# Patient Record
Sex: FEMALE | Race: WHITE | NOT HISPANIC OR LATINO | Employment: OTHER | URBAN - METROPOLITAN AREA
[De-identification: names, ages, dates, MRNs, and addresses within clinical notes are randomized per-mention and may not be internally consistent; named-entity substitution may affect disease eponyms.]

---

## 2017-01-09 DIAGNOSIS — M06.09 RHEUMATOID ARTHRITIS OF MULTIPLE SITES WITH NEGATIVE RHEUMATOID FACTOR (H): ICD-10-CM

## 2017-01-09 DIAGNOSIS — Z79.899 HIGH RISK MEDICATION USE: ICD-10-CM

## 2017-01-09 LAB
ALBUMIN SERPL-MCNC: 3.3 G/DL (ref 3.4–5)
ALP SERPL-CCNC: 62 U/L (ref 40–150)
ALT SERPL W P-5'-P-CCNC: 31 U/L (ref 0–50)
AST SERPL W P-5'-P-CCNC: 22 U/L (ref 0–45)
BASOPHILS # BLD AUTO: 0.1 10E9/L (ref 0–0.2)
BASOPHILS NFR BLD AUTO: 0.5 %
BILIRUB DIRECT SERPL-MCNC: <0.1 MG/DL (ref 0–0.2)
BILIRUB SERPL-MCNC: 0.4 MG/DL (ref 0.2–1.3)
CREAT SERPL-MCNC: 1.44 MG/DL (ref 0.52–1.04)
DIFFERENTIAL METHOD BLD: ABNORMAL
EOSINOPHIL # BLD AUTO: 0.1 10E9/L (ref 0–0.7)
EOSINOPHIL NFR BLD AUTO: 1.4 %
ERYTHROCYTE [DISTWIDTH] IN BLOOD BY AUTOMATED COUNT: 18.9 % (ref 10–15)
GFR SERPL CREATININE-BSD FRML MDRD: 34 ML/MIN/1.7M2
HCT VFR BLD AUTO: 35 % (ref 35–47)
HGB BLD-MCNC: 10.8 G/DL (ref 11.7–15.7)
LYMPHOCYTES # BLD AUTO: 2.1 10E9/L (ref 0.8–5.3)
LYMPHOCYTES NFR BLD AUTO: 20.6 %
MCH RBC QN AUTO: 28.7 PG (ref 26.5–33)
MCHC RBC AUTO-ENTMCNC: 30.9 G/DL (ref 31.5–36.5)
MCV RBC AUTO: 93 FL (ref 78–100)
MONOCYTES # BLD AUTO: 0.4 10E9/L (ref 0–1.3)
MONOCYTES NFR BLD AUTO: 4.1 %
NEUTROPHILS # BLD AUTO: 7.3 10E9/L (ref 1.6–8.3)
NEUTROPHILS NFR BLD AUTO: 73.4 %
PLATELET # BLD AUTO: 267 10E9/L (ref 150–450)
PROT SERPL-MCNC: 6.5 G/DL (ref 6.8–8.8)
RBC # BLD AUTO: 3.76 10E12/L (ref 3.8–5.2)
WBC # BLD AUTO: 10 10E9/L (ref 4–11)

## 2017-01-09 PROCEDURE — 82565 ASSAY OF CREATININE: CPT | Performed by: INTERNAL MEDICINE

## 2017-01-09 PROCEDURE — 80076 HEPATIC FUNCTION PANEL: CPT | Performed by: INTERNAL MEDICINE

## 2017-01-09 PROCEDURE — 36415 COLL VENOUS BLD VENIPUNCTURE: CPT | Performed by: INTERNAL MEDICINE

## 2017-01-09 PROCEDURE — 85025 COMPLETE CBC W/AUTO DIFF WBC: CPT | Performed by: INTERNAL MEDICINE

## 2017-01-10 NOTE — PROGRESS NOTES
Quick Note:    Rheumatology team: Please call to inform Ms. Stark that her renal function is slightly reduced, but similar to labs done in early December 2016. She also has mild anemia but will be reevaluated with her next labs.    Jamie Johnson MD  1/10/2017 4:52 PM    ______

## 2017-01-17 ENCOUNTER — TELEPHONE (OUTPATIENT)
Dept: FAMILY MEDICINE | Facility: CLINIC | Age: 82
End: 2017-01-17

## 2017-01-17 DIAGNOSIS — M06.09 RHEUMATOID ARTHRITIS OF MULTIPLE SITES WITH NEGATIVE RHEUMATOID FACTOR (H): Primary | ICD-10-CM

## 2017-01-17 RX ORDER — PREDNISONE 20 MG/1
TABLET ORAL
Qty: 5 TABLET | Refills: 0 | Status: SHIPPED | OUTPATIENT
Start: 2017-01-17 | End: 2017-01-25

## 2017-01-17 NOTE — TELEPHONE ENCOUNTER
Patients daughter notified of providers message as written.  Patients daugther verbalized understanding, no further questions or concerns.     Jerilyn Orr RN

## 2017-01-17 NOTE — TELEPHONE ENCOUNTER
Spoke with daughter, patients left foot toes and joints seem inflamed and are pink in color- especially in second toe, no swelling in big toe or pinky toe.  Sister in law is RN and thinks this gout, because they are on vacation in [place] she was hoping a prescription could be sent to pharmacy.  Patient is walking okay but it is painful.  This started last Wednesday 1/11/17 but is worse now.    Routing to provider to advise     Jerilyn Orr RN

## 2017-01-17 NOTE — TELEPHONE ENCOUNTER
Call her: I have sent prescription for prednisone 20 mg to be taken one daily (along with her daily 5 mg prednisone) for 5 days.     YISSEL LIGHT M.D.

## 2017-01-17 NOTE — TELEPHONE ENCOUNTER
Reason for call:  Patient reporting a symptom    Symptom or request: Left foot pain.    Duration (how long have symptoms been present): a week.    Have you been treated for this before? No    Additional comments: Requesting prescription for gout to be sent to [facility] in [place] [phone]    Phone Number patient can be reached at:  [phone]    Best Time:  any    Can we leave a detailed message on this number:  YES    Call taken on 1/17/2017 at 9:29 AM by Amarilis Chaudhry

## 2017-01-17 NOTE — TELEPHONE ENCOUNTER
Anna Hirsch is asking if the RX can be sent to [facility] in [place] [phone].  Thank-you,  .Lou Gudino

## 2017-01-25 ENCOUNTER — PRE VISIT (OUTPATIENT)
Dept: CARDIOLOGY | Facility: CLINIC | Age: 82
End: 2017-01-25

## 2017-01-25 ENCOUNTER — OFFICE VISIT (OUTPATIENT)
Dept: FAMILY MEDICINE | Facility: CLINIC | Age: 82
End: 2017-01-25
Payer: MEDICARE

## 2017-01-25 VITALS
TEMPERATURE: 97.1 F | WEIGHT: 120.2 LBS | HEIGHT: 61 IN | HEART RATE: 68 BPM | OXYGEN SATURATION: 99 % | SYSTOLIC BLOOD PRESSURE: 128 MMHG | BODY MASS INDEX: 22.69 KG/M2 | DIASTOLIC BLOOD PRESSURE: 58 MMHG

## 2017-01-25 DIAGNOSIS — B35.4 TINEA CORPORIS: ICD-10-CM

## 2017-01-25 DIAGNOSIS — B35.3 TINEA PEDIS OF BOTH FEET: ICD-10-CM

## 2017-01-25 DIAGNOSIS — M10.9 ACUTE GOUTY ARTHRITIS: Primary | ICD-10-CM

## 2017-01-25 LAB — URATE SERPL-MCNC: 5.7 MG/DL (ref 2.6–6)

## 2017-01-25 PROCEDURE — 84550 ASSAY OF BLOOD/URIC ACID: CPT | Performed by: FAMILY MEDICINE

## 2017-01-25 PROCEDURE — 99214 OFFICE O/P EST MOD 30 MIN: CPT | Performed by: FAMILY MEDICINE

## 2017-01-25 PROCEDURE — 36415 COLL VENOUS BLD VENIPUNCTURE: CPT | Performed by: FAMILY MEDICINE

## 2017-01-25 RX ORDER — ECONAZOLE NITRATE 10 MG/G
CREAM TOPICAL
Qty: 85 G | Refills: 3 | Status: SHIPPED | OUTPATIENT
Start: 2017-01-25 | End: 2018-08-23

## 2017-01-25 ASSESSMENT — PAIN SCALES - GENERAL: PAINLEVEL: MODERATE PAIN (4)

## 2017-01-25 NOTE — Clinical Note
[facility]  [address] [place] [address]    January 25, 2017    Roxanna Stark  [address] [facility] [address]  [place] [address]          Dear Roxanna,    Enclosed is a copy of your results. Your labs are all within normal limits  Your uric acid was normal. That really doesn't help much. I would have been helpful to know your uric acid on the day your gout started. I hope you never have gout again. Continue your healthy lifestyle.    Results for orders placed or performed in visit on 01/25/17   Uric acid   Result Value Ref Range    Uric Acid 5.7 2.6 - 6.0 mg/dL       If you have any questions or concerns, please call myself or my nurse at [phone].      Sincerely,        Letha Grissom MD, dr

## 2017-01-25 NOTE — PATIENT INSTRUCTIONS
[facility]    If you have any questions regarding to your visit please contact your care team:       Team Purple:   Clinic Hours Telephone Number   [address] Dr.   7am-7pm  Monday - Thursday   7am-5pm  Fridays  [phone]  (Appointment scheduling available 24/7)    Questions about your Visit?   Team Line:  [phone]   Urgent Care - [place] and [facility] - 11am-9pm Monday-Friday Saturday-Sunday- 9am-5pm   [place] - 5pm-9pm Monday-Friday Saturday-Sunday- 9am-5pm  [phone] - [facility]  [phone] - [place]       What options do I have for visits at the clinic other than the traditional office visit?  To expand how we care for you, many of our providers are utilizing electronic visits (e-visits) and telephone visits, when medically appropriate, for interactions with their patients rather than a visit in the clinic.   We also offer nurse visits for many medical concerns. Just like any other service, we will bill your insurance company for this type of visit based on time spent on the phone with your provider. Not all insurance companies cover these visits. Please check with your medical insurance if this type of visit is covered. You will be responsible for any charges that are not paid by your insurance.      E-visits via Miro:  generally incur a $35.00 fee.  Telephone visits:  Time spent on the phone: *charged based on time that is spent on the phone in increments of 10 minutes. Estimated cost:   5-10 mins $30.00   11-20 mins. $59.00   21-30 mins. $85.00     Use [email or web address] (secure email communication and access to your chart) to send your primary care provider a message or make an appointment. Ask someone on your Team how to sign up for Miro.  For a Price Quote for your services, please call our Consumer Price Line at [phone].  As always, Thank you for trusting us with your health care needs!

## 2017-01-25 NOTE — MR AVS SNAPSHOT
After Visit Summary   1/25/2017    Roxanna Stark    MRN: [id number]           Patient Information     Date Of Birth          5/20/1927        Visit Information        Provider Department      1/25/2017 9:00 AM Letha Grissom MD [facility]        Today's Diagnoses     Tinea corporis         Acute gouty arthritis         Tinea pedis of both feet           Care Instructions    [facility]    If you have any questions regarding to your visit please contact your care team:       Team Purple:   Clinic Hours Telephone Number   [address]NA [address] Dr[address] Dr.   7am-7pm  Monday - Thursday   7am-5pm  Fridays  [phone]  (Appointment scheduling available 24/7)    Questions about your Visit?   Team Line:  [phone]   Urgent Care - [place] - 11am-9pm Monday-Friday Saturday-Sunday- 9am-5pm   [place] - 5pm-9pm Monday-Friday Saturday-Sunday- 9am-5pm  [phone] - [facility]  [phone] - [place]       What options do I have for visits at the clinic other than the traditional office visit?  To expand how we care for you, many of our providers are utilizing electronic visits (e-visits) and telephone visits, when medically appropriate, for interactions with their patients rather than a visit in the clinic.   We also offer nurse visits for many medical concerns. Just like any other service, we will bill your insurance company for this type of visit based on time spent on the phone with your provider. Not all insurance companies cover these visits. Please check with your medical insurance if this type of visit is covered. You will be responsible for any charges that are not paid by your insurance.      E-visits via [employer]:  generally incur a $35.00 fee.  Telephone visits:  Time spent on the phone: *charged based on time that is spent on the phone in increments of 10 minutes. Estimated cost:   5-10  mins $30.00   11-20 mins. $59.00   21-30 mins. $85.00     Use IDENT Technologyhart (secure email communication and access to your chart) to send your primary care provider a message or make an appointment. Ask someone on your Team how to sign up for BIOSAFEt.  For a Price Quote for your services, please call our Consumer Price Line at [phone].  As always, Thank you for trusting us with your health care needs!            Follow-ups after your visit        Your next 10 appointments already scheduled     Feb 03, 2017  9:50 AM   Return Visit with Boston Navarro MD   [facility] ([facility])    [address]  [facility] [address]   [phone]            Feb 06, 2017  9:00 AM   LAB with [facility]   [facility] ([facility]    [address] [address] [phone]           Patient must bring picture ID.  Patient should be prepared to give a urine specimen  Please do not eat 10-12 hours before your appointment if you are coming in fasting for labs on lipids, cholesterol, or glucose (sugar).  Pregnant women should follow their Care Team instructions. Water with medications is okay. Do not drink coffee or other fluids.   If you have concerns about taking  your medications, please ask at office or if scheduling via [employer], send a message by clicking on Secure Messaging, Message Your Care Team.            Feb 09, 2017 10:00 AM   Return Visit with Jamie Johnson MD   [facility] ([facility])    [address] [address]   [phone]              Who to contact     If you have questions or need follow up information about today's clinic visit or your schedule please contact [facility] directly at [phone].  Normal or non-critical lab and imaging results will be communicated to you by MyChart, letter or phone within 4 business days after the  "clinic has received the results. If you do not hear from us within 7 days, please contact the clinic through CleveX or phone. If you have a critical or abnormal lab result, we will notify you by phone as soon as possible.  Submit refill requests through CleveX or call your pharmacy and they will forward the refill request to us. Please allow 3 business days for your refill to be completed.          Additional Information About Your Visit        Entrust[employer] Love QC Information     CleveX lets you send messages to your doctor, view your test results, renew your prescriptions, schedule appointments and more. To sign up, go to [email or web address] . Click on \"Log in\" on the left side of the screen, which will take you to the Welcome page. Then click on \"Sign up Now\" on the right side of the page.     You will be asked to enter the access code listed below, as well as some personal information. Please follow the directions to create your username and password.     Your access code is: [email or web address]  Expires: 2017  9:04 AM     Your access code will  in 90 days. If you need help or a new code, please call your [place] clinic or [phone].        Care EveryWhere ID     This is your Care EveryWhere ID. This could be used by other organizations to access your [place] medical records  [id number]        Your Vitals Were     Pulse Temperature Height BMI (Body Mass Index) Pulse Oximetry       68 97.1  F (36.2  C) (Oral) 5' 1\" (1.549 m) 22.72 kg/m2 99%        Blood Pressure from Last 3 Encounters:   17 128/58   16 126/71   11/10/16 151/68    Weight from Last 3 Encounters:   17 120 lb 3.2 oz (54.522 kg)   11/10/16 120 lb (54.432 kg)   16 118 lb (53.524 kg)              We Performed the Following     Uric acid          Today's Medication Changes          These changes are accurate as of: 17  9:34 AM.  If you have any questions, ask your nurse or doctor.               Start taking these " medicines.        Dose/Directions    econazole nitrate 1 % cream   Used for:  Tinea corporis, Tinea pedis of both feet   Started by:  Letha Grissom MD        Apply to red rash of legs and feet twice a day till rash is gone   Quantity:  85 g   Refills:  3            Where to get your medicines      These medications were sent to [id number] - [place], [place] - [address] [facility] [address], [facility] [address]     Phone:  [phone]    - econazole nitrate 1 % cream             Primary Care Provider Office Phone # Fax #    Letha Grissom [phone]       [address] [address]        Thank you!     Thank you for choosing [facility]  for your care. Our goal is always to provide you with excellent care. Hearing back from our patients is one way we can continue to improve our services. Please take a few minutes to complete the written survey that you may receive in the mail after your visit with us. Thank you!             Your Updated Medication List - Protect others around you: Learn how to safely use, store and throw away your medicines at www.disposemymeds.org.          This list is accurate as of: 1/25/17  9:34 AM.  Always use your most recent med list.                   Brand Name Dispense Instructions for use    aspirin 325 MG EC tablet     90 tablet    Take 1 tablet (325 mg) by mouth daily       calcium-vitamin D 500-125 MG-UNIT Tabs          econazole nitrate 1 % cream     85 g    Apply to red rash of legs and feet twice a day till rash is gone       folic acid 1 MG tablet    FOLVITE    100 tablet    Take 1 tablet (1 mg) by mouth daily       furosemide 20 MG tablet    LASIX    90 tablet    TAKE 1 TABLET BY MOUTH EVERY DAY IF 2 TO 3 POUNDS WEIGHT PAIN OVER 2 DAY PERIOD       ICAPS PO      2 tablets daily       lisinopril 5 MG tablet    PRINIVIL/ZESTRIL    90 tablet     Take 1 tablet (5 mg) by mouth daily       methotrexate sodium 2.5 MG Tabs     24 tablet    Take 15 mg by mouth once a week . Take all 6 tablets on the same day of each week.       metoprolol 25 MG tablet    LOPRESSOR    180 tablet    Take 1 tablet (25 mg) by mouth 2 times daily       OMEGA-3 FISH OIL PO      Take 2 g by mouth daily       predniSONE 5 MG tablet    DELTASONE    30 tablet    Take 1 tablet (5 mg) by mouth daily

## 2017-01-25 NOTE — PROGRESS NOTES
SUBJECTIVE:                                                    Roxanna Stark is a 89 year old female who presents to clinic today for the following health issues:      Musculoskeletal problem/pain      Duration: ongoing worse in the last week    Description  Location: left foot     Intensity:  mild, 4/10    Accompanying signs and symptoms: swelling, redness and burning feeling, hurts to touch it-stabbing pain    History  Previous similar problem: no   Previous evaluation:  none    Precipitating or alleviating factors:  Trauma or overuse: no   Aggravating factors include: standing, walking, climbing stairs and am pain    Therapies tried and outcome: rest/inactivity and covering with bandaid            Problem list and histories reviewed & adjusted, as indicated.  Additional history: as documented    Patient Active Problem List   Diagnosis     Polymyalgia rheumatica (H)     History of basal cell carcinoma     CARDIOVASCULAR SCREENING; LDL GOAL LESS THAN 130     Advanced directives, counseling/discussion     Hypertension goal BP (blood pressure) < 140/90     Hip pain     Left atrial enlargement     Aortic stenosis     Status post coronary angiogram     Aortic valve stenosis     Aortic valve replaced     Rheumatoid arthritis of multiple sites with negative rheumatoid factor (H)     Past Surgical History   Procedure Laterality Date     Colonoscopy  2002     C skin tissue procedure unlisted  11/3/08     mmis skin cancer excision     Cataract iol, rt/lt  5/09     bilateral     Replace valve aortic N/A 4/25/2016     Procedure: REPLACE VALVE AORTIC;  Surgeon: Sudeep Tsai MD;  Location:  OR       Social History   Substance Use Topics     Smoking status: Never Smoker      Smokeless tobacco: Never Used     Alcohol Use: Yes     Family History   Problem Relation Age of Onset     Breast Cancer Sister      Arthritis Sister      CANCER Sister      breast     Thyroid Disease Sister      CANCER Sister      colon      "Arthritis Sister      Arthritis Mother      Hypertension Father      Cancer - colorectal Father      Prostate Cancer Father      Arthritis Father      HEART DISEASE Father      Lipids Father      Arthritis Sister      Asthma Daughter      Asthma Daughter      CANCER Daughter 58     lung     CANCER  81     pancreatic          BP Readings from Last 3 Encounters:   01/25/17 128/58   11/11/16 126/71   11/10/16 151/68    Wt Readings from Last 3 Encounters:   01/25/17 120 lb 3.2 oz (54.522 kg)   11/10/16 120 lb (54.432 kg)   08/11/16 118 lb (53.524 kg)                  Labs reviewed in EPIC  Problem list, Medication list, Allergies, and Medical/Social/Surgical histories reviewed in [facility] and updated as appropriate.    ROS:  This 89 year old female is here today because she was on vacation with her grandson and daughter in [place], [place] last week and she suddenly had severe pain in her left foot. She is on prednisone 5 mg daily for chronic polymyalgia rheumatica and rheumatoid arthritis. Daughter called me on 1/17/17 and I felt patient most likely had acute gout given that her middle toe was the most painful, red, and inflamed. I treated her with an additional 20 mg prednisone daily for 5 days. She is back home now. She still has pain in that toe but it is getting better. She is able to walk and drive. However, she has developed a rash on her feet and on her left lower leg. It doesn't itch. All other review of systems are negative  Personal, family, and social history reviewed with patient and revised.         OBJECTIVE:                                                    /58 mmHg  Pulse 68  Temp(Src) 97.1  F (36.2  C) (Oral)  Ht 5' 1\" (1.549 m)  Wt 120 lb 3.2 oz (54.522 kg)  BMI 22.72 kg/m2  SpO2 99%  Body mass index is 22.72 kg/(m^2).   patient has classic tinea corporis on her left lower medial leg. Large area: larger than a silver dollar.   Patient has tinea pedis of both her feet, most likely due to her " chronic prednisone use and recent warm weather exposure  Patient has resolving gout of her left 3rd and 4th toes. 3rd is still quite painful and red. Top of left foot is still red and swollen. She probably doesn't need more prednisone at this time as she can wear shoes and walk.   Well hydrated  Well nourished  Well groomed  Alert and oriented X 3  Good spirits  Gait is quite brisk with no limp       Diagnostic Test Results:  none      ASSESSMENT/PLAN:                                                             1. Acute gouty arthritis  As above   - Uric acid    2. Tinea corporis  As above   - econazole nitrate 1 % cream; Apply to red rash of legs and feet twice a day till rash is gone  Dispense: 85 g; Refill: 3    3. Tinea pedis of both feet  As above   - econazole nitrate 1 % cream; Apply to red rash of legs and feet twice a day till rash is gone  Dispense: 85 g; Refill: 3    Return to clinic if no improvement     YISSEL LIGHT MD  [facility]

## 2017-01-25 NOTE — NURSING NOTE
"Chief Complaint   Patient presents with     Musculoskeletal Problem       Initial /58 mmHg  Pulse 68  Temp(Src) 97.1  F (36.2  C) (Oral)  Ht 5' 1\" (1.549 m)  Wt 120 lb 3.2 oz (54.522 kg)  BMI 22.72 kg/m2  SpO2 99% Estimated body mass index is 22.72 kg/(m^2) as calculated from the following:    Height as of this encounter: 5' 1\" (1.549 m).    Weight as of this encounter: 120 lb 3.2 oz (54.522 kg).  BP completed using cuff size: teresita Soriano CMA    "

## 2017-01-25 NOTE — TELEPHONE ENCOUNTER
Last Assessment & Plan:     1. Severe AS. S/p AVR with a #21 tissue prosthesis. Excellent post-surgical results.  2. Hypertension: well controlled.  3. Polymyalgia.  4. Mild volume overload.    4. Plan:    - Lasix 20 mg PO BID for 3 days; then take lasix 20 mg PO QD if 2-3 lbs weight gain over 2 day period. Quit taking lasix when weight returns to normal. Target weight 115-117 lbs.  - BMP in 2 weeks. (Basic metabolic panel LAST COMPLETED 12/14/2016)  - ASA 81 mg PO QHS.  - RTC in 6 months.      Boston Navarro MD, PhD    Chart prep completed; patient is up to date on requested cardiac/lab testing. No further information or testing needed at this time.   SORAIDA LatifM.A.

## 2017-01-31 ENCOUNTER — TELEPHONE (OUTPATIENT)
Dept: FAMILY MEDICINE | Facility: CLINIC | Age: 82
End: 2017-01-31

## 2017-01-31 NOTE — TELEPHONE ENCOUNTER
Call her. Ring worm takes a long time to go away. Is her ring worm rash spreading? She needs to use the cream regularly and faithfully.     YISSEL LIGHT M.D.

## 2017-01-31 NOTE — TELEPHONE ENCOUNTER
Patient notified of providers message as written.  Patient verbalized understanding, no further questions or concerns.  Patient will call back if worsening symptoms    Jerilyn Orr RN

## 2017-01-31 NOTE — TELEPHONE ENCOUNTER
Reason for call:  Patient reporting a symptom    Symptom or request: Left foot hurts    Duration (how long have symptoms been present): awhile    Have you been treated for this before? Yes    Additional comments: Patient states the econazole nitrate is not helping her. Please call.    Phone Number patient can be reached at:  Home number on file [phone] (home)    Best Time:  any    Can we leave a detailed message on this number:  YES    Call taken on 1/31/2017 at 2:14 PM by Pippa Mullins

## 2017-01-31 NOTE — TELEPHONE ENCOUNTER
Patient was seen on 1/25/17 and given Econazole cream.  Patient states it is not helping with her pain.  What are the next steps?  Please advise   Jerilyn Orr RN

## 2017-02-03 ENCOUNTER — OFFICE VISIT (OUTPATIENT)
Dept: CARDIOLOGY | Facility: CLINIC | Age: 82
End: 2017-02-03
Payer: MEDICARE

## 2017-02-03 VITALS
HEART RATE: 68 BPM | WEIGHT: 115 LBS | SYSTOLIC BLOOD PRESSURE: 131 MMHG | DIASTOLIC BLOOD PRESSURE: 68 MMHG | BODY MASS INDEX: 21.74 KG/M2 | OXYGEN SATURATION: 100 %

## 2017-02-03 DIAGNOSIS — I35.0 NONRHEUMATIC AORTIC VALVE STENOSIS: Primary | ICD-10-CM

## 2017-02-03 DIAGNOSIS — Z95.2 S/P AVR (AORTIC VALVE REPLACEMENT): ICD-10-CM

## 2017-02-03 PROCEDURE — 99213 OFFICE O/P EST LOW 20 MIN: CPT | Performed by: INTERNAL MEDICINE

## 2017-02-03 ASSESSMENT — PAIN SCALES - GENERAL: PAINLEVEL: NO PAIN (0)

## 2017-02-03 NOTE — PROGRESS NOTES
CARDIOLOGY CLINIC FOLLOW UP    HPI: Roxanna Stark is an 89 year-old very pleasant female patient seen today in follow up. She was initially seen for severe aortic stenosis and underwent AVR with a 21 mm tissue valve on 4/21/2016 by Dr. Tsai. Her postoperative course was uneventful. She is very active. She denies chest discomfort, dyspnea, PND, orthopnea, pedal edema, palpitations, lightheadedness, and syncope. When she was last seen in clinic, I discontinued her furosemide and K supplement. Today, she returns to clinic and is asymptomatic.    PAST MEDICAL HISTORY:  Past Medical History   Diagnosis Date     Polymyalgia rheumatica (H) 11/99     Temporal arteritis (H) 11/99     HTN (hypertension)      Melanoma in situ (H) 9/30/08     left arm     Basal cell carcinoma 9/30/08     left cheek     melanoma in situ 9/30/2008     LEFT ARM     Actinic keratosis      Basal cell cancer 7/2014     left eye medial canthus      Aortic stenosis 2014     CURRENT MEDICATIONS:  Current Outpatient Prescriptions   Medication Sig Dispense Refill     econazole nitrate 1 % cream Apply to red rash of legs and feet twice a day till rash is gone 85 g 3     methotrexate sodium 2.5 MG TABS Take 15 mg by mouth once a week . Take all 6 tablets on the same day of each week. 24 tablet 3     folic acid (FOLVITE) 1 MG tablet Take 1 tablet (1 mg) by mouth daily 100 tablet 3     predniSONE (DELTASONE) 5 MG tablet Take 1 tablet (5 mg) by mouth daily 30 tablet 3     metoprolol (LOPRESSOR) 25 MG tablet Take 1 tablet (25 mg) by mouth 2 times daily 180 tablet 3     lisinopril (PRINIVIL,ZESTRIL) 5 MG tablet Take 1 tablet (5 mg) by mouth daily 90 tablet 3     furosemide (LASIX) 20 MG tablet TAKE 1 TABLET BY MOUTH EVERY DAY IF 2 TO 3 POUNDS WEIGHT PAIN OVER 2 DAY PERIOD 90 tablet 1     calcium-vitamin D 500-125 MG-UNIT TABS        aspirin  MG EC tablet Take 1 tablet (325 mg) by mouth daily 90 tablet 3     Omega-3 Fatty Acids (OMEGA-3 FISH OIL PO) Take 2 g  by mouth daily       ICAPS PO 2 tablets daily       PAST SURGICAL HISTORY:  Past Surgical History   Procedure Laterality Date     Colonoscopy  2002     C skin tissue procedure unlisted  11/3/08     mmis skin cancer excision     Cataract iol, rt/lt  5/09     bilateral     Replace valve aortic N/A 4/25/2016     Procedure: REPLACE VALVE AORTIC;  Surgeon: Sudeep Tsai MD;  Location: [facility] OR     ALLERGIES  Review of patient's allergies indicates no known allergies.    FAMILY HX:  Family History   Problem Relation Age of Onset     Breast Cancer Sister      Arthritis Sister      CANCER Sister      breast     Thyroid Disease Sister      CANCER Sister      colon     Arthritis Sister      Arthritis Mother      Hypertension Father      Cancer - colorectal Father      Prostate Cancer Father      Arthritis Father      HEART DISEASE Father      Lipids Father      Arthritis Sister      Asthma Daughter      Asthma Daughter      CANCER Daughter 58     lung     CANCER  81     pancreatic      SOCIAL HX:  History     Social History     Marital Status:      Spouse Name: N/A     Number of Children: N/A     Years of Education: N/A     Occupational History      Retired     Social History Main Topics     Smoking status: Never Smoker      Smokeless tobacco: Never Used     Alcohol Use: Yes     Drug Use: No     Sexual Activity: No     Other Topics Concern     Not on file     Social History Narrative     ROS:  Constitutional: No fever, chills, or sweats. No weight gain/loss.   ENT: No visual disturbance, ear ache, epistaxis, sore throat.   Allergies/Immunologic: Negative.   Respiratory: No cough, hemoptysis.   Cardiovascular: As per HPI.   GI: No nausea, vomiting, hematemesis, melena, or hematochezia.   : No urinary frequency, dysuria, or hematuria.   Integument: Negative.   Psychiatric: Negative.   Neuro: Negative.   Endocrinology: Negative.   Musculoskeletal: No myalgia. The sternal wound is healing well without  erythema.    VITAL SIGNS:  /68 mmHg  Pulse 68  Wt 115 lb (52.164 kg)  SpO2 100%  Body mass index is 21.74 kg/(m^2).  Wt Readings from Last 2 Encounters:   17 115 lb (52.164 kg)   17 120 lb 3.2 oz (54.522 kg)       PHYSICAL EXAM  Roxanna Stark is an 89 year old female in no acute distress.  HEENT: Unremarkable.  Neck: JVP normal.  Carotids +4/4 bilaterally without bruits.  Lungs: CTA.  Cor: RRR. Normal S1 and S2, soft MICHAELA.  Abd: Soft, nontender, nondistended.  NABS.  No pulsatile mass.  Extremities: Trace to mild bilateral LE edema.  Pulses +4/4 symmetric in upper and lower extremities.  Neuro: Grossly intact.    LABS    WBC     12.5   2012  RBC     4.50   2012  HGB     13.9   2012  HCT     40.5   2012  No components found with this name: mct  MCV       90   2012  MCH     30.8   2012  MCHC     34.2   2012  RDW     13.3   2012  PLT      296   2012   Recent Labs   Lab Test  14   1426  13   1403   POTASSIUM  4.4  4.3   CR  1.10*  0.90     EK12.   Sinus Rhythm - occasional ectopic ventricular beat.    ECHOCARDIOGRAMS:   2016  Global and regional left ventricular function is normal with an EF of 60-65%.  There is at least moderate diastolic dysfunction.  Global right ventricular function is normal.  Severe aortic stenosis is present. The mean gradient across the aortic valve is 61 mmHg. The peak aortic velocity is 5.1 m/sec. JAVIER 0.6 cm2.    10/13/2014   1. Normal biventricular systolic function. LVEF estimate 65%.   2. Moderate aortic stenosis (peak velocity: 3.9 m/s, mean gradient: 36 mmHg, calculated valve area: 1.2 cm2).   3. Normal IVC with preserved respiratory variability.   4. Compared to study dated 02/10/14 the aortic valve parameters have slightly worsened.    14  Global and regional left ventricular function is normal with an EF of 55-60%. Global right ventricular function is normal. Trace tricuspid insufficiency is  present. Right ventricular systolic pressure is 19mmHg above the right atrial pressure. The inferior vena cava was normal in size with preserved respiratory variability. Small circumferential pericardial effusion is present without any hemodynamic significance. Chamber compression is not present; there is no evidence for tamponade.   Left Ventricle: Global and regional left ventricular function is normal with an EF of 55-60%. Left ventricular size is normal. Left ventricular Doppler filling pattern consistent with abnormal relaxation.   Right Ventricle: The right ventricle is normal size. Global right ventricular function is normal.   Atria: The right atria appears normal. Moderate left atrial enlargement is present.   Mitral Valve: Mild mitral annular calcification is present. Trace mitral insufficiency is present.   Aortic Valve: Mild aortic valve sclerosis is present. No aortic regurgitation is present.   Tricuspid Valve: The tricuspid valve is normal. Trace tricuspid insufficiency is present. Right ventricular systolic pressure is 19mmHg above the right atrial pressure.   Pulmonic Valve: The pulmonic valve is normal. Trace pulmonic insufficiency is present.   Vessels: The aorta root is normal. The inferior vena cava was normal in size with preserved respiratory variability.   Pericardium: Small circumferential pericardial effusion is present without any hemodynamic significance. Chamber compression is not present; there is no evidence for tamponade.    05/11/12  Left ventricular function, chamber size, wall motion, and wall thickness are normal.The EF is > 65%. Paradoxic low flow aortic stenosis with moderate to severe reduction in valve area. Visually the valve appears moderately calcified with mild-moderate cusp restriction.   Left Ventricle: Left ventricular function, chamber size, wall motion, and wall thickness are normal.The EF is > 65%. Relative wall thickness is increased consistent with concentric  remodeling.   Right Ventricle: Right ventricular function, chamber size, wall motion, and thickness are normal.   Atria: Both atria appear normal.   Mitral Valve: Mild to moderate mitral annular calcification is present. Systolic anterior motion without gradient.   Aortic Valve: The aortic valve is tricuspid. Mild aortic valve sclerosis is present.   Tricuspid Valve: The tricuspid valve is normal. Trace tricuspid insufficiency is present.   Pulmonic Valve: The pulmonic valve cannot be assessed.   Vessels: The aorta root is normal. The pulmonary artery is normal. The inferior vena cava is normal.   Pericardium: No pericardial effusion is present.     ASSESSMENT AND PLAN:   1. Severe AS. S/p AVR with a #21 tissue prosthesis. Excellent post-surgical results.  2. Hypertension: well controlled.  3. RA.  4. Plan:   - No changes to medications today. Euvolemic. Weight target 114-117 lbs. No need to take furosemide.  - RTC in 6 months with TTE.     Boston Navarro MD, PhD

## 2017-02-03 NOTE — Clinical Note
2/3/2017      RE: Roxanna Stark  [address]  [place] [address]       Dear Colleague,    Thank you for the opportunity to participate in the care of your patient, Roxanna Stark, at the [facility]. Please see a copy of my visit note below.    CARDIOLOGY CLINIC FOLLOW UP    HPI: Roxanna Stark is an 89 year-old very pleasant female patient seen today in follow up. She was initially seen for severe aortic stenosis and underwent AVR with a 21 mm tissue valve on 4/21/2016 by Dr. Tsai. Her postoperative course was uneventful. She is very active. She denies chest discomfort, dyspnea, PND, orthopnea, pedal edema, palpitations, lightheadedness, and syncope. When she was last seen in clinic, I discontinued her furosemide and K supplement. Today, she returns to clinic and is asymptomatic.    PAST MEDICAL HISTORY:  Past Medical History   Diagnosis Date     Polymyalgia rheumatica (H) 11/99     Temporal arteritis (H) 11/99     HTN (hypertension)      Melanoma in situ (H) 9/30/08     left arm     Basal cell carcinoma 9/30/08     left cheek     melanoma in situ 9/30/2008     LEFT ARM     Actinic keratosis      Basal cell cancer 7/2014     left eye medial canthus      Aortic stenosis 2014     CURRENT MEDICATIONS:  Current Outpatient Prescriptions   Medication Sig Dispense Refill     econazole nitrate 1 % cream Apply to red rash of legs and feet twice a day till rash is gone 85 g 3     methotrexate sodium 2.5 MG TABS Take 15 mg by mouth once a week . Take all 6 tablets on the same day of each week. 24 tablet 3     folic acid (FOLVITE) 1 MG tablet Take 1 tablet (1 mg) by mouth daily 100 tablet 3     predniSONE (DELTASONE) 5 MG tablet Take 1 tablet (5 mg) by mouth daily 30 tablet 3     metoprolol (LOPRESSOR) 25 MG tablet Take 1 tablet (25 mg) by mouth 2 times daily 180 tablet 3     lisinopril (PRINIVIL,ZESTRIL) 5 MG  tablet Take 1 tablet (5 mg) by mouth daily 90 tablet 3     furosemide (LASIX) 20 MG tablet TAKE 1 TABLET BY MOUTH EVERY DAY IF 2 TO 3 POUNDS WEIGHT PAIN OVER 2 DAY PERIOD 90 tablet 1     calcium-vitamin D 500-125 MG-UNIT TABS        aspirin  MG EC tablet Take 1 tablet (325 mg) by mouth daily 90 tablet 3     Omega-3 Fatty Acids (OMEGA-3 FISH OIL PO) Take 2 g by mouth daily       ICAPS PO 2 tablets daily       PAST SURGICAL HISTORY:  Past Surgical History   Procedure Laterality Date     Colonoscopy  2002     C skin tissue procedure unlisted  11/3/08     mmis skin cancer excision     Cataract iol, rt/lt  5/09     bilateral     Replace valve aortic N/A 4/25/2016     Procedure: REPLACE VALVE AORTIC;  Surgeon: Sudeep Tsai MD;  Location: [facility] OR     ALLERGIES  Review of patient's allergies indicates no known allergies.    FAMILY HX:  Family History   Problem Relation Age of Onset     Breast Cancer Sister      Arthritis Sister      CANCER Sister      breast     Thyroid Disease Sister      CANCER Sister      colon     Arthritis Sister      Arthritis Mother      Hypertension Father      Cancer - colorectal Father      Prostate Cancer Father      Arthritis Father      HEART DISEASE Father      Lipids Father      Arthritis Sister      Asthma Daughter      Asthma Daughter      CANCER Daughter 58     lung     CANCER  81     pancreatic      SOCIAL HX:  History     Social History     Marital Status:      Spouse Name: N/A     Number of Children: N/A     Years of Education: N/A     Occupational History      Retired     Social History Main Topics     Smoking status: Never Smoker      Smokeless tobacco: Never Used     Alcohol Use: Yes     Drug Use: No     Sexual Activity: No     Other Topics Concern     Not on file     Social History Narrative     ROS:  Constitutional: No fever, chills, or sweats. No weight gain/loss.   ENT: No visual disturbance, ear ache, epistaxis, sore throat.   Allergies/Immunologic:  Negative.   Respiratory: No cough, hemoptysis.   Cardiovascular: As per HPI.   GI: No nausea, vomiting, hematemesis, melena, or hematochezia.   : No urinary frequency, dysuria, or hematuria.   Integument: Negative.   Psychiatric: Negative.   Neuro: Negative.   Endocrinology: Negative.   Musculoskeletal: No myalgia. The sternal wound is healing well without erythema.    VITAL SIGNS:  /68 mmHg  Pulse 68  Wt 115 lb (52.164 kg)  SpO2 100%  Body mass index is 21.74 kg/(m^2).  Wt Readings from Last 2 Encounters:   17 115 lb (52.164 kg)   17 120 lb 3.2 oz (54.522 kg)       PHYSICAL EXAM  Roxanna Stark is an 89 year old female in no acute distress.  HEENT: Unremarkable.  Neck: JVP normal.  Carotids +4/4 bilaterally without bruits.  Lungs: CTA.  Cor: RRR. Normal S1 and S2, soft MICHAELA.  Abd: Soft, nontender, nondistended.  NABS.  No pulsatile mass.  Extremities: Trace to mild bilateral LE edema.  Pulses +4/4 symmetric in upper and lower extremities.  Neuro: Grossly intact.    LABS    WBC     12.5   2012  RBC     4.50   2012  HGB     13.9   2012  HCT     40.5   2012  No components found with this name: mct  MCV       90   2012  MCH     30.8   2012  MCHC     34.2   2012  RDW     13.3   2012  PLT      296   2012   Recent Labs   Lab Test  14   1426  13   1403   POTASSIUM  4.4  4.3   CR  1.10*  0.90     EK12.   Sinus Rhythm - occasional ectopic ventricular beat.    ECHOCARDIOGRAMS:   2016  Global and regional left ventricular function is normal with an EF of 60-65%.  There is at least moderate diastolic dysfunction.  Global right ventricular function is normal.  Severe aortic stenosis is present. The mean gradient across the aortic valve is 61 mmHg. The peak aortic velocity is 5.1 m/sec. JAVIER 0.6 cm2.    10/13/2014   1. Normal biventricular systolic function. LVEF estimate 65%.   2. Moderate aortic stenosis (peak velocity: 3.9 m/s, mean gradient: 36  mmHg, calculated valve area: 1.2 cm2).   3. Normal IVC with preserved respiratory variability.   4. Compared to study dated 02/10/14 the aortic valve parameters have slightly worsened.    02/11/14  Global and regional left ventricular function is normal with an EF of 55-60%. Global right ventricular function is normal. Trace tricuspid insufficiency is present. Right ventricular systolic pressure is 19mmHg above the right atrial pressure. The inferior vena cava was normal in size with preserved respiratory variability. Small circumferential pericardial effusion is present without any hemodynamic significance. Chamber compression is not present; there is no evidence for tamponade.   Left Ventricle: Global and regional left ventricular function is normal with an EF of 55-60%. Left ventricular size is normal. Left ventricular Doppler filling pattern consistent with abnormal relaxation.   Right Ventricle: The right ventricle is normal size. Global right ventricular function is normal.   Atria: The right atria appears normal. Moderate left atrial enlargement is present.   Mitral Valve: Mild mitral annular calcification is present. Trace mitral insufficiency is present.   Aortic Valve: Mild aortic valve sclerosis is present. No aortic regurgitation is present.   Tricuspid Valve: The tricuspid valve is normal. Trace tricuspid insufficiency is present. Right ventricular systolic pressure is 19mmHg above the right atrial pressure.   Pulmonic Valve: The pulmonic valve is normal. Trace pulmonic insufficiency is present.   Vessels: The aorta root is normal. The inferior vena cava was normal in size with preserved respiratory variability.   Pericardium: Small circumferential pericardial effusion is present without any hemodynamic significance. Chamber compression is not present; there is no evidence for tamponade.    05/11/12  Left ventricular function, chamber size, wall motion, and wall thickness are normal.The EF is > 65%.  Paradoxic low flow aortic stenosis with moderate to severe reduction in valve area. Visually the valve appears moderately calcified with mild-moderate cusp restriction.   Left Ventricle: Left ventricular function, chamber size, wall motion, and wall thickness are normal.The EF is > 65%. Relative wall thickness is increased consistent with concentric remodeling.   Right Ventricle: Right ventricular function, chamber size, wall motion, and thickness are normal.   Atria: Both atria appear normal.   Mitral Valve: Mild to moderate mitral annular calcification is present. Systolic anterior motion without gradient.   Aortic Valve: The aortic valve is tricuspid. Mild aortic valve sclerosis is present.   Tricuspid Valve: The tricuspid valve is normal. Trace tricuspid insufficiency is present.   Pulmonic Valve: The pulmonic valve cannot be assessed.   Vessels: The aorta root is normal. The pulmonary artery is normal. The inferior vena cava is normal.   Pericardium: No pericardial effusion is present.     ASSESSMENT AND PLAN:   1. Severe AS. S/p AVR with a #21 tissue prosthesis. Excellent post-surgical results.  2. Hypertension: well controlled.  3. RA.  4. Plan:   - No changes to medications today. Euvolemic. Weight target 114-117 lbs. No need to take furosemide.  - RTC in 6 months with TTE.     Boston Navarro MD, PhD

## 2017-02-03 NOTE — MR AVS SNAPSHOT
After Visit Summary   2/3/2017    Roxanna Stark    MRN: [id number]           Patient Information     Date Of Birth          5/20/1927        Visit Information        Provider Department      2/3/2017 9:50 AM Boston Navarro MD [facility] PHYSICIANS HEART AT [facility]        Today's Diagnoses     Aortic stenosis    -  1     Left atrial enlargement           Care Instructions    1. Dr. Boston Navarro has ordered an echocardiogram to be performed. We have scheduled your echocardiogram appointment at the  [facility] Imaging Department ([address] [place] [address]) for Friday, August 4th, 2017 at 9:00am.  Please arrive 15 minutes early to allow time for registration.  The Cardiology Nurse will contact you regarding the results (please see result notification details at bottom of summary).    Echocardiogram Instructions:  -Wear comfortable clothing  -Refrain from wearing perfumes or scented lotions      2. We have scheduled you for a cardiac follow up appointment with Dr. Boston Navarro at the [facility]  ([address] [place] [address]) on Friday, August 4th, 2017 at 10:30am with a check-in arrival time of 10:15am.  If this appointment conflicts with your schedule, please contact our specialty schedulers at [phone] to reschedule. We look forward to seeing you again.        [facility] Cardiology [facility] Location    If you have any questions regarding to your visit please contact your care team:     Cardiology  Telephone Number   Leonard Quinones  Cardiology RN's.    Goldie Rodriguez CMA [phone]    After hours: [phone].  [phone]   For scheduling appts:     [phone] or  [phone]    After hours: [phone]   For the Device Clinic (Pacemakers and ICD's)  RN's :  Rosa Knight   During business hours: [phone]  After business hours:  [phone]- select option 4.      If you need a medication refill please  contact your pharmacy.  Please allow 3 business days for your refill to be completed.    As always, Thank you for trusting us with your health care needs!  _____________________________________________________________________            Follow-ups after your visit        Your next 10 appointments already scheduled     Feb 06, 2017  9:00 AM   LAB with [facility]   [facility]    [address] [address]   [phone]           Patient must bring picture ID.  Patient should be prepared to give a urine specimen  Please do not eat 10-12 hours before your appointment if you are coming in fasting for labs on lipids, cholesterol, or glucose (sugar).  Pregnant women should follow their Care Team instructions. Water with medications is okay. Do not drink coffee or other fluids.   If you have concerns about taking  your medications, please ask at office or if scheduling via [email or web address], send a message by clicking on Secure Messaging, Message Your Care Team.            Feb 09, 2017 10:00 AM   Return Visit with Jamie Johnson MD   [facility] ([facility]    [address] [address]   [phone]            Aug 04, 2017 10:30 AM   Return Visit with Boston Navarro MD   [facility] PHYSICIANS HEART AT [facility] ([facility])    [address] [place] [address] [address] [facility] [address]   [phone]              Future tests that were ordered for you today     Open Future Orders        Priority Expected Expires Ordered    Echocardiogram Complete Routine  2/3/2018 2/3/2017            Who to contact     If you have questions or need follow up information about today's clinic visit or your schedule please contact [facility] PHYSICIANS HEART AT [facility] directly at [phone].  Normal or non-critical lab and imaging results will be communicated to you by MyChart, letter or phone within  "4 business days after the clinic has received the results. If you do not hear from us within 7 days, please contact the clinic through [employer] or phone. If you have a critical or abnormal lab result, we will notify you by phone as soon as possible.  Submit refill requests through [employer] or call your pharmacy and they will forward the refill request to us. Please allow 3 business days for your refill to be completed.          Additional Information About Your Visit        [employer] Information     [employer] lets you send messages to your doctor, view your test results, renew your prescriptions, schedule appointments and more. To sign up, go to [email or web address] . Click on \"Log in\" on the left side of the screen, which will take you to the Welcome page. Then click on \"Sign up Now\" on the right side of the page.     You will be asked to enter the access code listed below, as well as some personal information. Please follow the directions to create your username and password.     Your access code is: [email or web address]  Expires: 2017  9:04 AM     Your access code will  in 90 days. If you need help or a new code, please call your [facility] clinic or [phone].        Care EveryWhere ID     This is your Care EveryWhere ID. This could be used by other organizations to access your [facility] medical records  [id number]        Your Vitals Were     Pulse Pulse Oximetry                68 100%           Blood Pressure from Last 3 Encounters:   17 131/68   17 128/58   16 126/71    Weight from Last 3 Encounters:   17 52.164 kg (115 lb)   17 54.522 kg (120 lb 3.2 oz)   11/10/16 54.432 kg (120 lb)               Primary Care Provider Office Phone # Fax #    Letha Grissom [phone]       [address] [address]        Thank you!     Thank you for choosing [facility] PHYSICIANS HEART AT [facility]  for your " care. Our goal is always to provide you with excellent care. Hearing back from our patients is one way we can continue to improve our services. Please take a few minutes to complete the written survey that you may receive in the mail after your visit with us. Thank you!             Your Updated Medication List - Protect others around you: Learn how to safely use, store and throw away your medicines at www.disposemymeds.org.          This list is accurate as of: 2/3/17 10:31 AM.  Always use your most recent med list.                   Brand Name Dispense Instructions for use    aspirin 325 MG EC tablet     90 tablet    Take 1 tablet (325 mg) by mouth daily       calcium-vitamin D 500-125 MG-UNIT Tabs          econazole nitrate 1 % cream     85 g    Apply to red rash of legs and feet twice a day till rash is gone       folic acid 1 MG tablet    FOLVITE    100 tablet    Take 1 tablet (1 mg) by mouth daily       furosemide 20 MG tablet    LASIX    90 tablet    TAKE 1 TABLET BY MOUTH EVERY DAY IF 2 TO 3 POUNDS WEIGHT PAIN OVER 2 DAY PERIOD       ICAPS PO      2 tablets daily       lisinopril 5 MG tablet    PRINIVIL/ZESTRIL    90 tablet    Take 1 tablet (5 mg) by mouth daily       methotrexate sodium 2.5 MG Tabs     24 tablet    Take 15 mg by mouth once a week . Take all 6 tablets on the same day of each week.       metoprolol 25 MG tablet    LOPRESSOR    180 tablet    Take 1 tablet (25 mg) by mouth 2 times daily       OMEGA-3 FISH OIL PO      Take 2 g by mouth daily       predniSONE 5 MG tablet    DELTASONE    30 tablet    Take 1 tablet (5 mg) by mouth daily

## 2017-02-03 NOTE — NURSING NOTE
"Chief Complaint   Patient presents with     RECHECK     7 month cardiac follow up for Nonrheumatic aortic valve stenosis and S/P AVR (aortic valve replacement); States she is feeling well will sometimes feel her heartbeat with bending over.       Initial /68 mmHg  Pulse 68  Wt 115 lb (52.164 kg)  SpO2 100% Estimated body mass index is 21.74 kg/(m^2) as calculated from the following:    Height as of 1/25/17: 5' 1\" (1.549 m).    Weight as of this encounter: 115 lb (52.164 kg)..  BP completed using cuff size: teresita Rodriguez CMA      "

## 2017-02-03 NOTE — NURSING NOTE
Cardiac Testing: Patient given instructions regarding  echocardiogram (8-4-17). Discussed purpose, preparation, procedure and when to expect results reported back to the patient. Patient demonstrated understanding of this information and agreed to call with further questions or concerns.    Return Appointment: Patient given instructions regarding scheduling next clinic visit (on 8-14-17). Patient demonstrated understanding of this information and agreed to call with further questions or concerns.    Patient stated she understood all health information given and agreed to call with further questions or concerns.    Leonard Rashid RN

## 2017-02-03 NOTE — PATIENT INSTRUCTIONS
1. Dr. Boston Navarro has ordered an echocardiogram to be performed. We have scheduled your echocardiogram appointment at the  [facility] Imaging Department ([address] [place] [address]) for Friday, August 4th, 2017 at 9:00am.  Please arrive 15 minutes early to allow time for registration.  The Cardiology Nurse will contact you regarding the results (please see result notification details at bottom of summary).    Echocardiogram Instructions:  -Wear comfortable clothing  -Refrain from wearing perfumes or scented lotions      2. We have scheduled you for a cardiac follow up appointment with Dr. Boston Navarro at the [facility]  ([address] [place] [address]) on Friday, August 4th, 2017 at 10:30am with a check-in arrival time of 10:15am.  If this appointment conflicts with your schedule, please contact our specialty schedulers at [phone] to reschedule. We look forward to seeing you again.        [facility] Cardiology [facility] Location    If you have any questions regarding to your visit please contact your care team:     Cardiology  Telephone Number   Leonard Quinones  Cardiology RN's.    Goldie Rodriguez [facility] [phone]    After hours: [phone].  [phone]   For scheduling appts:     [phone] or  [phone]    After hours: [phone]   For the Device Clinic (Pacemakers and ICD's)  RN's :  Rosa Knight   During business hours: [phone]  After business hours:  [phone]- select option 4.      If you need a medication refill please contact your pharmacy.  Please allow 3 business days for your refill to be completed.    As always, Thank you for trusting us with your health care needs!  _____________________________________________________________________

## 2017-02-06 DIAGNOSIS — Z79.899 HIGH RISK MEDICATION USE: ICD-10-CM

## 2017-02-06 DIAGNOSIS — M06.09 RHEUMATOID ARTHRITIS OF MULTIPLE SITES WITH NEGATIVE RHEUMATOID FACTOR (H): ICD-10-CM

## 2017-02-06 LAB
ALBUMIN SERPL-MCNC: 3.5 G/DL (ref 3.4–5)
ALP SERPL-CCNC: 60 U/L (ref 40–150)
ALT SERPL W P-5'-P-CCNC: 39 U/L (ref 0–50)
AST SERPL W P-5'-P-CCNC: 29 U/L (ref 0–45)
BASOPHILS # BLD AUTO: 0.1 10E9/L (ref 0–0.2)
BASOPHILS NFR BLD AUTO: 1.1 %
BILIRUB DIRECT SERPL-MCNC: <0.1 MG/DL (ref 0–0.2)
BILIRUB SERPL-MCNC: 0.3 MG/DL (ref 0.2–1.3)
CREAT SERPL-MCNC: 1.46 MG/DL (ref 0.52–1.04)
CRP SERPL-MCNC: <2.9 MG/L (ref 0–8)
DIFFERENTIAL METHOD BLD: ABNORMAL
EOSINOPHIL # BLD AUTO: 0.2 10E9/L (ref 0–0.7)
EOSINOPHIL NFR BLD AUTO: 2.1 %
ERYTHROCYTE [DISTWIDTH] IN BLOOD BY AUTOMATED COUNT: 20.6 % (ref 10–15)
ERYTHROCYTE [SEDIMENTATION RATE] IN BLOOD BY WESTERGREN METHOD: 26 MM/H (ref 0–30)
GFR SERPL CREATININE-BSD FRML MDRD: 34 ML/MIN/1.7M2
HCT VFR BLD AUTO: 36.1 % (ref 35–47)
HGB BLD-MCNC: 11.1 G/DL (ref 11.7–15.7)
LYMPHOCYTES # BLD AUTO: 2.1 10E9/L (ref 0.8–5.3)
LYMPHOCYTES NFR BLD AUTO: 18.6 %
MCH RBC QN AUTO: 29.5 PG (ref 26.5–33)
MCHC RBC AUTO-ENTMCNC: 30.7 G/DL (ref 31.5–36.5)
MCV RBC AUTO: 96 FL (ref 78–100)
MONOCYTES # BLD AUTO: 1.1 10E9/L (ref 0–1.3)
MONOCYTES NFR BLD AUTO: 10.2 %
NEUTROPHILS # BLD AUTO: 7.5 10E9/L (ref 1.6–8.3)
NEUTROPHILS NFR BLD AUTO: 68 %
PLATELET # BLD AUTO: 266 10E9/L (ref 150–450)
PROT SERPL-MCNC: 6.9 G/DL (ref 6.8–8.8)
RBC # BLD AUTO: 3.76 10E12/L (ref 3.8–5.2)
WBC # BLD AUTO: 11 10E9/L (ref 4–11)

## 2017-02-06 PROCEDURE — 86140 C-REACTIVE PROTEIN: CPT | Performed by: INTERNAL MEDICINE

## 2017-02-06 PROCEDURE — 82565 ASSAY OF CREATININE: CPT | Performed by: INTERNAL MEDICINE

## 2017-02-06 PROCEDURE — 36415 COLL VENOUS BLD VENIPUNCTURE: CPT | Performed by: INTERNAL MEDICINE

## 2017-02-06 PROCEDURE — 85652 RBC SED RATE AUTOMATED: CPT | Performed by: INTERNAL MEDICINE

## 2017-02-06 PROCEDURE — 80076 HEPATIC FUNCTION PANEL: CPT | Performed by: INTERNAL MEDICINE

## 2017-02-06 PROCEDURE — 85025 COMPLETE CBC W/AUTO DIFF WBC: CPT | Performed by: INTERNAL MEDICINE

## 2017-02-09 ENCOUNTER — OFFICE VISIT (OUTPATIENT)
Dept: RHEUMATOLOGY | Facility: CLINIC | Age: 82
End: 2017-02-09
Payer: MEDICARE

## 2017-02-09 VITALS
TEMPERATURE: 96.2 F | HEART RATE: 61 BPM | BODY MASS INDEX: 22.51 KG/M2 | HEIGHT: 61 IN | SYSTOLIC BLOOD PRESSURE: 140 MMHG | DIASTOLIC BLOOD PRESSURE: 63 MMHG | WEIGHT: 119.2 LBS | OXYGEN SATURATION: 100 %

## 2017-02-09 DIAGNOSIS — G89.29 CHRONIC RIGHT SHOULDER PAIN: ICD-10-CM

## 2017-02-09 DIAGNOSIS — M06.09 RHEUMATOID ARTHRITIS OF MULTIPLE SITES WITH NEGATIVE RHEUMATOID FACTOR (H): Primary | ICD-10-CM

## 2017-02-09 DIAGNOSIS — Z79.899 HIGH RISK MEDICATION USE: ICD-10-CM

## 2017-02-09 DIAGNOSIS — M25.511 CHRONIC RIGHT SHOULDER PAIN: ICD-10-CM

## 2017-02-09 PROCEDURE — 99213 OFFICE O/P EST LOW 20 MIN: CPT | Mod: 25 | Performed by: INTERNAL MEDICINE

## 2017-02-09 PROCEDURE — 20610 DRAIN/INJ JOINT/BURSA W/O US: CPT | Mod: RT | Performed by: INTERNAL MEDICINE

## 2017-02-09 RX ORDER — TRIAMCINOLONE ACETONIDE 40 MG/ML
40 INJECTION, SUSPENSION INTRA-ARTICULAR; INTRAMUSCULAR ONCE
Qty: 1 ML | Refills: 0 | OUTPATIENT
Start: 2017-02-09 | End: 2017-02-09

## 2017-02-09 RX ORDER — METHOTREXATE 2.5 MG/1
15 TABLET ORAL WEEKLY
Qty: 24 TABLET | Refills: 3 | Status: SHIPPED | OUTPATIENT
Start: 2017-02-09 | End: 2017-05-05

## 2017-02-09 RX ORDER — PREDNISONE 5 MG/1
5 TABLET ORAL DAILY
Qty: 30 TABLET | Refills: 3 | Status: SHIPPED | OUTPATIENT
Start: 2017-02-09 | End: 2017-05-05

## 2017-02-09 NOTE — NURSING NOTE
"Chief Complaint   Patient presents with     RECHECK     RA no concerns       Initial /63 mmHg  Pulse 61  Temp(Src) 96.2  F (35.7  C) (Oral)  Ht 1.549 m (5' 1\")  Wt 54.069 kg (119 lb 3.2 oz)  BMI 22.53 kg/m2  SpO2 100% Estimated body mass index is 22.53 kg/(m^2) as calculated from the following:    Height as of this encounter: 1.549 m (5' 1\").    Weight as of this encounter: 54.069 kg (119 lb 3.2 oz).  BP completed using cuff size: regular         RAPID3 (0-30) Cumulative Score  8.0          RAPID3 Weighted Score (divide #4 by 3 and that is the weighted score)  2.7           "

## 2017-02-09 NOTE — PROGRESS NOTES
Rheumatology Clinic Visit      Roxanna Stark MRN# [id number]   YOB: 1927 Age: 89 year old      Date of visit: 2/09/17   PCP: Dr. Letha Grissom     Chief Complaint   Patient presents with:  RECHECK: RA no concerns      Assessment and Plan     1. 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  She was treated with prednisone monotherapy for several years, being able to taper off without recurrence of symptoms. She then developed worsening symptoms in her hands and was diagnosed with rheumatoid arthritis. Initially, she was resistant to DMARD therapy. She has now been on methotrexate 15 mg once weekly for about 3 months and is doing significantly better. No synovitis on exam today. Plan to continue methotrexate and expect that prednisone will be able to be tapered at her next clinic visit.   - Continue Methotrexate 15mg once weekly   - Continue folic acid 1mg daily  - Continue prednisone 5 mg daily  - Labs 2-3 days prior to the next rheumatology clinic visit: CBC, Creatinine, Hepatic Panel, ESR, CRP     2. Giant Cell Arteritis History?: 12/26/2005 Left TA biopsy negative per [facility] record review.  No symptoms of GCA at this time.     3. Right shoulder rotator cuff tear and pain: Previously evaluated by orthopedic surgery and her pain resolved for approximately one year after having a steroid injection in March 2015. She does not want to have surgical correction of her shoulder. Repeat steroid injection in Feb 2016 was effective. Restart injection in November 2016 was not as effective as previous injections, as it wore off in the last couple weeks. Repeat steroid injection of the right shoulder today. I strongly encouraged her to go to physical therapy; she preferred doing the activities at home that she has been taught. If she does not improve, she is going to reconsider seeing  orthopedic surgery. She believes that some of her pain has worsened since she was not doing physical therapy exercises after her heart surgery.  - Steroid injection as documented in the procedure section    4. History of basal cell carcinoma / skin lesion: Currently with chronic lesion on her face that does not appear to be basal cell carcinoma but should be evaluated by dermatology.  I encouraged her again to see her dermatologist.     5. Bone Health: Managed by PCP already.    Ms. Stark verbalized agreement with and understanding of the rational for the diagnosis and treatment plan.  All questions were answered to best of my ability and the patient's satisfaction. Ms. Stark was advised to contact the clinic with any questions that may arise after the clinic visit.      Thank you for involving me in the care of the patient    Return to clinic: 3 months      HPI   Roxanna Stark is a 89 year old female with medical history significant for basal cell carcinoma, hypertension, aortic stenosis, right rotator cuff tear (previously evaluated by Dr. Bingham, orthopedic surgery, on 3/20/2015 where at that time Ms. Stark was not interested in surgical correction; she received an intra-articular steroid injection at that time that was effective for ~1year), temporal arteritis?, and seronegative erosive rheumatoid arthritis.     Today, she tells me that she does not want to live with the pain in her hands anymore. She tells me that she has pain and swelling in her MCPs, PIPs, and wrists. Morning stiffness for at least 2-3 hours per day. Pain and stiffness improve with activity. She continues to have some pain in her shoulders and hips. She is willing to start a steroid sparing DMARD now. Currently on prednisone 5 mg daily.    Denies fevers, chills, nausea, vomiting, constipation, diarrhea. No abdominal pain. No chest pain/pressure, palpitations, or shortness of breath. No oral or nasal sores. No neck pain. No rash. No LE  swelling.  No headache. No jaw claudication. No scalp tenderness.  No change in vision; no vision loss.     Tobacco: None  EtOH: No more than 1 drink per week  Drugs: None  Occupation: Used to work for the [employer] company; now retired    ROS   GEN: No fevers, chills, night sweats, or weight change  SKIN: No itching, rashes, sores  HEENT: No oral or nasal ulcers.  CV: No chest pain, pressure, palpitations, or dyspnea on exertion.  PULM: No SOB, wheeze, cough.  GI: No nausea, vomiting, constipation, diarrhea. No blood in stool. No abdominal pain.  : No blood in urine.  MSK: See HPI.  NEURO: No numbness, tingling, or weakness.  ENDO: No heat/cold intolerance.  EXT: No LE swelling    Active Problem List     Patient Active Problem List   Diagnosis     Polymyalgia rheumatica (H)     History of basal cell carcinoma     CARDIOVASCULAR SCREENING; LDL GOAL LESS THAN 130     Advanced directives, counseling/discussion     Hypertension goal BP (blood pressure) < 140/90     Hip pain     Left atrial enlargement     Aortic stenosis     Status post coronary angiogram     Aortic valve stenosis     Aortic valve replaced     Rheumatoid arthritis of multiple sites with negative rheumatoid factor (H)     Past Medical History     Past Medical History   Diagnosis Date     Polymyalgia rheumatica (H) 11/99     Temporal arteritis (H) 11/99     HTN (hypertension)      Melanoma in situ (H) 9/30/08     left arm     Basal cell carcinoma 9/30/08     left cheek     melanoma in situ 9/30/2008     LEFT ARM     Actinic keratosis      Basal cell cancer 7/2014     left eye medial canthus      Aortic stenosis 2014     Past Surgical History     Past Surgical History   Procedure Laterality Date     Colonoscopy  2002     C skin tissue procedure unlisted  11/3/08     mmis skin cancer excision     Cataract iol, rt/lt  5/09     bilateral     Replace valve aortic N/A 4/25/2016     Procedure: REPLACE VALVE AORTIC;  Surgeon: Sudeep Tsai MD;   Location: [facility] OR     Allergy   No Known Allergies     Current Medication List     Current Outpatient Prescriptions   Medication Sig     econazole nitrate 1 % cream Apply to red rash of legs and feet twice a day till rash is gone     methotrexate sodium 2.5 MG TABS Take 15 mg by mouth once a week . Take all 6 tablets on the same day of each week.     folic acid (FOLVITE) 1 MG tablet Take 1 tablet (1 mg) by mouth daily     predniSONE (DELTASONE) 5 MG tablet Take 1 tablet (5 mg) by mouth daily     metoprolol (LOPRESSOR) 25 MG tablet Take 1 tablet (25 mg) by mouth 2 times daily     lisinopril (PRINIVIL,ZESTRIL) 5 MG tablet Take 1 tablet (5 mg) by mouth daily     furosemide (LASIX) 20 MG tablet TAKE 1 TABLET BY MOUTH EVERY DAY IF 2 TO 3 POUNDS WEIGHT PAIN OVER 2 DAY PERIOD     calcium-vitamin D 500-125 MG-UNIT TABS      aspirin  MG EC tablet Take 1 tablet (325 mg) by mouth daily     Omega-3 Fatty Acids (OMEGA-3 FISH OIL PO) Take 2 g by mouth daily     ICAPS PO 2 tablets daily     No current facility-administered medications for this visit.       Social History   See HPI    Family History     Family History   Problem Relation Age of Onset     Breast Cancer Sister      Arthritis Sister      CANCER Sister      breast     Thyroid Disease Sister      CANCER Sister      colon     Arthritis Sister      Arthritis Mother      Hypertension Father      Cancer - colorectal Father      Prostate Cancer Father      Arthritis Father      HEART DISEASE Father      Lipids Father      Arthritis Sister      Asthma Daughter      Asthma Daughter      CANCER Daughter 58     lung     CANCER  81     pancreatic      Physical Exam     Temp Readings from Last 3 Encounters:   02/09/17 96.2  F (35.7  C) Oral   01/25/17 97.1  F (36.2  C) Oral   11/11/16 97.9  F (36.6  C)      BP Readings from Last 5 Encounters:   02/09/17 140/63   02/03/17 131/68   01/25/17 128/58   11/11/16 126/71   11/10/16 151/68     Pulse Readings from Last 1 Encounters:  "  02/09/17 61     Resp Readings from Last 1 Encounters:   11/11/16 18     Estimated body mass index is 22.53 kg/(m^2) as calculated from the following:    Height as of this encounter: 1.549 m (5' 1\").    Weight as of this encounter: 54.069 kg (119 lb 3.2 oz).    GEN: NAD  HEENT: MMM.  Anicteric, noninjected sclera  CV: S1, S2. RRR. No m/r/g.  PULM: CTA bilaterally. No w/c.  MSK:  Bilateral second and third MCPs with synovial swelling but no tenderness to palpation. Heberden's Gracie's nodes present. Wrists without swelling or tenderness to palpation. Elbows without swelling or tenderness to palpation. Left shoulder with normal range of motion and no swelling or tenderness to palpation. Right shoulder with moderate diffuse muscle atrophy by visual inspection, and pain with range of motion, worse with abduction. Hips nontender to direct palpation. Knees and ankles without swelling, increased warmth, tenderness to palpation, or overlying erythema. Negative MTP squeeze.    NEURO: Able to stand up from a chair without difficulty.   SKIN: No rash.    EXT: No LE edema  PSYCH: Alert. Appropriate.    Labs / Imaging (select studies)   RF/CCP  Recent Labs   Lab Test  08/11/16   1124  08/04/16   1222  02/18/16   1543   CCPIGG  1   --    --    RHF   --   <20  <20     BAIRON/RNP/Sm/SSA/SSB/dsDNA  Recent Labs   Lab Test  08/04/16   1222  02/18/16   1543   ISABELA  <1.0  Interpretation:  Negative    <1.0  Interpretation:  Negative       CBC  Recent Labs   Lab Test  02/06/17   0859  01/09/17   0943  12/08/16   0854   WBC  11.0  10.0  9.9   RBC  3.76*  3.76*  4.18   HGB  11.1*  10.8*  11.5*   HCT  36.1  35.0  37.7   MCV  96  93  90   RDW  20.6*  18.9*  17.1*   PLT  266  267  289   MCH  29.5  28.7  27.5   MCHC  30.7*  30.9*  30.5*   NEUTROPHIL  68.0  73.4  57.4   LYMPH  18.6  20.6  32.7   MONOCYTE  10.2  4.1  7.4   EOSINOPHIL  2.1  1.4  1.9   BASOPHIL  1.1  0.5  0.6   ANEU  7.5  7.3  5.7   ALYM  2.1  2.1  3.2   IGNACIO  1.1  0.4  0.7   AEOS  " 0.2  0.1  0.2   ABAS  0.1  0.1  0.1     CMP  Recent Labs   Lab Test  02/06/17   0859  01/09/17   0943  12/14/16   0849  12/08/16   0854   07/12/16   1035  06/24/16   0852   NA   --    --   140   --    --   138  140   POTASSIUM   --    --   4.5   --    --   4.6  4.3   CHLORIDE   --    --   105   --    --   102  105   CO2   --    --   26   --    --   28  27   ANIONGAP   --    --   9   --    --   8  8   GLC   --    --   172*   --    --   117*  95   BUN   --    --   28   --    --   17  25   CR  1.46*  1.44*  1.19*  1.61*   < >  1.04  1.18*   GFRESTIMATED  34*  34*  43*  30*   < >  50*  43*   GFRESTBLACK  41*  41*  52*  36*   < >  60*  52*   ROEL   --    --   8.8   --    --   9.4  9.0   BILITOTAL  0.3  0.4   --   0.4   < >   --    --    ALBUMIN  3.5  3.3*   --   3.7   < >   --    --    PROTTOTAL  6.9  6.5*   --   7.2   < >   --    --    ALKPHOS  60  62   --   65   < >   --    --    AST  29  22   --   23   < >   --    --    ALT  39  31   --   35   < >   --    --     < > = values in this interval not displayed.     Uric Acid  Recent Labs   Lab Test  01/25/17   0940   URIC  5.7     Calcium/VitaminD  Recent Labs   Lab Test  12/14/16   0849  07/12/16   1035  06/24/16   0852   ROEL  8.8  9.4  9.0     ESR/CRP  Recent Labs   Lab Test  02/06/17   0859  11/10/16   0909  08/04/16   1222   SED  26  63*  37*   CRP  <2.9  28.9*  50.6*     TSH/T4  Recent Labs   Lab Test  06/21/12   0841   TSH  1.55   T4  0.81     Lipid Panel  Recent Labs   Lab Test  03/17/15   0923   CHOL  228*   TRIG  200*   HDL  72   LDL  116   VLDL  40*   CHOLHDLRATIO  3.2     Hepatitis B  Recent Labs   Lab Test  11/10/16   0909   HBCAB  Nonreactive   HEPBANG  Nonreactive     Hepatitis C  Recent Labs   Lab Test  11/10/16   0909   HCVAB  Nonreactive   Assay performance characteristics have not been established for newborns,   infants, and children       HIV Screening  Recent Labs   Lab Test  11/10/16   0909   HIAGAB  Nonreactive   HIV-1 p24 Ag & HIV-1/HIV-2 Ab Not  "Detected         \"XR HAND BILATERAL G/E 3 VW 8/11/2016 11:43 AM     HISTORY: Rheumatoid arthritis.     COMPARISON: None.     Findings: Near complete loss of joint space in the right hand at the  triscaphe joint, first interphalangeal joint, second and third  metacarpal phalangeal joints and second and fourth distal  interphalangeal joints as well as in the left hand at the triscaphe  joint, first carpometacarpal joint, first interphalangeal joint,  second metacarpophalangeal joints and the second and third distal  interphalangeal joints.     Subtle erosive changes are seen at the bases of the third proximal  phalanges, bilaterally. Osteopenia about the hands. No fractures are  seen.                                                                    IMPRESSION: Degenerative changes in both hands as detailed above.  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     RAMILA HERNANDEZ\"    Immunization History     Immunization History   Administered Date(s) Administered     Influenza (H1N1) 10/20/2016     Influenza (IIV3) 10/04/2005, 10/20/2010, 11/09/2011, 09/22/2012, 09/16/2013, 10/15/2014     Pneumococcal (PCV 13) 03/17/2015     Pneumococcal 23 valent 11/03/2000, 12/13/2010     TD (ADULT, 7+) 01/12/2004     TDAP (ADACEL AGES 11-64) 05/14/2013     Zoster vaccine, live 12/15/2006       Procedure      Procedure: Right shoulder steroid injection  Indication: Pain    The procedure was explained in detail. Risks including infection, pain, structural damage such as cartilage damage and tendon rupture, fat atrophy, skin hyper-/hypo-pigmentation, and medication reaction was explained. The need for rest of the affected joint for one week after the procedure was explained.  The option of not doing the procedure was also provided. All questions were answered and the patient consented to the procedure.     A time-out was performed and the correct patient, procedure, and laterality were " verified.    The right shoulder was examined and location for injection was identified - posterior approach. The area was cleaned with chlorhexidine, twice.  Ethyl chloride was then used for topical anaesthetic. Then a mixture of lidocaine 1% 2 mL and Kenalog 40mg was injected into the intra-articular space.     The patient tolerated the procedure well. No complications.             Kenalog-40  NDC 7630-6629-50  [employer]  EXP May 2018  Lot: [id number]         Chart documentation done in part with Dragon Voice recognition Software. Although reviewed after completion, some word and grammatical error may remain.    Jamie Johnson MD

## 2017-02-09 NOTE — MR AVS SNAPSHOT
After Visit Summary   2/9/2017    Roxanna Stark    MRN: [id number]           Patient Information     Date Of Birth          5/20/1927        Visit Information        Provider Department      2/9/2017 10:00 AM Jamie Johnson MD [facility]        Today's Diagnoses     Rheumatoid arthritis of multiple sites with negative rheumatoid factor (H)    -  1     High risk medication use         Chronic right shoulder pain            Follow-ups after your visit        Your next 10 appointments already scheduled     May 02, 2017  9:15 AM   LAB with  LAB   [facility]    [address] [address]   [phone]           Patient must bring picture ID.  Patient should be prepared to give a urine specimen  Please do not eat 10-12 hours before your appointment if you are coming in fasting for labs on lipids, cholesterol, or glucose (sugar).  Pregnant women should follow their Care Team instructions. Water with medications is okay. Do not drink coffee or other fluids.   If you have concerns about taking  your medications, please ask at office or if scheduling via [place], send a message by clicking on Secure Messaging, Message Your Care Team.            May 05, 2017  8:20 AM   Return Visit with Jamie Johnson MD   [facility] ([facility]    [address] [address]   [phone]            Aug 04, 2017 10:30 AM   Return Visit with Boston Navarro MD   [facility] ([facility])    [address]  [facility] [address]   [phone]              Future tests that were ordered for you today     Open Future Orders        Priority Expected Expires Ordered    CBC with platelets differential Routine 5/6/2017 5/25/2017 2/9/2017    Creatinine Routine 5/6/2017 5/25/2017 2/9/2017    Erythrocyte sedimentation rate auto Routine  "2017    CRP inflammation Routine 2017    Hepatic panel Routine 2017            Who to contact     If you have questions or need follow up information about today's clinic visit or your schedule please contact [facility] directly at [phone].  Normal or non-critical lab and imaging results will be communicated to you by [employer], letter or phone within 4 business days after the clinic has received the results. If you do not hear from us within 7 days, please contact the clinic through [employer] or phone. If you have a critical or abnormal lab result, we will notify you by phone as soon as possible.  Submit refill requests through [employer] or call your pharmacy and they will forward the refill request to us. Please allow 3 business days for your refill to be completed.          Additional Information About Your Visit        [employer] Information     [employer] lets you send messages to your doctor, view your test results, renew your prescriptions, schedule appointments and more. To sign up, go to [email or web address] . Click on \"Log in\" on the left side of the screen, which will take you to the Welcome page. Then click on \"Sign up Now\" on the right side of the page.     You will be asked to enter the access code listed below, as well as some personal information. Please follow the directions to create your username and password.     Your access code is: [email or web address]  Expires: 5/10/2017 10:33 AM     Your access code will  in 90 days. If you need help or a new code, please call your [facility] clinic or [phone].        Care EveryWhere ID     This is your Care EveryWhere ID. This could be used by other organizations to access your [facility] medical records  [id number]        Your Vitals Were     Pulse Temperature Height BMI (Body Mass Index) Pulse Oximetry       61 96.2  F (35.7  C) (Oral) 5' 1\" (1.549 m) 22.53 kg/m2 100%        " Blood Pressure from Last 3 Encounters:   02/09/17 140/63   02/03/17 131/68   01/25/17 128/58    Weight from Last 3 Encounters:   02/09/17 119 lb 3.2 oz (54.069 kg)   02/03/17 115 lb (52.164 kg)   01/25/17 120 lb 3.2 oz (54.522 kg)              We Performed the Following     DRAIN/INJECT LARGE JOINT/BURSA     TRIAMCINOLONE ACET INJ NOS          Today's Medication Changes          These changes are accurate as of: 2/9/17 10:33 AM.  If you have any questions, ask your nurse or doctor.               Start taking these medicines.        Dose/Directions    triamcinolone acetonide 40 MG/ML injection   Commonly known as:  KENALOG   Used for:  Chronic right shoulder pain   Started by:  Jamie Johnson MD        Dose:  40 mg   1 mL (40 mg) by INTRA-ARTICULAR route once for 1 dose   Quantity:  1 mL   Refills:  0            Where to get your medicines      These medications were sent to [id number] - [address] [place] [address], [facility] [address]     Phone:  [phone]    - methotrexate sodium 2.5 MG Tabs  - predniSONE 5 MG tablet      Some of these will need a paper prescription and others can be bought over the counter.  Ask your nurse if you have questions.     You don't need a prescription for these medications    - triamcinolone acetonide 40 MG/ML injection             Primary Care Provider Office Phone # Fax #    Letha Grissom [phone]       [address] [address]        Thank you!     Thank you for choosing [facility]  for your care. Our goal is always to provide you with excellent care. Hearing back from our patients is one way we can continue to improve our services. Please take a few minutes to complete the written survey that you may receive in the mail after your visit with us. Thank you!             Your Updated Medication List - Protect others around  you: Learn how to safely use, store and throw away your medicines at www.disposemymeds.org.          This list is accurate as of: 2/9/17 10:33 AM.  Always use your most recent med list.                   Brand Name Dispense Instructions for use    aspirin 325 MG EC tablet     90 tablet    Take 1 tablet (325 mg) by mouth daily       calcium-vitamin D 500-125 MG-UNIT Tabs          econazole nitrate 1 % cream     85 g    Apply to red rash of legs and feet twice a day till rash is gone       folic acid 1 MG tablet    FOLVITE    100 tablet    Take 1 tablet (1 mg) by mouth daily       furosemide 20 MG tablet    LASIX    90 tablet    TAKE 1 TABLET BY MOUTH EVERY DAY IF 2 TO 3 POUNDS WEIGHT PAIN OVER 2 DAY PERIOD       ICAPS PO      2 tablets daily       lisinopril 5 MG tablet    PRINIVIL/ZESTRIL    90 tablet    Take 1 tablet (5 mg) by mouth daily       methotrexate sodium 2.5 MG Tabs     24 tablet    Take 15 mg by mouth once a week . Take all 6 tablets on the same day of each week.       metoprolol 25 MG tablet    LOPRESSOR    180 tablet    Take 1 tablet (25 mg) by mouth 2 times daily       OMEGA-3 FISH OIL PO      Take 2 g by mouth daily       predniSONE 5 MG tablet    DELTASONE    30 tablet    Take 1 tablet (5 mg) by mouth daily       triamcinolone acetonide 40 MG/ML injection    KENALOG    1 mL    1 mL (40 mg) by INTRA-ARTICULAR route once for 1 dose

## 2017-03-09 ENCOUNTER — OFFICE VISIT (OUTPATIENT)
Dept: FAMILY MEDICINE | Facility: CLINIC | Age: 82
End: 2017-03-09
Payer: MEDICARE

## 2017-03-09 VITALS
OXYGEN SATURATION: 97 % | TEMPERATURE: 97.2 F | BODY MASS INDEX: 22.66 KG/M2 | DIASTOLIC BLOOD PRESSURE: 70 MMHG | HEART RATE: 84 BPM | HEIGHT: 61 IN | SYSTOLIC BLOOD PRESSURE: 138 MMHG | WEIGHT: 120 LBS

## 2017-03-09 DIAGNOSIS — D22.9 ATYPICAL NEVI: Primary | ICD-10-CM

## 2017-03-09 PROCEDURE — 99213 OFFICE O/P EST LOW 20 MIN: CPT | Performed by: FAMILY MEDICINE

## 2017-03-09 ASSESSMENT — ANXIETY QUESTIONNAIRES
1. FEELING NERVOUS, ANXIOUS, OR ON EDGE: SEVERAL DAYS
7. FEELING AFRAID AS IF SOMETHING AWFUL MIGHT HAPPEN: NOT AT ALL
2. NOT BEING ABLE TO STOP OR CONTROL WORRYING: SEVERAL DAYS
6. BECOMING EASILY ANNOYED OR IRRITABLE: NOT AT ALL
5. BEING SO RESTLESS THAT IT IS HARD TO SIT STILL: NOT AT ALL
GAD7 TOTAL SCORE: 2
3. WORRYING TOO MUCH ABOUT DIFFERENT THINGS: NOT AT ALL

## 2017-03-09 ASSESSMENT — PATIENT HEALTH QUESTIONNAIRE - PHQ9: 5. POOR APPETITE OR OVEREATING: NOT AT ALL

## 2017-03-09 NOTE — PATIENT INSTRUCTIONS
[facility]    If you have any questions regarding to your visit please contact your care team:       Team Purple:   Clinic Hours Telephone Number   [address] Dr.   7am-7pm  Monday - Thursday   7am-5pm  Fridays  [phone]  (Appointment scheduling available 24/7)    Questions about your Visit?   Team Line:  [phone]   Urgent Care - [place] [facility] - 11am-9pm Monday-Friday Saturday-Sunday- 9am-5pm   [place] - 5pm-9pm Monday-Friday Saturday-Sunday- 9am-5pm  [phone] - [facility]  [phone] - [place]       What options do I have for visits at the clinic other than the traditional office visit?  To expand how we care for you, many of our providers are utilizing electronic visits (e-visits) and telephone visits, when medically appropriate, for interactions with their patients rather than a visit in the clinic.   We also offer nurse visits for many medical concerns. Just like any other service, we will bill your insurance company for this type of visit based on time spent on the phone with your provider. Not all insurance companies cover these visits. Please check with your medical insurance if this type of visit is covered. You will be responsible for any charges that are not paid by your insurance.      E-visits via Phone Warrior:  generally incur a $35.00 fee.  Telephone visits:  Time spent on the phone: *charged based on time that is spent on the phone in increments of 10 minutes. Estimated cost:   5-10 mins $30.00   11-20 mins. $59.00   21-30 mins. $85.00     Use [employer]qSearcht (secure email communication and access to your chart) to send your primary care provider a message or make an appointment. Ask someone on your Team how to sign up for Phone Warrior.  For a Price Quote for your services, please call our Consumer Price Line at [phone].  As always, Thank you for trusting us with your health care needs!

## 2017-03-09 NOTE — PROGRESS NOTES
SUBJECTIVE:                                                    Roxanna Stark is a 89 year old female who presents to clinic today for the following health issues:      Hypertension Follow-up      Outpatient blood pressures are not being checked.    Low Salt Diet: no added salt       Amount of exercise or physical activity: 6-7 days/week for an average of 30-45 minutes    Problems taking medications regularly: No    Medication side effects: none  Diet: regular (no restrictions)         Problem list and histories reviewed & adjusted, as indicated.  Additional history: as documented    Patient Active Problem List   Diagnosis     Polymyalgia rheumatica (H)     History of basal cell carcinoma     CARDIOVASCULAR SCREENING; LDL GOAL LESS THAN 130     Advanced directives, counseling/discussion     Hypertension goal BP (blood pressure) < 140/90     Hip pain     Left atrial enlargement     Aortic stenosis     Status post coronary angiogram     Aortic valve stenosis     Aortic valve replaced     Rheumatoid arthritis of multiple sites with negative rheumatoid factor (H)     Past Surgical History   Procedure Laterality Date     Colonoscopy  2002     C skin tissue procedure unlisted  11/3/08     mmis skin cancer excision     Cataract iol, rt/lt  5/09     bilateral     Replace valve aortic N/A 4/25/2016     Procedure: REPLACE VALVE AORTIC;  Surgeon: Sudeep Tsai MD;  Location:  OR       Social History   Substance Use Topics     Smoking status: Never Smoker     Smokeless tobacco: Never Used     Alcohol use Yes     Family History   Problem Relation Age of Onset     Breast Cancer Sister      Arthritis Sister      CANCER Sister      breast     Thyroid Disease Sister      CANCER Sister      colon     Arthritis Sister      Arthritis Mother      Hypertension Father      Cancer - colorectal Father      Prostate Cancer Father      Arthritis Father      HEART DISEASE Father      Lipids Father      Arthritis Sister       Asthma Daughter      Asthma Daughter      CANCER Daughter 58     lung     CANCER Other 81     pancreatic          Current Outpatient Prescriptions   Medication Sig Dispense Refill     methotrexate sodium 2.5 MG TABS Take 15 mg by mouth once a week . Take all 6 tablets on the same day of each week. 24 tablet 3     predniSONE (DELTASONE) 5 MG tablet Take 1 tablet (5 mg) by mouth daily 30 tablet 3     econazole nitrate 1 % cream Apply to red rash of legs and feet twice a day till rash is gone 85 g 3     folic acid (FOLVITE) 1 MG tablet Take 1 tablet (1 mg) by mouth daily 100 tablet 3     metoprolol (LOPRESSOR) 25 MG tablet Take 1 tablet (25 mg) by mouth 2 times daily 180 tablet 3     lisinopril (PRINIVIL,ZESTRIL) 5 MG tablet Take 1 tablet (5 mg) by mouth daily 90 tablet 3     furosemide (LASIX) 20 MG tablet TAKE 1 TABLET BY MOUTH EVERY DAY IF 2 TO 3 POUNDS WEIGHT PAIN OVER 2 DAY PERIOD 90 tablet 1     calcium-vitamin D 500-125 MG-UNIT TABS        aspirin  MG EC tablet Take 1 tablet (325 mg) by mouth daily 90 tablet 3     Omega-3 Fatty Acids (OMEGA-3 FISH OIL PO) Take 2 g by mouth daily       ICAPS PO 2 tablets daily       BP Readings from Last 3 Encounters:   03/09/17 138/70   02/09/17 140/63   02/03/17 131/68    Wt Readings from Last 3 Encounters:   03/09/17 120 lb (54.4 kg)   02/09/17 119 lb 3.2 oz (54.1 kg)   02/03/17 115 lb (52.2 kg)                  Labs reviewed in EPIC    Reviewed and updated as needed this visit by clinical staff  Tobacco  Allergies  Meds  Med Hx  Surg Hx  Fam Hx  Soc Hx      Reviewed and updated as needed this visit by Provider         ROS:  This 89 year old female is here today because she is worried she could have more basal cell cancer on her face. She had a basal cell cancer removed from the medial canthus of her left eye by an ophthalmologist in 2014. Now she has new lesions on the tip of her nose and thickened white lesions on her left cheek that are growing. She can't pick  "them off. All other review of systems are negative  Personal, family, and social history reviewed with patient and revised.    C: NEGATIVE for fever, chills, change in weight  E/M: NEGATIVE for ear, mouth and throat problems  R: NEGATIVE for significant cough or SOB  CV: NEGATIVE for chest pain, palpitations or peripheral edema    OBJECTIVE:                                                    /70 (BP Location: Right arm, Patient Position: Chair, Cuff Size: Adult Regular)  Pulse 84  Temp 97.2  F (36.2  C)  Ht 5' 1\" (1.549 m)  Wt 120 lb (54.4 kg)  SpO2 97%  BMI 22.67 kg/m2  Body mass index is 22.67 kg/(m^2).   patient has thickened white growths on her left cheek and by her nose. They are not warts. I have no idea what they could be but they are growing and should be removed.   Also has salmon colored growths on the tip of her nose.   Well hydrated  Well nourished  Well groomed  Alert and oriented X 3  Good spirits  Brisk gait   Diagnostic Test Results:  none      ASSESSMENT/PLAN:                                                             1. Atypical nevi  As above   - DERMATOLOGY REFERRAL    Return to clinic if no improvement     YISSEL LIGHT MD  [facility]3] [place]    "

## 2017-03-09 NOTE — NURSING NOTE
"Chief Complaint   Patient presents with     Recheck Medication     Light/dark Spots     on face     Nose Bleeds       Initial /70 (BP Location: Right arm, Patient Position: Chair, Cuff Size: Adult Regular)  Pulse 84  Temp 97.2  F (36.2  C)  Ht 5' 1\" (1.549 m)  Wt 120 lb (54.4 kg)  SpO2 97%  BMI 22.67 kg/m2 Estimated body mass index is 22.67 kg/(m^2) as calculated from the following:    Height as of this encounter: 5' 1\" (1.549 m).    Weight as of this encounter: 120 lb (54.4 kg).  Medication Reconciliation: complete   Latisha Atkins MA      "

## 2017-03-09 NOTE — MR AVS SNAPSHOT
After Visit Summary   3/9/2017    Roxanna Stark    MRN: [id number]           Patient Information     Date Of Birth          5/20/1927        Visit Information        Provider Department      3/9/2017 1:45 PM Letha Grissom MD [facility]        Today's Diagnoses     Atypical nevi    -  1      Care Instructions    [facility]    If you have any questions regarding to your visit please contact your care team:       Team Purple:   Clinic Hours Telephone Number   [address]NA [address] Dr., Dr.   7am-7pm  Monday - Thursday   7am-5pm  Fridays  [phone]  (Appointment scheduling available 24/7)    Questions about your Visit?   Team Line:  [phone]   Urgent Care - [facility] - 11am-9pm Monday-Friday Saturday-Sunday- 9am-5pm   [place] - 5pm-9pm Monday-Friday Saturday-Sunday- 9am-5pm  [phone] - [place]   [phone] - [place]       What options do I have for visits at the clinic other than the traditional office visit?  To expand how we care for you, many of our providers are utilizing electronic visits (e-visits) and telephone visits, when medically appropriate, for interactions with their patients rather than a visit in the clinic.   We also offer nurse visits for many medical concerns. Just like any other service, we will bill your insurance company for this type of visit based on time spent on the phone with your provider. Not all insurance companies cover these visits. Please check with your medical insurance if this type of visit is covered. You will be responsible for any charges that are not paid by your insurance.      E-visits via Conversion Logic:  generally incur a $35.00 fee.  Telephone visits:  Time spent on the phone: *charged based on time that is spent on the phone in increments of 10 minutes. Estimated cost:   5-10 mins $30.00   11-20 mins. $59.00   21-30 mins. $85.00     Use  Wowsait (secure email communication and access to your chart) to send your primary care provider a message or make an appointment. Ask someone on your Team how to sign up for Crescentrating.  For a Price Quote for your services, please call our Consumer Price Line at [phone].  As always, Thank you for trusting us with your health care needs!            Follow-ups after your visit        Additional Services     DERMATOLOGY REFERRAL       Your provider has referred you to: [facility]: [facility]  [place] [phone]   [email or web address]    Please be aware that coverage of these services is subject to the terms and limitations of your health insurance plan.  Call member services at your health plan with any benefit or coverage questions.      Please bring the following with you to your appointment:    (1) Any X-Rays, CTs or MRIs which have been performed.  Contact the facility where they were done to arrange for  prior to your scheduled appointment.    (2) List of current medications  (3) This referral request   (4) Any documents/labs given to you for this referral                  Your next 10 appointments already scheduled     May 02, 2017  9:15 AM CDT   LAB with  LAB   [facility]    [address] [address] [phone]           Patient must bring picture ID.  Patient should be prepared to give a urine specimen  Please do not eat 10-12 hours before your appointment if you are coming in fasting for labs on lipids, cholesterol, or glucose (sugar).  Pregnant women should follow their Care Team instructions. Water with medications is okay. Do not drink coffee or other fluids.   If you have concerns about taking  your medications, please ask at office or if scheduling via Crescentrating, send a message by clicking on Secure Messaging, Message Your Care Team.            May 05, 2017  8:20 AM CDT   [facility] Visit with Jamie Johnson MD  "  [facility]    [address]  [place] [address]   [phone]            Aug 04, 2017  9:00 AM CDT   Ech Complete with FKECHR1   [facility] [place] ([facility])    [address] [place] [address] 2nd Floor  [place] [address]   [phone]           1.  Please bring or wear a comfortable two-piece outfit. 2.  You may eat, drink and take your normal medicines. 3.  For any questions that cannot be answered, please contact the ordering physician            Aug 04, 2017 10:30 AM CDT   Return Visit with Boston Navarro MD   [facility] ([facility])    [address] [place] [address]   [phone]              Who to contact     If you have questions or need follow up information about today's clinic visit or your schedule please contact [facility] directly at [phone].  Normal or non-critical lab and imaging results will be communicated to you by Serverside Grouphart, letter or phone within 4 business days after the clinic has received the results. If you do not hear from us within 7 days, please contact the clinic through FonJaxt or phone. If you have a critical or abnormal lab result, we will notify you by phone as soon as possible.  Submit refill requests through AchieveMint or call your pharmacy and they will forward the refill request to us. Please allow 3 business days for your refill to be completed.          Additional Information About Your Visit        Serverside Grouphart Information     AchieveMint lets you send messages to your doctor, view your test results, renew your prescriptions, schedule appointments and more. To sign up, go to [email or web address] . Click on \"Log in\" on the left side of the screen, which will take you to the Welcome page. Then click on \"Sign up Now\" on the right side of the page.     You will be asked to enter the access " "code listed below, as well as some personal information. Please follow the directions to create your username and password.     Your access code is: [email or web address]  Expires: 5/10/2017 10:33 AM     Your access code will  in 90 days. If you need help or a new code, please call your [facility] or [phone].        Care EveryWhere ID     This is your Care EveryWhere ID. This could be used by other organizations to access your [place] medical records  [id number]        Your Vitals Were     Pulse Temperature Height Pulse Oximetry BMI (Body Mass Index)       84 97.2  F (36.2  C) 5' 1\" (1.549 m) 97% 22.67 kg/m2        Blood Pressure from Last 3 Encounters:   17 138/70   17 140/63   17 131/68    Weight from Last 3 Encounters:   17 120 lb (54.4 kg)   17 119 lb 3.2 oz (54.1 kg)   17 115 lb (52.2 kg)              We Performed the Following     DERMATOLOGY REFERRAL        Primary Care Provider Office Phone # Fax #    Letha Grissom [phone]       [address] [address]        Thank you!     Thank you for choosing [facility]  for your care. Our goal is always to provide you with excellent care. Hearing back from our patients is one way we can continue to improve our services. Please take a few minutes to complete the written survey that you may receive in the mail after your visit with us. Thank you!             Your Updated Medication List - Protect others around you: Learn how to safely use, store and throw away your medicines at www.disposemymeds.org.          This list is accurate as of: 3/9/17  2:18 PM.  Always use your most recent med list.                   Brand Name Dispense Instructions for use    aspirin 325 MG EC tablet     90 tablet    Take 1 tablet (325 mg) by mouth daily       calcium-vitamin D 500-125 MG-UNIT Tabs          econazole nitrate 1 % cream     85 g    Apply to red " rash of legs and feet twice a day till rash is gone       folic acid 1 MG tablet    FOLVITE    100 tablet    Take 1 tablet (1 mg) by mouth daily       furosemide 20 MG tablet    LASIX    90 tablet    TAKE 1 TABLET BY MOUTH EVERY DAY IF 2 TO 3 POUNDS WEIGHT PAIN OVER 2 DAY PERIOD       ICAPS PO      2 tablets daily       lisinopril 5 MG tablet    PRINIVIL/ZESTRIL    90 tablet    Take 1 tablet (5 mg) by mouth daily       methotrexate sodium 2.5 MG Tabs     24 tablet    Take 15 mg by mouth once a week . Take all 6 tablets on the same day of each week.       metoprolol 25 MG tablet    LOPRESSOR    180 tablet    Take 1 tablet (25 mg) by mouth 2 times daily       OMEGA-3 FISH OIL PO      Take 2 g by mouth daily       predniSONE 5 MG tablet    DELTASONE    30 tablet    Take 1 tablet (5 mg) by mouth daily

## 2017-03-10 ASSESSMENT — PATIENT HEALTH QUESTIONNAIRE - PHQ9: SUM OF ALL RESPONSES TO PHQ QUESTIONS 1-9: 3

## 2017-03-10 ASSESSMENT — ANXIETY QUESTIONNAIRES: GAD7 TOTAL SCORE: 2

## 2017-03-19 DIAGNOSIS — I10 HYPERTENSION GOAL BP (BLOOD PRESSURE) < 140/90: ICD-10-CM

## 2017-03-20 ENCOUNTER — TRANSFERRED RECORDS (OUTPATIENT)
Dept: HEALTH INFORMATION MANAGEMENT | Facility: CLINIC | Age: 82
End: 2017-03-20

## 2017-03-21 RX ORDER — FUROSEMIDE 20 MG
TABLET ORAL
Qty: 90 TABLET | Refills: 0 | Status: SHIPPED | OUTPATIENT
Start: 2017-03-21 | End: 2017-06-11

## 2017-04-03 ENCOUNTER — TRANSFERRED RECORDS (OUTPATIENT)
Dept: HEALTH INFORMATION MANAGEMENT | Facility: CLINIC | Age: 82
End: 2017-04-03

## 2017-04-17 ENCOUNTER — TRANSFERRED RECORDS (OUTPATIENT)
Dept: HEALTH INFORMATION MANAGEMENT | Facility: CLINIC | Age: 82
End: 2017-04-17

## 2017-05-02 DIAGNOSIS — M06.09 RHEUMATOID ARTHRITIS OF MULTIPLE SITES WITH NEGATIVE RHEUMATOID FACTOR (H): ICD-10-CM

## 2017-05-02 DIAGNOSIS — Z79.899 HIGH RISK MEDICATION USE: ICD-10-CM

## 2017-05-02 LAB
ALBUMIN SERPL-MCNC: 3.4 G/DL (ref 3.4–5)
ALP SERPL-CCNC: 60 U/L (ref 40–150)
ALT SERPL W P-5'-P-CCNC: 33 U/L (ref 0–50)
AST SERPL W P-5'-P-CCNC: 20 U/L (ref 0–45)
BASOPHILS # BLD AUTO: 0.1 10E9/L (ref 0–0.2)
BASOPHILS NFR BLD AUTO: 0.8 %
BILIRUB DIRECT SERPL-MCNC: <0.1 MG/DL (ref 0–0.2)
BILIRUB SERPL-MCNC: 0.3 MG/DL (ref 0.2–1.3)
CREAT SERPL-MCNC: 1.46 MG/DL (ref 0.52–1.04)
CRP SERPL-MCNC: <2.9 MG/L (ref 0–8)
DIFFERENTIAL METHOD BLD: ABNORMAL
EOSINOPHIL # BLD AUTO: 0.3 10E9/L (ref 0–0.7)
EOSINOPHIL NFR BLD AUTO: 2.3 %
ERYTHROCYTE [DISTWIDTH] IN BLOOD BY AUTOMATED COUNT: 16.2 % (ref 10–15)
ERYTHROCYTE [SEDIMENTATION RATE] IN BLOOD BY WESTERGREN METHOD: 20 MM/H (ref 0–30)
GFR SERPL CREATININE-BSD FRML MDRD: 34 ML/MIN/1.7M2
HCT VFR BLD AUTO: 37.7 % (ref 35–47)
HGB BLD-MCNC: 12 G/DL (ref 11.7–15.7)
LYMPHOCYTES # BLD AUTO: 2.2 10E9/L (ref 0.8–5.3)
LYMPHOCYTES NFR BLD AUTO: 18.7 %
MCH RBC QN AUTO: 31 PG (ref 26.5–33)
MCHC RBC AUTO-ENTMCNC: 31.8 G/DL (ref 31.5–36.5)
MCV RBC AUTO: 97 FL (ref 78–100)
MONOCYTES # BLD AUTO: 0.7 10E9/L (ref 0–1.3)
MONOCYTES NFR BLD AUTO: 5.7 %
NEUTROPHILS # BLD AUTO: 8.3 10E9/L (ref 1.6–8.3)
NEUTROPHILS NFR BLD AUTO: 72.5 %
PLATELET # BLD AUTO: 269 10E9/L (ref 150–450)
PROT SERPL-MCNC: 6.8 G/DL (ref 6.8–8.8)
RBC # BLD AUTO: 3.87 10E12/L (ref 3.8–5.2)
WBC # BLD AUTO: 11.5 10E9/L (ref 4–11)

## 2017-05-02 PROCEDURE — 85652 RBC SED RATE AUTOMATED: CPT | Performed by: INTERNAL MEDICINE

## 2017-05-02 PROCEDURE — 80076 HEPATIC FUNCTION PANEL: CPT | Performed by: INTERNAL MEDICINE

## 2017-05-02 PROCEDURE — 86140 C-REACTIVE PROTEIN: CPT | Performed by: INTERNAL MEDICINE

## 2017-05-02 PROCEDURE — 36415 COLL VENOUS BLD VENIPUNCTURE: CPT | Performed by: INTERNAL MEDICINE

## 2017-05-02 PROCEDURE — 82565 ASSAY OF CREATININE: CPT | Performed by: INTERNAL MEDICINE

## 2017-05-02 PROCEDURE — 85025 COMPLETE CBC W/AUTO DIFF WBC: CPT | Performed by: INTERNAL MEDICINE

## 2017-05-05 ENCOUNTER — OFFICE VISIT (OUTPATIENT)
Dept: RHEUMATOLOGY | Facility: CLINIC | Age: 82
End: 2017-05-05
Payer: MEDICARE

## 2017-05-05 VITALS
HEIGHT: 61 IN | SYSTOLIC BLOOD PRESSURE: 122 MMHG | WEIGHT: 119.8 LBS | OXYGEN SATURATION: 97 % | BODY MASS INDEX: 22.62 KG/M2 | DIASTOLIC BLOOD PRESSURE: 62 MMHG | HEART RATE: 70 BPM | TEMPERATURE: 96.6 F

## 2017-05-05 DIAGNOSIS — Z79.899 HIGH RISK MEDICATION USE: ICD-10-CM

## 2017-05-05 DIAGNOSIS — M06.09 RHEUMATOID ARTHRITIS OF MULTIPLE SITES WITH NEGATIVE RHEUMATOID FACTOR (H): Primary | ICD-10-CM

## 2017-05-05 PROCEDURE — 99213 OFFICE O/P EST LOW 20 MIN: CPT | Performed by: INTERNAL MEDICINE

## 2017-05-05 RX ORDER — PREDNISONE 1 MG/1
TABLET ORAL
Qty: 140 TABLET | Refills: 0 | Status: SHIPPED | OUTPATIENT
Start: 2017-05-05 | End: 2017-08-02

## 2017-05-05 RX ORDER — METHOTREXATE 2.5 MG/1
10 TABLET ORAL WEEKLY
Qty: 16 TABLET | Refills: 3 | Status: SHIPPED | OUTPATIENT
Start: 2017-05-05 | End: 2017-08-02

## 2017-05-05 NOTE — NURSING NOTE
"Chief Complaint   Patient presents with     Arthritis     RA, patient states the RA is about the same, not bad       Initial /61 (BP Location: Left arm, Patient Position: Chair, Cuff Size: Adult Regular)  Pulse 70  Temp 96.6  F (35.9  C) (Oral)  Ht 1.549 m (5' 1\")  Wt 54.3 kg (119 lb 12.8 oz)  SpO2 97%  BMI 22.64 kg/m2 Estimated body mass index is 22.64 kg/(m^2) as calculated from the following:    Height as of this encounter: 1.549 m (5' 1\").    Weight as of this encounter: 54.3 kg (119 lb 12.8 oz).  BP completed using cuff size: regular         RAPID3 (0-30) Cumulative Score  5.0          RAPID3 Weighted Score (divide #4 by 3 and that is the weighted score)  1.67         "

## 2017-05-05 NOTE — MR AVS SNAPSHOT
After Visit Summary   5/5/2017    Roxanna Stark    MRN: [id number]           Patient Information     Date Of Birth          5/20/1927        Visit Information        Provider Department      5/5/2017 8:20 AM Jamie Johnson MD [facility]        Today's Diagnoses     Rheumatoid arthritis of multiple sites with negative rheumatoid factor (H)    -  1    High risk medication use          Care Instructions    - Reduce methotrexate: Take 10 mg by mouth once a week . Take all 4 tablets on the same day of each week.      - Reduce prednisone: 4mg daily x2weeks, then 3mg daily x2weeks, then 2mg daily x2weeks, then 1mg daily x2weeks, then stop.          Follow-ups after your visit        Your next 10 appointments already scheduled     Jul 31, 2017  9:00 AM CDT   LAB with  LAB   [facility] ([facility])    [address] [address] [phone]           Patient must bring picture ID.  Patient should be prepared to give a urine specimen  Please do not eat 10-12 hours before your appointment if you are coming in fasting for labs on lipids, cholesterol, or glucose (sugar).  Pregnant women should follow their Care Team instructions. Water with medications is okay. Do not drink coffee or other fluids.   If you have concerns about taking  your medications, please ask at office or if scheduling via Cellmax, send a message by clicking on Secure Messaging, Message Your Care Team.            Aug 02, 2017  9:20 AM CDT   Return Visit with Jamie Johnson MD   [facility] [place] ([facility])    [address] [address]   [phone]            Aug 04, 2017  9:00 AM CDT   Ech Complete with FKECHR1   [facility] ([facility])    [address] [facility] [address]   [phone]           1.  Please bring or wear a comfortable two-piece outfit. 2.  You may eat,  "drink and take your normal medicines. 3.  For any questions that cannot be answered, please contact the ordering physician            Aug 04, 2017 10:30 AM CDT   Return Visit with Boston Navarro MD   [facility] ([facility])    [address] [place] [address] 2nd Floor  [place] [address] [phone]              Future tests that were ordered for you today     Open Future Orders        Priority Expected Expires Ordered    CBC with platelets differential Routine 7/30/2017 8/18/2017 5/5/2017    Creatinine Routine 7/30/2017 8/18/2017 5/5/2017    CRP inflammation Routine 7/30/2017 8/18/2017 5/5/2017    Erythrocyte sedimentation rate auto Routine 7/30/2017 8/18/2017 5/5/2017    Hepatic panel Routine 7/30/2017 8/18/2017 5/5/2017            Who to contact     If you have questions or need follow up information about today's clinic visit or your schedule please contact [facility] directly at [phone].  Normal or non-critical lab and imaging results will be communicated to you by 4momshart, letter or phone within 4 business days after the clinic has received the results. If you do not hear from us within 7 days, please contact the clinic through Cadeet or phone. If you have a critical or abnormal lab result, we will notify you by phone as soon as possible.  Submit refill requests through Impliant or call your pharmacy and they will forward the refill request to us. Please allow 3 business days for your refill to be completed.          Additional Information About Your Visit        4momshart Information     Impliant lets you send messages to your doctor, view your test results, renew your prescriptions, schedule appointments and more. To sign up, go to [email or web address] . Click on \"Log in\" on the left side of the screen, which will take you to the Welcome page. Then click on \"Sign up Now\" on the right side of the page.     You will be asked to " "enter the access code listed below, as well as some personal information. Please follow the directions to create your username and password.     Your access code is: [email or web address]  Expires: 5/10/2017 11:33 AM     Your access code will  in 90 days. If you need help or a new code, please call your [place] clinic or [phone].        Care EveryWhere ID     This is your Care EveryWhere ID. This could be used by other organizations to access your [place] medical records  [id number]        Your Vitals Were     Pulse Temperature Height Pulse Oximetry BMI (Body Mass Index)       70 96.6  F (35.9  C) (Oral) 1.549 m (5' 1\") 97% 22.64 kg/m2        Blood Pressure from Last 3 Encounters:   17 122/62   17 138/70   17 140/63    Weight from Last 3 Encounters:   17 54.3 kg (119 lb 12.8 oz)   17 54.4 kg (120 lb)   17 54.1 kg (119 lb 3.2 oz)                 Today's Medication Changes          These changes are accurate as of: 17  9:09 AM.  If you have any questions, ask your nurse or doctor.               These medicines have changed or have updated prescriptions.        Dose/Directions    methotrexate sodium 2.5 MG Tabs   This may have changed:    - how much to take  - additional instructions   Used for:  Rheumatoid arthritis of multiple sites with negative rheumatoid factor (H)   Changed by:  Jamie Johnson MD        Dose:  10 mg   Take 10 mg by mouth once a week . Take all 4 tablets on the same day of each week.   Quantity:  16 tablet   Refills:  3       predniSONE 1 MG tablet   Commonly known as:  DELTASONE   This may have changed:    - medication strength  - how much to take  - how to take this  - when to take this  - additional instructions   Used for:  Rheumatoid arthritis of multiple sites with negative rheumatoid factor (H)   Changed by:  Jamie Johnson MD        Prednisone 4mg daily x2weeks, then 3mg daily x2weeks, then 2mg daily x2weeks, then 1mg daily x2weeks, then stop. "   Quantity:  140 tablet   Refills:  0            Where to get your medicines      These medications were sent to [id number] - [place] [id number] AT [facility] [address] [facility] [address]     Phone:  [phone]     methotrexate sodium 2.5 MG Tabs    predniSONE 1 MG tablet                Primary Care Provider Office Phone # Fax #    Letha Grissom [phone]       [address] [address]        Thank you!     Thank you for choosing [facility]  for your care. Our goal is always to provide you with excellent care. Hearing back from our patients is one way we can continue to improve our services. Please take a few minutes to complete the written survey that you may receive in the mail after your visit with us. Thank you!             Your Updated Medication List - Protect others around you: Learn how to safely use, store and throw away your medicines at www.disposemymeds.org.          This list is accurate as of: 5/5/17  9:09 AM.  Always use your most recent med list.                   Brand Name Dispense Instructions for use    aspirin 325 MG EC tablet     90 tablet    Take 1 tablet (325 mg) by mouth daily       calcium-vitamin D 500-125 MG-UNIT Tabs          econazole nitrate 1 % cream     85 g    Apply to red rash of legs and feet twice a day till rash is gone       folic acid 1 MG tablet    FOLVITE    100 tablet    Take 1 tablet (1 mg) by mouth daily       furosemide 20 MG tablet    LASIX    90 tablet    TAKE 1 TABLET BY MOUTH EVERY DAY IF 2 TO 3 POUNDS WEIGHT PAIN OVER 2 DAY PERIOD       ICAPS PO      2 tablets daily       lisinopril 5 MG tablet    PRINIVIL/ZESTRIL    90 tablet    Take 1 tablet (5 mg) by mouth daily       methotrexate sodium 2.5 MG Tabs     16 tablet    Take 10 mg by mouth once a week . Take all 4 tablets on the same day of each week.       metoprolol 25 MG  tablet    LOPRESSOR    180 tablet    Take 1 tablet (25 mg) by mouth 2 times daily       OMEGA-3 FISH OIL PO      Take 2 g by mouth daily       predniSONE 1 MG tablet    DELTASONE    140 tablet    Prednisone 4mg daily x2weeks, then 3mg daily x2weeks, then 2mg daily x2weeks, then 1mg daily x2weeks, then stop.

## 2017-05-05 NOTE — Clinical Note
Dr. Grissom,  RA improved.  Cr elevated - possibly from lasix or methotrexate?  I am reducing MTX.  Also reducing prednisone now and hopefully this will reduce her lasix dose if the prednisone was causing any fluid retention.  Thanks, Jamie

## 2017-05-05 NOTE — PROGRESS NOTES
Rheumatology Clinic Visit      Roxanna Stark MRN# [id number]   YOB: 1927 Age: 89 year old      Date of visit: 5/05/17   PCP: Dr. Letha Grissom     Chief Complaint   Patient presents with:  Arthritis: RA, patient states the RA is about the same, not bad      Assessment and Plan     1. 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  She was treated with prednisone monotherapy for several years, being able to taper off without recurrence of symptoms. She then developed worsening symptoms in her hands and was diagnosed with rheumatoid arthritis. Initially, she was resistant to DMARD therapy. She has now been on methotrexate 15 mg once weekly for about 6 months and is doing well. Creatinine has been elevated and therefore will reduce methotrexate; given that she is now on a steroid sparing DMARD will also reduce prednisone. Hopefully with the reduce prednisone dose she can also reduce her Lasix requirement as prednisone may be causing her to retain some fluid.    - Reduce methotrexate to 10 mg once weekly   - Continue folic acid 1mg daily  - Reduce prednisone: 4 mg daily ×2 weeks, then 3 mg daily ×2 weeks, then 2 mg daily ×2 weeks, then 1 mg daily ×2 weeks, then stop   - Labs 2-3 days prior to the next rheumatology clinic visit: CBC, Creatinine, Hepatic Panel, ESR, CRP               Rapid 3, cumulative scores                      5/5/2017: 5 (MTX 15mg wkly, prednisone 5mg daily)    2. Giant Cell Arteritis History?: 12/26/2005 Left TA biopsy negative per [facility] record review.  No symptoms of GCA at this time.     3. Right shoulder rotator cuff tear and pain: Previously evaluated by orthopedic surgery and her pain resolved for approximately one year after having a steroid injection in March 2015. She does not want to have surgical correction of her shoulder. Repeat steroid injections  have been helpful; not required today. Physical therapy was effective and she is doing exercises at home.     4. History of basal cell carcinoma: Following with dermatology     5. Bone Health: Managed by PCP already.    6. Right iliotibial band syndrome: The diagnosis treatment options were discussed with her today. She did not want to go to physical therapy. I explained the exercises that she can do and she plans to try these at home. If they are not helpful, then she says that she will go to physical therapy.    Ms. Stark verbalized agreement with and understanding of the rational for the diagnosis and treatment plan.  All questions were answered to best of my ability and the patient's satisfaction. Ms. Stark was advised to contact the clinic with any questions that may arise after the clinic visit.      Thank you for involving me in the care of the patient    Return to clinic: 3 months      HPI   Roxanna Stark is a 89 year old female with medical history significant for basal cell carcinoma, hypertension, aortic stenosis, right rotator cuff tear (previously evaluated by Dr. Bingham, orthopedic surgery, on 3/20/2015 where at that time Ms. Stark was not interested in surgical correction; she received an intra-articular steroid injection at that time that was effective for ~1year), temporal arteritis?, and seronegative erosive rheumatoid arthritis.     Today, she reports that she is doing well. No joint pain. No morning stiffness. No difficulty standing up from a chair or raising her arms above her head. No difficulty with daily tasks. No difficulty getting dressed in the morning. Right shoulder pain occasionally that she says is tolerable and doing well; she says it is not bad right now and does not need a repeat steroid injection yet.     Denies fevers, chills, nausea, vomiting, constipation, diarrhea. No abdominal pain. No chest pain/pressure, palpitations, or shortness of breath. No oral or nasal sores. No  neck pain. No rash. No LE swelling.  No headache. No jaw claudication. No scalp tenderness.  No change in vision; no vision loss.     Tobacco: None  EtOH: No more than 1 drink per week  Drugs: None  Occupation: Used to work for the [employer] company; now retired    ROS   GEN: No fevers, chills, night sweats, or weight change  SKIN: No itching, rashes, sores  HEENT: No oral or nasal ulcers.  CV: No chest pain, pressure, palpitations, or dyspnea on exertion.  PULM: No SOB, wheeze, cough.  GI: No nausea, vomiting, constipation, diarrhea. No blood in stool. No abdominal pain.  : No blood in urine.  MSK: See HPI.  NEURO: No numbness, tingling, or weakness.  ENDO: No heat/cold intolerance.  EXT: No LE swelling    Active Problem List     Patient Active Problem List   Diagnosis     Polymyalgia rheumatica (H)     History of basal cell carcinoma     CARDIOVASCULAR SCREENING; LDL GOAL LESS THAN 130     Advanced directives, counseling/discussion     Hypertension goal BP (blood pressure) < 140/90     Hip pain     Left atrial enlargement     Aortic stenosis     Status post coronary angiogram     Aortic valve stenosis     Aortic valve replaced     Rheumatoid arthritis of multiple sites with negative rheumatoid factor (H)     Past Medical History     Past Medical History:   Diagnosis Date     Actinic keratosis      Aortic stenosis 2014     Basal cell cancer 7/2014    left eye medial canthus      Basal cell carcinoma 9/30/08    left cheek     HTN (hypertension)      melanoma in situ 9/30/2008    LEFT ARM     Melanoma in situ (H) 9/30/08    left arm     Polymyalgia rheumatica (H) 11/99     Temporal arteritis (H) 11/99     Past Surgical History     Past Surgical History:   Procedure Laterality Date     C SKIN TISSUE PROCEDURE UNLISTED  11/3/08    mmis skin cancer excision     CATARACT IOL, RT/LT  5/09    bilateral     COLONOSCOPY  2002     REPLACE VALVE AORTIC N/A 4/25/2016    Procedure: REPLACE VALVE AORTIC;  Surgeon: Sudeep Tsai  MD Elsa;  Location: [facility] OR     Allergy   No Known Allergies     Current Medication List     Current Outpatient Prescriptions   Medication Sig     furosemide (LASIX) 20 MG tablet TAKE 1 TABLET BY MOUTH EVERY DAY IF 2 TO 3 POUNDS WEIGHT PAIN OVER 2 DAY PERIOD     methotrexate sodium 2.5 MG TABS Take 15 mg by mouth once a week . Take all 6 tablets on the same day of each week.     predniSONE (DELTASONE) 5 MG tablet Take 1 tablet (5 mg) by mouth daily     econazole nitrate 1 % cream Apply to red rash of legs and feet twice a day till rash is gone     folic acid (FOLVITE) 1 MG tablet Take 1 tablet (1 mg) by mouth daily     metoprolol (LOPRESSOR) 25 MG tablet Take 1 tablet (25 mg) by mouth 2 times daily     lisinopril (PRINIVIL,ZESTRIL) 5 MG tablet Take 1 tablet (5 mg) by mouth daily     calcium-vitamin D 500-125 MG-UNIT TABS      aspirin  MG EC tablet Take 1 tablet (325 mg) by mouth daily     Omega-3 Fatty Acids (OMEGA-3 FISH OIL PO) Take 2 g by mouth daily     ICAPS PO 2 tablets daily     No current facility-administered medications for this visit.        Social History   See HPI    Family History     Family History   Problem Relation Age of Onset     Breast Cancer Sister      Arthritis Sister      CANCER Sister      breast     Thyroid Disease Sister      CANCER Sister      colon     Arthritis Sister      Arthritis Mother      Hypertension Father      Cancer - colorectal Father      Prostate Cancer Father      Arthritis Father      HEART DISEASE Father      Lipids Father      Arthritis Sister      Asthma Daughter      Asthma Daughter      CANCER Daughter 58     lung     CANCER Other 81     pancreatic      Physical Exam     Temp Readings from Last 3 Encounters:   05/05/17 96.6  F (35.9  C) (Oral)   03/09/17 97.2  F (36.2  C)   02/09/17 96.2  F (35.7  C) (Oral)     BP Readings from Last 5 Encounters:   05/05/17 152/61   03/09/17 138/70   02/09/17 140/63   02/03/17 131/68   01/25/17 128/58     Pulse Readings  "from Last 1 Encounters:   05/05/17 70     Resp Readings from Last 1 Encounters:   11/11/16 18     Estimated body mass index is 22.64 kg/(m^2) as calculated from the following:    Height as of this encounter: 1.549 m (5' 1\").    Weight as of this encounter: 54.3 kg (119 lb 12.8 oz).    GEN: NAD  HEENT: MMM.  Anicteric, noninjected sclera  CV: S1, S2. RRR. No m/r/g.  PULM: CTA bilaterally. No w/c.  MSK:  MCPs, PIPs, wrists, elbows, and shoulders without swelling or tenderness to palpation. Heberden's and Gracie's nodes present. Hips nontender to direct palpation. Mild tenderness to palpation along the iliotibial band on the right side. Knees without swelling or tenderness to palpation. Ankles and feet without swelling or tenderness to palpation.   NEURO: Able to stand up unassisted from a chair without difficulty. Able to raise her arms above her head without difficulty.  SKIN: No rash.    EXT: No LE edema  PSYCH: Alert. Appropriate.    Labs / Imaging (select studies)   RF/CCP  Recent Labs   Lab Test  08/11/16   1124  08/04/16   1222  02/18/16   1543   CCPIGG  1   --    --    RHF   --   <20  <20     BAIRON/RNP/Sm/SSA/SSB/dsDNA  Recent Labs   Lab Test  08/04/16   1222  02/18/16   1543   ISABELA  <1.0  Interpretation:  Negative    <1.0  Interpretation:  Negative       CBC  Recent Labs   Lab Test  05/02/17   0912  02/06/17   0859  01/09/17   0943   WBC  11.5*  11.0  10.0   RBC  3.87  3.76*  3.76*   HGB  12.0  11.1*  10.8*   HCT  37.7  36.1  35.0   MCV  97  96  93   RDW  16.2*  20.6*  18.9*   PLT  269  266  267   MCH  31.0  29.5  28.7   MCHC  31.8  30.7*  30.9*   NEUTROPHIL  72.5  68.0  73.4   LYMPH  18.7  18.6  20.6   MONOCYTE  5.7  10.2  4.1   EOSINOPHIL  2.3  2.1  1.4   BASOPHIL  0.8  1.1  0.5   ANEU  8.3  7.5  7.3   ALYM  2.2  2.1  2.1   IGNACIO  0.7  1.1  0.4   AEOS  0.3  0.2  0.1   ABAS  0.1  0.1  0.1     CMP  Recent Labs   Lab Test  05/02/17   0912  02/06/17   0859  01/09/17   0943  12/14/16   0849   07/12/16   1035  " "06/24/16   0852   NA   --    --    --   140   --   138  140   POTASSIUM   --    --    --   4.5   --   4.6  4.3   CHLORIDE   --    --    --   105   --   102  105   CO2   --    --    --   26   --   28  27   ANIONGAP   --    --    --   9   --   8  8   GLC   --    --    --   172*   --   117*  95   BUN   --    --    --   28   --   17  25   CR  1.46*  1.46*  1.44*  1.19*   < >  1.04  1.18*   GFRESTIMATED  34*  34*  34*  43*   < >  50*  43*   GFRESTBLACK  41*  41*  41*  52*   < >  60*  52*   OREL   --    --    --   8.8   --   9.4  9.0   BILITOTAL  0.3  0.3  0.4   --    < >   --    --    ALBUMIN  3.4  3.5  3.3*   --    < >   --    --    PROTTOTAL  6.8  6.9  6.5*   --    < >   --    --    ALKPHOS  60  60  62   --    < >   --    --    AST  20  29  22   --    < >   --    --    ALT  33  39  31   --    < >   --    --     < > = values in this interval not displayed.     Uric Acid  Recent Labs   Lab Test  01/25/17   0940   URIC  5.7     Calcium/VitaminD  Recent Labs   Lab Test  12/14/16   0849  07/12/16   1035  06/24/16   0852   ROEL  8.8  9.4  9.0     ESR/CRP  Recent Labs   Lab Test  05/02/17   0912  02/06/17   0859  11/10/16   0909   SED  20  26  63*   CRP  <2.9  <2.9  28.9*     TSH/T4  Recent Labs   Lab Test  06/21/12   0841   TSH  1.55   T4  0.81     Lipid Panel  Recent Labs   Lab Test  03/17/15   0923   CHOL  228*   TRIG  200*   HDL  72   LDL  116   VLDL  40*   CHOLHDLRATIO  3.2     Hepatitis B  Recent Labs   Lab Test  11/10/16   0909   HBCAB  Nonreactive   HEPBANG  Nonreactive     Hepatitis C  Recent Labs   Lab Test  11/10/16   0909   HCVAB  Nonreactive   Assay performance characteristics have not been established for newborns,   infants, and children       HIV Screening  Recent Labs   Lab Test  11/10/16   0909   HIAGAB  Nonreactive   HIV-1 p24 Ag & HIV-1/HIV-2 Ab Not Detected           \"XR HAND BILATERAL G/E 3 VW 8/11/2016 11:43 AM     HISTORY: Rheumatoid arthritis.     COMPARISON: None.     Findings: Near complete loss of " "joint space in the right hand at the  triscaphe joint, first interphalangeal joint, second and third  metacarpal phalangeal joints and second and fourth distal  interphalangeal joints as well as in the left hand at the triscaphe  joint, first carpometacarpal joint, first interphalangeal joint,  second metacarpophalangeal joints and the second and third distal  interphalangeal joints.     Subtle erosive changes are seen at the bases of the third proximal  phalanges, bilaterally. Osteopenia about the hands. No fractures are  seen.                                                                    IMPRESSION: Degenerative changes in both hands as detailed above.  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     RAMILA HERNANDEZ\"    Immunization History     Immunization History   Administered Date(s) Administered     Influenza (H1N1) 10/20/2016     Influenza (IIV3) 10/04/2005, 10/20/2010, 11/09/2011, 09/22/2012, 09/16/2013, 10/15/2014     Pneumococcal (PCV 13) 03/17/2015     Pneumococcal 23 valent 11/03/2000, 12/13/2010     TD (ADULT, 7+) 01/12/2004     TDAP Vaccine (Adacel) 05/14/2013     Zoster vaccine, live 12/15/2006          Chart documentation done in part with Dragon Voice recognition Software. Although reviewed after completion, some word and grammatical error may remain.    Jamie Johnson MD      "

## 2017-05-05 NOTE — PATIENT INSTRUCTIONS
- Reduce methotrexate: Take 10 mg by mouth once a week . Take all 4 tablets on the same day of each week.      - Reduce prednisone: 4mg daily x2weeks, then 3mg daily x2weeks, then 2mg daily x2weeks, then 1mg daily x2weeks, then stop.

## 2017-05-10 ENCOUNTER — TELEPHONE (OUTPATIENT)
Dept: FAMILY MEDICINE | Facility: CLINIC | Age: 82
End: 2017-05-10

## 2017-05-10 NOTE — TELEPHONE ENCOUNTER
Received fax from pharmacy stating patient requires Prior Authorization for Econazole Nitrate 1% Cream 15gm .     Insurance information:   Name: Medicare  Phone number: [phone]  ID number: [id number]    Initiate prior authorization or change medication?    If a prior authorization is to be initiated, please list the following:    -any medications the patient has tried and failed or any contraindications.  Over the counter lotrimin cream  -is the patient currently on this medication, or has tried before? No   -What is the diagnosis?  Tinea corporis   -Justification or other information that may be helpful. She needs a strong antifungal cream    YISSEL LIGHT M.D.          JEAN-PIERRE/MA

## 2017-05-15 NOTE — TELEPHONE ENCOUNTER
Reason for Call:  Other prescription    Detailed comments: Hector with [employer] would like to clarify the Qty for the prescription Econazole cream    Phone Number Patient can be reached at: [phone]    Best Time: today    Can we leave a detailed message on this number? NO    Call taken on 5/15/2017 at 9:29 AM by Ashly Mary

## 2017-05-15 NOTE — TELEPHONE ENCOUNTER
No need to call we received an Approval for the econazole Nitrate cream good until 05/10/2018ID # [id number]. Called the pharmacy and let them know this information and they will let patient know.  Svetlana Kirkpatrick,

## 2017-06-11 DIAGNOSIS — I10 HYPERTENSION GOAL BP (BLOOD PRESSURE) < 140/90: ICD-10-CM

## 2017-06-13 RX ORDER — FUROSEMIDE 20 MG
TABLET ORAL
Qty: 90 TABLET | Refills: 3 | Status: SHIPPED | OUTPATIENT
Start: 2017-06-13 | End: 2018-06-20

## 2017-06-22 ENCOUNTER — OFFICE VISIT (OUTPATIENT)
Dept: FAMILY MEDICINE | Facility: CLINIC | Age: 82
End: 2017-06-22
Payer: MEDICARE

## 2017-06-22 VITALS
BODY MASS INDEX: 23.22 KG/M2 | SYSTOLIC BLOOD PRESSURE: 134 MMHG | WEIGHT: 123 LBS | HEART RATE: 76 BPM | TEMPERATURE: 97.5 F | HEIGHT: 61 IN | DIASTOLIC BLOOD PRESSURE: 72 MMHG | OXYGEN SATURATION: 98 %

## 2017-06-22 DIAGNOSIS — T14.8XXA WOUND INFECTION: Primary | ICD-10-CM

## 2017-06-22 DIAGNOSIS — L08.9 WOUND INFECTION: Primary | ICD-10-CM

## 2017-06-22 PROCEDURE — 99213 OFFICE O/P EST LOW 20 MIN: CPT | Performed by: FAMILY MEDICINE

## 2017-06-22 RX ORDER — CEPHALEXIN 500 MG/1
500 CAPSULE ORAL 3 TIMES DAILY
Qty: 30 CAPSULE | Refills: 0 | Status: SHIPPED | OUTPATIENT
Start: 2017-06-22 | End: 2017-08-04

## 2017-06-22 NOTE — PATIENT INSTRUCTIONS
[facility]    If you have any questions regarding to your visit please contact your care team:       Team Purple:   Clinic Hours Telephone Number   Dr. Letha Salinas     7am-7pm  Monday - Thursday   7am-5pm  Fridays  [phone]  (Appointment scheduling available 24/7)    Questions about your Visit?   Team Line:  [phone]   Urgent Care - [place] and [facility] - 11am-9pm Monday-Friday Saturday-Sunday- 9am-5pm   [place] - 5pm-9pm Monday-Friday Saturday-Sunday- 9am-5pm  [phone] - [facility]  [phone] - [place]       What options do I have for visits at the clinic other than the traditional office visit?  To expand how we care for you, many of our providers are utilizing electronic visits (e-visits) and telephone visits, when medically appropriate, for interactions with their patients rather than a visit in the clinic.   We also offer nurse visits for many medical concerns. Just like any other service, we will bill your insurance company for this type of visit based on time spent on the phone with your provider. Not all insurance companies cover these visits. Please check with your medical insurance if this type of visit is covered. You will be responsible for any charges that are not paid by your insurance.      E-visits via [employer]:  generally incur a $35.00 fee.  Telephone visits:  Time spent on the phone: *charged based on time that is spent on the phone in increments of 10 minutes. Estimated cost:   5-10 mins $30.00   11-20 mins. $59.00   21-30 mins. $85.00     Use [employer] (secure email communication and access to your chart) to send your primary care provider a message or make an appointment. Ask someone on your Team how to sign up for [employer].  For a Price Quote for your services, please call our Consumer Price Line at [phone].  As always, Thank you for trusting us with your health care needs!

## 2017-06-22 NOTE — PROGRESS NOTES
"Chief Complaint   Patient presents with     WOUND CARE     left leg     SUBJECTIVE:  This 89 year old female is here today because she was getting out of her recliner about a week ago when her left foot slipped off the foot rest and her left shin rubbed hard on the fabric as she fell to the floor. Initially it was just a small abrasion but now the skin around the area is red and tender. She admits that she didn't clean it well and has avoided getting any soap to the area. She had been putting bacitracin on the scrape. Foot is swollen now as well. All other review of systems are negative  Personal, family, and social history reviewed with patient and revised.  Immunization History   Administered Date(s) Administered     Influenza (H1N1) 10/20/2016     Influenza (IIV3) 10/04/2005, 10/20/2010, 11/09/2011, 09/22/2012, 09/16/2013, 10/15/2014     Pneumococcal (PCV 13) 03/17/2015     Pneumococcal 23 valent 11/03/2000, 12/13/2010     TD (ADULT, 7+) 01/12/2004     TDAP Vaccine (Adacel) 05/14/2013     Zoster vaccine, live 12/15/2006      OBJECTIVE:  Vital signs:  Temp: 97.5  F (36.4  C)   BP: 134/72 Pulse: 76     SpO2: 98 %     Height: 5' 1\" (154.9 cm) Weight: 123 lb (55.8 kg)  Estimated body mass index is 23.24 kg/(m^2) as calculated from the following:    Height as of this encounter: 5' 1\" (1.549 m).    Weight as of this encounter: 123 lb (55.8 kg).       patient has an abrasion over her left anterior tibial area. It has some yellow honey coating over the surface which indicates the start of impetigo. She also has local redness, tenderness and swelling around the abrasion. She now needs antibiotics. Has some mild dependent swelling into her left ankle as well.   Her gait is brisk   Well hydrated  Well nourished  Well groomed  Alert and oriented X 3  Good spirits  ASSESSMENT / PLAN:  (T14.8,  L08.9) Wound infection  (primary encounter diagnosis)  Comment: as above   Plan: cephALEXin (KEFLEX) 500 MG capsule         TID for 10 " days    Wash area with soap and water several times a day and then apply bacitracin topically. Try to keep open to the air.     YISSEL LIGHT M.D.

## 2017-06-22 NOTE — PATIENT INSTRUCTIONS
Preventive Health Recommendations  Female Ages 65 +    Yearly exam:     See your health care provider every year in order to  o Review health changes.   o Discuss preventive care.    o Review your medicines if your doctor has prescribed any.      You no longer need a yearly Pap test unless you've had an abnormal Pap test in the past 10 years. If you have vaginal symptoms, such as bleeding or discharge, be sure to talk with your provider about a Pap test.      Every 1 to 2 years, have a mammogram.  If you are over 69, talk with your health care provider about whether or not you want to continue having screening mammograms.      Every 10 years, have a colonoscopy. Or, have a yearly FIT test (stool test). These exams will check for colon cancer.       Have a cholesterol test every 5 years, or more often if your doctor advises it.       Have a diabetes test (fasting glucose) every three years. If you are at risk for diabetes, you should have this test more often.       At age 65, have a bone density scan (DEXA) to check for osteoporosis (brittle bone disease).    Shots:    Get a flu shot each year.    Get a tetanus shot every 10 years.    Talk to your doctor about your pneumonia vaccines. There are now two you should receive - Pneumovax (PPSV 23) and Prevnar (PCV 13).    Talk to your doctor about the shingles vaccine.    Talk to your doctor about the hepatitis B vaccine.    Nutrition:     Eat at least 5 servings of fruits and vegetables each day.      Eat whole-grain bread, whole-wheat pasta and brown rice instead of white grains and rice.      Talk to your provider about Calcium and Vitamin D.     Lifestyle    Exercise at least 150 minutes a week (30 minutes a day, 5 days a week). This will help you control your weight and prevent disease.      Limit alcohol to one drink per day.      No smoking.       Wear sunscreen to prevent skin cancer.       See your dentist twice a year for an exam and cleaning.      See your  eye doctor every 1 to 2 years to screen for conditions such as glaucoma, macular degeneration, cataracts, etc     [facility]    If you have any questions regarding to your visit please contact your care team:       Team Purple:   Clinic Hours Telephone Number   Dr. Letha Salinas     7am-7pm  Monday - Thursday   7am-5pm  Fridays  [phone]  (Appointment scheduling available 24/7)    Questions about your Visit?   Team Line:  [phone]   Urgent Care - [place] - 11am-9pm Monday-Friday Saturday-Sunday- 9am-5pm   [place] - 5pm-9pm Monday-Friday Saturday-Sunday- 9am-5pm  [phone] - [facility]  [phone] - [place]       What options do I have for visits at the clinic other than the traditional office visit?  To expand how we care for you, many of our providers are utilizing electronic visits (e-visits) and telephone visits, when medically appropriate, for interactions with their patients rather than a visit in the clinic.   We also offer nurse visits for many medical concerns. Just like any other service, we will bill your insurance company for this type of visit based on time spent on the phone with your provider. Not all insurance companies cover these visits. Please check with your medical insurance if this type of visit is covered. You will be responsible for any charges that are not paid by your insurance.      E-visits via [employer]:  generally incur a $35.00 fee.  Telephone visits:  Time spent on the phone: *charged based on time that is spent on the phone in increments of 10 minutes. Estimated cost:   5-10 mins $30.00   11-20 mins. $59.00   21-30 mins. $85.00     Use [employer] (secure email communication and access to your chart) to send your primary care provider a message or make an appointment. Ask someone on your Team how to sign up for [employer].  For a Price Quote for your services, please call our Consumer Price  Line at [phone].  As always, Thank you for trusting us with your health care needs!

## 2017-06-22 NOTE — NURSING NOTE
"Chief Complaint   Patient presents with     WOUND CARE     left leg       Initial /72 (BP Location: Right arm, Patient Position: Chair, Cuff Size: Adult Regular)  Pulse 76  Temp 97.5  F (36.4  C)  Ht 5' 1\" (1.549 m)  Wt 123 lb (55.8 kg)  SpO2 98%  BMI 23.24 kg/m2 Estimated body mass index is 23.24 kg/(m^2) as calculated from the following:    Height as of this encounter: 5' 1\" (1.549 m).    Weight as of this encounter: 123 lb (55.8 kg).  Medication Reconciliation: complete   Latisha Atkins MA      "

## 2017-06-22 NOTE — MR AVS SNAPSHOT
After Visit Summary   6/22/2017    Roxanna Stark    MRN: [id number]           Patient Information     Date Of Birth          5/20/1927        Visit Information        Provider Department      6/22/2017 9:00 AM Letha Grissom MD [facility]        Today's Diagnoses     Wound infection    -  1      Care Instructions    [facility]    If you have any questions regarding to your visit please contact your care team:       Team Purple:   Clinic Hours Telephone Number   Dr. Letha Salinas     7am-7pm  Monday - Thursday   7am-5pm  Fridays  [phone]  (Appointment scheduling available 24/7)    Questions about your Visit?   Team Line:  [phone]   Urgent Care - [place] and [facility] - 11am-9pm Monday-Friday Saturday-Sunday- 9am-5pm   [place] - 5pm-9pm Monday-Friday Saturday-Sunday- 9am-5pm  [phone] - [facility]  [phone] - [place]       What options do I have for visits at the clinic other than the traditional office visit?  To expand how we care for you, many of our providers are utilizing electronic visits (e-visits) and telephone visits, when medically appropriate, for interactions with their patients rather than a visit in the clinic.   We also offer nurse visits for many medical concerns. Just like any other service, we will bill your insurance company for this type of visit based on time spent on the phone with your provider. Not all insurance companies cover these visits. Please check with your medical insurance if this type of visit is covered. You will be responsible for any charges that are not paid by your insurance.      E-visits via OptixConnect:  generally incur a $35.00 fee.  Telephone visits:  Time spent on the phone: *charged based on time that is spent on the phone in increments of 10 minutes. Estimated cost:   5-10 mins $30.00   11-20 mins. $59.00   21-30 mins. $85.00     Use OptixConnect  (secure email communication and access to your chart) to send your primary care provider a message or make an appointment. Ask someone on your Team how to sign up for [place].  For a Price Quote for your services, please call our Consumer Price Line at [phone].  As always, Thank you for trusting us with your health care needs!              Follow-ups after your visit        Your next 10 appointments already scheduled     Jun 28, 2017  9:30 AM CDT   [facility]   [facility]    [address] [facility] [address] [phone]           Bring a current list of meds and any records pertaining to this visit.  For Physicals, please bring immunization records and any forms needing to be filled out.  Please arrive 10 minutes early to complete paperwork.            Jul 31, 2017  9:00 AM CDT   LAB with  LAB   [facility]    [address] [address] [phone]           Patient must bring picture ID.  Patient should be prepared to give a urine specimen  Please do not eat 10-12 hours before your appointment if you are coming in fasting for labs on lipids, cholesterol, or glucose (sugar).  Pregnant women should follow their Care Team instructions. Water with medications is okay. Do not drink coffee or other fluids.   If you have concerns about taking  your medications, please ask at office or if scheduling via Styloolahart, send a message by clicking on Secure Messaging, Message Your Care Team.            Aug 02, 2017  9:20 AM CDT   Return Visit with Jamie Johnson MD   [facility] [place] ([facility])    [address] [address] [facility] [address] [phone]            Aug 04, 2017  9:00 AM CDT   Ech Complete with FKECHR1   [facility] ([facility])    [address] [place] [address] 2nd Floor  [place] [address]  "  [phone]           1.  Please bring or wear a comfortable two-piece outfit. 2.  You may eat, drink and take your normal medicines. 3.  For any questions that cannot be answered, please contact the ordering physician            Aug 04, 2017 10:30 AM CDT   Return Visit with Boston Navarro MD   [facility] AT [facility] ([facility])    [address] [place] [address] Floor  [facility] [address] [phone]              Who to contact     If you have questions or need follow up information about today's clinic visit or your schedule please contact [facility] directly at [phone].  Normal or non-critical lab and imaging results will be communicated to you by eCullethart, letter or phone within 4 business days after the clinic has received the results. If you do not hear from us within 7 days, please contact the clinic through eCullethart or phone. If you have a critical or abnormal lab result, we will notify you by phone as soon as possible.  Submit refill requests through Starline or call your pharmacy and they will forward the refill request to us. Please allow 3 business days for your refill to be completed.          Additional Information About Your Visit        eCullethart Information     Starline lets you send messages to your doctor, view your test results, renew your prescriptions, schedule appointments and more. To sign up, go to [email or web address] . Click on \"Log in\" on the left side of the screen, which will take you to the Welcome page. Then click on \"Sign up Now\" on the right side of the page.     You will be asked to enter the access code listed below, as well as some personal information. Please follow the directions to create your username and password.     Your access code is: [email or web address]  Expires: 2017  9:18 AM     Your access code will  in 90 days. If you need help or a new code, please call your [facility] or " "[phone].        Care EveryWhere ID     This is your Care EveryWhere ID. This could be used by other organizations to access your [place] medical records  [id number]        Your Vitals Were     Pulse Temperature Height Pulse Oximetry BMI (Body Mass Index)       76 97.5  F (36.4  C) 5' 1\" (1.549 m) 98% 23.24 kg/m2        Blood Pressure from Last 3 Encounters:   06/22/17 134/72   05/05/17 122/62   03/09/17 138/70    Weight from Last 3 Encounters:   06/22/17 123 lb (55.8 kg)   05/05/17 119 lb 12.8 oz (54.3 kg)   03/09/17 120 lb (54.4 kg)              Today, you had the following     No orders found for display         Today's Medication Changes          These changes are accurate as of: 6/22/17  9:18 AM.  If you have any questions, ask your nurse or doctor.               Start taking these medicines.        Dose/Directions    cephALEXin 500 MG capsule   Commonly known as:  KEFLEX   Used for:  Wound infection   Started by:  Letha Grissom MD        Dose:  500 mg   Take 1 capsule (500 mg) by mouth 3 times daily   Quantity:  30 capsule   Refills:  0            Where to get your medicines      These medications were sent to [facility] [address] [place], [place] [address]E [place] AT [address] [address]     Phone:  [phone]     cephALEXin 500 MG capsule                Primary Care Provider Office Phone # Fax #    Letha Grissom [phone]       [address] [address]        Equal Access to Services     JARRELL HEIN AH: Elías Pearson, sherrida lumartha, qaybta kaalmasarai fermin, griffin bonner. So [facility] [phone].    ATENCIÓN: Si habla español, tiene a goldman disposición servicios gratuitos de asistencia lingüística. Llame al [phone].    We comply with applicable federal civil rights laws and [place] laws. We do not discriminate on the basis " of race, color, national origin, age, disability sex, sexual orientation or gender identity.            Thank you!     Thank you for choosing [facility]  for your care. Our goal is always to provide you with excellent care. Hearing back from our patients is one way we can continue to improve our services. Please take a few minutes to complete the written survey that you may receive in the mail after your visit with us. Thank you!             Your Updated Medication List - Protect others around you: Learn how to safely use, store and throw away your medicines at www.disposemymeds.org.          This list is accurate as of: 6/22/17  9:18 AM.  Always use your most recent med list.                   Brand Name Dispense Instructions for use Diagnosis    aspirin 325 MG EC tablet     90 tablet    Take 1 tablet (325 mg) by mouth daily    S/P AVR (aortic valve replacement)       calcium-vitamin D 500-125 MG-UNIT Tabs           cephALEXin 500 MG capsule    KEFLEX    30 capsule    Take 1 capsule (500 mg) by mouth 3 times daily    Wound infection       econazole nitrate 1 % cream     85 g    Apply to red rash of legs and feet twice a day till rash is gone    Tinea corporis, Tinea pedis of both feet       folic acid 1 MG tablet    FOLVITE    100 tablet    Take 1 tablet (1 mg) by mouth daily    Rheumatoid arthritis of multiple sites with negative rheumatoid factor (H), High risk medication use       furosemide 20 MG tablet    LASIX    90 tablet    TAKE 1 TABLET BY MOUTH EVERY DAY IF 2 TO 3 POUNDS WEIGHT GAIN OVER 2 DAY PERIOD    Hypertension goal BP (blood pressure) < 140/90       ICAPS PO      2 tablets daily        lisinopril 5 MG tablet    PRINIVIL/ZESTRIL    90 tablet    Take 1 tablet (5 mg) by mouth daily    S/P AVR (aortic valve replacement), Hypertension goal BP (blood pressure) < 140/90       methotrexate sodium 2.5 MG Tabs     16 tablet    Take 10 mg by mouth once a week . Take all 4 tablets on the same day  of each week.    Rheumatoid arthritis of multiple sites with negative rheumatoid factor (H)       metoprolol 25 MG tablet    LOPRESSOR    180 tablet    Take 1 tablet (25 mg) by mouth 2 times daily    Hypertension goal BP (blood pressure) < 140/90       OMEGA-3 FISH OIL PO      Take 2 g by mouth daily        predniSONE 1 MG tablet    DELTASONE    140 tablet    Prednisone 4mg daily x2weeks, then 3mg daily x2weeks, then 2mg daily x2weeks, then 1mg daily x2weeks, then stop.    Rheumatoid arthritis of multiple sites with negative rheumatoid factor (H)

## 2017-06-27 DIAGNOSIS — M06.09 RHEUMATOID ARTHRITIS OF MULTIPLE SITES WITH NEGATIVE RHEUMATOID FACTOR (H): ICD-10-CM

## 2017-06-27 RX ORDER — METHOTREXATE 2.5 MG/1
10 TABLET ORAL WEEKLY
Qty: 16 TABLET | Refills: 3 | Status: CANCELLED | OUTPATIENT
Start: 2017-06-27

## 2017-06-27 NOTE — TELEPHONE ENCOUNTER
Routing refill request to provider for review/approval because:  Drug not on the [facility] refill protocol       Ann Sparrow RN - BC

## 2017-06-27 NOTE — TELEPHONE ENCOUNTER
methotrexate sodium 2.5 MG TABS      Last Written Prescription Date:  5/5/17  Last Fill Quantity: 16,   # refills: 3  Last Office Visit with [facility], [facility] or [facility] prescribing provider: 5/5/17  Future Office visit:    Next 5 appointments (look out 90 days)     Jun 28, 2017  9:30 AM CDT   PHYSICAL with Letha Grissom MD   [facility] ([facility]    [address] [address]   [phone]            Aug 02, 2017  9:20 AM CDT   Return Visit with Jamie Johnson MD   [facility] ([facility])    [address] [address]   [phone]            Aug 04, 2017 10:30 AM CDT   Return Visit with Boston Navarro MD   [facility] AT [facility] ([facility])    [address] [place] [address] 2nd Floor  [facility] [address]   [phone]                   Routing refill request to provider for review/approval because:  Drug not on the [facility], [facility] or [facility] refill protocol or controlled substance

## 2017-06-28 ENCOUNTER — OFFICE VISIT (OUTPATIENT)
Dept: FAMILY MEDICINE | Facility: CLINIC | Age: 82
End: 2017-06-28
Payer: MEDICARE

## 2017-06-28 VITALS
WEIGHT: 122 LBS | HEIGHT: 61 IN | DIASTOLIC BLOOD PRESSURE: 72 MMHG | OXYGEN SATURATION: 98 % | SYSTOLIC BLOOD PRESSURE: 122 MMHG | TEMPERATURE: 98.2 F | BODY MASS INDEX: 23.03 KG/M2 | HEART RATE: 71 BPM

## 2017-06-28 DIAGNOSIS — Z00.00 ENCOUNTER FOR ROUTINE ADULT HEALTH EXAMINATION WITHOUT ABNORMAL FINDINGS: Primary | ICD-10-CM

## 2017-06-28 DIAGNOSIS — H61.22 IMPACTED CERUMEN OF LEFT EAR: ICD-10-CM

## 2017-06-28 DIAGNOSIS — R19.8 INCREASED ABDOMINAL GIRTH: ICD-10-CM

## 2017-06-28 PROCEDURE — G0439 PPPS, SUBSEQ VISIT: HCPCS | Performed by: FAMILY MEDICINE

## 2017-06-28 NOTE — MR AVS SNAPSHOT
After Visit Summary   6/28/2017    Roxanna Stark    MRN: [id number]           Patient Information     Date Of Birth          5/20/1927        Visit Information        Provider Department      6/28/2017 9:30 AM Letha Grissom MD [facility]        Today's Diagnoses     Encounter for routine adult health examination without abnormal findings    -  1    Impacted cerumen of left ear        Increased abdominal girth          Care Instructions      Preventive Health Recommendations  Female Ages 65 +    Yearly exam:     See your health care provider every year in order to  o Review health changes.   o Discuss preventive care.    o Review your medicines if your doctor has prescribed any.      You no longer need a yearly Pap test unless you've had an abnormal Pap test in the past 10 years. If you have vaginal symptoms, such as bleeding or discharge, be sure to talk with your provider about a Pap test.      Every 1 to 2 years, have a mammogram.  If you are over 69, talk with your health care provider about whether or not you want to continue having screening mammograms.      Every 10 years, have a colonoscopy. Or, have a yearly FIT test (stool test). These exams will check for colon cancer.       Have a cholesterol test every 5 years, or more often if your doctor advises it.       Have a diabetes test (fasting glucose) every three years. If you are at risk for diabetes, you should have this test more often.       At age 65, have a bone density scan (DEXA) to check for osteoporosis (brittle bone disease).    Shots:    Get a flu shot each year.    Get a tetanus shot every 10 years.    Talk to your doctor about your pneumonia vaccines. There are now two you should receive - Pneumovax (PPSV 23) and Prevnar (PCV 13).    Talk to your doctor about the shingles vaccine.    Talk to your doctor about the hepatitis B vaccine.    Nutrition:     Eat at least 5 servings of fruits and vegetables each  day.      Eat whole-grain bread, whole-wheat pasta and brown rice instead of white grains and rice.      Talk to your provider about Calcium and Vitamin D.     Lifestyle    Exercise at least 150 minutes a week (30 minutes a day, 5 days a week). This will help you control your weight and prevent disease.      Limit alcohol to one drink per day.      No smoking.       Wear sunscreen to prevent skin cancer.       See your dentist twice a year for an exam and cleaning.      See your eye doctor every 1 to 2 years to screen for conditions such as glaucoma, macular degeneration, cataracts, etc     [facility]    If you have any questions regarding to your visit please contact your care team:       Team Purple:   Clinic Hours Telephone Number   Dr. Letha Salinas     7am-7pm  Monday - Thursday   7am-5pm  Fridays  [phone]  (Appointment scheduling available 24/7)    Questions about your Visit?   Team Line:  [phone]   Urgent Care - [place] and [place] - 11am-9pm Monday-Friday Saturday-Sunday- 9am-5pm   [place] - 5pm-9pm Monday-Friday Saturday-Sunday- 9am-5pm  [phone] - [place]   [phone] - [place]       What options do I have for visits at the clinic other than the traditional office visit?  To expand how we care for you, many of our providers are utilizing electronic visits (e-visits) and telephone visits, when medically appropriate, for interactions with their patients rather than a visit in the clinic.   We also offer nurse visits for many medical concerns. Just like any other service, we will bill your insurance company for this type of visit based on time spent on the phone with your provider. Not all insurance companies cover these visits. Please check with your medical insurance if this type of visit is covered. You will be responsible for any charges that are not paid by your insurance.      E-visits via NatureBox:   generally incur a $35.00 fee.  Telephone visits:  Time spent on the phone: *charged based on time that is spent on the phone in increments of 10 minutes. Estimated cost:   5-10 mins $30.00   11-20 mins. $59.00   21-30 mins. $85.00     Use Mowdohart (secure email communication and access to your chart) to send your primary care provider a message or make an appointment. Ask someone on your Team how to sign up for Oncimmunet.  For a Price Quote for your services, please call our [place] Line at [phone].  As always, Thank you for trusting us with your health care needs!              Follow-ups after your visit        Your next 10 appointments already scheduled     Jul 31, 2017  9:00 AM CDT   LAB with  LAB   [facility]    [address] [facility] [address] [phone]           Patient must bring picture ID.  Patient should be prepared to give a urine specimen  Please do not eat 10-12 hours before your appointment if you are coming in fasting for labs on lipids, cholesterol, or glucose (sugar).  Pregnant women should follow their Care Team instructions. Water with medications is okay. Do not drink coffee or other fluids.   If you have concerns about taking  your medications, please ask at office or if scheduling via Oncimmune, send a message by clicking on Secure Messaging, Message Your Care Team.            Aug 02, 2017  9:20 AM CDT   Return Visit with Jamie Johnson MD   [facility] ([facility])    [address] [place] [address]   [phone]            Aug 04, 2017  9:00 AM CDT   Ech Complete with FKECHR1   [facility] ([facility])    [address] [facility] [facility] [address] [facility] [address] [phone]           1.  Please bring or wear a comfortable two-piece outfit. 2.  You may eat, drink and take your normal medicines. 3.  For any questions that cannot be answered,  "please contact the ordering physician            Aug 04, 2017 10:30 AM CDT   Return Visit with Boston Navarro MD   [facility] AT [facility] ([facility])    [address] [place] [address] Floor  [facility] [address] [phone]              Future tests that were ordered for you today     Open Future Orders        Priority Expected Expires Ordered    US Abdomen Complete Routine  2018            Who to contact     If you have questions or need follow up information about today's clinic visit or your schedule please contact [facility] directly at [phone].  Normal or non-critical lab and imaging results will be communicated to you by Romotivehart, letter or phone within 4 business days after the clinic has received the results. If you do not hear from us within 7 days, please contact the clinic through Romotivehart or phone. If you have a critical or abnormal lab result, we will notify you by phone as soon as possible.  Submit refill requests through Urban Traffic or call your pharmacy and they will forward the refill request to us. Please allow 3 business days for your refill to be completed.          Additional Information About Your Visit        RomotiveharBuyWithMe Information     Urban Traffic lets you send messages to your doctor, view your test results, renew your prescriptions, schedule appointments and more. To sign up, go to [email or web address] . Click on \"Log in\" on the left side of the screen, which will take you to the Welcome page. Then click on \"Sign up Now\" on the right side of the page.     You will be asked to enter the access code listed below, as well as some personal information. Please follow the directions to create your username and password.     Your access code is: [email or web address]  Expires: 2017  9:18 AM     Your access code will  in 90 days. If you need help or a new code, please call your [facility] or [phone].        Care " "EveryWhere ID     This is your Care EveryWhere ID. This could be used by other organizations to access your [place] medical records  [id number]        Your Vitals Were     Pulse Temperature Height Pulse Oximetry BMI (Body Mass Index)       71 98.2  F (36.8  C) 5' 1\" (1.549 m) 98% 23.05 kg/m2        Blood Pressure from Last 3 Encounters:   06/28/17 122/72   06/22/17 134/72   05/05/17 122/62    Weight from Last 3 Encounters:   06/28/17 122 lb (55.3 kg)   06/22/17 123 lb (55.8 kg)   05/05/17 119 lb 12.8 oz (54.3 kg)              We Performed the Following     REMOVE IMPACTED [place]        Primary Care Provider Office Phone # Fax #    Letha Grissom [phone]       [address] [address]        Equal Access to Services     ALTA HEIN : Hadii aad ku hadasho Soomaali, waaxda luqadaha, qaybta kaalmada adeegyada, waxay idiin hayshabnamn agapito medina . So [facility] [phone].    ATENCIÓN: Si habla español, tiene a goldman disposición servicios gratuitos de asistencia lingüística. Llame al [phone].    We comply with applicable federal civil rights laws and [place] laws. We do not discriminate on the basis of race, color, national origin, age, disability sex, sexual orientation or gender identity.            Thank you!     Thank you for choosing [facility]  for your care. Our goal is always to provide you with excellent care. Hearing back from our patients is one way we can continue to improve our services. Please take a few minutes to complete the written survey that you may receive in the mail after your visit with us. Thank you!             Your Updated Medication List - Protect others around you: Learn how to safely use, store and throw away your medicines at www.disposemymeds.org.          This list is accurate as of: 6/28/17  9:56 AM.  Always use your most recent med list.                   Brand Name Dispense Instructions " for use Diagnosis    aspirin 325 MG EC tablet     90 tablet    Take 1 tablet (325 mg) by mouth daily    S/P AVR (aortic valve replacement)       calcium-vitamin D 500-125 MG-UNIT Tabs           cephALEXin 500 MG capsule    KEFLEX    30 capsule    Take 1 capsule (500 mg) by mouth 3 times daily    Wound infection       econazole nitrate 1 % cream     85 g    Apply to red rash of legs and feet twice a day till rash is gone    Tinea corporis, Tinea pedis of both feet       folic acid 1 MG tablet    FOLVITE    100 tablet    Take 1 tablet (1 mg) by mouth daily    Rheumatoid arthritis of multiple sites with negative rheumatoid factor (H), High risk medication use       furosemide 20 MG tablet    LASIX    90 tablet    TAKE 1 TABLET BY MOUTH EVERY DAY IF 2 TO 3 POUNDS WEIGHT GAIN OVER 2 DAY PERIOD    Hypertension goal BP (blood pressure) < 140/90       ICAPS PO      2 tablets daily        lisinopril 5 MG tablet    PRINIVIL/ZESTRIL    90 tablet    Take 1 tablet (5 mg) by mouth daily    S/P AVR (aortic valve replacement), Hypertension goal BP (blood pressure) < 140/90       methotrexate sodium 2.5 MG Tabs     16 tablet    Take 10 mg by mouth once a week . Take all 4 tablets on the same day of each week.    Rheumatoid arthritis of multiple sites with negative rheumatoid factor (H)       metoprolol 25 MG tablet    LOPRESSOR    180 tablet    Take 1 tablet (25 mg) by mouth 2 times daily    Hypertension goal BP (blood pressure) < 140/90       OMEGA-3 FISH OIL PO      Take 2 g by mouth daily        predniSONE 1 MG tablet    DELTASONE    140 tablet    Prednisone 4mg daily x2weeks, then 3mg daily x2weeks, then 2mg daily x2weeks, then 1mg daily x2weeks, then stop.    Rheumatoid arthritis of multiple sites with negative rheumatoid factor (H)

## 2017-06-28 NOTE — TELEPHONE ENCOUNTER
Spoke with pharmacy patient had called in a old prescription number off a bottle of methotrexate. Prescription was picked up today.  Twila Atkins CMA  6/28/2017 3:45 PM

## 2017-06-28 NOTE — NURSING NOTE
"Chief Complaint   Patient presents with     Physical       Initial /72 (BP Location: Left arm, Patient Position: Chair, Cuff Size: Adult Regular)  Pulse 71  Temp 98.2  F (36.8  C)  Ht 5' 1\" (1.549 m)  Wt 122 lb (55.3 kg)  SpO2 98%  BMI 23.05 kg/m2 Estimated body mass index is 23.05 kg/(m^2) as calculated from the following:    Height as of this encounter: 5' 1\" (1.549 m).    Weight as of this encounter: 122 lb (55.3 kg).  Medication Reconciliation: complete   Latisha Atkins MA      "

## 2017-07-05 ENCOUNTER — RADIANT APPOINTMENT (OUTPATIENT)
Dept: ULTRASOUND IMAGING | Facility: CLINIC | Age: 82
End: 2017-07-05
Attending: FAMILY MEDICINE
Payer: MEDICARE

## 2017-07-05 DIAGNOSIS — R19.8 INCREASED ABDOMINAL GIRTH: ICD-10-CM

## 2017-07-05 PROCEDURE — 76700 US EXAM ABDOM COMPLETE: CPT

## 2017-07-07 ENCOUNTER — TELEPHONE (OUTPATIENT)
Dept: FAMILY MEDICINE | Facility: CLINIC | Age: 82
End: 2017-07-07

## 2017-07-07 DIAGNOSIS — Z29.89 SBE (SUBACUTE BACTERIAL ENDOCARDITIS) PROPHYLAXIS CANDIDATE: Primary | ICD-10-CM

## 2017-07-07 RX ORDER — AMOXICILLIN 500 MG/1
2000 CAPSULE ORAL ONCE
Qty: 4 CAPSULE | Refills: 3 | Status: SHIPPED | OUTPATIENT
Start: 2017-07-07 | End: 2017-11-14

## 2017-07-07 NOTE — TELEPHONE ENCOUNTER
Patient notified of providers message as written.  Prescription sent to pharmacy.  Patient verbalized understanding, no further questions or concerns.    Jerilyn Orr RN

## 2017-07-07 NOTE — TELEPHONE ENCOUNTER
Reason for Call:  Other prescription    Detailed comments:  Patient calling. She is going to the dentist. She needs a rx called to the pharmacy.     Phone Number Patient can be reached at: Home number on file [phone] (home)    Best Time:  Any     Can we leave a detailed message on this number? YES    Call taken on 7/7/2017 at 10:35 AM by Shoshana Myers

## 2017-07-07 NOTE — TELEPHONE ENCOUNTER
Patient is [facility] before her dentist appointment on 7/10/17.     Esther Henry MA

## 2017-07-07 NOTE — TELEPHONE ENCOUNTER
Routing refill request to provider for review/approval because:  Drug not on the [facility] refill protocol     Jerilyn Orr RN

## 2017-07-07 NOTE — TELEPHONE ENCOUNTER
Amoxicillin can interact with methotrexate  Needs to hold her weekly dose of methotrexate when she takes the amoxicillin  Ok to send prescription to pharmacy of choice  Beatris Salinas MD

## 2017-07-24 ENCOUNTER — TRANSFERRED RECORDS (OUTPATIENT)
Dept: HEALTH INFORMATION MANAGEMENT | Facility: CLINIC | Age: 82
End: 2017-07-24

## 2017-07-26 ENCOUNTER — PRE VISIT (OUTPATIENT)
Dept: CARDIOLOGY | Facility: CLINIC | Age: 82
End: 2017-07-26

## 2017-07-26 NOTE — TELEPHONE ENCOUNTER
6 month cardiac follow up for Nonrheumatic aortic valve stenosis and S/P AVR (aortic valve replacement); patient has echocardiogram SCHEDULED @ 9:00 AM today.    Last Assessment & Plan:     1. Severe AS. S/p AVR with a #21 tissue prosthesis. Excellent post-surgical results.  2. Hypertension: well controlled.  3. RA.  4. Plan:   - No changes to medications today. Euvolemic. Weight target 114-117 lbs. No need to take furosemide.  - RTC in 6 months with TTE. (ECHOCARDIOGRAM SCHEDULED @ 9:00 AM TO PROCEED CARDIOLOGY FOLLOW UP APPOINTMENT)      Boston Navarro MD, PhD     Chart prep completed; patient is up to date on requested cardiac/lab testing. No further information or testing needed at this time.   SORAIDA LatifM.A.

## 2017-07-31 DIAGNOSIS — Z79.899 HIGH RISK MEDICATION USE: ICD-10-CM

## 2017-07-31 DIAGNOSIS — M06.09 RHEUMATOID ARTHRITIS OF MULTIPLE SITES WITH NEGATIVE RHEUMATOID FACTOR (H): ICD-10-CM

## 2017-07-31 LAB
ALBUMIN SERPL-MCNC: 3.2 G/DL (ref 3.4–5)
ALP SERPL-CCNC: 81 U/L (ref 40–150)
ALT SERPL W P-5'-P-CCNC: 22 U/L (ref 0–50)
AST SERPL W P-5'-P-CCNC: 20 U/L (ref 0–45)
BASOPHILS # BLD AUTO: 0.1 10E9/L (ref 0–0.2)
BASOPHILS NFR BLD AUTO: 0.9 %
BILIRUB DIRECT SERPL-MCNC: 0.1 MG/DL (ref 0–0.2)
BILIRUB SERPL-MCNC: 0.2 MG/DL (ref 0.2–1.3)
CREAT SERPL-MCNC: 1.18 MG/DL (ref 0.52–1.04)
CRP SERPL-MCNC: 14.3 MG/L (ref 0–8)
DIFFERENTIAL METHOD BLD: ABNORMAL
EOSINOPHIL # BLD AUTO: 0.5 10E9/L (ref 0–0.7)
EOSINOPHIL NFR BLD AUTO: 4.9 %
ERYTHROCYTE [DISTWIDTH] IN BLOOD BY AUTOMATED COUNT: 16.6 % (ref 10–15)
ERYTHROCYTE [SEDIMENTATION RATE] IN BLOOD BY WESTERGREN METHOD: 49 MM/H (ref 0–30)
GFR SERPL CREATININE-BSD FRML MDRD: 43 ML/MIN/1.7M2
HCT VFR BLD AUTO: 36.4 % (ref 35–47)
HGB BLD-MCNC: 11.5 G/DL (ref 11.7–15.7)
LYMPHOCYTES # BLD AUTO: 2.1 10E9/L (ref 0.8–5.3)
LYMPHOCYTES NFR BLD AUTO: 21.5 %
MCH RBC QN AUTO: 29.5 PG (ref 26.5–33)
MCHC RBC AUTO-ENTMCNC: 31.6 G/DL (ref 31.5–36.5)
MCV RBC AUTO: 93 FL (ref 78–100)
MONOCYTES # BLD AUTO: 1 10E9/L (ref 0–1.3)
MONOCYTES NFR BLD AUTO: 10.4 %
NEUTROPHILS # BLD AUTO: 5.9 10E9/L (ref 1.6–8.3)
NEUTROPHILS NFR BLD AUTO: 62.3 %
PLATELET # BLD AUTO: 265 10E9/L (ref 150–450)
PROT SERPL-MCNC: 7 G/DL (ref 6.8–8.8)
RBC # BLD AUTO: 3.9 10E12/L (ref 3.8–5.2)
WBC # BLD AUTO: 9.5 10E9/L (ref 4–11)

## 2017-07-31 PROCEDURE — 82565 ASSAY OF CREATININE: CPT | Performed by: INTERNAL MEDICINE

## 2017-07-31 PROCEDURE — 80076 HEPATIC FUNCTION PANEL: CPT | Performed by: INTERNAL MEDICINE

## 2017-07-31 PROCEDURE — 86140 C-REACTIVE PROTEIN: CPT | Performed by: INTERNAL MEDICINE

## 2017-07-31 PROCEDURE — 36415 COLL VENOUS BLD VENIPUNCTURE: CPT | Performed by: INTERNAL MEDICINE

## 2017-07-31 PROCEDURE — 85025 COMPLETE CBC W/AUTO DIFF WBC: CPT | Performed by: INTERNAL MEDICINE

## 2017-07-31 PROCEDURE — 85652 RBC SED RATE AUTOMATED: CPT | Performed by: INTERNAL MEDICINE

## 2017-08-02 ENCOUNTER — OFFICE VISIT (OUTPATIENT)
Dept: RHEUMATOLOGY | Facility: CLINIC | Age: 82
End: 2017-08-02
Payer: MEDICARE

## 2017-08-02 VITALS
HEIGHT: 61 IN | BODY MASS INDEX: 22.69 KG/M2 | DIASTOLIC BLOOD PRESSURE: 80 MMHG | SYSTOLIC BLOOD PRESSURE: 146 MMHG | HEART RATE: 79 BPM | TEMPERATURE: 96.9 F | OXYGEN SATURATION: 95 % | WEIGHT: 120.2 LBS

## 2017-08-02 DIAGNOSIS — G89.29 CHRONIC RIGHT SHOULDER PAIN: ICD-10-CM

## 2017-08-02 DIAGNOSIS — M06.09 RHEUMATOID ARTHRITIS OF MULTIPLE SITES WITH NEGATIVE RHEUMATOID FACTOR (H): Primary | ICD-10-CM

## 2017-08-02 DIAGNOSIS — Z79.899 HIGH RISK MEDICATION USE: ICD-10-CM

## 2017-08-02 DIAGNOSIS — M25.511 CHRONIC RIGHT SHOULDER PAIN: ICD-10-CM

## 2017-08-02 PROCEDURE — 20610 DRAIN/INJ JOINT/BURSA W/O US: CPT | Mod: RT | Performed by: INTERNAL MEDICINE

## 2017-08-02 PROCEDURE — 99213 OFFICE O/P EST LOW 20 MIN: CPT | Mod: 25 | Performed by: INTERNAL MEDICINE

## 2017-08-02 RX ORDER — TRIAMCINOLONE ACETONIDE 40 MG/ML
40 INJECTION, SUSPENSION INTRA-ARTICULAR; INTRAMUSCULAR ONCE
Qty: 1 ML | Refills: 0 | OUTPATIENT
Start: 2017-08-02 | End: 2017-08-02

## 2017-08-02 RX ORDER — FOLIC ACID 1 MG/1
1 TABLET ORAL DAILY
Qty: 100 TABLET | Refills: 3 | Status: SHIPPED | OUTPATIENT
Start: 2017-08-02 | End: 2017-11-20

## 2017-08-02 RX ORDER — METHOTREXATE 2.5 MG/1
10 TABLET ORAL WEEKLY
Qty: 16 TABLET | Refills: 3 | Status: SHIPPED | OUTPATIENT
Start: 2017-08-02 | End: 2017-11-01

## 2017-08-02 NOTE — NURSING NOTE
"Chief Complaint   Patient presents with     Arthritis     RA, patient states she does not feel any different       Initial /79 (BP Location: Left arm, Patient Position: Chair, Cuff Size: Adult Regular)  Pulse 79  Temp 96.9  F (36.1  C) (Oral)  Ht 1.549 m (5' 1\")  Wt 54.5 kg (120 lb 3.2 oz)  SpO2 95%  BMI 22.71 kg/m2 Estimated body mass index is 22.71 kg/(m^2) as calculated from the following:    Height as of this encounter: 1.549 m (5' 1\").    Weight as of this encounter: 54.5 kg (120 lb 3.2 oz).  BP completed using cuff size: regular         RAPID3 (0-30) Cumulative Score            RAPID3 Weighted Score (divide #4 by 3 and that is the weighted score)           "

## 2017-08-02 NOTE — MR AVS SNAPSHOT
After Visit Summary   8/2/2017    Roxanna Stark    MRN: [id number]           Patient Information     Date Of Birth          5/20/1927        Visit Information        Provider Department      8/2/2017 9:20 AM Jamie Johnson MD [facility]        Today's Diagnoses     Rheumatoid arthritis of multiple sites with negative rheumatoid factor (H)    -  1    High risk medication use        Chronic right shoulder pain           Follow-ups after your visit        Your next 10 appointments already scheduled     Aug 04, 2017  9:00 AM CDT   Ech Complete with FKECHR1   [facility] [place] ([facility])    [address] [place] [address]   [phone]           1. Please bring or wear a comfortable two-piece outfit. 2. You may eat, drink and take your normal medicines. 3. For any questions that cannot be answered, please contact the ordering physician            Aug 04, 2017 10:30 AM CDT   Return Visit with Boston Navarro MD   [facility] ([facility])    [address] [place] [address]   [phone]            Oct 30, 2017  9:00 AM CDT   LAB with [facility]   [facility]    [address] [facility] [address] [facility] [address]   [phone]           Patient must bring picture ID. Patient should be prepared to give a urine specimen  Please do not eat 10-12 hours before your appointment if you are coming in fasting for labs on lipids, cholesterol, or glucose (sugar). Pregnant women should follow their Care Team instructions. Water with medications is okay. Do not drink coffee or other fluids. If you have concerns about taking  your medications, please ask at office or if scheduling via [employer], send a message by clicking on Secure Messaging, Message Your Care Team.            Nov 01, 2017  9:00 AM CDT   Return Visit with Jamie  "MD Alex   [facility] [place] ([facility])    [address] [facility]  [place] [address] [phone]              Future tests that were ordered for you today     Open Future Orders        Priority Expected Expires Ordered    Creatinine Routine 10/27/2017 11/15/2017 8/2/2017    CRP inflammation Routine 10/27/2017 11/15/2017 8/2/2017    Erythrocyte sedimentation rate auto Routine 10/27/2017 11/15/2017 8/2/2017    Hepatic panel Routine 10/27/2017 11/15/2017 8/2/2017    CBC with platelets differential Routine 10/27/2017 11/15/2017 8/2/2017            Who to contact     If you have questions or need follow up information about today's clinic visit or your schedule please contact [facility] directly at [phone].  Normal or non-critical lab and imaging results will be communicated to you by MyChart, letter or phone within 4 business days after the clinic has received the results. If you do not hear from us within 7 days, please contact the clinic through [employer]hart or phone. If you have a critical or abnormal lab result, we will notify you by phone as soon as possible.  Submit refill requests through [employer] or call your pharmacy and they will forward the refill request to us. Please allow 3 business days for your refill to be completed.          Additional Information About Your Visit        MyChart Information     [employer] lets you send messages to your doctor, view your test results, renew your prescriptions, schedule appointments and more. To sign up, go to [email or web address] . Click on \"Log in\" on the left side of the screen, which will take you to the Welcome page. Then click on \"Sign up Now\" on the right side of the page.     You will be asked to enter the access code listed below, as well as some personal information. Please follow the directions to create your username and password.     Your access code is: [email or web address]  Expires: 9/20/2017  9:18 AM     Your access " "code will  in 90 days. If you need help or a new code, please call your [facility] clinic or [phone].        Care EveryWhere ID     This is your Care EveryWhere ID. This could be used by other organizations to access your [facility] medical records  [id number]        Your Vitals Were     Pulse Temperature Height Pulse Oximetry BMI (Body Mass Index)       79 96.9  F (36.1  C) (Oral) 1.549 m (5' 1\") 95% 22.71 kg/m2        Blood Pressure from Last 3 Encounters:   17 154/79   17 122/72   17 134/72    Weight from Last 3 Encounters:   17 54.5 kg (120 lb 3.2 oz)   17 55.3 kg (122 lb)   17 55.8 kg (123 lb)                 Today's Medication Changes          These changes are accurate as of: 17  9:48 AM.  If you have any questions, ask your nurse or doctor.               Stop taking these medicines if you haven't already. Please contact your care team if you have questions.     predniSONE 1 MG tablet   Commonly known as:  DELTASONE   Stopped by:  Jamie Johnson MD                Where to get your medicines      [facility] [address] [place] [address] AVE [address] AT [place] [id number] [facility] [address]     Phone:  [phone]     folic acid 1 MG tablet    methotrexate sodium 2.5 MG Tabs                Primary Care Provider Office Phone # Fax #    Letha Grissom [phone]       [facility] [address] [facility] [address]        Equal Access to Services     [facility] ANGEL LUIS AH: Elías Pearson, zoran flores, roberta kaalmasarai fermin, griffin bonner. So [facility] [phone].    ATENCIÓN: Si habla español, tiene a goldman disposición servicios gratuitos de asistencia lingüística. Llame al [phone].    We comply with applicable federal civil rights laws and [place] laws. We do not discriminate on the basis of race, color, " national origin, age, disability sex, sexual orientation or gender identity.            Thank you!     Thank you for choosing [facility]  for your care. Our goal is always to provide you with excellent care. Hearing back from our patients is one way we can continue to improve our services. Please take a few minutes to complete the written survey that you may receive in the mail after your visit with us. Thank you!             Your Updated Medication List - Protect others around you: Learn how to safely use, store and throw away your medicines at www.disposemymeds.org.          This list is accurate as of: 8/2/17  9:48 AM.  Always use your most recent med list.                   Brand Name Dispense Instructions for use Diagnosis    aspirin 325 MG EC tablet     90 tablet    Take 1 tablet (325 mg) by mouth daily    S/P AVR (aortic valve replacement)       calcium-vitamin D 500-125 MG-UNIT Tabs           cephALEXin 500 MG capsule    KEFLEX    30 capsule    Take 1 capsule (500 mg) by mouth 3 times daily    Wound infection       econazole nitrate 1 % cream     85 g    Apply to red rash of legs and feet twice a day till rash is gone    Tinea corporis, Tinea pedis of both feet       folic acid 1 MG tablet    FOLVITE    100 tablet    Take 1 tablet (1 mg) by mouth daily    Rheumatoid arthritis of multiple sites with negative rheumatoid factor (H), High risk medication use       furosemide 20 MG tablet    LASIX    90 tablet    TAKE 1 TABLET BY MOUTH EVERY DAY IF 2 TO 3 POUNDS WEIGHT GAIN OVER 2 DAY PERIOD    Hypertension goal BP (blood pressure) < 140/90       ICAPS PO      2 tablets daily        lisinopril 5 MG tablet    PRINIVIL/ZESTRIL    90 tablet    Take 1 tablet (5 mg) by mouth daily    S/P AVR (aortic valve replacement), Hypertension goal BP (blood pressure) < 140/90       methotrexate sodium 2.5 MG Tabs     16 tablet    Take 10 mg by mouth once a week . Take all 4 tablets on the same day of each week.     Rheumatoid arthritis of multiple sites with negative rheumatoid factor (H), High risk medication use       metoprolol 25 MG tablet    LOPRESSOR    180 tablet    Take 1 tablet (25 mg) by mouth 2 times daily    Hypertension goal BP (blood pressure) < 140/90       OMEGA-3 FISH OIL PO      Take 2 g by mouth daily

## 2017-08-02 NOTE — PROGRESS NOTES
Rheumatology Clinic Visit      Roxanna Stark MRN# [id number]   YOB: 1927 Age: 89 year old      Date of visit: 8/02/17   PCP: Dr. Letha Grissom     Chief Complaint   Patient presents with:  Arthritis: RA, patient states she does not feel any different      Assessment and Plan     1. 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  She was treated with prednisone monotherapy for several years, being able to taper off without recurrence of symptoms. She then developed worsening symptoms in her hands and was diagnosed with rheumatoid arthritis. Initially, she was resistant to DMARD therapy. She was on methotrexate 15 mg once weekly and was doing well; methotrexate and prednisone were reduced. Currently off of prednisone and on methotrexate 10 mg once weekly. She is doing well except for the right shoulder pain that is mechanical. ESR and CRP are elevated but this does not correlate with her symptoms.   - Continue methotrexate 10 mg once weekly   - Continue folic acid 1mg daily  - Labs 2-3 days prior to the next rheumatology clinic visit: CBC, Creatinine, Hepatic Panel, ESR, CRP     2. Giant Cell Arteritis History?: 12/26/2005 Left TA biopsy negative per [facility] record review.  No symptoms of GCA at this time.     3. Right shoulder rotator cuff tear and pain: Previously evaluated by orthopedic surgery and her pain resolved for approximately one year after having a steroid injection in March 2015. She does not want to have surgical correction of her shoulder. Repeat steroid injections have been helpful; repeat today as documented in the procedure section.  Physical therapy was effective and she is doing exercises at home.     4. History of basal cell carcinoma: Following with dermatology     5. Bone Health: Managed by PCP already.    Ms. Stark verbalized agreement with and  understanding of the rational for the diagnosis and treatment plan.  All questions were answered to best of my ability and the patient's satisfaction. Ms. Stark was advised to contact the clinic with any questions that may arise after the clinic visit.      Thank you for involving me in the care of the patient    Return to clinic: 3 months      HPI   Roxanna Stark is a 89 year old female with medical history significant for basal cell carcinoma, hypertension, aortic stenosis, right rotator cuff tear (previously evaluated by Dr. Bingham, orthopedic surgery, on 3/20/2015 where at that time Ms. Stark was not interested in surgical correction; she received an intra-articular steroid injection at that time that was effective for ~1year), temporal arteritis?, and seronegative erosive rheumatoid arthritis.     Today, she reports that she is doing well. Right shoulder has some pain, especially when she raises her arm above her head. She would like a repeat steroid injection of her right shoulder today. No other joint pain. No morning stiffness. No gelling phenomenon. She is able to stand up from a chair unassisted and without difficulty. She is able to raise her arms above her head without difficulty, but does have pain in the right shoulder. No headache. No vision change. No vision loss. No scalp tenderness. No jaw claudication.     Denies fevers, chills, nausea, vomiting, constipation, diarrhea. No abdominal pain. No chest pain/pressure, palpitations, or shortness of breath. No oral or nasal sores. No neck pain. No rash. No LE swelling.       Tobacco: None  EtOH: No more than 1 drink per week  Drugs: None  Occupation: Used to work for the [employer]; now retired    ROS   GEN: No fevers, chills, night sweats, or weight change  SKIN: No itching, rashes, sores  HEENT: No oral or nasal ulcers.  CV: No chest pain, pressure, palpitations, or dyspnea on exertion.  PULM: No SOB, wheeze, cough.  GI: No nausea, vomiting,  constipation, diarrhea. No blood in stool. No abdominal pain.  : No blood in urine.  MSK: See HPI.  NEURO: No numbness, tingling, or weakness.  ENDO: No heat/cold intolerance.  EXT: No LE swelling    Active Problem List     Patient Active Problem List   Diagnosis     Polymyalgia rheumatica (H)     History of basal cell carcinoma     CARDIOVASCULAR SCREENING; LDL GOAL LESS THAN 130     Advanced directives, counseling/discussion     Hypertension goal BP (blood pressure) < 140/90     Hip pain     Left atrial enlargement     Aortic stenosis     Status post coronary angiogram     Aortic valve stenosis     Aortic valve replaced     Rheumatoid arthritis of multiple sites with negative rheumatoid factor (H)     Past Medical History     Past Medical History:   Diagnosis Date     Actinic keratosis      Aortic stenosis 2014     Basal cell cancer 7/2014    left eye medial canthus      Basal cell carcinoma 9/30/08    left cheek     HTN (hypertension)      melanoma in situ 9/30/2008    LEFT ARM     Melanoma in situ (H) 9/30/08    left arm     Polymyalgia rheumatica (H) 11/99     Temporal arteritis (H) 11/99     Past Surgical History     Past Surgical History:   Procedure Laterality Date     C SKIN TISSUE PROCEDURE UNLISTED  11/3/08    mmis skin cancer excision     CATARACT IOL, RT/LT  5/09    bilateral     COLONOSCOPY  2002     REPLACE VALVE AORTIC N/A 4/25/2016    Procedure: REPLACE VALVE AORTIC;  Surgeon: Sudeep Tsai MD;  Location: [facility] OR     Allergy   No Known Allergies     Current Medication List     Current Outpatient Prescriptions   Medication Sig     furosemide (LASIX) 20 MG tablet TAKE 1 TABLET BY MOUTH EVERY DAY IF 2 TO 3 POUNDS WEIGHT GAIN OVER 2 DAY PERIOD     methotrexate sodium 2.5 MG TABS Take 10 mg by mouth once a week . Take all 4 tablets on the same day of each week.     folic acid (FOLVITE) 1 MG tablet Take 1 tablet (1 mg) by mouth daily     metoprolol (LOPRESSOR) 25 MG tablet Take 1 tablet (25 mg)  "by mouth 2 times daily     lisinopril (PRINIVIL,ZESTRIL) 5 MG tablet Take 1 tablet (5 mg) by mouth daily     calcium-vitamin D 500-125 MG-UNIT TABS      aspirin  MG EC tablet Take 1 tablet (325 mg) by mouth daily     Omega-3 Fatty Acids (OMEGA-3 FISH OIL PO) Take 2 g by mouth daily     ICAPS PO 2 tablets daily     cephALEXin (KEFLEX) 500 MG capsule Take 1 capsule (500 mg) by mouth 3 times daily (Patient not taking: Reported on 8/2/2017)     predniSONE (DELTASONE) 1 MG tablet Prednisone 4mg daily x2weeks, then 3mg daily x2weeks, then 2mg daily x2weeks, then 1mg daily x2weeks, then stop. (Patient not taking: Reported on 8/2/2017)     econazole nitrate 1 % cream Apply to red rash of legs and feet twice a day till rash is gone (Patient not taking: Reported on 8/2/2017)     No current facility-administered medications for this visit.        Social History   See HPI    Family History     Family History   Problem Relation Age of Onset     Breast Cancer Sister      Arthritis Sister      CANCER Sister      breast     Thyroid Disease Sister      CANCER Sister      colon     Arthritis Sister      Arthritis Mother      Hypertension Father      Cancer - colorectal Father      Prostate Cancer Father      Arthritis Father      HEART DISEASE Father      Lipids Father      Arthritis Sister      Asthma Daughter      Asthma Daughter      CANCER Daughter 58     lung     CANCER Other 81     pancreatic      Physical Exam     Temp Readings from Last 3 Encounters:   08/02/17 96.9  F (36.1  C) (Oral)   06/28/17 98.2  F (36.8  C)   06/22/17 97.5  F (36.4  C)     BP Readings from Last 5 Encounters:   08/02/17 146/80   06/28/17 122/72   06/22/17 134/72   05/05/17 122/62   03/09/17 138/70     Pulse Readings from Last 1 Encounters:   08/02/17 79     Resp Readings from Last 1 Encounters:   11/11/16 18     Estimated body mass index is 22.71 kg/(m^2) as calculated from the following:    Height as of this encounter: 1.549 m (5' 1\").    Weight " as of this encounter: 54.5 kg (120 lb 3.2 oz).    GEN: NAD  HEENT: MMM.  Anicteric, noninjected sclera  CV: S1, S2. RRR. No m/r/g.  PULM: CTA bilaterally. No w/c.  MSK:  MCPs, PIPs, wrists, elbows, knees, ankles, and feet without swelling or tenderness to palpation. Heberden's and Gracie's nodes present. His nontender to direct palpation. Right shoulder painful with abduction above 90 , and with palpation on the most lateral aspect. Left shoulder without swelling or tenderness to palpation. .   NEURO: Able to stand up unassisted from a chair without difficulty. Able to raise her arms above her head without difficulty, but she has pain in the right shoulder when she abducts above 90 .   SKIN: No rash.    EXT: No LE edema  PSYCH: Alert. Appropriate.    Labs / Imaging (select studies)   RF/CCP  Recent Labs   Lab Test  08/11/16   1124  08/04/16   1222  02/18/16   1543   CCPIGG  1   --    --    RHF   --   <20  <20     BAIRON/RNP/Sm/SSA/SSB  Recent Labs   Lab Test  08/04/16   1222  02/18/16   1543   ISABELA  <1.0  Interpretation:  Negative    <1.0  Interpretation:  Negative       CBC  Recent Labs   Lab Test  07/31/17   0905  05/02/17   0912  02/06/17   0859   WBC  9.5  11.5*  11.0   RBC  3.90  3.87  3.76*   HGB  11.5*  12.0  11.1*   HCT  36.4  37.7  36.1   MCV  93  97  96   RDW  16.6*  16.2*  20.6*   PLT  265  269  266   MCH  29.5  31.0  29.5   MCHC  31.6  31.8  30.7*   NEUTROPHIL  62.3  72.5  68.0   LYMPH  21.5  18.7  18.6   MONOCYTE  10.4  5.7  10.2   EOSINOPHIL  4.9  2.3  2.1   BASOPHIL  0.9  0.8  1.1   ANEU  5.9  8.3  7.5   ALYM  2.1  2.2  2.1   IGNACIO  1.0  0.7  1.1   AEOS  0.5  0.3  0.2   ABAS  0.1  0.1  0.1     CMP  Recent Labs   Lab Test  07/31/17   0905  05/02/17   0912  02/06/17   0859  01/09/17   0943  12/14/16   0849   07/12/16   1035  06/24/16   0852  06/06/16   1116  05/02/16   0508  05/01/16   0643   NA   --    --    --    --   140   --   138  140  141  141  141   POTASSIUM   --    --    --    --   4.5   --   4.6   4.3  4.7  4.2  4.1   CHLORIDE   --    --    --    --   105   --   102  105  104  110*  109   CO2   --    --    --    --   26   --   28  27  31  24  24   ANIONGAP   --    --    --    --   9   --   8  8  6  6  8   GLC   --    --    --    --   172*   --   117*  95  111*  115*  98   BUN   --    --    --    --   28   --   17  25  30  30  31*   CR  1.18*  1.46*  1.46*  1.44*  1.19*   < >  1.04  1.18*  1.23*  0.93  1.03   GFRESTIMATED  43*  34*  34*  34*  43*   < >  50*  43*  41*  57*  50*   GFRESTBLACK  52*  41*  41*  41*  52*   < >  60*  52*  50*  69  61   ROEL   --    --    --    --   8.8   --   9.4  9.0  9.3  8.3*  8.2*   BILITOTAL  0.2  0.3  0.3  0.4   --    < >   --    --    --    --    --    ALBUMIN  3.2*  3.4  3.5  3.3*   --    < >   --    --    --    --    --    PROTTOTAL  7.0  6.8  6.9  6.5*   --    < >   --    --    --    --    --    ALKPHOS  81  60  60  62   --    < >   --    --    --    --    --    AST  20  20  29  22   --    < >   --    --    --    --    --    ALT  22  33  39  31   --    < >   --    --    --    --    --     < > = values in this interval not displayed.     Uric Acid  Recent Labs   Lab Test  01/25/17   0940   URIC  5.7     Calcium/VitaminD  Recent Labs   Lab Test  12/14/16   0849  07/12/16   1035  06/24/16   0852   ROEL  8.8  9.4  9.0     ESR/CRP  Recent Labs   Lab Test  07/31/17   0905  05/02/17   0912  02/06/17   0859   SED  49*  20  26   CRP  14.3*  <2.9  <2.9     TSH/T4  Recent Labs   Lab Test  06/21/12   0841   TSH  1.55   T4  0.81     Lipid Panel  Recent Labs   Lab Test  03/17/15   0923   CHOL  228*   TRIG  200*   HDL  72   LDL  116   VLDL  40*   CHOLHDLRATIO  3.2     Hepatitis B  Recent Labs   Lab Test  11/10/16   0909   HBCAB  Nonreactive   HEPBANG  Nonreactive     Hepatitis C  Recent Labs   Lab Test  11/10/16   0909   HCVAB  Nonreactive   Assay performance characteristics have not been established for newborns,   infants, and children       HIV Screening  Recent Labs   Lab Test   11/10/16   0909   HIAGAB  Nonreactive   HIV-1 p24 Ag & HIV-1/HIV-2 Ab Not Detected       Immunization History     Immunization History   Administered Date(s) Administered     Influenza (H1N1) 10/20/2016     Influenza (IIV3) 10/04/2005, 10/20/2010, 11/09/2011, 09/22/2012, 09/16/2013, 10/15/2014     Pneumococcal (PCV 13) 03/17/2015     Pneumococcal 23 valent 11/03/2000, 12/13/2010     TD (ADULT, 7+) 01/12/2004     TDAP Vaccine (Adacel) 05/14/2013     Zoster vaccine, live 12/15/2006       Procedure     Procedure: Steroid injection of the right shoulder  Indication: Pain, impingement syndrome     The procedure was explained in detail. Risks including infection, pain, structural damage such as cartilage damage and tendon rupture, fat atrophy, skin hyper-/hypo-pigmentation, and medication reaction was explained. The need for rest of the affected joint for one week after the procedure was explained.  The option of not doing the procedure was also provided. All questions were answered and the patient consented to the procedure.     A time-out was performed and the correct patient, procedure, and laterality were verified.    The right shoulder was examined and location for injection was identified. The area was cleaned with chlorhexidine, twice.  Ethyl chloride was then used for topical anaesthetic.  Then a mixture of lidocaine 1% 2 mL and Kenalog 40mg was injected into the intra-articular space.     The patient tolerated the procedure well. No complications.    MEDICATION: Kenalog 40 mg  LOT #: [id number]  : [employer]  EXPIRATION DATE: 12/01/2018  NDC#: 1591-0747-97          Chart documentation done in part with Dragon Voice recognition Software. Although reviewed after completion, some word and grammatical error may remain.    Jamie Johnson MD

## 2017-08-02 NOTE — NURSING NOTE
The following medication was given:     MEDICATION: Kenalog 40 mg  SITE: Right Shoulder  DOSE: 1 ml  LOT #: [id number]  :  [employer]  EXPIRATION DATE:  12/01/2018  NDC#: 7346-4497-48

## 2017-08-02 NOTE — NURSING NOTE
Blood pressure rechecked after visit   146/80  Twila Atkins CMA  8/2/2017 9:52 AM

## 2017-08-04 ENCOUNTER — OFFICE VISIT (OUTPATIENT)
Dept: CARDIOLOGY | Facility: CLINIC | Age: 82
End: 2017-08-04
Payer: MEDICARE

## 2017-08-04 ENCOUNTER — RADIANT APPOINTMENT (OUTPATIENT)
Dept: CARDIOLOGY | Facility: CLINIC | Age: 82
End: 2017-08-04
Attending: INTERNAL MEDICINE
Payer: MEDICARE

## 2017-08-04 VITALS — DIASTOLIC BLOOD PRESSURE: 80 MMHG | SYSTOLIC BLOOD PRESSURE: 176 MMHG | OXYGEN SATURATION: 100 % | HEART RATE: 63 BPM

## 2017-08-04 DIAGNOSIS — I35.0 NONRHEUMATIC AORTIC VALVE STENOSIS: ICD-10-CM

## 2017-08-04 DIAGNOSIS — I10 HYPERTENSION GOAL BP (BLOOD PRESSURE) < 140/90: Primary | ICD-10-CM

## 2017-08-04 PROCEDURE — 99213 OFFICE O/P EST LOW 20 MIN: CPT | Mod: 25 | Performed by: INTERNAL MEDICINE

## 2017-08-04 PROCEDURE — 93306 TTE W/DOPPLER COMPLETE: CPT | Performed by: INTERNAL MEDICINE

## 2017-08-04 RX ORDER — LISINOPRIL 10 MG/1
10 TABLET ORAL DAILY
Qty: 90 TABLET | Refills: 3 | Status: SHIPPED | OUTPATIENT
Start: 2017-08-04 | End: 2018-06-20

## 2017-08-04 ASSESSMENT — PAIN SCALES - GENERAL: PAINLEVEL: MODERATE PAIN (4)

## 2017-08-04 NOTE — PATIENT INSTRUCTIONS
1.  Dr. Boston Navarro would like for you to check your blood pressure at home once daily.     2. Dr. Boston Navarro has increased your Lisinopril to 10 mg daily. You may take two (2) of the 5 mg tabs to equal 10 mg, until those are finished. Then just take one (1) tab of 10 mg daily going forward. Rx has been sent to your [facility] in [place].    3. Rx for home blood pressure cuff given.    3. Follow up in 1 year (August 2018) we will call you to schedule this appointment as time draws closer to the date.          [facility] - [place] Location    If you have any questions regarding to your visit please contact your care team:     Cardiology  Telephone Number   Leonard Quinones  Cardiology RN's.    Goldie Rodriguez CMA [phone]    After hours: [phone].  [phone]   For scheduling appts:     [phone] or  [phone]    After hours: [phone]   For the Device Clinic (Pacemakers and ICD's)  RN's :  Rosa Knight   During business hours: [phone]  After business hours:  [phone]- select option 4.      If you need a medication refill please contact your pharmacy.  Please allow 3 business days for your refill to be completed.    As always, Thank you for trusting us with your health care needs!  _____________________________________________________________________

## 2017-08-04 NOTE — NURSING NOTE
"Chief Complaint   Patient presents with     RECHECK     6 month cardiac follow up for Nonrheumatic aortic valve stenosis and S/P AVR (aortic valve replacement);  ECHO completed 8/4/17. Reports feeling well, no new sx or concerns       Initial /80 (BP Location: Left arm, Patient Position: Chair, Cuff Size: Adult Regular)  Pulse 63  SpO2 100% Estimated body mass index is 22.71 kg/(m^2) as calculated from the following:    Height as of 8/2/17: 5' 1\" (1.549 m).    Weight as of 8/2/17: 120 lb 3.2 oz (54.5 kg)..  BP completed using cuff size: regular    Oriana Rodriguez CMA    "

## 2017-08-04 NOTE — NURSING NOTE
Med Reconcile: Reviewed and verified all current medications with the patient. The updated medication list was printed and given to the patient.    Return Appointment: Patient given instructions regarding scheduling next clinic visit, in 1 year (August 2018). Patient demonstrated understanding of this information and agreed to call with further questions or concerns.    Pt to take BP daily and record. Call clinic with any issues.    Medication Change: Patient was educated regarding prescribed medication change, increase lisinopril to 10mg/day, including discussion of the indication, administration, side effects, and when to report to MD or RN. Patient demonstrated understanding of this information and agreed to call with further questions or concerns.    Patient stated she understood all health information given and agreed to call with further questions or concerns.    Leonard Rashid RN

## 2017-08-04 NOTE — NURSING NOTE
Patient presents today for resting echo ordered by MD.   Echo Tech provided patient education.    Echo technician completed resting echo. Data transferred to [facility] for final interpretation through [facility].   Patient education provided about cardiology interpretation and primary provider will be notified of results.    Mitar Avila RDCS

## 2017-08-04 NOTE — LETTER
8/4/2017      RE: Roxanna Stark  [address]  [place] [address]       Dear Colleague,    Thank you for the opportunity to participate in the care of your patient, Roxanna Stark, at the [facility] HEART AT [facility]. Please see a copy of my visit note below.    CARDIOLOGY CLINIC FOLLOW UP    HPI: Roxanna Stark is an 89 year-old very pleasant female patient seen today in follow up. She was initially seen for severe aortic stenosis and underwent AVR with a 21 mm tissue valve on 4/21/2016 by Dr. Tsai. Her postoperative course was uneventful. She is very active. She denies chest discomfort, dyspnea, PND, orthopnea, pedal edema, palpitations, lightheadedness, and syncope. When she was last seen in clinic, I discontinued her furosemide and K supplement. Today, she returns to clinic and is asymptomatic.    PAST MEDICAL HISTORY:  Past Medical History:   Diagnosis Date     Actinic keratosis      Aortic stenosis 2014     Basal cell cancer 7/2014    left eye medial canthus      Basal cell carcinoma 9/30/08    left cheek     HTN (hypertension)      melanoma in situ 9/30/2008    LEFT ARM     Melanoma in situ (H) 9/30/08    left arm     Polymyalgia rheumatica (H) 11/99     Temporal arteritis (H) 11/99     CURRENT MEDICATIONS:  Current Outpatient Prescriptions   Medication Sig Dispense Refill     methotrexate sodium 2.5 MG TABS Take 10 mg by mouth once a week . Take all 4 tablets on the same day of each week. 16 tablet 3     folic acid (FOLVITE) 1 MG tablet Take 1 tablet (1 mg) by mouth daily 100 tablet 3     furosemide (LASIX) 20 MG tablet TAKE 1 TABLET BY MOUTH EVERY DAY IF 2 TO 3 POUNDS WEIGHT GAIN OVER 2 DAY PERIOD 90 tablet 3     metoprolol (LOPRESSOR) 25 MG tablet Take 1 tablet (25 mg) by mouth 2 times daily 180 tablet 3     lisinopril (PRINIVIL,ZESTRIL) 5 MG tablet Take 1 tablet (5 mg) by mouth daily 90 tablet 3      calcium-vitamin D 500-125 MG-UNIT TABS        aspirin  MG EC tablet Take 1 tablet (325 mg) by mouth daily 90 tablet 3     Omega-3 Fatty Acids (OMEGA-3 FISH OIL PO) Take 2 g by mouth daily       ICAPS PO 2 tablets daily       econazole nitrate 1 % cream Apply to red rash of legs and feet twice a day till rash is gone (Patient not taking: Reported on 8/4/2017) 85 g 3     PAST SURGICAL HISTORY:  Past Surgical History:   Procedure Laterality Date     C SKIN TISSUE PROCEDURE UNLISTED  11/3/08    mmis skin cancer excision     CATARACT IOL, RT/LT  5/09    bilateral     COLONOSCOPY  2002     REPLACE VALVE AORTIC N/A 4/25/2016    Procedure: REPLACE VALVE AORTIC;  Surgeon: Sudeep Tsai MD;  Location: [facility] OR     ALLERGIES  Review of patient's allergies indicates no known allergies.    FAMILY HX:  Family History   Problem Relation Age of Onset     Breast Cancer Sister      Arthritis Sister      CANCER Sister      breast     Thyroid Disease Sister      CANCER Sister      colon     Arthritis Sister      Arthritis Mother      Hypertension Father      Cancer - colorectal Father      Prostate Cancer Father      Arthritis Father      HEART DISEASE Father      Lipids Father      Arthritis Sister      Asthma Daughter      Asthma Daughter      CANCER Daughter 58     lung     CANCER Other 81     pancreatic      SOCIAL HX:  History     Social History     Marital Status:      Spouse Name: N/A     Number of Children: N/A     Years of Education: N/A     Occupational History      Retired     Social History Main Topics     Smoking status: Never Smoker      Smokeless tobacco: Never Used     Alcohol Use: Yes     Drug Use: No     Sexual Activity: No     Other Topics Concern     Not on file     Social History Narrative     ROS:  Constitutional: No fever, chills, or sweats. No weight gain/loss.   ENT: No visual disturbance, ear ache, epistaxis, sore throat.   Allergies/Immunologic: Negative.   Respiratory: No cough,  hemoptysis.   Cardiovascular: As per HPI.   GI: No nausea, vomiting, hematemesis, melena, or hematochezia.   : No urinary frequency, dysuria, or hematuria.   Integument: Negative.   Psychiatric: Negative.   Neuro: Negative.   Endocrinology: Negative.   Musculoskeletal: No myalgia. The sternal wound is healing well without erythema.    VITAL SIGNS:  /80 (BP Location: Left arm, Patient Position: Chair, Cuff Size: Adult Regular)  Pulse 63  SpO2 100%  There is no height or weight on file to calculate BMI.  Wt Readings from Last 2 Encounters:   17 120 lb 3.2 oz (54.5 kg)   17 122 lb (55.3 kg)     PHYSICAL EXAM  Roxanna Stark is an 89 year old female in no acute distress.  HEENT: Unremarkable.  Neck: JVP normal.  Carotids +4/4 bilaterally without bruits.  Lungs: CTA.  Cor: RRR. Normal S1 and S2, soft MICHAELA.  Abd: Soft, nontender, nondistended.  NABS.  No pulsatile mass.  Extremities: Trace to mild bilateral LE edema.  Pulses +4/4 symmetric in upper and lower extremities.  Neuro: Grossly intact.    LABS    WBC     12.5   2012  RBC     4.50   2012  HGB     13.9   2012  HCT     40.5   2012  No components found with this name: mct  MCV       90   2012  MCH     30.8   2012  MCHC     34.2   2012  RDW     13.3   2012  PLT      296   2012   Recent Labs   Lab Test  14   1426  13   1403   POTASSIUM  4.4  4.3   CR  1.10*  0.90     EK12.   Sinus Rhythm - occasional ectopic ventricular beat.    ECHOCARDIOGRAMS:   2016  Global and regional left ventricular function is normal with an EF of 60-65%.  There is at least moderate diastolic dysfunction.  Global right ventricular function is normal.  Severe aortic stenosis is present. The mean gradient across the aortic valve is 61 mmHg. The peak aortic velocity is 5.1 m/sec. JAVIER 0.6 cm2.    10/13/2014   1. Normal biventricular systolic function. LVEF estimate 65%.   2. Moderate aortic stenosis (peak velocity:  3.9 m/s, mean gradient: 36 mmHg, calculated valve area: 1.2 cm2).   3. Normal IVC with preserved respiratory variability.   4. Compared to study dated 02/10/14 the aortic valve parameters have slightly worsened.    02/11/14  Global and regional left ventricular function is normal with an EF of 55-60%. Global right ventricular function is normal. Trace tricuspid insufficiency is present. Right ventricular systolic pressure is 19mmHg above the right atrial pressure. The inferior vena cava was normal in size with preserved respiratory variability. Small circumferential pericardial effusion is present without any hemodynamic significance. Chamber compression is not present; there is no evidence for tamponade.   Left Ventricle: Global and regional left ventricular function is normal with an EF of 55-60%. Left ventricular size is normal. Left ventricular Doppler filling pattern consistent with abnormal relaxation.   Right Ventricle: The right ventricle is normal size. Global right ventricular function is normal.   Atria: The right atria appears normal. Moderate left atrial enlargement is present.   Mitral Valve: Mild mitral annular calcification is present. Trace mitral insufficiency is present.   Aortic Valve: Mild aortic valve sclerosis is present. No aortic regurgitation is present.   Tricuspid Valve: The tricuspid valve is normal. Trace tricuspid insufficiency is present. Right ventricular systolic pressure is 19mmHg above the right atrial pressure.   Pulmonic Valve: The pulmonic valve is normal. Trace pulmonic insufficiency is present.   Vessels: The aorta root is normal. The inferior vena cava was normal in size with preserved respiratory variability.   Pericardium: Small circumferential pericardial effusion is present without any hemodynamic significance. Chamber compression is not present; there is no evidence for tamponade.    05/11/12  Left ventricular function, chamber size, wall motion, and wall thickness are  normal.The EF is > 65%. Paradoxic low flow aortic stenosis with moderate to severe reduction in valve area. Visually the valve appears moderately calcified with mild-moderate cusp restriction.   Left Ventricle: Left ventricular function, chamber size, wall motion, and wall thickness are normal.The EF is > 65%. Relative wall thickness is increased consistent with concentric remodeling.   Right Ventricle: Right ventricular function, chamber size, wall motion, and thickness are normal.   Atria: Both atria appear normal.   Mitral Valve: Mild to moderate mitral annular calcification is present. Systolic anterior motion without gradient.   Aortic Valve: The aortic valve is tricuspid. Mild aortic valve sclerosis is present.   Tricuspid Valve: The tricuspid valve is normal. Trace tricuspid insufficiency is present.   Pulmonic Valve: The pulmonic valve cannot be assessed.   Vessels: The aorta root is normal. The pulmonary artery is normal. The inferior vena cava is normal.   Pericardium: No pericardial effusion is present.     08/04/2017  Global and regional left ventricular function is normal with an EF of 60-65%.  Global right ventricular function is normal.  S/p AVR 21mm tissue valve. The mean gradient is 11 mmHg. The effective orifice  area is 1.4 cm^2. EOAI is 0.9 cm/m2, DVI is 0.55 (all normal.)  Mild mitral stenosis is present. The etiology of the mitral stenosis is mitral  annular calcification. Mean gradient is 4mmHg  IVC is normal in size with preserved respiratory variability. PAP is normal.  No pericardial effusion is present.  This study was compared with the study from 2/25/2016. The patient is now s/p AVR for severe aortic stenosis.      Left Ventricle  Global and regional left ventricular function is normal with an EF of 60-65%.  Left ventricular size is normal. Mild concentric wall thickening consistent with left ventricular hypertrophy is present. Diastolic function not assessed  due to severe mitral  annular calcification.     Right Ventricle  The right ventricle is normal size. Global right ventricular function is normal.     Atria  Moderate left atrial enlargement is present. Mild right atrial enlargement is  present. The atrial septum is intact as assessed by color Doppler .     Mitral Valve  Moderate mitral annular calcification is present. Mild mitral insufficiency is present. Mild mitral stenosis is present. The mean mitral valve gradient is 4.0 mmHg. The etiology of the mitral stenosis is mitral annular  calcification .        Aortic Valve  The mean gradient is 11 mmHg. The effective orifice area is 1.4 cm^2.     Tricuspid Valve  Right ventricular systolic pressure is 32mmHg above the right atrial pressure.  Mild tricuspid insufficiency is present.     Pulmonic Valve  Trace pulmonic insufficiency is present.     Vessels  The aorta root is normal. The inferior vena cava was normal in size with preserved respiratory variability. Estimated mean right atrial pressure is 3  mmHg.     Pericardium  No pericardial effusion is present.        Compared to Previous Study  This study was compared with the study from 2016 . The patient is now s/p AVR for severe aortic stenosis.     IVSd: 1.4 cm  LVIDd: 3.5 cm  LVIDs: 2.7 cm  LVPWd: 1.1 cm  FS: 21.3 %  EDV(Teich): 50.2 ml  ESV(Teich): 28.0 ml  LV mass(C)d: 144.4 grams  LV mass(C)dI: 96.7 grams/m2  MV Diam: 3.0 cm  Ao root diam: 2.6 cm  LA dimension: 3.8 cm  LA/Ao: 1.5  LVOT diam: 1.8 cm  LVOT area: 2.5 cm2  LA Volume (BP): 72.0 ml  LA Volume Index (BP): 48.3 ml/m2  MV E max damien: 161.0 cm/sec  MV A max damien: 122.0 cm/sec  MV E/A: 1.3  MV max P.2 mmHg  MV mean P.0 mmHg  MV V2 VTI: 57.9 cm  MVA(VTI): 1.3 cm2  MV Flow area(1diam): 6.8 cm2  MV dec time: 0.26 sec  Ao V2 max: 227.0 cm/sec  Ao max P.0 mmHg  Ao V2 mean: 158.5 cm/sec  Ao mean P.0 mmHg  Ao V2 VTI: 55.0 cm  JAVIER(I,D): 1.4 cm2  JAVIER(V,D): 1.4 cm2  LV V1 max P.3 mmHg  LV V1 max: 125.0  cm/sec  LV V1 VTI: 30.5 cm  SV(MV 1 diam): 395.7 ml  SI(MV 1 diam): 265.0 ml/m2  SV(LVOT): 77.6 ml  SI(LVOT): 52.0 ml/m2  TR max damien: 284.7 cm/sec  TR max P.4 mmHg  RF(MV,Ao)(1 diam): 0.26  JAVIER Index (cm2/m2): 0.95  Lateral E/e': 25.9  Medial E/e': 30.5    ASSESSMENT AND PLAN:   1. Severe AS. S/p AVR with a #21 tissue prosthesis. Excellent post-surgical results.  2. Hypertension: not at target.  3. RA.  4. Plan:   - Increase lisinopril to 10 mg PO QD.  - Check BP at home weekly. Call the office with an update in 2-3 weeks.  - RTC in 12 months.     Boston Navarro MD, PhD

## 2017-08-04 NOTE — MR AVS SNAPSHOT
After Visit Summary   8/4/2017    Roxanna Stark    MRN: [id number]           Patient Information     Date Of Birth          5/20/1927        Visit Information        Provider Department      8/4/2017 10:30 AM Boston Navarro MD [facility] PHYSICIANS HEART AT [facility]        Today's Diagnoses     Hypertension goal BP (blood pressure) < 140/90    -  1      Care Instructions    1.  Dr. Boston Navarro would like for you to check your blood pressure at home once daily.     2. Dr. Boston Navarro has increased your Lisinopril to 10 mg daily. You may take two (2) of the 5 mg tabs to equal 10 mg, until those are finished. Then just take one (1) tab of 10 mg daily going forward. Rx has been sent to your [facility] in [place].    3. Rx for home blood pressure cuff given.    3. Follow up in 1 year (August 2018) we will call you to schedule this appointment as time draws closer to the date.          [facility] Cardiology - [place] Location    If you have any questions regarding to your visit please contact your care team:     Cardiology  Telephone Number   Leonard Quinones  Cardiology RN's.    Goldie Rodriguez CMA [phone]    After hours: [phone].  [phone]   For scheduling appts:     [phone] or  [phone]    After hours: [phone]   For the Device Clinic (Pacemakers and ICD's)  RN's :  Rosa Knight   During business hours: [phone]  After business hours:  [phone]- select option 4.      If you need a medication refill please contact your pharmacy.  Please allow 3 business days for your refill to be completed.    As always, Thank you for trusting us with your health care needs!  _____________________________________________________________________              Follow-ups after your visit        Your next 10 appointments already scheduled     Oct 30, 2017  9:00 AM CDT   LAB with  LAB   [facility] ([facility] [place])     "[address]  [place] [address]   [phone]           Patient must bring picture ID. Patient should be prepared to give a urine specimen  Please do not eat 10-12 hours before your appointment if you are coming in fasting for labs on lipids, cholesterol, or glucose (sugar). Pregnant women should follow their Care Team instructions. Water with medications is okay. Do not drink coffee or other fluids. If you have concerns about taking  your medications, please ask at office or if scheduling via Eyesquad, send a message by clicking on Secure Messaging, Message Your Care Team.            Nov 01, 2017  9:00 AM CDT   Return Visit with Jamie Johnson MD   [facility] ([facility])    [address]  [place] [id number] [phone]              Who to contact     If you have questions or need follow up information about today's clinic visit or your schedule please contact [facility] PHYSICIANS HEART AT [facility] directly at [phone].  Normal or non-critical lab and imaging results will be communicated to you by Dabblehart, letter or phone within 4 business days after the clinic has received the results. If you do not hear from us within 7 days, please contact the clinic through @Payt or phone. If you have a critical or abnormal lab result, we will notify you by phone as soon as possible.  Submit refill requests through Eyesquad or call your pharmacy and they will forward the refill request to us. Please allow 3 business days for your refill to be completed.          Additional Information About Your Visit        Eyesquad Information     Eyesquad lets you send messages to your doctor, view your test results, renew your prescriptions, schedule appointments and more. To sign up, go to [email or web address] . Click on \"Log in\" on the left side of the screen, which will take you to the Welcome page. Then click on \"Sign up Now\" on the right side of the page. "     You will be asked to enter the access code listed below, as well as some personal information. Please follow the directions to create your username and password.     Your access code is: [email or web address]  Expires: 2017  9:18 AM     Your access code will  in 90 days. If you need help or a new code, please call your [place] clinic or [phone].        Care EveryWhere ID     This is your Care EveryWhere ID. This could be used by other organizations to access your [place] medical records  [id number]        Your Vitals Were     Pulse Pulse Oximetry                63 100%           Blood Pressure from Last 3 Encounters:   17 176/80   17 146/80   17 122/72    Weight from Last 3 Encounters:   17 54.5 kg (120 lb 3.2 oz)   17 55.3 kg (122 lb)   17 55.8 kg (123 lb)              Today, you had the following     No orders found for display         Today's Medication Changes          These changes are accurate as of: 17 11:48 AM.  If you have any questions, ask your nurse or doctor.               Start taking these medicines.        Dose/Directions    Blood Pressure Monitor Kit   Used for:  Hypertension goal BP (blood pressure) < 140/90   Started by:  Boston Navarro MD        Dose:  1 Units   1 Units daily   Quantity:  1 kit   Refills:  0         These medicines have changed or have updated prescriptions.        Dose/Directions    * lisinopril 5 MG tablet   Commonly known as:  PRINIVIL/ZESTRIL   This may have changed:  Another medication with the same name was added. Make sure you understand how and when to take each.   Used for:  S/P AVR (aortic valve replacement), Hypertension goal BP (blood pressure) < 140/90   Changed by:  Boston Navarro MD        Dose:  5 mg   Take 1 tablet (5 mg) by mouth daily   Quantity:  90 tablet   Refills:  3       * lisinopril 10 MG tablet   Commonly known as:  PRINIVIL/ZESTRIL   This may have changed:  You were already taking a  medication with the same name, and this prescription was added. Make sure you understand how and when to take each.   Used for:  Hypertension goal BP (blood pressure) < 140/90   Changed by:  Boston Navarro MD        Dose:  10 mg   Take 1 tablet (10 mg) by mouth daily   Quantity:  90 tablet   Refills:  3       * Notice:  This list has 2 medication(s) that are the same as other medications prescribed for you. Read the directions carefully, and ask your doctor or other care provider to review them with you.         Where to get your medicines      These medications were sent to [facility] [address] AT [place] [id number], [facility] [address]     Phone:  [phone]     lisinopril 10 MG tablet         Some of these will need a paper prescription and others can be bought over the counter.  Ask your nurse if you have questions.     Bring a paper prescription for each of these medications     Blood Pressure Monitor Kit                Primary Care Provider Office Phone # Fax #    Letha Grissom [phone]       [facility] [address] [address]        Equal Access to Services     ALTA HEIN AH: Hadii carol thakuro Soharriett, waaxda luqadaha, qaybta kaalmada adeegyada, griffin bonner. So [facility] [phone].    ATENCIÓN: Si habla español, tiene a goldman disposición servicios gratuitos de asistencia lingüística. Neal al [phone].    We comply with applicable federal civil rights laws and [place] laws. We do not discriminate on the basis of race, color, national origin, age, disability sex, sexual orientation or gender identity.            Thank you!     Thank you for choosing [facility] PHYSICIANS HEART AT [facility]  for your care. Our goal is always to provide you with excellent care. Hearing back from our patients is one way we can continue to  improve our services. Please take a few minutes to complete the written survey that you may receive in the mail after your visit with us. Thank you!             Your Updated Medication List - Protect others around you: Learn how to safely use, store and throw away your medicines at www.disposemymeds.org.          This list is accurate as of: 8/4/17 11:48 AM.  Always use your most recent med list.                   Brand Name Dispense Instructions for use Diagnosis    aspirin 325 MG EC tablet     90 tablet    Take 1 tablet (325 mg) by mouth daily    S/P AVR (aortic valve replacement)       Blood Pressure Monitor Kit     1 kit    1 Units daily    Hypertension goal BP (blood pressure) < 140/90       calcium-vitamin D 500-125 MG-UNIT Tabs           econazole nitrate 1 % cream     85 g    Apply to red rash of legs and feet twice a day till rash is gone    Tinea corporis, Tinea pedis of both feet       folic acid 1 MG tablet    FOLVITE    100 tablet    Take 1 tablet (1 mg) by mouth daily    Rheumatoid arthritis of multiple sites with negative rheumatoid factor (H), High risk medication use       furosemide 20 MG tablet    LASIX    90 tablet    TAKE 1 TABLET BY MOUTH EVERY DAY IF 2 TO 3 POUNDS WEIGHT GAIN OVER 2 DAY PERIOD    Hypertension goal BP (blood pressure) < 140/90       ICAPS PO      2 tablets daily        * lisinopril 5 MG tablet    PRINIVIL/ZESTRIL    90 tablet    Take 1 tablet (5 mg) by mouth daily    S/P AVR (aortic valve replacement), Hypertension goal BP (blood pressure) < 140/90       * lisinopril 10 MG tablet    PRINIVIL/ZESTRIL    90 tablet    Take 1 tablet (10 mg) by mouth daily    Hypertension goal BP (blood pressure) < 140/90       methotrexate sodium 2.5 MG Tabs     16 tablet    Take 10 mg by mouth once a week . Take all 4 tablets on the same day of each week.    Rheumatoid arthritis of multiple sites with negative rheumatoid factor (H), High risk medication use       metoprolol 25 MG tablet     LOPRESSOR    180 tablet    Take 1 tablet (25 mg) by mouth 2 times daily    Hypertension goal BP (blood pressure) < 140/90       OMEGA-3 FISH OIL PO      Take 2 g by mouth daily        * Notice:  This list has 2 medication(s) that are the same as other medications prescribed for you. Read the directions carefully, and ask your doctor or other care provider to review them with you.

## 2017-08-25 ENCOUNTER — TELEPHONE (OUTPATIENT)
Dept: FAMILY MEDICINE | Facility: CLINIC | Age: 82
End: 2017-08-25

## 2017-08-25 NOTE — TELEPHONE ENCOUNTER
Reason for Call:  Other call back    Detailed comments: patient states she was taking extra calcium when she was also on the prednisone. Patient is no longer taking the prednisone so is wondering if she still needs to take that much calcium. Please contact patient to discuss further.    Phone Number Patient can be reached at: Home number on file [phone] (home)    Best Time: any time    Can we leave a detailed message on this number? YES    Call taken on 8/25/2017 at 1:40 PM by Julia Wise

## 2017-08-25 NOTE — TELEPHONE ENCOUNTER
"Per patient, she started taking \"Dietary Supplement, Bone Building w/D and Mg\" when she started Prednisone therapy.  She stated that she brought the bottle in for Dr. Grissom to review at one point in the past and was told that she should take 1 cap TID  Had patient read the label.  She stated that it lists 4 cap per serving and each serving has Calcium Carbonate 1000mg    Patient is no longer on Prednisone therapy and is wondering if she should d/c this dietary supplement or continue  Routing to DR Grissom to Please advise    Deonte Washington RN    "

## 2017-08-28 NOTE — TELEPHONE ENCOUNTER
Detailed message left for patient with providers message as written.  Advised patient to call back if there are any questions.   Jerilyn Orr RN

## 2017-10-21 ENCOUNTER — NURSE TRIAGE (OUTPATIENT)
Dept: NURSING | Facility: CLINIC | Age: 82
End: 2017-10-21

## 2017-10-21 NOTE — TELEPHONE ENCOUNTER
"Patient states she has been out of her medications for a week.   Has been out of Metoprolol, Furosemide, methotrexate (was due to take yesterday)  and folic acid.  Has 2 pills left of lisinopril.    Patient states that she doesn't know why the pharmacy hasn't called her. States that she has Prednisone at home that she could take in place of the methotrexate, but she doesn't think her provider wants her to take it.   Patient states that she has right leg pain and some swelling in her left hand and that both ankles are slightly swollen. \"That's from my rheumatoid arthritis.\"   Denies any chest pain or difficulty breathing. Declines triage    Spoke with [facility] pharmacy and they have all the medication that the Patient is out of and they will fill it for her today.   Called Roxanna and informed her that her medications will be ready for her to .  Roxanna has no further questions at this time.     Additional Information    Caller has medication question, adult has minor symptoms, caller declines triage, and triager answers question    Protocols used: MEDICATION QUESTION CALL-ADULT-    "

## 2017-11-01 ENCOUNTER — OFFICE VISIT (OUTPATIENT)
Dept: RHEUMATOLOGY | Facility: CLINIC | Age: 82
End: 2017-11-01
Payer: MEDICARE

## 2017-11-01 VITALS — HEIGHT: 61 IN | OXYGEN SATURATION: 96 % | WEIGHT: 120.6 LBS | HEART RATE: 72 BPM | BODY MASS INDEX: 22.77 KG/M2

## 2017-11-01 DIAGNOSIS — M75.41 IMPINGEMENT SYNDROME, SHOULDER, RIGHT: ICD-10-CM

## 2017-11-01 DIAGNOSIS — Z79.899 HIGH RISK MEDICATION USE: ICD-10-CM

## 2017-11-01 DIAGNOSIS — M06.09 RHEUMATOID ARTHRITIS OF MULTIPLE SITES WITH NEGATIVE RHEUMATOID FACTOR (H): Primary | ICD-10-CM

## 2017-11-01 LAB
ALBUMIN SERPL-MCNC: 3 G/DL (ref 3.4–5)
ALP SERPL-CCNC: 103 U/L (ref 40–150)
ALT SERPL W P-5'-P-CCNC: 27 U/L (ref 0–50)
AST SERPL W P-5'-P-CCNC: 19 U/L (ref 0–45)
BASOPHILS # BLD AUTO: 0.1 10E9/L (ref 0–0.2)
BASOPHILS NFR BLD AUTO: 1.1 %
BILIRUB DIRECT SERPL-MCNC: <0.1 MG/DL (ref 0–0.2)
BILIRUB SERPL-MCNC: 0.2 MG/DL (ref 0.2–1.3)
CREAT SERPL-MCNC: 0.98 MG/DL (ref 0.52–1.04)
CRP SERPL-MCNC: 23.8 MG/L (ref 0–8)
DIFFERENTIAL METHOD BLD: ABNORMAL
EOSINOPHIL # BLD AUTO: 0.3 10E9/L (ref 0–0.7)
EOSINOPHIL NFR BLD AUTO: 2.6 %
ERYTHROCYTE [DISTWIDTH] IN BLOOD BY AUTOMATED COUNT: 17.7 % (ref 10–15)
ERYTHROCYTE [SEDIMENTATION RATE] IN BLOOD BY WESTERGREN METHOD: 44 MM/H (ref 0–30)
GFR SERPL CREATININE-BSD FRML MDRD: 53 ML/MIN/1.7M2
HCT VFR BLD AUTO: 36.1 % (ref 35–47)
HGB BLD-MCNC: 11.5 G/DL (ref 11.7–15.7)
LYMPHOCYTES # BLD AUTO: 1.8 10E9/L (ref 0.8–5.3)
LYMPHOCYTES NFR BLD AUTO: 15.1 %
MCH RBC QN AUTO: 28.9 PG (ref 26.5–33)
MCHC RBC AUTO-ENTMCNC: 31.9 G/DL (ref 31.5–36.5)
MCV RBC AUTO: 91 FL (ref 78–100)
MONOCYTES # BLD AUTO: 1.2 10E9/L (ref 0–1.3)
MONOCYTES NFR BLD AUTO: 10.2 %
NEUTROPHILS # BLD AUTO: 8.3 10E9/L (ref 1.6–8.3)
NEUTROPHILS NFR BLD AUTO: 71 %
PLATELET # BLD AUTO: 337 10E9/L (ref 150–450)
PROT SERPL-MCNC: 7.3 G/DL (ref 6.8–8.8)
RBC # BLD AUTO: 3.98 10E12/L (ref 3.8–5.2)
WBC # BLD AUTO: 11.7 10E9/L (ref 4–11)

## 2017-11-01 PROCEDURE — 86140 C-REACTIVE PROTEIN: CPT | Performed by: INTERNAL MEDICINE

## 2017-11-01 PROCEDURE — 85652 RBC SED RATE AUTOMATED: CPT | Performed by: INTERNAL MEDICINE

## 2017-11-01 PROCEDURE — 36415 COLL VENOUS BLD VENIPUNCTURE: CPT | Performed by: INTERNAL MEDICINE

## 2017-11-01 PROCEDURE — 80076 HEPATIC FUNCTION PANEL: CPT | Performed by: INTERNAL MEDICINE

## 2017-11-01 PROCEDURE — 99213 OFFICE O/P EST LOW 20 MIN: CPT | Mod: 25 | Performed by: INTERNAL MEDICINE

## 2017-11-01 PROCEDURE — 20610 DRAIN/INJ JOINT/BURSA W/O US: CPT | Mod: RT | Performed by: INTERNAL MEDICINE

## 2017-11-01 PROCEDURE — 82565 ASSAY OF CREATININE: CPT | Performed by: INTERNAL MEDICINE

## 2017-11-01 PROCEDURE — 85025 COMPLETE CBC W/AUTO DIFF WBC: CPT | Performed by: INTERNAL MEDICINE

## 2017-11-01 RX ORDER — TRIAMCINOLONE ACETONIDE 40 MG/ML
40 INJECTION, SUSPENSION INTRA-ARTICULAR; INTRAMUSCULAR ONCE
Qty: 1 ML | Refills: 0 | OUTPATIENT
Start: 2017-11-01 | End: 2017-11-01

## 2017-11-01 RX ORDER — PREDNISONE 2.5 MG/1
TABLET ORAL
Qty: 70 TABLET | Refills: 0 | Status: SHIPPED | OUTPATIENT
Start: 2017-11-01 | End: 2018-01-31

## 2017-11-01 RX ORDER — METHOTREXATE 2.5 MG/1
15 TABLET ORAL WEEKLY
Qty: 24 TABLET | Refills: 3 | Status: SHIPPED | OUTPATIENT
Start: 2017-11-01 | End: 2018-01-31

## 2017-11-01 NOTE — Clinical Note
FYI: arthritis doing okay.  Bilateral nonpitting edema of the lower extremities; I advised f/u with Dr. Grissom and/or Dr. Navarro for this issue, and to use compression stockings.

## 2017-11-01 NOTE — NURSING NOTE
"Chief Complaint   Patient presents with     Arthritis     Patient states she is feeling ok, little pain and stiffness.       Initial Pulse 72  Ht 1.549 m (5' 1\")  Wt 54.7 kg (120 lb 9.6 oz)  SpO2 96%  BMI 22.79 kg/m2 Estimated body mass index is 22.79 kg/(m^2) as calculated from the following:    Height as of this encounter: 1.549 m (5' 1\").    Weight as of this encounter: 54.7 kg (120 lb 9.6 oz).  BP completed using cuff size: small regular         RAPID3 (0-30) Cumulative Score  11.5          RAPID3 Weighted Score (divide #4 by 3 and that is the weighted score)  3.83         "

## 2017-11-01 NOTE — MR AVS SNAPSHOT
After Visit Summary   2017    Roxanna Stark    MRN: [id number]           Patient Information     Date Of Birth          1927        Visit Information        Provider Department      2017 9:00 AM Jamie Johnson MD [facility] [place]        Today's Diagnoses     Rheumatoid arthritis of multiple sites with negative rheumatoid factor (H)    -  1    High risk medication use          Care Instructions    Dr. Johnson s Rheumatology Clinics  Locations Clinic Hours Telephone Number     [place]  [address]  [place] [address]     Wednesday: 7:20AM - 4:00PM  Thursday:     7:20AM - 4:00PM     Friday:          7:20AM - 11:00AM       To schedule an appointment with  Dr. Johnson,  please contact  Specialty [phone]       [place]  [address]  [place] [address]       Monday:       7:20AM - 4:00PM        [place]  [address]  [place] [address]       Tuesday:      7:20AM - 4:00PM          Thank you!    Twila Atkins CMA              Follow-ups after your visit        Your next 10 appointments already scheduled     2017  9:00 AM CST   LAB with [facility]   [facility]    [address] [address]   [phone]           Please do not eat 10-12 hours before your appointment if you are coming in fasting for labs on lipids, cholesterol, or glucose (sugar). This does not apply to pregnant women. Water, hot tea and black coffee (with nothing added) are okay. Do not drink other fluids, diet soda or chew gum.            Dec 22, 2017  9:00 AM CST   LAB with [facility]   [facility])    [address] [address]   [phone]           Please do not eat 10-12 hours before your appointment if you are coming in fasting for labs on lipids, cholesterol, or glucose (sugar). This does not apply to pregnant  women. Water, hot tea and black coffee (with nothing added) are okay. Do not drink other fluids, diet soda or chew gum.            Jan 29, 2018  9:00 AM CST   [facility]   [facility]    [address]  [place] [address]   [phone]           Please do not eat 10-12 hours before your appointment if you are coming in fasting for labs on lipids, cholesterol, or glucose (sugar). This does not apply to pregnant women. Water, hot tea and black coffee (with nothing added) are okay. Do not drink other fluids, diet soda or chew gum.            Jan 31, 2018  9:00 AM CST   Return Visit with Jamie Johnson MD   [facility]    [address]  [place] [address]   [phone]              Future tests that were ordered for you today     Open Standing Orders        Priority Remaining Interval Expires Ordered    CBC with platelets differential Routine 2/2 Every 4 Weeks 4/30/2018 11/1/2017    Creatinine Routine 2/2 Every 4 Weeks 4/30/2018 11/1/2017    Hepatic panel Routine 2/2 Every 4 Weeks 4/30/2018 11/1/2017          Open Future Orders        Priority Expected Expires Ordered    CBC with platelets differential Routine 1/26/2018 2/14/2018 11/1/2017    Creatinine Routine 1/26/2018 2/14/2018 11/1/2017    Erythrocyte sedimentation rate auto Routine 1/26/2018 2/14/2018 11/1/2017    CRP inflammation Routine 1/26/2018 2/14/2018 11/1/2017    Hepatic panel Routine 1/26/2018 2/14/2018 11/1/2017            Who to contact     If you have questions or need follow up information about today's clinic visit or your schedule please contact [facility] directly at [phone].  Normal or non-critical lab and imaging results will be communicated to you by MyChart, letter or phone within 4 business days after the clinic has received the results. If you do not hear from us within 7 days, please contact the clinic through [employer]  "or phone. If you have a critical or abnormal lab result, we will notify you by phone as soon as possible.  Submit refill requests through [employer] or call your pharmacy and they will forward the refill request to us. Please allow 3 business days for your refill to be completed.          Additional Information About Your Visit        [employer] Information     [employer] lets you send messages to your doctor, view your test results, renew your prescriptions, schedule appointments and more. To sign up, go to [email or web address] . Click on \"Log in\" on the left side of the screen, which will take you to the Welcome page. Then click on \"Sign up Now\" on the right side of the page.     You will be asked to enter the access code listed below, as well as some personal information. Please follow the directions to create your username and password.     Your access code is: [email or web address]  Expires: 2018  9:35 AM     Your access code will  in 90 days. If you need help or a new code, please call your [place] clinic or [phone].        Care EveryWhere ID     This is your Care EveryWhere ID. This could be used by other organizations to access your [place] medical records  [id number]        Your Vitals Were     Pulse Height Pulse Oximetry BMI (Body Mass Index)          72 1.549 m (5' 1\") 96% 22.79 kg/m2         Blood Pressure from Last 3 Encounters:   17 176/80   17 146/80   17 122/72    Weight from Last 3 Encounters:   17 54.7 kg (120 lb 9.6 oz)   17 54.5 kg (120 lb 3.2 oz)   17 55.3 kg (122 lb)              We Performed the Following     CBC with platelets differential     Creatinine     CRP inflammation     Erythrocyte sedimentation rate auto     Hepatic panel          Today's Medication Changes          These changes are accurate as of: 17  9:35 AM.  If you have any questions, ask your nurse or doctor.               Start taking these medicines.        Dose/Directions    " predniSONE 2.5 MG tablet   Commonly known as:  DELTASONE   Used for:  Rheumatoid arthritis of multiple sites with negative rheumatoid factor (H), High risk medication use   Started by:  Jamie Johnson MD        Prednisone 10mg daily x7days, then 7.5mg daily x7days, then 5mg daily x7days, then 2.5mg daily x7days, then stop.   Quantity:  70 tablet   Refills:  0         These medicines have changed or have updated prescriptions.        Dose/Directions    methotrexate sodium 2.5 MG Tabs   This may have changed:    - how much to take  - additional instructions   Used for:  Rheumatoid arthritis of multiple sites with negative rheumatoid factor (H), High risk medication use   Changed by:  Jamie Johnson MD        Dose:  15 mg   Take 15 mg by mouth once a week . Take all 6 tablets on the same day of each week.   Quantity:  24 tablet   Refills:  3            Where to get your medicines      These medications were sent to [facility] [address] [place] [address] AVE [address] AT [address] [facility] [address]     Phone:  [phone]     methotrexate sodium 2.5 MG Tabs    predniSONE 2.5 MG tablet                Primary Care Provider Office Phone # Fax #    Letha Grissom [phone]       [facility] [address] [address]        Equal Access to Services     ALTA HEIN AH: Hadii carol baig hadasho Soomaali, waaxda luqadaha, qaybta kaalmada adeegyada, griffin bonner. So [facility] [phone].    ATENCIÓN: Si habla español, tiene a goldman disposición servicios gratuitos de asistencia lingüística. Neal al [phone].    We comply with applicable federal civil rights laws and [place] laws. We do not discriminate on the basis of race, color, national origin, age, disability, sex, sexual orientation, or gender identity.            Thank you!     Thank you for choosing [facility]  for your care.  Our goal is always to provide you with excellent care. Hearing back from our patients is one way we can continue to improve our services. Please take a few minutes to complete the written survey that you may receive in the mail after your visit with us. Thank you!             Your Updated Medication List - Protect others around you: Learn how to safely use, store and throw away your medicines at www.disposemymeds.org.          This list is accurate as of: 11/1/17  9:35 AM.  Always use your most recent med list.                   Brand Name Dispense Instructions for use Diagnosis    aspirin 325 MG EC tablet     90 tablet    Take 1 tablet (325 mg) by mouth daily    S/P AVR (aortic valve replacement)       Blood Pressure Monitor Kit     1 kit    1 Units daily    Hypertension goal BP (blood pressure) < 140/90       calcium-vitamin D 500-125 MG-UNIT Tabs           econazole nitrate 1 % cream     85 g    Apply to red rash of legs and feet twice a day till rash is gone    Tinea corporis, Tinea pedis of both feet       folic acid 1 MG tablet    FOLVITE    100 tablet    Take 1 tablet (1 mg) by mouth daily    Rheumatoid arthritis of multiple sites with negative rheumatoid factor (H), High risk medication use       furosemide 20 MG tablet    LASIX    90 tablet    TAKE 1 TABLET BY MOUTH EVERY DAY IF 2 TO 3 POUNDS WEIGHT GAIN OVER 2 DAY PERIOD    Hypertension goal BP (blood pressure) < 140/90       ICAPS PO      2 tablets daily        * lisinopril 5 MG tablet    PRINIVIL/ZESTRIL    90 tablet    Take 1 tablet (5 mg) by mouth daily    S/P AVR (aortic valve replacement), Hypertension goal BP (blood pressure) < 140/90       * lisinopril 10 MG tablet    PRINIVIL/ZESTRIL    90 tablet    Take 1 tablet (10 mg) by mouth daily    Hypertension goal BP (blood pressure) < 140/90       methotrexate sodium 2.5 MG Tabs     24 tablet    Take 15 mg by mouth once a week . Take all 6 tablets on the same day of each week.    Rheumatoid arthritis of  multiple sites with negative rheumatoid factor (H), High risk medication use       metoprolol 25 MG tablet    LOPRESSOR    180 tablet    Take 1 tablet (25 mg) by mouth 2 times daily    Hypertension goal BP (blood pressure) < 140/90       OMEGA-3 FISH OIL PO      Take 2 g by mouth daily        predniSONE 2.5 MG tablet    DELTASONE    70 tablet    Prednisone 10mg daily x7days, then 7.5mg daily x7days, then 5mg daily x7days, then 2.5mg daily x7days, then stop.    Rheumatoid arthritis of multiple sites with negative rheumatoid factor (H), High risk medication use       * Notice:  This list has 2 medication(s) that are the same as other medications prescribed for you. Read the directions carefully, and ask your doctor or other care provider to review them with you.

## 2017-11-01 NOTE — PROGRESS NOTES
Rheumatology team: Please call to notify Ms. Stark that her labs do not show evidence for methotrexate toxicity, but ESR and CRP are both elevated that suggests an increased disease activity that correlates with her symptoms. These findings do not change the plan as outlined during the clinic visit.     Jamie Johnson MD  11/1/2017 5:36 PM

## 2017-11-01 NOTE — PROGRESS NOTES
Rheumatology Clinic Visit      Roxanna Stark MRN# [id number]   YOB: 1927 Age: 89 year old      Date of visit: 11/01/17   PCP: Dr. Letha Grissom  Cardiology: Dr. Boston Navarro     Chief Complaint   Patient presents with:  Arthritis: Patient states she is feeling ok, little pain and stiffness.      Assessment and Plan     1. 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  She was treated with prednisone monotherapy for several years, being able to taper off without recurrence of symptoms. She then developed worsening symptoms in her hands and was diagnosed with rheumatoid arthritis. Initially, she was resistant to DMARD therapy. She was on MTX 15 mg once weekly and was doing well; methotrexate and prednisone were reduced. Currently off of prednisone and on methotrexate 10 mg once weekly. Now with some active arthritis in her hands so will increase MTX to 15mg once weekly again.  Will use prednisone to calm down the current disease activity.   - Start prednisone 10mg daily x7days, then 7.5mg daily x7days, then 5mg daily x7days, then 2.5mg daily x7days, then stop.    - Increase methotrexate to 15 mg once weekly   - Continue folic acid 1mg daily  - Labs monthly w8ssteni: CBC, Cr, Hepatic Panel  - Labs 2-3 days prior to the next rheumatology clinic visit: CBC, Creatinine, Hepatic Panel, ESR, CRP     # Prednisone Risks and Benefits: The risks and benefits of prednisone were discussed in detail and the patient verbalized understanding.  The risks discussed include, but are not limited to, weight gain, fluid retention, impaired wound healing, hyperglycemia, adrenal suppression, GI upset, peptic ulcer, hepatotoxicity, aseptic necrosis of the femoral and humeral heads, osteoporosis, myopathy, tendon rupture (particularly Achilles tendon), ocular changes including an increased intraocular  pressure.  I encouraged reviewing the package insert and asking any questions about the medication.      2. Giant Cell Arteritis History?: 12/26/2005 Left TA biopsy negative per [facility] record review.  No symptoms of GCA at this time.     3. Right shoulder rotator cuff tear and pain: Previously evaluated by orthopedic surgery and her pain resolved for approximately one year after having a steroid injection in March 2015. She does not want to have surgical correction of her shoulder. Repeat steroid injections have been helpful; repeat today as documented in the procedure section.  Physical therapy was effective and she is doing exercises at home.     4. History of basal cell carcinoma: Following with dermatology     5. Bone Health: Managed by PCP already.    6. Nonpitting edema of the bilateral lower extremities: I advised her to f/u with her PCP and cardiologist for this issue.  I also recommended that she wear compression stockings as she has used compression stockings in the past for this issue with good effect.     Ms. Stark verbalized agreement with and understanding of the rational for the diagnosis and treatment plan.  All questions were answered to best of my ability and the patient's satisfaction. Ms. Stark was advised to contact the clinic with any questions that may arise after the clinic visit.      Thank you for involving me in the care of the patient    Return to clinic: 3 months      HPI   Roxanna Stark is a 89 year old female with medical history significant for basal cell carcinoma, hypertension, aortic stenosis, right rotator cuff tear (previously evaluated by Dr. Bingham, orthopedic surgery, on 3/20/2015 where at that time Ms. Stark was not interested in surgical correction; she received an intra-articular steroid injection at that time that was effective for ~1year), temporal arteritis?, and seronegative erosive rheumatoid arthritis.     Today, she reports that she is not doing as well.  She  says that she has some bad days and some good days. On her bad days she has some swelling in her fingers, especially in her left third finger. On good days she feels okay. She says that one issue she had was that her medications were on automatic refill and she did not realize that she was not taking several of her medications for about 2 weeks; reportedly the automatic refill at her pharmacy has been reinstated. Her daughter, who is with her today, says that her mother, the patient, felt much better when she was on prednisone and encouraged the patient to ask for more prednisone; Ms. Harkins then said that she felt somewhat better on prednisone but it was hard for her to tell them that she is doing okay right now, but then said that she would be happy trying prednisone again. She would like to have repeat steroid injection of her right shoulder as it is effective for her right shoulder pain. She said the morning stiffness does not really bother her; she would not quantitate how many minutes the morning stiffness lasted for. She is able to stand up from a chair unassisted and without difficulty. She is able to raise her arms above her head without difficulty, but does have pain in the right shoulder. No headache. No vision change. No vision loss. No scalp tenderness. No jaw claudication.     Denies fevers, chills, nausea, vomiting, constipation, diarrhea. No abdominal pain. No chest pain/pressure, palpitations, or shortness of breath. No oral or nasal sores. No neck pain. No rash. Swelling in her bilateral feet.       Tobacco: None  EtOH: No more than 1 drink per week  Drugs: None  Occupation: Used to work for the telephone company; now retired    ROS   GEN: No fevers, chills, night sweats, or weight change  SKIN: No itching, rashes, sores  HEENT: No oral or nasal ulcers.  CV: No chest pain, pressure, palpitations, or dyspnea on exertion.  PULM: No SOB, wheeze, cough.  GI: No nausea, vomiting, constipation,  diarrhea. No blood in stool. No abdominal pain.  : No blood in urine.  MSK: See HPI.  NEURO: No numbness, tingling, or weakness.  ENDO: No heat/cold intolerance.  EXT: See HPI    Active Problem List     Patient Active Problem List   Diagnosis     Polymyalgia rheumatica (H)     History of basal cell carcinoma     CARDIOVASCULAR SCREENING; LDL GOAL LESS THAN 130     Advanced directives, counseling/discussion     Hypertension goal BP (blood pressure) < 140/90     Hip pain     Left atrial enlargement     Aortic stenosis     Status post coronary angiogram     Aortic valve stenosis     Aortic valve replaced     Rheumatoid arthritis of multiple sites with negative rheumatoid factor (H)     Past Medical History     Past Medical History:   Diagnosis Date     Actinic keratosis      Aortic stenosis 2014     Basal cell cancer 7/2014    left eye medial canthus      Basal cell carcinoma 9/30/08    left cheek     HTN (hypertension)      melanoma in situ 9/30/2008    LEFT ARM     Melanoma in situ (H) 9/30/08    left arm     Polymyalgia rheumatica (H) 11/99     Temporal arteritis (H) 11/99     Past Surgical History     Past Surgical History:   Procedure Laterality Date     C SKIN TISSUE PROCEDURE UNLISTED  11/3/08    mmis skin cancer excision     CATARACT IOL, RT/LT  5/09    bilateral     COLONOSCOPY  2002     REPLACE VALVE AORTIC N/A 4/25/2016    Procedure: REPLACE VALVE AORTIC;  Surgeon: Sudeep Tsai MD;  Location: [facility] OR     Allergy   No Known Allergies     Current Medication List     Current Outpatient Prescriptions   Medication Sig     lisinopril (PRINIVIL/ZESTRIL) 10 MG tablet Take 1 tablet (10 mg) by mouth daily     methotrexate sodium 2.5 MG TABS Take 10 mg by mouth once a week . Take all 4 tablets on the same day of each week.     folic acid (FOLVITE) 1 MG tablet Take 1 tablet (1 mg) by mouth daily     furosemide (LASIX) 20 MG tablet TAKE 1 TABLET BY MOUTH EVERY DAY IF 2 TO 3 POUNDS WEIGHT GAIN OVER 2 DAY  "PERIOD     metoprolol (LOPRESSOR) 25 MG tablet Take 1 tablet (25 mg) by mouth 2 times daily     lisinopril (PRINIVIL,ZESTRIL) 5 MG tablet Take 1 tablet (5 mg) by mouth daily     calcium-vitamin D 500-125 MG-UNIT TABS      aspirin  MG EC tablet Take 1 tablet (325 mg) by mouth daily     Omega-3 Fatty Acids (OMEGA-3 FISH OIL PO) Take 2 g by mouth daily     Blood Pressure Monitor KIT 1 Units daily     econazole nitrate 1 % cream Apply to red rash of legs and feet twice a day till rash is gone (Patient not taking: Reported on 8/4/2017)     ICAPS PO 2 tablets daily     No current facility-administered medications for this visit.        Social History   See HPI    Family History     Family History   Problem Relation Age of Onset     Breast Cancer Sister      Arthritis Sister      CANCER Sister      breast     Thyroid Disease Sister      CANCER Sister      colon     Arthritis Sister      Arthritis Mother      Hypertension Father      Cancer - colorectal Father      Prostate Cancer Father      Arthritis Father      HEART DISEASE Father      Lipids Father      Arthritis Sister      Asthma Daughter      Asthma Daughter      CANCER Daughter 58     lung     CANCER Other 81     pancreatic      Physical Exam     Temp Readings from Last 3 Encounters:   08/02/17 96.9  F (36.1  C) (Oral)   06/28/17 98.2  F (36.8  C)   06/22/17 97.5  F (36.4  C)     BP Readings from Last 5 Encounters:   08/04/17 176/80   08/02/17 146/80   06/28/17 122/72   06/22/17 134/72   05/05/17 122/62     Pulse Readings from Last 1 Encounters:   11/01/17 72     Resp Readings from Last 1 Encounters:   11/11/16 18     Estimated body mass index is 22.79 kg/(m^2) as calculated from the following:    Height as of this encounter: 1.549 m (5' 1\").    Weight as of this encounter: 54.7 kg (120 lb 9.6 oz).    GEN: NAD  HEENT: MMM.  Anicteric, noninjected sclera  CV: S1, S2. RRR. No m/r/g.  PULM: CTA bilaterally. No w/c.  MSK:  Swelling and tenderness to palpation of " the left third MCP and PIP. Other MCPs and PIPs without swelling or tenderness to palpation. Wrists, elbows, knees, ankles, and MTPs without swelling or tenderness to palpation. Heberden's and Gracie's nodes present. Hips nontender to direct palpation. Right shoulder painful with abduction above 90  and with palpation on the most lateral aspect; no swelling or increased warmth. Left shoulder without swelling or tenderness to palpation. .   NEURO: Able to stand up unassisted from a chair without difficulty. Able to raise her arms above her head without difficulty, but she has pain in the right shoulder when she abducts above 90 .   SKIN: No rash.    EXT: Nonpitting edema of the bilateral lower extremities distal to the mid calf  PSYCH: Alert. Appropriate.    Labs / Imaging (select studies)   RF/CCP  Recent Labs   Lab Test  08/11/16   1124  08/04/16   1222  02/18/16   1543   CCPIGG  1   --    --    RHF   --   <20  <20     CBC  Recent Labs   Lab Test  07/31/17   0905 05/02/17   0912  02/06/17   0859   WBC  9.5  11.5*  11.0   RBC  3.90  3.87  3.76*   HGB  11.5*  12.0  11.1*   HCT  36.4  37.7  36.1   MCV  93  97  96   RDW  16.6*  16.2*  20.6*   PLT  265  269  266   MCH  29.5  31.0  29.5   MCHC  31.6  31.8  30.7*   NEUTROPHIL  62.3  72.5  68.0   LYMPH  21.5  18.7  18.6   MONOCYTE  10.4  5.7  10.2   EOSINOPHIL  4.9  2.3  2.1   BASOPHIL  0.9  0.8  1.1   ANEU  5.9  8.3  7.5   ALYM  2.1  2.2  2.1   IGNACIO  1.0  0.7  1.1   AEOS  0.5  0.3  0.2   ABAS  0.1  0.1  0.1     CMP  Recent Labs   Lab Test  07/31/17   0905  05/02/17   0912  02/06/17   0859   12/14/16   0849   07/12/16   1035  06/24/16   0852   NA   --    --    --    --   140   --   138  140   POTASSIUM   --    --    --    --   4.5   --   4.6  4.3   CHLORIDE   --    --    --    --   105   --   102  105   CO2   --    --    --    --   26   --   28  27   ANIONGAP   --    --    --    --   9   --   8  8   GLC   --    --    --    --   172*   --   117*  95   BUN   --    --     --    --   28   --   17  25   CR  1.18*  1.46*  1.46*   < >  1.19*   < >  1.04  1.18*   GFRESTIMATED  43*  34*  34*   < >  43*   < >  50*  43*   GFRESTBLACK  52*  41*  41*   < >  52*   < >  60*  52*   ROEL   --    --    --    --   8.8   --   9.4  9.0   BILITOTAL  0.2  0.3  0.3   < >   --    < >   --    --    ALBUMIN  3.2*  3.4  3.5   < >   --    < >   --    --    PROTTOTAL  7.0  6.8  6.9   < >   --    < >   --    --    ALKPHOS  81  60  60   < >   --    < >   --    --    AST  20  20  29   < >   --    < >   --    --    ALT  22  33  39   < >   --    < >   --    --     < > = values in this interval not displayed.     Calcium/VitaminD  Recent Labs   Lab Test  12/14/16   0849  07/12/16   1035  06/24/16   0852   ROEL  8.8  9.4  9.0     ESR/CRP  Recent Labs   Lab Test  07/31/17   0905  05/02/17   0912  02/06/17   0859   SED  49*  20  26   CRP  14.3*  <2.9  <2.9     Hepatitis B  Recent Labs   Lab Test  11/10/16   0909   HBCAB  Nonreactive   HEPBANG  Nonreactive     Hepatitis C  Recent Labs   Lab Test  11/10/16   0909   HCVAB  Nonreactive   Assay performance characteristics have not been established for newborns,   infants, and children       HIV Screening  Recent Labs   Lab Test  11/10/16   0909   HIAGAB  Nonreactive   HIV-1 p24 Ag & HIV-1/HIV-2 Ab Not Detected         Immunization History     Immunization History   Administered Date(s) Administered     Influenza (H1N1) 10/20/2016     Influenza (High Dose) 3 valent vaccine 10/03/2017     Influenza (IIV3) 10/04/2005, 10/20/2010, 11/09/2011, 09/22/2012, 09/16/2013, 10/15/2014     Pneumococcal (PCV 13) 03/17/2015     Pneumococcal 23 valent 11/03/2000, 12/13/2010     TD (ADULT, 7+) 01/12/2004     TDAP Vaccine (Adacel) 05/14/2013     Zoster vaccine, live 12/15/2006       Procedure     Procedure: Steroid injection of the right shoulder  Indication: Pain, impingement syndrome, rheumatoid arthritis    The procedure was explained in detail. Risks including infection, pain, structural  damage such as cartilage damage and tendon rupture, fat atrophy, skin hyper-/hypo-pigmentation, and medication reaction was explained. The need for rest of the affected joint for one week after the procedure was explained.  The option of not doing the procedure was also provided. All questions were answered and the patient consented to the procedure.     A time-out was performed and the correct patient, procedure, and laterality were verified.    The right shoulder was examined and location for injection was identified - posterior approach. The area was cleaned with chlorhexidine, twice.  Ethyl chloride was then used for topical anaesthetic.  Then a mixture of lidocaine 1% 2 mL and Kenalog 40mg was injected into the intra-articular space.     The patient tolerated the procedure well. No complications.    MEDICATION: Kenalog 40 mg  LOT #: [id number]  : luma-id  EXPIRATION DATE: 02/01/2019  NDC#: 9310-3933-15          Chart documentation done in part with Dragon Voice recognition Software. Although reviewed after completion, some word and grammatical error may remain.    Jamie Johnson MD

## 2017-11-01 NOTE — NURSING NOTE
The following medication was given:     MEDICATION: Kenalog 40 mg  SITE: Right shoulder  DOSE: 1 ml  LOT #: [id number]  :  Recroup  EXPIRATION DATE:  02/01/2019  NDC#: 3648-4166-68

## 2017-11-01 NOTE — PATIENT INSTRUCTIONS
Dr. Johnson s Rheumatology Clinics  Locations Clinic Hours Telephone Number     Tim [place]  [address]  [place] [address]     Wednesday: 7:20AM - 4:00PM  Thursday:     7:20AM - 4:00PM     Friday:          7:20AM - 11:00AM       To schedule an appointment with  Dr. Johnson,  please contact  Specialty [phone]       Tim [place]  [address]  [place] [address]       Monday:       7:20AM - 4:00PM        Tim Esteban  [address]  [place] [address]       Tuesday:      7:20AM - 4:00PM          Thank you!    Twila Atkins CMA

## 2017-11-07 ENCOUNTER — TELEPHONE (OUTPATIENT)
Dept: FAMILY MEDICINE | Facility: CLINIC | Age: 82
End: 2017-11-07

## 2017-11-07 NOTE — TELEPHONE ENCOUNTER
Reason for Call:  Other call back    Detailed comments: patient states she received a letter from an Care Coordinator with an $25.00 dollar check inside, the patient would like a return call to discuss the reasons for receiving this letter,     Phone Number Patient can be reached at: Home number on file [phone] (home)    Best Time: today    Can we leave a detailed message on this number? YES    Call taken on 11/7/2017 at 8:08 AM by Ashly Mary

## 2017-11-07 NOTE — TELEPHONE ENCOUNTER
Patient is due for routine mammogram, please contact patient to schedule.   Svetlana Kirkpatrick,

## 2017-11-08 NOTE — TELEPHONE ENCOUNTER
Called patient left message that we do not show we sent this information to her. Said maybe if was in the Hospital lately maybe that did told to call back if more questions.  Svetlana Kirkpatrick,

## 2017-11-14 DIAGNOSIS — Z29.89 SBE (SUBACUTE BACTERIAL ENDOCARDITIS) PROPHYLAXIS CANDIDATE: ICD-10-CM

## 2017-11-14 RX ORDER — AMOXICILLIN 500 MG/1
CAPSULE ORAL
Qty: 4 CAPSULE | Refills: 3 | Status: SHIPPED | OUTPATIENT
Start: 2017-11-14 | End: 2018-12-02

## 2017-11-14 NOTE — TELEPHONE ENCOUNTER
amoxicillin (AMOXIL) 500 MG capsule    Last Written Prescription Date:  07/07/2017  Last Fill Quantity: 4,   # refills: 3  Future Office visit:    Next 5 appointments (look out 90 days)     Jan 31, 2018  9:00 AM CST   [facility] with [facility] MD   [facility]    [address]  [place] [address]   [phone]                   Routing refill request to provider for review/approval because:  Drug not active on patient's medication list    Mita Araujo MA

## 2017-11-19 DIAGNOSIS — I10 HYPERTENSION GOAL BP (BLOOD PRESSURE) < 140/90: ICD-10-CM

## 2017-11-21 RX ORDER — METOPROLOL TARTRATE 25 MG/1
TABLET, FILM COATED ORAL
Qty: 180 TABLET | Refills: 3 | Status: SHIPPED | OUTPATIENT
Start: 2017-11-21 | End: 2018-11-22

## 2017-11-27 DIAGNOSIS — Z79.899 HIGH RISK MEDICATION USE: ICD-10-CM

## 2017-11-27 DIAGNOSIS — M06.09 RHEUMATOID ARTHRITIS OF MULTIPLE SITES WITH NEGATIVE RHEUMATOID FACTOR (H): ICD-10-CM

## 2017-11-27 LAB
ALBUMIN SERPL-MCNC: 3.4 G/DL (ref 3.4–5)
ALP SERPL-CCNC: 60 U/L (ref 40–150)
ALT SERPL W P-5'-P-CCNC: 32 U/L (ref 0–50)
AST SERPL W P-5'-P-CCNC: 28 U/L (ref 0–45)
BASOPHILS # BLD AUTO: 0.1 10E9/L (ref 0–0.2)
BASOPHILS NFR BLD AUTO: 0.5 %
BILIRUB DIRECT SERPL-MCNC: <0.1 MG/DL (ref 0–0.2)
BILIRUB SERPL-MCNC: 0.3 MG/DL (ref 0.2–1.3)
CREAT SERPL-MCNC: 1.38 MG/DL (ref 0.52–1.04)
DIFFERENTIAL METHOD BLD: ABNORMAL
EOSINOPHIL # BLD AUTO: 0.3 10E9/L (ref 0–0.7)
EOSINOPHIL NFR BLD AUTO: 2.7 %
ERYTHROCYTE [DISTWIDTH] IN BLOOD BY AUTOMATED COUNT: 18.7 % (ref 10–15)
GFR SERPL CREATININE-BSD FRML MDRD: 36 ML/MIN/1.7M2
HCT VFR BLD AUTO: 35.4 % (ref 35–47)
HGB BLD-MCNC: 11.1 G/DL (ref 11.7–15.7)
LYMPHOCYTES # BLD AUTO: 2.1 10E9/L (ref 0.8–5.3)
LYMPHOCYTES NFR BLD AUTO: 21.5 %
MCH RBC QN AUTO: 28.6 PG (ref 26.5–33)
MCHC RBC AUTO-ENTMCNC: 31.4 G/DL (ref 31.5–36.5)
MCV RBC AUTO: 91 FL (ref 78–100)
MONOCYTES # BLD AUTO: 0.7 10E9/L (ref 0–1.3)
MONOCYTES NFR BLD AUTO: 7.3 %
NEUTROPHILS # BLD AUTO: 6.6 10E9/L (ref 1.6–8.3)
NEUTROPHILS NFR BLD AUTO: 68 %
PLATELET # BLD AUTO: 209 10E9/L (ref 150–450)
PROT SERPL-MCNC: 6.9 G/DL (ref 6.8–8.8)
RBC # BLD AUTO: 3.88 10E12/L (ref 3.8–5.2)
WBC # BLD AUTO: 9.8 10E9/L (ref 4–11)

## 2017-11-27 PROCEDURE — 80076 HEPATIC FUNCTION PANEL: CPT | Performed by: INTERNAL MEDICINE

## 2017-11-27 PROCEDURE — 85025 COMPLETE CBC W/AUTO DIFF WBC: CPT | Performed by: INTERNAL MEDICINE

## 2017-11-27 PROCEDURE — 36415 COLL VENOUS BLD VENIPUNCTURE: CPT | Performed by: INTERNAL MEDICINE

## 2017-11-27 PROCEDURE — 82565 ASSAY OF CREATININE: CPT | Performed by: INTERNAL MEDICINE

## 2017-11-27 NOTE — TELEPHONE ENCOUNTER
predniSONE (DELTASONE) 2.5 MG tablet      Last Written Prescription Date:  11/1/17  Last Fill Quantity: 70,   # refills: 0  Last Office Visit: 11/1/17  Future Office visit:    Next 5 appointments (look out 90 days)     Jan 31, 2018  9:00 AM CST   Return Visit with Jamie Johnson MD   [facility] ([facility]    [address]  [place] [address]   [phone]                   Routing refill request to provider for review/approval because:  Drug not on the [facility], [facility] or [facility] refill protocol or controlled substance

## 2017-11-28 RX ORDER — PREDNISONE 2.5 MG/1
TABLET ORAL
Qty: 70 TABLET | Refills: 0 | OUTPATIENT
Start: 2017-11-28

## 2017-11-28 NOTE — TELEPHONE ENCOUNTER
Rheumatology team: Prednisone refill request was received and refused.  It was a taper only.  Was the refill requested by the patient? If so please get more details.  Or was it automated from the pharmacy?    Jamie Johnson MD  11/28/2017 5:17 AM

## 2017-11-28 NOTE — PROGRESS NOTES
Rheumatology team: Please call to notify Ms. Stark that her labs do not show evidence of methotrexate toxicity.    Jamie Johnson MD  11/28/2017 4:39 PM

## 2017-11-30 NOTE — TELEPHONE ENCOUNTER
Patient called back, she is out of prednisone and is not sure if on the taper she did them for more than 7 days at a time. Spoke with Dr Johnson and he said that if she is feeling ok then she does not need to take anymore right now. Patient was happy to know she does not need anymore. Will call pharmacy to let them know.  Twila Atkins CMA  11/30/2017 12:18 PM

## 2017-12-22 DIAGNOSIS — M06.09 RHEUMATOID ARTHRITIS OF MULTIPLE SITES WITH NEGATIVE RHEUMATOID FACTOR (H): ICD-10-CM

## 2017-12-22 DIAGNOSIS — Z79.899 HIGH RISK MEDICATION USE: ICD-10-CM

## 2017-12-22 LAB
ALBUMIN SERPL-MCNC: 3.5 G/DL (ref 3.4–5)
ALP SERPL-CCNC: 84 U/L (ref 40–150)
ALT SERPL W P-5'-P-CCNC: 30 U/L (ref 0–50)
AST SERPL W P-5'-P-CCNC: 24 U/L (ref 0–45)
BASOPHILS # BLD AUTO: 0.1 10E9/L (ref 0–0.2)
BASOPHILS NFR BLD AUTO: 0.6 %
BILIRUB DIRECT SERPL-MCNC: <0.1 MG/DL (ref 0–0.2)
BILIRUB SERPL-MCNC: 0.3 MG/DL (ref 0.2–1.3)
CREAT SERPL-MCNC: 1.09 MG/DL (ref 0.52–1.04)
DIFFERENTIAL METHOD BLD: ABNORMAL
EOSINOPHIL # BLD AUTO: 0.2 10E9/L (ref 0–0.7)
EOSINOPHIL NFR BLD AUTO: 1.8 %
ERYTHROCYTE [DISTWIDTH] IN BLOOD BY AUTOMATED COUNT: 19.3 % (ref 10–15)
GFR SERPL CREATININE-BSD FRML MDRD: 47 ML/MIN/1.7M2
HCT VFR BLD AUTO: 35.9 % (ref 35–47)
HGB BLD-MCNC: 11.3 G/DL (ref 11.7–15.7)
LYMPHOCYTES # BLD AUTO: 2.4 10E9/L (ref 0.8–5.3)
LYMPHOCYTES NFR BLD AUTO: 19.2 %
MCH RBC QN AUTO: 29.2 PG (ref 26.5–33)
MCHC RBC AUTO-ENTMCNC: 31.5 G/DL (ref 31.5–36.5)
MCV RBC AUTO: 93 FL (ref 78–100)
MONOCYTES # BLD AUTO: 1.4 10E9/L (ref 0–1.3)
MONOCYTES NFR BLD AUTO: 11.1 %
NEUTROPHILS # BLD AUTO: 8.5 10E9/L (ref 1.6–8.3)
NEUTROPHILS NFR BLD AUTO: 67.3 %
PLATELET # BLD AUTO: 331 10E9/L (ref 150–450)
PROT SERPL-MCNC: 7.3 G/DL (ref 6.8–8.8)
RBC # BLD AUTO: 3.87 10E12/L (ref 3.8–5.2)
WBC # BLD AUTO: 12.6 10E9/L (ref 4–11)

## 2017-12-22 PROCEDURE — 80076 HEPATIC FUNCTION PANEL: CPT | Performed by: INTERNAL MEDICINE

## 2017-12-22 PROCEDURE — 82565 ASSAY OF CREATININE: CPT | Performed by: INTERNAL MEDICINE

## 2017-12-22 PROCEDURE — 36415 COLL VENOUS BLD VENIPUNCTURE: CPT | Performed by: INTERNAL MEDICINE

## 2017-12-22 PROCEDURE — 85025 COMPLETE CBC W/AUTO DIFF WBC: CPT | Performed by: INTERNAL MEDICINE

## 2018-01-22 DIAGNOSIS — M06.09 RHEUMATOID ARTHRITIS OF MULTIPLE SITES WITH NEGATIVE RHEUMATOID FACTOR (H): ICD-10-CM

## 2018-01-22 DIAGNOSIS — Z79.899 HIGH RISK MEDICATION USE: ICD-10-CM

## 2018-01-22 NOTE — TELEPHONE ENCOUNTER
Routing refill request to provider for review/approval because:  Drug not on the [facility] refill protocol     Requested Prescriptions   Pending Prescriptions Disp Refills     methotrexate sodium 2.5 MG TABS 24 tablet 3     Sig: Take 15 mg by mouth once a week . Take all 6 tablets on the same day of each week.    There is no refill protocol information for this order        Ann Sparrow RN - BC

## 2018-01-23 RX ORDER — METHOTREXATE 2.5 MG/1
15 TABLET ORAL WEEKLY
Qty: 24 TABLET | Refills: 3 | OUTPATIENT
Start: 2018-01-23

## 2018-01-23 NOTE — TELEPHONE ENCOUNTER
Rheumatology team: please contact patient and her pharmacy.  Methotrexate refill request was refused, as she should have a sufficient supply.  Please let me know if you find out otherwise.    Jamie Johnson MD  1/23/2018 5:00 PM

## 2018-01-26 NOTE — TELEPHONE ENCOUNTER
Called the pharmacy and informed them of MD message below. Attempted to call the patient, no answer, did not leave a message. Please inform the patient of refill denial and see how much medication she has left. Patient should have at least 4 weeks supply left based on dosing and last rx 11/1/17.     Leilani Ruiz, CMA

## 2018-01-29 DIAGNOSIS — M06.09 RHEUMATOID ARTHRITIS OF MULTIPLE SITES WITH NEGATIVE RHEUMATOID FACTOR (H): ICD-10-CM

## 2018-01-29 DIAGNOSIS — Z79.899 HIGH RISK MEDICATION USE: ICD-10-CM

## 2018-01-29 LAB
ALBUMIN SERPL-MCNC: 3.1 G/DL (ref 3.4–5)
ALP SERPL-CCNC: 77 U/L (ref 40–150)
ALT SERPL W P-5'-P-CCNC: 21 U/L (ref 0–50)
AST SERPL W P-5'-P-CCNC: 17 U/L (ref 0–45)
BASOPHILS # BLD AUTO: 0.1 10E9/L (ref 0–0.2)
BASOPHILS NFR BLD AUTO: 0.5 %
BILIRUB DIRECT SERPL-MCNC: <0.1 MG/DL (ref 0–0.2)
BILIRUB SERPL-MCNC: 0.3 MG/DL (ref 0.2–1.3)
CREAT SERPL-MCNC: 1.05 MG/DL (ref 0.52–1.04)
CRP SERPL-MCNC: 31.8 MG/L (ref 0–8)
DIFFERENTIAL METHOD BLD: ABNORMAL
EOSINOPHIL # BLD AUTO: 0.3 10E9/L (ref 0–0.7)
EOSINOPHIL NFR BLD AUTO: 2.5 %
ERYTHROCYTE [DISTWIDTH] IN BLOOD BY AUTOMATED COUNT: 19.2 % (ref 10–15)
ERYTHROCYTE [SEDIMENTATION RATE] IN BLOOD BY WESTERGREN METHOD: 68 MM/H (ref 0–30)
GFR SERPL CREATININE-BSD FRML MDRD: 49 ML/MIN/1.7M2
HCT VFR BLD AUTO: 33.1 % (ref 35–47)
HGB BLD-MCNC: 10.4 G/DL (ref 11.7–15.7)
LYMPHOCYTES # BLD AUTO: 1.7 10E9/L (ref 0.8–5.3)
LYMPHOCYTES NFR BLD AUTO: 17.3 %
MCH RBC QN AUTO: 28.7 PG (ref 26.5–33)
MCHC RBC AUTO-ENTMCNC: 31.4 G/DL (ref 31.5–36.5)
MCV RBC AUTO: 91 FL (ref 78–100)
MONOCYTES # BLD AUTO: 0.9 10E9/L (ref 0–1.3)
MONOCYTES NFR BLD AUTO: 8.8 %
NEUTROPHILS # BLD AUTO: 7.1 10E9/L (ref 1.6–8.3)
NEUTROPHILS NFR BLD AUTO: 70.9 %
PLATELET # BLD AUTO: 340 10E9/L (ref 150–450)
PROT SERPL-MCNC: 7 G/DL (ref 6.8–8.8)
RBC # BLD AUTO: 3.62 10E12/L (ref 3.8–5.2)
WBC # BLD AUTO: 10 10E9/L (ref 4–11)

## 2018-01-29 PROCEDURE — 82565 ASSAY OF CREATININE: CPT | Performed by: INTERNAL MEDICINE

## 2018-01-29 PROCEDURE — 85652 RBC SED RATE AUTOMATED: CPT | Performed by: INTERNAL MEDICINE

## 2018-01-29 PROCEDURE — 85025 COMPLETE CBC W/AUTO DIFF WBC: CPT | Performed by: INTERNAL MEDICINE

## 2018-01-29 PROCEDURE — 86140 C-REACTIVE PROTEIN: CPT | Performed by: INTERNAL MEDICINE

## 2018-01-29 PROCEDURE — 36415 COLL VENOUS BLD VENIPUNCTURE: CPT | Performed by: INTERNAL MEDICINE

## 2018-01-29 PROCEDURE — 80076 HEPATIC FUNCTION PANEL: CPT | Performed by: INTERNAL MEDICINE

## 2018-01-31 ENCOUNTER — OFFICE VISIT (OUTPATIENT)
Dept: RHEUMATOLOGY | Facility: CLINIC | Age: 83
End: 2018-01-31
Payer: MEDICARE

## 2018-01-31 VITALS
HEART RATE: 73 BPM | OXYGEN SATURATION: 100 % | SYSTOLIC BLOOD PRESSURE: 160 MMHG | RESPIRATION RATE: 16 BRPM | HEIGHT: 61 IN | DIASTOLIC BLOOD PRESSURE: 68 MMHG | BODY MASS INDEX: 21.94 KG/M2 | WEIGHT: 116.2 LBS | TEMPERATURE: 97.5 F

## 2018-01-31 DIAGNOSIS — M06.09 RHEUMATOID ARTHRITIS OF MULTIPLE SITES WITH NEGATIVE RHEUMATOID FACTOR (H): Primary | ICD-10-CM

## 2018-01-31 DIAGNOSIS — Z79.899 HIGH RISK MEDICATION USE: ICD-10-CM

## 2018-01-31 PROCEDURE — 99214 OFFICE O/P EST MOD 30 MIN: CPT | Performed by: INTERNAL MEDICINE

## 2018-01-31 RX ORDER — METHOTREXATE 2.5 MG/1
20 TABLET ORAL WEEKLY
Qty: 32 TABLET | Refills: 3 | Status: SHIPPED | OUTPATIENT
Start: 2018-01-31 | End: 2018-04-01

## 2018-01-31 RX ORDER — SULFASALAZINE 500 MG/1
TABLET, DELAYED RELEASE ORAL
Qty: 120 TABLET | Refills: 3 | Status: SHIPPED | OUTPATIENT
Start: 2018-01-31 | End: 2018-05-10

## 2018-01-31 RX ORDER — PREDNISONE 2.5 MG/1
TABLET ORAL
Qty: 134 TABLET | Refills: 0 | Status: SHIPPED | OUTPATIENT
Start: 2018-01-31 | End: 2018-04-20

## 2018-01-31 NOTE — NURSING NOTE
"Chief Complaint   Patient presents with     RECHECK     RA       Initial /68  Pulse 73  Temp 97.5  F (36.4  C) (Oral)  Resp 16  Ht 1.549 m (5' 1\")  Wt 52.7 kg (116 lb 3.2 oz)  SpO2 100%  BMI 21.96 kg/m2 Estimated body mass index is 21.96 kg/(m^2) as calculated from the following:    Height as of this encounter: 1.549 m (5' 1\").    Weight as of this encounter: 52.7 kg (116 lb 3.2 oz).  BP completed using cuff size: regular         RAPID3 (0-30) Cumulative Score  7.8          RAPID3 Weighted Score (divide #4 by 3 and that is the weighted score)  2.6         "

## 2018-01-31 NOTE — MR AVS SNAPSHOT
After Visit Summary   1/31/2018    Roxanna Stark    MRN: [id number]           Patient Information     Date Of Birth          5/20/1927        Visit Information        Provider Department      1/31/2018 9:00 AM Jamie Johnson MD [facility]        Today's Diagnoses     Rheumatoid arthritis of multiple sites with negative rheumatoid factor (H)    -  1    High risk medication use          Care Instructions    Methotrexate: increase to 6 tablets once weekly for one week, then 8 tablets (20mg) once weekly thereafter    Start sulfasalazine 500mg twice daily for 1 week, then 1000mg twice daily thereafter    Prednisone 5mg daily [id number], then 2.5mg daily [id number], then stop    Labs monthly          Follow-ups after your visit        Your next 10 appointments already scheduled     Feb 26, 2018  8:30 AM CST   LAB with [facility]   [facility]    [address]  [place] [address]   [phone]           Please do not eat 10-12 hours before your appointment if you are coming in fasting for labs on lipids, cholesterol, or glucose (sugar). This does not apply to pregnant women. Water, hot tea and black coffee (with nothing added) are okay. Do not drink other fluids, diet soda or chew gum.            Mar 26, 2018  8:30 AM CDT   LAB with [facility]   [facility] ([facility]    [address]  [place] [address]   [phone]           Please do not eat 10-12 hours before your appointment if you are coming in fasting for labs on lipids, cholesterol, or glucose (sugar). This does not apply to pregnant women. Water, hot tea and black coffee (with nothing added) are okay. Do not drink other fluids, diet soda or chew gum.            Apr 23, 2018  8:30 AM CDT   LAB with [facility]   [facility] ([facility])    [address] [address]   [phone]           Please do not eat 10-12  hours before your appointment if you are coming in fasting for labs on lipids, cholesterol, or glucose (sugar). This does not apply to pregnant women. Water, hot tea and black coffee (with nothing added) are okay. Do not drink other fluids, diet soda or chew gum.            May 07, 2018  8:00 AM CDT   [facility]   [facility]    [address] [place] [place] [address] [address]   [phone]           Please do not eat 10-12 hours before your appointment if you are coming in fasting for labs on lipids, cholesterol, or glucose (sugar). This does not apply to pregnant women. Water, hot tea and black coffee (with nothing added) are okay. Do not drink other fluids, diet soda or chew gum.            May 07, 2018  9:00 AM CDT   [facility] with [facility]   [facility]    [address] [place] [place] [address] [address]   [phone]           Please do not eat 10-12 hours before your appointment if you are coming in fasting for labs on lipids, cholesterol, or glucose (sugar). This does not apply to pregnant women. Water, hot tea and black coffee (with nothing added) are okay. Do not drink other fluids, diet soda or chew gum.            May 10, 2018 11:00 AM CDT   [facility] with [facility] Johnson MD   [facility]    [address] [place] [address]   [phone]              Future tests that were ordered for you today     Open Standing Orders        Priority Remaining Interval Expires Ordered    CBC with platelets differential Routine 2/2 Every 4 Weeks 7/30/2018 1/31/2018    Creatinine Routine 2/2 Every 4 Weeks 7/30/2018 1/31/2018    Hepatic panel Routine 2/2 Every 4 Weeks 7/30/2018 1/31/2018          Open Future Orders        Priority Expected Expires Ordered    CBC with platelets differential Routine 4/27/2018 5/16/2018 1/31/2018    Creatinine Routine 4/27/2018 5/16/2018 1/31/2018    CRP inflammation  "Routine 2018    Erythrocyte sedimentation rate auto Routine 2018    Hepatic panel Routine 2018            Who to contact     If you have questions or need follow up information about today's clinic visit or your schedule please contact [facility] directly at [phone].  Normal or non-critical lab and imaging results will be communicated to you by [employer], letter or phone within 4 business days after the clinic has received the results. If you do not hear from us within 7 days, please contact the clinic through [facility] or phone. If you have a critical or abnormal lab result, we will notify you by phone as soon as possible.  Submit refill requests through [employer] or call your pharmacy and they will forward the refill request to us. Please allow 3 business days for your refill to be completed.          Additional Information About Your Visit        [employer] Information     [employer] lets you send messages to your doctor, view your test results, renew your prescriptions, schedule appointments and more. To sign up, go to [email or web address] . Click on \"Log in\" on the left side of the screen, which will take you to the Welcome page. Then click on \"Sign up Now\" on the right side of the page.     You will be asked to enter the access code listed below, as well as some personal information. Please follow the directions to create your username and password.     Your access code is: [email or web address]  Expires: 2018  9:44 AM     Your access code will  in 90 days. If you need help or a new code, please call your [facility] clinic or [phone].        Care EveryWhere ID     This is your Care EveryWhere ID. This could be used by other organizations to access your [facility] medical records  [id number]        Your Vitals Were     Pulse Temperature Respirations Height Pulse Oximetry BMI (Body Mass Index)    73 97.5  F (36.4  C) (Oral) " "16 1.549 m (5' 1\") 100% 21.96 kg/m2       Blood Pressure from Last 3 Encounters:   01/31/18 160/68   08/04/17 176/80   08/02/17 146/80    Weight from Last 3 Encounters:   01/31/18 52.7 kg (116 lb 3.2 oz)   11/01/17 54.7 kg (120 lb 9.6 oz)   08/02/17 54.5 kg (120 lb 3.2 oz)                 Today's Medication Changes          These changes are accurate as of 1/31/18  9:44 AM.  If you have any questions, ask your nurse or doctor.               Start taking these medicines.        Dose/Directions    sulfaSALAzine  MG EC tablet   Commonly known as:  AZULFIDINE EN   Used for:  Rheumatoid arthritis of multiple sites with negative rheumatoid factor (H)   Started by:  Jamie Johnson MD        500mg BID for 7 days, then increase to 1000mg BID and continue 1000mg BID thereafter.   Quantity:  120 tablet   Refills:  3         These medicines have changed or have updated prescriptions.        Dose/Directions    methotrexate sodium 2.5 MG Tabs   This may have changed:    - how much to take  - additional instructions   Used for:  Rheumatoid arthritis of multiple sites with negative rheumatoid factor (H)   Changed by:  Jamie Johnson MD        Dose:  20 mg   Take 20 mg by mouth once a week . Take all 8 tablets on the same day of each week.   Quantity:  32 tablet   Refills:  3       predniSONE 2.5 MG tablet   Commonly known as:  DELTASONE   This may have changed:  additional instructions   Used for:  Rheumatoid arthritis of multiple sites with negative rheumatoid factor (H)   Changed by:  Jamie Johnson MD        Prednisone 5mg daily [id number], then 2.5mg daily [id number], then stop.   Quantity:  134 tablet   Refills:  0            Where to get your medicines      These medications were sent to [id number] - [facility] [address] AT [place] [address], [facility] [address]     Phone:  [phone]     methotrexate sodium 2.5 MG Tabs    predniSONE 2.5 MG tablet    sulfaSALAzine "  MG EC tablet                Primary Care Provider Office Phone # Fax #    Letha Grissom [phone]       [address] [address]        Equal Access to Services     LUCINDAJARRELL ANGEL LUIS AH: Hadii aad ku hadnegritoo Soomaali, waaxda luqadaha, qaybta kaalmada adeegyada, waxamry idiin hayshabnamn adealexandra hi laKenanbrandon bonner. So [facility] [phone].    ATENCIÓN: Si habla español, tiene a goldman disposición servicios gratuitos de asistencia lingüística. Llame al [phone].    We comply with applicable federal civil rights laws and [place] laws. We do not discriminate on the basis of race, color, national origin, age, disability, sex, sexual orientation, or gender identity.            Thank you!     Thank you for choosing [facility]  for your care. Our goal is always to provide you with excellent care. Hearing back from our patients is one way we can continue to improve our services. Please take a few minutes to complete the written survey that you may receive in the mail after your visit with us. Thank you!             Your Updated Medication List - Protect others around you: Learn how to safely use, store and throw away your medicines at www.disposemymeds.org.          This list is accurate as of 1/31/18  9:44 AM.  Always use your most recent med list.                   Brand Name Dispense Instructions for use Diagnosis    amoxicillin 500 MG capsule    AMOXIL    4 capsule    TAKE FOUR CAPSULES BY MOUTH 1 HOUR BEFORE DENTAL APPOINTMENT    SBE (subacute bacterial endocarditis) prophylaxis candidate       aspirin 325 MG EC tablet     90 tablet    Take 1 tablet (325 mg) by mouth daily    S/P AVR (aortic valve replacement)       Blood Pressure Monitor Kit     1 kit    1 Units daily    Hypertension goal BP (blood pressure) < 140/90       calcium-vitamin D 500-125 MG-UNIT Tabs           econazole nitrate 1 % cream     85 g    Apply to red rash of legs and feet  twice a day till rash is gone    Tinea corporis, Tinea pedis of both feet       folic acid 1 MG tablet    FOLVITE    100 tablet    Take 1 tablet (1 mg) by mouth daily    Rheumatoid arthritis of multiple sites with negative rheumatoid factor (H), High risk medication use       furosemide 20 MG tablet    LASIX    90 tablet    TAKE 1 TABLET BY MOUTH EVERY DAY IF 2 TO 3 POUNDS WEIGHT GAIN OVER 2 DAY PERIOD    Hypertension goal BP (blood pressure) < 140/90       ICAPS PO      2 tablets daily        * lisinopril 5 MG tablet    PRINIVIL/ZESTRIL    90 tablet    Take 1 tablet (5 mg) by mouth daily    S/P AVR (aortic valve replacement), Hypertension goal BP (blood pressure) < 140/90       * lisinopril 10 MG tablet    PRINIVIL/ZESTRIL    90 tablet    Take 1 tablet (10 mg) by mouth daily    Hypertension goal BP (blood pressure) < 140/90       methotrexate sodium 2.5 MG Tabs     32 tablet    Take 20 mg by mouth once a week . Take all 8 tablets on the same day of each week.    Rheumatoid arthritis of multiple sites with negative rheumatoid factor (H)       metoprolol tartrate 25 MG tablet    LOPRESSOR    180 tablet    TAKE 1 TABLET(25 MG) BY MOUTH TWICE DAILY    Hypertension goal BP (blood pressure) < 140/90       OMEGA-3 FISH OIL PO      Take 2 g by mouth daily        predniSONE 2.5 MG tablet    DELTASONE    134 tablet    Prednisone 5mg daily [id number], then 2.5mg daily [id number], then stop.    Rheumatoid arthritis of multiple sites with negative rheumatoid factor (H)       sulfaSALAzine  MG EC tablet    AZULFIDINE EN    120 tablet    500mg BID for 7 days, then increase to 1000mg BID and continue 1000mg BID thereafter.    Rheumatoid arthritis of multiple sites with negative rheumatoid factor (H)       * Notice:  This list has 2 medication(s) that are the same as other medications prescribed for you. Read the directions carefully, and ask your doctor or other care provider to review them with you.

## 2018-01-31 NOTE — PATIENT INSTRUCTIONS
Methotrexate: increase to 6 tablets once weekly for one week, then 8 tablets (20mg) once weekly thereafter    Start sulfasalazine 500mg twice daily for 1 week, then 1000mg twice daily thereafter    Prednisone 5mg daily [id number], then 2.5mg daily i28jbmf, then stop    Labs monthly

## 2018-01-31 NOTE — PROGRESS NOTES
Rheumatology Clinic Visit      Roxanna Stark MRN# [id number]   YOB: 1927 Age: 89 year old      Date of visit: 1/31/18   PCP: Dr. Letha Grissom  Cardiology: Dr. Boston Navarro     Chief Complaint   Patient presents with:  RECHECK: RA      Assessment and Plan     1. 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  She was treated with prednisone monotherapy for several years, being able to taper off without recurrence of symptoms. She then developed worsening symptoms in her hands and was diagnosed with rheumatoid arthritis. Initially, she was resistant to DMARD therapy.  She was then started on methotrexate 15 mg once weekly and was doing well so the dose was reduced to 10 mg once weekly with return of symptoms.  She was supposed to increase methotrexate 15 mg once weekly again but did not.  We discussed the rationale for increasing methotrexate and will do so today.  Also add sulfasalazine given the persistent synovitis on exam.    - Increase methotrexate to 15 mg once weekly ×1 week, then 20 mg once weekly thereafter  - Continue folic acid 1mg daily  - Start sulfasalazine 500 mg twice daily ×7 days, then 1000 mg twice daily thereafter  - Start prednisone 5 mg daily ×60 days, then 2.5 mg daily ×14 days, then stop  - Labs monthly [id number]: CBC, Cr, Hepatic Panel  - Labs 2-3 days prior to the next rheumatology clinic visit: CBC, Creatinine, Hepatic Panel, ESR, CRP     # Sulfasalazine Risks and Benefits: The risks and benefits of sulfasalazine were discussed in detail and the patient verbalized understanding.  The risks discussed include, but are not limited to, the risk for hypersensitivity, anaphylaxis, anaphylactoid reactions, infections, bone marrow suppression,  hepatotoxicity, nausea, vomiting, and GI upset.  Oligospermia may occur in males.  I encouraged reviewing the package  insert and asking any questions about the medication.      # Prednisone Risks and Benefits: The risks and benefits of prednisone were discussed in detail and the patient verbalized understanding.  The risks discussed include, but are not limited to, weight gain, fluid retention, impaired wound healing, hyperglycemia, adrenal suppression, GI upset, peptic ulcer, hepatotoxicity, aseptic necrosis of the femoral and humeral heads, osteoporosis, myopathy, tendon rupture (particularly Achilles tendon), ocular changes including an increased intraocular pressure.  I encouraged reviewing the package insert and asking any questions about the medication.      2. Giant Cell Arteritis History?: 12/26/2005 Left TA biopsy negative per [facility] record review.  No symptoms of GCA at this time.     3. Right shoulder rotator cuff tear and pain: Previously evaluated by orthopedic surgery and her pain resolved for approximately one year after having a steroid injection in March 2015. She does not want to have surgical correction of her shoulder. Repeat steroid injections have been helpful; not needed today.  Physical therapy was effective and she is doing exercises at home.     4. History of basal cell carcinoma: Following with dermatology     5. Bone Health: Managed by PCP already.    Ms. Stark verbalized agreement with and understanding of the rational for the diagnosis and treatment plan.  All questions were answered to best of my ability and the patient's satisfaction. Ms. Stark was advised to contact the clinic with any questions that may arise after the clinic visit.      Thank you for involving me in the care of the patient    Return to clinic: 3 months      HPI   Roxanna Stark is a 89 year old female with medical history significant for basal cell carcinoma, hypertension, aortic stenosis, right rotator cuff tear (previously evaluated by Dr. Bingham, orthopedic surgery, on 3/20/2015 where at that time Ms. Stark was not  interested in surgical correction; she received an intra-articular steroid injection at that time that was effective for ~1year), temporal arteritis?, and seronegative erosive rheumatoid arthritis.     Today, she reports that she has not taken methotrexate 15 mg once weekly but rather only taking 10 mg once weekly.  She is having some pain in her MCPs and PIPs.  She says that her shoulders are doing well and she does not need a repeat steroid injection today.  She also has some pain in her knees and feet.  Morning stiffness for most of the day.  Stiffness improves with activity.  She says that she has just never been the same ever since her open heart surgery.  She is able to stand up from a chair unassisted and without difficulty. She is able to raise her arms above her head without difficulty, but does have pain in the right shoulder. No headache. No vision change. No vision loss. No scalp tenderness. No jaw claudication.     Denies fevers, chills, nausea, vomiting, constipation, diarrhea. No abdominal pain. No chest pain/pressure, palpitations, or shortness of breath. No oral or nasal sores. No neck pain. No rash. Swelling in her bilateral feet.       Her granddaughter was present with her today during the clinic visit    Tobacco: None  EtOH: No more than 1 drink per week  Drugs: None  Occupation: Used to work for the [employer]; now retired    ROS   GEN: No fevers, chills, night sweats, or weight change  SKIN: No itching, rashes, sores  HEENT: No oral or nasal ulcers.  CV: No chest pain, pressure, palpitations, or dyspnea on exertion.  PULM: No SOB, wheeze, cough.  GI: No nausea, vomiting, constipation, diarrhea. No blood in stool. No abdominal pain.  : No blood in urine.  MSK: See HPI.  NEURO: No numbness, tingling, or weakness.  ENDO: No heat/cold intolerance.  EXT: See HPI    Active Problem List     Patient Active Problem List   Diagnosis     Polymyalgia rheumatica (H)     History of basal cell  carcinoma     CARDIOVASCULAR SCREENING; LDL GOAL LESS THAN 130     Advanced directives, counseling/discussion     Hypertension goal BP (blood pressure) < 140/90     Hip pain     Left atrial enlargement     Aortic stenosis     Status post coronary angiogram     Aortic valve stenosis     Aortic valve replaced     Rheumatoid arthritis of multiple sites with negative rheumatoid factor (H)     Past Medical History     Past Medical History:   Diagnosis Date     Actinic keratosis      Aortic stenosis 2014     Basal cell cancer 7/2014    left eye medial canthus      Basal cell carcinoma 9/30/08    left cheek     HTN (hypertension)      melanoma in situ 9/30/2008    LEFT ARM     Melanoma in situ (H) 9/30/08    left arm     Polymyalgia rheumatica (H) 11/99     Temporal arteritis (H) 11/99     Past Surgical History     Past Surgical History:   Procedure Laterality Date     C SKIN TISSUE PROCEDURE UNLISTED  11/3/08    mmis skin cancer excision     CATARACT IOL, RT/LT  5/09    bilateral     COLONOSCOPY  2002     REPLACE VALVE AORTIC N/A 4/25/2016    Procedure: REPLACE VALVE AORTIC;  Surgeon: Sudeep Tsai MD;  Location: [facility] OR     Allergy   No Known Allergies     Current Medication List     Current Outpatient Prescriptions   Medication Sig     metoprolol (LOPRESSOR) 25 MG tablet TAKE 1 TABLET(25 MG) BY MOUTH TWICE DAILY     folic acid (FOLVITE) 1 MG tablet Take 1 tablet (1 mg) by mouth daily     amoxicillin (AMOXIL) 500 MG capsule TAKE FOUR CAPSULES BY MOUTH 1 HOUR BEFORE DENTAL APPOINTMENT     methotrexate sodium 2.5 MG TABS Take 15 mg by mouth once a week . Take all 6 tablets on the same day of each week.     predniSONE (DELTASONE) 2.5 MG tablet Prednisone 10mg daily x7days, then 7.5mg daily x7days, then 5mg daily x7days, then 2.5mg daily x7days, then stop.     Blood Pressure Monitor KIT 1 Units daily     lisinopril (PRINIVIL/ZESTRIL) 10 MG tablet Take 1 tablet (10 mg) by mouth daily     furosemide (LASIX) 20 MG  "tablet TAKE 1 TABLET BY MOUTH EVERY DAY IF 2 TO 3 POUNDS WEIGHT GAIN OVER 2 DAY PERIOD     econazole nitrate 1 % cream Apply to red rash of legs and feet twice a day till rash is gone     lisinopril (PRINIVIL,ZESTRIL) 5 MG tablet Take 1 tablet (5 mg) by mouth daily     calcium-vitamin D 500-125 MG-UNIT TABS      aspirin  MG EC tablet Take 1 tablet (325 mg) by mouth daily     Omega-3 Fatty Acids (OMEGA-3 FISH OIL PO) Take 2 g by mouth daily     ICAPS PO 2 tablets daily     No current facility-administered medications for this visit.        Social History   See HPI    Family History     Family History   Problem Relation Age of Onset     Breast Cancer Sister      Arthritis Sister      CANCER Sister      breast     Thyroid Disease Sister      CANCER Sister      colon     Arthritis Sister      Arthritis Mother      Hypertension Father      Cancer - colorectal Father      Prostate Cancer Father      Arthritis Father      HEART DISEASE Father      Lipids Father      Arthritis Sister      Asthma Daughter      Asthma Daughter      CANCER Daughter 58     lung     CANCER Other 81     pancreatic      Physical Exam     Temp Readings from Last 3 Encounters:   01/31/18 97.5  F (36.4  C) (Oral)   08/02/17 96.9  F (36.1  C) (Oral)   06/28/17 98.2  F (36.8  C)     BP Readings from Last 5 Encounters:   01/31/18 160/68   08/04/17 176/80   08/02/17 146/80   06/28/17 122/72   06/22/17 134/72     Pulse Readings from Last 1 Encounters:   01/31/18 73     Resp Readings from Last 1 Encounters:   01/31/18 16     Estimated body mass index is 21.96 kg/(m^2) as calculated from the following:    Height as of this encounter: 1.549 m (5' 1\").    Weight as of this encounter: 52.7 kg (116 lb 3.2 oz).    GEN: NAD  HEENT: MMM.  Anicteric, noninjected sclera  CV: S1, S2. RRR. No m/r/g.  PULM: CTA bilaterally. No w/c.  MSK:  Swelling and tenderness to palpation of the bilateral second-third MCPs and second-fourth PIPs.  Wrists with swelling and " tenderness to palpation.  Elbows, shoulders, knees, ankles, and MTPs without swelling or tenderness to palpation.     NEURO: Able to stand up unassisted from a chair without difficulty. Able to raise her arms above her head without difficulty  SKIN: No rash.    EXT: Nonpitting edema of the bilateral lower extremities  PSYCH: Alert. Appropriate.    Labs / Imaging (select studies)   RF/CCP  Recent Labs   Lab Test  08/11/16   1124  08/04/16   1222  02/18/16   1543   CCPIGG  1   --    --    RHF   --   <20  <20     CBC  Recent Labs   Lab Test  01/29/18   0903  12/22/17   0921 11/27/17   0903   WBC  10.0  12.6*  9.8   RBC  3.62*  3.87  3.88   HGB  10.4*  11.3*  11.1*   HCT  33.1*  35.9  35.4   MCV  91  93  91   RDW  19.2*  19.3*  18.7*   PLT  340  331  209   MCH  28.7  29.2  28.6   MCHC  31.4*  31.5  31.4*   NEUTROPHIL  70.9  67.3  68.0   LYMPH  17.3  19.2  21.5   MONOCYTE  8.8  11.1  7.3   EOSINOPHIL  2.5  1.8  2.7   BASOPHIL  0.5  0.6  0.5   ANEU  7.1  8.5*  6.6   ALYM  1.7  2.4  2.1   IGNACIO  0.9  1.4*  0.7   AEOS  0.3  0.2  0.3   ABAS  0.1  0.1  0.1     CMP  Recent Labs   Lab Test  01/29/18   0903  12/22/17   0921  11/27/17   0903   12/14/16   0849   07/12/16   1035  06/24/16   0852   NA   --    --    --    --   140   --   138  140   POTASSIUM   --    --    --    --   4.5   --   4.6  4.3   CHLORIDE   --    --    --    --   105   --   102  105   CO2   --    --    --    --   26   --   28  27   ANIONGAP   --    --    --    --   9   --   8  8   GLC   --    --    --    --   172*   --   117*  95   BUN   --    --    --    --   28   --   17  25   CR  1.05*  1.09*  1.38*   < >  1.19*   < >  1.04  1.18*   GFRESTIMATED  49*  47*  36*   < >  43*   < >  50*  43*   GFRESTBLACK  59*  57*  43*   < >  52*   < >  60*  52*   ROEL   --    --    --    --   8.8   --   9.4  9.0   BILITOTAL  0.3  0.3  0.3   < >   --    < >   --    --    ALBUMIN  3.1*  3.5  3.4   < >   --    < >   --    --    PROTTOTAL  7.0  7.3  6.9   < >   --    < >   --     --    ALKPHOS  77  84  60   < >   --    < >   --    --    AST  17  24  28   < >   --    < >   --    --    ALT  21  30  32   < >   --    < >   --    --     < > = values in this interval not displayed.     Calcium/VitaminD  Recent Labs   Lab Test  12/14/16   0849  07/12/16   1035  06/24/16   0852   ROEL  8.8  9.4  9.0     ESR/CRP  Recent Labs   Lab Test  01/29/18   0903  11/01/17   0945  07/31/17   0905   SED  68*  44*  49*   CRP  31.8*  23.8*  14.3*     Lipid Panel  Recent Labs   Lab Test  03/17/15   0923   CHOL  228*   TRIG  200*   HDL  72   LDL  116   VLDL  40*   CHOLHDLRATIO  3.2     Hepatitis B  Recent Labs   Lab Test  11/10/16   0909   HBCAB  Nonreactive   HEPBANG  Nonreactive     Hepatitis C  Recent Labs   Lab Test  11/10/16   0909   HCVAB  Nonreactive   Assay performance characteristics have not been established for newborns,   infants, and children       HIV Screening  Recent Labs   Lab Test  11/10/16   0909   HIAGAB  Nonreactive   HIV-1 p24 Ag & HIV-1/HIV-2 Ab Not Detected         Immunization History     Immunization History   Administered Date(s) Administered     Influenza (H1N1) 10/20/2016     Influenza (High Dose) 3 valent vaccine 10/03/2017     Influenza (IIV3) PF 10/04/2005, 10/20/2010, 11/09/2011, 09/22/2012, 09/16/2013, 10/15/2014     Pneumo Conj 13-V (2010&after) 03/17/2015     Pneumococcal 23 valent 11/03/2000, 12/13/2010     TD (ADULT, 7+) 01/12/2004     TDAP Vaccine (Adacel) 05/14/2013     Zoster vaccine, live 12/15/2006          Chart documentation done in part with Dragon Voice recognition Software. Although reviewed after completion, some word and grammatical error may remain.    Jamie Johnson MD

## 2018-02-01 DIAGNOSIS — Z95.2 AORTIC VALVE REPLACED: Primary | ICD-10-CM

## 2018-02-01 DIAGNOSIS — I51.7 LEFT ATRIAL ENLARGEMENT: ICD-10-CM

## 2018-02-01 DIAGNOSIS — I10 HYPERTENSION GOAL BP (BLOOD PRESSURE) < 140/90: ICD-10-CM

## 2018-02-01 DIAGNOSIS — I35.0 AORTIC STENOSIS: ICD-10-CM

## 2018-02-01 DIAGNOSIS — I35.0 AORTIC VALVE STENOSIS: ICD-10-CM

## 2018-02-02 ENCOUNTER — TELEPHONE (OUTPATIENT)
Dept: RHEUMATOLOGY | Facility: CLINIC | Age: 83
End: 2018-02-02

## 2018-02-05 NOTE — TELEPHONE ENCOUNTER
Got a PA response case ID# [id number], saying the medication SulfaSalazine 500mg tablet is covered.  This medication is on your plan's list of covered drugs. Prior authorization is not required at this time. If your pharmacy has questions regarding the processing of your prescription, please have them call the [facility] pharmacy help desk at [phone]. For additional information, the member can contact Member Services by calling the number on the back of their ID Card.

## 2018-02-07 NOTE — TELEPHONE ENCOUNTER
Prescription for methotrexate was written on 1/31/18. Closing encounter  Twila Atkins CMA  2/7/2018 12:02 PM

## 2018-02-14 NOTE — PROGRESS NOTES
SUBJECTIVE:   Roxanna Stark is a 89 year old female who presents to clinic today for the following health issues:    Sore on the head    Duration: x unsure when it started but in the last couple of weeks has worsened in symptoms    Description (location/character/radiation): bump on the top of the head     Intensity:  moderate    Accompanying signs and symptoms: none    History (similar episodes/previous evaluation): None    Precipitating or alleviating factors: None    Therapies tried and outcome: None          Problem list and histories reviewed & adjusted, as indicated.  Additional history: as documented    Patient Active Problem List   Diagnosis     Polymyalgia rheumatica (H)     History of basal cell carcinoma     CARDIOVASCULAR SCREENING; LDL GOAL LESS THAN 130     Advanced directives, counseling/discussion     Hypertension goal BP (blood pressure) < 140/90     Hip pain     Left atrial enlargement     Aortic stenosis     Status post coronary angiogram     Aortic valve stenosis     Aortic valve replaced     Rheumatoid arthritis of multiple sites with negative rheumatoid factor (H)     Past Surgical History:   Procedure Laterality Date     C SKIN TISSUE PROCEDURE UNLISTED  11/3/08    mmis skin cancer excision     CATARACT IOL, RT/LT  5/09    bilateral     COLONOSCOPY  2002     REPLACE VALVE AORTIC N/A 4/25/2016    Procedure: REPLACE VALVE AORTIC;  Surgeon: Sudeep Tsai MD;  Location:  OR       Social History   Substance Use Topics     Smoking status: Never Smoker     Smokeless tobacco: Never Used     Alcohol use Yes     Family History   Problem Relation Age of Onset     Breast Cancer Sister      Arthritis Sister      CANCER Sister      breast     Thyroid Disease Sister      CANCER Sister      colon     Arthritis Sister      Arthritis Mother      Hypertension Father      Cancer - colorectal Father      Prostate Cancer Father      Arthritis Father      HEART DISEASE Father      Lipids Father       Arthritis Sister      Asthma Daughter      Asthma Daughter      CANCER Daughter 58     lung     CANCER Other 81     pancreatic          Current Outpatient Prescriptions   Medication Sig Dispense Refill     methotrexate sodium 2.5 MG TABS Take 20 mg by mouth once a week . Take all 8 tablets on the same day of each week. 32 tablet 3     predniSONE (DELTASONE) 2.5 MG tablet Prednisone 5mg daily [id number], then 2.5mg daily [id number], then stop. 134 tablet 0     sulfaSALAzine ER (AZULFIDINE EN) 500 MG EC tablet 500mg BID for 7 days, then increase to 1000mg BID and continue 1000mg BID thereafter. 120 tablet 3     metoprolol (LOPRESSOR) 25 MG tablet TAKE 1 TABLET(25 MG) BY MOUTH TWICE DAILY 180 tablet 3     folic acid (FOLVITE) 1 MG tablet Take 1 tablet (1 mg) by mouth daily 100 tablet 3     amoxicillin (AMOXIL) 500 MG capsule TAKE FOUR CAPSULES BY MOUTH 1 HOUR BEFORE DENTAL APPOINTMENT 4 capsule 3     Blood Pressure Monitor KIT 1 Units daily 1 kit 0     lisinopril (PRINIVIL/ZESTRIL) 10 MG tablet Take 1 tablet (10 mg) by mouth daily 90 tablet 3     furosemide (LASIX) 20 MG tablet TAKE 1 TABLET BY MOUTH EVERY DAY IF 2 TO 3 POUNDS WEIGHT GAIN OVER 2 DAY PERIOD 90 tablet 3     econazole nitrate 1 % cream Apply to red rash of legs and feet twice a day till rash is gone 85 g 3     lisinopril (PRINIVIL,ZESTRIL) 5 MG tablet Take 1 tablet (5 mg) by mouth daily 90 tablet 3     calcium-vitamin D 500-125 MG-UNIT TABS        aspirin  MG EC tablet Take 1 tablet (325 mg) by mouth daily 90 tablet 3     Omega-3 Fatty Acids (OMEGA-3 FISH OIL PO) Take 2 g by mouth daily       ICAPS PO 2 tablets daily       No Known Allergies  BP Readings from Last 3 Encounters:   02/15/18 128/42   01/31/18 160/68   08/04/17 176/80    Wt Readings from Last 3 Encounters:   02/15/18 113 lb (51.3 kg)   01/31/18 116 lb 3.2 oz (52.7 kg)   11/01/17 120 lb 9.6 oz (54.7 kg)                  Labs reviewed in EPIC    Reviewed and updated as needed this visit by  "clinical staff       Reviewed and updated as needed this visit by Provider       ROS:  This 89 year old female is here today because she has been feeling a crusted lesion on the top of her head for the past month. Initially she thought it might be a scab that would wash away when she washes her hair, but it just keeps growing. It is not painful. She has history of facial basal cell cancer and also had melanoma removed from her left arm in the past. She has been seen by [facility] dermatology in the past for basal cell on her face. All other review of systems are negative  Personal, family, and social history reviewed with patient and revised.         OBJECTIVE:     /42 (BP Location: Left arm, Patient Position: Chair, Cuff Size: Adult Regular)  Pulse 65  Temp 97.5  F (36.4  C) (Oral)  Resp 13  Ht 5' 1\" (1.549 m)  Wt 113 lb (51.3 kg)  SpO2 99%  Breastfeeding? No  BMI 21.35 kg/m2  Body mass index is 21.35 kg/(m^2).  Patient has a dark, raised irregular crusted lesion on the top of her head. Appears to have some dried blood at the base. This is not normal and needs to be removed and sent to pathology.     Diagnostic Test Results:  none     ASSESSMENT/PLAN:              1. Atypical nevus  As above   - DERMATOLOGY REFERRAL    Return to clinic if no improvement     YISSEL LIGHT MD  [facility]  "

## 2018-02-15 ENCOUNTER — OFFICE VISIT (OUTPATIENT)
Dept: FAMILY MEDICINE | Facility: CLINIC | Age: 83
End: 2018-02-15
Payer: MEDICARE

## 2018-02-15 VITALS
DIASTOLIC BLOOD PRESSURE: 42 MMHG | TEMPERATURE: 97.5 F | BODY MASS INDEX: 21.34 KG/M2 | HEART RATE: 65 BPM | SYSTOLIC BLOOD PRESSURE: 128 MMHG | WEIGHT: 113 LBS | OXYGEN SATURATION: 99 % | RESPIRATION RATE: 13 BRPM | HEIGHT: 61 IN

## 2018-02-15 DIAGNOSIS — D22.9 ATYPICAL NEVUS: Primary | ICD-10-CM

## 2018-02-15 PROCEDURE — 99213 OFFICE O/P EST LOW 20 MIN: CPT | Performed by: FAMILY MEDICINE

## 2018-02-15 NOTE — MR AVS SNAPSHOT
After Visit Summary   2/15/2018    Roxanna Stark    MRN: [id number]           Patient Information     Date Of Birth          5/20/1927        Visit Information        Provider Department      2/15/2018 2:30 PM Letha Grissom MD [facility]        Today's Diagnoses     Atypical nevus    -  1      Care Instructions    [facility]    If you have any questions regarding to your visit please contact your care team:       Team Purple:   Clinic Hours Telephone Number   Dr. Letha Arrieta   7am-7pm  Monday - Thursday   7am-5pm  Fridays  [phone]  (Appointment scheduling available 24/7)    Questions about your Visit?   Team Line:  [phone]   Urgent Care - [place] and [facility] - 11am-9pm Monday-Friday Saturday-Sunday- 9am-5pm   [place] - 5pm-9pm Monday-Friday Saturday-Sunday- 9am-5pm  [phone] - [facility]  [phone] - [place]       What options do I have for visits at the clinic other than the traditional office visit?  To expand how we care for you, many of our providers are utilizing electronic visits (e-visits) and telephone visits, when medically appropriate, for interactions with their patients rather than a visit in the clinic.   We also offer nurse visits for many medical concerns. Just like any other service, we will bill your insurance company for this type of visit based on time spent on the phone with your provider. Not all insurance companies cover these visits. Please check with your medical insurance if this type of visit is covered. You will be responsible for any charges that are not paid by your insurance.      E-visits via [employer]:  generally incur a $35.00 fee.  Telephone visits:  Time spent on the phone: *charged based on time that is spent on the phone in increments of 10 minutes. Estimated cost:   5-10 mins $30.00   11-20 mins. $59.00   21-30 mins. $85.00      Use [employer] (secure email communication and access to your chart) to send your primary care provider a message or make an appointment. Ask someone on your Team how to sign up for [employer].  For a Price Quote for your services, please call our Consumer Price Line at [phone].  As always, Thank you for trusting us with your health care needs!              Follow-ups after your visit        Additional Services     DERMATOLOGY REFERRAL       Your provider has referred you to: BENIGNO: [facility]  [place] [phone]   [email or web address]    Please be aware that coverage of these services is subject to the terms and limitations of your health insurance plan.  Call member services at your health plan with any benefit or coverage questions.      Please bring the following with you to your appointment:    (1) Any X-Rays, CTs or MRIs which have been performed.  Contact the facility where they were done to arrange for  prior to your scheduled appointment.    (2) List of current medications  (3) This referral request   (4) Any documents/labs given to you for this referral                  Your next 10 appointments already scheduled     Feb 26, 2018  8:30 AM CST   LAB with [facility] LAB   [facility]    [address] [address]   [phone]           Please do not eat 10-12 hours before your appointment if you are coming in fasting for labs on lipids, cholesterol, or glucose (sugar). This does not apply to pregnant women. Water, hot tea and black coffee (with nothing added) are okay. Do not drink other fluids, diet soda or chew gum.            Mar 26, 2018  8:30 AM CDT   LAB with [facility]Z LAB   [facility])    [address] [address]   [phone]           Please do not eat 10-12 hours before your appointment if you are coming in fasting for labs on lipids, cholesterol, or  glucose (sugar). This does not apply to pregnant women. Water, hot tea and black coffee (with nothing added) are okay. Do not drink other fluids, diet soda or chew gum.            Apr 23, 2018  8:30 AM CDT   LAB with FZ LAB   [facility] [place] ([facility])    [address] [place] [address]  [place] [address]   [phone]           Please do not eat 10-12 hours before your appointment if you are coming in fasting for labs on lipids, cholesterol, or glucose (sugar). This does not apply to pregnant women. Water, hot tea and black coffee (with nothing added) are okay. Do not drink other fluids, diet soda or chew gum.            May 07, 2018  8:00 AM CDT   LAB with FZ LAB   [facility] [place] ([facility] [place])    [address] [place] [address]  [place] [address]   [phone]           Please do not eat 10-12 hours before your appointment if you are coming in fasting for labs on lipids, cholesterol, or glucose (sugar). This does not apply to pregnant women. Water, hot tea and black coffee (with nothing added) are okay. Do not drink other fluids, diet soda or chew gum.            May 07, 2018  9:00 AM CDT   LAB with FZ LAB   [facility] [place] ([facility])    [address] [place] [address]  [place] [address]   [phone]           Please do not eat 10-12 hours before your appointment if you are coming in fasting for labs on lipids, cholesterol, or glucose (sugar). This does not apply to pregnant women. Water, hot tea and black coffee (with nothing added) are okay. Do not drink other fluids, diet soda or chew gum.            May 10, 2018 11:00 AM CDT   Return Visit with Jamie Johnson MD   [facility] ([facility])    [address] [place] [address]  [place] [address]   [phone]              Who to contact     If you have questions or need follow up information about today's clinic visit or your schedule please contact [facility] [place] directly  "at [phone].  Normal or non-critical lab and imaging results will be communicated to you by MyChart, letter or phone within 4 business days after the clinic has received the results. If you do not hear from us within 7 days, please contact the clinic through [employer] or phone. If you have a critical or abnormal lab result, we will notify you by phone as soon as possible.  Submit refill requests through [email or web address] or call your pharmacy and they will forward the refill request to us. Please allow 3 business days for your refill to be completed.          Additional Information About Your Visit        [employer] Information     [email or web address] lets you send messages to your doctor, view your test results, renew your prescriptions, schedule appointments and more. To sign up, go to [email or web address] . Click on \"Log in\" on the left side of the screen, which will take you to the Welcome page. Then click on \"Sign up Now\" on the right side of the page.     You will be asked to enter the access code listed below, as well as some personal information. Please follow the directions to create your username and password.     Your access code is: [email or web address]  Expires: 2018  9:44 AM     Your access code will  in 90 days. If you need help or a new code, please call your [place] clinic or [phone].        Care EveryWhere ID     This is your Care EveryWhere ID. This could be used by other organizations to access your [place] medical records  [id number]        Your Vitals Were     Pulse Temperature Respirations Height Pulse Oximetry Breastfeeding?    65 97.5  F (36.4  C) (Oral) 13 5' 1\" (1.549 m) 99% No    BMI (Body Mass Index)                   21.35 kg/m2            Blood Pressure from Last 3 Encounters:   02/15/18 128/42   18 160/68   17 176/80    Weight from Last 3 Encounters:   02/15/18 113 lb (51.3 kg)   18 116 lb 3.2 oz (52.7 kg)   17 120 lb 9.6 oz (54.7 kg)              We Performed the " Following     DERMATOLOGY REFERRAL        Primary Care Provider Office Phone # Fax #    Letha Grissom [phone]       [address] [address]        Equal Access to Services     ALTA HEIN : Hadii carol ku hadnegritoo Soomaali, waaxda luqadaha, qaybta kaalmada adeegyada, griffin patinon sulyalexandra hi adam [place]ner. So [facility] [phone].    ATENCIÓN: Si habla español, tiene a goldman disposición servicios gratuitos de asistencia lingüística. Llame al [phone].    We comply with applicable federal civil rights laws and [place] laws. We do not discriminate on the basis of race, color, national origin, age, disability, sex, sexual orientation, or gender identity.            Thank you!     Thank you for choosing [facility]  for your care. Our goal is always to provide you with excellent care. Hearing back from our patients is one way we can continue to improve our services. Please take a few minutes to complete the written survey that you may receive in the mail after your visit with us. Thank you!             Your Updated Medication List - Protect others around you: Learn how to safely use, store and throw away your medicines at www.disposemymeds.org.          This list is accurate as of 2/15/18  3:12 PM.  Always use your most recent med list.                   Brand Name Dispense Instructions for use Diagnosis    amoxicillin 500 MG capsule    AMOXIL    4 capsule    TAKE FOUR CAPSULES BY MOUTH 1 HOUR BEFORE DENTAL APPOINTMENT    SBE (subacute bacterial endocarditis) prophylaxis candidate       aspirin 325 MG EC tablet     90 tablet    Take 1 tablet (325 mg) by mouth daily    S/P AVR (aortic valve replacement)       Blood Pressure Monitor Kit     1 kit    1 Units daily    Hypertension goal BP (blood pressure) < 140/90       calcium-vitamin D 500-125 MG-UNIT Tabs           econazole nitrate 1 % cream     85 g    Apply to red rash of legs  and feet twice a day till rash is gone    Tinea corporis, Tinea pedis of both feet       folic acid 1 MG tablet    FOLVITE    100 tablet    Take 1 tablet (1 mg) by mouth daily    Rheumatoid arthritis of multiple sites with negative rheumatoid factor (H), High risk medication use       furosemide 20 MG tablet    LASIX    90 tablet    TAKE 1 TABLET BY MOUTH EVERY DAY IF 2 TO 3 POUNDS WEIGHT GAIN OVER 2 DAY PERIOD    Hypertension goal BP (blood pressure) < 140/90       ICAPS PO      2 tablets daily        * lisinopril 5 MG tablet    PRINIVIL/ZESTRIL    90 tablet    Take 1 tablet (5 mg) by mouth daily    S/P AVR (aortic valve replacement), Hypertension goal BP (blood pressure) < 140/90       * lisinopril 10 MG tablet    PRINIVIL/ZESTRIL    90 tablet    Take 1 tablet (10 mg) by mouth daily    Hypertension goal BP (blood pressure) < 140/90       methotrexate sodium 2.5 MG Tabs     32 tablet    Take 20 mg by mouth once a week . Take all 8 tablets on the same day of each week.    Rheumatoid arthritis of multiple sites with negative rheumatoid factor (H)       metoprolol tartrate 25 MG tablet    LOPRESSOR    180 tablet    TAKE 1 TABLET(25 MG) BY MOUTH TWICE DAILY    Hypertension goal BP (blood pressure) < 140/90       OMEGA-3 FISH OIL PO      Take 2 g by mouth daily        predniSONE 2.5 MG tablet    DELTASONE    134 tablet    Prednisone 5mg daily [id number], then 2.5mg daily [id number], then stop.    Rheumatoid arthritis of multiple sites with negative rheumatoid factor (H)       sulfaSALAzine  MG EC tablet    AZULFIDINE EN    120 tablet    500mg BID for 7 days, then increase to 1000mg BID and continue 1000mg BID thereafter.    Rheumatoid arthritis of multiple sites with negative rheumatoid factor (H)       * Notice:  This list has 2 medication(s) that are the same as other medications prescribed for you. Read the directions carefully, and ask your doctor or other care provider to review them with you.

## 2018-02-15 NOTE — NURSING NOTE
"Chief Complaint   Patient presents with     Derm Problem     sore on head     Health Maintenance     PHQ-9, HI, ADP       Initial /42 (BP Location: Left arm, Patient Position: Chair, Cuff Size: Adult Regular)  Pulse 65  Temp 97.5  F (36.4  C) (Oral)  Resp 13  Ht 5' 1\" (1.549 m)  Wt 113 lb (51.3 kg)  SpO2 99%  Breastfeeding? No  BMI 21.35 kg/m2 Estimated body mass index is 21.35 kg/(m^2) as calculated from the following:    Height as of this encounter: 5' 1\" (1.549 m).    Weight as of this encounter: 113 lb (51.3 kg).  Medication Reconciliation: complete    Wade Stock      "

## 2018-02-15 NOTE — PATIENT INSTRUCTIONS
[facility]    If you have any questions regarding to your visit please contact your care team:       Team Purple:   Clinic Hours Telephone Number   Dr. Letha Arrieta   7am-7pm  Monday - Thursday   7am-5pm  Fridays  [phone]  (Appointment scheduling available 24/7)    Questions about your Visit?   Team Line:  [phone]   Urgent Care - [place] and [facility] - 11am-9pm Monday-Friday Saturday-Sunday- 9am-5pm   [place] - 5pm-9pm Monday-Friday Saturday-Sunday- 9am-5pm  [phone] - [facility]  [phone] - [place]       What options do I have for visits at the clinic other than the traditional office visit?  To expand how we care for you, many of our providers are utilizing electronic visits (e-visits) and telephone visits, when medically appropriate, for interactions with their patients rather than a visit in the clinic.   We also offer nurse visits for many medical concerns. Just like any other service, we will bill your insurance company for this type of visit based on time spent on the phone with your provider. Not all insurance companies cover these visits. Please check with your medical insurance if this type of visit is covered. You will be responsible for any charges that are not paid by your insurance.      E-visits via [employer]:  generally incur a $35.00 fee.  Telephone visits:  Time spent on the phone: *charged based on time that is spent on the phone in increments of 10 minutes. Estimated cost:   5-10 mins $30.00   11-20 mins. $59.00   21-30 mins. $85.00     Use [employer] (secure email communication and access to your chart) to send your primary care provider a message or make an appointment. Ask someone on your Team how to sign up for [employer].  For a Price Quote for your services, please call our Consumer Price Line at [phone].  As always, Thank you for trusting us with your health care needs!

## 2018-02-21 ENCOUNTER — TRANSFERRED RECORDS (OUTPATIENT)
Dept: HEALTH INFORMATION MANAGEMENT | Facility: CLINIC | Age: 83
End: 2018-02-21

## 2018-02-26 DIAGNOSIS — Z79.899 HIGH RISK MEDICATION USE: ICD-10-CM

## 2018-02-26 DIAGNOSIS — M06.09 RHEUMATOID ARTHRITIS OF MULTIPLE SITES WITH NEGATIVE RHEUMATOID FACTOR (H): ICD-10-CM

## 2018-02-26 LAB
ALBUMIN SERPL-MCNC: 3.5 G/DL (ref 3.4–5)
ALP SERPL-CCNC: 62 U/L (ref 40–150)
ALT SERPL W P-5'-P-CCNC: 38 U/L (ref 0–50)
AST SERPL W P-5'-P-CCNC: 19 U/L (ref 0–45)
BASOPHILS # BLD AUTO: 0.1 10E9/L (ref 0–0.2)
BASOPHILS NFR BLD AUTO: 1 %
BILIRUB DIRECT SERPL-MCNC: <0.1 MG/DL (ref 0–0.2)
BILIRUB SERPL-MCNC: 0.4 MG/DL (ref 0.2–1.3)
CREAT SERPL-MCNC: 1.19 MG/DL (ref 0.52–1.04)
DIFFERENTIAL METHOD BLD: ABNORMAL
EOSINOPHIL # BLD AUTO: 0.2 10E9/L (ref 0–0.7)
EOSINOPHIL NFR BLD AUTO: 2.5 %
ERYTHROCYTE [DISTWIDTH] IN BLOOD BY AUTOMATED COUNT: 19.7 % (ref 10–15)
GFR SERPL CREATININE-BSD FRML MDRD: 43 ML/MIN/1.7M2
HCT VFR BLD AUTO: 33.6 % (ref 35–47)
HGB BLD-MCNC: 10.6 G/DL (ref 11.7–15.7)
LYMPHOCYTES # BLD AUTO: 2.6 10E9/L (ref 0.8–5.3)
LYMPHOCYTES NFR BLD AUTO: 29 %
MCH RBC QN AUTO: 29.2 PG (ref 26.5–33)
MCHC RBC AUTO-ENTMCNC: 31.5 G/DL (ref 31.5–36.5)
MCV RBC AUTO: 93 FL (ref 78–100)
MONOCYTES # BLD AUTO: 0.6 10E9/L (ref 0–1.3)
MONOCYTES NFR BLD AUTO: 6.2 %
NEUTROPHILS # BLD AUTO: 5.5 10E9/L (ref 1.6–8.3)
NEUTROPHILS NFR BLD AUTO: 61.3 %
PLATELET # BLD AUTO: 240 10E9/L (ref 150–450)
PROT SERPL-MCNC: 6.9 G/DL (ref 6.8–8.8)
RBC # BLD AUTO: 3.63 10E12/L (ref 3.8–5.2)
WBC # BLD AUTO: 9 10E9/L (ref 4–11)

## 2018-02-26 PROCEDURE — 36415 COLL VENOUS BLD VENIPUNCTURE: CPT | Performed by: INTERNAL MEDICINE

## 2018-02-26 PROCEDURE — 85025 COMPLETE CBC W/AUTO DIFF WBC: CPT | Performed by: INTERNAL MEDICINE

## 2018-02-26 PROCEDURE — 80076 HEPATIC FUNCTION PANEL: CPT | Performed by: INTERNAL MEDICINE

## 2018-02-26 PROCEDURE — 82565 ASSAY OF CREATININE: CPT | Performed by: INTERNAL MEDICINE

## 2018-02-26 NOTE — PROGRESS NOTES
Rheumatology team: Please call to inform Ms. Stark that her labs do not show evidence for medication toxicity.    Jamie Johnson MD  2/26/2018 5:30 PM

## 2018-02-27 ENCOUNTER — TELEPHONE (OUTPATIENT)
Dept: RHEUMATOLOGY | Facility: CLINIC | Age: 83
End: 2018-02-27

## 2018-02-27 NOTE — TELEPHONE ENCOUNTER
Called and spoke with Pt passing on message re: results per Dr Johnson.  Pt agreed and understood.    Rosy Alvarez, CMA

## 2018-03-26 DIAGNOSIS — M06.09 RHEUMATOID ARTHRITIS OF MULTIPLE SITES WITH NEGATIVE RHEUMATOID FACTOR (H): ICD-10-CM

## 2018-03-26 DIAGNOSIS — Z79.899 HIGH RISK MEDICATION USE: ICD-10-CM

## 2018-03-26 LAB
ALBUMIN SERPL-MCNC: 3.6 G/DL (ref 3.4–5)
ALP SERPL-CCNC: 59 U/L (ref 40–150)
ALT SERPL W P-5'-P-CCNC: 44 U/L (ref 0–50)
AST SERPL W P-5'-P-CCNC: 31 U/L (ref 0–45)
BASOPHILS # BLD AUTO: 0.1 10E9/L (ref 0–0.2)
BASOPHILS NFR BLD AUTO: 0.8 %
BILIRUB DIRECT SERPL-MCNC: <0.1 MG/DL (ref 0–0.2)
BILIRUB SERPL-MCNC: 0.4 MG/DL (ref 0.2–1.3)
CREAT SERPL-MCNC: 1.46 MG/DL (ref 0.52–1.04)
DIFFERENTIAL METHOD BLD: ABNORMAL
EOSINOPHIL # BLD AUTO: 0.2 10E9/L (ref 0–0.7)
EOSINOPHIL NFR BLD AUTO: 3.1 %
ERYTHROCYTE [DISTWIDTH] IN BLOOD BY AUTOMATED COUNT: 20.3 % (ref 10–15)
GFR SERPL CREATININE-BSD FRML MDRD: 34 ML/MIN/1.7M2
HCT VFR BLD AUTO: 33.9 % (ref 35–47)
HGB BLD-MCNC: 10.7 G/DL (ref 11.7–15.7)
LYMPHOCYTES # BLD AUTO: 2 10E9/L (ref 0.8–5.3)
LYMPHOCYTES NFR BLD AUTO: 30.6 %
MCH RBC QN AUTO: 29.8 PG (ref 26.5–33)
MCHC RBC AUTO-ENTMCNC: 31.6 G/DL (ref 31.5–36.5)
MCV RBC AUTO: 94 FL (ref 78–100)
MONOCYTES # BLD AUTO: 0.3 10E9/L (ref 0–1.3)
MONOCYTES NFR BLD AUTO: 4.8 %
NEUTROPHILS # BLD AUTO: 4 10E9/L (ref 1.6–8.3)
NEUTROPHILS NFR BLD AUTO: 60.7 %
PLATELET # BLD AUTO: 301 10E9/L (ref 150–450)
PROT SERPL-MCNC: 7.3 G/DL (ref 6.8–8.8)
RBC # BLD AUTO: 3.59 10E12/L (ref 3.8–5.2)
WBC # BLD AUTO: 6.5 10E9/L (ref 4–11)

## 2018-03-26 PROCEDURE — 82565 ASSAY OF CREATININE: CPT | Performed by: INTERNAL MEDICINE

## 2018-03-26 PROCEDURE — 80076 HEPATIC FUNCTION PANEL: CPT | Performed by: INTERNAL MEDICINE

## 2018-03-26 PROCEDURE — 85025 COMPLETE CBC W/AUTO DIFF WBC: CPT | Performed by: INTERNAL MEDICINE

## 2018-03-26 PROCEDURE — 36415 COLL VENOUS BLD VENIPUNCTURE: CPT | Performed by: INTERNAL MEDICINE

## 2018-04-01 DIAGNOSIS — M06.09 RHEUMATOID ARTHRITIS OF MULTIPLE SITES WITH NEGATIVE RHEUMATOID FACTOR (H): ICD-10-CM

## 2018-04-01 RX ORDER — METHOTREXATE 2.5 MG/1
15 TABLET ORAL WEEKLY
Qty: 24 TABLET | Refills: 1 | Status: SHIPPED | OUTPATIENT
Start: 2018-04-01 | End: 2018-05-10

## 2018-04-20 DIAGNOSIS — M06.09 RHEUMATOID ARTHRITIS OF MULTIPLE SITES WITH NEGATIVE RHEUMATOID FACTOR (H): ICD-10-CM

## 2018-04-20 NOTE — TELEPHONE ENCOUNTER
Routing refill request to provider for review/approval because:  Drug not active on patient's medication list      Requested Prescriptions   Pending Prescriptions Disp Refills     predniSONE (DELTASONE) 2.5 MG tablet  Last Written Prescription Date:  1/31/18  Last Fill Quantity: 134,  # refills: 0   Last office visit: 1/31/2018 with prescribing provider:     Future Office Visit:   Next 5 appointments (look out 90 days)     May 10, 2018 11:00 AM CDT   Return Visit with Jamie Johnson MD   [facility] ([address] [address]   [phone]                  134 tablet 0     Sig: Prednisone 5mg daily [id number], then 2.5mg daily [id number], then stop.    There is no refill protocol information for this order        Ann Sparrow RN - BC

## 2018-04-23 DIAGNOSIS — M06.09 RHEUMATOID ARTHRITIS OF MULTIPLE SITES WITH NEGATIVE RHEUMATOID FACTOR (H): ICD-10-CM

## 2018-04-23 DIAGNOSIS — Z79.899 HIGH RISK MEDICATION USE: ICD-10-CM

## 2018-04-23 LAB
ALBUMIN SERPL-MCNC: 3.5 G/DL (ref 3.4–5)
ALP SERPL-CCNC: 66 U/L (ref 40–150)
ALT SERPL W P-5'-P-CCNC: 30 U/L (ref 0–50)
AST SERPL W P-5'-P-CCNC: 30 U/L (ref 0–45)
BASOPHILS # BLD AUTO: 0.1 10E9/L (ref 0–0.2)
BASOPHILS NFR BLD AUTO: 0.7 %
BILIRUB DIRECT SERPL-MCNC: <0.1 MG/DL (ref 0–0.2)
BILIRUB SERPL-MCNC: 0.3 MG/DL (ref 0.2–1.3)
CREAT SERPL-MCNC: 1.08 MG/DL (ref 0.52–1.04)
CRP SERPL-MCNC: 4.8 MG/L (ref 0–8)
DIFFERENTIAL METHOD BLD: ABNORMAL
EOSINOPHIL # BLD AUTO: 0.3 10E9/L (ref 0–0.7)
EOSINOPHIL NFR BLD AUTO: 4.1 %
ERYTHROCYTE [DISTWIDTH] IN BLOOD BY AUTOMATED COUNT: 20.7 % (ref 10–15)
ERYTHROCYTE [SEDIMENTATION RATE] IN BLOOD BY WESTERGREN METHOD: 53 MM/H (ref 0–30)
GFR SERPL CREATININE-BSD FRML MDRD: 48 ML/MIN/1.7M2
HCT VFR BLD AUTO: 31.2 % (ref 35–47)
HGB BLD-MCNC: 9.9 G/DL (ref 11.7–15.7)
LYMPHOCYTES # BLD AUTO: 1.8 10E9/L (ref 0.8–5.3)
LYMPHOCYTES NFR BLD AUTO: 24 %
MCH RBC QN AUTO: 29.7 PG (ref 26.5–33)
MCHC RBC AUTO-ENTMCNC: 31.7 G/DL (ref 31.5–36.5)
MCV RBC AUTO: 94 FL (ref 78–100)
MONOCYTES # BLD AUTO: 0.7 10E9/L (ref 0–1.3)
MONOCYTES NFR BLD AUTO: 10 %
NEUTROPHILS # BLD AUTO: 4.5 10E9/L (ref 1.6–8.3)
NEUTROPHILS NFR BLD AUTO: 61.2 %
PLATELET # BLD AUTO: 294 10E9/L (ref 150–450)
PROT SERPL-MCNC: 7 G/DL (ref 6.8–8.8)
RBC # BLD AUTO: 3.33 10E12/L (ref 3.8–5.2)
WBC # BLD AUTO: 7.4 10E9/L (ref 4–11)

## 2018-04-23 PROCEDURE — 85652 RBC SED RATE AUTOMATED: CPT | Performed by: INTERNAL MEDICINE

## 2018-04-23 PROCEDURE — 82565 ASSAY OF CREATININE: CPT | Performed by: INTERNAL MEDICINE

## 2018-04-23 PROCEDURE — 36415 COLL VENOUS BLD VENIPUNCTURE: CPT | Performed by: INTERNAL MEDICINE

## 2018-04-23 PROCEDURE — 85025 COMPLETE CBC W/AUTO DIFF WBC: CPT | Performed by: INTERNAL MEDICINE

## 2018-04-23 PROCEDURE — 86140 C-REACTIVE PROTEIN: CPT | Performed by: INTERNAL MEDICINE

## 2018-04-23 PROCEDURE — 80076 HEPATIC FUNCTION PANEL: CPT | Performed by: INTERNAL MEDICINE

## 2018-04-24 RX ORDER — PREDNISONE 2.5 MG/1
TABLET ORAL
Qty: 134 TABLET | Refills: 0 | Status: SHIPPED | OUTPATIENT
Start: 2018-04-24 | End: 2018-08-23

## 2018-04-24 NOTE — TELEPHONE ENCOUNTER
Called and notified patient of provider's message.  She reports she did not request a refill and is doing well off it.  She will not start it. No further questions.    Ken Soriano RN....4/24/2018 1:50 PM

## 2018-04-24 NOTE — TELEPHONE ENCOUNTER
Rheumatology team: Please call to notify Ms. Stark that the prednisone was refilled, but it was not clear if she requested it or if it was requested just by her pharmacy.  If she is doing well off prednisone then she does not need to restart it.  Jamie Johnson MD  4/24/2018 6:14 AM

## 2018-05-07 ENCOUNTER — DOCUMENTATION ONLY (OUTPATIENT)
Dept: LAB | Facility: CLINIC | Age: 83
End: 2018-05-07

## 2018-05-07 DIAGNOSIS — Z79.899 HIGH RISK MEDICATION USE: Primary | ICD-10-CM

## 2018-05-07 DIAGNOSIS — Z79.899 HIGH RISK MEDICATION USE: ICD-10-CM

## 2018-05-07 PROCEDURE — 85025 COMPLETE CBC W/AUTO DIFF WBC: CPT | Performed by: INTERNAL MEDICINE

## 2018-05-07 PROCEDURE — 36415 COLL VENOUS BLD VENIPUNCTURE: CPT | Performed by: INTERNAL MEDICINE

## 2018-05-07 PROCEDURE — 82565 ASSAY OF CREATININE: CPT | Performed by: INTERNAL MEDICINE

## 2018-05-07 PROCEDURE — 80076 HEPATIC FUNCTION PANEL: CPT | Performed by: INTERNAL MEDICINE

## 2018-05-07 NOTE — PROGRESS NOTES
Please place lab orders if needed for today's lab appointment  5/07/18. Lab collected blood samples just in case. Thank You.     RANDAL

## 2018-05-08 LAB
ALBUMIN SERPL-MCNC: 3.4 G/DL (ref 3.4–5)
ALP SERPL-CCNC: 67 U/L (ref 40–150)
ALT SERPL W P-5'-P-CCNC: 29 U/L (ref 0–50)
AST SERPL W P-5'-P-CCNC: 33 U/L (ref 0–45)
BASOPHILS # BLD AUTO: 0.1 10E9/L (ref 0–0.2)
BASOPHILS NFR BLD AUTO: 1.3 %
BILIRUB DIRECT SERPL-MCNC: <0.1 MG/DL (ref 0–0.2)
BILIRUB SERPL-MCNC: 0.3 MG/DL (ref 0.2–1.3)
CREAT SERPL-MCNC: 1.2 MG/DL (ref 0.52–1.04)
DIFFERENTIAL METHOD BLD: ABNORMAL
EOSINOPHIL # BLD AUTO: 0.4 10E9/L (ref 0–0.7)
EOSINOPHIL NFR BLD AUTO: 3.8 %
ERYTHROCYTE [DISTWIDTH] IN BLOOD BY AUTOMATED COUNT: 21.4 % (ref 10–15)
GFR SERPL CREATININE-BSD FRML MDRD: 42 ML/MIN/1.7M2
HCT VFR BLD AUTO: 30.9 % (ref 35–47)
HGB BLD-MCNC: 9.5 G/DL (ref 11.7–15.7)
LYMPHOCYTES # BLD AUTO: 2.1 10E9/L (ref 0.8–5.3)
LYMPHOCYTES NFR BLD AUTO: 22.5 %
MCH RBC QN AUTO: 29.4 PG (ref 26.5–33)
MCHC RBC AUTO-ENTMCNC: 30.7 G/DL (ref 31.5–36.5)
MCV RBC AUTO: 96 FL (ref 78–100)
MONOCYTES # BLD AUTO: 0.6 10E9/L (ref 0–1.3)
MONOCYTES NFR BLD AUTO: 6.1 %
NEUTROPHILS # BLD AUTO: 6.1 10E9/L (ref 1.6–8.3)
NEUTROPHILS NFR BLD AUTO: 66.3 %
PLATELET # BLD AUTO: 323 10E9/L (ref 150–450)
PROT SERPL-MCNC: 6.8 G/DL (ref 6.8–8.8)
RBC # BLD AUTO: 3.23 10E12/L (ref 3.8–5.2)
WBC # BLD AUTO: 9.3 10E9/L (ref 4–11)

## 2018-05-10 ENCOUNTER — OFFICE VISIT (OUTPATIENT)
Dept: RHEUMATOLOGY | Facility: CLINIC | Age: 83
End: 2018-05-10
Payer: MEDICARE

## 2018-05-10 VITALS
DIASTOLIC BLOOD PRESSURE: 69 MMHG | SYSTOLIC BLOOD PRESSURE: 139 MMHG | WEIGHT: 116 LBS | HEIGHT: 61 IN | HEART RATE: 70 BPM | RESPIRATION RATE: 14 BRPM | BODY MASS INDEX: 21.9 KG/M2 | OXYGEN SATURATION: 99 %

## 2018-05-10 DIAGNOSIS — Z79.899 HIGH RISK MEDICATION USE: ICD-10-CM

## 2018-05-10 DIAGNOSIS — M06.09 RHEUMATOID ARTHRITIS OF MULTIPLE SITES WITH NEGATIVE RHEUMATOID FACTOR (H): Primary | ICD-10-CM

## 2018-05-10 PROCEDURE — 99213 OFFICE O/P EST LOW 20 MIN: CPT | Performed by: INTERNAL MEDICINE

## 2018-05-10 RX ORDER — METHOTREXATE 2.5 MG/1
15 TABLET ORAL WEEKLY
Qty: 24 TABLET | Refills: 3 | Status: SHIPPED | OUTPATIENT
Start: 2018-05-10 | End: 2018-08-13

## 2018-05-10 RX ORDER — SULFASALAZINE 500 MG/1
500 TABLET, DELAYED RELEASE ORAL 2 TIMES DAILY
Qty: 120 TABLET | Refills: 3 | Status: SHIPPED | OUTPATIENT
Start: 2018-05-10 | End: 2018-09-06

## 2018-05-10 RX ORDER — FOLIC ACID 1 MG/1
1 TABLET ORAL DAILY
Qty: 30 TABLET | Refills: 6 | Status: SHIPPED | OUTPATIENT
Start: 2018-05-10 | End: 2018-08-27

## 2018-05-10 NOTE — MR AVS SNAPSHOT
After Visit Summary   5/10/2018    Roxanna Stark    MRN: [id number]           Patient Information     Date Of Birth          5/20/1927        Visit Information        Provider Department      5/10/2018 11:00 AM Jamie Johnson MD [facility]        Today's Diagnoses     Rheumatoid arthritis of multiple sites with negative rheumatoid factor (H)    -  1    High risk medication use          Care Instructions    Rheumatology    Dr. Jamie Johnson         [facility]   (Monday)  [address]  [place] [address]       [facility]   (Tuesday)  [address] [place] [address]    [facility]   (Wed., Thurs., and Friday)  [address]  [place] [address]    Phone number: [phone]  Thank you for choosing [facility].  Twila Atkins CMA            Follow-ups after your visit        Your next 10 appointments already scheduled     Jun 21, 2018  9:00 AM CDT   LAB with [facility]   [facility] ([facility])    [address] [address]   [phone]           Please do not eat 10-12 hours before your appointment if you are coming in fasting for labs on lipids, cholesterol, or glucose (sugar). This does not apply to pregnant women. Water, hot tea and black coffee (with nothing added) are okay. Do not drink other fluids, diet soda or chew gum.            Sep 04, 2018  9:00 AM CDT   LAB with [facility] LAB   [facility] ([facility])    [address] [address]   [phone]           Please do not eat 10-12 hours before your appointment if you are coming in fasting for labs on lipids, cholesterol, or glucose (sugar). This does not apply to pregnant women. Water, hot tea and black coffee (with nothing added) are okay. Do not drink other fluids, diet soda or chew gum.            Sep 06, 2018  9:00 AM CDT   Return Visit with Jamie Johnson MD   [facility] ([facility]  "[facility] [place])    [address]  [place] [address]   [phone]              Future tests that were ordered for you today     Open Future Orders        Priority Expected Expires Ordered    CBC with platelets differential Routine 8/4/2018 9/7/2018 5/10/2018    Creatinine Routine 8/4/2018 9/7/2018 5/10/2018    Erythrocyte sedimentation rate auto Routine 8/4/2018 9/7/2018 5/10/2018    CRP inflammation Routine 8/4/2018 9/7/2018 5/10/2018    Hepatic panel Routine 8/4/2018 9/7/2018 5/10/2018    CBC with platelets differential Routine 6/19/2018 7/5/2019 5/10/2018            Who to contact     If you have questions or need follow up information about today's clinic visit or your schedule please contact [facility] directly at [phone].  Normal or non-critical lab and imaging results will be communicated to you by MyChart, letter or phone within 4 business days after the clinic has received the results. If you do not hear from us within 7 days, please contact the clinic through [facility] or phone. If you have a critical or abnormal lab result, we will notify you by phone as soon as possible.  Submit refill requests through [employer] or call your pharmacy and they will forward the refill request to us. Please allow 3 business days for your refill to be completed.          Additional Information About Your Visit        [employer] Information     [employer] lets you send messages to your doctor, view your test results, renew your prescriptions, schedule appointments and more. To sign up, go to [email or web address] . Click on \"Log in\" on the left side of the screen, which will take you to the Welcome page. Then click on \"Sign up Now\" on the right side of the page.     You will be asked to enter the access code listed below, as well as some personal information. Please follow the directions to create your username and password.     Your access code is: [email or web address]  Expires: 8/8/2018 11:24 AM     Your " "access code will  in 90 days. If you need help or a new code, please call your [facility] clinic or [phone].        Care EveryWhere ID     This is your Care EveryWhere ID. This could be used by other organizations to access your [facility] medical records  [id number]        Your Vitals Were     Pulse Respirations Height Pulse Oximetry BMI (Body Mass Index)       70 14 1.549 m (5' 1\") 99% 21.92 kg/m2        Blood Pressure from Last 3 Encounters:   05/10/18 139/69   02/15/18 128/42   18 160/68    Weight from Last 3 Encounters:   05/10/18 52.6 kg (116 lb)   02/15/18 51.3 kg (113 lb)   18 52.7 kg (116 lb 3.2 oz)                 Today's Medication Changes          These changes are accurate as of 5/10/18 11:24 AM.  If you have any questions, ask your nurse or doctor.               These medicines have changed or have updated prescriptions.        Dose/Directions    sulfaSALAzine  MG EC tablet   Commonly known as:  AZULFIDINE EN   This may have changed:    - how much to take  - how to take this  - when to take this  - additional instructions   Used for:  Rheumatoid arthritis of multiple sites with negative rheumatoid factor (H)   Changed by:  Jamie Johnson MD        Dose:  500 mg   Take 1 tablet (500 mg) by mouth 2 times daily   Quantity:  120 tablet   Refills:  3            Where to get your medicines      These medications were sent to [facility] [address] [place] [address] AVE NE AT [address] [facility] [address]     Phone:  [phone]     folic acid 1 MG tablet    methotrexate sodium 2.5 MG Tabs    sulfaSALAzine  MG EC tablet                Primary Care Provider Office Phone # Fax #    Letha Grissom [phone] [address] [address]        Equal Access to Services     ALTA HEIN AH: Elías reynoso Soharriett, waaxda luqadaha, qaybalcides fermin, griffin howard " agapito yingjessysandro hernandez'aan ah. So [facility] [phone].    ATENCIÓN: Si sergey patel, tiene a goldman disposición servicios gratuitos de asistencia lingüística. Neal massey [phone].    We comply with applicable federal civil rights laws and [place] laws. We do not discriminate on the basis of race, color, national origin, age, disability, sex, sexual orientation, or gender identity.            Thank you!     Thank you for choosing [facility]  for your care. Our goal is always to provide you with excellent care. Hearing back from our patients is one way we can continue to improve our services. Please take a few minutes to complete the written survey that you may receive in the mail after your visit with us. Thank you!             Your Updated Medication List - Protect others around you: Learn how to safely use, store and throw away your medicines at www.disposemymeds.org.          This list is accurate as of 5/10/18 11:24 AM.  Always use your most recent med list.                   Brand Name Dispense Instructions for use Diagnosis    amoxicillin 500 MG capsule    AMOXIL    4 capsule    TAKE FOUR CAPSULES BY MOUTH 1 HOUR BEFORE DENTAL APPOINTMENT    SBE (subacute bacterial endocarditis) prophylaxis candidate       aspirin 325 MG EC tablet     90 tablet    Take 1 tablet (325 mg) by mouth daily    S/P AVR (aortic valve replacement)       Blood Pressure Monitor Kit     1 kit    1 Units daily    Hypertension goal BP (blood pressure) < 140/90       calcium-vitamin D 500-125 MG-UNIT Tabs           econazole nitrate 1 % cream     85 g    Apply to red rash of legs and feet twice a day till rash is gone    Tinea corporis, Tinea pedis of both feet       folic acid 1 MG tablet    FOLVITE    30 tablet    Take 1 tablet (1 mg) by mouth daily    Rheumatoid arthritis of multiple sites with negative rheumatoid factor (H)       furosemide 20 MG tablet    LASIX    90 tablet    TAKE 1 TABLET BY MOUTH EVERY DAY IF 2 TO 3 POUNDS  WEIGHT GAIN OVER 2 DAY PERIOD    Hypertension goal BP (blood pressure) < 140/90       ICAPS PO      2 tablets daily        * lisinopril 5 MG tablet    PRINIVIL/ZESTRIL    90 tablet    Take 1 tablet (5 mg) by mouth daily    S/P AVR (aortic valve replacement), Hypertension goal BP (blood pressure) < 140/90       * lisinopril 10 MG tablet    PRINIVIL/ZESTRIL    90 tablet    Take 1 tablet (10 mg) by mouth daily    Hypertension goal BP (blood pressure) < 140/90       methotrexate sodium 2.5 MG Tabs     24 tablet    Take 15 mg by mouth once a week . Take all 6 tablets on the same day of each week.  Do not take with amoxicillin.    Rheumatoid arthritis of multiple sites with negative rheumatoid factor (H)       metoprolol tartrate 25 MG tablet    LOPRESSOR    180 tablet    TAKE 1 TABLET(25 MG) BY MOUTH TWICE DAILY    Hypertension goal BP (blood pressure) < 140/90       OMEGA-3 FISH OIL PO      Take 2 g by mouth daily        predniSONE 2.5 MG tablet    DELTASONE    134 tablet    Prednisone 5mg daily [id number], then 2.5mg daily [id number], then stop.    Rheumatoid arthritis of multiple sites with negative rheumatoid factor (H)       sulfaSALAzine  MG EC tablet    AZULFIDINE EN    120 tablet    Take 1 tablet (500 mg) by mouth 2 times daily    Rheumatoid arthritis of multiple sites with negative rheumatoid factor (H)       * Notice:  This list has 2 medication(s) that are the same as other medications prescribed for you. Read the directions carefully, and ask your doctor or other care provider to review them with you.

## 2018-05-10 NOTE — PROGRESS NOTES
Rheumatology Clinic Visit      Roxanna Stark MRN# [id number]   YOB: 1927 Age: 89 year old      Date of visit: 5/10/18   PCP: Dr. Letha Grissom  Cardiology: Dr. Boston Navarro     Chief Complaint   Patient presents with:  Arthritis: RA, patient states she is ok.      Assessment and Plan     1. 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  She was treated with prednisone monotherapy for several years, being able to taper off without recurrence of symptoms. She then developed worsening symptoms in her hands and was diagnosed with rheumatoid arthritis. Initially, she was resistant to DMARD therapy.  She then was started on MTX that was effective; she reduced the dose with worsening symptoms. Currently on MTX 15mg wkly and SSZ 1000mg BID.  SSZ initiation correlates with reduced Hgb and will therefore reduce the dose of SSZ.  Synovitis resolved with SSZ 1000mg BID when also on MTX 15mg wkly.  Also advised increasing folic acid to 2mg daily but she wants to see how the SSZ dose reduction does before increasing folic acid.    - Continue methotrexate 15 mg once weekly   - Continue folic acid 1mg daily  - Reduce sulfasalazine to 500 mg twice daily   - Labs in 6 weeks: CBC  - Labs 2-3 days prior to the next rheumatology clinic visit: CBC, Creatinine, Hepatic Panel, ESR, CRP     2. Giant Cell Arteritis History?: 12/26/2005 Left TA biopsy negative per [facility] record review.  No symptoms of GCA at this time.     3. Right shoulder rotator cuff tear and pain: Previously evaluated by orthopedic surgery and her pain resolved for approximately one year after having a steroid injection in March 2015. She does not want to have surgical correction of her shoulder. Repeat steroid injections have been helpful; not needed today.  Physical therapy was effective and she is doing exercises at home.     4.  History of basal cell carcinoma: Following with dermatology     5. Bone Health: Managed by PCP already.    Ms. Stark verbalized agreement with and understanding of the rational for the diagnosis and treatment plan.  All questions were answered to best of my ability and the patient's satisfaction. Ms. Stark was advised to contact the clinic with any questions that may arise after the clinic visit.      Thank you for involving me in the care of the patient    Return to clinic: 3 months      HPI   Roxanna Stark is a 89 year old female with medical history significant for basal cell carcinoma, hypertension, aortic stenosis, right rotator cuff tear (previously evaluated by Dr. Bingham, orthopedic surgery, on 3/20/2015 where at that time Ms. Stark was not interested in surgical correction; she received an intra-articular steroid injection at that time that was effective for ~1year), temporal arteritis?, and seronegative erosive rheumatoid arthritis.     Today, she reports that she is doing well.  Taking methotrexate 15 mg once weekly and sulfasalazine 1000 mg twice daily.  No joint pain.  Morning stiffness for no more than 10 minutes.  Arthritis does not prevent her daily activities.  No difficulty standing up from a low seated position or raising her arms above her head.  No headache.  No vision change.  No vision loss.  No scalp tenderness or jaw claudication.     Denies fevers, chills, nausea, vomiting, constipation, diarrhea. No abdominal pain. No chest pain/pressure, palpitations, or shortness of breath. No oral or nasal sores. No neck pain. No rash. Swelling in her bilateral feet.       Tobacco: None  EtOH: No more than 1 drink per week  Drugs: None  Occupation: Used to work for the [employer]; now retired    ROS   GEN: No fevers, chills, night sweats, or weight change  SKIN: No itching, rashes, sores  HEENT: No oral or nasal ulcers.  CV: No chest pain, pressure, palpitations, or dyspnea on  exertion.  PULM: No SOB, wheeze, cough.  GI: No nausea, vomiting, constipation, diarrhea. No blood in stool. No abdominal pain.  : No blood in urine.  MSK: See HPI.  NEURO: No numbness, tingling, or weakness.  ENDO: No heat/cold intolerance.  EXT: See HPI    Active Problem List     Patient Active Problem List   Diagnosis     Polymyalgia rheumatica (H)     History of basal cell carcinoma     CARDIOVASCULAR SCREENING; LDL GOAL LESS THAN 130     Advanced directives, counseling/discussion     Hypertension goal BP (blood pressure) < 140/90     Hip pain     Left atrial enlargement     Aortic stenosis     Status post coronary angiogram     Aortic valve stenosis     Aortic valve replaced     Rheumatoid arthritis of multiple sites with negative rheumatoid factor (H)     Past Medical History     Past Medical History:   Diagnosis Date     Actinic keratosis      Aortic stenosis 2014     Basal cell cancer 7/2014    left eye medial canthus      Basal cell carcinoma 9/30/08    left cheek     HTN (hypertension)      melanoma in situ 9/30/2008    LEFT ARM     Melanoma in situ (H) 9/30/08    left arm     Polymyalgia rheumatica (H) 11/99     Temporal arteritis (H) 11/99     Past Surgical History     Past Surgical History:   Procedure Laterality Date     C SKIN TISSUE PROCEDURE UNLISTED  11/3/08    mmis skin cancer excision     CATARACT IOL, RT/LT  5/09    bilateral     COLONOSCOPY  2002     REPLACE VALVE AORTIC N/A 4/25/2016    Procedure: REPLACE VALVE AORTIC;  Surgeon: Sudeep Tsai MD;  Location: [facility] OR     Allergy   No Known Allergies     Current Medication List     Current Outpatient Prescriptions   Medication Sig     aspirin  MG EC tablet Take 1 tablet (325 mg) by mouth daily     econazole nitrate 1 % cream Apply to red rash of legs and feet twice a day till rash is gone     folic acid (FOLVITE) 1 MG tablet Take 1 tablet (1 mg) by mouth daily     furosemide (LASIX) 20 MG tablet TAKE 1 TABLET BY MOUTH EVERY DAY  IF 2 TO 3 POUNDS WEIGHT GAIN OVER 2 DAY PERIOD     ICAPS PO 2 tablets daily     lisinopril (PRINIVIL/ZESTRIL) 10 MG tablet Take 1 tablet (10 mg) by mouth daily     methotrexate sodium 2.5 MG TABS Take 15 mg by mouth once a week . Take all 6 tablets on the same day of each week.  Do not take with amoxicillin.     metoprolol (LOPRESSOR) 25 MG tablet TAKE 1 TABLET(25 MG) BY MOUTH TWICE DAILY     Omega-3 Fatty Acids (OMEGA-3 FISH OIL PO) Take 2 g by mouth daily     sulfaSALAzine ER (AZULFIDINE EN) 500 MG EC tablet 500mg BID for 7 days, then increase to 1000mg BID and continue 1000mg BID thereafter.     amoxicillin (AMOXIL) 500 MG capsule TAKE FOUR CAPSULES BY MOUTH 1 HOUR BEFORE DENTAL APPOINTMENT     Blood Pressure Monitor KIT 1 Units daily     calcium-vitamin D 500-125 MG-UNIT TABS      lisinopril (PRINIVIL,ZESTRIL) 5 MG tablet Take 1 tablet (5 mg) by mouth daily (Patient not taking: Reported on 5/10/2018)     predniSONE (DELTASONE) 2.5 MG tablet Prednisone 5mg daily [id number], then 2.5mg daily [id number], then stop. (Patient not taking: Reported on 5/10/2018)     No current facility-administered medications for this visit.        Social History   See HPI    Family History     Family History   Problem Relation Age of Onset     Breast Cancer Sister      Arthritis Sister      CANCER Sister      breast     Thyroid Disease Sister      CANCER Sister      colon     Arthritis Sister      Arthritis Mother      Hypertension Father      Cancer - colorectal Father      Prostate Cancer Father      Arthritis Father      HEART DISEASE Father      Lipids Father      Arthritis Sister      Asthma Daughter      Asthma Daughter      CANCER Daughter 58     lung     CANCER Other 81     pancreatic      Physical Exam     Temp Readings from Last 3 Encounters:   02/15/18 97.5  F (36.4  C) (Oral)   01/31/18 97.5  F (36.4  C) (Oral)   08/02/17 96.9  F (36.1  C) (Oral)     BP Readings from Last 5 Encounters:   05/10/18 139/69   02/15/18 128/42  "  01/31/18 160/68   08/04/17 176/80   08/02/17 146/80     Pulse Readings from Last 1 Encounters:   05/10/18 70     Resp Readings from Last 1 Encounters:   05/10/18 14     Estimated body mass index is 21.92 kg/(m^2) as calculated from the following:    Height as of this encounter: 1.549 m (5' 1\").    Weight as of this encounter: 52.6 kg (116 lb).    GEN: NAD  HEENT: MMM.  Anicteric, noninjected sclera  CV: S1, S2. RRR. No m/r/g.  PULM: CTA bilaterally. No w/c.  MSK: Bilateral second-third MCPs appear swollen by visual inspection but the joint spaces are well palpated and they are nontender to palpation.  PIPs, wrists, elbows, shoulders, knees, ankles, and MTPs without swelling or tenderness to palpation.  Hips nontender to palpation.  NEURO: Able to stand up unassisted from a chair without difficulty. Able to raise her arms above her head without difficulty  SKIN: No rash.    EXT: Nonpitting edema of the bilateral lower extremities  PSYCH: Alert. Appropriate.    Labs / Imaging (select studies)   RF/CCP  Recent Labs   Lab Test  08/11/16   1124  08/04/16   1222  02/18/16   1543   CCPIGG  1   --    --    RHF   --   <20  <20     CBC  Recent Labs   Lab Test  05/07/18   0800  04/23/18   0824  03/26/18   0834   WBC  9.3  7.4  6.5   RBC  3.23*  3.33*  3.59*   HGB  9.5*  9.9*  10.7*   HCT  30.9*  31.2*  33.9*   MCV  96  94  94   RDW  21.4*  20.7*  20.3*   PLT  323  294  301   MCH  29.4  29.7  29.8   MCHC  30.7*  31.7  31.6   NEUTROPHIL  66.3  61.2  60.7   LYMPH  22.5  24.0  30.6   MONOCYTE  6.1  10.0  4.8   EOSINOPHIL  3.8  4.1  3.1   BASOPHIL  1.3  0.7  0.8   ANEU  6.1  4.5  4.0   ALYM  2.1  1.8  2.0   IGNACIO  0.6  0.7  0.3   AEOS  0.4  0.3  0.2   ABAS  0.1  0.1  0.1     CMP  Recent Labs   Lab Test  05/07/18   0800  04/23/18   0824  03/26/18   0834   12/14/16   0849   07/12/16   1035  06/24/16   0852   NA   --    --    --    --   140   --   138  140   POTASSIUM   --    --    --    --   4.5   --   4.6  4.3   CHLORIDE   --    -- "    --    --   105   --   102  105   CO2   --    --    --    --   26   --   28  27   ANIONGAP   --    --    --    --   9   --   8  8   GLC   --    --    --    --   172*   --   117*  95   BUN   --    --    --    --   28   --   17  25   CR  1.20*  1.08*  1.46*   < >  1.19*   < >  1.04  1.18*   GFRESTIMATED  42*  48*  34*   < >  43*   < >  50*  43*   GFRESTBLACK  51*  58*  41*   < >  52*   < >  60*  52*   ROEL   --    --    --    --   8.8   --   9.4  9.0   BILITOTAL  0.3  0.3  0.4   < >   --    < >   --    --    ALBUMIN  3.4  3.5  3.6   < >   --    < >   --    --    PROTTOTAL  6.8  7.0  7.3   < >   --    < >   --    --    ALKPHOS  67  66  59   < >   --    < >   --    --    AST  33  30  31   < >   --    < >   --    --    ALT  29  30  44   < >   --    < >   --    --     < > = values in this interval not displayed.     Calcium/VitaminD  Recent Labs   Lab Test  12/14/16   0849  07/12/16   1035  06/24/16   0852   ROEL  8.8  9.4  9.0     ESR/CRP  Recent Labs   Lab Test  04/23/18   0824  01/29/18   0903  11/01/17   0945   SED  53*  68*  44*   CRP  4.8  31.8*  23.8*     Hepatitis B  Recent Labs   Lab Test  11/10/16   0909   HBCAB  Nonreactive   HEPBANG  Nonreactive     Hepatitis C  Recent Labs   Lab Test  11/10/16   0909   HCVAB  Nonreactive   Assay performance characteristics have not been established for newborns,   infants, and children       HIV Screening  Recent Labs   Lab Test  11/10/16   0909   HIAGAB  Nonreactive   HIV-1 p24 Ag & HIV-1/HIV-2 Ab Not Detected       Immunization History     Immunization History   Administered Date(s) Administered     Influenza (H1N1) 10/20/2016     Influenza (High Dose) 3 valent vaccine 10/03/2017     Influenza (IIV3) PF 10/04/2005, 10/20/2010, 11/09/2011, 09/22/2012, 09/16/2013, 10/15/2014     Pneumo Conj 13-V (2010&after) 03/17/2015     Pneumococcal 23 valent 11/03/2000, 12/13/2010     TD (ADULT, 7+) 01/12/2004     TDAP Vaccine (Adacel) 05/14/2013     Zoster vaccine, live 12/15/2006           Chart documentation done in part with Dragon Voice recognition Software. Although reviewed after completion, some word and grammatical error may remain.    Jamie Johnson MD

## 2018-05-10 NOTE — PATIENT INSTRUCTIONS
Rheumatology    Dr. Jamie Johnson         [facility]   (Monday)  [address]  [place] [address]       [facility]   (Tuesday)  [address]  [place] [address]    [facility]   (Wed., Thurs., and Friday)  [address] [place] [address]    Phone number: [phone]  Thank you for choosing [facility].  Twila Atkins CMA

## 2018-06-19 NOTE — PROGRESS NOTES
SUBJECTIVE:   Roxanna Stark is a 89 year old female who presents to clinic today for the following health issues:      ENT Symptoms             Symptoms: cc Present Absent Comment   Fever/Chills   x Fever present at onset symptoms for 1 week but not currenlty   Fatigue  x     Muscle Aches   x    Eye Irritation   x    Sneezing  x     Nasal Praveen/Drg  x     Sinus Pressure/Pain   x    Loss of smell   x    Dental pain   x    Sore Throat   x    Swollen Glands   x    Ear Pain/Fullness   x    Cough  x     Wheeze  x     Chest Pain   x    Shortness of breath  x  Slightly when walking   Rash   x    Other   x      Symptom duration:  3 months    Symptom severity:  mild    Treatments tried:  none   Contacts:  none              Problem list and histories reviewed & adjusted, as indicated.  Additional history: as documented    Patient Active Problem List   Diagnosis     Polymyalgia rheumatica (H)     History of basal cell carcinoma     CARDIOVASCULAR SCREENING; LDL GOAL LESS THAN 130     Advanced directives, counseling/discussion     Hypertension goal BP (blood pressure) < 140/90     Hip pain     Left atrial enlargement     Aortic stenosis     Status post coronary angiogram     Aortic valve stenosis     Aortic valve replaced     Rheumatoid arthritis of multiple sites with negative rheumatoid factor (H)     Past Surgical History:   Procedure Laterality Date     C SKIN TISSUE PROCEDURE UNLISTED  11/3/08    mmis skin cancer excision     CATARACT IOL, RT/LT  5/09    bilateral     COLONOSCOPY  2002     REPLACE VALVE AORTIC N/A 4/25/2016    Procedure: REPLACE VALVE AORTIC;  Surgeon: Sudeep Tsai MD;  Location:  OR       Social History   Substance Use Topics     Smoking status: Never Smoker     Smokeless tobacco: Never Used     Alcohol use Yes     Family History   Problem Relation Age of Onset     Breast Cancer Sister      Arthritis Sister      Cancer Sister      breast     Thyroid Disease Sister      Cancer Sister       colon     Arthritis Sister      Arthritis Mother      Hypertension Father      Cancer - colorectal Father      Prostate Cancer Father      Arthritis Father      HEART DISEASE Father      Lipids Father      Arthritis Sister      Asthma Daughter      Asthma Daughter      Cancer Daughter 58     lung     Cancer Other 81     pancreatic          Current Outpatient Prescriptions   Medication Sig Dispense Refill     amoxicillin (AMOXIL) 500 MG capsule TAKE FOUR CAPSULES BY MOUTH 1 HOUR BEFORE DENTAL APPOINTMENT 4 capsule 3     aspirin  MG EC tablet Take 1 tablet (325 mg) by mouth daily 90 tablet 3     Blood Pressure Monitor KIT 1 Units daily 1 kit 0     calcium-vitamin D 500-125 MG-UNIT TABS        folic acid (FOLVITE) 1 MG tablet Take 1 tablet (1 mg) by mouth daily 30 tablet 6     furosemide (LASIX) 20 MG tablet TAKE 1 TABLET BY MOUTH EVERY DAY IF 2 TO 3 POUNDS WEIGHT GAIN OVER 2 DAY PERIOD 90 tablet 3     ICAPS PO 2 tablets daily       lisinopril (PRINIVIL/ZESTRIL) 10 MG tablet Take 1 tablet (10 mg) by mouth daily 90 tablet 3     methotrexate sodium 2.5 MG TABS Take 15 mg by mouth once a week . Take all 6 tablets on the same day of each week.  Do not take with amoxicillin. 24 tablet 3     metoprolol (LOPRESSOR) 25 MG tablet TAKE 1 TABLET(25 MG) BY MOUTH TWICE DAILY 180 tablet 3     Omega-3 Fatty Acids (OMEGA-3 FISH OIL PO) Take 1 tablet twice daily.       sulfaSALAzine ER (AZULFIDINE EN) 500 MG EC tablet Take 1 tablet (500 mg) by mouth 2 times daily 120 tablet 3     econazole nitrate 1 % cream Apply to red rash of legs and feet twice a day till rash is gone (Patient not taking: Reported on 6/20/2018) 85 g 3     lisinopril (PRINIVIL,ZESTRIL) 5 MG tablet Take 1 tablet (5 mg) by mouth daily (Patient not taking: Reported on 5/10/2018) 90 tablet 3     predniSONE (DELTASONE) 2.5 MG tablet Prednisone 5mg daily [id number], then 2.5mg daily [id number], then stop. (Patient not taking: Reported on 5/10/2018) 134 tablet 0      "[DISCONTINUED] furosemide (LASIX) 20 MG tablet TAKE 1 TABLET BY MOUTH EVERY DAY IF 2 TO 3 POUNDS WEIGHT GAIN OVER 2 DAY PERIOD 90 tablet 3     [DISCONTINUED] lisinopril (PRINIVIL/ZESTRIL) 10 MG tablet Take 1 tablet (10 mg) by mouth daily 90 tablet 3     No Known Allergies  BP Readings from Last 3 Encounters:   06/20/18 120/58   05/10/18 139/69   02/15/18 128/42    Wt Readings from Last 3 Encounters:   06/20/18 116 lb (52.6 kg)   05/10/18 116 lb (52.6 kg)   02/15/18 113 lb (51.3 kg)                  Labs reviewed in EPIC    Reviewed and updated as needed this visit by clinical staff       Reviewed and updated as needed this visit by Provider         ROS:  This 89 year old female is here today because she has had a cough for over 2 months. Started with a head cold and slight fever end of March. Was close to her chair or bed for a week. Since then, it has just been a lingering cough that is irritating to her and family members. No fevers. Weight is stable. Will see cardiologist next week in follow-up of her aortic valve replacement. Has no swelling of her legs. No shortness of breath. All other review of systems are negative  Personal, family, and social history reviewed with patient and revised.         OBJECTIVE:     /58  Pulse 67  Temp 97.2  F (36.2  C) (Oral)  Resp 18  Ht 5' 1\" (1.549 m)  Wt 116 lb (52.6 kg)  SpO2 100%  BMI 21.92 kg/m2  Body mass index is 21.92 kg/(m^2).  GENERAL: healthy, alert and no distress  EYES: Eyes grossly normal to inspection, PERRL and conjunctivae and sclerae normal  HENT: ear canals and TM's normal, nose and mouth without ulcers or lesions  NECK: no adenopathy, no asymmetry, masses, or scars and thyroid normal to palpation  RESP: lungs clear to auscultation - no rales, rhonchi or wheezes. She does have a frequent dry cough   CV: regular rate and rhythm, normal S1 S2, no S3 or S4, no murmur, click or rub, no peripheral edema and peripheral pulses strong  ABDOMEN: soft, " slim  MS: no gross musculoskeletal defects noted, no edema    Diagnostic Test Results:  none     ASSESSMENT/PLAN:              1. Cough  X-ray appears normal. I believe her cough is an ACE inhibitor induced cough   - XR Chest 2 Views    2. Hypertension goal BP (blood pressure) < 140/90  Good control, but will stop lisinopril and change to losartan.   - furosemide (LASIX) 20 MG tablet; TAKE 1 TABLET BY MOUTH EVERY DAY IF 2 TO 3 POUNDS WEIGHT GAIN OVER 2 DAY PERIOD  Dispense: 90 tablet; Refill: 3  - losartan (COZAAR) 25 MG tablet; Take 1 tablet (25 mg) by mouth daily  Dispense: 90 tablet; Refill: 3    Return to clinic if no improvement     YISSEL LIGHT MD  [facility]

## 2018-06-20 ENCOUNTER — OFFICE VISIT (OUTPATIENT)
Dept: FAMILY MEDICINE | Facility: CLINIC | Age: 83
End: 2018-06-20
Payer: MEDICARE

## 2018-06-20 ENCOUNTER — RADIANT APPOINTMENT (OUTPATIENT)
Dept: GENERAL RADIOLOGY | Facility: CLINIC | Age: 83
End: 2018-06-20
Attending: FAMILY MEDICINE
Payer: MEDICARE

## 2018-06-20 VITALS
TEMPERATURE: 97.2 F | DIASTOLIC BLOOD PRESSURE: 58 MMHG | HEART RATE: 67 BPM | HEIGHT: 61 IN | WEIGHT: 116 LBS | OXYGEN SATURATION: 100 % | RESPIRATION RATE: 18 BRPM | BODY MASS INDEX: 21.9 KG/M2 | SYSTOLIC BLOOD PRESSURE: 120 MMHG

## 2018-06-20 DIAGNOSIS — I10 HYPERTENSION GOAL BP (BLOOD PRESSURE) < 140/90: ICD-10-CM

## 2018-06-20 DIAGNOSIS — R05.9 COUGH: Primary | ICD-10-CM

## 2018-06-20 PROCEDURE — 99213 OFFICE O/P EST LOW 20 MIN: CPT | Performed by: FAMILY MEDICINE

## 2018-06-20 PROCEDURE — 71046 X-RAY EXAM CHEST 2 VIEWS: CPT

## 2018-06-20 RX ORDER — LOSARTAN POTASSIUM 25 MG/1
25 TABLET ORAL DAILY
Qty: 90 TABLET | Refills: 3 | Status: SHIPPED | OUTPATIENT
Start: 2018-06-20 | End: 2019-06-13

## 2018-06-20 RX ORDER — FUROSEMIDE 20 MG
TABLET ORAL
Qty: 90 TABLET | Refills: 3 | Status: SHIPPED | OUTPATIENT
Start: 2018-06-20 | End: 2019-06-21

## 2018-06-20 RX ORDER — LISINOPRIL 10 MG/1
10 TABLET ORAL DAILY
Qty: 90 TABLET | Refills: 3 | Status: SHIPPED | OUTPATIENT
Start: 2018-06-20 | End: 2018-06-20 | Stop reason: SINTOL

## 2018-06-20 ASSESSMENT — ANXIETY QUESTIONNAIRES
5. BEING SO RESTLESS THAT IT IS HARD TO SIT STILL: NOT AT ALL
7. FEELING AFRAID AS IF SOMETHING AWFUL MIGHT HAPPEN: NOT AT ALL
2. NOT BEING ABLE TO STOP OR CONTROL WORRYING: NOT AT ALL
3. WORRYING TOO MUCH ABOUT DIFFERENT THINGS: NOT AT ALL
GAD7 TOTAL SCORE: 0
6. BECOMING EASILY ANNOYED OR IRRITABLE: NOT AT ALL
1. FEELING NERVOUS, ANXIOUS, OR ON EDGE: NOT AT ALL
IF YOU CHECKED OFF ANY PROBLEMS ON THIS QUESTIONNAIRE, HOW DIFFICULT HAVE THESE PROBLEMS MADE IT FOR YOU TO DO YOUR WORK, TAKE CARE OF THINGS AT HOME, OR GET ALONG WITH OTHER PEOPLE: NOT DIFFICULT AT ALL

## 2018-06-20 ASSESSMENT — PATIENT HEALTH QUESTIONNAIRE - PHQ9: 5. POOR APPETITE OR OVEREATING: NOT AT ALL

## 2018-06-20 NOTE — LETTER
[address] [place] [address]    June 20, 2018    Roxanna Stark  [facility] [facility]   [place] [address]          Dear Roxanna,    The radiologist agreed with me that your chest xray was normal. Please let me know if your cough doesn't resolve after stopping lisinopril    Enclosed is a copy of your results.     Results for orders placed or performed in visit on 06/20/18   XR Chest 2 Views    Narrative    XR CHEST 2 VW 6/20/2018 12:32 PM    COMPARISON: 5/1/2016    HISTORY: Cough.      Impression    IMPRESSION: Median sternotomy wires appear intact. No focal airspace  disease, pleural effusion or pneumothorax.    RAMILA HERNANDEZ MD       If you have any questions or concerns, please call myself or my nurse at [phone].      Sincerely,      Letha Grissom MD/johnny

## 2018-06-20 NOTE — PATIENT INSTRUCTIONS
[facility]    If you have any questions regarding to your visit please contact your care team:       Team Purple:   Clinic Hours Telephone Number   Dr. Letha Arrieta   7am-7pm  Monday - Thursday   7am-5pm  Fridays  [phone]  (Appointment scheduling available 24/7)    Questions about your recent visit?   Team Line:  [phone]   Urgent Care - [place] and [facility] - 11am-9pm Monday-Friday Saturday-Sunday- 9am-5pm   [place] - 5pm-9pm Monday-Friday Saturday-Sunday- 9am-5pm  [phone] - [place]  [phone] [facility]       What options do I have for a visit other than an office visit? We offer electronic visits (e-visits) and telephone visits, when medically appropriate.  Please check with your medical insurance to see if these types of visits are covered, as you will be responsible for any charges that are not paid by your insurance.      You can use [employer] (secure electronic communication) to access to your chart, send your primary care provider a message, or make an appointment. Ask a team member how to get started.     For a price quote for your services, please call our Consumer Price Line at [phone] or our Imaging Cost estimation line at [phone] (for imaging tests).

## 2018-06-20 NOTE — MR AVS SNAPSHOT
After Visit Summary   6/20/2018    Roxanna Stark    MRN: [id number]           Patient Information     Date Of Birth          5/20/1927        Visit Information        Provider Department      6/20/2018 12:00 PM Letha Grissom MD [facility]        Today's Diagnoses     Cough    -  1    Hypertension goal BP (blood pressure) < 140/90          Care Instructions    [facility]    If you have any questions regarding to your visit please contact your care team:       Team Purple:   Clinic Hours Telephone Number   Dr. Letha Arrieta   7am-7pm  Monday - Thursday   7am-5pm  Fridays  [phone]  (Appointment scheduling available 24/7)    Questions about your recent visit?   Team Line:  [phone]   Urgent Care - [place] and [facility] - 11am-9pm Monday-Friday Saturday-Sunday- 9am-5pm   [place] - 5pm-9pm Monday-Friday Saturday-Sunday- 9am-5pm  [phone] - [place]  [phone] [facility]       What options do I have for a visit other than an office visit? We offer electronic visits (e-visits) and telephone visits, when medically appropriate.  Please check with your medical insurance to see if these types of visits are covered, as you will be responsible for any charges that are not paid by your insurance.      You can use [employer] (secure electronic communication) to access to your chart, send your primary care provider a message, or make an appointment. Ask a team member how to get started.     For a price quote for your services, please call our Consumer Price Line at [phone] or our Imaging Cost estimation line at [phone] (for imaging tests).              Follow-ups after your visit        Your next 10 appointments already scheduled     Jun 21, 2018  9:00 AM CDT   LAB with [facility]   [facility] ([facility])    [address] [address] [phone]            Please do not eat 10-12 hours before your appointment if you are coming in fasting for labs on lipids, cholesterol, or glucose (sugar). This does not apply to pregnant women. Water, hot tea and black coffee (with nothing added) are okay. Do not drink other fluids, diet soda or chew gum.            Jun 29, 2018 12:40 PM CDT   Return Visit with Maynor Mary MD   [facility] ([facility])    [address]  [facility] [address]   [phone]            Sep 04, 2018  9:00 AM CDT   LAB with FZ LAB   [facility] ([facility])    [address] [address]   [phone]           Please do not eat 10-12 hours before your appointment if you are coming in fasting for labs on lipids, cholesterol, or glucose (sugar). This does not apply to pregnant women. Water, hot tea and black coffee (with nothing added) are okay. Do not drink other fluids, diet soda or chew gum.            Sep 06, 2018  9:00 AM CDT   Return Visit with Jamie Johnson MD   [facility] ([facility])    [address] [address]   [phone]              Who to contact     If you have questions or need follow up information about today's clinic visit or your schedule please contact [facility] directly at [phone].  Normal or non-critical lab and imaging results will be communicated to you by [employer], letter or phone within 4 business days after the clinic has received the results. If you do not hear from us within 7 days, please contact the clinic through [employer] or phone. If you have a critical or abnormal lab result, we will notify you by phone as soon as possible.  Submit refill requests through [employer] or call your pharmacy and they will forward the refill request to us. Please allow 3 business days for your refill to be completed.          Additional Information  "About Your Visit        Care EveryWhere ID     This is your Care EveryWhere ID. This could be used by other organizations to access your [facility] medical records  [id number]        Your Vitals Were     Pulse Temperature Respirations Height Pulse Oximetry BMI (Body Mass Index)    67 97.2  F (36.2  C) (Oral) 18 5' 1\" (1.549 m) 100% 21.92 kg/m2       Blood Pressure from Last 3 Encounters:   06/20/18 120/58   05/10/18 139/69   02/15/18 128/42    Weight from Last 3 Encounters:   06/20/18 116 lb (52.6 kg)   05/10/18 116 lb (52.6 kg)   02/15/18 113 lb (51.3 kg)              We Performed the Following     XR Chest 2 Views          Today's Medication Changes          These changes are accurate as of 6/20/18 12:36 PM.  If you have any questions, ask your nurse or doctor.               Start taking these medicines.        Dose/Directions    losartan 25 MG tablet   Commonly known as:  COZAAR   Used for:  Hypertension goal BP (blood pressure) < 140/90   Started by:  Letha Grissom MD        Dose:  25 mg   Take 1 tablet (25 mg) by mouth daily   Quantity:  90 tablet   Refills:  3         These medicines have changed or have updated prescriptions.        Dose/Directions    lisinopril 5 MG tablet   Commonly known as:  PRINIVIL/ZESTRIL   This may have changed:  Another medication with the same name was removed. Continue taking this medication, and follow the directions you see here.   Used for:  S/P AVR (aortic valve replacement), Hypertension goal BP (blood pressure) < 140/90   Changed by:  Letha Grissom MD        Dose:  5 mg   Take 1 tablet (5 mg) by mouth daily   Quantity:  90 tablet   Refills:  3            Where to get your medicines      These medications were sent to [id number] - [place], [place] - [address] AT [facility] [address], [facility] [address]     Phone:  [phone]     furosemide 20 MG tablet    losartan 25 MG tablet                Primary Care Provider " Office Phone # Fax #    Letha Grissom [phone] [address] [facility] [address]        Equal Access to Services     ALTA HEIN : Hadii carol ku blazeo Soomaali, waaxda luqadaha, qaybta kaalmada adeross, griffin yangbrandon bonner. So [facility] [phone].    ATENCIÓN: Si habla español, tiene a goldman disposición servicios gratuitos de asistencia lingüística. Llame al [phone].    We comply with applicable federal civil rights laws and [place] laws. We do not discriminate on the basis of race, color, national origin, age, disability, sex, sexual orientation, or gender identity.            Thank you!     Thank you for choosing [facility]  for your care. Our goal is always to provide you with excellent care. Hearing back from our patients is one way we can continue to improve our services. Please take a few minutes to complete the written survey that you may receive in the mail after your visit with us. Thank you!             Your Updated Medication List - Protect others around you: Learn how to safely use, store and throw away your medicines at www.disposemymeds.org.          This list is accurate as of 6/20/18 12:36 PM.  Always use your most recent med list.                   Brand Name Dispense Instructions for use Diagnosis    amoxicillin 500 MG capsule    AMOXIL    4 capsule    TAKE FOUR CAPSULES BY MOUTH 1 HOUR BEFORE DENTAL APPOINTMENT    SBE (subacute bacterial endocarditis) prophylaxis candidate       aspirin 325 MG EC tablet     90 tablet    Take 1 tablet (325 mg) by mouth daily    S/P AVR (aortic valve replacement)       Blood Pressure Monitor Kit     1 kit    1 Units daily    Hypertension goal BP (blood pressure) < 140/90       calcium-vitamin D 500-125 MG-UNIT Tabs           econazole nitrate 1 % cream     85 g    Apply to red rash of legs and feet twice a day till rash is gone    Tinea corporis, Tinea pedis of both feet        folic acid 1 MG tablet    FOLVITE    30 tablet    Take 1 tablet (1 mg) by mouth daily    Rheumatoid arthritis of multiple sites with negative rheumatoid factor (H)       furosemide 20 MG tablet    LASIX    90 tablet    TAKE 1 TABLET BY MOUTH EVERY DAY IF 2 TO 3 POUNDS WEIGHT GAIN OVER 2 DAY PERIOD    Hypertension goal BP (blood pressure) < 140/90       ICAPS PO      2 tablets daily        lisinopril 5 MG tablet    PRINIVIL/ZESTRIL    90 tablet    Take 1 tablet (5 mg) by mouth daily    S/P AVR (aortic valve replacement), Hypertension goal BP (blood pressure) < 140/90       losartan 25 MG tablet    COZAAR    90 tablet    Take 1 tablet (25 mg) by mouth daily    Hypertension goal BP (blood pressure) < 140/90       methotrexate sodium 2.5 MG Tabs     24 tablet    Take 15 mg by mouth once a week . Take all 6 tablets on the same day of each week.  Do not take with amoxicillin.    Rheumatoid arthritis of multiple sites with negative rheumatoid factor (H)       metoprolol tartrate 25 MG tablet    LOPRESSOR    180 tablet    TAKE 1 TABLET(25 MG) BY MOUTH TWICE DAILY    Hypertension goal BP (blood pressure) < 140/90       OMEGA-3 FISH OIL PO      Take 1 tablet twice daily.        predniSONE 2.5 MG tablet    DELTASONE    134 tablet    Prednisone 5mg daily [id number], then 2.5mg daily [id number], then stop.    Rheumatoid arthritis of multiple sites with negative rheumatoid factor (H)       sulfaSALAzine  MG EC tablet    AZULFIDINE EN    120 tablet    Take 1 tablet (500 mg) by mouth 2 times daily    Rheumatoid arthritis of multiple sites with negative rheumatoid factor (H)

## 2018-06-21 ASSESSMENT — PATIENT HEALTH QUESTIONNAIRE - PHQ9: SUM OF ALL RESPONSES TO PHQ QUESTIONS 1-9: 5

## 2018-06-21 ASSESSMENT — ANXIETY QUESTIONNAIRES: GAD7 TOTAL SCORE: 0

## 2018-06-29 ENCOUNTER — OFFICE VISIT (OUTPATIENT)
Dept: CARDIOLOGY | Facility: CLINIC | Age: 83
End: 2018-06-29
Payer: MEDICARE

## 2018-06-29 VITALS
DIASTOLIC BLOOD PRESSURE: 71 MMHG | WEIGHT: 114 LBS | HEART RATE: 67 BPM | SYSTOLIC BLOOD PRESSURE: 147 MMHG | OXYGEN SATURATION: 98 % | BODY MASS INDEX: 21.54 KG/M2

## 2018-06-29 DIAGNOSIS — I35.0 NONRHEUMATIC AORTIC VALVE STENOSIS: ICD-10-CM

## 2018-06-29 DIAGNOSIS — Z95.2 AORTIC VALVE REPLACED: Primary | ICD-10-CM

## 2018-06-29 DIAGNOSIS — E78.2 MIXED HYPERLIPIDEMIA: ICD-10-CM

## 2018-06-29 DIAGNOSIS — I10 BENIGN ESSENTIAL HYPERTENSION: ICD-10-CM

## 2018-06-29 PROCEDURE — 99214 OFFICE O/P EST MOD 30 MIN: CPT | Performed by: INTERNAL MEDICINE

## 2018-06-29 NOTE — PATIENT INSTRUCTIONS
Thank you for coming to the [facility] [place]; please note the following instructions:    1. Dr. Maynor Mary would like you to return for a cardiac follow up in 1 year  (June 2019).  We will contact you regarding your appointment when the time draws closer or you may call [phone] to arrange an appointment.  Mean while, if you should have any questions or concerns regarding your heart health, please contact us.  Thank you for choosing [facility] for your care.        If you have any questions regarding your visit please contact your care team:     Cardiology  Telephone Number   Andressa MCGRATH, RN  Leonard LEWIS, RN   Linda HENDRIX, JEF HINKLE, MA  Rich QUIGLEY, LEFTYN   [phone]    *After hours: [phone]   For scheduling appts:     [phone] or    [phone] (select option 1)    *After hours: [phone]     For the Device Clinic (Pacemakers and ICD's)  RN's :  Rosa Knight   During business hours: [phone]    *After business hours:  [phone] (select option 4)      Normal test result notifications will be released via [employer] or mailed within 7 business days.  All other test results, will be communicated via telephone once reviewed by your cardiologist.    If you need a medication refill please contact your pharmacy.  Please allow 3 business days for your refill to be completed.    As always, thank you for trusting us with your health care needs!

## 2018-06-29 NOTE — PROGRESS NOTES
June 29, 2018     Roxanna Stark is an 89 year-old very female patient seen today in follow up, previously seen and followed by Dr. Navarro. She was initially seen for severe aortic stenosis and underwent AVR with a 21 mm tissue valve on 4/21/2016 by Dr. Tsai. Her postoperative course was uneventful.  She remains very active able to walk around as she wishes and denies any associated chest pain worsening shortness of breath no dizziness lightheadedness and no syncope.  Also denies any PND lower extremity edema.  Takes all her medications as prescribed.  No other complaints    PAST MEDICAL HISTORY:  Past Medical History:   Diagnosis Date     Actinic keratosis      Aortic stenosis 2014     Basal cell cancer 7/2014    left eye medial canthus      Basal cell carcinoma 9/30/08    left cheek     HTN (hypertension)      melanoma in situ 9/30/2008    LEFT ARM     Melanoma in situ (H) 9/30/08    left arm     Polymyalgia rheumatica (H) 11/99     Temporal arteritis (H) 11/99     FAMILY HISTORY:  Family History   Problem Relation Age of Onset     Breast Cancer Sister      Arthritis Sister      Cancer Sister      breast     Thyroid Disease Sister      Cancer Sister      colon     Arthritis Sister      Arthritis Mother      Hypertension Father      Cancer - colorectal Father      Prostate Cancer Father      Arthritis Father      HEART DISEASE Father      Lipids Father      Arthritis Sister      Asthma Daughter      Asthma Daughter      Cancer Daughter 58     lung     Cancer Other 81     pancreatic      SOCIAL HISTORY:  Social History     Social History     Marital status:      Spouse name: N/A     Number of children: N/A     Years of education: N/A     Occupational History      Retired     Social History Main Topics     Smoking status: Never Smoker     Smokeless tobacco: Never Used     Alcohol use Yes      Comment: 4 times a year     Drug use: No     Sexual activity: No     Other Topics Concern     Parent/Sibling W/ Cabg, Mi  Or Angioplasty Before 65f 55m? No     Social History Narrative     CURRENT MEDICATIONS:  Current Outpatient Prescriptions   Medication     amoxicillin (AMOXIL) 500 MG capsule     aspirin  MG EC tablet     Blood Pressure Monitor KIT     calcium-vitamin D 500-125 MG-UNIT TABS     folic acid (FOLVITE) 1 MG tablet     furosemide (LASIX) 20 MG tablet     ICAPS PO     losartan (COZAAR) 25 MG tablet     methotrexate sodium 2.5 MG TABS     metoprolol (LOPRESSOR) 25 MG tablet     Omega-3 Fatty Acids (OMEGA-3 FISH OIL PO)     sulfaSALAzine ER (AZULFIDINE EN) 500 MG EC tablet     econazole nitrate 1 % cream     lisinopril (PRINIVIL,ZESTRIL) 5 MG tablet     predniSONE (DELTASONE) 2.5 MG tablet     No current facility-administered medications for this visit.      ROS:   Constitutional: No fever, chills, or sweats. Weight is 114 lbs 0 oz  ENT: No visual disturbance, ear ache, epistaxis, sore throat.   Allergies/Immunologic: Negative.   Respiratory: No cough, hemoptysis.   Cardiovascular: As per HPI.   GI: No nausea, vomiting, hematemesis, melena, or hematochezia.   : No urinary frequency, dysuria, or hematuria.   Integrument: Negative.   Psychiatric: No evidence of major depression  Neuro: No new neurological complaints at this time. Non focal  Endocrinology: Negative.   Musculoskeletal: As per HPI.      EXAM:  /71 (BP Location: Right arm, Patient Position: Chair, Cuff Size: Adult Regular)  Pulse 67  Wt 51.7 kg (114 lb)  SpO2 98%  BMI 21.54 kg/m2  General: appears comfortable, alert and oriented  Head: normocephalic, atraumatic  Eyes: anicteric sclera, EOMI , PERRL  Neck: no adenopathy  Orophyarynx: moist mucosa, no lesions noted  Heart: regular, S1/S2, soft MICHAELA unchanged per prior notes, no gallop. Estimated JVP at 5 cmH2O  Lungs: CTAB, No wheezing.   Abdomen: soft, non-tender, bowel sounds present, no hepatosplenomegaly  Extremities: No LE edema today  Skin: no open lesions noted  Neuro: grossly  non-focal  Psych: no evidence of depression noted     Labs:  Lab Results   Component Value Date    WBC 9.3 2018    HGB 9.5 (L) 2018    HCT 30.9 (L) 2018     2018     2016    POTASSIUM 4.5 2016    CHLORIDE 105 2016    CO2 26 2016    BUN 28 2016    CR 1.20 (H) 2018     (H) 2016    SED 53 (H) 2018    AST 33 2018    ALT 29 2018    ALKPHOS 67 2018    BILITOTAL 0.3 2018    INR 1.41 (H) 2016   EK12.   Sinus Rhythm - occasional ectopic ventricular beat.  ECHOCARDIOGRAMS:   2016  Global and regional left ventricular function is normal with an EF of 60-65%.  There is at least moderate diastolic dysfunction.  Global right ventricular function is normal.  Severe aortic stenosis is present. The mean gradient across the aortic valve is 61 mmHg. The peak aortic velocity is 5.1 m/sec. JAVIER 0.6 cm2.     10/13/2014   1. Normal biventricular systolic function. LVEF estimate 65%.   2. Moderate aortic stenosis (peak velocity: 3.9 m/s, mean gradient: 36 mmHg, calculated valve area: 1.2 cm2).   3. Normal IVC with preserved respiratory variability.   4. Compared to study dated 02/10/14 the aortic valve parameters have slightly worsened.     14  Global and regional left ventricular function is normal with an EF of 55-60%. Global right ventricular function is normal. Trace tricuspid insufficiency is present. Right ventricular systolic pressure is 19mmHg above the right atrial pressure. The inferior vena cava was normal in size with preserved respiratory variability. Small circumferential pericardial effusion is present without any hemodynamic significance. Chamber compression is not present; there is no evidence for tamponade.   Left Ventricle: Global and regional left ventricular function is normal with an EF of 55-60%. Left ventricular size is normal. Left ventricular Doppler filling pattern consistent  with abnormal relaxation.   Right Ventricle: The right ventricle is normal size. Global right ventricular function is normal.   Atria: The right atria appears normal. Moderate left atrial enlargement is present.   Mitral Valve: Mild mitral annular calcification is present. Trace mitral insufficiency is present.   Aortic Valve: Mild aortic valve sclerosis is present. No aortic regurgitation is present.   Tricuspid Valve: The tricuspid valve is normal. Trace tricuspid insufficiency is present. Right ventricular systolic pressure is 19mmHg above the right atrial pressure.   Pulmonic Valve: The pulmonic valve is normal. Trace pulmonic insufficiency is present.   Vessels: The aorta root is normal. The inferior vena cava was normal in size with preserved respiratory variability.   Pericardium: Small circumferential pericardial effusion is present without any hemodynamic significance. Chamber compression is not present; there is no evidence for tamponade.   05/11/12  Left ventricular function, chamber size, wall motion, and wall thickness are normal.The EF is > 65%. Paradoxic low flow aortic stenosis with moderate to severe reduction in valve area. Visually the valve appears moderately calcified with mild-moderate cusp restriction.   Left Ventricle: Left ventricular function, chamber size, wall motion, and wall thickness are normal.The EF is > 65%. Relative wall thickness is increased consistent with concentric remodeling.   Right Ventricle: Right ventricular function, chamber size, wall motion, and thickness are normal.   Atria: Both atria appear normal.   Mitral Valve: Mild to moderate mitral annular calcification is present. Systolic anterior motion without gradient.   Aortic Valve: The aortic valve is tricuspid. Mild aortic valve sclerosis is present.   Tricuspid Valve: The tricuspid valve is normal. Trace tricuspid insufficiency is present.   Pulmonic Valve: The pulmonic valve cannot be assessed.   Vessels: The aorta  root is normal. The pulmonary artery is normal. The inferior vena cava is normal.   Pericardium: No pericardial effusion is present.      08/04/2017  Global and regional left ventricular function is normal with an EF of 60-65%.  Global right ventricular function is normal.  S/p AVR 21mm tissue valve. The mean gradient is 11 mmHg. The effective orifice  area is 1.4 cm^2. EOAI is 0.9 cm/m2, DVI is 0.55 (all normal.)  Mild mitral stenosis is present. The etiology of the mitral stenosis is mitral  annular calcification. Mean gradient is 4mmHg  IVC is normal in size with preserved respiratory variability. PAP is normal.  No pericardial effusion is present.  This study was compared with the study from 2/25/2016. The patient is now s/p AVR for severe aortic stenosis.    ASSESSMENT AND PLAN:   1. Severe AS. S/p AVR with a #21 tissue prosthesis. Excellent post-surgical results.  2. Hypertension: not at target although does have lower numbers at times and also notes better values ar home. Transitioned to losartan due to cough with lisinopril and tolerates this well.  3. RA.  4. Plan:   - Check BP at home weekly. Call the office with an update in 2-3 weeks.  - TTE in 1 year prior to visit  - RTC in 12 months.    I appreciate the opportunity to participate in the care of Roxanna MIGEL Lincoln . Please do not hesitate to contact me with any further questions.    Sincerely,    Maynor Mary MD     [facility] Division of Cardiology

## 2018-06-29 NOTE — NURSING NOTE
"Chief Complaint   Patient presents with     RECHECK     1 year follow up. Hypertension,patient reports no new concerns       Initial /71 (BP Location: Right arm, Patient Position: Chair, Cuff Size: Adult Regular)  Pulse 67  Wt 51.7 kg (114 lb)  SpO2 98%  BMI 21.54 kg/m2 Estimated body mass index is 21.54 kg/(m^2) as calculated from the following:    Height as of 6/20/18: 1.549 m (5' 1\").    Weight as of this encounter: 51.7 kg (114 lb)..  BP completed using cuff size: regular    JEF Latif      "

## 2018-06-29 NOTE — LETTER
6/29/2018      RE: Roxanna Stark  [address]  [place] [address]       Dear Colleague,    Thank you for the opportunity to participate in the care of your patient, Roxanna Stark, at the [facility] HEART AT [facility] Please see a copy of my visit note below.    June 29, 2018     Roxanna Stark is an 89 year-old very female patient seen today in follow up, previously seen and followed by Dr. Navarro. She was initially seen for severe aortic stenosis and underwent AVR with a 21 mm tissue valve on 4/21/2016 by Dr. Tsai. Her postoperative course was uneventful.  She remains very active able to walk around as she wishes and denies any associated chest pain worsening shortness of breath no dizziness lightheadedness and no syncope.  Also denies any PND lower extremity edema.  Takes all her medications as prescribed.  No other complaints    PAST MEDICAL HISTORY:  Past Medical History:   Diagnosis Date     Actinic keratosis      Aortic stenosis 2014     Basal cell cancer 7/2014    left eye medial canthus      Basal cell carcinoma 9/30/08    left cheek     HTN (hypertension)      melanoma in situ 9/30/2008    LEFT ARM     Melanoma in situ (H) 9/30/08    left arm     Polymyalgia rheumatica (H) 11/99     Temporal arteritis (H) 11/99     FAMILY HISTORY:  Family History   Problem Relation Age of Onset     Breast Cancer Sister      Arthritis Sister      Cancer Sister      breast     Thyroid Disease Sister      Cancer Sister      colon     Arthritis Sister      Arthritis Mother      Hypertension Father      Cancer - colorectal Father      Prostate Cancer Father      Arthritis Father      HEART DISEASE Father      Lipids Father      Arthritis Sister      Asthma Daughter      Asthma Daughter      Cancer Daughter 58     lung     Cancer Other 81     pancreatic      SOCIAL HISTORY:  Social History     Social History     Marital status:       Spouse name: N/A     Number of children: N/A     Years of education: N/A     Occupational History      Retired     Social History Main Topics     Smoking status: Never Smoker     Smokeless tobacco: Never Used     Alcohol use Yes      Comment: 4 times a year     Drug use: No     Sexual activity: No     Other Topics Concern     Parent/Sibling W/ Cabg, Mi Or Angioplasty Before 65f 55m? No     Social History Narrative     CURRENT MEDICATIONS:  Current Outpatient Prescriptions   Medication     amoxicillin (AMOXIL) 500 MG capsule     aspirin  MG EC tablet     Blood Pressure Monitor KIT     calcium-vitamin D 500-125 MG-UNIT TABS     folic acid (FOLVITE) 1 MG tablet     furosemide (LASIX) 20 MG tablet     ICAPS PO     losartan (COZAAR) 25 MG tablet     methotrexate sodium 2.5 MG TABS     metoprolol (LOPRESSOR) 25 MG tablet     Omega-3 Fatty Acids (OMEGA-3 FISH OIL PO)     sulfaSALAzine ER (AZULFIDINE EN) 500 MG EC tablet     econazole nitrate 1 % cream     lisinopril (PRINIVIL,ZESTRIL) 5 MG tablet     predniSONE (DELTASONE) 2.5 MG tablet     No current facility-administered medications for this visit.      ROS:   Constitutional: No fever, chills, or sweats. Weight is 114 lbs 0 oz  ENT: No visual disturbance, ear ache, epistaxis, sore throat.   Allergies/Immunologic: Negative.   Respiratory: No cough, hemoptysis.   Cardiovascular: As per HPI.   GI: No nausea, vomiting, hematemesis, melena, or hematochezia.   : No urinary frequency, dysuria, or hematuria.   Integrument: Negative.   Psychiatric: No evidence of major depression  Neuro: No new neurological complaints at this time. Non focal  Endocrinology: Negative.   Musculoskeletal: As per HPI.      EXAM:  /71 (BP Location: Right arm, Patient Position: Chair, Cuff Size: Adult Regular)  Pulse 67  Wt 51.7 kg (114 lb)  SpO2 98%  BMI 21.54 kg/m2  General: appears comfortable, alert and oriented  Head: normocephalic, atraumatic  Eyes: anicteric sclera,  EOMI , PERRL  Neck: no adenopathy  Orophyarynx: moist mucosa, no lesions noted  Heart: regular, S1/S2,  soft MICHEALA unchanged per prior notes, no gallop. Estimated JVP at 5 cmH2O  Lungs: CTAB, No wheezing.   Abdomen: soft, non-tender, bowel sounds present, no hepatosplenomegaly  Extremities: No LE edema today  Skin: no open lesions noted  Neuro: grossly non-focal  Psych: no evidence of depression noted     Labs:  Lab Results   Component Value Date    WBC 9.3 2018    HGB 9.5 (L) 2018    HCT 30.9 (L) 2018     2018     2016    POTASSIUM 4.5 2016    CHLORIDE 105 2016    CO2 26 2016    BUN 28 2016    CR 1.20 (H) 2018     (H) 2016    SED 53 (H) 2018    AST 33 2018    ALT 29 2018    ALKPHOS 67 2018    BILITOTAL 0.3 2018    INR 1.41 (H) 2016   EK12.   Sinus Rhythm - occasional ectopic ventricular beat.  ECHOCARDIOGRAMS:   2016  Global and regional left ventricular function is normal with an EF of 60-65%.  There is at least moderate diastolic dysfunction.  Global right ventricular function is normal.  Severe aortic stenosis is present. The mean gradient across the aortic valve is 61 mmHg. The peak aortic velocity is 5.1 m/sec. JAVIER 0.6 cm2.     10/13/2014   1. Normal biventricular systolic function. LVEF estimate 65%.   2. Moderate aortic stenosis (peak velocity: 3.9 m/s, mean gradient: 36 mmHg, calculated valve area: 1.2 cm2).   3. Normal IVC with preserved respiratory variability.   4. Compared to study dated 02/10/14 the aortic valve parameters have slightly worsened.     14  Global and regional left ventricular function is normal with an EF of 55-60%. Global right ventricular function is normal. Trace tricuspid insufficiency is present. Right ventricular systolic pressure is 19mmHg above the right atrial pressure. The inferior vena cava was normal in size with preserved respiratory  variability. Small circumferential pericardial effusion is present without any hemodynamic significance. Chamber compression is not present; there is no evidence for tamponade.   Left Ventricle: Global and regional left ventricular function is normal with an EF of 55-60%. Left ventricular size is normal. Left ventricular Doppler filling pattern consistent with abnormal relaxation.   Right Ventricle: The right ventricle is normal size. Global right ventricular function is normal.   Atria: The right atria appears normal. Moderate left atrial enlargement is present.   Mitral Valve: Mild mitral annular calcification is present. Trace mitral insufficiency is present.   Aortic Valve: Mild aortic valve sclerosis is present. No aortic regurgitation is present.   Tricuspid Valve: The tricuspid valve is normal. Trace tricuspid insufficiency is present. Right ventricular systolic pressure is 19mmHg above the right atrial pressure.   Pulmonic Valve: The pulmonic valve is normal. Trace pulmonic insufficiency is present.   Vessels: The aorta root is normal. The inferior vena cava was normal in size with preserved respiratory variability.   Pericardium: Small circumferential pericardial effusion is present without any hemodynamic significance. Chamber compression is not present; there is no evidence for tamponade.   05/11/12  Left ventricular function, chamber size, wall motion, and wall thickness are normal.The EF is > 65%. Paradoxic low flow aortic stenosis with moderate to severe reduction in valve area. Visually the valve appears moderately calcified with mild-moderate cusp restriction.   Left Ventricle: Left ventricular function, chamber size, wall motion, and wall thickness are normal.The EF is > 65%. Relative wall thickness is increased consistent with concentric remodeling.   Right Ventricle: Right ventricular function, chamber size, wall motion, and thickness are normal.   Atria: Both atria appear normal.   Mitral Valve:  Mild to moderate mitral annular calcification is present. Systolic anterior motion without gradient.   Aortic Valve: The aortic valve is tricuspid. Mild aortic valve sclerosis is present.   Tricuspid Valve: The tricuspid valve is normal. Trace tricuspid insufficiency is present.   Pulmonic Valve: The pulmonic valve cannot be assessed.   Vessels: The aorta root is normal. The pulmonary artery is normal. The inferior vena cava is normal.   Pericardium: No pericardial effusion is present.      08/04/2017  Global and regional left ventricular function is normal with an EF of 60-65%.  Global right ventricular function is normal.  S/p AVR 21mm tissue valve. The mean gradient is 11 mmHg. The effective orifice  area is 1.4 cm^2. EOAI is 0.9 cm/m2, DVI is 0.55 (all normal.)  Mild mitral stenosis is present. The etiology of the mitral stenosis is mitral  annular calcification. Mean gradient is 4mmHg  IVC is normal in size with preserved respiratory variability. PAP is normal.  No pericardial effusion is present.  This study was compared with the study from 2/25/2016. The patient is now s/p AVR for severe aortic stenosis.    ASSESSMENT AND PLAN:   1. Severe AS. S/p AVR with a #21 tissue prosthesis. Excellent post-surgical results.  2. Hypertension: not at target although does have lower numbers at times and also notes better values ar home. Transitioned to losartan due to cough with lisinopril and tolerates this well.  3. RA.  4. Plan:   - Check BP at home weekly. Call the office with an update in 2-3 weeks.  - TTE in 1 year prior to visit  - RTC in 12 months.    I appreciate the opportunity to participate in the care of Roxanna Stark . Please do not hesitate to contact me with any further questions.    Sincerely,   Maynor Mary MD     [facility] Division of Cardiology

## 2018-06-29 NOTE — MR AVS SNAPSHOT
After Visit Summary   6/29/2018    Roxanna Stark    MRN: [id number]           Patient Information     Date Of Birth          5/20/1927        Visit Information        Provider Department      6/29/2018 12:40 PM Maynor Mary MD [facility] PHYSICIANS HEART AT [facility]        Today's Diagnoses     Aortic valve replaced    -  1    Nonrheumatic aortic valve stenosis        Benign essential hypertension        Mixed hyperlipidemia          Care Instructions    Thank you for coming to the [facility] @ [facility]; please note the following instructions:    1. Dr. Maynor Mary would like you to return for a cardiac follow up in 1 year  (June 2019).  We will contact you regarding your appointment when the time draws closer or you may call [phone] to arrange an appointment.  Mean while, if you should have any questions or concerns regarding your heart health, please contact us.  Thank you for choosing [facility] for your care.        If you have any questions regarding your visit please contact your care team:     Cardiology  Telephone Number   Andressa MCGRATH, JOHN LEWIS, RN   Linda HENDRIX, JEF HINKLE, VESTA QUIGLEY, N   [phone]    *After hours: [phone]   For scheduling appts:     [phone] or    [phone] (select option 1)    *After hours: [phone]     For the Device Clinic (Pacemakers and ICD's)  RN's :  Rosa Knight   During business hours: [phone]    *After business hours:  [phone] (select option 4)      Normal test result notifications will be released via SetJam or mailed within 7 business days.  All other test results, will be communicated via telephone once reviewed by your cardiologist.    If you need a medication refill please contact your pharmacy.  Please allow 3 business days for your refill to be completed.    As always, thank you for trusting us with your health care needs!                Follow-ups  after your visit        Your next 10 appointments already scheduled     Sep 04, 2018  9:00 AM CDT   LAB with FZ LAB   [facility] ([facility])    [address] [place] [address] [phone]           Please do not eat 10-12 hours before your appointment if you are coming in fasting for labs on lipids, cholesterol, or glucose (sugar). This does not apply to pregnant women. Water, hot tea and black coffee (with nothing added) are okay. Do not drink other fluids, diet soda or chew gum.            Sep 06, 2018  9:00 AM CDT   Return Visit with Jamie Johnson MD   [facility] ([facility])    [address]  [place] [address] [phone]              Future tests that were ordered for you today     Open Future Orders        Priority Expected Expires Ordered    Echocardiogram Complete Routine  6/29/2019 6/29/2018            Who to contact     If you have questions or need follow up information about today's clinic visit or your schedule please contact [facility] PHYSICIANS HEART AT [facility] directly at [phone].  Normal or non-critical lab and imaging results will be communicated to you by MyChart, letter or phone within 4 business days after the clinic has received the results. If you do not hear from us within 7 days, please contact the clinic through MyChart or phone. If you have a critical or abnormal lab result, we will notify you by phone as soon as possible.  Submit refill requests through [employer] or call your pharmacy and they will forward the refill request to us. Please allow 3 business days for your refill to be completed.          Additional Information About Your Visit        Care EveryWhere ID     This is your Care EveryWhere ID. This could be used by other organizations to access your [place] medical records  [id number]        Your Vitals Were     Pulse Pulse Oximetry BMI (Body Mass Index)              67 98% 21.54 kg/m2          Blood Pressure from Last 3 Encounters:   06/29/18 147/71   06/20/18 120/58   05/10/18 139/69    Weight from Last 3 Encounters:   06/29/18 51.7 kg (114 lb)   06/20/18 52.6 kg (116 lb)   05/10/18 52.6 kg (116 lb)               Primary Care Provider Office Phone # Fax #    Letha Grissom [phone] [address] [address]        Equal Access to Services     [facility]: Hadii aad ku hadasho Soomaali, waaxda luqadaha, qaybta kaalmada adealexandrayasarai, griffin medina . So [facility] [phone].    ATENCIÓN: Si habla español, tiene a goldman disposición servicios gratuitos de asistencia lingüística. [facility] [phone].    We comply with applicable federal civil rights laws and [place] laws. We do not discriminate on the basis of race, color, national origin, age, disability, sex, sexual orientation, or gender identity.            Thank you!     Thank you for choosing [facility] PHYSICIANS HEART AT [facility]  for your care. Our goal is always to provide you with excellent care. Hearing back from our patients is one way we can continue to improve our services. Please take a few minutes to complete the written survey that you may receive in the mail after your visit with us. Thank you!             Your Updated Medication List - Protect others around you: Learn how to safely use, store and throw away your medicines at www.disposemymeds.org.          This list is accurate as of 6/29/18  1:02 PM.  Always use your most recent med list.                   Brand Name Dispense Instructions for use Diagnosis    amoxicillin 500 MG capsule    AMOXIL    4 capsule    TAKE FOUR CAPSULES BY MOUTH 1 HOUR BEFORE DENTAL APPOINTMENT    SBE (subacute bacterial endocarditis) prophylaxis candidate       aspirin 325 MG EC tablet     90 tablet    Take 1 tablet (325 mg) by mouth daily    S/P AVR (aortic valve replacement)       Blood  Pressure Monitor Kit     1 kit    1 Units daily    Hypertension goal BP (blood pressure) < 140/90       calcium-vitamin D 500-125 MG-UNIT Tabs           econazole nitrate 1 % cream     85 g    Apply to red rash of legs and feet twice a day till rash is gone    Tinea corporis, Tinea pedis of both feet       folic acid 1 MG tablet    FOLVITE    30 tablet    Take 1 tablet (1 mg) by mouth daily    Rheumatoid arthritis of multiple sites with negative rheumatoid factor (H)       furosemide 20 MG tablet    LASIX    90 tablet    TAKE 1 TABLET BY MOUTH EVERY DAY IF 2 TO 3 POUNDS WEIGHT GAIN OVER 2 DAY PERIOD    Hypertension goal BP (blood pressure) < 140/90       ICAPS PO      2 tablets daily        lisinopril 5 MG tablet    PRINIVIL/ZESTRIL    90 tablet    Take 1 tablet (5 mg) by mouth daily    S/P AVR (aortic valve replacement), Hypertension goal BP (blood pressure) < 140/90       losartan 25 MG tablet    COZAAR    90 tablet    Take 1 tablet (25 mg) by mouth daily    Hypertension goal BP (blood pressure) < 140/90       methotrexate sodium 2.5 MG Tabs     24 tablet    Take 15 mg by mouth once a week . Take all 6 tablets on the same day of each week.  Do not take with amoxicillin.    Rheumatoid arthritis of multiple sites with negative rheumatoid factor (H)       metoprolol tartrate 25 MG tablet    LOPRESSOR    180 tablet    TAKE 1 TABLET(25 MG) BY MOUTH TWICE DAILY    Hypertension goal BP (blood pressure) < 140/90       OMEGA-3 FISH OIL PO      Take 1 tablet twice daily.        predniSONE 2.5 MG tablet    DELTASONE    134 tablet    Prednisone 5mg daily [id number], then 2.5mg daily [id number], then stop.    Rheumatoid arthritis of multiple sites with negative rheumatoid factor (H)       sulfaSALAzine  MG EC tablet    AZULFIDINE EN    120 tablet    Take 1 tablet (500 mg) by mouth 2 times daily    Rheumatoid arthritis of multiple sites with negative rheumatoid factor (H)

## 2018-08-13 DIAGNOSIS — M06.09 RHEUMATOID ARTHRITIS OF MULTIPLE SITES WITH NEGATIVE RHEUMATOID FACTOR (H): ICD-10-CM

## 2018-08-13 NOTE — TELEPHONE ENCOUNTER
Requested Prescriptions   Pending Prescriptions Disp Refills     methotrexate sodium 2.5 MG TABS 24 tablet 3     Sig: Take 6 tablets (15 mg) by mouth once a week . Take all 6 tablets on the same day of each week.  Do not take with amoxicillin.    There is no refill protocol information for this order        Last Written Prescription Date:  5/10/2018  Last Fill Quantity: 24,  # refills: 3   Last office visit: 6/20/2018 with prescribing provider:  Praveena   Future Office Visit:   Next 5 appointments (look out 90 days)     Sep 06, 2018  9:00 AM CDT   [facility] Jamie [facility]   [facility]    [address]  [place] [address] [phone]

## 2018-08-14 RX ORDER — METHOTREXATE 2.5 MG/1
15 TABLET ORAL WEEKLY
Qty: 24 TABLET | Refills: 3 | Status: SHIPPED | OUTPATIENT
Start: 2018-08-14 | End: 2018-09-06

## 2018-08-21 NOTE — PROGRESS NOTES
SUBJECTIVE:   Roxanna Stark is a 89 year old female who presents to clinic today for the following health issues:      Patient presents with:  Swelling: and pain in feet x 2 months or more             Problem list and histories reviewed & adjusted, as indicated.  Additional history: as documented    Patient Active Problem List   Diagnosis     Polymyalgia rheumatica (H)     History of basal cell carcinoma     CARDIOVASCULAR SCREENING; LDL GOAL LESS THAN 130     Advanced directives, counseling/discussion     Hypertension goal BP (blood pressure) < 140/90     Hip pain     Left atrial enlargement     Aortic stenosis     Status post coronary angiogram     Aortic valve stenosis     Aortic valve replaced     Rheumatoid arthritis of multiple sites with negative rheumatoid factor (H)     Past Surgical History:   Procedure Laterality Date     C SKIN TISSUE PROCEDURE UNLISTED  11/3/08    mmis skin cancer excision     CATARACT IOL, RT/LT  5/09    bilateral     COLONOSCOPY  2002     REPLACE VALVE AORTIC N/A 4/25/2016    Procedure: REPLACE VALVE AORTIC;  Surgeon: Sudeep Tsai MD;  Location:  OR       Social History   Substance Use Topics     Smoking status: Never Smoker     Smokeless tobacco: Never Used     Alcohol use Yes      Comment: 4 times a year     Family History   Problem Relation Age of Onset     Breast Cancer Sister      Arthritis Sister      Cancer Sister      breast     Thyroid Disease Sister      Cancer Sister      colon     Arthritis Sister      Arthritis Mother      Hypertension Father      Cancer - colorectal Father      Prostate Cancer Father      Arthritis Father      HEART DISEASE Father      Lipids Father      Arthritis Sister      Asthma Daughter      Asthma Daughter      Cancer Daughter 58     lung     Cancer Other 81     pancreatic          Current Outpatient Prescriptions   Medication Sig Dispense Refill     amoxicillin (AMOXIL) 500 MG capsule TAKE FOUR CAPSULES BY MOUTH 1 HOUR BEFORE  DENTAL APPOINTMENT 4 capsule 3     aspirin  MG EC tablet Take 1 tablet (325 mg) by mouth daily 90 tablet 3     Blood Pressure Monitor KIT 1 Units daily 1 kit 0     calcium-vitamin D 500-125 MG-UNIT TABS        econazole nitrate 1 % cream Apply topically 2 times daily for 14 days 30 g 3     folic acid (FOLVITE) 1 MG tablet Take 1 tablet (1 mg) by mouth daily 30 tablet 6     furosemide (LASIX) 20 MG tablet TAKE 1 TABLET BY MOUTH EVERY DAY IF 2 TO 3 POUNDS WEIGHT GAIN OVER 2 DAY PERIOD 90 tablet 3     gabapentin (NEURONTIN) 100 MG capsule Take one 3 X a day for neuropathy 90 capsule 3     ICAPS PO 2 tablets daily       losartan (COZAAR) 25 MG tablet Take 1 tablet (25 mg) by mouth daily 90 tablet 3     methotrexate sodium 2.5 MG TABS Take 6 tablets (15 mg) by mouth once a week . Take all 6 tablets on the same day of each week.  Do not take with amoxicillin. 24 tablet 3     metoprolol (LOPRESSOR) 25 MG tablet TAKE 1 TABLET(25 MG) BY MOUTH TWICE DAILY 180 tablet 3     Omega-3 Fatty Acids (OMEGA-3 FISH OIL PO) Take 1 tablet twice daily.       sulfaSALAzine ER (AZULFIDINE EN) 500 MG EC tablet Take 1 tablet (500 mg) by mouth 2 times daily 120 tablet 3     Allergies   Allergen Reactions     Lisinopril Cough     BP Readings from Last 3 Encounters:   08/23/18 160/60   06/29/18 147/71   06/20/18 120/58    Wt Readings from Last 3 Encounters:   08/23/18 116 lb (52.6 kg)   06/29/18 114 lb (51.7 kg)   06/20/18 116 lb (52.6 kg)                  Labs reviewed in EPIC    Reviewed and updated as needed this visit by clinical staff       Reviewed and updated as needed this visit by Provider         ROS:  This 89 year old female is here today to discuss several issues:  1. She has developed a burning feeling in her feet. She is not diabetic.   2. The joints in her fingers keep swelling. She has rheumatoid arthritis but worries about swelling that might be from congestive heart failure.   3. She has swelling of her feet most of the  "time though she admits she doesn't wear compression socks very often. She had TVAR 2 1/2 years ago and was warned that she could develop congestive heart failure which would cause swelling of her feet.   4. She also has enlarging lower abdomen and was told that abdomen swelling could be a sign of congestive heart failure. This worries her.   5. Patient is flying to [place] with a friend next month. Wonders about her vaccines. She just got her flu vaccine and shingrix today.   6. She wonders if she needs a mammogram. Hasn't had one in a long time.   7. She has recurrence of a rash on her left inner lower leg that responded to econzaole cream last year. Wonders if she should use it again.   All other review of systems are negative  Personal, family, and social history reviewed with patient and revised.         OBJECTIVE:     /60  Pulse 141  Temp 97.8  F (36.6  C) (Oral)  Resp 16  Ht 5' 1\" (1.549 m)  Wt 116 lb (52.6 kg)  SpO2 96%  BMI 21.92 kg/m2  Body mass index is 21.92 kg/(m^2).  GENERAL: healthy, alert and no distress  NECK: no adenopathy, no asymmetry, masses, or scars and thyroid normal to palpation  RESP: lungs clear to auscultation - no rales, rhonchi or wheezes  CV: regular rate and rhythm, normal S1 S2, no S3 or S4, no murmur, click or rub, no peripheral edema and peripheral pulses strong  ABDOMEN: soft, nontender, no hepatosplenomegaly, no masses and bowel sounds normal, but she does have hard enlargement of her lower abdomen. I would worry that she could have an enlarging uterus   MS: no gross musculoskeletal defects noted, no edema  She has compression socks on today   Rheumatoid arthritis changes in all of her hand joints   Suspicious red dermatitis on her inner left lower leg that looks like tinea, but can't be sure. No excoriations. OK to resume econazole cream as it can't do any harm.  Venous stasis of her lower legs: she needs to wear compression socks at all times.   Well hydrated  Well " nourished  Well groomed  Alert and oriented X 3  Good spirits  Brisk gait with no shortness of breath   Very active for her age     Diagnostic Test Results:  none     ASSESSMENT/PLAN:              1. Neuropathy of both feet  As above, will start low dose   - gabapentin (NEURONTIN) 100 MG capsule; Take one 3 X a day for neuropathy  Dispense: 90 capsule; Refill: 3    2. Dermatitis  As above   - econazole nitrate 1 % cream; Apply topically 2 times daily for 14 days  Dispense: 30 g; Refill: 3    3. Abdominal enlargement  As above   - US Pelvic Complete w Transvaginal; Future    4. Rheumatoid arthritis of multiple sites with negative rheumatoid factor (H)  Managed by Dr. Johnson    5. Venous stasis of both lower extremities  As above, encouraged compression socks while on the plane and in [place] also     6. Encounter for screening mammogram for breast cancer  due  - *MA Screening Digital Bilateral; Future    Return to clinic as needed if no improvement     YISSEL LIGHT MD  [facility]

## 2018-08-23 ENCOUNTER — OFFICE VISIT (OUTPATIENT)
Dept: FAMILY MEDICINE | Facility: CLINIC | Age: 83
End: 2018-08-23
Payer: MEDICARE

## 2018-08-23 VITALS
HEIGHT: 61 IN | SYSTOLIC BLOOD PRESSURE: 160 MMHG | BODY MASS INDEX: 21.9 KG/M2 | TEMPERATURE: 97.8 F | WEIGHT: 116 LBS | DIASTOLIC BLOOD PRESSURE: 60 MMHG | RESPIRATION RATE: 16 BRPM | HEART RATE: 141 BPM | OXYGEN SATURATION: 96 %

## 2018-08-23 DIAGNOSIS — L30.9 DERMATITIS: ICD-10-CM

## 2018-08-23 DIAGNOSIS — Z12.31 ENCOUNTER FOR SCREENING MAMMOGRAM FOR BREAST CANCER: ICD-10-CM

## 2018-08-23 DIAGNOSIS — M06.09 RHEUMATOID ARTHRITIS OF MULTIPLE SITES WITH NEGATIVE RHEUMATOID FACTOR (H): ICD-10-CM

## 2018-08-23 DIAGNOSIS — I87.8 VENOUS STASIS OF BOTH LOWER EXTREMITIES: ICD-10-CM

## 2018-08-23 DIAGNOSIS — G57.93 NEUROPATHY OF BOTH FEET: Primary | ICD-10-CM

## 2018-08-23 DIAGNOSIS — R19.8 ABDOMINAL ENLARGEMENT: ICD-10-CM

## 2018-08-23 PROCEDURE — 99214 OFFICE O/P EST MOD 30 MIN: CPT | Performed by: FAMILY MEDICINE

## 2018-08-23 RX ORDER — GABAPENTIN 100 MG/1
CAPSULE ORAL
Qty: 90 CAPSULE | Refills: 3 | Status: SHIPPED | OUTPATIENT
Start: 2018-08-23 | End: 2018-12-01

## 2018-08-23 RX ORDER — ECONAZOLE NITRATE 10 MG/G
CREAM TOPICAL 2 TIMES DAILY
Qty: 30 G | Refills: 3 | Status: SHIPPED | OUTPATIENT
Start: 2018-08-23 | End: 2018-09-06

## 2018-08-23 ASSESSMENT — PAIN SCALES - GENERAL: PAINLEVEL: MODERATE PAIN (5)

## 2018-08-23 NOTE — MR AVS SNAPSHOT
After Visit Summary   8/23/2018    Roxanna Stark    MRN: [id number]           Patient Information     Date Of Birth          5/20/1927        Visit Information        Provider Department      8/23/2018 12:00 PM Letha Grissom MD [facility]        Today's Diagnoses     Neuropathy of both feet    -  1    Dermatitis        Encounter for screening mammogram for breast cancer        Abdominal enlargement          Care Instructions    [facility]    If you have any questions regarding to your visit please contact your care team:       Team Purple:   Clinic Hours Telephone Number   Dr. Letha Arrieta   7am-7pm  Monday - Thursday   7am-5pm  Fridays  [phone]  (Appointment scheduling available 24/7)    Questions about your recent visit?   Team Line:  [phone]   Urgent Care - [place] and [facility] - 11am-9pm Monday-Friday Saturday-Sunday- 9am-5pm   [place] - 5pm-9pm Monday-Friday Saturday-Sunday- 9am-5pm  [phone] - [place]  [phone] - [place]       What options do I have for a visit other than an office visit? We offer electronic visits (e-visits) and telephone visits, when medically appropriate.  Please check with your medical insurance to see if these types of visits are covered, as you will be responsible for any charges that are not paid by your insurance.      You can use uiu (secure electronic communication) to access to your chart, send your primary care provider a message, or make an appointment. Ask a team member how to get started.     For a price quote for your services, please call our Consumer Price Line at [phone] or our Imaging Cost estimation line at [phone] (for imaging tests).              Follow-ups after your visit        Your next 10 appointments already scheduled     Sep 04, 2018  9:00 AM CDT   LAB with FZ LAB   [facility] ([facility]  "[place])    [address]  [place] [address]   [phone]           Please do not eat 10-12 hours before your appointment if you are coming in fasting for labs on lipids, cholesterol, or glucose (sugar). This does not apply to pregnant women. Water, hot tea and black coffee (with nothing added) are okay. Do not drink other fluids, diet soda or chew gum.            Sep 06, 2018  9:00 AM CDT   Return Visit with Jamie Johnson MD   [facility] [place] ([facility])    [address]  [place] [address]   [phone]              Future tests that were ordered for you today     Open Future Orders        Priority Expected Expires Ordered    US Pelvic Complete w Transvaginal Routine  8/23/2019 8/23/2018    *MA Screening Digital Bilateral Routine  8/23/2019 8/23/2018            Who to contact     If you have questions or need follow up information about today's clinic visit or your schedule please contact [facility] directly at [phone].  Normal or non-critical lab and imaging results will be communicated to you by MyChart, letter or phone within 4 business days after the clinic has received the results. If you do not hear from us within 7 days, please contact the clinic through MyChart or phone. If you have a critical or abnormal lab result, we will notify you by phone as soon as possible.  Submit refill requests through Robin or call your pharmacy and they will forward the refill request to us. Please allow 3 business days for your refill to be completed.          Additional Information About Your Visit        Care EveryWhere ID     This is your Care EveryWhere ID. This could be used by other organizations to access your [place] medical records  [id number]        Your Vitals Were     Pulse Temperature Respirations Height Pulse Oximetry BMI (Body Mass Index)    141 97.8  F (36.6  C) (Oral) 16 5' 1\" (1.549 m) 96% 21.92 kg/m2       Blood Pressure from Last 3 " Encounters:   08/23/18 160/60   06/29/18 147/71   06/20/18 120/58    Weight from Last 3 Encounters:   08/23/18 116 lb (52.6 kg)   06/29/18 114 lb (51.7 kg)   06/20/18 116 lb (52.6 kg)                 Today's Medication Changes          These changes are accurate as of 8/23/18 12:29 PM.  If you have any questions, ask your nurse or doctor.               Start taking these medicines.        Dose/Directions    econazole nitrate 1 % cream   Used for:  Dermatitis   Started by:  Letha Grissom MD        Apply topically 2 times daily for 14 days   Quantity:  30 g   Refills:  3       gabapentin 100 MG capsule   Commonly known as:  NEURONTIN   Used for:  Neuropathy of both feet   Started by:  Letha Grissom MD        Take one 3 X a day for neuropathy   Quantity:  90 capsule   Refills:  3            Where to get your medicines      These medications were sent to [facility] [address] [place] [address] AVE [place] AT [place] [address] [facility] [address]     Phone:  [phone]     econazole nitrate 1 % cream    gabapentin 100 MG capsule                Primary Care Provider Office Phone # Fax #    Letha Grissom [phone] [address] [address]        Equal Access to Services     [facility]: Hadii carol baig hadasho Soomaali, waaxda luqadaha, qaybta kaalmada adeross, griffin bonner. So [facility] [phone].    ATENCIÓN: Si habla español, tiene a goldman disposición servicios gratuitos de asistencia lingüística. Neal al [phone].    We comply with applicable federal civil rights laws and [place] laws. We do not discriminate on the basis of race, color, national origin, age, disability, sex, sexual orientation, or gender identity.            Thank you!     Thank you for choosing [facility]  for your care. Our goal is always to provide you with excellent care. Hearing back from our  patients is one way we can continue to improve our services. Please take a few minutes to complete the written survey that you may receive in the mail after your visit with us. Thank you!             Your Updated Medication List - Protect others around you: Learn how to safely use, store and throw away your medicines at www.disposemymeds.org.          This list is accurate as of 8/23/18 12:29 PM.  Always use your most recent med list.                   Brand Name Dispense Instructions for use Diagnosis    amoxicillin 500 MG capsule    AMOXIL    4 capsule    TAKE FOUR CAPSULES BY MOUTH 1 HOUR BEFORE DENTAL APPOINTMENT    SBE (subacute bacterial endocarditis) prophylaxis candidate       aspirin 325 MG EC tablet     90 tablet    Take 1 tablet (325 mg) by mouth daily    S/P AVR (aortic valve replacement)       Blood Pressure Monitor Kit     1 kit    1 Units daily    Hypertension goal BP (blood pressure) < 140/90       calcium-vitamin D 500-125 MG-UNIT Tabs           econazole nitrate 1 % cream     30 g    Apply topically 2 times daily for 14 days    Dermatitis       folic acid 1 MG tablet    FOLVITE    30 tablet    Take 1 tablet (1 mg) by mouth daily    Rheumatoid arthritis of multiple sites with negative rheumatoid factor (H)       furosemide 20 MG tablet    LASIX    90 tablet    TAKE 1 TABLET BY MOUTH EVERY DAY IF 2 TO 3 POUNDS WEIGHT GAIN OVER 2 DAY PERIOD    Hypertension goal BP (blood pressure) < 140/90       gabapentin 100 MG capsule    NEURONTIN    90 capsule    Take one 3 X a day for neuropathy    Neuropathy of both feet       ICAPS PO      2 tablets daily        losartan 25 MG tablet    COZAAR    90 tablet    Take 1 tablet (25 mg) by mouth daily    Hypertension goal BP (blood pressure) < 140/90       methotrexate sodium 2.5 MG Tabs     24 tablet    Take 6 tablets (15 mg) by mouth once a week . Take all 6 tablets on the same day of each week.  Do not take with amoxicillin.    Rheumatoid arthritis of multiple  sites with negative rheumatoid factor (H)       metoprolol tartrate 25 MG tablet    LOPRESSOR    180 tablet    TAKE 1 TABLET(25 MG) BY MOUTH TWICE DAILY    Hypertension goal BP (blood pressure) < 140/90       OMEGA-3 FISH OIL PO      Take 1 tablet twice daily.        sulfaSALAzine  MG EC tablet    AZULFIDINE EN    120 tablet    Take 1 tablet (500 mg) by mouth 2 times daily    Rheumatoid arthritis of multiple sites with negative rheumatoid factor (H)

## 2018-08-23 NOTE — PATIENT INSTRUCTIONS
[facility]    If you have any questions regarding to your visit please contact your care team:       Team Purple:   Clinic Hours Telephone Number   Dr. Letha Arrieta   7am-7pm  Monday - Thursday   7am-5pm  Fridays  [phone]  (Appointment scheduling available 24/7)    Questions about your recent visit?   Team Line:  [phone]   Urgent Care - [place] and [facility] - 11am-9pm Monday-Friday Saturday-Sunday- 9am-5pm   [place] - 5pm-9pm Monday-Friday Saturday-Sunday- 9am-5pm  [phone] - [place]  [phone] [facility]       What options do I have for a visit other than an office visit? We offer electronic visits (e-visits) and telephone visits, when medically appropriate.  Please check with your medical insurance to see if these types of visits are covered, as you will be responsible for any charges that are not paid by your insurance.      You can use [facility] (secure electronic communication) to access to your chart, send your primary care provider a message, or make an appointment. Ask a team member how to get started.     For a price quote for your services, please call our Consumer Price Line at [phone] or our Imaging Cost estimation line at [phone] (for imaging tests).

## 2018-08-24 ENCOUNTER — TELEPHONE (OUTPATIENT)
Dept: FAMILY MEDICINE | Facility: CLINIC | Age: 83
End: 2018-08-24

## 2018-08-24 NOTE — TELEPHONE ENCOUNTER
Prior Authorization Retail Medication Request    Medication/Dose: econazole nitrate 1 % cream  ICD code (if different than what is on RX):  Dermatitis [L30.9]   Previously Tried and Failed:    Rationale:      Insurance Name:    Insurance ID:        Pharmacy Information (if different than what is on RX)  Name:  [facility]  Phone:  [phone]  Fax: [phone]

## 2018-08-27 DIAGNOSIS — M06.09 RHEUMATOID ARTHRITIS OF MULTIPLE SITES WITH NEGATIVE RHEUMATOID FACTOR (H): ICD-10-CM

## 2018-08-27 NOTE — TELEPHONE ENCOUNTER
Requested Prescriptions   Pending Prescriptions Disp Refills     folic acid (FOLVITE) 1 MG tablet 30 tablet 6     Sig: Take 1 tablet (1 mg) by mouth daily    There is no refill protocol information for this order        Last Written Prescription Date:  5/10/2018  Last Fill Quantity: 30,  # refills: 6   Last office visit: 8/23/2018 with prescribing provider:  Praveena   Future Office Visit:   Next 5 appointments (look out 90 days)     Sep 06, 2018  9:00 AM CDT   Return Visit with Jamie Johnson MD   [facility] [place] ([facility])    [address]  [place] [address]   [phone]

## 2018-08-27 NOTE — TELEPHONE ENCOUNTER
PA Initiation    Medication: ECONAZOLE NITRATE 1% CREAM  Insurance Company: [employer] ([facility]) - Phone [phone] Fax [phone]  Pharmacy Filling the Rx: [email or web address] [address] [place] [address] [address]VE [address] AT [facility] [address]  Filling Pharmacy Phone: [phone]  Filling Pharmacy Fax:    Start Date: 8/27/2018

## 2018-08-28 ENCOUNTER — RADIANT APPOINTMENT (OUTPATIENT)
Dept: MAMMOGRAPHY | Facility: CLINIC | Age: 83
End: 2018-08-28
Attending: FAMILY MEDICINE
Payer: MEDICARE

## 2018-08-28 DIAGNOSIS — Z12.31 ENCOUNTER FOR SCREENING MAMMOGRAM FOR BREAST CANCER: ICD-10-CM

## 2018-08-28 PROCEDURE — 77067 SCR MAMMO BI INCL CAD: CPT | Mod: TC

## 2018-08-28 RX ORDER — FOLIC ACID 1 MG/1
1 TABLET ORAL DAILY
Qty: 30 TABLET | Refills: 6 | Status: SHIPPED | OUTPATIENT
Start: 2018-08-28 | End: 2019-01-23

## 2018-09-04 DIAGNOSIS — Z79.899 HIGH RISK MEDICATION USE: ICD-10-CM

## 2018-09-04 DIAGNOSIS — M06.09 RHEUMATOID ARTHRITIS OF MULTIPLE SITES WITH NEGATIVE RHEUMATOID FACTOR (H): ICD-10-CM

## 2018-09-04 LAB
BASOPHILS # BLD AUTO: 0.1 10E9/L (ref 0–0.2)
BASOPHILS NFR BLD AUTO: 0.9 %
DIFFERENTIAL METHOD BLD: ABNORMAL
EOSINOPHIL # BLD AUTO: 0.5 10E9/L (ref 0–0.7)
EOSINOPHIL NFR BLD AUTO: 5 %
ERYTHROCYTE [DISTWIDTH] IN BLOOD BY AUTOMATED COUNT: 20.6 % (ref 10–15)
HCT VFR BLD AUTO: 31.7 % (ref 35–47)
HGB BLD-MCNC: 10.1 G/DL (ref 11.7–15.7)
LYMPHOCYTES # BLD AUTO: 2.3 10E9/L (ref 0.8–5.3)
LYMPHOCYTES NFR BLD AUTO: 22.5 %
MCH RBC QN AUTO: 28.2 PG (ref 26.5–33)
MCHC RBC AUTO-ENTMCNC: 31.9 G/DL (ref 31.5–36.5)
MCV RBC AUTO: 89 FL (ref 78–100)
MONOCYTES # BLD AUTO: 0.8 10E9/L (ref 0–1.3)
MONOCYTES NFR BLD AUTO: 7.4 %
NEUTROPHILS # BLD AUTO: 6.6 10E9/L (ref 1.6–8.3)
NEUTROPHILS NFR BLD AUTO: 64.2 %
PLATELET # BLD AUTO: 296 10E9/L (ref 150–450)
RBC # BLD AUTO: 3.58 10E12/L (ref 3.8–5.2)
WBC # BLD AUTO: 10.3 10E9/L (ref 4–11)

## 2018-09-04 PROCEDURE — 36415 COLL VENOUS BLD VENIPUNCTURE: CPT | Performed by: INTERNAL MEDICINE

## 2018-09-04 PROCEDURE — 85025 COMPLETE CBC W/AUTO DIFF WBC: CPT | Performed by: INTERNAL MEDICINE

## 2018-09-05 ENCOUNTER — RADIANT APPOINTMENT (OUTPATIENT)
Dept: ULTRASOUND IMAGING | Facility: CLINIC | Age: 83
End: 2018-09-05
Attending: FAMILY MEDICINE
Payer: MEDICARE

## 2018-09-05 DIAGNOSIS — R19.8 ABDOMINAL ENLARGEMENT: ICD-10-CM

## 2018-09-05 PROCEDURE — 76856 US EXAM PELVIC COMPLETE: CPT

## 2018-09-06 ENCOUNTER — OFFICE VISIT (OUTPATIENT)
Dept: RHEUMATOLOGY | Facility: CLINIC | Age: 83
End: 2018-09-06
Payer: MEDICARE

## 2018-09-06 VITALS
WEIGHT: 118.8 LBS | SYSTOLIC BLOOD PRESSURE: 132 MMHG | HEART RATE: 71 BPM | OXYGEN SATURATION: 97 % | RESPIRATION RATE: 16 BRPM | DIASTOLIC BLOOD PRESSURE: 58 MMHG | BODY MASS INDEX: 22.43 KG/M2 | TEMPERATURE: 97.4 F | HEIGHT: 61 IN

## 2018-09-06 DIAGNOSIS — Z79.899 HIGH RISK MEDICATION USE: ICD-10-CM

## 2018-09-06 DIAGNOSIS — M06.09 RHEUMATOID ARTHRITIS OF MULTIPLE SITES WITH NEGATIVE RHEUMATOID FACTOR (H): Primary | ICD-10-CM

## 2018-09-06 LAB
ALBUMIN SERPL-MCNC: 3.3 G/DL (ref 3.4–5)
ALP SERPL-CCNC: 91 U/L (ref 40–150)
ALT SERPL W P-5'-P-CCNC: 25 U/L (ref 0–50)
AST SERPL W P-5'-P-CCNC: 23 U/L (ref 0–45)
BASOPHILS # BLD AUTO: 0.1 10E9/L (ref 0–0.2)
BASOPHILS NFR BLD AUTO: 0.7 %
BILIRUB DIRECT SERPL-MCNC: <0.1 MG/DL (ref 0–0.2)
BILIRUB SERPL-MCNC: 0.2 MG/DL (ref 0.2–1.3)
CREAT SERPL-MCNC: 1.2 MG/DL (ref 0.52–1.04)
CRP SERPL-MCNC: 13.6 MG/L (ref 0–8)
DIFFERENTIAL METHOD BLD: ABNORMAL
EOSINOPHIL # BLD AUTO: 0.5 10E9/L (ref 0–0.7)
EOSINOPHIL NFR BLD AUTO: 4.5 %
ERYTHROCYTE [DISTWIDTH] IN BLOOD BY AUTOMATED COUNT: 20.9 % (ref 10–15)
ERYTHROCYTE [SEDIMENTATION RATE] IN BLOOD BY WESTERGREN METHOD: 64 MM/H (ref 0–30)
GFR SERPL CREATININE-BSD FRML MDRD: 42 ML/MIN/1.7M2
HCT VFR BLD AUTO: 31.3 % (ref 35–47)
HGB BLD-MCNC: 9.9 G/DL (ref 11.7–15.7)
LYMPHOCYTES # BLD AUTO: 1.9 10E9/L (ref 0.8–5.3)
LYMPHOCYTES NFR BLD AUTO: 18.4 %
MCH RBC QN AUTO: 28 PG (ref 26.5–33)
MCHC RBC AUTO-ENTMCNC: 31.6 G/DL (ref 31.5–36.5)
MCV RBC AUTO: 89 FL (ref 78–100)
MONOCYTES # BLD AUTO: 1.2 10E9/L (ref 0–1.3)
MONOCYTES NFR BLD AUTO: 11.7 %
NEUTROPHILS # BLD AUTO: 6.7 10E9/L (ref 1.6–8.3)
NEUTROPHILS NFR BLD AUTO: 64.7 %
PLATELET # BLD AUTO: 282 10E9/L (ref 150–450)
PROT SERPL-MCNC: 7.3 G/DL (ref 6.8–8.8)
RBC # BLD AUTO: 3.53 10E12/L (ref 3.8–5.2)
WBC # BLD AUTO: 10.3 10E9/L (ref 4–11)

## 2018-09-06 PROCEDURE — 99213 OFFICE O/P EST LOW 20 MIN: CPT | Performed by: INTERNAL MEDICINE

## 2018-09-06 PROCEDURE — 82565 ASSAY OF CREATININE: CPT | Performed by: INTERNAL MEDICINE

## 2018-09-06 PROCEDURE — 36415 COLL VENOUS BLD VENIPUNCTURE: CPT | Performed by: INTERNAL MEDICINE

## 2018-09-06 PROCEDURE — 85652 RBC SED RATE AUTOMATED: CPT | Performed by: INTERNAL MEDICINE

## 2018-09-06 PROCEDURE — 85025 COMPLETE CBC W/AUTO DIFF WBC: CPT | Performed by: INTERNAL MEDICINE

## 2018-09-06 PROCEDURE — 86140 C-REACTIVE PROTEIN: CPT | Performed by: INTERNAL MEDICINE

## 2018-09-06 PROCEDURE — 80076 HEPATIC FUNCTION PANEL: CPT | Performed by: INTERNAL MEDICINE

## 2018-09-06 RX ORDER — METHOTREXATE 2.5 MG/1
15 TABLET ORAL WEEKLY
Qty: 24 TABLET | Refills: 4 | Status: SHIPPED | OUTPATIENT
Start: 2018-09-06 | End: 2019-01-23

## 2018-09-06 RX ORDER — SULFASALAZINE 500 MG/1
500 TABLET, DELAYED RELEASE ORAL 2 TIMES DAILY
Qty: 60 TABLET | Refills: 4 | Status: SHIPPED | OUTPATIENT
Start: 2018-09-06 | End: 2019-01-23

## 2018-09-06 NOTE — PROGRESS NOTES
Rheumatology Clinic Visit      Roxanna Stark MRN# [id number]   YOB: 1927 Age: 89 year old      Date of visit: 9/06/18   PCP: Dr. Letha Grissom  Cardiology: Dr. Boston Navarro     Chief Complaint   Patient presents with:  RECHECK: arthritis follow up      Assessment and Plan     1. 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  She was treated with prednisone monotherapy for several years, being able to taper off without recurrence of symptoms. She then developed worsening symptoms in her hands and was diagnosed with rheumatoid arthritis. Initially, she was resistant to taking DMARD therapy.  She then was started on MTX that was effective; she reduced the dose with worsening symptoms. Currently on MTX 15mg wkly and SSZ 500mg BID (mild anemia possibly associated with SSZ so the dose was previously reduced).  - Continue methotrexate 15 mg once weekly   - Continue folic acid 1mg daily  - Continue sulfasalazine 500 mg twice daily   - Labs in 3 months: CBC, Creatinine, Hepatic Panel, ESR, CRP              Rapid 3, cumulative scores                      9/6/2018:  3.3 (MTX 15mg wkly, SSZ 500mg BID)    2. Giant Cell Arteritis History?: 12/26/2005 Left TA biopsy negative per [facility] record review.  No symptoms of GCA at this time.     3. Right shoulder rotator cuff tear and pain: Previously evaluated by orthopedic surgery and her pain resolved for approximately one year after having a steroid injection in March 2015. She does not want to have surgical correction of her shoulder. Repeat steroid injections have been helpful; not needed today.  Physical therapy was effective and she is doing exercises at home.     4. History of basal cell carcinoma: Following with dermatology     5. Bone Health: Managed by PCP already.    Ms. Stark verbalized agreement with and understanding of the rational  for the diagnosis and treatment plan.  All questions were answered to best of my ability and the patient's satisfaction. Ms. Stark was advised to contact the clinic with any questions that may arise after the clinic visit.      Thank you for involving me in the care of the patient    Return to clinic: 3-4 months      HPI   Roxanna Stark is a 89 year old female with medical history significant for basal cell carcinoma, hypertension, aortic stenosis, right rotator cuff tear (previously evaluated by Dr. Bingham, orthopedic surgery, on 3/20/2015 where at that time Ms. Stark was not interested in surgical correction; she received an intra-articular steroid injection at that time that was effective for ~1year), temporal arteritis?, and seronegative erosive rheumatoid arthritis.     Today, she reports that she is doing well. Tolerating medications.   Morning stiffness for no more than 10 minutes.  Arthritis does not prevent her daily activities.  No difficulty standing up from a low seated position or raising her arms above her head.  No headache.  No vision change.  No vision loss.  No scalp tenderness or jaw claudication.  No joint pain.    Denies fevers, chills, nausea, vomiting, constipation, diarrhea. No abdominal pain. No chest pain/pressure, palpitations, or shortness of breath. No oral or nasal sores. No neck pain. No rash. Swelling in her bilateral feet.       Tobacco: None  EtOH: No more than 1 drink per week  Drugs: None  Occupation: Used to work for the telephone company; now retired    ROS   GEN: No fevers, chills, night sweats, or weight change  SKIN: No itching, rashes, sores  HEENT: No oral or nasal ulcers.  CV: No chest pain, pressure, palpitations, or dyspnea on exertion.  PULM: No SOB, wheeze, cough.  GI: No nausea, vomiting, constipation, diarrhea. No blood in stool. No abdominal pain.  : No blood in urine.  MSK: See HPI.  NEURO: Says that gabapentin is helping her peripheral neuropathy - affecting  the feet  ENDO: No heat/cold intolerance.  EXT: See HPI    Active Problem List     Patient Active Problem List   Diagnosis     Polymyalgia rheumatica (H)     History of basal cell carcinoma     CARDIOVASCULAR SCREENING; LDL GOAL LESS THAN 130     Advanced directives, counseling/discussion     Hypertension goal BP (blood pressure) < 140/90     Hip pain     Left atrial enlargement     Aortic stenosis     Status post coronary angiogram     Aortic valve stenosis     Aortic valve replaced     Rheumatoid arthritis of multiple sites with negative rheumatoid factor (H)     Past Medical History     Past Medical History:   Diagnosis Date     Actinic keratosis      Aortic stenosis 2014     Basal cell cancer 7/2014    left eye medial canthus      Basal cell carcinoma 9/30/08    left cheek     HTN (hypertension)      melanoma in situ 9/30/2008    LEFT ARM     Melanoma in situ (H) 9/30/08    left arm     Polymyalgia rheumatica (H) 11/99     Temporal arteritis (H) 11/99     Past Surgical History     Past Surgical History:   Procedure Laterality Date     C SKIN TISSUE PROCEDURE UNLISTED  11/3/08    mmis skin cancer excision     CATARACT IOL, RT/LT  5/09    bilateral     COLONOSCOPY  2002     REPLACE VALVE AORTIC N/A 4/25/2016    Procedure: REPLACE VALVE AORTIC;  Surgeon: Sudeep Tsai MD;  Location: [facility] OR     Allergy     Allergies   Allergen Reactions     Lisinopril Cough        Current Medication List     Current Outpatient Prescriptions   Medication Sig     amoxicillin (AMOXIL) 500 MG capsule TAKE FOUR CAPSULES BY MOUTH 1 HOUR BEFORE DENTAL APPOINTMENT     aspirin  MG EC tablet Take 1 tablet (325 mg) by mouth daily     Blood Pressure Monitor KIT 1 Units daily     calcium-vitamin D 500-125 MG-UNIT TABS      econazole nitrate 1 % cream Apply topically 2 times daily for 14 days     folic acid (FOLVITE) 1 MG tablet Take 1 tablet (1 mg) by mouth daily     furosemide (LASIX) 20 MG tablet TAKE 1 TABLET BY MOUTH EVERY  DAY IF 2 TO 3 POUNDS WEIGHT GAIN OVER 2 DAY PERIOD     gabapentin (NEURONTIN) 100 MG capsule Take one 3 X a day for neuropathy     ICAPS PO 2 tablets daily     losartan (COZAAR) 25 MG tablet Take 1 tablet (25 mg) by mouth daily     methotrexate sodium 2.5 MG TABS Take 6 tablets (15 mg) by mouth once a week . Take all 6 tablets on the same day of each week.  Do not take with amoxicillin.     metoprolol (LOPRESSOR) 25 MG tablet TAKE 1 TABLET(25 MG) BY MOUTH TWICE DAILY     Omega-3 Fatty Acids (OMEGA-3 FISH OIL PO) Take 1 tablet twice daily.     sulfaSALAzine ER (AZULFIDINE EN) 500 MG EC tablet Take 1 tablet (500 mg) by mouth 2 times daily     [DISCONTINUED] methotrexate sodium 2.5 MG TABS Take 6 tablets (15 mg) by mouth once a week . Take all 6 tablets on the same day of each week.  Do not take with amoxicillin.     No current facility-administered medications for this visit.        Social History   See HPI    Family History     Family History   Problem Relation Age of Onset     Breast Cancer Sister      Arthritis Sister      Cancer Sister      breast     Thyroid Disease Sister      Cancer Sister      colon     Arthritis Sister      Arthritis Mother      Hypertension Father      Cancer - colorectal Father      Prostate Cancer Father      Arthritis Father      HEART DISEASE Father      Lipids Father      Arthritis Sister      Asthma Daughter      Asthma Daughter      Cancer Daughter 58     lung     Cancer Other 81     pancreatic      Physical Exam     Temp Readings from Last 3 Encounters:   09/06/18 97.4  F (36.3  C) (Oral)   08/23/18 97.8  F (36.6  C) (Oral)   06/20/18 97.2  F (36.2  C) (Oral)     BP Readings from Last 5 Encounters:   09/06/18 132/58   08/23/18 160/60   06/29/18 147/71   06/20/18 120/58   05/10/18 139/69     Pulse Readings from Last 1 Encounters:   09/06/18 71     Resp Readings from Last 1 Encounters:   09/06/18 16     Estimated body mass index is 22.45 kg/(m^2) as calculated from the following:     "Height as of this encounter: 1.549 m (5' 1\").    Weight as of this encounter: 53.9 kg (118 lb 12.8 oz).    GEN: NAD  HEENT: MMM.  Anicteric, noninjected sclera  CV: S1, S2. RRR. No m/r/g.  PULM: CTA bilaterally. No w/c.  MSK: Mild synovial swelling without tenderness to palpation of the bilateral second-third MCPs.  PIPs, wrists, elbows, shoulders, knees, ankles, and MTPs without swelling or tenderness to palpation.  Hips nontender to palpation. Boggy nontender bursa over the left elbow.  NEURO: Able to stand up unassisted from a chair without difficulty. Able to raise her arms above her head without difficulty  SKIN: No rash.    EXT: Nonpitting edema of the bilateral lower extremities  PSYCH: Alert. Appropriate.    Labs / Imaging (select studies)   RF/CCP  Recent Labs   Lab Test  08/11/16   1124  08/04/16   1222  02/18/16   1543   CCPIGG  1   --    --    RHF   --   <20  <20     CBC  Recent Labs   Lab Test  09/04/18   0853  05/07/18   0800  04/23/18   0824   WBC  10.3  9.3  7.4   RBC  3.58*  3.23*  3.33*   HGB  10.1*  9.5*  9.9*   HCT  31.7*  30.9*  31.2*   MCV  89  96  94   RDW  20.6*  21.4*  20.7*   PLT  296  323  294   MCH  28.2  29.4  29.7   MCHC  31.9  30.7*  31.7   NEUTROPHIL  64.2  66.3  61.2   LYMPH  22.5  22.5  24.0   MONOCYTE  7.4  6.1  10.0   EOSINOPHIL  5.0  3.8  4.1   BASOPHIL  0.9  1.3  0.7   ANEU  6.6  6.1  4.5   ALYM  2.3  2.1  1.8   IGNACIO  0.8  0.6  0.7   AEOS  0.5  0.4  0.3   ABAS  0.1  0.1  0.1     CMP  Recent Labs   Lab Test  05/07/18   0800  04/23/18   0824  03/26/18   0834   12/14/16   0849   07/12/16   1035  06/24/16   0852   NA   --    --    --    --   140   --   138  140   POTASSIUM   --    --    --    --   4.5   --   4.6  4.3   CHLORIDE   --    --    --    --   105   --   102  105   CO2   --    --    --    --   26   --   28  27   ANIONGAP   --    --    --    --   9   --   8  8   GLC   --    --    --    --   172*   --   117*  95   BUN   --    --    --    --   28   --   17  25   CR  1.20*  " 1.08*  1.46*   < >  1.19*   < >  1.04  1.18*   GFRESTIMATED  42*  48*  34*   < >  43*   < >  50*  43*   GFRESTBLACK  51*  58*  41*   < >  52*   < >  60*  52*   ROEL   --    --    --    --   8.8   --   9.4  9.0   BILITOTAL  0.3  0.3  0.4   < >   --    < >   --    --    ALBUMIN  3.4  3.5  3.6   < >   --    < >   --    --    PROTTOTAL  6.8  7.0  7.3   < >   --    < >   --    --    ALKPHOS  67  66  59   < >   --    < >   --    --    AST  33  30  31   < >   --    < >   --    --    ALT  29  30  44   < >   --    < >   --    --     < > = values in this interval not displayed.     Calcium/VitaminD  Recent Labs   Lab Test  12/14/16   0849  07/12/16   1035  06/24/16   0852   ROEL  8.8  9.4  9.0     ESR/CRP  Recent Labs   Lab Test  04/23/18   0824  01/29/18   0903  11/01/17   0945   SED  53*  68*  44*   CRP  4.8  31.8*  23.8*     Hepatitis B  Recent Labs   Lab Test  11/10/16   0909   HBCAB  Nonreactive   HEPBANG  Nonreactive     Hepatitis C  Recent Labs   Lab Test  11/10/16   0909   HCVAB  Nonreactive   Assay performance characteristics have not been established for newborns,   infants, and children       HIV Screening  Recent Labs   Lab Test  11/10/16   0909   HIAGAB  Nonreactive   HIV-1 p24 Ag & HIV-1/HIV-2 Ab Not Detected         Immunization History     Immunization History   Administered Date(s) Administered     Influenza (H1N1) 10/20/2016     Influenza (High Dose) 3 valent vaccine 10/03/2017, 08/23/2018     Influenza (IIV3) PF 10/04/2005, 10/20/2010, 11/09/2011, 09/22/2012, 09/16/2013, 10/15/2014     Pneumo Conj 13-V (2010&after) 03/17/2015     Pneumococcal 23 valent 11/03/2000, 12/13/2010     TD (ADULT, 7+) 01/12/2004     TDAP Vaccine (Adacel) 05/14/2013     Zoster vaccine recombinant adjuvanted (SHINGRIX) 06/23/2018, 08/23/2018     Zoster vaccine, live 12/15/2006          Chart documentation done in part with Dragon Voice recognition Software. Although reviewed after completion, some word and grammatical error may  remain.    Jamie Johnson MD

## 2018-09-06 NOTE — MR AVS SNAPSHOT
After Visit Summary   9/6/2018    Roxanna Stark    MRN: [id number]           Patient Information     Date Of Birth          5/20/1927        Visit Information        Provider Department      9/6/2018 9:00 AM Jamie Johnson MD [facility]        Today's Diagnoses     Rheumatoid arthritis of multiple sites with negative rheumatoid factor (H)    -  1    High risk medication use           Follow-ups after your visit        Your next 10 appointments already scheduled     Dec 20, 2018  9:00 AM CST   LAB with [facility]   [facility])    [address] [place] [address]   [phone]           Please do not eat 10-12 hours before your appointment if you are coming in fasting for labs on lipids, cholesterol, or glucose (sugar). This does not apply to pregnant women. Water, hot tea and black coffee (with nothing added) are okay. Do not drink other fluids, diet soda or chew gum.            Jan 23, 2019  8:40 AM CST   Return Visit with Jamie Johnson MD   [facility] [place] ([facility])    [address] [place] [address]   [phone]              Future tests that were ordered for you today     Open Future Orders        Priority Expected Expires Ordered    CBC with platelets differential Routine 12/1/2018 1/4/2019 9/6/2018    Creatinine Routine 12/1/2018 1/4/2019 9/6/2018    Erythrocyte sedimentation rate auto Routine 12/1/2018 1/4/2019 9/6/2018    CRP inflammation Routine 12/1/2018 1/4/2019 9/6/2018    Hepatic panel Routine 12/1/2018 1/4/2019 9/6/2018            Who to contact     If you have questions or need follow up information about today's clinic visit or your schedule please contact [facility] [place] directly at [phone].  Normal or non-critical lab and imaging results will be communicated to you by MyChart, letter or phone within 4 business days after the clinic has received the results. If you do  "not hear from us within 7 days, please contact the clinic through [employer] or phone. If you have a critical or abnormal lab result, we will notify you by phone as soon as possible.  Submit refill requests through [employer] or call your pharmacy and they will forward the refill request to us. Please allow 3 business days for your refill to be completed.          Additional Information About Your Visit        Care EveryWhere ID     This is your Care EveryWhere ID. This could be used by other organizations to access your [place] medical records  [id number]        Your Vitals Were     Pulse Temperature Respirations Height Pulse Oximetry BMI (Body Mass Index)    71 97.4  F (36.3  C) (Oral) 16 1.549 m (5' 1\") 97% 22.45 kg/m2       Blood Pressure from Last 3 Encounters:   09/06/18 151/70   08/23/18 160/60   06/29/18 147/71    Weight from Last 3 Encounters:   09/06/18 53.9 kg (118 lb 12.8 oz)   08/23/18 52.6 kg (116 lb)   06/29/18 51.7 kg (114 lb)              We Performed the Following     CBC with platelets differential     Creatinine     CRP inflammation     Erythrocyte sedimentation rate auto     Hepatic panel          Where to get your medicines      These medications were sent to [facility] [id number] [place] [address] AVE [address] AT [address]  [address], [facility] [address]     Phone:  [phone]     methotrexate sodium 2.5 MG Tabs    sulfaSALAzine  MG EC tablet          Primary Care Provider Office Phone # Fax #    Letha [place] [phone] [address] [address]        Equal Access to Services     [facility]: Hadii carol Pearson, zoran flores, qaybta griffin pruett. So [facility] [phone].    ATENCIÓN: Si habla español, tiene a goldman disposición servicios gratuitos de asistencia lingüística. Llame al [phone].    We comply with applicable federal civil " rights laws and [place] laws. We do not discriminate on the basis of race, color, national origin, age, disability, sex, sexual orientation, or gender identity.            Thank you!     Thank you for choosing [facility] [place]  for your care. Our goal is always to provide you with excellent care. Hearing back from our patients is one way we can continue to improve our services. Please take a few minutes to complete the written survey that you may receive in the mail after your visit with us. Thank you!             Your Updated Medication List - Protect others around you: Learn how to safely use, store and throw away your medicines at www.disposemymeds.org.          This list is accurate as of 9/6/18  9:29 AM.  Always use your most recent med list.                   Brand Name Dispense Instructions for use Diagnosis    amoxicillin 500 MG capsule    AMOXIL    4 capsule    TAKE FOUR CAPSULES BY MOUTH 1 HOUR BEFORE DENTAL APPOINTMENT    SBE (subacute bacterial endocarditis) prophylaxis candidate       aspirin 325 MG EC tablet     90 tablet    Take 1 tablet (325 mg) by mouth daily    S/P AVR (aortic valve replacement)       Blood Pressure Monitor Kit     1 kit    1 Units daily    Hypertension goal BP (blood pressure) < 140/90       calcium-vitamin D 500-125 MG-UNIT Tabs           econazole nitrate 1 % cream     30 g    Apply topically 2 times daily for 14 days    Dermatitis       folic acid 1 MG tablet    FOLVITE    30 tablet    Take 1 tablet (1 mg) by mouth daily    Rheumatoid arthritis of multiple sites with negative rheumatoid factor (H)       furosemide 20 MG tablet    LASIX    90 tablet    TAKE 1 TABLET BY MOUTH EVERY DAY IF 2 TO 3 POUNDS WEIGHT GAIN OVER 2 DAY PERIOD    Hypertension goal BP (blood pressure) < 140/90       gabapentin 100 MG capsule    NEURONTIN    90 capsule    Take one 3 X a day for neuropathy    Neuropathy of both feet       ICAPS PO      2 tablets daily        losartan 25 MG tablet     COZAAR    90 tablet    Take 1 tablet (25 mg) by mouth daily    Hypertension goal BP (blood pressure) < 140/90       methotrexate sodium 2.5 MG Tabs     24 tablet    Take 6 tablets (15 mg) by mouth once a week . Take all 6 tablets on the same day of each week.  Do not take with amoxicillin.    Rheumatoid arthritis of multiple sites with negative rheumatoid factor (H), High risk medication use       metoprolol tartrate 25 MG tablet    LOPRESSOR    180 tablet    TAKE 1 TABLET(25 MG) BY MOUTH TWICE DAILY    Hypertension goal BP (blood pressure) < 140/90       OMEGA-3 FISH OIL PO      Take 1 tablet twice daily.        sulfaSALAzine  MG EC tablet    AZULFIDINE EN    60 tablet    Take 1 tablet (500 mg) by mouth 2 times daily    Rheumatoid arthritis of multiple sites with negative rheumatoid factor (H), High risk medication use

## 2018-09-12 ENCOUNTER — TELEPHONE (OUTPATIENT)
Dept: RHEUMATOLOGY | Facility: CLINIC | Age: 83
End: 2018-09-12

## 2018-09-12 NOTE — PROGRESS NOTES
Rheumatology team: Please call to notify Ms. Stark that inflammatory markers ESR and CRP remain elevated similar to previous labs.  Renal function is reduced, stable compared to previous labs.     Jamie Johnson MD  9/12/2018 5:04 AM

## 2018-09-12 NOTE — TELEPHONE ENCOUNTER
Attempted to contact Pt.  Left detailed message, as well as a call back number of [phone] if Pt has any questions or concerns.    Rosy Alvarez CMA  9/12/2018  11:50 AM

## 2018-11-22 DIAGNOSIS — I10 HYPERTENSION GOAL BP (BLOOD PRESSURE) < 140/90: ICD-10-CM

## 2018-11-23 RX ORDER — METOPROLOL TARTRATE 25 MG/1
TABLET, FILM COATED ORAL
Qty: 180 TABLET | Refills: 3 | Status: SHIPPED | OUTPATIENT
Start: 2018-11-23 | End: 2019-07-01

## 2018-12-01 DIAGNOSIS — G57.93 NEUROPATHY OF BOTH FEET: ICD-10-CM

## 2018-12-02 DIAGNOSIS — Z29.89 SBE (SUBACUTE BACTERIAL ENDOCARDITIS) PROPHYLAXIS CANDIDATE: ICD-10-CM

## 2018-12-03 RX ORDER — GABAPENTIN 100 MG/1
100 CAPSULE ORAL 3 TIMES DAILY
Qty: 90 CAPSULE | Refills: 0 | Status: SHIPPED | OUTPATIENT
Start: 2018-12-03 | End: 2018-12-30

## 2018-12-03 NOTE — TELEPHONE ENCOUNTER
Spoke to pharmacy and no refills on file.    gabapentin (NEURONTIN) 100 MG capsule      Last Written Prescription Date:  8/23/2018  Last Fill Quantity: 90,   # refills: 3  Last Office Visit: 8/23/2018  Future Office visit:    Next 5 appointments (look out 90 days)     Jan 23, 2019  8:40 AM CST   Return Visit with Jamie Johnson MD   [facility] ([facility]    [address]  [place] [address]   [phone]                   Routing refill request to provider for review/approval because:  Drug not on the [facility], [facility] or [facility] refill protocol or controlled substance

## 2018-12-04 RX ORDER — AMOXICILLIN 500 MG/1
CAPSULE ORAL
Qty: 4 CAPSULE | Refills: 0 | Status: SHIPPED | OUTPATIENT
Start: 2018-12-04 | End: 2019-05-03

## 2018-12-04 NOTE — TELEPHONE ENCOUNTER
Reason for call:  Med refill   Patient called regarding (reason for call): prescription-amoxicillin  Additional comments: Patient has dental appointment tomorrow and would like it filled asap    Phone number to reach patient: [phone]    Best Time: anytime    Can we leave a detailed message on this number?  YES

## 2018-12-04 NOTE — TELEPHONE ENCOUNTER
"Routing refill request to provider for review/approval because:  Pt has dental appt tomorrow     Requested Prescriptions   Pending Prescriptions Disp Refills     amoxicillin (AMOXIL) 500 MG capsule [Pharmacy Med Name: AMOXICILLIN 500MG CAPSULES] 4 capsule 0     Sig: TAKE 4 CAPSULES BY MOUTH 1 HOUR BEFORE DENTAL APPOINTMENT    Oral Acne/Rosacea Medications Protocol Failed    12/4/2018  2:35 PM       Failed - Confirmation of diagnosis is required    Please confirm diagnosis is acne or rosacea.     If NOT acne or rosacea; refer request to provider for further evaluation.    If diagnosis IS acne or rosacea, OK to refill BASED ON PREVIOUS REFILL CLINICAL NOTE RECOMMENDATION.         Passed - Patient is 12 years of age or older       Passed - Recent (12 mo) or future (30 days) visit within the authorizing provider's specialty    Patient had office visit in the last 12 months or has a visit in the next 30 days with authorizing provider or within the authorizing provider's specialty.  See \"Patient Info\" tab in inbasket, or \"Choose Columns\" in Meds & Orders section of the refill encounter.             Passed - No active pregnancy on record       Passed - No positive prenancy test is past 12 months        Roberta Carrasco RN      "

## 2018-12-21 DIAGNOSIS — Z79.899 HIGH RISK MEDICATION USE: ICD-10-CM

## 2018-12-21 DIAGNOSIS — M06.09 RHEUMATOID ARTHRITIS OF MULTIPLE SITES WITH NEGATIVE RHEUMATOID FACTOR (H): ICD-10-CM

## 2018-12-21 LAB
ALBUMIN SERPL-MCNC: 3.4 G/DL (ref 3.4–5)
ALP SERPL-CCNC: 92 U/L (ref 40–150)
ALT SERPL W P-5'-P-CCNC: 23 U/L (ref 0–50)
AST SERPL W P-5'-P-CCNC: 24 U/L (ref 0–45)
BASOPHILS # BLD AUTO: 0.1 10E9/L (ref 0–0.2)
BASOPHILS NFR BLD AUTO: 0.8 %
BILIRUB DIRECT SERPL-MCNC: <0.1 MG/DL (ref 0–0.2)
BILIRUB SERPL-MCNC: 0.3 MG/DL (ref 0.2–1.3)
CREAT SERPL-MCNC: 1.19 MG/DL (ref 0.52–1.04)
CRP SERPL-MCNC: 6.3 MG/L (ref 0–8)
DIFFERENTIAL METHOD BLD: ABNORMAL
EOSINOPHIL # BLD AUTO: 0.5 10E9/L (ref 0–0.7)
EOSINOPHIL NFR BLD AUTO: 4.6 %
ERYTHROCYTE [DISTWIDTH] IN BLOOD BY AUTOMATED COUNT: 21.4 % (ref 10–15)
ERYTHROCYTE [SEDIMENTATION RATE] IN BLOOD BY WESTERGREN METHOD: 37 MM/H (ref 0–30)
GFR SERPL CREATININE-BSD FRML MDRD: 40 ML/MIN/{1.73_M2}
HCT VFR BLD AUTO: 35.3 % (ref 35–47)
HGB BLD-MCNC: 11.1 G/DL (ref 11.7–15.7)
LYMPHOCYTES # BLD AUTO: 2.1 10E9/L (ref 0.8–5.3)
LYMPHOCYTES NFR BLD AUTO: 20.4 %
MCH RBC QN AUTO: 29.1 PG (ref 26.5–33)
MCHC RBC AUTO-ENTMCNC: 31.4 G/DL (ref 31.5–36.5)
MCV RBC AUTO: 93 FL (ref 78–100)
MONOCYTES # BLD AUTO: 1.6 10E9/L (ref 0–1.3)
MONOCYTES NFR BLD AUTO: 15.4 %
NEUTROPHILS # BLD AUTO: 6 10E9/L (ref 1.6–8.3)
NEUTROPHILS NFR BLD AUTO: 58.8 %
PLATELET # BLD AUTO: 259 10E9/L (ref 150–450)
PROT SERPL-MCNC: 7.3 G/DL (ref 6.8–8.8)
RBC # BLD AUTO: 3.81 10E12/L (ref 3.8–5.2)
WBC # BLD AUTO: 10.3 10E9/L (ref 4–11)

## 2018-12-21 PROCEDURE — 85025 COMPLETE CBC W/AUTO DIFF WBC: CPT | Performed by: INTERNAL MEDICINE

## 2018-12-21 PROCEDURE — 36415 COLL VENOUS BLD VENIPUNCTURE: CPT | Performed by: INTERNAL MEDICINE

## 2018-12-21 PROCEDURE — 82565 ASSAY OF CREATININE: CPT | Performed by: INTERNAL MEDICINE

## 2018-12-21 PROCEDURE — 80076 HEPATIC FUNCTION PANEL: CPT | Performed by: INTERNAL MEDICINE

## 2018-12-21 PROCEDURE — 86140 C-REACTIVE PROTEIN: CPT | Performed by: INTERNAL MEDICINE

## 2018-12-21 PROCEDURE — 85652 RBC SED RATE AUTOMATED: CPT | Performed by: INTERNAL MEDICINE

## 2018-12-30 DIAGNOSIS — G57.93 NEUROPATHY OF BOTH FEET: ICD-10-CM

## 2018-12-31 RX ORDER — GABAPENTIN 100 MG/1
CAPSULE ORAL
Qty: 90 CAPSULE | Refills: 0 | Status: SHIPPED | OUTPATIENT
Start: 2018-12-31 | End: 2019-02-04

## 2018-12-31 NOTE — RESULT ENCOUNTER NOTE
Rheumatology team: Please call to inform Ms. Stark that her labs do not show evidence for medication toxicity.    Jamie Johnson MD  12/31/2018 6:10 AM

## 2018-12-31 NOTE — TELEPHONE ENCOUNTER
gabapentin (NEURONTIN) 100 MG capsule      Last Written Prescription Date:  12/3/2018  Last Fill Quantity: 90,   # refills: 0  Last Office Visit: 8/23/2018  Future Office visit:    Next 5 appointments (look out 90 days)    Jan 23, 2019  8:40 AM CST  Return Visit with Jamie Johnson MD  [facility] ([facility] [address] [address]  [phone]           Routing refill request to provider for review/approval because:  Drug not on the [facility], [facility] or  Health refill protocol or controlled substance

## 2019-01-23 ENCOUNTER — OFFICE VISIT (OUTPATIENT)
Dept: RHEUMATOLOGY | Facility: CLINIC | Age: 84
End: 2019-01-23
Payer: MEDICARE

## 2019-01-23 VITALS
OXYGEN SATURATION: 95 % | DIASTOLIC BLOOD PRESSURE: 66 MMHG | HEART RATE: 64 BPM | SYSTOLIC BLOOD PRESSURE: 138 MMHG | BODY MASS INDEX: 23.35 KG/M2 | TEMPERATURE: 97.8 F | WEIGHT: 123.6 LBS

## 2019-01-23 DIAGNOSIS — Z79.899 HIGH RISK MEDICATION USE: ICD-10-CM

## 2019-01-23 DIAGNOSIS — M06.09 RHEUMATOID ARTHRITIS OF MULTIPLE SITES WITH NEGATIVE RHEUMATOID FACTOR (H): Primary | ICD-10-CM

## 2019-01-23 PROCEDURE — 99214 OFFICE O/P EST MOD 30 MIN: CPT | Performed by: INTERNAL MEDICINE

## 2019-01-23 RX ORDER — METHOTREXATE 2.5 MG/1
20 TABLET ORAL WEEKLY
Qty: 32 TABLET | Refills: 4 | Status: SHIPPED | OUTPATIENT
Start: 2019-01-23 | End: 2019-05-08

## 2019-01-23 RX ORDER — SULFASALAZINE 500 MG/1
500 TABLET, DELAYED RELEASE ORAL 2 TIMES DAILY
Qty: 60 TABLET | Refills: 4 | Status: SHIPPED | OUTPATIENT
Start: 2019-01-23 | End: 2019-05-08

## 2019-01-23 RX ORDER — FOLIC ACID 1 MG/1
1 TABLET ORAL DAILY
Qty: 30 TABLET | Refills: 6 | Status: SHIPPED | OUTPATIENT
Start: 2019-01-23 | End: 2019-05-08

## 2019-01-23 NOTE — PROGRESS NOTES
Rheumatology Clinic Visit      Roxanna Stark MRN# [id number]   YOB: 1927 Age: 89 year old      Date of visit: 1/23/19   PCP: Dr. Letha Grissom  Cardiology: Dr. Boston Navarro     Chief Complaint   Patient presents with:  Arthritis: RA; overall doing well; does mention she sometimes feels like there is something under her skin on the left side of her face for the past month    Assessment and Plan     1. 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  She was treated with prednisone monotherapy for several years, being able to taper off without recurrence of symptoms. She then developed worsening symptoms in her hands and was diagnosed with rheumatoid arthritis. Initially, she was resistant to taking DMARD therapy.  She then was started on MTX that was effective; she reduced the dose with worsening symptoms. Currently on MTX 15mg wkly and SSZ 500mg BID (mild anemia possibly associated with SSZ so the dose was previously reduced).  Currently with active synovitis so will increase methotrexate to 20 mg once weekly.  She has some hesitation about using sulfasalazine because she has some neuropathy in her feet and a sensation of bugs under her skin on the left side of her face for a few weeks now that she thinks may be related to sulfasalazine so she is going to try holding SSZ to see if her symptoms resolve.  I advised that for this neuropathy she follow-up with her PCP; she will need to establish with a new PCP since her last one has retired  - Increase methotrexate to 20 mg once weekly   - Continue folic acid 1mg daily  - Continue sulfasalazine 500 mg twice daily   - Labs monthly [id number]: CBC, Cr, Hepatic Panel  - Labs in 3 months: CBC, Creatinine, Hepatic Panel, ESR, CRP              Rapid 3, cumulative scores                      01/23/2019:   1.3  (MTX 15mg wkly, SSZ 500mg BID)                        09/06/2018:   3.3  (MTX 15mg wkly, SSZ 500mg BID)    2. Giant Cell Arteritis History?: 12/26/2005 Left TA biopsy negative per [facility] record review.  No symptoms of GCA at this time.     3. Right shoulder rotator cuff tear and pain: Previously evaluated by orthopedic surgery and her pain resolved for approximately one year after having a steroid injection in March 2015. She does not want to have surgical correction of her shoulder. Repeat steroid injections have been helpful; not needed today.  Physical therapy was effective and she is doing exercises at home.     4. History of basal cell carcinoma: Following with dermatology     5. Bone Health: Managed by PCP already.    6.  Peripheral neuropathy it is improved with gabapentin from her PCP, and a sensation of bugs crawling under her skin on the left side of her face: Symptoms have been present for more than 1 month.  Advised that she see a PCP for evaluation soon.    Ms. Stark verbalized agreement with and understanding of the rational for the diagnosis and treatment plan.  All questions were answered to best of my ability and the patient's satisfaction. Ms. Stark was advised to contact the clinic with any questions that may arise after the clinic visit.      Thank you for involving me in the care of the patient    Return to clinic: 3-4 months      HPI   Roxanna Stark is a 89 year old female with medical history significant for basal cell carcinoma, hypertension, aortic stenosis, right rotator cuff tear (previously evaluated by Dr. Bingham, orthopedic surgery, on 3/20/2015 where at that time Ms. Stark was not interested in surgical correction; she received an intra-articular steroid injection at that time that was effective for ~1year), temporal arteritis?, and seronegative erosive rheumatoid arthritis.     Today, she reports that she is doing well.  However, having some pain in her MCPs and PIPs.  Morning stiffness for approximately 45-60  minutes.  Pain and stiffness improved with activity.  Positive gelling phenomenon that resolves quickly.   No difficulty standing up from a low seated position or raising her arms above her head.  No headache.  No vision change.  No vision loss.  No scalp tenderness or jaw claudication.  No joint pain.    She notes having a sensation that bugs are crawling under her skin on the left side of her face for the past 1 month.  Also with peripheral neuropathy affecting her feet with pins and needles sensation on the plantar aspect of her feet that has improved with gabapentin given to her by her PCP.    Denies fevers, chills, nausea, vomiting, constipation, diarrhea. No abdominal pain. No chest pain/pressure, palpitations, or shortness of breath. No oral or nasal sores. No neck pain. No rash. Swelling in her bilateral feet.       Tobacco: None  EtOH: No more than 1 drink per week  Drugs: None  Occupation: Used to work for the telephone company; now retired    ROS   GEN: No fevers, chills, night sweats, or weight change  SKIN: No itching, rashes, sores  HEENT: No oral or nasal ulcers.  CV: No chest pain, pressure, palpitations, or dyspnea on exertion.  PULM: No SOB, wheeze, cough.  GI: No nausea, vomiting, constipation, diarrhea. No blood in stool. No abdominal pain.  : No blood in urine.  MSK: See HPI.  NEURO: Says that gabapentin is helping her peripheral neuropathy - affecting the feet  ENDO: No heat/cold intolerance.  EXT: See HPI    Active Problem List     Patient Active Problem List   Diagnosis     Polymyalgia rheumatica (H)     History of basal cell carcinoma     CARDIOVASCULAR SCREENING; LDL GOAL LESS THAN 130     Advanced directives, counseling/discussion     Hypertension goal BP (blood pressure) < 140/90     Hip pain     Left atrial enlargement     Aortic stenosis     Status post coronary angiogram     Aortic valve stenosis     Aortic valve replaced     Rheumatoid arthritis of multiple sites with negative  rheumatoid factor (H)     Past Medical History     Past Medical History:   Diagnosis Date     Actinic keratosis      Aortic stenosis 2014     Basal cell cancer 7/2014    left eye medial canthus      Basal cell carcinoma 9/30/08    left cheek     HTN (hypertension)      melanoma in situ 9/30/2008    LEFT ARM     Melanoma in situ (H) 9/30/08    left arm     Polymyalgia rheumatica (H) 11/99     Temporal arteritis (H) 11/99     Past Surgical History     Past Surgical History:   Procedure Laterality Date     C SKIN TISSUE PROCEDURE UNLISTED  11/3/08    mmis skin cancer excision     CATARACT IOL, RT/LT  5/09    bilateral     COLONOSCOPY  2002     REPLACE VALVE AORTIC N/A 4/25/2016    Procedure: REPLACE VALVE AORTIC;  Surgeon: Sudeep Tsai MD;  Location: [facility] OR     Allergy     Allergies   Allergen Reactions     Lisinopril Cough        Current Medication List     Current Outpatient Medications   Medication Sig     amoxicillin (AMOXIL) 500 MG capsule TAKE 4 CAPSULES BY MOUTH 1 HOUR BEFORE DENTAL APPOINTMENT     aspirin  MG EC tablet Take 1 tablet (325 mg) by mouth daily     Blood Pressure Monitor KIT 1 Units daily     calcium-vitamin D 500-125 MG-UNIT TABS      folic acid (FOLVITE) 1 MG tablet Take 1 tablet (1 mg) by mouth daily     furosemide (LASIX) 20 MG tablet TAKE 1 TABLET BY MOUTH EVERY DAY IF 2 TO 3 POUNDS WEIGHT GAIN OVER 2 DAY PERIOD     gabapentin (NEURONTIN) 100 MG capsule TAKE ONE CAPSULE BY MOUTH THREE TIMES DAILY. NEED TO SEE MD FOR MORE REFILLS     ICAPS PO 2 tablets daily     losartan (COZAAR) 25 MG tablet Take 1 tablet (25 mg) by mouth daily     methotrexate sodium 2.5 MG TABS Take 6 tablets (15 mg) by mouth once a week . Take all 6 tablets on the same day of each week.  Do not take with amoxicillin.     metoprolol tartrate (LOPRESSOR) 25 MG tablet TAKE 1 TABLET(25 MG) BY MOUTH TWICE DAILY     Omega-3 Fatty Acids (OMEGA-3 FISH OIL PO) Take 1 tablet twice daily.     sulfaSALAzine ER  "(AZULFIDINE EN) 500 MG EC tablet Take 1 tablet (500 mg) by mouth 2 times daily     No current facility-administered medications for this visit.        Social History   See HPI    Family History     Family History   Problem Relation Age of Onset     Breast Cancer Sister      Arthritis Sister      Cancer Sister         breast     Thyroid Disease Sister      Cancer Sister         colon     Arthritis Sister      Arthritis Mother      Hypertension Father      Cancer - colorectal Father      Prostate Cancer Father      Arthritis Father      Heart Disease Father      Lipids Father      Arthritis Sister      Asthma Daughter      Asthma Daughter      Cancer Daughter 58        lung     Cancer Other 81        pancreatic      Physical Exam     Temp Readings from Last 3 Encounters:   09/06/18 97.4  F (36.3  C) (Oral)   08/23/18 97.8  F (36.6  C) (Oral)   06/20/18 97.2  F (36.2  C) (Oral)     BP Readings from Last 5 Encounters:   09/06/18 132/58   08/23/18 160/60   06/29/18 147/71   06/20/18 120/58   05/10/18 139/69     Pulse Readings from Last 1 Encounters:   09/06/18 71     Resp Readings from Last 1 Encounters:   09/06/18 16     Estimated body mass index is 22.45 kg/m  as calculated from the following:    Height as of 9/6/18: 1.549 m (5' 1\").    Weight as of 9/6/18: 53.9 kg (118 lb 12.8 oz).    GEN: NAD  HEENT: MMM.  Anicteric, noninjected sclera  CV: S1, S2. RRR. No m/r/g.  PULM: CTA bilaterally. No w/c.  MSK: Mild synovial swelling with tenderness palpation of the bilateral second-third MCPs.  Other MCPs, PIPs, wrists, elbows, shoulders, knees, ankles, and MTPs without swelling or tenderness to palpation.  Hips nontender to palpation. Boggy nontender bursa over the left elbow.  NEURO: Able to stand up unassisted from a chair without difficulty. Able to raise her arms above her head without difficulty  SKIN: No rash.    EXT: Nonpitting edema of the bilateral lower extremities  PSYCH: Alert. Appropriate.    Labs / Imaging " (select studies)   RF/CCP  Recent Labs   Lab Test 08/11/16  1124 08/04/16  1222 02/18/16  1543   CCPIGG 1  --   --    RHF  --  <20 <20     CBC  Recent Labs   Lab Test 12/21/18  1050 09/06/18  0931 09/04/18  0853   WBC 10.3 10.3 10.3   RBC 3.81 3.53* 3.58*   HGB 11.1* 9.9* 10.1*   HCT 35.3 31.3* 31.7*   MCV 93 89 89   RDW 21.4* 20.9* 20.6*    282 296   MCH 29.1 28.0 28.2   MCHC 31.4* 31.6 31.9   NEUTROPHIL 58.8 64.7 64.2   LYMPH 20.4 18.4 22.5   MONOCYTE 15.4 11.7 7.4   EOSINOPHIL 4.6 4.5 5.0   BASOPHIL 0.8 0.7 0.9   ANEU 6.0 6.7 6.6   ALYM 2.1 1.9 2.3   IGNACIO 1.6* 1.2 0.8   AEOS 0.5 0.5 0.5   ABAS 0.1 0.1 0.1     CMP  Recent Labs   Lab Test 12/21/18  1050 09/06/18  0931 05/07/18  0800  12/14/16  0849  07/12/16  1035 06/24/16  0852   NA  --   --   --   --  140  --  138 140   POTASSIUM  --   --   --   --  4.5  --  4.6 4.3   CHLORIDE  --   --   --   --  105  --  102 105   CO2  --   --   --   --  26  --  28 27   ANIONGAP  --   --   --   --  9  --  8 8   GLC  --   --   --   --  172*  --  117* 95   BUN  --   --   --   --  28  --  17 25   CR 1.19* 1.20* 1.20*   < > 1.19*   < > 1.04 1.18*   GFRESTIMATED 40* 42* 42*   < > 43*   < > 50* 43*   GFRESTBLACK 46* 51* 51*   < > 52*   < > 60* 52*   ROEL  --   --   --   --  8.8  --  9.4 9.0   BILITOTAL 0.3 0.2 0.3   < >  --    < >  --   --    ALBUMIN 3.4 3.3* 3.4   < >  --    < >  --   --    PROTTOTAL 7.3 7.3 6.8   < >  --    < >  --   --    ALKPHOS 92 91 67   < >  --    < >  --   --    AST 24 23 33   < >  --    < >  --   --    ALT 23 25 29   < >  --    < >  --   --     < > = values in this interval not displayed.     Calcium/VitaminD  Recent Labs   Lab Test 12/14/16  0849 07/12/16  1035 06/24/16  0852   ROEL 8.8 9.4 9.0     ESR/CRP  Recent Labs   Lab Test 12/21/18  1050 09/06/18  0931 04/23/18  0824   SED 37* 64* 53*   CRP 6.3 13.6* 4.8     Hepatitis B  Recent Labs   Lab Test 11/10/16  0909   HBCAB Nonreactive   HEPBANG Nonreactive     Hepatitis C  Recent Labs   Lab Test  11/10/16  0909   HCVAB Nonreactive   Assay performance characteristics have not been established for newborns,   infants, and children       HIV Screening  Recent Labs   Lab Test 11/10/16  0909   HIAGAB Nonreactive   HIV-1 p24 Ag & HIV-1/HIV-2 Ab Not Detected       Immunization History     Immunization History   Administered Date(s) Administered     Influenza (H1N1) 10/20/2016     Influenza (High Dose) 3 valent vaccine 10/03/2017, 08/23/2018     Influenza (IIV3) PF 10/04/2005, 10/20/2010, 11/09/2011, 09/22/2012, 09/16/2013, 10/15/2014     Pneumo Conj 13-V (2010&after) 03/17/2015     Pneumococcal 23 valent 11/03/2000, 12/13/2010     TD (ADULT, 7+) 01/12/2004     TDAP Vaccine (Adacel) 05/14/2013     Zoster vaccine recombinant adjuvanted (SHINGRIX) 06/23/2018, 08/23/2018     Zoster vaccine, live 12/15/2006          Chart documentation done in part with Dragon Voice recognition Software. Although reviewed after completion, some word and grammatical error may remain.    Jamie Johnson MD

## 2019-01-23 NOTE — PROGRESS NOTES
RAPID3 (0-30) Cumulative Score  1.3          RAPID3 Weighted Score (divide #4 by 3 and that is the weighted score)  0.4

## 2019-02-04 ENCOUNTER — TELEPHONE (OUTPATIENT)
Dept: FAMILY MEDICINE | Facility: CLINIC | Age: 84
End: 2019-02-04

## 2019-02-04 DIAGNOSIS — G57.93 NEUROPATHY OF BOTH FEET: ICD-10-CM

## 2019-02-04 RX ORDER — GABAPENTIN 100 MG/1
CAPSULE ORAL
Qty: 90 CAPSULE | Refills: 5 | Status: SHIPPED | OUTPATIENT
Start: 2019-02-04 | End: 2019-08-21

## 2019-02-04 NOTE — TELEPHONE ENCOUNTER
Would you confirm how this is working for her and establish a new PCP  Signed Prescriptions:                        Disp   Refills    gabapentin (NEURONTIN) 100 MG capsule      90 cap*5        Sig: TAKE ONE CAPSULE BY MOUTH THREE TIMES DAILY  Authorizing Provider: SUNDAY BRADEN

## 2019-02-04 NOTE — TELEPHONE ENCOUNTER
Routing refill request to provider for review/approval because:  Drug not on the [facility] refill protocol       Requested Prescriptions   Pending Prescriptions Disp Refills     gabapentin (NEURONTIN) 100 MG capsule  Last Written Prescription Date:  12/31/18  Last Fill Quantity: 90,  # refills: 0   Last office visit: 8/23/2018 with prescribing provider:     Future Office Visit:     90 capsule 0    There is no refill protocol information for this order        Ann Sparrow RN - [facility]

## 2019-02-04 NOTE — TELEPHONE ENCOUNTER
Requested Prescriptions   Pending Prescriptions Disp Refills     gabapentin (NEURONTIN) 100 MG capsule 90 capsule 0    There is no refill protocol information for this order

## 2019-02-05 NOTE — TELEPHONE ENCOUNTER
Patient notified that prescription was sent to pharmacy.  She reports the medication is working well for the sharp pains she had in her feet from neuropathy.  Patient not sure who to follow up with patient may see Maria D Aguilar. Patient wrote name down- advised her she will need a visit before Sept. Patient verbalized understanding, no further questions or concerns.    Jerilyn Orr RN

## 2019-02-21 ENCOUNTER — TRANSFERRED RECORDS (OUTPATIENT)
Dept: HEALTH INFORMATION MANAGEMENT | Facility: CLINIC | Age: 84
End: 2019-02-21

## 2019-03-20 DIAGNOSIS — Z79.899 HIGH RISK MEDICATION USE: ICD-10-CM

## 2019-03-20 DIAGNOSIS — M06.09 RHEUMATOID ARTHRITIS OF MULTIPLE SITES WITH NEGATIVE RHEUMATOID FACTOR (H): ICD-10-CM

## 2019-03-20 LAB
ALBUMIN SERPL-MCNC: 3.5 G/DL (ref 3.4–5)
ALP SERPL-CCNC: 90 U/L (ref 40–150)
ALT SERPL W P-5'-P-CCNC: 38 U/L (ref 0–50)
AST SERPL W P-5'-P-CCNC: 26 U/L (ref 0–45)
BASOPHILS # BLD AUTO: 0.1 10E9/L (ref 0–0.2)
BASOPHILS NFR BLD AUTO: 0.7 %
BILIRUB DIRECT SERPL-MCNC: <0.1 MG/DL (ref 0–0.2)
BILIRUB SERPL-MCNC: 0.3 MG/DL (ref 0.2–1.3)
CREAT SERPL-MCNC: 1.23 MG/DL (ref 0.52–1.04)
CRP SERPL-MCNC: 6.3 MG/L (ref 0–8)
DIFFERENTIAL METHOD BLD: ABNORMAL
EOSINOPHIL # BLD AUTO: 0.4 10E9/L (ref 0–0.7)
EOSINOPHIL NFR BLD AUTO: 4.8 %
ERYTHROCYTE [DISTWIDTH] IN BLOOD BY AUTOMATED COUNT: 20.5 % (ref 10–15)
ERYTHROCYTE [SEDIMENTATION RATE] IN BLOOD BY WESTERGREN METHOD: 40 MM/H (ref 0–30)
GFR SERPL CREATININE-BSD FRML MDRD: 38 ML/MIN/{1.73_M2}
HCT VFR BLD AUTO: 34.6 % (ref 35–47)
HGB BLD-MCNC: 10.6 G/DL (ref 11.7–15.7)
LYMPHOCYTES # BLD AUTO: 2.3 10E9/L (ref 0.8–5.3)
LYMPHOCYTES NFR BLD AUTO: 25.2 %
MCH RBC QN AUTO: 28.8 PG (ref 26.5–33)
MCHC RBC AUTO-ENTMCNC: 30.6 G/DL (ref 31.5–36.5)
MCV RBC AUTO: 94 FL (ref 78–100)
MONOCYTES # BLD AUTO: 0.7 10E9/L (ref 0–1.3)
MONOCYTES NFR BLD AUTO: 8 %
NEUTROPHILS # BLD AUTO: 5.5 10E9/L (ref 1.6–8.3)
NEUTROPHILS NFR BLD AUTO: 61.3 %
PLATELET # BLD AUTO: 240 10E9/L (ref 150–450)
PROT SERPL-MCNC: 7.2 G/DL (ref 6.8–8.8)
RBC # BLD AUTO: 3.68 10E12/L (ref 3.8–5.2)
WBC # BLD AUTO: 9 10E9/L (ref 4–11)

## 2019-03-20 PROCEDURE — 36415 COLL VENOUS BLD VENIPUNCTURE: CPT | Performed by: INTERNAL MEDICINE

## 2019-03-20 PROCEDURE — 82565 ASSAY OF CREATININE: CPT | Performed by: INTERNAL MEDICINE

## 2019-03-20 PROCEDURE — 85652 RBC SED RATE AUTOMATED: CPT | Performed by: INTERNAL MEDICINE

## 2019-03-20 PROCEDURE — 85025 COMPLETE CBC W/AUTO DIFF WBC: CPT | Performed by: INTERNAL MEDICINE

## 2019-03-20 PROCEDURE — 80076 HEPATIC FUNCTION PANEL: CPT | Performed by: INTERNAL MEDICINE

## 2019-03-20 PROCEDURE — 86140 C-REACTIVE PROTEIN: CPT | Performed by: INTERNAL MEDICINE

## 2019-03-22 NOTE — RESULT ENCOUNTER NOTE
Rheumatology team: Please call to notify Ms. Stark that labs are stable.    Jamie Johnson MD  3/22/2019 11:51 AM

## 2019-03-26 NOTE — RESULT ENCOUNTER NOTE
Called and left a vm for patient letting her know her labs are stable. I left a call back number for questions.  Evon Chapman Certified Medical Assistant

## 2019-04-15 ENCOUNTER — OFFICE VISIT (OUTPATIENT)
Dept: FAMILY MEDICINE | Facility: CLINIC | Age: 84
End: 2019-04-15
Payer: MEDICARE

## 2019-04-15 VITALS
WEIGHT: 122 LBS | BODY MASS INDEX: 23.03 KG/M2 | RESPIRATION RATE: 16 BRPM | HEIGHT: 61 IN | HEART RATE: 62 BPM | TEMPERATURE: 96.7 F | DIASTOLIC BLOOD PRESSURE: 70 MMHG | SYSTOLIC BLOOD PRESSURE: 128 MMHG | OXYGEN SATURATION: 98 %

## 2019-04-15 DIAGNOSIS — Z79.899 HIGH RISK MEDICATIONS (NOT ANTICOAGULANTS) LONG-TERM USE: ICD-10-CM

## 2019-04-15 DIAGNOSIS — H93.91 EAR PROBLEM, RIGHT: Primary | ICD-10-CM

## 2019-04-15 PROCEDURE — 99213 OFFICE O/P EST LOW 20 MIN: CPT | Performed by: FAMILY MEDICINE

## 2019-04-15 ASSESSMENT — MIFFLIN-ST. JEOR: SCORE: 905.77

## 2019-04-15 NOTE — PROGRESS NOTES
SUBJECTIVE:   Roxanna Stark is a 89 year old female who presents to clinic today for the following   health issues:        Acute Illness   Acute illness concerns Right: Ear-feels some movement  Sleeps well at Night  No pain  Onset: Started around [place]    Fever: no     Chills/Sweats: no     Headache (location?): no    Sinus Pressure:no    Conjunctivitis:  no    Ear Pain:  As above    Rhinorrhea: YES    Congestion: no     Sore Throat: no      Cough: no    Wheeze: no     Decreased Appetite: no     Nausea: no     Vomiting: no     Diarrhea:  no     Dysuria/Freq.: no     Fatigue/Achiness: no     Sick/Strep Exposure: no     Therapies Tried and outcome: None  Feels somethin on the Right side of her Teeth  No pain    Additional history: as documented    Reviewed  and updated as needed this visit by clinical staff  Tobacco  Allergies  Meds  Med Hx  Surg Hx  Fam Hx  Soc Hx        Reviewed and updated as needed this visit by Provider         Patient Active Problem List   Diagnosis     Polymyalgia rheumatica (H)     History of basal cell carcinoma     CARDIOVASCULAR SCREENING; LDL GOAL LESS THAN 130     Advanced directives, counseling/discussion     Hypertension goal BP (blood pressure) < 140/90     Hip pain     Left atrial enlargement     Aortic stenosis     Status post coronary angiogram     Aortic valve stenosis     Aortic valve replaced     Rheumatoid arthritis of multiple sites with negative rheumatoid factor (H)     Past Surgical History:   Procedure Laterality Date     C SKIN TISSUE PROCEDURE UNLISTED  11/3/08    mmis skin cancer excision     CATARACT IOL, RT/LT  5/09    bilateral     COLONOSCOPY  2002     REPLACE VALVE AORTIC N/A 4/25/2016    Procedure: REPLACE VALVE AORTIC;  Surgeon: Sudeep Tsai MD;  Location:  OR       Social History     Tobacco Use     Smoking status: Never Smoker     Smokeless tobacco: Never Used   Substance Use Topics     Alcohol use: Yes     Comment: 4 times a year      Family History   Problem Relation Age of Onset     Breast Cancer Sister      Arthritis Sister      Cancer Sister         breast     Thyroid Disease Sister      Cancer Sister         colon     Arthritis Sister      Arthritis Mother      Hypertension Father      Cancer - colorectal Father      Prostate Cancer Father      Arthritis Father      Heart Disease Father      Lipids Father      Arthritis Sister      Asthma Daughter      Asthma Daughter      Cancer Daughter 58        lung     Cancer Other 81        pancreatic          Current Outpatient Medications   Medication Sig Dispense Refill     aspirin  MG EC tablet Take 1 tablet (325 mg) by mouth daily 90 tablet 3     Blood Pressure Monitor KIT 1 Units daily 1 kit 0     calcium-vitamin D 500-125 MG-UNIT TABS        folic acid (FOLVITE) 1 MG tablet Take 1 tablet (1 mg) by mouth daily 30 tablet 6     furosemide (LASIX) 20 MG tablet TAKE 1 TABLET BY MOUTH EVERY DAY IF 2 TO 3 POUNDS WEIGHT GAIN OVER 2 DAY PERIOD 90 tablet 3     gabapentin (NEURONTIN) 100 MG capsule TAKE ONE CAPSULE BY MOUTH THREE TIMES DAILY 90 capsule 5     ICAPS PO 2 tablets daily       losartan (COZAAR) 25 MG tablet Take 1 tablet (25 mg) by mouth daily 90 tablet 3     methotrexate sodium 2.5 MG TABS Take 8 tablets (20 mg) by mouth once a week . Take all 8 tablets on the same day of each week.  Do not take with amoxicillin. 32 tablet 4     metoprolol tartrate (LOPRESSOR) 25 MG tablet TAKE 1 TABLET(25 MG) BY MOUTH TWICE DAILY 180 tablet 3     Omega-3 Fatty Acids (OMEGA-3 FISH OIL PO) Take 1 tablet twice daily.       sulfaSALAzine ER (AZULFIDINE EN) 500 MG EC tablet Take 1 tablet (500 mg) by mouth 2 times daily 60 tablet 4     amoxicillin (AMOXIL) 500 MG capsule TAKE 4 CAPSULES BY MOUTH 1 HOUR BEFORE DENTAL APPOINTMENT (Patient not taking: Reported on 1/23/2019) 4 capsule 0     Allergies   Allergen Reactions     Lisinopril Cough     Recent Labs   Lab Test 03/20/19  0846 12/21/18  1050 09/06/18  0931   "12/14/16  0849  07/12/16  1035  03/17/15  0923  06/21/12  0841   LDL  --   --   --   --   --   --   --   --  116  --   --    HDL  --   --   --   --   --   --   --   --  72  --   --    TRIG  --   --   --   --   --   --   --   --  200*  --   --    ALT 38 23 25   < >  --    < >  --    < >  --   --   --    CR 1.23* 1.19* 1.20*   < > 1.19*   < > 1.04   < > 1.02   < >  --    GFRESTIMATED 38* 40* 42*   < > 43*   < > 50*   < > 51*   < >  --    GFRESTBLACK 44* 46* 51*   < > 52*   < > 60*   < > 62   < >  --    POTASSIUM  --   --   --   --  4.5  --  4.6   < > 3.8   < >  --    TSH  --   --   --   --   --   --   --   --   --   --  1.55    < > = values in this interval not displayed.      BP Readings from Last 3 Encounters:   04/15/19 128/70   01/23/19 138/66   09/06/18 132/58    Wt Readings from Last 3 Encounters:   04/15/19 55.3 kg (122 lb)   01/23/19 56.1 kg (123 lb 9.6 oz)   09/06/18 53.9 kg (118 lb 12.8 oz)                  Labs reviewed in EPIC    ROS:  CONSTITUTIONAL: NEGATIVE for fever, chills, change in weight  ENT/MOUTH: as above  RESP: NEGATIVE for significant cough or SOB  CV: NEGATIVE for chest pain, palpitations or peripheral edema  Rest of the ROS is Negative except see above and Problem list [stable]      OBJECTIVE:     /70   Pulse 62   Temp 96.7  F (35.9  C) (Oral)   Resp 16   Ht 1.549 m (5' 1\")   Wt 55.3 kg (122 lb)   SpO2 98%   Breastfeeding? No   BMI 23.05 kg/m    Body mass index is 23.05 kg/m .  GENERAL: healthy, alert and no distress  HENT: ear canals and TM's normal, nose and mouth without ulcers or lesions  NECK: no adenopathy, no asymmetry, masses, or scars and thyroid normal to palpation  RESP: lungs clear to auscultation - no rales, rhonchi or wheezes  CV: regular rate and rhythm, normal S1 S2, no S3 or S4, no murmur, click or rub, no peripheral edema and peripheral pulses strong    MS: no gross musculoskeletal defects noted, no edema    Diagnostic Test Results:  none     ASSESSMENT/PLAN: "       ICD-10-CM    1. Ear problem, right H93.91    2. High risk medications (not anticoagulants) long-term use Z79.899      Discussed I do not see anything  Please see Dentist for Possible Dental issues  Follow up if not better  Pt sleeps well at night  Swapna Gaines MD  [facility]

## 2019-05-03 ENCOUNTER — OFFICE VISIT (OUTPATIENT)
Dept: CARDIOLOGY | Facility: CLINIC | Age: 84
End: 2019-05-03
Payer: MEDICARE

## 2019-05-03 VITALS
OXYGEN SATURATION: 100 % | WEIGHT: 126 LBS | HEART RATE: 77 BPM | BODY MASS INDEX: 23.81 KG/M2 | SYSTOLIC BLOOD PRESSURE: 166 MMHG | DIASTOLIC BLOOD PRESSURE: 73 MMHG

## 2019-05-03 DIAGNOSIS — E78.2 MIXED HYPERLIPIDEMIA: ICD-10-CM

## 2019-05-03 DIAGNOSIS — I10 BENIGN ESSENTIAL HYPERTENSION: Primary | ICD-10-CM

## 2019-05-03 DIAGNOSIS — Z95.2 AORTIC VALVE REPLACED: ICD-10-CM

## 2019-05-03 DIAGNOSIS — I10 HYPERTENSION GOAL BP (BLOOD PRESSURE) < 140/90: ICD-10-CM

## 2019-05-03 PROCEDURE — 99214 OFFICE O/P EST MOD 30 MIN: CPT | Performed by: INTERNAL MEDICINE

## 2019-05-03 NOTE — LETTER
5/3/2019    RE: Roxanna Stark  [address]  [place] [address]       Dear Colleague,    Thank you for the opportunity to participate in the care of your patient, Roxanna Stark, at the [facility] HEART AT [facility]. Please see a copy of my visit note below.    May 3, 2019     Roxanna Stark is an 89 year-old very female patient seen today in follow up, previously seen and followed by Dr. Navarro. She was initially seen for severe aortic stenosis and underwent AVR with a 21 mm tissue valve on 4/21/2016 by Dr. Tsai. Her postoperative course was uneventful.   Today she returns for follow-up.  Offers no new complaints she remains active.  Her blood pressure is elevated today for the first time she says is been never this high before.  We will recheck.  No dizziness lightheadedness no chest pains no shortness of breath.  Takes all medications as prescribed.  No lower extremity edema no PND orthopnea.  No other complaints.     PAST MEDICAL HISTORY:  Past Medical History:   Diagnosis Date     Actinic keratosis      Aortic stenosis 2014     Basal cell cancer 7/2014    left eye medial canthus      Basal cell carcinoma 9/30/08    left cheek     HTN (hypertension)      melanoma in situ 9/30/2008    LEFT ARM     Melanoma in situ (H) 9/30/08    left arm     Polymyalgia rheumatica (H) 11/99     Temporal arteritis (H) 11/99     FAMILY HISTORY:  Family History   Problem Relation Age of Onset     Breast Cancer Sister      Arthritis Sister      Cancer Sister         breast     Thyroid Disease Sister      Cancer Sister         colon     Arthritis Sister      Arthritis Mother      Hypertension Father      Cancer - colorectal Father      Prostate Cancer Father      Arthritis Father      Heart Disease Father      Lipids Father      Arthritis Sister      Asthma Daughter      Asthma Daughter      Cancer Daughter 58        lung     Cancer Other 81         pancreatic      SOCIAL HISTORY:  Social History     Socioeconomic History     Marital status:      Spouse name: None     Number of children: None     Years of education: None     Highest education level: None   Occupational History     Employer: RETIRED   Social Needs     Financial resource strain: None     Food insecurity:     Worry: None     Inability: None     Transportation needs:     Medical: None     Non-medical: None   Tobacco Use     Smoking status: Never Smoker     Smokeless tobacco: Never Used   Substance and Sexual Activity     Alcohol use: Yes     Comment: 4 times a year     Drug use: No     Sexual activity: Never   Lifestyle     Physical activity:     Days per week: None     Minutes per session: None     Stress: None   Relationships     Social connections:     Talks on phone: None     Gets together: None     Attends Gnosticist service: None     Active member of club or organization: None     Attends meetings of clubs or organizations: None     Relationship status: None     Intimate partner violence:     Fear of current or ex partner: None     Emotionally abused: None     Physically abused: None     Forced sexual activity: None   Other Topics Concern     Parent/sibling w/ CABG, MI or angioplasty before 65F 55M? No   Social History Narrative     None     CURRENT MEDICATIONS:  Current Outpatient Medications   Medication     aspirin  MG EC tablet     calcium-vitamin D 500-125 MG-UNIT TABS     folic acid (FOLVITE) 1 MG tablet     furosemide (LASIX) 20 MG tablet     gabapentin (NEURONTIN) 100 MG capsule     ICAPS PO     losartan (COZAAR) 25 MG tablet     methotrexate sodium 2.5 MG TABS     metoprolol tartrate (LOPRESSOR) 25 MG tablet     Omega-3 Fatty Acids (OMEGA-3 FISH OIL PO)     sulfaSALAzine ER (AZULFIDINE EN) 500 MG EC tablet     Blood Pressure Monitor KIT     No current facility-administered medications for this visit.       ROS:   Constitutional: No fever, chills, or sweats. Weight  is 126 lbs 0 oz  ENT: No visual disturbance, ear ache, epistaxis, sore throat.   Allergies/Immunologic: Negative.   Respiratory: No cough, hemoptysis.   Cardiovascular: As per HPI.   GI: No nausea, vomiting, hematemesis, melena, or hematochezia.   : No urinary frequency, dysuria, or hematuria.   Integrument: Negative.   Psychiatric: No evidence of major depression  Neuro: No new neurological complaints at this time. Non focal  Endocrinology: Negative.   Musculoskeletal: As per HPI.      EXAM:  /73 (BP Location: Right arm, Patient Position: Chair, Cuff Size: Adult Regular)   Pulse 77   Wt 57.2 kg (126 lb)   SpO2 100%   BMI 23.81 kg/m     General: appears comfortable, alert and oriented  Head: normocephalic, atraumatic  Eyes: anicteric sclera, EOMI , PERRL  Neck: no adenopathy  Orophyarynx: moist mucosa, no lesions noted  Heart: regular, S1/S2, soft MICHAELA unchanged, no gallop. Estimated JVP at 5 cmH2O  Lungs: CTAB, No wheezing.   Abdomen: soft, non-tender, bowel sounds present, no hepatosplenomegaly  Extremities: No LE edema today  Skin: no open lesions noted  Neuro: grossly non-focal  Psych: no evidence of depression noted     Labs:  Lab Results   Component Value Date    WBC 9.0 2019    HGB 10.6 (L) 2019    HCT 34.6 (L) 2019     2019     2016    POTASSIUM 4.5 2016    CHLORIDE 105 2016    CO2 26 2016    BUN 28 2016    CR 1.23 (H) 2019     (H) 2016    SED 40 (H) 2019    AST 26 2019    ALT 38 2019    ALKPHOS 90 2019    BILITOTAL 0.3 2019    INR 1.41 (H) 2016     EK12.   Sinus Rhythm - occasional ectopic ventricular beat.  ECHOCARDIOGRAMS:   2016  Global and regional left ventricular function is normal with an EF of 60-65%.  There is at least moderate diastolic dysfunction.  Global right ventricular function is normal.  Severe aortic stenosis is present. The mean gradient across  the aortic valve is 61 mmHg. The peak aortic velocity is 5.1 m/sec. JAVIER 0.6 cm2.     10/13/2014   1. Normal biventricular systolic function. LVEF estimate 65%.   2. Moderate aortic stenosis (peak velocity: 3.9 m/s, mean gradient: 36 mmHg, calculated valve area: 1.2 cm2).   3. Normal IVC with preserved respiratory variability.   4. Compared to study dated 02/10/14 the aortic valve parameters have slightly worsened.     02/11/14  Global and regional left ventricular function is normal with an EF of 55-60%. Global right ventricular function is normal. Trace tricuspid insufficiency is present. Right ventricular systolic pressure is 19mmHg above the right atrial pressure. The inferior vena cava was normal in size with preserved respiratory variability. Small circumferential pericardial effusion is present without any hemodynamic significance. Chamber compression is not present; there is no evidence for tamponade.   Left Ventricle: Global and regional left ventricular function is normal with an EF of 55-60%. Left ventricular size is normal. Left ventricular Doppler filling pattern consistent with abnormal relaxation.   Right Ventricle: The right ventricle is normal size. Global right ventricular function is normal.   Atria: The right atria appears normal. Moderate left atrial enlargement is present.   Mitral Valve: Mild mitral annular calcification is present. Trace mitral insufficiency is present.   Aortic Valve: Mild aortic valve sclerosis is present. No aortic regurgitation is present.   Tricuspid Valve: The tricuspid valve is normal. Trace tricuspid insufficiency is present. Right ventricular systolic pressure is 19mmHg above the right atrial pressure.   Pulmonic Valve: The pulmonic valve is normal. Trace pulmonic insufficiency is present.   Vessels: The aorta root is normal. The inferior vena cava was normal in size with preserved respiratory variability.   Pericardium: Small circumferential pericardial effusion is  present without any hemodynamic significance. Chamber compression is not present; there is no evidence for tamponade.   05/11/12  Left ventricular function, chamber size, wall motion, and wall thickness are normal.The EF is > 65%. Paradoxic low flow aortic stenosis with moderate to severe reduction in valve area. Visually the valve appears moderately calcified with mild-moderate cusp restriction.   Left Ventricle: Left ventricular function, chamber size, wall motion, and wall thickness are normal.The EF is > 65%. Relative wall thickness is increased consistent with concentric remodeling.   Right Ventricle: Right ventricular function, chamber size, wall motion, and thickness are normal.   Atria: Both atria appear normal.   Mitral Valve: Mild to moderate mitral annular calcification is present. Systolic anterior motion without gradient.   Aortic Valve: The aortic valve is tricuspid. Mild aortic valve sclerosis is present.   Tricuspid Valve: The tricuspid valve is normal. Trace tricuspid insufficiency is present.   Pulmonic Valve: The pulmonic valve cannot be assessed.   Vessels: The aorta root is normal. The pulmonary artery is normal. The inferior vena cava is normal.   Pericardium: No pericardial effusion is present.      08/04/2017  Global and regional left ventricular function is normal with an EF of 60-65%.  Global right ventricular function is normal.  S/p AVR 21mm tissue valve. The mean gradient is 11 mmHg. The effective orifice  area is 1.4 cm^2. EOAI is 0.9 cm/m2, DVI is 0.55 (all normal.)  Mild mitral stenosis is present. The etiology of the mitral stenosis is mitral  annular calcification. Mean gradient is 4mmHg  IVC is normal in size with preserved respiratory variability. PAP is normal.  No pericardial effusion is present.  This study was compared with the study from 2/25/2016. The patient is now s/p AVR for severe aortic stenosis.     ASSESSMENT AND PLAN:   1. Severe AS. S/p AVR with a #21 tissue  prosthesis. Excellent post-surgical results.  2. Hypertension: not at target today, she does have lower numbers at home. Transitioned to losartan due to cough with lisinopril and tolerates this well. Will have her recheck her BP and if remains elevated would increase losartan or consider adding amlodipine. She was hesitant to any changes today  3. RA.  4. Plan:   - Check BP at home .Call the office with an update in 2-3 weeks. May need to adjust medications as above.  - TTE in 1 year prior to visit  - RTC in 12 months.    I appreciate the opportunity to participate in the care of Roxanna Stark . Please do not hesitate to contact me with any further questions.     Sincerely,    Maynor Mary MD     [facility] Division of Cardiology

## 2019-05-03 NOTE — PATIENT INSTRUCTIONS
Thank you for coming to the [facility] @ [facility]; please note the following instructions:    1. Follow up with your primary care physician regarding your blood pressure being high.  2. Follow up with Dr Mary and an echocardiogram in 1 year.        If you have any questions regarding your visit please contact your care team:     Cardiology  Telephone Number   Andressa MCGRATH, RN  Shoshana ABRAMS, RN   Linda HENDRIX, [facility]  [place], [place]    [phone]   For scheduling appts   [phone] (select option 1)         For the Device Clinic (Pacemakers and ICD's)  RN's :  Rosa Knight   During business hours: [phone]    *After business hours:  [phone] (select option 4)      Normal test result notifications will be released via Getfugu or mailed within 7 business days.  All other test results, will be communicated via telephone once reviewed by your cardiologist.    If you need a medication refill please contact your pharmacy.  Please allow 3 business days for your refill to be completed.    As always, thank you for trusting us with your health care needs!

## 2019-05-03 NOTE — PROGRESS NOTES
May 3, 2019     Roxanna Stark is an 89 year-old very female patient seen today in follow up, previously seen and followed by Dr. Navarro. She was initially seen for severe aortic stenosis and underwent AVR with a 21 mm tissue valve on 4/21/2016 by Dr. Tsai. Her postoperative course was uneventful.   Today she returns for follow-up.  Offers no new complaints she remains active.  Her blood pressure is elevated today for the first time she says is been never this high before.  We will recheck.  No dizziness lightheadedness no chest pains no shortness of breath.  Takes all medications as prescribed.  No lower extremity edema no PND orthopnea.  No other complaints.     PAST MEDICAL HISTORY:  Past Medical History:   Diagnosis Date     Actinic keratosis      Aortic stenosis 2014     Basal cell cancer 7/2014    left eye medial canthus      Basal cell carcinoma 9/30/08    left cheek     HTN (hypertension)      melanoma in situ 9/30/2008    LEFT ARM     Melanoma in situ (H) 9/30/08    left arm     Polymyalgia rheumatica (H) 11/99     Temporal arteritis (H) 11/99     FAMILY HISTORY:  Family History   Problem Relation Age of Onset     Breast Cancer Sister      Arthritis Sister      Cancer Sister         breast     Thyroid Disease Sister      Cancer Sister         colon     Arthritis Sister      Arthritis Mother      Hypertension Father      Cancer - colorectal Father      Prostate Cancer Father      Arthritis Father      Heart Disease Father      Lipids Father      Arthritis Sister      Asthma Daughter      Asthma Daughter      Cancer Daughter 58        lung     Cancer Other 81        pancreatic      SOCIAL HISTORY:  Social History     Socioeconomic History     Marital status:      Spouse name: None     Number of children: None     Years of education: None     Highest education level: None   Occupational History     Employer: RETIRED   Social Needs     Financial resource strain: None     Food insecurity:     Worry: None      Inability: None     Transportation needs:     Medical: None     Non-medical: None   Tobacco Use     Smoking status: Never Smoker     Smokeless tobacco: Never Used   Substance and Sexual Activity     Alcohol use: Yes     Comment: 4 times a year     Drug use: No     Sexual activity: Never   Lifestyle     Physical activity:     Days per week: None     Minutes per session: None     Stress: None   Relationships     Social connections:     Talks on phone: None     Gets together: None     Attends Holiness service: None     Active member of club or organization: None     Attends meetings of clubs or organizations: None     Relationship status: None     Intimate partner violence:     Fear of current or ex partner: None     Emotionally abused: None     Physically abused: None     Forced sexual activity: None   Other Topics Concern     Parent/sibling w/ CABG, MI or angioplasty before 65F 55M? No   Social History Narrative     None     CURRENT MEDICATIONS:  Current Outpatient Medications   Medication     aspirin  MG EC tablet     calcium-vitamin D 500-125 MG-UNIT TABS     folic acid (FOLVITE) 1 MG tablet     furosemide (LASIX) 20 MG tablet     gabapentin (NEURONTIN) 100 MG capsule     ICAPS PO     losartan (COZAAR) 25 MG tablet     methotrexate sodium 2.5 MG TABS     metoprolol tartrate (LOPRESSOR) 25 MG tablet     Omega-3 Fatty Acids (OMEGA-3 FISH OIL PO)     sulfaSALAzine ER (AZULFIDINE EN) 500 MG EC tablet     Blood Pressure Monitor KIT     No current facility-administered medications for this visit.       ROS:   Constitutional: No fever, chills, or sweats. Weight is 126 lbs 0 oz  ENT: No visual disturbance, ear ache, epistaxis, sore throat.   Allergies/Immunologic: Negative.   Respiratory: No cough, hemoptysis.   Cardiovascular: As per HPI.   GI: No nausea, vomiting, hematemesis, melena, or hematochezia.   : No urinary frequency, dysuria, or hematuria.   Integrument: Negative.   Psychiatric: No evidence of major  depression  Neuro: No new neurological complaints at this time. Non focal  Endocrinology: Negative.   Musculoskeletal: As per HPI.      EXAM:  /73 (BP Location: Right arm, Patient Position: Chair, Cuff Size: Adult Regular)   Pulse 77   Wt 57.2 kg (126 lb)   SpO2 100%   BMI 23.81 kg/m    General: appears comfortable, alert and oriented  Head: normocephalic, atraumatic  Eyes: anicteric sclera, EOMI , PERRL  Neck: no adenopathy  Orophyarynx: moist mucosa, no lesions noted  Heart: regular, S1/S2, soft MICHAELA unchanged, no gallop. Estimated JVP at 5 cmH2O  Lungs: CTAB, No wheezing.   Abdomen: soft, non-tender, bowel sounds present, no hepatosplenomegaly  Extremities: No LE edema today  Skin: no open lesions noted  Neuro: grossly non-focal  Psych: no evidence of depression noted     Labs:  Lab Results   Component Value Date    WBC 9.0 2019    HGB 10.6 (L) 2019    HCT 34.6 (L) 2019     2019     2016    POTASSIUM 4.5 2016    CHLORIDE 105 2016    CO2 26 2016    BUN 28 2016    CR 1.23 (H) 2019     (H) 2016    SED 40 (H) 2019    AST 26 2019    ALT 38 2019    ALKPHOS 90 2019    BILITOTAL 0.3 2019    INR 1.41 (H) 2016     EK12.   Sinus Rhythm - occasional ectopic ventricular beat.  ECHOCARDIOGRAMS:   2016  Global and regional left ventricular function is normal with an EF of 60-65%.  There is at least moderate diastolic dysfunction.  Global right ventricular function is normal.  Severe aortic stenosis is present. The mean gradient across the aortic valve is 61 mmHg. The peak aortic velocity is 5.1 m/sec. JAVIER 0.6 cm2.     10/13/2014   1. Normal biventricular systolic function. LVEF estimate 65%.   2. Moderate aortic stenosis (peak velocity: 3.9 m/s, mean gradient: 36 mmHg, calculated valve area: 1.2 cm2).   3. Normal IVC with preserved respiratory variability.   4. Compared to study dated  02/10/14 the aortic valve parameters have slightly worsened.     02/11/14  Global and regional left ventricular function is normal with an EF of 55-60%. Global right ventricular function is normal. Trace tricuspid insufficiency is present. Right ventricular systolic pressure is 19mmHg above the right atrial pressure. The inferior vena cava was normal in size with preserved respiratory variability. Small circumferential pericardial effusion is present without any hemodynamic significance. Chamber compression is not present; there is no evidence for tamponade.   Left Ventricle: Global and regional left ventricular function is normal with an EF of 55-60%. Left ventricular size is normal. Left ventricular Doppler filling pattern consistent with abnormal relaxation.   Right Ventricle: The right ventricle is normal size. Global right ventricular function is normal.   Atria: The right atria appears normal. Moderate left atrial enlargement is present.   Mitral Valve: Mild mitral annular calcification is present. Trace mitral insufficiency is present.   Aortic Valve: Mild aortic valve sclerosis is present. No aortic regurgitation is present.   Tricuspid Valve: The tricuspid valve is normal. Trace tricuspid insufficiency is present. Right ventricular systolic pressure is 19mmHg above the right atrial pressure.   Pulmonic Valve: The pulmonic valve is normal. Trace pulmonic insufficiency is present.   Vessels: The aorta root is normal. The inferior vena cava was normal in size with preserved respiratory variability.   Pericardium: Small circumferential pericardial effusion is present without any hemodynamic significance. Chamber compression is not present; there is no evidence for tamponade.   05/11/12  Left ventricular function, chamber size, wall motion, and wall thickness are normal.The EF is > 65%. Paradoxic low flow aortic stenosis with moderate to severe reduction in valve area. Visually the valve appears moderately  calcified with mild-moderate cusp restriction.   Left Ventricle: Left ventricular function, chamber size, wall motion, and wall thickness are normal.The EF is > 65%. Relative wall thickness is increased consistent with concentric remodeling.   Right Ventricle: Right ventricular function, chamber size, wall motion, and thickness are normal.   Atria: Both atria appear normal.   Mitral Valve: Mild to moderate mitral annular calcification is present. Systolic anterior motion without gradient.   Aortic Valve: The aortic valve is tricuspid. Mild aortic valve sclerosis is present.   Tricuspid Valve: The tricuspid valve is normal. Trace tricuspid insufficiency is present.   Pulmonic Valve: The pulmonic valve cannot be assessed.   Vessels: The aorta root is normal. The pulmonary artery is normal. The inferior vena cava is normal.   Pericardium: No pericardial effusion is present.      08/04/2017  Global and regional left ventricular function is normal with an EF of 60-65%.  Global right ventricular function is normal.  S/p AVR 21mm tissue valve. The mean gradient is 11 mmHg. The effective orifice  area is 1.4 cm^2. EOAI is 0.9 cm/m2, DVI is 0.55 (all normal.)  Mild mitral stenosis is present. The etiology of the mitral stenosis is mitral  annular calcification. Mean gradient is 4mmHg  IVC is normal in size with preserved respiratory variability. PAP is normal.  No pericardial effusion is present.  This study was compared with the study from 2/25/2016. The patient is now s/p AVR for severe aortic stenosis.     ASSESSMENT AND PLAN:   1. Severe AS. S/p AVR with a #21 tissue prosthesis. Excellent post-surgical results.  2. Hypertension: not at target today, she does have lower numbers at home. Transitioned to losartan due to cough with lisinopril and tolerates this well. Will have her recheck her BP and if remains elevated would increase losartan or consider adding amlodipine. She was hesitant to any changes today  3. RA.  4.  Plan:   - Check BP at home .Call the office with an update in 2-3 weeks. May need to adjust medications as above.  - TTE in 1 year prior to visit  - RTC in 12 months.    I appreciate the opportunity to participate in the care of Roxanna Stark . Please do not hesitate to contact me with any further questions.     Sincerely,    Maynor Mary MD     [facility] Division of Cardiology

## 2019-05-03 NOTE — NURSING NOTE
"Chief Complaint   Patient presents with     RECHECK     OV Maynor Mary for 1 year follow up for HTN. Pt reports no cardiac symptoms.       Initial /73 (BP Location: Right arm, Patient Position: Chair, Cuff Size: Adult Regular)   Pulse 77   Wt 57.2 kg (126 lb)   SpO2 100%   BMI 23.81 kg/m   Estimated body mass index is 23.81 kg/m  as calculated from the following:    Height as of 4/15/19: 1.549 m (5' 1\").    Weight as of this encounter: 57.2 kg (126 lb)..  BP completed using cuff size: regular    Toshia Guillen MA    "

## 2019-05-08 ENCOUNTER — OFFICE VISIT (OUTPATIENT)
Dept: RHEUMATOLOGY | Facility: CLINIC | Age: 84
End: 2019-05-08
Payer: MEDICARE

## 2019-05-08 VITALS
OXYGEN SATURATION: 100 % | HEIGHT: 61 IN | HEART RATE: 82 BPM | SYSTOLIC BLOOD PRESSURE: 128 MMHG | WEIGHT: 122.6 LBS | DIASTOLIC BLOOD PRESSURE: 80 MMHG | BODY MASS INDEX: 23.15 KG/M2

## 2019-05-08 DIAGNOSIS — Z79.899 HIGH RISK MEDICATION USE: ICD-10-CM

## 2019-05-08 DIAGNOSIS — M06.09 RHEUMATOID ARTHRITIS OF MULTIPLE SITES WITH NEGATIVE RHEUMATOID FACTOR (H): ICD-10-CM

## 2019-05-08 PROCEDURE — 99213 OFFICE O/P EST LOW 20 MIN: CPT | Performed by: INTERNAL MEDICINE

## 2019-05-08 RX ORDER — FOLIC ACID 1 MG/1
1 TABLET ORAL DAILY
Qty: 30 TABLET | Refills: 6 | Status: SHIPPED | OUTPATIENT
Start: 2019-05-08 | End: 2019-09-18

## 2019-05-08 RX ORDER — SULFASALAZINE 500 MG/1
500 TABLET, DELAYED RELEASE ORAL 2 TIMES DAILY
Qty: 60 TABLET | Refills: 4 | Status: SHIPPED | OUTPATIENT
Start: 2019-05-08 | End: 2019-09-18

## 2019-05-08 RX ORDER — METHOTREXATE 2.5 MG/1
20 TABLET ORAL WEEKLY
Qty: 32 TABLET | Refills: 4 | Status: SHIPPED | OUTPATIENT
Start: 2019-05-08 | End: 2019-09-18

## 2019-05-08 ASSESSMENT — MIFFLIN-ST. JEOR: SCORE: 908.49

## 2019-05-08 NOTE — PROGRESS NOTES
Rheumatology Clinic Visit      Roxanna Stark MRN# [id number]   YOB: 1927 Age: 89 year old      Date of visit: 5/08/19   PCP: Dr. Swapna Gaines  Cardiology: Dr. Boston Navarro     Chief Complaint   Patient presents with:  Arthritis: RA, patient is feeling the same as before, some pain and stiffness.    Assessment and Plan     1. 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  She was treated with prednisone monotherapy for several years, being able to taper off without recurrence of symptoms. She then developed worsening symptoms in her hands and was diagnosed with rheumatoid arthritis. Initially, she was resistant to taking DMARD therapy.  She then was started on MTX that was effective; she reduced the dose with worsening symptoms. Currently on MTX 15mg wkly and SSZ 500mg BID (mild anemia possibly associated with SSZ so the dose was previously reduced).  Previously she had active synovitis of methotrexate was increased.  Arthritis is well controlled today.    - Continue methotrexate 20 mg once weekly   - Continue folic acid 1mg daily  - Continue sulfasalazine 500 mg twice daily   - Labs every months: CBC, Creatinine, Hepatic Panel, ESR, CRP              Rapid 3, cumulative scores                      05/08/2019:   1.3  (MTX 20mg wkly, SSZ 500mg BID)                       01/23/2019:   1.3  (MTX 15mg wkly, SSZ 500mg BID)                       09/06/2018:   3.3  (MTX 15mg wkly, SSZ 500mg BID)    2. Giant Cell Arteritis History?: 12/26/2005 Left TA biopsy negative per [facility] record review.  No symptoms of GCA at this time.     3. Right shoulder rotator cuff tear and pain: Previously evaluated by orthopedic surgery and her pain resolved for approximately one year after having a steroid injection in March 2015. She does not want to have surgical correction of her shoulder. Repeat steroid  injections have been helpful; not needed today.  Physical therapy was effective and she is doing exercises at home.     4. History of basal cell carcinoma: Following with dermatology     5. Bone Health: Managed by PCP already.    6.  Peripheral neuropathy it is improved with gabapentin from her PCP, and a sensation of bugs crawling under her skin on the left side of her face: She still has a sensation of bugs crawling on her face but the neuropathy has improved.  Neuropathy does persist so she is going to discuss with her PCP about increasing gabapentin    Ms. Stark verbalized agreement with and understanding of the rational for the diagnosis and treatment plan.  All questions were answered to best of my ability and the patient's satisfaction. Ms. Stark was advised to contact the clinic with any questions that may arise after the clinic visit.      Thank you for involving me in the care of the patient    Return to clinic: 3-4 months      HPI   Roxanna Stark is a 89 year old female with medical history significant for basal cell carcinoma, hypertension, aortic stenosis, right rotator cuff tear (previously evaluated by Dr. Bingham, orthopedic surgery, on 3/20/2015 where at that time Ms. Stark was not interested in surgical correction; she received an intra-articular steroid injection at that time that was effective for ~1year), temporal arteritis?, and seronegative erosive rheumatoid arthritis.     Today, she reports that she is doing well.  The increased dose of methotrexate did not make much of a difference, but she says that her morning stiffness is now for only 20 minutes where it was previously 45-60 minutes.  She also reports having no pain in her MCPs or PIPs, where at her last clinic visit she reported having pain at these joints.  Positive gelling phenomenon that resolves quickly.  No difficulty standing up from a low seated position or raising her arms above her head.  No headache.  No vision change.  No  vision loss.  No scalp tenderness or jaw claudication.  No joint pain.    She notes having a sensation that bugs are crawling under her skin on her face still.  Peripheral neuropathy improved with gabapentin but she believes that she needs a higher dose because the neuropathy in her legs persists - diffuse leg numbeness and tingling.     Denies fevers, chills, nausea, vomiting, constipation, diarrhea. No abdominal pain. No chest pain/pressure, palpitations, or shortness of breath. No oral or nasal sores. No neck pain. No rash. Swelling in her bilateral feet.       Tobacco: None  EtOH: No more than 1 drink per week  Drugs: None  Occupation: Used to work for the telephone company; now retired    ROS   GEN: No fevers, chills, night sweats, or weight change  SKIN: No itching, rashes, sores  HEENT: No oral or nasal ulcers.  CV: No chest pain, pressure, palpitations, or dyspnea on exertion.  PULM: No SOB, wheeze, cough.  GI: No nausea, vomiting, constipation, diarrhea. No blood in stool. No abdominal pain.  : No blood in urine.  MSK: See HPI.  NEURO: Says that gabapentin is helping her peripheral neuropathy - affecting the feet  ENDO: No heat/cold intolerance.  EXT: See HPI    Active Problem List     Patient Active Problem List   Diagnosis     Polymyalgia rheumatica (H)     History of basal cell carcinoma     CARDIOVASCULAR SCREENING; LDL GOAL LESS THAN 130     Advanced directives, counseling/discussion     Hypertension goal BP (blood pressure) < 140/90     Hip pain     Left atrial enlargement     Aortic stenosis     Status post coronary angiogram     Aortic valve stenosis     Aortic valve replaced     Rheumatoid arthritis of multiple sites with negative rheumatoid factor (H)     Past Medical History     Past Medical History:   Diagnosis Date     Actinic keratosis      Aortic stenosis 2014     Basal cell cancer 7/2014    left eye medial canthus      Basal cell carcinoma 9/30/08    left cheek     HTN (hypertension)       melanoma in situ 9/30/2008    LEFT ARM     Melanoma in situ (H) 9/30/08    left arm     Polymyalgia rheumatica (H) 11/99     Temporal arteritis (H) 11/99     Past Surgical History     Past Surgical History:   Procedure Laterality Date     C SKIN TISSUE PROCEDURE UNLISTED  11/3/08    mmis skin cancer excision     CATARACT IOL, RT/LT  5/09    bilateral     COLONOSCOPY  2002     REPLACE VALVE AORTIC N/A 4/25/2016    Procedure: REPLACE VALVE AORTIC;  Surgeon: Sudeep Tsai MD;  Location:  OR     Allergy     Allergies   Allergen Reactions     Lisinopril Cough        Current Medication List     Current Outpatient Medications   Medication Sig     aspirin  MG EC tablet Take 1 tablet (325 mg) by mouth daily     calcium-vitamin D 500-125 MG-UNIT TABS      folic acid (FOLVITE) 1 MG tablet Take 1 tablet (1 mg) by mouth daily     furosemide (LASIX) 20 MG tablet TAKE 1 TABLET BY MOUTH EVERY DAY IF 2 TO 3 POUNDS WEIGHT GAIN OVER 2 DAY PERIOD     gabapentin (NEURONTIN) 100 MG capsule TAKE ONE CAPSULE BY MOUTH THREE TIMES DAILY     ICAPS PO 2 tablets daily     losartan (COZAAR) 25 MG tablet Take 1 tablet (25 mg) by mouth daily     methotrexate sodium 2.5 MG TABS Take 8 tablets (20 mg) by mouth once a week . Take all 8 tablets on the same day of each week.  Do not take with amoxicillin.     metoprolol tartrate (LOPRESSOR) 25 MG tablet TAKE 1 TABLET(25 MG) BY MOUTH TWICE DAILY     Omega-3 Fatty Acids (OMEGA-3 FISH OIL PO) Take 1 tablet twice daily.     sulfaSALAzine ER (AZULFIDINE EN) 500 MG EC tablet Take 1 tablet (500 mg) by mouth 2 times daily     Blood Pressure Monitor KIT 1 Units daily     No current facility-administered medications for this visit.        Social History   See HPI    Family History     Family History   Problem Relation Age of Onset     Breast Cancer Sister      Arthritis Sister      Cancer Sister         breast     Thyroid Disease Sister      Cancer Sister         colon     Arthritis Sister   "    Arthritis Mother      Hypertension Father      Cancer - colorectal Father      Prostate Cancer Father      Arthritis Father      Heart Disease Father      Lipids Father      Arthritis Sister      Asthma Daughter      Asthma Daughter      Cancer Daughter 58        lung     Cancer Other 81        pancreatic      Physical Exam     Temp Readings from Last 3 Encounters:   04/15/19 96.7  F (35.9  C) (Oral)   01/23/19 97.8  F (36.6  C) (Oral)   09/06/18 97.4  F (36.3  C) (Oral)     BP Readings from Last 5 Encounters:   05/08/19 128/80   05/03/19 166/73   04/15/19 128/70   01/23/19 138/66   09/06/18 132/58     Pulse Readings from Last 1 Encounters:   05/08/19 82     Resp Readings from Last 1 Encounters:   04/15/19 16     Estimated body mass index is 23.17 kg/m  as calculated from the following:    Height as of this encounter: 1.549 m (5' 1\").    Weight as of this encounter: 55.6 kg (122 lb 9.6 oz).    GEN: NAD  HEENT: MMM.  Anicteric, noninjected sclera  CV: S1, S2. RRR. No m/r/g.  PULM: CTA bilaterally. No w/c.  MSK:  MCPs, PIPs, wrists, elbows, shoulders, knees, ankles, and MTPs without swelling or tenderness to palpation.  Hips nontender to palpation.  NEURO: Able to stand up unassisted from a chair without difficulty. Able to raise her arms above her head without difficulty  SKIN: No rash.    EXT: Nonpitting edema of the bilateral lower extremities  PSYCH: Alert. Appropriate.    Labs / Imaging (select studies)   RF/CCP  Recent Labs   Lab Test 08/11/16  1124 08/04/16  1222 02/18/16  1543   CCPIGG 1  --   --    RHF  --  <20 <20     CBC  Recent Labs   Lab Test 03/20/19  0846 12/21/18  1050 09/06/18  0931   WBC 9.0 10.3 10.3   RBC 3.68* 3.81 3.53*   HGB 10.6* 11.1* 9.9*   HCT 34.6* 35.3 31.3*   MCV 94 93 89   RDW 20.5* 21.4* 20.9*    259 282   MCH 28.8 29.1 28.0   MCHC 30.6* 31.4* 31.6   NEUTROPHIL 61.3 58.8 64.7   LYMPH 25.2 20.4 18.4   MONOCYTE 8.0 15.4 11.7   EOSINOPHIL 4.8 4.6 4.5   BASOPHIL 0.7 0.8 0.7 "   ANEU 5.5 6.0 6.7   ALYM 2.3 2.1 1.9   IGNACIO 0.7 1.6* 1.2   AEOS 0.4 0.5 0.5   ABAS 0.1 0.1 0.1     CMP  Recent Labs   Lab Test 03/20/19  0846 12/21/18  1050 09/06/18  0931  12/14/16  0849  07/12/16  1035 06/24/16  0852   NA  --   --   --   --  140  --  138 140   POTASSIUM  --   --   --   --  4.5  --  4.6 4.3   CHLORIDE  --   --   --   --  105  --  102 105   CO2  --   --   --   --  26  --  28 27   ANIONGAP  --   --   --   --  9  --  8 8   GLC  --   --   --   --  172*  --  117* 95   BUN  --   --   --   --  28  --  17 25   CR 1.23* 1.19* 1.20*   < > 1.19*   < > 1.04 1.18*   GFRESTIMATED 38* 40* 42*   < > 43*   < > 50* 43*   GFRESTBLACK 44* 46* 51*   < > 52*   < > 60* 52*   ROEL  --   --   --   --  8.8  --  9.4 9.0   BILITOTAL 0.3 0.3 0.2   < >  --    < >  --   --    ALBUMIN 3.5 3.4 3.3*   < >  --    < >  --   --    PROTTOTAL 7.2 7.3 7.3   < >  --    < >  --   --    ALKPHOS 90 92 91   < >  --    < >  --   --    AST 26 24 23   < >  --    < >  --   --    ALT 38 23 25   < >  --    < >  --   --     < > = values in this interval not displayed.     Calcium/VitaminD  Recent Labs   Lab Test 12/14/16  0849 07/12/16  1035 06/24/16  0852   ROEL 8.8 9.4 9.0     ESR/CRP  Recent Labs   Lab Test 03/20/19  0846 12/21/18  1050 09/06/18  0931   SED 40* 37* 64*   CRP 6.3 6.3 13.6*     Hepatitis B  Recent Labs   Lab Test 11/10/16  0909   HBCAB Nonreactive   HEPBANG Nonreactive     Hepatitis C  Recent Labs   Lab Test 11/10/16  0909   HCVAB Nonreactive   Assay performance characteristics have not been established for newborns,   infants, and children       HIV Screening  Recent Labs   Lab Test 11/10/16  0909   HIAGAB Nonreactive   HIV-1 p24 Ag & HIV-1/HIV-2 Ab Not Detected         Immunization History     Immunization History   Administered Date(s) Administered     Influenza (H1N1) 10/20/2016     Influenza (High Dose) 3 valent vaccine 10/03/2017, 08/23/2018     Influenza (IIV3) PF 10/04/2005, 10/20/2010, 11/09/2011, 09/22/2012, 09/16/2013,  10/15/2014     Pneumo Conj 13-V (2010&after) 03/17/2015     Pneumococcal 23 valent 11/03/2000, 12/13/2010     TD (ADULT, 7+) 01/12/2004     TDAP Vaccine (Adacel) 05/14/2013     Zoster vaccine recombinant adjuvanted (SHINGRIX) 06/23/2018, 08/23/2018     Zoster vaccine, live 12/15/2006          Chart documentation done in part with Dragon Voice recognition Software. Although reviewed after completion, some word and grammatical error may remain.    Jamie Johnson MD

## 2019-05-08 NOTE — NURSING NOTE
RAPID3 (0-30) Cumulative Score  1.3          RAPID3 Weighted Score (divide #4 by 3 and that is the weighted score)  0.42     Twila Atkins [facility]  5/8/2019 7:56 AM

## 2019-05-08 NOTE — PATIENT INSTRUCTIONS
Rheumatology    Dr. Jamie Johnson         [facility]   (Monday)  [address]  [place] [address]       [facility]   (Tuesday)  [address]  [place] [address]    [facility]   (Wed., Thurs., and Friday)  [address] [place] [address]    Phone number: [phone]  Thank you for choosing [facility].  Twila Atkins CMA

## 2019-06-01 DIAGNOSIS — Z29.89 SBE (SUBACUTE BACTERIAL ENDOCARDITIS) PROPHYLAXIS CANDIDATE: ICD-10-CM

## 2019-06-03 RX ORDER — AMOXICILLIN 500 MG/1
CAPSULE ORAL
Qty: 4 CAPSULE | Refills: 0 | Status: SHIPPED | OUTPATIENT
Start: 2019-06-03 | End: 2020-01-06

## 2019-06-03 NOTE — TELEPHONE ENCOUNTER
AMOXICILLIN 500MG CAPSULES       Last Written Prescription Date:    Last Fill Quantity: ,   # refills:   Last Office Visit: 8/23/2018  Future Office visit:       Routing refill request to provider for review/approval because:  Drug not active on patient's medication list

## 2019-06-07 DIAGNOSIS — I10 HYPERTENSION GOAL BP (BLOOD PRESSURE) < 140/90: ICD-10-CM

## 2019-06-07 RX ORDER — LOSARTAN POTASSIUM 25 MG/1
25 TABLET ORAL DAILY
Qty: 90 TABLET | Refills: 3 | Status: CANCELLED | OUTPATIENT
Start: 2019-06-07

## 2019-06-07 NOTE — TELEPHONE ENCOUNTER
"Routing refill request to provider for review/approval because:  Labs out of range:    Labs not current:   Requested Prescriptions   Pending Prescriptions Disp Refills     losartan (COZAAR) 25 MG tablet  Last Written Prescription Date:  6/20/18  Last Fill Quantity: 90,  # refills: 3   Last office visit: 4/15/2019 with prescribing provider:     Future Office Visit:   90 tablet 3     Sig: Take 1 tablet (25 mg) by mouth daily       Angiotensin-II Receptors Failed - 6/7/2019  9:40 AM        Failed - Normal serum creatinine on file in past 12 months     Recent Labs   Lab Test 03/20/19  0846   CR 1.23*             Failed - Normal serum potassium on file in past 12 months     Recent Labs   Lab Test 12/14/16  0849   POTASSIUM 4.5                    Passed - Blood pressure under 140/90 in past 12 months     BP Readings from Last 3 Encounters:   05/08/19 128/80   05/03/19 166/73   04/15/19 128/70                 Passed - Recent (12 mo) or future (30 days) visit within the authorizing provider's specialty     Patient had office visit in the last 12 months or has a visit in the next 30 days with authorizing provider or within the authorizing provider's specialty.  See \"Patient Info\" tab in inbasket, or \"Choose Columns\" in Meds & Orders section of the refill encounter.              Passed - Medication is active on med list        Passed - Patient is age 18 or older        Passed - No active pregnancy on record        Passed - No positive pregnancy test in past 12 months        Ann Sparrow RN - BC      "

## 2019-06-10 NOTE — TELEPHONE ENCOUNTER
Voicemail left for patient to call back Red team.    Please assist in scheduling medicare wellness visit/Blood Pressure appointment check if call returned.      Thank you,  SIMEON Carr CMA ([facility])

## 2019-06-12 DIAGNOSIS — Z79.899 HIGH RISK MEDICATION USE: ICD-10-CM

## 2019-06-12 DIAGNOSIS — M06.09 RHEUMATOID ARTHRITIS OF MULTIPLE SITES WITH NEGATIVE RHEUMATOID FACTOR (H): ICD-10-CM

## 2019-06-12 LAB
ALBUMIN SERPL-MCNC: 3.5 G/DL (ref 3.4–5)
ALP SERPL-CCNC: 91 U/L (ref 40–150)
ALT SERPL W P-5'-P-CCNC: 44 U/L (ref 0–50)
AST SERPL W P-5'-P-CCNC: 38 U/L (ref 0–45)
BASOPHILS # BLD AUTO: 0.1 10E9/L (ref 0–0.2)
BASOPHILS NFR BLD AUTO: 0.9 %
BILIRUB DIRECT SERPL-MCNC: <0.1 MG/DL (ref 0–0.2)
BILIRUB SERPL-MCNC: 0.3 MG/DL (ref 0.2–1.3)
CREAT SERPL-MCNC: 1.18 MG/DL (ref 0.52–1.04)
CRP SERPL-MCNC: 6.5 MG/L (ref 0–8)
DIFFERENTIAL METHOD BLD: ABNORMAL
EOSINOPHIL # BLD AUTO: 0.3 10E9/L (ref 0–0.7)
EOSINOPHIL NFR BLD AUTO: 3.7 %
ERYTHROCYTE [DISTWIDTH] IN BLOOD BY AUTOMATED COUNT: 20.1 % (ref 10–15)
ERYTHROCYTE [SEDIMENTATION RATE] IN BLOOD BY WESTERGREN METHOD: 41 MM/H (ref 0–30)
GFR SERPL CREATININE-BSD FRML MDRD: 40 ML/MIN/{1.73_M2}
HCT VFR BLD AUTO: 35.5 % (ref 35–47)
HGB BLD-MCNC: 11.3 G/DL (ref 11.7–15.7)
LYMPHOCYTES # BLD AUTO: 2 10E9/L (ref 0.8–5.3)
LYMPHOCYTES NFR BLD AUTO: 24.3 %
MCH RBC QN AUTO: 29.9 PG (ref 26.5–33)
MCHC RBC AUTO-ENTMCNC: 31.8 G/DL (ref 31.5–36.5)
MCV RBC AUTO: 94 FL (ref 78–100)
MONOCYTES # BLD AUTO: 0.5 10E9/L (ref 0–1.3)
MONOCYTES NFR BLD AUTO: 6.4 %
NEUTROPHILS # BLD AUTO: 5.3 10E9/L (ref 1.6–8.3)
NEUTROPHILS NFR BLD AUTO: 64.7 %
PLATELET # BLD AUTO: 237 10E9/L (ref 150–450)
PROT SERPL-MCNC: 7.1 G/DL (ref 6.8–8.8)
RBC # BLD AUTO: 3.78 10E12/L (ref 3.8–5.2)
WBC # BLD AUTO: 8.2 10E9/L (ref 4–11)

## 2019-06-12 PROCEDURE — 36415 COLL VENOUS BLD VENIPUNCTURE: CPT | Performed by: INTERNAL MEDICINE

## 2019-06-12 PROCEDURE — 85652 RBC SED RATE AUTOMATED: CPT | Performed by: INTERNAL MEDICINE

## 2019-06-12 PROCEDURE — 86140 C-REACTIVE PROTEIN: CPT | Performed by: INTERNAL MEDICINE

## 2019-06-12 PROCEDURE — 85025 COMPLETE CBC W/AUTO DIFF WBC: CPT | Performed by: INTERNAL MEDICINE

## 2019-06-12 PROCEDURE — 82565 ASSAY OF CREATININE: CPT | Performed by: INTERNAL MEDICINE

## 2019-06-12 PROCEDURE — 80076 HEPATIC FUNCTION PANEL: CPT | Performed by: INTERNAL MEDICINE

## 2019-06-13 DIAGNOSIS — I10 HYPERTENSION GOAL BP (BLOOD PRESSURE) < 140/90: ICD-10-CM

## 2019-06-13 RX ORDER — LOSARTAN POTASSIUM 25 MG/1
25 TABLET ORAL DAILY
Qty: 30 TABLET | Refills: 0 | Status: SHIPPED | OUTPATIENT
Start: 2019-06-13 | End: 2019-07-01

## 2019-06-13 NOTE — RESULT ENCOUNTER NOTE
Rheumatology team: Please call to notify Ms. Stark that labs are stable.  It reveals mild renal insufficiency that is similar to previous labs.  Nonspecific marker for inflammation ESR is elevated, similar to the labs from March.  Mild anemia.  No change to the treatment plan based on this finding.    Jamie Johnson MD  6/13/2019 12:18 PM

## 2019-06-14 RX ORDER — LOSARTAN POTASSIUM 25 MG/1
TABLET ORAL
Qty: 90 TABLET | Refills: 0
Start: 2019-06-14

## 2019-06-14 NOTE — TELEPHONE ENCOUNTER
1st duplicate: Outpatient Medication Detail      Disp Refills Start End DINO   losartan (COZAAR) 25 MG tablet 30 tablet 0 6/13/2019  No   Sig - Route: Take 1 tablet (25 mg) by mouth daily - Oral   Sent to pharmacy as: losartan (COZAAR) 25 MG tablet   Class: E-Prescribe   Notes to Pharmacy: Patient due for office visit for further refills   Order: [id number]   E-Prescribing Status: Receipt confirmed by pharmacy (6/13/2019  4:07 PM CDT)

## 2019-06-20 DIAGNOSIS — I10 HYPERTENSION GOAL BP (BLOOD PRESSURE) < 140/90: ICD-10-CM

## 2019-06-20 NOTE — TELEPHONE ENCOUNTER
Requested Prescriptions   Pending Prescriptions Disp Refills     furosemide (LASIX) 20 MG tablet  Last Written Prescription Date:  6/20/18  Last Fill Quantity: 90,  # refills: 3   Last office visit: 4/15/2019 with prescribing provider:  Eder   Future Office Visit:   Next 5 appointments (look out 90 days)    Jul 01, 2019 12:20 PM CDT  [facility] [address] [facility] [address] [facility]  [place] [address]  [phone]   Sep 18, 2019  8:40 AM CDT  Return Visit with Jamie Johnson MD  [facility] [address] [facility] [address] [address] [facility]  [place] [address]  [phone]          90 tablet 3     Sig: TAKE 1 TABLET BY MOUTH EVERY DAY IF 2 TO 3 POUNDS WEIGHT GAIN OVER 2 DAY PERIOD       There is no refill protocol information for this order

## 2019-06-21 DIAGNOSIS — I10 HYPERTENSION GOAL BP (BLOOD PRESSURE) < 140/90: ICD-10-CM

## 2019-06-21 RX ORDER — FUROSEMIDE 20 MG
TABLET ORAL
Qty: 10 TABLET | Refills: 0 | Status: SHIPPED | OUTPATIENT
Start: 2019-06-21 | End: 2019-06-21

## 2019-06-21 NOTE — TELEPHONE ENCOUNTER
"Routing refill request to provider for review/approval because:  Labs not current:      Requested Prescriptions   Pending Prescriptions Disp Refills     furosemide (LASIX) 20 MG tablet  Last Written Prescription Date:  6/20/18  Last Fill Quantity: 90,  # refills: 3   Last office visit: 4/15/2019 with prescribing provider:     Future Office Visit:   Next 5 appointments (look out 90 days)    Jul 01, 2019 12:20 PM CDT  PHYSICAL with Swapna Gaines MD  [facility] ([facility] [address] [place] [address]  [facility] [address]  [phone]   Sep 18, 2019  8:40 AM CDT  Return Visit with Jamie Johnson MD  [facility] [address]  [place] [address]  [phone]        90 tablet 3     Sig: TAKE 1 TABLET BY MOUTH EVERY DAY IF 2 TO 3 POUNDS WEIGHT GAIN OVER 2 DAY PERIOD       Diuretics (Including Combos) Protocol Failed - 6/20/2019 10:18 AM        Failed - Recent (12 mo) or future (30 days) visit within the authorizing provider's specialty     Patient had office visit in the last 12 months or has a visit in the next 30 days with authorizing provider or within the authorizing provider's specialty.  See \"Patient Info\" tab in inbasket, or \"Choose Columns\" in Meds & Orders section of the refill encounter.              Failed - Normal serum creatinine on file in past 12 months     Recent Labs   Lab Test 06/12/19  0910   CR 1.18*              Failed - Normal serum potassium on file in past 12 months     Recent Labs   Lab Test 12/14/16  0849   POTASSIUM 4.5                    Failed - Normal serum sodium on file in past 12 months     Recent Labs   Lab Test 12/14/16  0849                 Passed - Blood pressure under 140/90 in past 12 months     BP Readings from Last 3 Encounters:   05/08/19 128/80   05/03/19 166/73   04/15/19 128/70                 Passed - Medication is active on med list        Passed - Patient is age 18 or older        Passed - No " active pregancy on record        Passed - No positive pregnancy test in past 12 months        Ann Sparrow, RN - BC

## 2019-06-25 RX ORDER — FUROSEMIDE 20 MG
TABLET ORAL
Qty: 90 TABLET | Refills: 0 | Status: SHIPPED | OUTPATIENT
Start: 2019-06-25 | End: 2019-07-01

## 2019-07-01 ENCOUNTER — OFFICE VISIT (OUTPATIENT)
Dept: FAMILY MEDICINE | Facility: CLINIC | Age: 84
End: 2019-07-01
Payer: MEDICARE

## 2019-07-01 VITALS
HEART RATE: 67 BPM | OXYGEN SATURATION: 98 % | TEMPERATURE: 98.6 F | HEIGHT: 61 IN | BODY MASS INDEX: 22.47 KG/M2 | WEIGHT: 119 LBS | SYSTOLIC BLOOD PRESSURE: 136 MMHG | DIASTOLIC BLOOD PRESSURE: 60 MMHG

## 2019-07-01 DIAGNOSIS — M06.09 RHEUMATOID ARTHRITIS OF MULTIPLE SITES WITH NEGATIVE RHEUMATOID FACTOR (H): ICD-10-CM

## 2019-07-01 DIAGNOSIS — I35.0 NONRHEUMATIC AORTIC VALVE STENOSIS: ICD-10-CM

## 2019-07-01 DIAGNOSIS — I10 HYPERTENSION GOAL BP (BLOOD PRESSURE) < 140/90: ICD-10-CM

## 2019-07-01 DIAGNOSIS — N76.5 VAGINAL ULCER: ICD-10-CM

## 2019-07-01 DIAGNOSIS — Z00.00 ENCOUNTER FOR MEDICARE ANNUAL WELLNESS EXAM: Primary | ICD-10-CM

## 2019-07-01 DIAGNOSIS — M35.3 POLYMYALGIA RHEUMATICA (H): ICD-10-CM

## 2019-07-01 DIAGNOSIS — I51.7 LEFT ATRIAL ENLARGEMENT: ICD-10-CM

## 2019-07-01 DIAGNOSIS — N18.30 CKD (CHRONIC KIDNEY DISEASE) STAGE 3, GFR 30-59 ML/MIN (H): ICD-10-CM

## 2019-07-01 LAB
ANION GAP SERPL CALCULATED.3IONS-SCNC: 6 MMOL/L (ref 3–14)
BUN SERPL-MCNC: 22 MG/DL (ref 7–30)
CALCIUM SERPL-MCNC: 9.6 MG/DL (ref 8.5–10.1)
CHLORIDE SERPL-SCNC: 103 MMOL/L (ref 94–109)
CO2 SERPL-SCNC: 30 MMOL/L (ref 20–32)
CREAT SERPL-MCNC: 1.21 MG/DL (ref 0.52–1.04)
GFR SERPL CREATININE-BSD FRML MDRD: 39 ML/MIN/{1.73_M2}
GLUCOSE SERPL-MCNC: 94 MG/DL (ref 70–99)
POTASSIUM SERPL-SCNC: 4.2 MMOL/L (ref 3.4–5.3)
SODIUM SERPL-SCNC: 139 MMOL/L (ref 133–144)

## 2019-07-01 PROCEDURE — 99212 OFFICE O/P EST SF 10 MIN: CPT | Mod: 25 | Performed by: FAMILY MEDICINE

## 2019-07-01 PROCEDURE — 36415 COLL VENOUS BLD VENIPUNCTURE: CPT | Performed by: FAMILY MEDICINE

## 2019-07-01 PROCEDURE — G0439 PPPS, SUBSEQ VISIT: HCPCS | Performed by: FAMILY MEDICINE

## 2019-07-01 PROCEDURE — 80048 BASIC METABOLIC PNL TOTAL CA: CPT | Performed by: FAMILY MEDICINE

## 2019-07-01 RX ORDER — FUROSEMIDE 20 MG
TABLET ORAL
Qty: 90 TABLET | Refills: 0 | Status: SHIPPED | OUTPATIENT
Start: 2019-07-01 | End: 2019-10-08

## 2019-07-01 RX ORDER — METOPROLOL TARTRATE 25 MG/1
TABLET, FILM COATED ORAL
Qty: 180 TABLET | Refills: 3 | Status: SHIPPED | OUTPATIENT
Start: 2019-07-01 | End: 2020-08-13

## 2019-07-01 RX ORDER — LOSARTAN POTASSIUM 25 MG/1
25 TABLET ORAL DAILY
Qty: 90 TABLET | Refills: 3 | Status: SHIPPED | OUTPATIENT
Start: 2019-07-01 | End: 2019-09-03

## 2019-07-01 ASSESSMENT — ANXIETY QUESTIONNAIRES
1. FEELING NERVOUS, ANXIOUS, OR ON EDGE: SEVERAL DAYS
6. BECOMING EASILY ANNOYED OR IRRITABLE: SEVERAL DAYS
IF YOU CHECKED OFF ANY PROBLEMS ON THIS QUESTIONNAIRE, HOW DIFFICULT HAVE THESE PROBLEMS MADE IT FOR YOU TO DO YOUR WORK, TAKE CARE OF THINGS AT HOME, OR GET ALONG WITH OTHER PEOPLE: SOMEWHAT DIFFICULT
7. FEELING AFRAID AS IF SOMETHING AWFUL MIGHT HAPPEN: SEVERAL DAYS
GAD7 TOTAL SCORE: 6
3. WORRYING TOO MUCH ABOUT DIFFERENT THINGS: SEVERAL DAYS
5. BEING SO RESTLESS THAT IT IS HARD TO SIT STILL: SEVERAL DAYS
2. NOT BEING ABLE TO STOP OR CONTROL WORRYING: SEVERAL DAYS

## 2019-07-01 ASSESSMENT — ACTIVITIES OF DAILY LIVING (ADL): CURRENT_FUNCTION: NO ASSISTANCE NEEDED

## 2019-07-01 ASSESSMENT — PATIENT HEALTH QUESTIONNAIRE - PHQ9
5. POOR APPETITE OR OVEREATING: NOT AT ALL
SUM OF ALL RESPONSES TO PHQ QUESTIONS 1-9: 1

## 2019-07-01 ASSESSMENT — PAIN SCALES - GENERAL: PAINLEVEL: NO PAIN (0)

## 2019-07-01 ASSESSMENT — MIFFLIN-ST. JEOR: SCORE: 879.22

## 2019-07-01 NOTE — PROGRESS NOTES
"SUBJECTIVE:   Roxanna Stark is a 89 year old female who presents for Preventive Visit.    Are you in the first 12 months of your Medicare coverage?  No    Healthy Habits:     In general, how would you rate your overall health?  Good    Frequency of exercise:  6-7 days/week    Duration of exercise:  15-30 minutes    Do you usually eat at least 4 servings of fruit and vegetables a day, include whole grains    & fiber and avoid regularly eating high fat or \"junk\" foods?  No    Taking medications regularly:  No    Barriers to taking medications:  None    Medication side effects:: possibly.    Ability to successfully perform activities of daily living:  No assistance needed    Home Safety:  No safety concerns identified    Hearing Impairment:  No hearing concerns    In the past 6 months, have you been bothered by leaking of urine? Yes    In general, how would you rate your overall mental or emotional health?  Very good      PHQ-2 Total Score: 0    Additional concerns today:  No    Do you feel safe in your environment? Yes    Do you have a Health Care Directive? No: Advance care planning reviewed with patient; information given to patient to review.      Fall risk  Fallen 2 or more times in the past year?: No  Any fall with injury in the past year?: No    Cognitive Screening   1) Repeat 3 items (Leader, Season, Table)    2) Clock draw: NORMAL  3) 3 item recall: Recalls 2 objects   Results: NORMAL clock, 1-2 items recalled: COGNITIVE IMPAIRMENT LESS LIKELY    Mini-CogTM Copyright PASTOR Holden. Licensed by the author for use in [facility]; reprinted with permission (thiago@.[facility]). All rights reserved.      Do you have sleep apnea, excessive snoring or daytime drowsiness?: yes    Reviewed and updated as needed this visit by clinical staff  Tobacco  Allergies  Meds         Reviewed and updated as needed this visit by Provider        Social History     Tobacco Use     Smoking status: Never Smoker     Smokeless " tobacco: Never Used   Substance Use Topics     Alcohol use: Yes     Comment: 4 times a year     If you drink alcohol do you typically have >3 drinks per day or >7 drinks per week? No    Alcohol Use 7/1/2019   Prescreen: >3 drinks/day or >7 drinks/week? No           Hypertension Follow-up      Do you check your blood pressure regularly outside of the clinic? No     Are you following a low salt diet? Yes    Are your blood pressures ever more than 140 on the top number (systolic) OR more   than 90 on the bottom number (diastolic), for example 140/90? No      Current providers sharing in care for this patient include:   Pt has Rheumatoid arthritis and doing well on meds  She also has a History of PMR  Pt says she has soreness in her vaginal area for over a year  No Urinary symptoms  No vaginal discharge    Pt has Aortic stenosis and sees Cardiology  CKD is stable   Not on any NSAID  Patient Care Team:  Swapna Gaines MD as PCP - General (Family Practice)  Maynor Mary MD as Assigned PCP    The following health maintenance items are reviewed in Epic and correct as of today:  Health Maintenance   Topic Date Due     ADVANCE CARE PLANNING  07/07/2016     BMP  12/14/2017     MEDICARE ANNUAL WELLNESS VISIT  06/28/2018     HI ASSESSMENT  06/20/2019     FALL RISK ASSESSMENT  06/20/2019     PHQ-9  06/20/2019     INFLUENZA VACCINE (1) 09/01/2019     CREATININE  06/12/2020     DTAP/TDAP/TD IMMUNIZATION (3 - Td) 05/14/2023     ZOSTER IMMUNIZATION  Completed     IPV IMMUNIZATION  Aged Out     MENINGITIS IMMUNIZATION  Aged Out     Lab work is in process  Labs reviewed in EPIC  BP Readings from Last 3 Encounters:   07/01/19 136/60   05/08/19 128/80   05/03/19 166/73    Wt Readings from Last 3 Encounters:   07/01/19 54 kg (119 lb)   05/08/19 55.6 kg (122 lb 9.6 oz)   05/03/19 57.2 kg (126 lb)                  Patient Active Problem List   Diagnosis     Polymyalgia rheumatica (H)     History of basal cell carcinoma     CARDIOVASCULAR  SCREENING; LDL GOAL LESS THAN 130     Advanced directives, counseling/discussion     Hypertension goal BP (blood pressure) < 140/90     Hip pain     Left atrial enlargement     Aortic stenosis     Status post coronary angiogram     Aortic valve stenosis     Aortic valve replaced     Rheumatoid arthritis of multiple sites with negative rheumatoid factor (H)     CKD (chronic kidney disease) stage 3, GFR 30-59 ml/min (H)     Past Surgical History:   Procedure Laterality Date     C SKIN TISSUE PROCEDURE UNLISTED  11/3/08    mmis skin cancer excision     CATARACT IOL, RT/LT  5/09    bilateral     COLONOSCOPY  2002     REPLACE VALVE AORTIC N/A 4/25/2016    Procedure: REPLACE VALVE AORTIC;  Surgeon: Sudeep Tsai MD;  Location:  OR       Social History     Tobacco Use     Smoking status: Never Smoker     Smokeless tobacco: Never Used   Substance Use Topics     Alcohol use: Yes     Comment: 4 times a year     Family History   Problem Relation Age of Onset     Breast Cancer Sister      Arthritis Sister      Cancer Sister         breast     Thyroid Disease Sister      Cancer Sister         colon     Arthritis Sister      Arthritis Mother      Hypertension Father      Cancer - colorectal Father      Prostate Cancer Father      Arthritis Father      Heart Disease Father      Lipids Father      Arthritis Sister      Asthma Daughter      Asthma Daughter      Cancer Daughter 58        lung     Cancer Other 81        pancreatic          Current Outpatient Medications   Medication Sig Dispense Refill     aspirin  MG EC tablet Take 1 tablet (325 mg) by mouth daily 90 tablet 3     calcium-vitamin D 500-125 MG-UNIT TABS        folic acid (FOLVITE) 1 MG tablet Take 1 tablet (1 mg) by mouth daily 30 tablet 6     furosemide (LASIX) 20 MG tablet TAKE 1 TABLET BY MOUTH EVERY DAILY IF 2 TO 3 POUNDS WEIGHT GAIN OVER 2 DAY PERIOD 90 tablet 0     gabapentin (NEURONTIN) 100 MG capsule TAKE ONE CAPSULE BY MOUTH THREE TIMES  DAILY 90 capsule 5     ICAPS PO 2 tablets daily       losartan (COZAAR) 25 MG tablet Take 1 tablet (25 mg) by mouth daily 90 tablet 3     methotrexate sodium 2.5 MG TABS Take 8 tablets (20 mg) by mouth once a week . Take all 8 tablets on the same day of each week.  Do not take with amoxicillin. 32 tablet 4     metoprolol tartrate (LOPRESSOR) 25 MG tablet TAKE 1 TABLET(25 MG) BY MOUTH TWICE DAILY 180 tablet 3     Omega-3 Fatty Acids (OMEGA-3 FISH OIL PO) Take 1 tablet twice daily.       sulfaSALAzine ER (AZULFIDINE EN) 500 MG EC tablet Take 1 tablet (500 mg) by mouth 2 times daily 60 tablet 4     amoxicillin (AMOXIL) 500 MG capsule TAKE 4 CAPSULES BY MOUTH 1 HOUR BEFORE DENTAL APPOINTMENT (Patient not taking: Reported on 7/1/2019) 4 capsule 0     Blood Pressure Monitor KIT 1 Units daily (Patient not taking: Reported on 7/1/2019) 1 kit 0     Allergies   Allergen Reactions     Lisinopril Cough     Recent Labs   Lab Test 06/12/19  0910 03/20/19  0846 12/21/18  1050  12/14/16  0849  07/12/16  1035  03/17/15  0923  06/21/12  0841   LDL  --   --   --   --   --   --   --   --  116  --   --    HDL  --   --   --   --   --   --   --   --  72  --   --    TRIG  --   --   --   --   --   --   --   --  200*  --   --    ALT 44 38 23   < >  --    < >  --    < >  --   --   --    CR 1.18* 1.23* 1.19*   < > 1.19*   < > 1.04   < > 1.02   < >  --    GFRESTIMATED 40* 38* 40*   < > 43*   < > 50*   < > 51*   < >  --    GFRESTBLACK 46* 44* 46*   < > 52*   < > 60*   < > 62   < >  --    POTASSIUM  --   --   --   --  4.5  --  4.6   < > 3.8   < >  --    TSH  --   --   --   --   --   --   --   --   --   --  1.55    < > = values in this interval not displayed.      .    Review of Systems  CONSTITUTIONAL: NEGATIVE for fever, chills, change in weight  INTEGUMENTARY/SKIN: NEGATIVE for worrisome rashes, moles or lesions  EYES: NEGATIVE for vision changes or irritation  ENT/MOUTH: NEGATIVE for ear, mouth and throat problems  RESP: NEGATIVE for  "significant cough or SOB  BREAST: NEGATIVE for masses, tenderness or discharge  CV: NEGATIVE for chest pain, palpitations or peripheral edema  GI: NEGATIVE for nausea, abdominal pain, heartburn, or change in bowel habits  : NEGATIVE for frequency, dysuria, or hematuria  MUSCULOSKELETAL:RA  NEURO: NEGATIVE for weakness, dizziness or paresthesias  ENDOCRINE: NEGATIVE for temperature intolerance, skin/hair changes  HEME: NEGATIVE for bleeding problems  PSYCHIATRIC: NEGATIVE for changes in mood or affect    OBJECTIVE:   /60 (BP Location: Left arm, Patient Position: Chair, Cuff Size: Adult Regular)   Pulse 67   Temp 98.6  F (37  C) (Oral)   Ht 1.537 m (5' 0.5\")   Wt 54 kg (119 lb)   SpO2 98%   BMI 22.86 kg/m   Estimated body mass index is 22.86 kg/m  as calculated from the following:    Height as of this encounter: 1.537 m (5' 0.5\").    Weight as of this encounter: 54 kg (119 lb).  Physical Exam  GENERAL APPEARANCE: alert and no distress  EYES: Eyes grossly normal to inspection, PERRL and conjunctivae and sclerae normal  HENT: ear canals and TM's normal, nose and mouth without ulcers or lesions, oropharynx clear and oral mucous membranes moist  NECK: no adenopathy, no asymmetry, masses, or scars and thyroid normal to palpation  RESP: lungs clear to auscultation - no rales, rhonchi or wheezes  BREAST: normal without masses, tenderness or nipple discharge and no palpable axillary masses or adenopathy  CV: regular rate and rhythm, normal S1 S2, no S3 or S4,  Grade 2/6 MICHAELA LSB, no peripheral edema and peripheral pulses strong  ABDOMEN: soft, nontender, no hepatosplenomegaly, no masses and bowel sounds normal  Genitalia  Theres is a ulcer with some whitish Plaques  MS: no musculoskeletal defects are noted  SKIN: no suspicious lesions or rashes  NEURO: Normal strength and tone, sensory exam grossly normal, mentation intact and speech normal  PSYCH: mentation appears normal and affect normal/bright    Diagnostic " "Test Results:  Pending     ASSESSMENT / PLAN:   1. Encounter for Medicare annual wellness exam      2. Polymyalgia rheumatica (H)  Stable     3. Rheumatoid arthritis of multiple sites with negative rheumatoid factor (H)  Sees Rheumatology    4. Vaginal ulcer/Leukoplakia  Referral GYN done-Pt will make appointment  For Biopsy  - OB/GYN REFERRAL    5. CKD (chronic kidney disease) stage 3, GFR 30-59 ml/min (H)  Pending   - Basic metabolic panel    6. Hypertension goal BP (blood pressure) < 140/90  controlled  - furosemide (LASIX) 20 MG tablet; TAKE 1 TABLET BY MOUTH EVERY DAILY IF 2 TO 3 POUNDS WEIGHT GAIN OVER 2 DAY PERIOD  Dispense: 90 tablet; Refill: 0  - losartan (COZAAR) 25 MG tablet; Take 1 tablet (25 mg) by mouth daily  Dispense: 90 tablet; Refill: 3  - metoprolol tartrate (LOPRESSOR) 25 MG tablet; TAKE 1 TABLET(25 MG) BY MOUTH TWICE DAILY  Dispense: 180 tablet; Refill: 3  - Basic metabolic panel    7. Nonrheumatic aortic valve stenosis  Sees Cardiology and has referral for echo    8. Left atrial enlargement  Stable       End of Life Planning:  Patient currently has an advanced directive: No.  I have verified the patient's ablity to prepare an advanced directive/make health care decisions.  Literature was provided to assist patient in preparing an advanced directive.    COUNSELING:  Reviewed preventive health counseling, as reflected in patient instructions       Regular exercise       Healthy diet/nutrition       Vision screening       Hearing screening       Dental care       Bladder control       Fall risk prevention       Osteoporosis Prevention/Bone Health       Advanced Planning     Estimated body mass index is 22.86 kg/m  as calculated from the following:    Height as of this encounter: 1.537 m (5' 0.5\").    Weight as of this encounter: 54 kg (119 lb).         reports that she has never smoked. She has never used smokeless tobacco.      Appropriate preventive services were discussed with this patient, " including applicable screening as appropriate for cardiovascular disease, diabetes, osteopenia/osteoporosis, and glaucoma.  As appropriate for age/gender, discussed screening for colorectal cancer, prostate cancer, breast cancer, and cervical cancer. Checklist reviewing preventive services available has been given to the patient.    Reviewed patients plan of care and provided an AVS. The Intermediate Care Plan ( asthma action plan, low back pain action plan, and migraine action plan) for Roxanna meets the Care Plan requirement. This Care Plan has been established and reviewed with the Patient.    Counseling Resources:  ATP IV Guidelines  Pooled Cohorts Equation Calculator  Breast Cancer Risk Calculator  FRAX Risk Assessment  ICSI Preventive Guidelines  Dietary Guidelines for Americans, 2010  [employer]'s MyPlate  ASA Prophylaxis  Lung CA Screening    Swapna Gaines MD  [facility]    Identified Health Risks:    The patient was counseled and encouraged to consider modifying their diet and eating habits. She was provided with information on recommended healthy diet options.  Information on urinary incontinence and treatment options given to patient.

## 2019-07-01 NOTE — PATIENT INSTRUCTIONS
Patient Education   Personalized Prevention Plan  You are due for the preventive services outlined below.  Your care team is available to assist you in scheduling these services.  If you have already completed any of these items, please share that information with your care team to update in your medical record.  Health Maintenance Due   Topic Date Due     Discuss Advance Directive Planning  07/07/2016     Annual Wellness Visit  06/28/2018     Anxiety Assessment  06/20/2019     FALL RISK ASSESSMENT  06/20/2019     Depression Assessment  06/20/2019        Patient Education   Personalized Prevention Plan  You are due for the preventive services outlined below.  Your care team is available to assist you in scheduling these services.  If you have already completed any of these items, please share that information with your care team to update in your medical record.  Health Maintenance Due   Topic Date Due     Discuss Advance Directive Planning  07/07/2016     Basic Metabolic Panel  12/14/2017     Annual Wellness Visit  06/28/2018     Anxiety Assessment  06/20/2019     FALL RISK ASSESSMENT  06/20/2019     Depression Assessment  06/20/2019       Understanding USDA MyPlate  The USDA (U.S. Department of Agriculture) has guidelines to help you make healthy food choices. These are called MyPlate. MyPlate shows the food groups that make up healthy meals using the image of a place setting. Before you eat, think about the healthiest choices for what to put onto your plate or into your cup or bowl. To learn more about building a healthy plate, visit www.choosemyplate.gov.    The food groups    Fruits. Any fruit or 100% fruit juice counts as part of the Fruit Group. Fruits may be fresh, canned, frozen, or dried, and may be whole, cut-up, or pureed. Make half your plate fruits and vegetables.    Vegetables. Any vegetable or 100% vegetable juice counts as a member of the Vegetable Group. Vegetables may be fresh, frozen, canned,  or dried. They can be served raw or cooked and may be whole, cut-up, or mashed. Make half your plate fruits and vegetables.    Grains. All foods made from grains are part of the Grains Group. These include wheat, rice, oats, cornmeal, and barley such as bread, pasta, oatmeal, cereal, tortillas, and grits. Grains should be no more than a quarter of your plate. At least half of your grains should be whole grains.    Protein. This group includes meat, poultry, seafood, beans and peas, eggs, processed soy products (like tofu), nuts (including nut butters), and seeds. Make protein choices no more than a quarter of your plate. Meat and poultry choices should be lean or low fat.    Dairy. All fluid milk products and foods made from milk that contain calcium, like yogurt and cheese, are part of the Dairy Group. (Foods that have little calcium, such as cream, butter, and cream cheese, are not part of the group.) Most dairy choices should be low-fat or fat-free.    Oils. These are fats that are liquid at room temperature. They include canola, corn, olive, soybean, and sunflower oil. Foods that are mainly oil include mayonnaise, certain salad dressings, and soft margarines. You should have only 5 to 7 teaspoons of oils a day. You probably already get this much from the food you eat.  Date Last Reviewed: 8/1/2017 2000-2018 [employer]. [address] [place] [address]. All rights reserved. This information is not intended as a substitute for professional medical care. Always follow your healthcare professional's instructions.          Urinary Incontinence, Female (Adult)  Urinary incontinence means loss of control of the bladder. This problem affects many women, especially as they get older. If you have incontinence, you may be embarrassed to ask for help. But know that this problem can be treated.  Types of Incontinence  There are different types of incontinence. Two of the main types are described  here. You can have more than one type.    Stress incontinence. With this type, urine leaks when pressure (stress) is put on the bladder. This may happen when you cough, sneeze, or laugh. Stress incontinence most often occurs because the pelvic floor muscles that support the bladder and urethra are weak. This can happen after pregnancy and vaginal childbirth or a hysterectomy. It can also be due to excess body weight or hormone changes.    Urge incontinence (also called overactive bladder). With this type, a sudden urge to urinate is felt often. This may happen even though there may not be much urine in the bladder. The need to urinate often during the night is common. Urge incontinence most often occurs because of bladder spasms. This may be due to bladder irritation or infection. Damage to bladder nerves or pelvic muscles, constipation, and certain medicines can also lead to urge incontinence.  Treatment of urinary incontinence depends on the cause. Further evaluation is needed to find the type you have. This will likely include an exam and certain tests. Based on the results, you and your healthcare provider can then plan treatment. Until a diagnosis is made, the home care tips below can help relieve symptoms.  Home care    Do pelvic floor muscle exercises, if they are prescribed. The pelvic floor muscles help support the bladder and urethra. Many women find that their symptoms improve when doing special exercises that strengthen these muscles. To do the exercises contract the muscles you would use to stop your stream of urine, but do this when you re not urinating. Hold for 10 seconds, then relax. Repeat 10 to 20 times in a row, at least 3 times a day. Your provider may give you other instructions for how to do the exercises and how often.    Keep a bladder diary. This helps track how often and how much you urinate over a set period of time. Bring this diary with you to your next visit with the provider. The  information can help your provider learn more about your bladder problem.    Lose weight, if advised to by your provider. Excess weight puts pressure on the bladder. Your provider can help you create a weight-loss plan that s right for you. This may include exercising more and making certain diet changes.    Don't consume foods and drinks that may irritate the bladder. These can include alcohol and caffeinated drinks.    Quit smoking. Smoking and other tobacco use can lead to chronic cough that strains the pelvic floor muscles. Smoking may also damage the bladder and urethra. Talk with your provider about treatments or methods you can use to quit smoking.    If drinking large amounts of fluid causes you to have symptoms, you may be advised to limit your fluid intake. You may also be advised to drink most of your fluids during the day and to limit fluids at night.    If you re worried about urine leakage or accidents, you may wear absorbent pads to catch urine. Change the pads often. This helps reduce discomfort. It may also reduce the risk of skin or bladder infections.  Follow-up care  Follow up with your healthcare provider, or as directed. It may take some to find the right treatment for your problem. Your treatment plan may include special therapies or medicines. Certain procedures or surgery may also be options. Be sure to discuss any questions you have with your provider.  When to seek medical advice  Call the healthcare provider right away if any of these occur:    Fever of 100.4 F (38 C) or higher, or as directed by your provider    Bladder pain or fullness    Abdominal swelling    Nausea or vomiting    Back pain    Weakness, dizziness or fainting  Date Last Reviewed: 10/1/2017    2658-8760 [employer]. [address] [place] [address], [place] [address]. All rights reserved. This information is not intended as a substitute for professional medical care. Always follow your healthcare professional's  instructions.          Depression and Suicide in Older Adults    Nearly 2 million older Americans have some type of depression. Sadly, some of them even take their own lives. Yet depression among older adults is often ignored. Learn the warning signs. You may help spare a loved one needless pain. You may also save a life.  What is depression?  Depression is a serious illness that affects the way you think and feel. It is not a normal part of aging, nor is it a sign of weakness, a character flaw, or something you can snap out of. Most people with depression need treatment to get better. The most common symptom is a feeling of deep sadness. People who are depressed also may seem tired and listless. And nothing seems to give them pleasure. It s normal to grieve or be sad sometimes. But sadness lessens or passes with time. Depression rarely goes away or improves on its own. Other symptoms of depression are:    Sleeping more or less than normal    Eating more or less than normal    Having headaches, stomachaches, or other pains that don t go away    Feeling nervous,  empty,  or worthless    Crying a great deal    Thinking or talking about suicide or death    Feeling confused or forgetful  What causes it?  The causes of depression aren t fully known. But, it is thought to result from a complex interaction of biochemistry, genetics, environmental factors, and personality. Certain chemicals in the brain play a role. Depression does run in families. And life stresses can also trigger depression in some people. Older adults often face many stressors, such as death of friends or a spouse, health problems, and financial concerns.  How you can help  Often, depressed people may not want to ask for help. When they do, they may be ignored. Or, they may receive the wrong treatment. You can help by showing parents and older friends love and support. If they seem depressed, help them find the right treatment. Talk to your doctor. Or  contact a local mental health center, social service agency, or hospital. With modern treatment, no one has to suffer from depression.  If your older friend or family member agrees, you can be an advocate for him or her in the healthcare setting. Many times, older adults have other chronic illnesses such as diabetes, heart disease, or cancer that can cause symptoms of depression. Medicine side effects can also contribute to certain behaviors and feelings. It is important that the older adult's healthcare provider listens and sorts out the causes of any symptoms of depression and makes referrals to mental health specialists when needed. Untreated depression can result in misdiagnosis, including brain disorders such as dementia and Alzheimer's. If the health professional does not take the issue of depression seriously, ask your family member or friend to consider finding another provider.  Resources    National Suicide Prevention Lifeline (crisis hotline)424-462-HUPV (3408)    National [place] of Mental Bnmqxi182-480-6589jkw.[facility].nih.gov    National [place] on Mental Kyxhttj764-058-9873ose.[place].org    Mental Health Tqfkjvc514-114-8456ifp.[facility].org    National Suicide Ljfmepz388-699-3458 (800-SUICIDE)   Date Last Reviewed: 2/1/2017 2000-2018 [employer]. [address] [place] [address], [place] [address]. All rights reserved. This information is not intended as a substitute for professional medical care. Always follow your healthcare professional's instructions.

## 2019-07-01 NOTE — LETTER
[facility]  [address] [place] [address]. [place] [address]    July 2, 2019    Roxanna Stark  [address]  [place] [address]          Dear Roxanna,  Normal Electrolytes   Kidney test are High but stable   Enclosed is a copy of your results.     Results for orders placed or performed in visit on 07/01/19   Basic metabolic panel   Result Value Ref Range    Sodium 139 133 - 144 mmol/L    Potassium 4.2 3.4 - 5.3 mmol/L    Chloride 103 94 - 109 mmol/L    Carbon Dioxide 30 20 - 32 mmol/L    Anion Gap 6 3 - 14 mmol/L    Glucose 94 70 - 99 mg/dL    Urea Nitrogen 22 7 - 30 mg/dL    Creatinine 1.21 (H) 0.52 - 1.04 mg/dL    GFR Estimate 39 (L) >60 mL/min/[1.73_m2]    GFR Estimate If Black 45 (L) >60 mL/min/[1.73_m2]    Calcium 9.6 8.5 - 10.1 mg/dL       If you have any questions or concerns, please call myself or my nurse at [phone].      Sincerely,        Swapna Gaines MD/pb

## 2019-07-02 ASSESSMENT — ANXIETY QUESTIONNAIRES: GAD7 TOTAL SCORE: 6

## 2019-07-03 ENCOUNTER — OFFICE VISIT (OUTPATIENT)
Dept: OBGYN | Facility: CLINIC | Age: 84
End: 2019-07-03
Payer: MEDICARE

## 2019-07-03 VITALS
BODY MASS INDEX: 23.13 KG/M2 | SYSTOLIC BLOOD PRESSURE: 171 MMHG | WEIGHT: 120.4 LBS | HEART RATE: 60 BPM | OXYGEN SATURATION: 100 % | DIASTOLIC BLOOD PRESSURE: 66 MMHG

## 2019-07-03 DIAGNOSIS — N90.4 VULVAR LEUKOPLAKIA: Primary | ICD-10-CM

## 2019-07-03 DIAGNOSIS — N76.6 VULVAR ULCERATION: ICD-10-CM

## 2019-07-03 PROCEDURE — 99203 OFFICE O/P NEW LOW 30 MIN: CPT | Mod: 25 | Performed by: OBSTETRICS & GYNECOLOGY

## 2019-07-03 PROCEDURE — 56605 BIOPSY OF VULVA/PERINEUM: CPT | Performed by: OBSTETRICS & GYNECOLOGY

## 2019-07-03 PROCEDURE — 88305 TISSUE EXAM BY PATHOLOGIST: CPT | Performed by: OBSTETRICS & GYNECOLOGY

## 2019-07-03 PROCEDURE — 88305 TISSUE EXAM BY PATHOLOGIST: CPT | Mod: 26 | Performed by: OBSTETRICS & GYNECOLOGY

## 2019-07-03 NOTE — PROGRESS NOTES
"Roxanna Stark is a 89-year-old postmenopausal female, presents today at the request of Dr. Gaines for consultation regarding vulvar ulcerations.  Patient reports that she is had vulvar soreness and irritation for \"a year or more\".  She has not had any itching.  She has tried options such as Vaseline and bacitracin however these have had little effect.  She states that the ulceration and soreness occasionally are better and at times seem worse.  She does not know of any aggravating factors or alleviating factors.  She does not have any history of incontinence.    Past Medical History:   Diagnosis Date     Actinic keratosis      Aortic stenosis 2014     Basal cell cancer 7/2014    left eye medial canthus      Basal cell carcinoma 9/30/08    left cheek     CKD (chronic kidney disease) stage 3, GFR 30-59 ml/min (H) 7/1/2019     HTN (hypertension)      melanoma in situ 9/30/2008    LEFT ARM     Melanoma in situ (H) 9/30/08    left arm     Polymyalgia rheumatica (H) 11/99     Temporal arteritis (H) 11/99       Past Surgical History:   Procedure Laterality Date     C SKIN TISSUE PROCEDURE UNLISTED  11/3/08    mmis skin cancer excision     CATARACT IOL, RT/LT  5/09    bilateral     COLONOSCOPY  2002     REPLACE VALVE AORTIC N/A 4/25/2016    Procedure: REPLACE VALVE AORTIC;  Surgeon: Sudeep Tsai MD;  Location:  OR          Allergies   Allergen Reactions     Lisinopril Cough       Current Outpatient Medications   Medication Sig Dispense Refill     aspirin  MG EC tablet Take 1 tablet (325 mg) by mouth daily 90 tablet 3     calcium-vitamin D 500-125 MG-UNIT TABS        folic acid (FOLVITE) 1 MG tablet Take 1 tablet (1 mg) by mouth daily 30 tablet 6     furosemide (LASIX) 20 MG tablet TAKE 1 TABLET BY MOUTH EVERY DAILY IF 2 TO 3 POUNDS WEIGHT GAIN OVER 2 DAY PERIOD 90 tablet 0     gabapentin (NEURONTIN) 100 MG capsule TAKE ONE CAPSULE BY MOUTH THREE TIMES DAILY 90 capsule 5     ICAPS PO 2 tablets daily       " losartan (COZAAR) 25 MG tablet Take 1 tablet (25 mg) by mouth daily 90 tablet 3     methotrexate sodium 2.5 MG TABS Take 8 tablets (20 mg) by mouth once a week . Take all 8 tablets on the same day of each week.  Do not take with amoxicillin. 32 tablet 4     metoprolol tartrate (LOPRESSOR) 25 MG tablet TAKE 1 TABLET(25 MG) BY MOUTH TWICE DAILY 180 tablet 3     Omega-3 Fatty Acids (OMEGA-3 FISH OIL PO) Take 1 tablet twice daily.       sulfaSALAzine ER (AZULFIDINE EN) 500 MG EC tablet Take 1 tablet (500 mg) by mouth 2 times daily 60 tablet 4     amoxicillin (AMOXIL) 500 MG capsule TAKE 4 CAPSULES BY MOUTH 1 HOUR BEFORE DENTAL APPOINTMENT (Patient not taking: Reported on 7/1/2019) 4 capsule 0     Blood Pressure Monitor KIT 1 Units daily (Patient not taking: Reported on 7/1/2019) 1 kit 0       Social History     Socioeconomic History     Marital status:      Spouse name: Not on file     Number of children: Not on file     Years of education: Not on file     Highest education level: Not on file   Occupational History     Employer: RETIRED   Social Needs     Financial resource strain: Not on file     Food insecurity:     Worry: Not on file     Inability: Not on file     Transportation needs:     Medical: Not on file     Non-medical: Not on file   Tobacco Use     Smoking status: Never Smoker     Smokeless tobacco: Never Used   Substance and Sexual Activity     Alcohol use: Yes     Comment: 4 times a year     Drug use: No     Sexual activity: Never   Lifestyle     Physical activity:     Days per week: Not on file     Minutes per session: Not on file     Stress: Not on file   Relationships     Social connections:     Talks on phone: Not on file     Gets together: Not on file     Attends Zoroastrian service: Not on file     Active member of club or organization: Not on file     Attends meetings of clubs or organizations: Not on file     Relationship status: Not on file     Intimate partner violence:     Fear of current or  "ex partner: Not on file     Emotionally abused: Not on file     Physically abused: Not on file     Forced sexual activity: Not on file   Other Topics Concern     Parent/sibling w/ CABG, MI or angioplasty before 65F 55M? No   Social History Narrative     Not on file       Family History   Problem Relation Age of Onset     Breast Cancer Sister      Arthritis Sister      Cancer Sister         breast     Thyroid Disease Sister      Cancer Sister         colon     Arthritis Sister      Arthritis Mother      Hypertension Father      Cancer - colorectal Father      Prostate Cancer Father      Arthritis Father      Heart Disease Father      Lipids Father      Arthritis Sister      Asthma Daughter      Asthma Daughter      Cancer Daughter 58        lung     Cancer Other 81        pancreatic        Review of Systems:  10 point ROS of systems including Constitutional, Eyes, Respiratory, Cardiovascular, Gastroenterology, Genitourinary, Integumentary, Muscularskeletal, Psychiatric were all negative except for pertinent positives noted in my HPI and in the PMH.        Exam  /66 (BP Location: Right arm, Cuff Size: Adult Regular)   Pulse 60   Wt 54.6 kg (120 lb 6.4 oz)   SpO2 100%   BMI 23.13 kg/m    General:  WNWD female, NAD  Alert  Oriented x 3  Gait:  Normal  Skin:  Normal skin turgor  HEENT:  NC/AT, EOMI  Abdomen:  Non-tender, non-distended.  Vulva: leukoplakia and ulceration seen from the 5 o'clock position to the 11 o'clock position of the perineum and the labia.  The leukoplakia is interspersed among the ulcerated areas.  Some areas have \"thickening\" of the skin.    BUS:  Normal, no masses noted  Urethra:  No hypermobility seen  Urethral meatus:  No masses noted  Vagina: atrophic changes seen no lesions  Cervix: atrophic   Perianal: some leukoplakia and skin thickening from the 9 to 12 o'clock position  Extremities:  No clubbing, no cyanosis and no edema.      Assessment  Leukoplakia  Vulvar " ulceration      Plan  I suggest to have the vulvar biopsy to diagnose condition.    We discussed using steroid creams and she is given the [place] cream to use until the results are back.    Questions seem to be answered.     Brandan Landin MD        PROCEDURE NOTE:  The procedure was reviewed with patient.  After consenting to the procedure she was placed into the dorsal lithotomy position.  The examination was performed.  The right labia minora was prepped with Betadine.  1% lidocaine was used for analgesia.  4 mm punch biopsy was performed over an area that had normal tissue, leukoplakia and ulcerated areas.  The tissue was removed and hemostasis was achieved with Silver Nitrate.  She tolerated the procedure well.   Instruments were removed and the specimen was sent to pathology.  Results to the patient in 1 week,  when available.  She will call me if she has not heard results in 2 weeks.     Brandan Landin MD

## 2019-07-11 LAB — COPATH REPORT: NORMAL

## 2019-07-12 DIAGNOSIS — I10 HYPERTENSION GOAL BP (BLOOD PRESSURE) < 140/90: ICD-10-CM

## 2019-07-15 RX ORDER — LOSARTAN POTASSIUM 25 MG/1
TABLET ORAL
Qty: 90 TABLET | Refills: 3 | Status: SHIPPED | OUTPATIENT
Start: 2019-07-15 | End: 2020-06-30

## 2019-07-30 ENCOUNTER — TELEPHONE (OUTPATIENT)
Dept: OBGYN | Facility: CLINIC | Age: 84
End: 2019-07-30

## 2019-07-30 DIAGNOSIS — N76.6 VULVAR ULCERATION: Primary | ICD-10-CM

## 2019-07-30 DIAGNOSIS — N90.1 VIN II (VULVAR INTRAEPITHELIAL NEOPLASIA II): ICD-10-CM

## 2019-07-30 NOTE — TELEPHONE ENCOUNTER
I called patient and gave her the biopsy results.   I suggest she see Gyn/Onc at [facility].  Referral placed.  She is given the phone number to call Gyn/Onc if she hasn't heard from them by the end of the week.   Brandan Landin MD

## 2019-08-16 NOTE — TELEPHONE ENCOUNTER
Your provider has referred you to: [facility]: Gynecologic Oncology at [facility] [phone]   [email or web address]        Please be aware that coverage of these services is subject to the terms and limitations of your health insurance plan.  Call member services at your health plan with any benefit or coverage questions.       Please bring the following to your appointment:     >>   Any x-rays, CTs or MRIs which have been performed.  Contact the facility where they were done to arrange for  prior to your scheduled appointment.  Any new CT, MRI or other procedures ordered by your specialist must be performed at a [place] facility or coordinated by your clinic's referral office.    >>   List of current medications   >>   This referral request   >>   Any documents/labs given to you for this referral    Pt lost number. Writer gave pt number to call them.    Guerline Polanco RN on 8/16/2019 at 11:07 AM

## 2019-08-16 NOTE — TELEPHONE ENCOUNTER
RECORDS STATUS - ALL OTHER DIAGNOSIS      RECORDS RECEIVED FROM: INTERNAL   DATE RECEIVED: 08/16/2019   NOTES STATUS DETAILS   OFFICE NOTE from referring provider YES CE   OFFICE NOTE from medical oncologist NA    DISCHARGE SUMMARY from hospital NA    DISCHARGE REPORT from the ER NA    OPERATIVE REPORT NA    MEDICATION LIST YES EPIC   CLINICAL TRIAL TREATMENTS TO DATE NA    LABS YES EPIC   PATHOLOGY REPORTS YES EPIC   ANYTHING RELATED TO DIAGNOSIS NA    GENONOMIC TESTING NA    TYPE:     IMAGING (NEED IMAGES & REPORT) YES    CT SCANS NA    MRI NA    MAMMO YES PACS   ULTRASOUND YES PACS   PET NA

## 2019-08-16 NOTE — TELEPHONE ENCOUNTER
Reason for call:  Other   Patient called regarding (reason for call): call back  Additional comments: Patient is calling because she miss placed information given to her and would like call back with her results and additional info given to her.      Phone number to reach patient:  Home number on file [phone] (home)    Best Time:  any    Can we leave a detailed message on this number?  YES

## 2019-08-22 ENCOUNTER — PRE VISIT (OUTPATIENT)
Dept: ONCOLOGY | Facility: CLINIC | Age: 84
End: 2019-08-22

## 2019-09-03 ENCOUNTER — PRE VISIT (OUTPATIENT)
Dept: ONCOLOGY | Facility: CLINIC | Age: 84
End: 2019-09-03

## 2019-09-03 ENCOUNTER — DOCUMENTATION ONLY (OUTPATIENT)
Dept: ONCOLOGY | Facility: CLINIC | Age: 84
End: 2019-09-03

## 2019-09-03 ENCOUNTER — ONCOLOGY VISIT (OUTPATIENT)
Dept: ONCOLOGY | Facility: CLINIC | Age: 84
End: 2019-09-03
Attending: OBSTETRICS & GYNECOLOGY
Payer: MEDICARE

## 2019-09-03 VITALS
WEIGHT: 123.8 LBS | RESPIRATION RATE: 16 BRPM | BODY MASS INDEX: 23.37 KG/M2 | HEIGHT: 61 IN | HEART RATE: 60 BPM | SYSTOLIC BLOOD PRESSURE: 185 MMHG | DIASTOLIC BLOOD PRESSURE: 80 MMHG | TEMPERATURE: 97.6 F | OXYGEN SATURATION: 98 %

## 2019-09-03 DIAGNOSIS — D07.1 VULVAR INTRAEPITHELIAL NEOPLASIA III (VIN III): Primary | ICD-10-CM

## 2019-09-03 PROCEDURE — 99205 OFFICE O/P NEW HI 60 MIN: CPT | Mod: ZP | Performed by: OBSTETRICS & GYNECOLOGY

## 2019-09-03 PROCEDURE — G0463 HOSPITAL OUTPT CLINIC VISIT: HCPCS | Mod: ZF

## 2019-09-03 ASSESSMENT — MIFFLIN-ST. JEOR: SCORE: 908.93

## 2019-09-03 ASSESSMENT — PAIN SCALES - GENERAL: PAINLEVEL: NO PAIN (0)

## 2019-09-03 NOTE — NURSING NOTE
Pre Op Nurse Teaching Template    Relevant Diagnosis: wolf 3    Teaching Topic: wide local excision colposcopy    Person(s) involved in teaching :  Patient  & other: grandson  Motivation Level:  Asks Questions:    Yes      Eager to Learn:     Yes     Cooperative:          Yes    Receptive (willing. Able to accept information):    Yes      Patient and those who are listed above demonstrates understanding of the following:   Reason for the appointment, diagnosis and treatment plan:   Yes   Knowledge of proper use of medications and conditions for which they are ordered (with special attention to potential side effects or drug interactions): Yes   Which situations necessitate calling provider and whom to contact: Yes         Nutritional needs and diet plan:  Yes      Pain management techniques:     Yes, Pain Scale   Diet:   Yes, [facility] Diet Instructions    Teaching Concerns addressed: Yes    Infection Prevention:  Patient and those who are listed above demonstrate understanding of the following:  Pre-Op CHG Bathing Instructions: Yes  Surgical procedure site care taught:   Yes   Signs and symptoms of infection taught: Yes       Instructional Materials Used/Given:  The [place] Before You Surgery Booklet  Showering or Bathing before Surgery Instructions & CHG Product  Hysterectomy Guidelines  Pain Assessment Tool   Home Care after Major Abdominal or Vaginal Surgery  Map  Accommodations Brochure  Phone numbers for [facility] and [facility]  Copy of Surgical Consent    Done Today:    Preop Visit needed with cardiology  Tests Ordered:  Post Op Visit Scheduled:10/17/19Comments:    Surgery date/time:9/27/19    Consent to file in medical records  .

## 2019-09-03 NOTE — PROGRESS NOTES
"Gynecologic Oncology Clinic - New Patient    Referring provider:    Brandan Landin MD  [address] [facility] [place] [address]    Patient: Roxanna Stark  : 1927    Date of Visit: September 3, 2019    Chief Complaint: VINIII    History of Present Illness:  Roxanna Stark is a 89 year old post-menopausal female who presents for recommendations regarding treatment/management of THERESA III.     First noticed a \"cut\" or sore on the vulva awhile ago. She's not exactly sure how long ago, but she feels it was at least a year ago. Initially was just there, but then started to be painful/itching. She has tried bacitracin and another cream which didn't seem to change things. She was seen for this issue and had a vulvar biopsy of the right labia which showed THERESA 2-3 and chronic inflammation. She presents for recommendations. She continues to have constant pain in the area. No discharge or bleeding. The area is sore. She has no other associated symptoms. She has never had anything like this before.       Review of Systems:  Constitutional: no fevers, chills  Eyes: no changes in vision  Ears/Nose/Throat: recently got hearing aids which has improved hearing  CV: no chest pain  Resp: no shortness of breath   GI: no abdominal pain, diarrhea, constipation, bloating  : no vaginal bleeding, vaginal discharge, +vulvar irritation as per HPI  MSK: negative  Skin: positive as per HPI of vulva as above  Neuro: no headaches  Psych: negative  Endocrine: no heat/cold intolerance  Heme/Lymph: no swollen lymph nodes, heat/cold intolerance  Allergy/Immunology: negative    Past Medical History  Past Medical History:   Diagnosis Date     Actinic keratosis      Aortic stenosis      Basal cell cancer 2014    left eye medial canthus      Basal cell carcinoma 08    left cheek     CKD (chronic kidney disease) stage 3, GFR 30-59 ml/min (H) 2019     HTN (hypertension)      Melanoma in situ (H) 08    left arm     " Polymyalgia rheumatica (H)      Temporal arteritis (H)        Past Surgical History  Past Surgical History:   Procedure Laterality Date     C SKIN TISSUE PROCEDURE UNLISTED  11/3/08    mmis skin cancer excision     CATARACT IOL, RT/LT      bilateral     COLONOSCOPY       REPLACE VALVE AORTIC N/A 2016    Procedure: REPLACE VALVE AORTIC;  Surgeon: Sudeep Tsai MD;  Location:  OR        OB/Gynecologic History:     Last Pap Smear: remote, no history of abnormal  She is not sexually active. She does not intend to be sexually active.      Social History  Social History     Tobacco Use     Smoking status: Never Smoker     Smokeless tobacco: Never Used   Substance Use Topics     Alcohol use: Yes     Comment: 4 times a year     Drug use: No   She has 5 living daughters.     Family History  Family History   Problem Relation Age of Onset     Breast Cancer Sister 45     Arthritis Sister      Thyroid Disease Sister      Cancer Sister         colon     Arthritis Sister      Arthritis Mother      Hypertension Father      Cancer - colorectal Father      Prostate Cancer Father      Arthritis Father      Heart Disease Father      Lipids Father      Arthritis Sister      Asthma Daughter      Asthma Daughter      Cancer Daughter 58        lung     Cancer Other 81        pancreatic        Current Outpatient Medications   Medication Sig Dispense Refill     amoxicillin (AMOXIL) 500 MG capsule TAKE 4 CAPSULES BY MOUTH 1 HOUR BEFORE DENTAL APPOINTMENT 4 capsule 0     aspirin  MG EC tablet Take 1 tablet (325 mg) by mouth daily 90 tablet 3     calcium-vitamin D 500-125 MG-UNIT TABS        folic acid (FOLVITE) 1 MG tablet Take 1 tablet (1 mg) by mouth daily 30 tablet 6     furosemide (LASIX) 20 MG tablet TAKE 1 TABLET BY MOUTH EVERY DAILY IF 2 TO 3 POUNDS WEIGHT GAIN OVER 2 DAY PERIOD 90 tablet 0     gabapentin (NEURONTIN) 100 MG capsule TAKE 1 CAPSULE BY MOUTH THREE TIMES DAILY 90 capsule 0      "ICAPS PO 2 tablets daily       losartan (COZAAR) 25 MG tablet TAKE 1 TABLET BY MOUTH EVERY DAY 90 tablet 3     methotrexate sodium 2.5 MG TABS Take 8 tablets (20 mg) by mouth once a week . Take all 8 tablets on the same day of each week.  Do not take with amoxicillin. 32 tablet 4     metoprolol tartrate (LOPRESSOR) 25 MG tablet TAKE 1 TABLET(25 MG) BY MOUTH TWICE DAILY 180 tablet 3     Omega-3 Fatty Acids (OMEGA-3 FISH OIL PO) Take 1 tablet twice daily.       sulfaSALAzine ER (AZULFIDINE EN) 500 MG EC tablet Take 1 tablet (500 mg) by mouth 2 times daily 60 tablet 4        Allergies  Allergies   Allergen Reactions     Lisinopril Cough       Physical Exam:   BP (!) 185/80   Pulse 60   Temp 97.6  F (36.4  C) (Oral)   Resp 16   Ht 1.549 m (5' 1\")   Wt 56.2 kg (123 lb 12.8 oz)   SpO2 98%   Breastfeeding? No   BMI 23.39 kg/m    General appearance: Alert and oriented, no acute distress, well groomed  ENT: no palpable neck masses or thyromegaly appreciated  CV: regular rate, regular rhythm, no murmurs appreciated   Resp: Lungs clear to auscultation bilaterally. Normal respiratory effort.  GI: Abdomen slightly distended, soft, non-tender. No palpable masses  Skin: scars consistent with prior skin excisions  Psych: alert and oriented, appropriate affect   Lymph: No palpable lymphadenopathy in the neck, supraclavicular region, groin  Genitourinary:  External/Vulva: from 5-10 o'clock on the vulva, surrounding the vaginal introitus is an erythematous plaque with areas of thickened white tissue, some areas of ulceration within. There appears to be a healing biopsy site around 9-10 o'clock, although can't tell for certain, there are some areas of hypertrophic tissue. The area of discoloration extends onto the perineum and in towards the vagina but is distal to the hymen. There are no other concerning lesions  Vagina: introitus narrowed, atrophic mucosa   Anus: no olimpia-anal lesions    Labs: n/a    Imaging: " n/a    Assessment:  Roxanna Stark is a 89 year old with PMH notable for HTN, RA on methotrexate, and aortic valve replacement here as a new patient for THERESA 2-3.    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 Left untreated it will become cancer over time. We discussed that biopsy is not always perfect and sometimes cancer can coexist within a lesion of THERESA 2-3. We reviewed options for management including surgical resection which is recommended/preferred. We discussed alternatives including LASER/ablative therapy, topical therapy, or observation (which is not recommended). We discussed wide local excision and the risks of infection and poor wound healing, scarring with narrowing of the vaginal introitus, and risk of recurrence. In her case her tissue, the size of the lesion, and her methotrexate all put her at additional risk in addition to the vulva having poor wound healing in general. I would also recommend colposcopy to look at the area given this can be a regional effect and to ensure no other concerning lesions. Given her cardiac history and HTN in clinic today, as well as being overdue for cardiology follow-up I would recommend she see cardiology and have recommended echo prior to surgery. Provided there are no concerns following that visit I recommend WLE of the vulva with colposcopy of the vagina and cervix at the same time. This would be an outpatient surgery. We did discuss taht if cancer is found she may need additional procedure. We also discussed that she will need close f/u regardless.    Pre-op workup: pre-op teaching done. To see cardiology. Up to date on PCP visits. No labs needed. She does have known CKD but Cr has been stable.    Mono Ribeiro MD     Gynecologic Oncology

## 2019-09-03 NOTE — NURSING NOTE
"Oncology Rooming Note    September 3, 2019 8:48 AM   Roxanna Stark is a 89 year old female who presents for:    Chief Complaint   Patient presents with     Oncology Clinic Visit     New: THERESA II     Initial Vitals: BP (!) 185/80   Pulse 60   Temp 97.6  F (36.4  C) (Oral)   Resp 16   Ht 1.549 m (5' 1\")   Wt 56.2 kg (123 lb 12.8 oz)   SpO2 98%   Breastfeeding? No   BMI 23.39 kg/m   Estimated body mass index is 23.39 kg/m  as calculated from the following:    Height as of this encounter: 1.549 m (5' 1\").    Weight as of this encounter: 56.2 kg (123 lb 12.8 oz). Body surface area is 1.56 meters squared.  No Pain (0) Comment: Data Unavailable   No LMP recorded. Patient is postmenopausal.  Allergies reviewed: Yes  Medications reviewed: Yes    Medications: Medication refills not needed today.  Pharmacy name entered into [facility]:    [facility]  [id number] - [place], [place] - [address] AT [facility] [address]  [id number] - [place] - [address] [facility] [address]  [facility] #[id number] - [place], [place] - [address] AT [facility] OF [address]    Clinical concerns: THERESA II       Dewey Ceja              "

## 2019-09-03 NOTE — LETTER
"9/3/2019       RE: Roxanna Stark  [address]  [place] [address]     Dear Colleague,    Thank you for referring your patient, Roxanna Stark, to the [facility]. Please see a copy of my visit note below.    Gynecologic Oncology Clinic - New Patient    Referring provider:    Brandan Landin MD  [address] [facility] [place] [address]    Patient: Roxanna Stark  : 1927    Date of Visit: September 3, 2019    Chief Complaint: VINIII    History of Present Illness:  Roxanna Stark is a 89 year old post-menopausal female who presents for recommendations regarding treatment/management of THERESA III.     First noticed a \"cut\" or sore on the vulva awhile ago. She's not exactly sure how long ago, but she feels it was at least a year ago. Initially was just there, but then started to be painful/itching. She has tried bacitracin and another cream which didn't seem to change things. She was seen for this issue and had a vulvar biopsy of the right labia which showed THERESA 2-3 and chronic inflammation. She presents for recommendations. She continues to have constant pain in the area. No discharge or bleeding. The area is sore. She has no other associated symptoms. She has never had anything like this before.       Review of Systems:  Constitutional: no fevers, chills  Eyes: no changes in vision  Ears/Nose/Throat: recently got hearing aids which has improved hearing  CV: no chest pain  Resp: no shortness of breath   GI: no abdominal pain, diarrhea, constipation, bloating  : no vaginal bleeding, vaginal discharge, +vulvar irritation as per HPI  MSK: negative  Skin: positive as per HPI of vulva as above  Neuro: no headaches  Psych: negative  Endocrine: no heat/cold intolerance  Heme/Lymph: no swollen lymph nodes, heat/cold intolerance  Allergy/Immunology: negative    Past Medical History  Past Medical History:   Diagnosis Date     Actinic keratosis      Aortic stenosis      Basal " cell cancer 2014    left eye medial canthus      Basal cell carcinoma 08    left cheek     CKD (chronic kidney disease) stage 3, GFR 30-59 ml/min (H) 2019     HTN (hypertension)      Melanoma in situ (H) 08    left arm     Polymyalgia rheumatica (H)      Temporal arteritis (H)        Past Surgical History  Past Surgical History:   Procedure Laterality Date     C SKIN TISSUE PROCEDURE UNLISTED  11/3/08    mmis skin cancer excision     CATARACT IOL, RT/LT      bilateral     COLONOSCOPY       REPLACE VALVE AORTIC N/A 2016    Procedure: REPLACE VALVE AORTIC;  Surgeon: Sudeep Tsai MD;  Location: [facility] OR        OB/Gynecologic History:     Last Pap Smear: remote, no history of abnormal  She is not sexually active. She does not intend to be sexually active.      Social History  Social History     Tobacco Use     Smoking status: Never Smoker     Smokeless tobacco: Never Used   Substance Use Topics     Alcohol use: Yes     Comment: 4 times a year     Drug use: No   She has 5 living daughters.     Family History  Family History   Problem Relation Age of Onset     Breast Cancer Sister 45     Arthritis Sister      Thyroid Disease Sister      Cancer Sister         colon     Arthritis Sister      Arthritis Mother      Hypertension Father      Cancer - colorectal Father      Prostate Cancer Father      Arthritis Father      Heart Disease Father      Lipids Father      Arthritis Sister      Asthma Daughter      Asthma Daughter      Cancer Daughter 58        lung     Cancer Other 81        pancreatic        Current Outpatient Medications   Medication Sig Dispense Refill     amoxicillin (AMOXIL) 500 MG capsule TAKE 4 CAPSULES BY MOUTH 1 HOUR BEFORE DENTAL APPOINTMENT 4 capsule 0     aspirin  MG EC tablet Take 1 tablet (325 mg) by mouth daily 90 tablet 3     calcium-vitamin D 500-125 MG-UNIT TABS        folic acid (FOLVITE) 1 MG tablet Take 1 tablet (1 mg) by mouth daily 30  "tablet 6     furosemide (LASIX) 20 MG tablet TAKE 1 TABLET BY MOUTH EVERY DAILY IF 2 TO 3 POUNDS WEIGHT GAIN OVER 2 DAY PERIOD 90 tablet 0     gabapentin (NEURONTIN) 100 MG capsule TAKE 1 CAPSULE BY MOUTH THREE TIMES DAILY 90 capsule 0     ICAPS PO 2 tablets daily       losartan (COZAAR) 25 MG tablet TAKE 1 TABLET BY MOUTH EVERY DAY 90 tablet 3     methotrexate sodium 2.5 MG TABS Take 8 tablets (20 mg) by mouth once a week . Take all 8 tablets on the same day of each week.  Do not take with amoxicillin. 32 tablet 4     metoprolol tartrate (LOPRESSOR) 25 MG tablet TAKE 1 TABLET(25 MG) BY MOUTH TWICE DAILY 180 tablet 3     Omega-3 Fatty Acids (OMEGA-3 FISH OIL PO) Take 1 tablet twice daily.       sulfaSALAzine ER (AZULFIDINE EN) 500 MG EC tablet Take 1 tablet (500 mg) by mouth 2 times daily 60 tablet 4        Allergies  Allergies   Allergen Reactions     Lisinopril Cough       Physical Exam:   BP (!) 185/80   Pulse 60   Temp 97.6  F (36.4  C) (Oral)   Resp 16   Ht 1.549 m (5' 1\")   Wt 56.2 kg (123 lb 12.8 oz)   SpO2 98%   Breastfeeding? No   BMI 23.39 kg/m     General appearance: Alert and oriented, no acute distress, well groomed  ENT: no palpable neck masses or thyromegaly appreciated  CV: regular rate, regular rhythm, no murmurs appreciated   Resp: Lungs clear to auscultation bilaterally. Normal respiratory effort.  GI: Abdomen slightly distended, soft, non-tender. No palpable masses  Skin: scars consistent with prior skin excisions  Psych: alert and oriented, appropriate affect   Lymph: No palpable lymphadenopathy in the neck, supraclavicular region, groin  Genitourinary:  External/Vulva: from 5-10 o'clock on the vulva, surrounding the vaginal introitus is an erythematous plaque with areas of thickened white tissue, some areas of ulceration within. There appears to be a healing biopsy site around 9-10 o'clock, although can't tell for certain, there are some areas of hypertrophic tissue. The area of " discoloration extends onto the perineum and in towards the vagina but is distal to the hymen. There are no other concerning lesions  Vagina: introitus narrowed, atrophic mucosa   Anus: no olimpia-anal lesions    Labs: n/a    Imaging: n/a    Assessment:  Roxanna Stark is a 89 year old with PMH notable for HTN, RA on methotrexate, and aortic valve replacement here as a new patient for THERESA 2-3.    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 Left untreated it will become cancer over time. We discussed that biopsy is not always perfect and sometimes cancer can coexist within a lesion of THERESA 2-3. We reviewed options for management including surgical resection which is recommended/preferred. We discussed alternatives including LASER/ablative therapy, topical therapy, or observation (which is not recommended). We discussed wide local excision and the risks of infection and poor wound healing, scarring with narrowing of the vaginal introitus, and risk of recurrence. In her case her tissue, the size of the lesion, and her methotrexate all put her at additional risk in addition to the vulva having poor wound healing in general. I would also recommend colposcopy to look at the area given this can be a regional effect and to ensure no other concerning lesions. Given her cardiac history and HTN in clinic today, as well as being overdue for cardiology follow-up I would recommend she see cardiology and have recommended echo prior to surgery. Provided there are no concerns following that visit I recommend WLE of the vulva with colposcopy of the vagina and cervix at the same time. This would be an outpatient surgery. We did discuss taht if cancer is found she may need additional procedure. We also discussed that she will need close f/u regardless.    Pre-op workup: pre-op teaching done. To see cardiology. Up to date on PCP visits. No labs needed. She does have  known CKD but Cr has been stable.    Mono Ribeiro MD     Gynecologic Oncology     Again, thank you for allowing me to participate in the care of your patient.      Sincerely,    Mono Ribeiro MD

## 2019-09-03 NOTE — PROGRESS NOTES
I scheduled surgery for this patient with Dr. Evans on 9/27 at [facility]. Scheduled per RN.    The RN has provided them with education and a packet regarding their procedure.     They are aware that they will receive a call  ~2 days prior to the scheduled procedure and will be given an exact arrival/start time.

## 2019-09-03 NOTE — H&P (VIEW-ONLY)
"Gynecologic Oncology Clinic - New Patient    Referring provider:    Brandan Landin MD  [address] [place] [address]    Patient: Roxanna Stark  : 1927    Date of Visit: September 3, 2019    Chief Complaint: VINIII    History of Present Illness:  Roxanna Stark is a 89 year old post-menopausal female who presents for recommendations regarding treatment/management of THERESA III.     First noticed a \"cut\" or sore on the vulva awhile ago. She's not exactly sure how long ago, but she feels it was at least a year ago. Initially was just there, but then started to be painful/itching. She has tried bacitracin and another cream which didn't seem to change things. She was seen for this issue and had a vulvar biopsy of the right labia which showed THERESA 2-3 and chronic inflammation. She presents for recommendations. She continues to have constant pain in the area. No discharge or bleeding. The area is sore. She has no other associated symptoms. She has never had anything like this before.       Review of Systems:  Constitutional: no fevers, chills  Eyes: no changes in vision  Ears/Nose/Throat: recently got hearing aids which has improved hearing  CV: no chest pain  Resp: no shortness of breath   GI: no abdominal pain, diarrhea, constipation, bloating  : no vaginal bleeding, vaginal discharge, +vulvar irritation as per HPI  MSK: negative  Skin: positive as per HPI of vulva as above  Neuro: no headaches  Psych: negative  Endocrine: no heat/cold intolerance  Heme/Lymph: no swollen lymph nodes, heat/cold intolerance  Allergy/Immunology: negative    Past Medical History  Past Medical History:   Diagnosis Date     Actinic keratosis      Aortic stenosis      Basal cell cancer 2014    left eye medial canthus      Basal cell carcinoma 08    left cheek     CKD (chronic kidney disease) stage 3, GFR 30-59 ml/min (H) 2019     HTN (hypertension)      Melanoma in situ (H) 08    left arm     " Polymyalgia rheumatica (H)      Temporal arteritis (H)        Past Surgical History  Past Surgical History:   Procedure Laterality Date     C SKIN TISSUE PROCEDURE UNLISTED  11/3/08    mmis skin cancer excision     CATARACT IOL, RT/LT      bilateral     COLONOSCOPY       REPLACE VALVE AORTIC N/A 2016    Procedure: REPLACE VALVE AORTIC;  Surgeon: Sudeep Tsai MD;  Location:  OR        OB/Gynecologic History:     Last Pap Smear: remote, no history of abnormal  She is not sexually active. She does not intend to be sexually active.      Social History  Social History     Tobacco Use     Smoking status: Never Smoker     Smokeless tobacco: Never Used   Substance Use Topics     Alcohol use: Yes     Comment: 4 times a year     Drug use: No   She has 5 living daughters.     Family History  Family History   Problem Relation Age of Onset     Breast Cancer Sister 45     Arthritis Sister      Thyroid Disease Sister      Cancer Sister         colon     Arthritis Sister      Arthritis Mother      Hypertension Father      Cancer - colorectal Father      Prostate Cancer Father      Arthritis Father      Heart Disease Father      Lipids Father      Arthritis Sister      Asthma Daughter      Asthma Daughter      Cancer Daughter 58        lung     Cancer Other 81        pancreatic        Current Outpatient Medications   Medication Sig Dispense Refill     amoxicillin (AMOXIL) 500 MG capsule TAKE 4 CAPSULES BY MOUTH 1 HOUR BEFORE DENTAL APPOINTMENT 4 capsule 0     aspirin  MG EC tablet Take 1 tablet (325 mg) by mouth daily 90 tablet 3     calcium-vitamin D 500-125 MG-UNIT TABS        folic acid (FOLVITE) 1 MG tablet Take 1 tablet (1 mg) by mouth daily 30 tablet 6     furosemide (LASIX) 20 MG tablet TAKE 1 TABLET BY MOUTH EVERY DAILY IF 2 TO 3 POUNDS WEIGHT GAIN OVER 2 DAY PERIOD 90 tablet 0     gabapentin (NEURONTIN) 100 MG capsule TAKE 1 CAPSULE BY MOUTH THREE TIMES DAILY 90 capsule 0      "ICAPS PO 2 tablets daily       losartan (COZAAR) 25 MG tablet TAKE 1 TABLET BY MOUTH EVERY DAY 90 tablet 3     methotrexate sodium 2.5 MG TABS Take 8 tablets (20 mg) by mouth once a week . Take all 8 tablets on the same day of each week.  Do not take with amoxicillin. 32 tablet 4     metoprolol tartrate (LOPRESSOR) 25 MG tablet TAKE 1 TABLET(25 MG) BY MOUTH TWICE DAILY 180 tablet 3     Omega-3 Fatty Acids (OMEGA-3 FISH OIL PO) Take 1 tablet twice daily.       sulfaSALAzine ER (AZULFIDINE EN) 500 MG EC tablet Take 1 tablet (500 mg) by mouth 2 times daily 60 tablet 4        Allergies  Allergies   Allergen Reactions     Lisinopril Cough       Physical Exam:   BP (!) 185/80   Pulse 60   Temp 97.6  F (36.4  C) (Oral)   Resp 16   Ht 1.549 m (5' 1\")   Wt 56.2 kg (123 lb 12.8 oz)   SpO2 98%   Breastfeeding? No   BMI 23.39 kg/m    General appearance: Alert and oriented, no acute distress, well groomed  ENT: no palpable neck masses or thyromegaly appreciated  CV: regular rate, regular rhythm, no murmurs appreciated   Resp: Lungs clear to auscultation bilaterally. Normal respiratory effort.  GI: Abdomen slightly distended, soft, non-tender. No palpable masses  Skin: scars consistent with prior skin excisions  Psych: alert and oriented, appropriate affect   Lymph: No palpable lymphadenopathy in the neck, supraclavicular region, groin  Genitourinary:  External/Vulva: from 5-10 o'clock on the vulva, surrounding the vaginal introitus is an erythematous plaque with areas of thickened white tissue, some areas of ulceration within. There appears to be a healing biopsy site around 9-10 o'clock, although can't tell for certain, there are some areas of hypertrophic tissue. The area of discoloration extends onto the perineum and in towards the vagina but is distal to the hymen. There are no other concerning lesions  Vagina: introitus narrowed, atrophic mucosa   Anus: no olimpia-anal lesions    Labs: n/a    Imaging: " n/a    Assessment:  Roxanna Stark is a 89 year old with PMH notable for HTN, RA on methotrexate, and aortic valve replacement here as a new patient for THERESA 2-3.    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 Left untreated it will become cancer over time. We discussed that biopsy is not always perfect and sometimes cancer can coexist within a lesion of THERESA 2-3. We reviewed options for management including surgical resection which is recommended/preferred. We discussed alternatives including LASER/ablative therapy, topical therapy, or observation (which is not recommended). We discussed wide local excision and the risks of infection and poor wound healing, scarring with narrowing of the vaginal introitus, and risk of recurrence. In her case her tissue, the size of the lesion, and her methotrexate all put her at additional risk in addition to the vulva having poor wound healing in general. I would also recommend colposcopy to look at the area given this can be a regional effect and to ensure no other concerning lesions. Given her cardiac history and HTN in clinic today, as well as being overdue for cardiology follow-up I would recommend she see cardiology and have recommended echo prior to surgery. Provided there are no concerns following that visit I recommend WLE of the vulva with colposcopy of the vagina and cervix at the same time. This would be an outpatient surgery. We did discuss taht if cancer is found she may need additional procedure. We also discussed that she will need close f/u regardless.    Pre-op workup: pre-op teaching done. To see cardiology. Up to date on PCP visits. No labs needed. She does have known CKD but Cr has been stable.    Mono Ribeiro MD     Gynecologic Oncology

## 2019-09-04 ENCOUNTER — TELEPHONE (OUTPATIENT)
Dept: CARDIOLOGY | Facility: CLINIC | Age: 84
End: 2019-09-04

## 2019-09-04 NOTE — TELEPHONE ENCOUNTER
[facility] Call Center    Phone Message    May a detailed message be left on voicemail: yes    Reason for Call: Other: pt has appt with Dr Mary on 9/6 at [place], pt needs echocardiogram done as well, please place new orders and call pt so she can schedule     Action Taken: Message routed to:  [facility]

## 2019-09-04 NOTE — TELEPHONE ENCOUNTER
Left message for patient to call clinic.  Andressa Bishop RN    9/3/2019  [facility]   Mono Ribeiro MD        1. I would also recommend colposcopy to look at the area given this can be a regional effect and to ensure no other concerning lesions. Given her cardiac history and HTN in clinic today, as well as being overdue for cardiology follow-up I would recommend she see cardiology and have recommended echo prior to surgery. Provided there are no concerns following that visit I recommend WLE of the vulva with colposcopy of the vagina and cervix at the same time. This would be an outpatient surgery. We did discuss taht if cancer is found she may need additional procedure. We also discussed that she will need close f/u regardless.

## 2019-09-05 NOTE — TELEPHONE ENCOUNTER
Spoke to patient.  She is scheduled for valva excision 9/27/19 and oncologist is recommending an echo and visit with cardiologist prior to excision.  Dr. Mary was unavailable.  Writer offered appt with Dr. Sheriff next week with echo prior and patient accepted.  Andressa Bishop RN

## 2019-09-05 NOTE — TELEPHONE ENCOUNTER
[facility] Call Center    Phone Message    May a detailed message be left on voicemail: yes    Reason for Call: Other: Patient is returning Andressa's call.  Please reach out to patient.      Action Taken: Message routed to:  [facility] ([facility] Cardiology

## 2019-09-09 ENCOUNTER — ANCILLARY PROCEDURE (OUTPATIENT)
Dept: CARDIOLOGY | Facility: CLINIC | Age: 84
End: 2019-09-09
Attending: INTERNAL MEDICINE
Payer: MEDICARE

## 2019-09-09 DIAGNOSIS — I35.0 NONRHEUMATIC AORTIC VALVE STENOSIS: ICD-10-CM

## 2019-09-09 DIAGNOSIS — Z95.2 AORTIC VALVE REPLACED: ICD-10-CM

## 2019-09-09 PROCEDURE — 93306 TTE W/DOPPLER COMPLETE: CPT | Performed by: INTERNAL MEDICINE

## 2019-09-11 ENCOUNTER — OFFICE VISIT (OUTPATIENT)
Dept: CARDIOLOGY | Facility: CLINIC | Age: 84
End: 2019-09-11
Payer: MEDICARE

## 2019-09-11 VITALS
SYSTOLIC BLOOD PRESSURE: 154 MMHG | HEART RATE: 59 BPM | WEIGHT: 122 LBS | BODY MASS INDEX: 23.05 KG/M2 | OXYGEN SATURATION: 98 % | DIASTOLIC BLOOD PRESSURE: 71 MMHG

## 2019-09-11 DIAGNOSIS — Z01.810 PRE-OPERATIVE CARDIOVASCULAR EXAMINATION: Primary | ICD-10-CM

## 2019-09-11 DIAGNOSIS — I10 ESSENTIAL HYPERTENSION: ICD-10-CM

## 2019-09-11 DIAGNOSIS — Z87.39 HISTORY OF RHEUMATOID ARTHRITIS: ICD-10-CM

## 2019-09-11 DIAGNOSIS — Z95.2 S/P AVR (AORTIC VALVE REPLACEMENT): ICD-10-CM

## 2019-09-11 PROCEDURE — 99214 OFFICE O/P EST MOD 30 MIN: CPT | Performed by: INTERNAL MEDICINE

## 2019-09-11 NOTE — PATIENT INSTRUCTIONS
Thank you for coming to the [facility] @ [place]; please note the following instructions:    1. No changes in treatment    2. Cardiac cleared for surgery by Dr. Sheriff        If you have any questions regarding your visit please contact your care team:     Cardiology  Telephone Number   Andressa MCGRATH, RN  Shoshana ABRAMS, RN   Linda HENDRIX, RMA  Toshia HINKLE, RMA  Rich QUIGLEY, [facility]   [phone] (option 1)   For scheduling appts:     [phone] (select option 1)       For the Device Clinic (Pacemakers and ICD's)  RN's :  Rosa Knight   During business hours: [phone]    *After business hours:  [phone] (select option 4)      Normal test result notifications will be released via [employer] or mailed within 7 business days.  All other test results, will be communicated via telephone once reviewed by your cardiologist.    If you need a medication refill please contact your pharmacy.  Please allow 3 business days for your refill to be completed.    As always, thank you for trusting us with your health care needs!

## 2019-09-11 NOTE — NURSING NOTE
"Chief Complaint   Patient presents with     RECHECK      OV with Dr. Maurizio Sheriff for pre-operative cardiac clearance for surgery on 9/27/19; echocardiogram scheduled for 9/09/19. Pt reports no cardiac symptoms.        Initial BP (!) 158/68 (BP Location: Right arm, Patient Position: Chair, Cuff Size: Adult Regular)   Pulse 61   Wt 55.3 kg (122 lb)   SpO2 98%   BMI 23.05 kg/m   Estimated body mass index is 23.05 kg/m  as calculated from the following:    Height as of 9/3/19: 1.549 m (5' 1\").    Weight as of this encounter: 55.3 kg (122 lb)..  BP completed using cuff size: regular    Toshia Guillen MA    "

## 2019-09-11 NOTE — PROGRESS NOTES
Referring provider:Mono Ribeiro MD    Chief complaint: None    HPI: Ms. Roxanna Stark is a 89 year old  female with PMH significant for severe aortic stenosis s/p surgical AVR with a 21 mm tissue valve on 4/21/2016 and rheumatoid arthritis.  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    The patient is asymptomatic from cardiac standpoint.  She is physically active.  She can climb 2 flights of stairs.  She reports exercising and practicing yoga. The patient denies a history of chest discomfort, dyspnea, PND, orthopnea, pedal edema, palpitations, lightheadedness, and syncope.    She had a coronary angiogram in 2016 prior to valve surgery which showed 50% proximal LAD and 90% stenosis of the first diagonal.  No intervention was performed.    No history of smoking or diabetes.    Last echo on 9/9/2019 showed normal LV/RV function with EF of 60-65%, normal functioning aortic valve with mean gradient at 11 mmHg.  No other significant valve disease was noted.  She is currently on aspirin 8325, furosemide 20 mg, losartan 25, metoprolol 25 twice daily, omega-3 acids, methotrexate and gabapentin.  Medications, personal, family, and social history reviewed with patient and revised.    PAST MEDICAL HISTORY:  Past Medical History:   Diagnosis Date     Actinic keratosis      Aortic stenosis 2014     Basal cell cancer 7/2014    left eye medial canthus      Basal cell carcinoma 9/30/08    left cheek     CKD (chronic kidney disease) stage 3, GFR 30-59 ml/min (H) 7/1/2019     HTN (hypertension)      Melanoma in situ (H) 9/30/08    left arm     Polymyalgia rheumatica (H) 11/99     Temporal arteritis (H) 11/99       CURRENT MEDICATIONS:  Current Outpatient Medications   Medication Sig Dispense Refill     aspirin  MG EC tablet Take 1 tablet (325 mg) by mouth daily 90 tablet 3     calcium-vitamin D 500-125 MG-UNIT TABS         folic acid (FOLVITE) 1 MG tablet Take 1 tablet (1 mg) by mouth daily 30 tablet 6     furosemide (LASIX) 20 MG tablet TAKE 1 TABLET BY MOUTH EVERY DAILY IF 2 TO 3 POUNDS WEIGHT GAIN OVER 2 DAY PERIOD 90 tablet 0     gabapentin (NEURONTIN) 100 MG capsule TAKE 1 CAPSULE BY MOUTH THREE TIMES DAILY 90 capsule 0     ICAPS PO 2 tablets daily       losartan (COZAAR) 25 MG tablet TAKE 1 TABLET BY MOUTH EVERY DAY 90 tablet 3     methotrexate sodium 2.5 MG TABS Take 8 tablets (20 mg) by mouth once a week . Take all 8 tablets on the same day of each week.  Do not take with amoxicillin. 32 tablet 4     metoprolol tartrate (LOPRESSOR) 25 MG tablet TAKE 1 TABLET(25 MG) BY MOUTH TWICE DAILY 180 tablet 3     Omega-3 Fatty Acids (OMEGA-3 FISH OIL PO) Take 1 tablet twice daily.       sulfaSALAzine ER (AZULFIDINE EN) 500 MG EC tablet Take 1 tablet (500 mg) by mouth 2 times daily 60 tablet 4     amoxicillin (AMOXIL) 500 MG capsule TAKE 4 CAPSULES BY MOUTH 1 HOUR BEFORE DENTAL APPOINTMENT 4 capsule 0       PAST SURGICAL HISTORY:  Past Surgical History:   Procedure Laterality Date     C SKIN TISSUE PROCEDURE UNLISTED  11/3/08    mmis skin cancer excision     CATARACT IOL, RT/LT  5/09    bilateral     COLONOSCOPY  2002     REPLACE VALVE AORTIC N/A 4/25/2016    Procedure: REPLACE VALVE AORTIC;  Surgeon: Sudeep Tsai MD;  Location:  OR       ALLERGIES:     Allergies   Allergen Reactions     Lisinopril Cough       FAMILY HISTORY:  Family History   Problem Relation Age of Onset     Breast Cancer Sister 45     Arthritis Sister      Thyroid Disease Sister      Cancer Sister         colon     Arthritis Sister      Arthritis Mother      Hypertension Father      Cancer - colorectal Father      Prostate Cancer Father      Arthritis Father      Heart Disease Father      Lipids Father      Arthritis Sister      Asthma Daughter      Asthma Daughter      Cancer Daughter 58        lung     Cancer Other 81        pancreatic           SOCIAL HISTORY:  Social History     Tobacco Use     Smoking status: Never Smoker     Smokeless tobacco: Never Used   Substance Use Topics     Alcohol use: Yes     Comment: 4 times a year     Drug use: No       ROS:   Constitutional: No fever, chills, or sweats. Weight stable.   ENT: No visual disturbance, ear ache, epistaxis, sore throat.   Cardiovascular: As per HPI.   Respiratory: No cough, hemoptysis.    GI: No nausea, vomiting, hematemesis, melena, or hematochezia.   : No hematuria.   Integument: Negative.   Psychiatric: Negative.   Hematologic:  No easy bruising, no easy bleeding.  Neuro: Negative.   Endocrinology: No significant heat or cold intolerance   Musculoskeletal: No myalgia.    Exam:  BP (!) 154/71 (BP Location: Right arm, Patient Position: Chair, Cuff Size: Adult Regular)   Pulse 59   Wt 55.3 kg (122 lb)   SpO2 98%   BMI 23.05 kg/m    GENERAL APPEARANCE: alert and no distress  HEENT: no icterus, no central cyanosis  LYMPH/NECK: no adenopathy, no asymmetry, JVP not elevated, no carotid bruits.  RESPIRATORY: lungs clear to auscultation - no rales, rhonchi or wheezes, no use of accessory muscles, no retractions, respirations are unlabored, normal respiratory rate  CARDIOVASCULAR: regular rhythm, normal S1, S2, no S3 or S4,  left parasternal 2/6 mid systolic ejection murmur.    GI: soft, non tender  EXTREMITIES:no edema  NEURO: alert, normal speech,and affect  SKIN: no ecchymoses, no rashes     I have reviewed the labs and personally reviewed the imaging below and made my comment in the assessment and plan.    Labs:  CBC RESULTS:   Lab Results   Component Value Date    WBC 8.2 06/12/2019    RBC 3.78 (L) 06/12/2019    HGB 11.3 (L) 06/12/2019    HCT 35.5 06/12/2019    MCV 94 06/12/2019    MCH 29.9 06/12/2019    MCHC 31.8 06/12/2019    RDW 20.1 (H) 06/12/2019     06/12/2019       BMP RESULTS:  Lab Results   Component Value Date     07/01/2019    POTASSIUM 4.2 07/01/2019     CHLORIDE 103 07/01/2019    CO2 30 07/01/2019    ANIONGAP 6 07/01/2019    GLC 94 07/01/2019    BUN 22 07/01/2019    CR 1.21 (H) 07/01/2019    GFRESTIMATED 39 (L) 07/01/2019    GFRESTBLACK 45 (L) 07/01/2019    ROEL 9.6 07/01/2019        INR RESULTS:  Lab Results   Component Value Date    INR 1.41 (H) 04/25/2016    INR 1.63 (H) 04/25/2016    INR 0.90 04/04/2016     Echocardiogram 9/9/2019  Global and regional left ventricular function is normal with an EF of 60-65%.  Right ventricular function, chamber size, wall motion, and thickness are normal.  S/P Minimally invasive aortic valve replacement with 21 mm Epic porcine valve  on 4/25/2016.Mean aortic gradient unchanged at 11mmHg.  There has been no change.    Coronary angiogram 2016 [facility]: LAD proximal 50%, first diagonal 90%.  No intervention performed.    Assessment and Plan:   Ms. Roxanna Stark is a 89 year old  female with PMH significant for severe aortic stenosis s/p surgical AVR with a 21 mm tissue valve on 4/21/2016 and rheumatoid arthritis.  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    1.  Preoperative cardiovascular examination: The patient is currently asymptomatic from cardiac standpoint.  Physical exam is normal (sinus rhythm). She is on furosemide 20 mg.  She is euvolemic.  She is cleared for planned gynecologic surgery.    2.  Status post AVR: Normal functioning aortic valve on last echo from 9/9/2019 which I have personally reviewed.  The patient is on aspirin 325 mg daily.  She can hold aspirin prior to planned surgery.    3.  Hypertension: Mildly elevated in clinic today.  Recommended home monitoring and report to us if it is similarly elevated at home as well.    4.  Rheumatoid arthritis: Follows with Dr. Johnson rheumatology.    No medication changes today.  Return to clinic with Dr. Mary as scheduled earlier.    A total of 30 minutes spent  face-toface with greater than 50% of the time spent in counseling and coordinating cares of the issues above.     Please donot hesitate to contact me if you have any questions or concerns. Again, thank you for allowing me to participate in the care of your patient.    Maurizio FU MD  [facility] Division of Cardiology  Pager [phone]

## 2019-09-11 NOTE — LETTER
9/11/2019      RE: Roxanna Stark  [address]  [place] [address]       Dear Colleague,    Thank you for the opportunity to participate in the care of your patient, Roxanna Stark, at the [facility] HEART AT [facility] at [facility]. Please see a copy of my visit note below.    Referring provider:Mono Ribeiro MD    Chief complaint: None    HPI: Ms. Roxanna Stark is a 89 year old  female with PMH significant for severe aortic stenosis s/p surgical AVR with a 21 mm tissue valve on 4/21/2016 and rheumatoid arthritis.  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    The patient is asymptomatic from cardiac standpoint.  She is physically active.  She can climb 2 flights of stairs.  She reports exercising and practicing yoga. The patient denies a history of chest discomfort, dyspnea, PND, orthopnea, pedal edema, palpitations, lightheadedness, and syncope.    She had a coronary angiogram in 2016 prior to valve surgery which showed 50% proximal LAD and 90% stenosis of the first diagonal.  No intervention was performed.    No history of smoking or diabetes.    Last echo on 9/9/2019 showed normal LV/RV function with EF of 60-65%, normal functioning aortic valve with mean gradient at 11 mmHg.  No other significant valve disease was noted.  She is currently on aspirin 8325, furosemide 20 mg, losartan 25, metoprolol 25 twice daily, omega-3 acids, methotrexate and gabapentin.  Medications, personal, family, and social history reviewed with patient and revised.    PAST MEDICAL HISTORY:  Past Medical History:   Diagnosis Date     Actinic keratosis      Aortic stenosis 2014     Basal cell cancer 7/2014    left eye medial canthus      Basal cell carcinoma 9/30/08    left cheek     CKD (chronic kidney disease) stage 3, GFR 30-59 ml/min (H)  7/1/2019     HTN (hypertension)      Melanoma in situ (H) 9/30/08    left arm     Polymyalgia rheumatica (H) 11/99     Temporal arteritis (H) 11/99       CURRENT MEDICATIONS:  Current Outpatient Medications   Medication Sig Dispense Refill     aspirin  MG EC tablet Take 1 tablet (325 mg) by mouth daily 90 tablet 3     calcium-vitamin D 500-125 MG-UNIT TABS        folic acid (FOLVITE) 1 MG tablet Take 1 tablet (1 mg) by mouth daily 30 tablet 6     furosemide (LASIX) 20 MG tablet TAKE 1 TABLET BY MOUTH EVERY DAILY IF 2 TO 3 POUNDS WEIGHT GAIN OVER 2 DAY PERIOD 90 tablet 0     gabapentin (NEURONTIN) 100 MG capsule TAKE 1 CAPSULE BY MOUTH THREE TIMES DAILY 90 capsule 0     ICAPS PO 2 tablets daily       losartan (COZAAR) 25 MG tablet TAKE 1 TABLET BY MOUTH EVERY DAY 90 tablet 3     methotrexate sodium 2.5 MG TABS Take 8 tablets (20 mg) by mouth once a week . Take all 8 tablets on the same day of each week.  Do not take with amoxicillin. 32 tablet 4     metoprolol tartrate (LOPRESSOR) 25 MG tablet TAKE 1 TABLET(25 MG) BY MOUTH TWICE DAILY 180 tablet 3     Omega-3 Fatty Acids (OMEGA-3 FISH OIL PO) Take 1 tablet twice daily.       sulfaSALAzine ER (AZULFIDINE EN) 500 MG EC tablet Take 1 tablet (500 mg) by mouth 2 times daily 60 tablet 4     amoxicillin (AMOXIL) 500 MG capsule TAKE 4 CAPSULES BY MOUTH 1 HOUR BEFORE DENTAL APPOINTMENT 4 capsule 0       PAST SURGICAL HISTORY:  Past Surgical History:   Procedure Laterality Date     C SKIN TISSUE PROCEDURE UNLISTED  11/3/08    mmis skin cancer excision     CATARACT IOL, RT/LT  5/09    bilateral     COLONOSCOPY  2002     REPLACE VALVE AORTIC N/A 4/25/2016    Procedure: REPLACE VALVE AORTIC;  Surgeon: Sudeep Tsai MD;  Location:  OR       ALLERGIES:     Allergies   Allergen Reactions     Lisinopril Cough       FAMILY HISTORY:  Family History   Problem Relation Age of Onset     Breast Cancer Sister 45     Arthritis Sister      Thyroid Disease Sister      Cancer  Sister         colon     Arthritis Sister      Arthritis Mother      Hypertension Father      Cancer - colorectal Father      Prostate Cancer Father      Arthritis Father      Heart Disease Father      Lipids Father      Arthritis Sister      Asthma Daughter      Asthma Daughter      Cancer Daughter 58        lung     Cancer Other 81        pancreatic          SOCIAL HISTORY:  Social History     Tobacco Use     Smoking status: Never Smoker     Smokeless tobacco: Never Used   Substance Use Topics     Alcohol use: Yes     Comment: 4 times a year     Drug use: No       ROS:   Constitutional: No fever, chills, or sweats. Weight stable.   ENT: No visual disturbance, ear ache, epistaxis, sore throat.   Cardiovascular: As per HPI.   Respiratory: No cough, hemoptysis.    GI: No nausea, vomiting, hematemesis, melena, or hematochezia.   : No hematuria.   Integument: Negative.   Psychiatric: Negative.   Hematologic:  No easy bruising, no easy bleeding.  Neuro: Negative.   Endocrinology: No significant heat or cold intolerance   Musculoskeletal: No myalgia.    Exam:  BP (!) 154/71 (BP Location: Right arm, Patient Position: Chair, Cuff Size: Adult Regular)   Pulse 59   Wt 55.3 kg (122 lb)   SpO2 98%   BMI 23.05 kg/m     GENERAL APPEARANCE: alert and no distress  HEENT: no icterus, no central cyanosis  LYMPH/NECK: no adenopathy, no asymmetry, JVP not elevated, no carotid bruits.  RESPIRATORY: lungs clear to auscultation - no rales, rhonchi or wheezes, no use of accessory muscles, no retractions, respirations are unlabored, normal respiratory rate  CARDIOVASCULAR: regular rhythm, normal S1, S2, no S3 or S4,  left parasternal 2/6 mid systolic ejection murmur.    GI: soft, non tender  EXTREMITIES:no edema  NEURO: alert, normal speech,and affect  SKIN: no ecchymoses, no rashes     I have reviewed the labs and personally reviewed the imaging below and made my comment in the assessment and plan.    Labs:  CBC RESULTS:   Lab  Results   Component Value Date    WBC 8.2 06/12/2019    RBC 3.78 (L) 06/12/2019    HGB 11.3 (L) 06/12/2019    HCT 35.5 06/12/2019    MCV 94 06/12/2019    MCH 29.9 06/12/2019    MCHC 31.8 06/12/2019    RDW 20.1 (H) 06/12/2019     06/12/2019       BMP RESULTS:  Lab Results   Component Value Date     07/01/2019    POTASSIUM 4.2 07/01/2019    CHLORIDE 103 07/01/2019    CO2 30 07/01/2019    ANIONGAP 6 07/01/2019    GLC 94 07/01/2019    BUN 22 07/01/2019    CR 1.21 (H) 07/01/2019    GFRESTIMATED 39 (L) 07/01/2019    GFRESTBLACK 45 (L) 07/01/2019    ROEL 9.6 07/01/2019        INR RESULTS:  Lab Results   Component Value Date    INR 1.41 (H) 04/25/2016    INR 1.63 (H) 04/25/2016    INR 0.90 04/04/2016     Echocardiogram 9/9/2019  Global and regional left ventricular function is normal with an EF of 60-65%.  Right ventricular function, chamber size, wall motion, and thickness are normal.  S/P Minimally invasive aortic valve replacement with 21 mm Epic porcine valve  on 4/25/2016.Mean aortic gradient unchanged at 11mmHg.  There has been no change.    Coronary angiogram 2016 [facility]: LAD proximal 50%, first diagonal 90%.  No intervention performed.    Assessment and Plan:   Ms. Roxanna Stark is a 89 year old  female with PMH significant for severe aortic stenosis s/p surgical AVR with a 21 mm tissue valve on 4/21/2016 and rheumatoid arthritis.  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    1.  Preoperative cardiovascular examination: The patient is currently asymptomatic from cardiac standpoint.  Physical exam is normal (sinus rhythm). She is on furosemide 20 mg.  She is euvolemic.  She is cleared for planned gynecologic surgery.    2.  Status post AVR: Normal functioning aortic valve on last echo from 9/9/2019 which I have personally reviewed.  The patient is on aspirin 325 mg daily.  She can hold aspirin prior  to planned surgery.    3.  Hypertension: Mildly elevated in clinic today.  Recommended home monitoring and report to us if it is similarly elevated at home as well.    4.  Rheumatoid arthritis: Follows with Dr. Johnson rheumatology.    No medication changes today.  Return to clinic with Dr. Mary as scheduled earlier.    A total of 30 minutes spent face-toface with greater than 50% of the time spent in counseling and coordinating cares of the issues above.     Please donot hesitate to contact me if you have any questions or concerns. Again, thank you for allowing me to participate in the care of your patient.    Maurizio FU MD  [facility] Division of Cardiology  Pager [phone]    Please do not hesitate to contact me if you have any questions/concerns.     Sincerely,     Maurizio Fu MD

## 2019-09-18 ENCOUNTER — OFFICE VISIT (OUTPATIENT)
Dept: RHEUMATOLOGY | Facility: CLINIC | Age: 84
End: 2019-09-18
Payer: MEDICARE

## 2019-09-18 VITALS
DIASTOLIC BLOOD PRESSURE: 64 MMHG | HEART RATE: 68 BPM | OXYGEN SATURATION: 98 % | TEMPERATURE: 98.1 F | SYSTOLIC BLOOD PRESSURE: 154 MMHG | WEIGHT: 123.6 LBS | HEIGHT: 61 IN | BODY MASS INDEX: 23.33 KG/M2

## 2019-09-18 DIAGNOSIS — Z79.899 HIGH RISK MEDICATION USE: ICD-10-CM

## 2019-09-18 DIAGNOSIS — M06.09 RHEUMATOID ARTHRITIS OF MULTIPLE SITES WITH NEGATIVE RHEUMATOID FACTOR (H): Primary | ICD-10-CM

## 2019-09-18 DIAGNOSIS — Z23 NEED FOR PROPHYLACTIC VACCINATION AND INOCULATION AGAINST INFLUENZA: ICD-10-CM

## 2019-09-18 LAB
ALBUMIN SERPL-MCNC: 3.2 G/DL (ref 3.4–5)
ALP SERPL-CCNC: 110 U/L (ref 40–150)
ALT SERPL W P-5'-P-CCNC: 38 U/L (ref 0–50)
AST SERPL W P-5'-P-CCNC: 40 U/L (ref 0–45)
BASOPHILS # BLD AUTO: 0.1 10E9/L (ref 0–0.2)
BASOPHILS NFR BLD AUTO: 0.7 %
BILIRUB DIRECT SERPL-MCNC: <0.1 MG/DL (ref 0–0.2)
BILIRUB SERPL-MCNC: 0.3 MG/DL (ref 0.2–1.3)
CREAT SERPL-MCNC: 1.07 MG/DL (ref 0.52–1.04)
CRP SERPL-MCNC: 23.2 MG/L (ref 0–8)
DIFFERENTIAL METHOD BLD: ABNORMAL
EOSINOPHIL # BLD AUTO: 0.2 10E9/L (ref 0–0.7)
EOSINOPHIL NFR BLD AUTO: 2.6 %
ERYTHROCYTE [DISTWIDTH] IN BLOOD BY AUTOMATED COUNT: 18.4 % (ref 10–15)
ERYTHROCYTE [SEDIMENTATION RATE] IN BLOOD BY WESTERGREN METHOD: 76 MM/H (ref 0–30)
GFR SERPL CREATININE-BSD FRML MDRD: 45 ML/MIN/{1.73_M2}
HCT VFR BLD AUTO: 32.9 % (ref 35–47)
HGB BLD-MCNC: 10.2 G/DL (ref 11.7–15.7)
LYMPHOCYTES # BLD AUTO: 1.5 10E9/L (ref 0.8–5.3)
LYMPHOCYTES NFR BLD AUTO: 16.6 %
MCH RBC QN AUTO: 29.8 PG (ref 26.5–33)
MCHC RBC AUTO-ENTMCNC: 31 G/DL (ref 31.5–36.5)
MCV RBC AUTO: 96 FL (ref 78–100)
MONOCYTES # BLD AUTO: 0.6 10E9/L (ref 0–1.3)
MONOCYTES NFR BLD AUTO: 6.5 %
NEUTROPHILS # BLD AUTO: 6.4 10E9/L (ref 1.6–8.3)
NEUTROPHILS NFR BLD AUTO: 73.6 %
PLATELET # BLD AUTO: 289 10E9/L (ref 150–450)
PROT SERPL-MCNC: 7.1 G/DL (ref 6.8–8.8)
RBC # BLD AUTO: 3.42 10E12/L (ref 3.8–5.2)
WBC # BLD AUTO: 8.7 10E9/L (ref 4–11)

## 2019-09-18 PROCEDURE — 86140 C-REACTIVE PROTEIN: CPT | Performed by: INTERNAL MEDICINE

## 2019-09-18 PROCEDURE — 99213 OFFICE O/P EST LOW 20 MIN: CPT | Performed by: INTERNAL MEDICINE

## 2019-09-18 PROCEDURE — 82565 ASSAY OF CREATININE: CPT | Performed by: INTERNAL MEDICINE

## 2019-09-18 PROCEDURE — 36415 COLL VENOUS BLD VENIPUNCTURE: CPT | Performed by: INTERNAL MEDICINE

## 2019-09-18 PROCEDURE — 90662 IIV NO PRSV INCREASED AG IM: CPT | Performed by: INTERNAL MEDICINE

## 2019-09-18 PROCEDURE — 85025 COMPLETE CBC W/AUTO DIFF WBC: CPT | Performed by: INTERNAL MEDICINE

## 2019-09-18 PROCEDURE — 80076 HEPATIC FUNCTION PANEL: CPT | Performed by: INTERNAL MEDICINE

## 2019-09-18 PROCEDURE — 85652 RBC SED RATE AUTOMATED: CPT | Performed by: INTERNAL MEDICINE

## 2019-09-18 PROCEDURE — G0008 ADMIN INFLUENZA VIRUS VAC: HCPCS | Performed by: INTERNAL MEDICINE

## 2019-09-18 RX ORDER — SULFASALAZINE 500 MG/1
500 TABLET, DELAYED RELEASE ORAL 2 TIMES DAILY
Qty: 60 TABLET | Refills: 6 | Status: SHIPPED | OUTPATIENT
Start: 2019-09-18 | End: 2020-03-25

## 2019-09-18 RX ORDER — FOLIC ACID 1 MG/1
1 TABLET ORAL DAILY
Qty: 30 TABLET | Refills: 6 | Status: SHIPPED | OUTPATIENT
Start: 2019-09-18 | End: 2020-03-25

## 2019-09-18 RX ORDER — METHOTREXATE 2.5 MG/1
20 TABLET ORAL WEEKLY
Qty: 32 TABLET | Refills: 4 | Status: SHIPPED | OUTPATIENT
Start: 2019-09-18 | End: 2020-03-25

## 2019-09-18 ASSESSMENT — MIFFLIN-ST. JEOR: SCORE: 908.03

## 2019-09-18 NOTE — PATIENT INSTRUCTIONS
Rheumatology    Dr. Jamie Johnson         [facility]   (Monday)  [address]  [place] [address]       [facility]   (Tuesday)  [address] [place] [address]    [facility]   (Wed., Thurs., and Friday)  [address] [place] [address]    Phone number: [phone]  Thank you for choosing [facility].  ORTIZ Stokes RMA

## 2019-09-18 NOTE — LETTER
[facility]  [address] [place] [address] [address]    September 25, 2019    Roxanna Stark  [address]  [place] [address]          Dear Roxanna,    Labs show stable renal insufficiency. Hemoglobin is reduced but similar to the range in the past.  Inflammatory markers are elevated.     No change to the treatment plan based on these labs alone.     Please let me know if you have any questions    Enclosed is a copy of your results.     Results for orders placed or performed in visit on 09/18/19   CBC with platelets differential   Result Value Ref Range    WBC 8.7 4.0 - 11.0 10e9/L    RBC Count 3.42 (L) 3.8 - 5.2 10e12/L    Hemoglobin 10.2 (L) 11.7 - 15.7 g/dL    Hematocrit 32.9 (L) 35.0 - 47.0 %    MCV 96 78 - 100 fl    MCH 29.8 26.5 - 33.0 pg    MCHC 31.0 (L) 31.5 - 36.5 g/dL    RDW 18.4 (H) 10.0 - 15.0 %    Platelet Count 289 150 - 450 10e9/L    % Neutrophils 73.6 %    % Lymphocytes 16.6 %    % Monocytes 6.5 %    % Eosinophils 2.6 %    % Basophils 0.7 %    Absolute Neutrophil 6.4 1.6 - 8.3 10e9/L    Absolute Lymphocytes 1.5 0.8 - 5.3 10e9/L    Absolute Monocytes 0.6 0.0 - 1.3 10e9/L    Absolute Eosinophils 0.2 0.0 - 0.7 10e9/L    Absolute Basophils 0.1 0.0 - 0.2 10e9/L    Diff Method Automated Method    Creatinine   Result Value Ref Range    Creatinine 1.07 (H) 0.52 - 1.04 mg/dL    GFR Estimate 45 (L) >60 mL/min/[1.73_m2]    GFR Estimate If Black 52 (L) >60 mL/min/[1.73_m2]   Erythrocyte sedimentation rate auto   Result Value Ref Range    Sed Rate 76 (H) 0 - 30 mm/h   CRP inflammation   Result Value Ref Range    CRP Inflammation 23.2 (H) 0.0 - 8.0 mg/L   Hepatic panel   Result Value Ref Range    Bilirubin Direct <0.1 0.0 - 0.2 mg/dL    Bilirubin Total 0.3 0.2 - 1.3 mg/dL    Albumin 3.2 (L) 3.4 - 5.0 g/dL    Protein Total 7.1 6.8 - 8.8 g/dL    Alkaline Phosphatase 110 40 - 150 U/L    ALT 38 0 - 50 U/L    AST 40 0 - 45 U/L       If  you have any questions or concerns, please call myself or my nurse at [phone].      Sincerely,        Jamie Johnson MD/johnny

## 2019-09-18 NOTE — NURSING NOTE
RAPID3 (0-30) Cumulative Score  3.5          RAPID3 Weighted Score (divide #4 by 3 and that is the weighted score)  1.3       Twila Atkins [facility] Rheumatology  9/18/2019 8:50 AM       (0) independent

## 2019-09-18 NOTE — PROGRESS NOTES
Rheumatology Clinic Visit      Roxanna Stark MRN# [id number]   YOB: 1927 Age: 89 year old      Date of visit: 9/18/19   PCP: Dr. Swapan Gaines  Cardiology: Dr. Boston Navarro     Chief Complaint   Patient presents with:  Arthritis: RA. Patient is doing good. No recent flares    Assessment and Plan     1. 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  She was treated with prednisone monotherapy for several years, being able to taper off without recurrence of symptoms. She then developed worsening symptoms in her hands and was diagnosed with rheumatoid arthritis. Initially, she was resistant to taking DMARD therapy.  She then was started on MTX that was effective; she reduced the dose with worsening symptoms. Currently on MTX 15mg wkly and SSZ 500mg BID (mild anemia possibly associated with SSZ so the dose was previously reduced).  RA is well controlled today.    - Continue methotrexate 20 mg once weekly   - Continue folic acid 1mg daily  - Continue sulfasalazine 500 mg twice daily   - Labs today: CBC, Creatinine, Hepatic Panel, ESR, CRP  - Labs in 3 months: CBC, Creatinine, Hepatic Panel  - Labs in 6 months: CBC, Creatinine, Hepatic Panel, ESR, CRP               Rapid 3, cumulative scores                      09/18/2019:   3.5  (MTX 20mg wkly, SSZ 500mg BID) worsening symptoms with rain today; no synovitis                      05/08/2019:   1.3  (MTX 20mg wkly, SSZ 500mg BID)                       01/23/2019:   1.3  (MTX 15mg wkly, SSZ 500mg BID)                       09/06/2018:   3.3  (MTX 15mg wkly, SSZ 500mg BID)    2. Giant Cell Arteritis History?: 12/26/2005 Left TA biopsy negative per [facility] record review.  No symptoms of GCA at this time.     3. Right shoulder rotator cuff tear and pain: Previously evaluated by orthopedic surgery and her pain resolved for approximately one  year after having a steroid injection in March 2015. She does not want to have surgical correction of her shoulder. Repeat steroid injections have been helpful; not needed today.  Physical therapy was effective and she is doing exercises at home.     4. History of basal cell carcinoma: Following with dermatology; encouraged yearly f/u for eval    5. Bone Health: Managed by PCP already.    6.  Vaccinations: Vaccinations reviewed with Ms. Stark.  Risks and benefits of vaccinations were discussed.    - Influenza: will receive today     7. Elevated blood pressure:  Roxanna to follow up with Primary Care provider regarding elevated blood pressure.     Ms. Stark verbalized agreement with and understanding of the rational for the diagnosis and treatment plan.  All questions were answered to best of my ability and the patient's satisfaction. Ms. Stark was advised to contact the clinic with any questions that may arise after the clinic visit.      Thank you for involving me in the care of the patient    Return to clinic: 6 months      HPI   Roxanna Stark is a 89 year old female with medical history significant for basal cell carcinoma, hypertension, aortic stenosis, right rotator cuff tear (previously evaluated by Dr. Bingham, orthopedic surgery, on 3/20/2015 where at that time Ms. Stark was not interested in surgical correction; she received an intra-articular steroid injection at that time that was effective for ~1year), temporal arteritis?, and seronegative erosive rheumatoid arthritis.     Today, she reports that she is doing well.  Tolerating medications well.  Morning stiffness for no more than 30 minutes.  No joint swelling.  Has bony nodules on her fingers that she says has not changed.  More aching when the weather changes, as it is raining today she feels a bit worse, but in general she feels pretty good.  She does not feel like her shoulder needs a steroid injection at this time; she had some pain when she was  doing exercises in her yoga class but those have resolved.  She avoids certain activities when doing yoga now - primarily quick over the head activity.     Denies fevers, chills, nausea, vomiting, constipation, diarrhea. No abdominal pain. No chest pain/pressure, palpitations, or shortness of breath. No oral or nasal sores. No neck pain. No rash. Swelling in her bilateral feet.       Tobacco: None  EtOH: No more than 1 drink per week  Drugs: None  Occupation: Used to work for the [employer] company; now retired    ROS   GEN: No fevers, chills, night sweats, or weight change  SKIN: No itching, rashes, sores  HEENT: No oral or nasal ulcers.  CV: No chest pain, pressure, palpitations, or dyspnea on exertion.  PULM: No SOB, wheeze, cough.  GI: No nausea, vomiting, constipation, diarrhea. No blood in stool. No abdominal pain.  : No blood in urine.  MSK: See HPI.  NEURO: Says that gabapentin is helping her peripheral neuropathy - affecting the feet  ENDO: No heat/cold intolerance.  EXT: See HPI    Active Problem List     Patient Active Problem List   Diagnosis     Polymyalgia rheumatica (H)     History of basal cell carcinoma     CARDIOVASCULAR SCREENING; LDL GOAL LESS THAN 130     Advanced directives, counseling/discussion     Hypertension goal BP (blood pressure) < 140/90     Hip pain     Left atrial enlargement     Aortic stenosis     Status post coronary angiogram     Aortic valve stenosis     Aortic valve replaced     Rheumatoid arthritis of multiple sites with negative rheumatoid factor (H)     CKD (chronic kidney disease) stage 3, GFR 30-59 ml/min (H)     Past Medical History     Past Medical History:   Diagnosis Date     Actinic keratosis      Aortic stenosis 2014     Basal cell cancer 7/2014    left eye medial canthus      Basal cell carcinoma 9/30/08    left cheek     CKD (chronic kidney disease) stage 3, GFR 30-59 ml/min (H) 7/1/2019     HTN (hypertension)      Melanoma in situ (H) 9/30/08    left arm      Polymyalgia rheumatica (H) 11/99     Temporal arteritis (H) 11/99     Past Surgical History     Past Surgical History:   Procedure Laterality Date     C SKIN TISSUE PROCEDURE UNLISTED  11/3/08    mmis skin cancer excision     CATARACT IOL, RT/LT  5/09    bilateral     COLONOSCOPY  2002     REPLACE VALVE AORTIC N/A 4/25/2016    Procedure: REPLACE VALVE AORTIC;  Surgeon: Sudeep Tsai MD;  Location:  OR     Allergy     Allergies   Allergen Reactions     Lisinopril Cough        Current Medication List     Current Outpatient Medications   Medication Sig     aspirin  MG EC tablet Take 1 tablet (325 mg) by mouth daily     calcium-vitamin D 500-125 MG-UNIT TABS      folic acid (FOLVITE) 1 MG tablet Take 1 tablet (1 mg) by mouth daily     furosemide (LASIX) 20 MG tablet TAKE 1 TABLET BY MOUTH EVERY DAILY IF 2 TO 3 POUNDS WEIGHT GAIN OVER 2 DAY PERIOD     gabapentin (NEURONTIN) 100 MG capsule TAKE 1 CAPSULE BY MOUTH THREE TIMES DAILY     ICAPS PO 2 tablets daily     losartan (COZAAR) 25 MG tablet TAKE 1 TABLET BY MOUTH EVERY DAY     methotrexate sodium 2.5 MG TABS Take 8 tablets (20 mg) by mouth once a week . Take all 8 tablets on the same day of each week.  Do not take with amoxicillin.     metoprolol tartrate (LOPRESSOR) 25 MG tablet TAKE 1 TABLET(25 MG) BY MOUTH TWICE DAILY     Omega-3 Fatty Acids (OMEGA-3 FISH OIL PO) Take 1 tablet twice daily.     sulfaSALAzine ER (AZULFIDINE EN) 500 MG EC tablet Take 1 tablet (500 mg) by mouth 2 times daily     amoxicillin (AMOXIL) 500 MG capsule TAKE 4 CAPSULES BY MOUTH 1 HOUR BEFORE DENTAL APPOINTMENT     No current facility-administered medications for this visit.        Social History   See HPI    Family History     Family History   Problem Relation Age of Onset     Breast Cancer Sister 45     Arthritis Sister      Thyroid Disease Sister      Cancer Sister         colon     Arthritis Sister      Arthritis Mother      Hypertension Father      Cancer - colorectal  "Father      Prostate Cancer Father      Arthritis Father      Heart Disease Father      Lipids Father      Arthritis Sister      Asthma Daughter      Asthma Daughter      Cancer Daughter 58        lung     Cancer Other 81        pancreatic      Physical Exam     Temp Readings from Last 3 Encounters:   09/18/19 98.1  F (36.7  C) (Oral)   09/03/19 97.6  F (36.4  C) (Oral)   07/01/19 98.6  F (37  C) (Oral)     BP Readings from Last 5 Encounters:   09/18/19 (!) 190/76   09/11/19 (!) 154/71   09/03/19 (!) 185/80   07/03/19 171/66   07/01/19 136/60     Pulse Readings from Last 1 Encounters:   09/18/19 68     Resp Readings from Last 1 Encounters:   09/03/19 16     Estimated body mass index is 23.35 kg/m  as calculated from the following:    Height as of this encounter: 1.549 m (5' 1\").    Weight as of this encounter: 56.1 kg (123 lb 9.6 oz).    GEN: NAD  HEENT: MMM.  Anicteric, noninjected sclera  CV: S1, S2. RRR. No m/r/g.  PULM: CTA bilaterally. No w/c.  MSK:  MCPs, PIPs, wrists, elbows, shoulders, knees, ankles, and MTPs without swelling or tenderness to palpation.  Hips nontender to palpation. Heberden's and Gracie's nodes.   NEURO: Able to stand up unassisted from a chair without difficulty. Able to raise her arms above her head without difficulty  SKIN: No rash.    EXT: Nonpitting edema of the bilateral lower extremities  PSYCH: Alert. Appropriate.    Labs / Imaging (select studies)   RF/CCP  Recent Labs   Lab Test 08/11/16  1124 08/04/16  1222 02/18/16  1543   CCPIGG 1  --   --    RHF  --  <20 <20     CBC  Recent Labs   Lab Test 06/12/19  0910 03/20/19  0846 12/21/18  1050   WBC 8.2 9.0 10.3   RBC 3.78* 3.68* 3.81   HGB 11.3* 10.6* 11.1*   HCT 35.5 34.6* 35.3   MCV 94 94 93   RDW 20.1* 20.5* 21.4*    240 259   MCH 29.9 28.8 29.1   MCHC 31.8 30.6* 31.4*   NEUTROPHIL 64.7 61.3 58.8   LYMPH 24.3 25.2 20.4   MONOCYTE 6.4 8.0 15.4   EOSINOPHIL 3.7 4.8 4.6   BASOPHIL 0.9 0.7 0.8   ANEU 5.3 5.5 6.0   ALYM 2.0 2.3 " 2.1   IGNACIO 0.5 0.7 1.6*   AEOS 0.3 0.4 0.5   ABAS 0.1 0.1 0.1     CMP  Recent Labs   Lab Test 07/01/19  1256 06/12/19  0910 03/20/19  0846 12/21/18  1050  12/14/16  0849  07/12/16  1035     --   --   --   --  140  --  138   POTASSIUM 4.2  --   --   --   --  4.5  --  4.6   CHLORIDE 103  --   --   --   --  105  --  102   CO2 30  --   --   --   --  26  --  28   ANIONGAP 6  --   --   --   --  9  --  8   GLC 94  --   --   --   --  172*  --  117*   BUN 22  --   --   --   --  28  --  17   CR 1.21* 1.18* 1.23* 1.19*   < > 1.19*   < > 1.04   GFRESTIMATED 39* 40* 38* 40*   < > 43*   < > 50*   GFRESTBLACK 45* 46* 44* 46*   < > 52*   < > 60*   ROEL 9.6  --   --   --   --  8.8  --  9.4   BILITOTAL  --  0.3 0.3 0.3   < >  --    < >  --    ALBUMIN  --  3.5 3.5 3.4   < >  --    < >  --    PROTTOTAL  --  7.1 7.2 7.3   < >  --    < >  --    ALKPHOS  --  91 90 92   < >  --    < >  --    AST  --  38 26 24   < >  --    < >  --    ALT  --  44 38 23   < >  --    < >  --     < > = values in this interval not displayed.     Calcium/VitaminD  Recent Labs   Lab Test 07/01/19  1256 12/14/16  0849 07/12/16  1035   ROEL 9.6 8.8 9.4     ESR/CRP  Recent Labs   Lab Test 06/12/19  0910 03/20/19  0846 12/21/18  1050   SED 41* 40* 37*   CRP 6.5 6.3 6.3     Hepatitis B  Recent Labs   Lab Test 11/10/16  0909   HBCAB Nonreactive   HEPBANG Nonreactive     Hepatitis C  Recent Labs   Lab Test 11/10/16  0909   HCVAB Nonreactive   Assay performance characteristics have not been established for newborns,   infants, and children       HIV Screening  Recent Labs   Lab Test 11/10/16  0909   HIAGAB Nonreactive   HIV-1 p24 Ag & HIV-1/HIV-2 Ab Not Detected         Immunization History     Immunization History   Administered Date(s) Administered     FLU 6-35 months 09/08/2010     Influenza (H1N1) 10/20/2016     Influenza (High Dose) 3 valent vaccine 10/16/2014, 10/06/2015, 10/23/2016, 10/03/2017, 08/23/2018     Influenza (IIV3) PF 11/05/1999, 12/14/2000,  10/26/2004, 10/04/2005, 09/16/2009, 10/20/2010, 11/09/2011, 09/22/2012, 09/15/2013, 10/15/2014     Pneumo Conj 13-V (2010&after) 03/17/2015     Pneumococcal 23 valent 11/03/2000, 12/13/2010     TD (ADULT, 7+) 01/12/2004     TDAP Vaccine (Adacel) 05/14/2013     Td (Adult), Adsorbed 01/12/2004     Zoster vaccine recombinant adjuvanted (SHINGRIX) 06/21/2018, 08/23/2018     Zoster vaccine, live 12/15/2006          Chart documentation done in part with Dragon Voice recognition Software. Although reviewed after completion, some word and grammatical error may remain.    Jamie Johnson MD

## 2019-09-19 DIAGNOSIS — G57.93 NEUROPATHY OF BOTH FEET: ICD-10-CM

## 2019-09-19 NOTE — TELEPHONE ENCOUNTER
gabapentin (NEURONTIN) 100 MG capsule      Last Written Prescription Date:  8/27/2019  Last Fill Quantity: 90,   # refills: 0  Last Office Visit: 7/1/2019  Future Office visit:       Routing refill request to provider for review/approval because:  Drug not on the [facility], [facility] or [facility] refill protocol or controlled substance

## 2019-09-20 NOTE — TELEPHONE ENCOUNTER
Routing refill request to provider for review/approval because:  Drug not on the [facility] refill protocol     Requested Prescriptions   Pending Prescriptions Disp Refills     gabapentin (NEURONTIN) 100 MG capsule [Pharmacy Med Name: GABAPENTIN 100MG CAPSULES] 90 capsule 0     Sig: TAKE 1 CAPSULE BY MOUTH THREE TIMES DAILY       There is no refill protocol information for this order        Roberta Preciado RN

## 2019-09-23 RX ORDER — GABAPENTIN 100 MG/1
CAPSULE ORAL
Qty: 90 CAPSULE | Refills: 0 | Status: SHIPPED | OUTPATIENT
Start: 2019-09-23 | End: 2019-10-21

## 2019-09-25 NOTE — RESULT ENCOUNTER NOTE
"Please mail Ms. Stark her results with the following message.    \"Ms. Stark,    Labs show stable renal insufficiency. Hemoglobin is reduced but similar to the range in the past.  Inflammatory markers are elevated.     No change to the treatment plan based on these labs alone.     Please let me know if you have any questions.    Sincerely,  Jamie Johnson MD  9/25/2019 1:25 AM\"  "

## 2019-09-26 ENCOUNTER — ANESTHESIA EVENT (OUTPATIENT)
Dept: SURGERY | Facility: CLINIC | Age: 84
End: 2019-09-26
Payer: MEDICARE

## 2019-09-26 DIAGNOSIS — I10 HYPERTENSION GOAL BP (BLOOD PRESSURE) < 140/90: ICD-10-CM

## 2019-09-26 RX ORDER — FENTANYL CITRATE 50 UG/ML
25-50 INJECTION, SOLUTION INTRAMUSCULAR; INTRAVENOUS
Status: CANCELLED | OUTPATIENT
Start: 2019-09-26

## 2019-09-26 RX ORDER — ONDANSETRON 2 MG/ML
4 INJECTION INTRAMUSCULAR; INTRAVENOUS EVERY 30 MIN PRN
Status: CANCELLED | OUTPATIENT
Start: 2019-09-26

## 2019-09-26 RX ORDER — SODIUM CHLORIDE, SODIUM LACTATE, POTASSIUM CHLORIDE, CALCIUM CHLORIDE 600; 310; 30; 20 MG/100ML; MG/100ML; MG/100ML; MG/100ML
INJECTION, SOLUTION INTRAVENOUS CONTINUOUS
Status: CANCELLED | OUTPATIENT
Start: 2019-09-26

## 2019-09-26 RX ORDER — ONDANSETRON 4 MG/1
4 TABLET, ORALLY DISINTEGRATING ORAL EVERY 30 MIN PRN
Status: CANCELLED | OUTPATIENT
Start: 2019-09-26

## 2019-09-26 NOTE — ANESTHESIA PREPROCEDURE EVALUATION
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)   Procedure(s):  Wide Local Excision Of Vulva, Colposcopy     Past Medical History:   Diagnosis Date     Actinic keratosis      Aortic stenosis      Basal cell cancer 2014    left eye medial canthus      Basal cell carcinoma 08    left cheek     CKD (chronic kidney disease) stage 3, GFR 30-59 ml/min (H) 2019     HTN (hypertension)      Melanoma in situ (H) 08    left arm     Polymyalgia rheumatica (H)      Temporal arteritis (H)       Past Surgical History:   Procedure Laterality Date     C SKIN TISSUE PROCEDURE UNLISTED  11/3/08    mmis skin cancer excision     CATARACT IOL, RT/LT      bilateral     COLONOSCOPY       REPLACE VALVE AORTIC N/A 2016    Procedure: REPLACE VALVE AORTIC;  Surgeon: Sudeep Tsai MD;  Location: [facility] OR          Anesthesia Evaluation     .             ROS/MED HX    ENT/Pulmonary:  - neg pulmonary ROS     Neurologic:  - neg neurologic ROS     Cardiovascular:     (+) hypertension----. : . . . :. valvular problems/murmurs type: AS s/p minimally invasive AVR in 2016:. Previous cardiac testing Echodate:2019results:Global and regional left ventricular function is normal with an EF of 60-65%.  Right ventricular function, chamber size, wall motion, and thickness are  normal.  S/P Minimally invasive aortic valve replacement with 21 mm Epic porcine valve  on 2016.Mean aortic gradient unchanged at 11mmHg.  There has been no change.date: results:ECG reviewed date:2019 results:A-fib with RVR date: results:          METS/Exercise Tolerance:     Hematologic:  - neg hematologic  ROS       Musculoskeletal:   (+) arthritis,  other musculoskeletal- Polymyalgia Rheumatica on methotrexate      GI/Hepatic:  - neg GI/hepatic ROS        Renal/Genitourinary:     (+) chronic renal disease, type: CRI, Pt does not require dialysis,       Endo:     (+) Chronic steroid usage for Arthritis Date most recently used: last use March 2019,.      Psychiatric:  - neg psychiatric ROS       Infectious Disease:  - neg infectious disease ROS       Malignancy:         Other:    (+) No chance of pregnancy H/O Chronic Pain,                       PHYSICAL EXAM:   Mental Status/Neuro: A/A/O   Airway: Facies: Feasible  Mallampati: II  Mouth/Opening: Full  TM distance: > 6 cm  Neck ROM: Full   Respiratory: Auscultation: CTAB      CV: Rhythm: Regular   Comments:      Dental: Normal Dentition                LABS:  CBC:   Lab Results   Component Value Date    WBC 8.7 09/18/2019    WBC 8.2 06/12/2019    HGB 10.2 (L) 09/18/2019    HGB 11.3 (L) 06/12/2019    HCT 32.9 (L) 09/18/2019    HCT 35.5 06/12/2019     09/18/2019     06/12/2019     BMP:   Lab Results   Component Value Date     07/01/2019     12/14/2016    POTASSIUM 4.2 07/01/2019    POTASSIUM 4.5 12/14/2016    CHLORIDE 103 07/01/2019    CHLORIDE 105 12/14/2016    CO2 30 07/01/2019    CO2 26 12/14/2016    BUN 22 07/01/2019    BUN 28 12/14/2016    CR 1.07 (H) 09/18/2019    CR 1.21 (H) 07/01/2019    GLC 94 07/01/2019     (H) 12/14/2016     COAGS:   Lab Results   Component Value Date    PTT 35 04/25/2016    INR 1.41 (H) 04/25/2016    FIBR 242 04/25/2016     POC:   Lab Results   Component Value Date     (H) 05/01/2016     OTHER:   Lab Results   Component Value Date    PH 7.42 04/25/2016    LACT 1.3 04/25/2016    ROEL 9.6 07/01/2019    PHOS 3.3 04/26/2016    MAG 2.8 (H) 04/28/2016    ALBUMIN 3.2 (L) 09/18/2019    PROTTOTAL 7.1 09/18/2019    ALT 38 09/18/2019    AST 40 09/18/2019    ALKPHOS 110 09/18/2019    BILITOTAL 0.3 09/18/2019    TSH 1.55 06/21/2012    T4 0.81 06/21/2012    CRP 23.2 (H) 09/18/2019    SED 76 (H) 09/18/2019        Preop Vitals    BP Readings from Last 3 Encounters:  "  09/18/19 (!) 154/64   09/11/19 (!) 154/71   09/03/19 (!) 185/80    Pulse Readings from Last 3 Encounters:   09/18/19 68   09/11/19 59   09/03/19 60      Resp Readings from Last 3 Encounters:   09/03/19 16   04/15/19 16   09/06/18 16    SpO2 Readings from Last 3 Encounters:   09/18/19 98%   09/11/19 98%   09/03/19 98%      Temp Readings from Last 1 Encounters:   09/18/19 36.7  C (98.1  F) (Oral)    Ht Readings from Last 1 Encounters:   09/18/19 1.549 m (5' 1\")      Wt Readings from Last 1 Encounters:   09/18/19 56.1 kg (123 lb 9.6 oz)    Estimated body mass index is 23.35 kg/m  as calculated from the following:    Height as of 9/18/19: 1.549 m (5' 1\").    Weight as of 9/18/19: 56.1 kg (123 lb 9.6 oz).     LDA:  Peripheral IV 04/25/16 Right Hand (Active)   Number of days: 1249        Assessment:   ASA SCORE: 3    H&P: History and physical reviewed and following examination; no interval change.   Smoking Status:  Non-Smoker/Unknown   NPO Status: NPO Appropriate     Plan:   Anes. Type:  MAC   Pre-Medication: None   Induction:  N/a   Airway: Native Airway   Access/Monitoring: PIV   Maintenance: N/a     Postop Plan:   Postop Pain: None  Postop Sedation/Airway: Not planned  Disposition: Inpatient/Admit     PONV Management:   Adult Risk Factors: Female, Non-Smoker   Prevention: Ondansetron     CONSENT: Direct conversation   Plan and risks discussed with: Patient   Blood Products: Consent Deferred (Minimal Blood Loss)                   Jaime Perez MD  "

## 2019-09-27 ENCOUNTER — ANESTHESIA (OUTPATIENT)
Dept: SURGERY | Facility: CLINIC | Age: 84
End: 2019-09-27
Payer: MEDICARE

## 2019-09-27 ENCOUNTER — SURGERY (OUTPATIENT)
Age: 84
End: 2019-09-27
Payer: MEDICARE

## 2019-09-27 ENCOUNTER — HOSPITAL ENCOUNTER (OUTPATIENT)
Facility: CLINIC | Age: 84
Discharge: HOME | End: 2019-09-27
Attending: OBSTETRICS & GYNECOLOGY | Admitting: OBSTETRICS & GYNECOLOGY
Payer: MEDICARE

## 2019-09-27 VITALS
WEIGHT: 121.25 LBS | TEMPERATURE: 96.8 F | OXYGEN SATURATION: 93 % | SYSTOLIC BLOOD PRESSURE: 115 MMHG | HEART RATE: 74 BPM | HEIGHT: 61 IN | DIASTOLIC BLOOD PRESSURE: 62 MMHG | RESPIRATION RATE: 16 BRPM | BODY MASS INDEX: 22.89 KG/M2

## 2019-09-27 DIAGNOSIS — D07.1 VULVAR INTRAEPITHELIAL NEOPLASIA III (VIN III): ICD-10-CM

## 2019-09-27 DIAGNOSIS — Z98.890 S/P GENITAL SURGERY: Primary | ICD-10-CM

## 2019-09-27 LAB
GLUCOSE BLDC GLUCOMTR-MCNC: 107 MG/DL (ref 70–99)
POTASSIUM SERPL-SCNC: 3.9 MMOL/L (ref 3.4–5.3)

## 2019-09-27 PROCEDURE — 88309 TISSUE EXAM BY PATHOLOGIST: CPT | Performed by: OBSTETRICS & GYNECOLOGY

## 2019-09-27 PROCEDURE — 40000170 ZZH STATISTIC PRE-PROCEDURE ASSESSMENT II: Performed by: OBSTETRICS & GYNECOLOGY

## 2019-09-27 PROCEDURE — 25000125 ZZHC RX 250: Performed by: OBSTETRICS & GYNECOLOGY

## 2019-09-27 PROCEDURE — 25800030 ZZH RX IP 258 OP 636: Performed by: STUDENT IN AN ORGANIZED HEALTH CARE EDUCATION/TRAINING PROGRAM

## 2019-09-27 PROCEDURE — 27210794 ZZH OR GENERAL SUPPLY STERILE: Performed by: OBSTETRICS & GYNECOLOGY

## 2019-09-27 PROCEDURE — 36000053 ZZH SURGERY LEVEL 2 EA 15 ADDTL MIN - UMMC: Performed by: OBSTETRICS & GYNECOLOGY

## 2019-09-27 PROCEDURE — 25000132 ZZH RX MED GY IP 250 OP 250 PS 637: Mod: GY | Performed by: STUDENT IN AN ORGANIZED HEALTH CARE EDUCATION/TRAINING PROGRAM

## 2019-09-27 PROCEDURE — 37000008 ZZH ANESTHESIA TECHNICAL FEE, 1ST 30 MIN: Performed by: OBSTETRICS & GYNECOLOGY

## 2019-09-27 PROCEDURE — 84132 ASSAY OF SERUM POTASSIUM: CPT | Performed by: ANESTHESIOLOGY

## 2019-09-27 PROCEDURE — 37000009 ZZH ANESTHESIA TECHNICAL FEE, EACH ADDTL 15 MIN: Performed by: OBSTETRICS & GYNECOLOGY

## 2019-09-27 PROCEDURE — 25000125 ZZHC RX 250: Performed by: STUDENT IN AN ORGANIZED HEALTH CARE EDUCATION/TRAINING PROGRAM

## 2019-09-27 PROCEDURE — 25000128 H RX IP 250 OP 636: Performed by: STUDENT IN AN ORGANIZED HEALTH CARE EDUCATION/TRAINING PROGRAM

## 2019-09-27 PROCEDURE — 25000128 H RX IP 250 OP 636: Performed by: OBSTETRICS & GYNECOLOGY

## 2019-09-27 PROCEDURE — 56620 VULVECTOMY SIMPLE PARTIAL: CPT | Mod: GC | Performed by: OBSTETRICS & GYNECOLOGY

## 2019-09-27 PROCEDURE — 36000051 ZZH SURGERY LEVEL 2 1ST 30 MIN - UMMC: Performed by: OBSTETRICS & GYNECOLOGY

## 2019-09-27 PROCEDURE — 00000146 ZZHCL STATISTIC GLUCOSE BY METER IP

## 2019-09-27 PROCEDURE — 71000027 ZZH RECOVERY PHASE 2 EACH 15 MINS: Performed by: OBSTETRICS & GYNECOLOGY

## 2019-09-27 PROCEDURE — 40000065 ZZH STATISTIC EKG NON-CHARGEABLE

## 2019-09-27 PROCEDURE — 93010 ELECTROCARDIOGRAM REPORT: CPT | Mod: 59 | Performed by: INTERNAL MEDICINE

## 2019-09-27 PROCEDURE — 93005 ELECTROCARDIOGRAM TRACING: CPT

## 2019-09-27 RX ORDER — BUPIVACAINE HCL/PF 5 MG/ML
AMPUL (ML) INJECTION PRN
Status: DISCONTINUED | OUTPATIENT
Start: 2019-09-27 | End: 2019-09-27 | Stop reason: HOSPADM

## 2019-09-27 RX ORDER — SODIUM CHLORIDE, SODIUM LACTATE, POTASSIUM CHLORIDE, CALCIUM CHLORIDE 600; 310; 30; 20 MG/100ML; MG/100ML; MG/100ML; MG/100ML
INJECTION, SOLUTION INTRAVENOUS CONTINUOUS PRN
Status: DISCONTINUED | OUTPATIENT
Start: 2019-09-27 | End: 2019-09-27

## 2019-09-27 RX ORDER — ONDANSETRON 4 MG/1
4 TABLET, ORALLY DISINTEGRATING ORAL EVERY 30 MIN PRN
Status: DISCONTINUED | OUTPATIENT
Start: 2019-09-27 | End: 2019-09-27 | Stop reason: HOSPADM

## 2019-09-27 RX ORDER — PROPOFOL 10 MG/ML
INJECTION, EMULSION INTRAVENOUS CONTINUOUS PRN
Status: DISCONTINUED | OUTPATIENT
Start: 2019-09-27 | End: 2019-09-27

## 2019-09-27 RX ORDER — HYDROCODONE BITARTRATE AND ACETAMINOPHEN 5; 325 MG/1; MG/1
0.5 TABLET ORAL EVERY 6 HOURS PRN
Qty: 5 TABLET | Refills: 0 | Status: SHIPPED | OUTPATIENT
Start: 2019-09-27 | End: 2019-10-08

## 2019-09-27 RX ORDER — OXYCODONE HYDROCHLORIDE 5 MG/1
5 TABLET ORAL
Status: COMPLETED | OUTPATIENT
Start: 2019-09-27 | End: 2019-09-27

## 2019-09-27 RX ORDER — FUROSEMIDE 20 MG
TABLET ORAL
Qty: 90 TABLET | Refills: 0 | Status: SHIPPED | OUTPATIENT
Start: 2019-09-27 | End: 2020-02-19

## 2019-09-27 RX ORDER — SODIUM CHLORIDE, SODIUM LACTATE, POTASSIUM CHLORIDE, CALCIUM CHLORIDE 600; 310; 30; 20 MG/100ML; MG/100ML; MG/100ML; MG/100ML
INJECTION, SOLUTION INTRAVENOUS CONTINUOUS
Status: DISCONTINUED | OUTPATIENT
Start: 2019-09-27 | End: 2019-09-27 | Stop reason: HOSPADM

## 2019-09-27 RX ORDER — NALOXONE HYDROCHLORIDE 0.4 MG/ML
.1-.4 INJECTION, SOLUTION INTRAMUSCULAR; INTRAVENOUS; SUBCUTANEOUS
Status: DISCONTINUED | OUTPATIENT
Start: 2019-09-27 | End: 2019-09-27 | Stop reason: HOSPADM

## 2019-09-27 RX ORDER — ACETIC ACID 5 %
LIQUID (ML) MISCELLANEOUS PRN
Status: DISCONTINUED | OUTPATIENT
Start: 2019-09-27 | End: 2019-09-27 | Stop reason: HOSPADM

## 2019-09-27 RX ORDER — LIDOCAINE 40 MG/G
CREAM TOPICAL
Status: DISCONTINUED | OUTPATIENT
Start: 2019-09-27 | End: 2019-09-27 | Stop reason: HOSPADM

## 2019-09-27 RX ORDER — MEPERIDINE HYDROCHLORIDE 25 MG/ML
12.5 INJECTION INTRAMUSCULAR; INTRAVENOUS; SUBCUTANEOUS
Status: DISCONTINUED | OUTPATIENT
Start: 2019-09-27 | End: 2019-09-27 | Stop reason: HOSPADM

## 2019-09-27 RX ORDER — ACETAMINOPHEN 325 MG/1
650 TABLET ORAL
Status: DISCONTINUED | OUTPATIENT
Start: 2019-09-27 | End: 2019-09-27 | Stop reason: HOSPADM

## 2019-09-27 RX ORDER — GABAPENTIN 300 MG/1
300 CAPSULE ORAL ONCE
Status: COMPLETED | OUTPATIENT
Start: 2019-09-27 | End: 2019-09-27

## 2019-09-27 RX ORDER — PROPOFOL 10 MG/ML
INJECTION, EMULSION INTRAVENOUS PRN
Status: DISCONTINUED | OUTPATIENT
Start: 2019-09-27 | End: 2019-09-27

## 2019-09-27 RX ORDER — FENTANYL CITRATE 50 UG/ML
25-50 INJECTION, SOLUTION INTRAMUSCULAR; INTRAVENOUS
Status: DISCONTINUED | OUTPATIENT
Start: 2019-09-27 | End: 2019-09-27 | Stop reason: HOSPADM

## 2019-09-27 RX ORDER — ACETAMINOPHEN 325 MG/1
650 TABLET ORAL EVERY 6 HOURS PRN
Qty: 50 TABLET | Refills: 0 | Status: SHIPPED | OUTPATIENT
Start: 2019-09-27 | End: 2023-06-01

## 2019-09-27 RX ORDER — ACETAMINOPHEN 325 MG/1
975 TABLET ORAL ONCE
Status: COMPLETED | OUTPATIENT
Start: 2019-09-27 | End: 2019-09-27

## 2019-09-27 RX ORDER — FENTANYL CITRATE 50 UG/ML
INJECTION, SOLUTION INTRAMUSCULAR; INTRAVENOUS PRN
Status: DISCONTINUED | OUTPATIENT
Start: 2019-09-27 | End: 2019-09-27

## 2019-09-27 RX ORDER — ONDANSETRON 2 MG/ML
INJECTION INTRAMUSCULAR; INTRAVENOUS PRN
Status: DISCONTINUED | OUTPATIENT
Start: 2019-09-27 | End: 2019-09-27

## 2019-09-27 RX ORDER — BUPIVACAINE HYDROCHLORIDE 2.5 MG/ML
INJECTION, SOLUTION INFILTRATION; PERINEURAL PRN
Status: DISCONTINUED | OUTPATIENT
Start: 2019-09-27 | End: 2019-09-27 | Stop reason: HOSPADM

## 2019-09-27 RX ORDER — LIDOCAINE HYDROCHLORIDE 20 MG/ML
INJECTION, SOLUTION INFILTRATION; PERINEURAL PRN
Status: DISCONTINUED | OUTPATIENT
Start: 2019-09-27 | End: 2019-09-27

## 2019-09-27 RX ORDER — ONDANSETRON 2 MG/ML
4 INJECTION INTRAMUSCULAR; INTRAVENOUS EVERY 30 MIN PRN
Status: DISCONTINUED | OUTPATIENT
Start: 2019-09-27 | End: 2019-09-27 | Stop reason: HOSPADM

## 2019-09-27 RX ORDER — HYDROCODONE BITARTRATE AND ACETAMINOPHEN 5; 325 MG/1; MG/1
0.5 TABLET ORAL EVERY 6 HOURS PRN
Qty: 5 TABLET | Refills: 0 | Status: SHIPPED | OUTPATIENT
Start: 2019-09-27 | End: 2019-09-27

## 2019-09-27 RX ADMIN — OXYCODONE HYDROCHLORIDE 5 MG: 5 TABLET ORAL at 15:40

## 2019-09-27 RX ADMIN — BUPIVACAINE HYDROCHLORIDE 20 ML: 2.5 INJECTION, SOLUTION INFILTRATION; PERINEURAL at 14:31

## 2019-09-27 RX ADMIN — ACETAMINOPHEN 975 MG: 325 TABLET, FILM COATED ORAL at 09:51

## 2019-09-27 RX ADMIN — GABAPENTIN 300 MG: 300 CAPSULE ORAL at 09:51

## 2019-09-27 RX ADMIN — PROPOFOL 50 MG: 10 INJECTION, EMULSION INTRAVENOUS at 12:24

## 2019-09-27 RX ADMIN — SODIUM CHLORIDE, POTASSIUM CHLORIDE, SODIUM LACTATE AND CALCIUM CHLORIDE: 600; 310; 30; 20 INJECTION, SOLUTION INTRAVENOUS at 12:12

## 2019-09-27 RX ADMIN — ONDANSETRON 4 MG: 2 INJECTION INTRAMUSCULAR; INTRAVENOUS at 12:43

## 2019-09-27 RX ADMIN — PROPOFOL 75 MCG/KG/MIN: 10 INJECTION, EMULSION INTRAVENOUS at 12:24

## 2019-09-27 RX ADMIN — FENTANYL CITRATE 25 MCG: 50 INJECTION, SOLUTION INTRAMUSCULAR; INTRAVENOUS at 13:00

## 2019-09-27 RX ADMIN — FENTANYL CITRATE 25 MCG: 50 INJECTION, SOLUTION INTRAMUSCULAR; INTRAVENOUS at 12:24

## 2019-09-27 RX ADMIN — PROPOFOL 10 MG: 10 INJECTION, EMULSION INTRAVENOUS at 12:29

## 2019-09-27 RX ADMIN — LIDOCAINE HYDROCHLORIDE 50 MG: 20 INJECTION, SOLUTION INFILTRATION; PERINEURAL at 12:24

## 2019-09-27 RX ADMIN — Medication 120 ML: at 12:41

## 2019-09-27 ASSESSMENT — MIFFLIN-ST. JEOR: SCORE: 897.12

## 2019-09-27 NOTE — INTERVAL H&P NOTE
I have seen the patient day of surgery. I have reviewed the below H&P from clinic and there have been no significant changes. We will proceed with surgery as detailed in the note below. Her daughter, Anna, is with her today.     Mono Ribeiro MD    Gynecologic Oncology

## 2019-09-27 NOTE — OR NURSING
"Pt met discharge criteria. Vitally stable,taking po well and voiding with no issues.. Pain under control.   GYN/ONC saw pt and okay to send pt home.Daughter \"Anna\" was at the bedside,all questions answered.  "

## 2019-09-27 NOTE — OP NOTE
Gynecologic Oncology Operative Note  Operative Note    Patient: Roxanna Stark  : 1927  MRN: [id number]    Date of Service: 2019    Pre-operative diagnosis:  Vulvar intraepithelial neoplasia III    Post-operative diagnosis:  Vulvar intraepithelial neoplasia III    Procedure:   EUA, colposcopy, wide local excision of vulva    Surgeon: Mono Ribeiro MD  Assistants: Lesa Ybarra MD     Anesthesia: Local with MAC    EBL: 10 mL  Urine: 350 mL clear  Fluids: 600 cystalloid    Specimens: Posterior vulva, stitch at 12 o'clock  Complications: none    Findings: On colposcopy: Small, atrophic cervix, transformation zone not seen. No gross lesions, acetowhite changes, or abnormal vessels appreciated. No vaginal lesions. Vulva: From about 4-6 o'clock on the left vulva just distal to the vaginal introitus there was an area that is slightly raised and pink. This is slightly different in appearance from the other tissue in the area. Extending around the posterior introitus is thickened white changes with the most notable plaque on the right vulva from about 7-10 o'clock. Tissue was friable and thin near the introitus and lesion extended up to the hymenal remnant. There are no AWE changes around the rectum. Due to the extent of involved tissue, there was no way to excise the two sides separately without transecting areas of concern.     Complications: none    Indications: Roxanna Stark is a 89 year old year old female who presented with vulvar complaints and was found to have a vulvar lesion that on biopsy was THERESA II-III. Discussed risks, benefits, and alternatives (LASER/ablation, topical, or observation) to the procedure including risk of infection, bleeding, difficulties with healing, and risks of recurrence. The patient's questions were answered, understanding confirmed, and the patient signed written informed consent.    Technique: The patient was taken to the OR where she was placed in the dorsal  lithotomy position with feet in Marshall stirrups. MAC was administered. The patient was examined under anesthesia and colposcopy was performed due to the possible regional effects of this disease. After acetic acid was applied to cervix, no significant findings concerning for malignancy. See findings above. The vulva was then prepped and draped in the usual sterile fashion. 0.25% marcaine was then injected into the tissue overlying and immediately surrounding the abnormal tissue. An area around the abnormalities was marked. Unfortunately due to the extent of concerning changes, this encompassed a large area. Because of the extent of the changes, the two sides could not be excised separately without transecting the concerning area. Thus the entire posterior vulvectomy was performed as a single specimen. The incision was made through the skin sharply and then carried out with electrocautery. The full thickness of skin was excised with care not to carry the excision too deep.  The specimen was removed and sent for pathology with a stitch placed to carla the medial 12 o'clock margin.  The subcutaneous tissue was then closely re-approximated using multiple layers of 2-0 vicryl using interrupted and horizontal mattress sutures. The skin was closed with a series of vertical mattress and simple interrupted sutures using 3-0 Vicryl.  Hemostasis was noted. Bacitracin was applied to the site.    The patient tolerated the procedure well and transferred to the PACU in stable condition.    Lesa Ybarra MD, MPH  OB/GYN Resident, PGY-2  9/27/2019 6:25 PM     I was present for and participated in the entire procedure. I have edited the resident's note above as necessary to reflect the findings and procedure.     Mono Ribeiro MD    Gynecologic Oncology

## 2019-09-27 NOTE — PROGRESS NOTES
SPIRITUAL HEALTH SERVICES  [facility] 3C   PRE-SURGERY VISIT    Had pre-surgery visit with pt./family. Provided spiritual support, prayer.     Glen Ortega M.Div.     Pager [phone]

## 2019-09-27 NOTE — BRIEF OP NOTE
[facility], [place]    Brief Operative Note    Pre-operative diagnosis: Vulvar Intraepithelial Neoplasia III (THERESA III)  Post-operative diagnosis Vulvar Intraepithelial Neoplasia III  Procedure: Procedure(s):  Wide Local Excision Of Vulva, Colposcopy  Surgeon: Surgeon(s) and Role:     * Mono Ribeiro MD - Primary     * Lesa Ybarra MD - Resident - Assisting  Anesthesia: Monitor Anesthesia Care  with local   Estimated blood loss: 10 ml  UOP: 350 ml  IVF: 600 ml  Drains: None  Specimens:   ID Type Source Tests Collected by Time Destination   A : Posterior vulva. Stitch at 12:00 Tissue Vulva SURGICAL PATHOLOGY EXAM Mono Ribeiro MD 9/27/2019  1:14 PM      Findings:   Colposcopy: Small, atrophic cervix, transformation zone not seen. No gross lesions, acetowhite changes, or abnormal vessels. No vaginal lesions. Vulva: From 5 to 10 o'clock external to the introitus, a thickened white plaque with thickening of the skin. Tissue was friable and thin near the introitus.  Complications: None.  Implants:  * No implants in log *     Lesa Ybarra MD, MPH  Gynecologic Oncology, PGY-2  September 27, 2019 , 2:25 PM    Pager [phone]

## 2019-09-27 NOTE — DISCHARGE INSTRUCTIONS
[facility]   Adult Discharge Orders & Instructions     For 24 hours after surgery    1. Get plenty of rest.  A responsible adult must stay with you for at least 24 hours after you leave the hospital.   2. Do not drive or use heavy equipment.  If you have weakness or tingling, don't drive or use heavy equipment until this feeling goes away.  3. Do not drink alcohol.  4. Avoid strenuous or risky activities.  Ask for help when climbing stairs.   5. You may feel lightheaded.  IF so, sit for a few minutes before standing.  Have someone help you get up.   6. If you have nausea (feel sick to your stomach): Drink only clear liquids such as apple juice, ginger ale, broth or 7-Up.  Rest may also help.  Be sure to drink enough fluids.  Move to a regular diet as you feel able.  7. You may have a slight fever. Call the doctor if your fever is over 100 F (37.7 C) (taken under the tongue) or lasts longer than 24 hours.  8. You may have a dry mouth, a sore throat, muscle aches or trouble sleeping.  These should go away after 24 hours.  9. Do not make important or legal decisions.   Call your doctor for any of the followin.  Signs of infection (fever, growing tenderness at the surgery site, a large amount of drainage or bleeding, severe pain, foul-smelling drainage, redness, swelling).    2. It has been over 8 to 10 hours since surgery and you are still not able to urinate (pass water).    3.  Headache for over 24 hours.    4.  Numbness, tingling or weakness the day after surgery (if you had spinal anesthesia).  To contact a doctor, call _____Dr. RibeiroSbid___[phone] [CLINIC]  ___________________

## 2019-09-27 NOTE — TELEPHONE ENCOUNTER
Prescription approved per [facility] Refill Protocol.  Millie Lam RN

## 2019-09-27 NOTE — ANESTHESIA POSTPROCEDURE EVALUATION
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       Anesthesia Type:  Not documented  MAC    Reportable Event: NO     PAIN: Uncomplicated   Sign Out status: Comfortable, Well controlled pain     PONV: No PONV   Sign Out status:  No Nausea or Vomiting     Neuro/Psych: Uneventful perioperative course   Sign Out Status: Preoperative baseline; Age appropriate mentation     Airway/Resp.: Uneventful perioperative course   Sign Out Status: Non labored breathing, age appropriate RR; Resp. Status within EXPECTED Parameters     CV: Uneventful perioperative course   Sign Out status: Appropriate BP and perfusion indices; Appropriate HR/Rhythm     Disposition:   Sign Out in:  Phase II  Recovery Course: Uneventful  Follow-Up: Not required           Last Anesthesia Record Vitals:  CRNA VITALS  2019 1350 - 2019 1450      2019             Pulse:  72    SpO2:  96 %          Last PACU Vitals:  Vitals Value Taken Time   /63 2019  2:22 PM   Temp 36  C (96.8  F) 2019  2:22 PM   Pulse 74 2019  2:22 PM   Resp 12 2019  2:22 PM   SpO2 96 % 2019  2:22 PM   Temp src Skin 2019  2:15 PM   NIBP 133/64 2019  2:16 PM   Pulse 72 2019  2:20 PM   SpO2 96 % 2019  2:20 PM   Resp     Temp 36  C (96.8  F) 2019  2:13 PM   Ht Rate 69 2019  2:15 PM   Temp 2           Electronically Signed By: Dewey Pratt MD, 2019, 4:16 PM

## 2019-09-27 NOTE — ANESTHESIA CARE TRANSFER NOTE
Patient: Roxanna Stark    Procedure(s):  Wide Local Excision Of Vulva, Colposcopy    Diagnosis: Vulvar Intraepithelial Neoplasia III (THERESA III)  Diagnosis Additional Information: No value filed.    Anesthesia Type:   MAC     Note:  Airway :Room Air  Patient transferred to:PACU  Handoff Report: Identifed the Patient, Identified the Reponsible Provider, Reviewed the pertinent medical history, Discussed the surgical course, Reviewed Intra-OP anesthesia mangement and issues during anesthesia, Set expectations for post-procedure period and Allowed opportunity for questions and acknowledgement of understanding      Vitals: (Last set prior to Anesthesia Care Transfer)    CRNA VITALS  9/27/2019 1350 - 9/27/2019 1423      9/27/2019             Pulse:  72    SpO2:  96 %                Electronically Signed By: CARMELITA Rodriguez CRNA  September 27, 2019  2:23 PM

## 2019-09-27 NOTE — PROGRESS NOTES
"Gynecologic Oncology Postoperative Check Note  9/27/2019    S: Patient reports she is doing well postoperatively. Pain IS well controlled with Oral pain medications. Ambulating without pain. Voiding spontaneously. Tolerating crackers and water without nausea or vomiting. Denies chest pain, shortness of breath, dizziness, or other concerns at this time.     O:  Vitals:    09/27/19 0900 09/27/19 1422 09/27/19 1430   BP: (!) 148/79 119/63 117/64   BP Location: Left arm Right arm Right arm   Cuff Size: Adult Regular Adult Regular Adult Regular   Pulse: 72 74 68   Resp:  12 16   Temp: 97.8  F (36.6  C) 96.8  F (36  C)    TempSrc: Oral Oral    SpO2:  96%    Weight: 55 kg (121 lb 4.1 oz)     Height: 1.549 m (5' 0.98\")         Gen: In no distress  Cardio: RRR, S1/S2, no murmurs  Resp: CTAB, no wheezing or crackles  Abdomen: soft, appropriately tender, non-distended  : Vulvar sutures intact  Extremities: Non-tender, no edema    A: 89 year old POD#0 s/p wide local incision of the vulva, colposcopy. Doing well post-op, ready to discharge.    Dz: THERESA III  FEN: Advance as tolerated  Pain: Tylenol, hydrocodone PRN  GI: Tolerating crackers and water  : voiding spontaneously    Dispo: To home, with follow up with Dr. Ribeiro 10/17    Lesa Ybarra MD, MPH  Gynecologic Oncology, PGY-2  September 27, 2019 , 6:27 PM    Pager [phone]      "

## 2019-09-30 LAB — INTERPRETATION ECG - MUSE: NORMAL

## 2019-10-02 ENCOUNTER — TELEPHONE (OUTPATIENT)
Dept: ONCOLOGY | Facility: CLINIC | Age: 84
End: 2019-10-02

## 2019-10-02 LAB — COPATH REPORT: NORMAL

## 2019-10-02 NOTE — TELEPHONE ENCOUNTER
"Pt had EUA, colposcopy, wide local excision of vulva on 9/27. Today is calling to C/O increased pain and swelling in the genital region. No itching or discoloration. Pt does not believe that she has a temp, no chills. \"Painful to sit or anything\" reports that standing under the shower feels good. Pt takes 0.5 norco/day in addition to gabapentin. Not taking tylenol, discussed staggering this with her norco. No n/v/c/d and passing gas.   "

## 2019-10-04 ENCOUNTER — TELEPHONE (OUTPATIENT)
Dept: ONCOLOGY | Facility: CLINIC | Age: 84
End: 2019-10-04

## 2019-10-04 ENCOUNTER — ONCOLOGY VISIT (OUTPATIENT)
Dept: ONCOLOGY | Facility: CLINIC | Age: 84
End: 2019-10-04
Attending: NURSE PRACTITIONER
Payer: MEDICARE

## 2019-10-04 VITALS
RESPIRATION RATE: 14 BRPM | BODY MASS INDEX: 22.71 KG/M2 | WEIGHT: 120.3 LBS | DIASTOLIC BLOOD PRESSURE: 76 MMHG | OXYGEN SATURATION: 98 % | HEART RATE: 104 BPM | SYSTOLIC BLOOD PRESSURE: 164 MMHG | TEMPERATURE: 97.4 F | HEIGHT: 61 IN

## 2019-10-04 DIAGNOSIS — D07.1 VIN III (VULVAR INTRAEPITHELIAL NEOPLASIA III): Primary | ICD-10-CM

## 2019-10-04 PROCEDURE — 99024 POSTOP FOLLOW-UP VISIT: CPT | Mod: ZP | Performed by: NURSE PRACTITIONER

## 2019-10-04 PROCEDURE — G0463 HOSPITAL OUTPT CLINIC VISIT: HCPCS | Mod: ZF

## 2019-10-04 ASSESSMENT — PAIN SCALES - GENERAL: PAINLEVEL: WORST PAIN (10)

## 2019-10-04 ASSESSMENT — MIFFLIN-ST. JEOR: SCORE: 893.06

## 2019-10-04 NOTE — NURSING NOTE
"Oncology Rooming Note    October 4, 2019 2:11 PM   Roxanna Stark is a 89 year old female who presents for:    Chief Complaint   Patient presents with     Oncology Clinic Visit     Return - Vulvar Ulceration     Initial Vitals: BP (!) 175/78   Pulse 104   Resp 14   Ht 1.549 m (5' 1\")   Wt 54.6 kg (120 lb 4.8 oz)   SpO2 98%   BMI 22.73 kg/m   Estimated body mass index is 22.73 kg/m  as calculated from the following:    Height as of this encounter: 1.549 m (5' 1\").    Weight as of this encounter: 54.6 kg (120 lb 4.8 oz). Body surface area is 1.53 meters squared.  Worst Pain (10) Comment: vaginal pain   No LMP recorded. Patient is postmenopausal.  Allergies reviewed: Yes  Medications reviewed: Yes    Medications: Medication refills not needed today.  Pharmacy name entered into powervault:    [facility]  [id number] - [place], [place] - [address] AT [facility] [address]  [id number] - [place] - [address] AT [facility] [address]  [id number] [place] [place] [place] [address] AT [facility] OF [id number]    Clinical concerns: Concerns about vaginal pain       Dewey Ceja              "

## 2019-10-04 NOTE — TELEPHONE ENCOUNTER
"Pt calling again, stated she may have had a voicemail from [facility] but unsure. Again stating \"there is something down there that shouldn't be there\", unsure if excess skin, lump or swelling. Denied drainage, bleeding, pain. Uncomfortable to sit, has been alternating Norco and Tylenol. No fevers or chills. Unable to reach [facility]. Added pt to Elisa Whatley NP schedule today at 2 pm.  "

## 2019-10-04 NOTE — PROGRESS NOTES
"            Follow Up Notes on Referred Patient    Date: 10/4/2019       Dr. Brandan Landin MD  [address] [facility] [place] [address]       RE: Roxanna Stark  : 1927  RUSSELL: 10/4/2019    Dear Dr. Brandan Landin:    Roxanna Stark is a 89 year old woman with a diagnosis of THERESA III s/p WLE 19.   She is here today for an acute visit.     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 She is unsure if excess skin, lump or swelling. Denied drainage, bleeding, pain. Uncomfortable to sit, has been alternating Norco and Tylenol.\". she was having constipation but had a BM this morning and now feels better. She states she has been taking 1/2 Norco in the morning and Tylenol 325 mg at bedtime; she has not taken any pain medications today. She denies any fever/chills, no issues urinating, and has used her squirt bottle when able although she does endorse not being able to use if very well. She has not had any bleeding or discharge from her wound/vulva.         Review of Systems:    Systemic           no weight changes; no fever; no chills; no night sweats; no appetite changes  Skin           no rashes, or lesions  Eye           no irritation; no changes in vision  Clara-Laryngeal           no dysphagia; no hoarseness   Pulmonary    no cough; no shortness of breath  Cardiovascular    no chest pain; no palpitations; + HTN  Gastrointestinal    no diarrhea; no constipation; no abdominal pain; no changes in bowel habits; no blood in " stool  Genitourinary   no urinary frequency; no urinary urgency; no dysuria; no pain; no abnormal vaginal discharge; no abnormal vaginal bleeding  Breast    no breast discharge; no breast changes; no breast pain  Musculoskeletal    no myalgias; no arthralgias; no back pain  Psychiatric           no depressed mood; no anxiety    Hematologic              no tender lymph nodes; no noticeable swellings or lumps   Endocrine    no hot flashes; no heat/cold intolerance         Neurological   no tremor; no numbness and tingling; no headaches; no difficulty sleeping      Past Medical History:    Past Medical History:   Diagnosis Date     Actinic keratosis      Aortic stenosis 2014     Basal cell cancer 7/2014    left eye medial canthus      Basal cell carcinoma 9/30/08    left cheek     CKD (chronic kidney disease) stage 3, GFR 30-59 ml/min (H) 7/1/2019     HTN (hypertension)      Melanoma in situ (H) 9/30/08    left arm     Polymyalgia rheumatica (H) 11/99     Temporal arteritis (H) 11/99         Past Surgical History:    Past Surgical History:   Procedure Laterality Date     C SKIN TISSUE PROCEDURE UNLISTED  11/3/08    mmis skin cancer excision     CATARACT IOL, RT/LT  5/09    bilateral     COLONOSCOPY  2002     EXCISE LESION VULVA N/A 9/27/2019    Procedure: Wide Local Excision Of Vulva, Colposcopy;  Surgeon: Mono Ribeiro MD;  Location:  OR     REPLACE VALVE AORTIC N/A 4/25/2016    Procedure: REPLACE VALVE AORTIC;  Surgeon: Sudeep Tsai MD;  Location:  OR         Health Maintenance Due   Topic Date Due     ADVANCE CARE PLANNING  07/07/2016       Current Medications:     Current Outpatient Medications   Medication Sig Dispense Refill     acetaminophen (TYLENOL) 325 MG tablet Take 2 tablets (650 mg) by mouth every 6 hours as needed for mild pain 50 tablet 0     amoxicillin (AMOXIL) 500 MG capsule TAKE 4 CAPSULES BY MOUTH 1 HOUR BEFORE DENTAL APPOINTMENT 4 capsule 0     aspirin  MG EC tablet Take 1  tablet (325 mg) by mouth daily (Patient taking differently: Take 325 mg by mouth every morning ) 90 tablet 3     calcium-vitamin D 500-125 MG-UNIT TABS        folic acid (FOLVITE) 1 MG tablet Take 1 tablet (1 mg) by mouth daily 30 tablet 6     furosemide (LASIX) 20 MG tablet TAKE 1 1 TABLET BY MOUTH EVERY DAY IF 2 TO 3 POUND WEIGHT GAIN OVER A 2 DAY PERIOD 90 tablet 0     furosemide (LASIX) 20 MG tablet TAKE 1 TABLET BY MOUTH EVERY DAILY IF 2 TO 3 POUNDS WEIGHT GAIN OVER 2 DAY PERIOD 90 tablet 0     gabapentin (NEURONTIN) 100 MG capsule TAKE 1 CAPSULE BY MOUTH THREE TIMES DAILY 90 capsule 0     HYDROcodone-acetaminophen (NORCO) 5-325 MG tablet Take 0.5 tablets by mouth every 6 hours as needed for pain or severe pain 5 tablet 0     ICAPS PO 2 tablets daily       losartan (COZAAR) 25 MG tablet TAKE 1 TABLET BY MOUTH EVERY DAY (Patient taking differently: 25 mg every morning ) 90 tablet 3     methotrexate sodium 2.5 MG TABS Take 8 tablets (20 mg) by mouth once a week . Take all 8 tablets on the same day of each week.  Do not take with amoxicillin. 32 tablet 4     metoprolol tartrate (LOPRESSOR) 25 MG tablet TAKE 1 TABLET(25 MG) BY MOUTH TWICE DAILY 180 tablet 3     Omega-3 Fatty Acids (OMEGA-3 FISH OIL PO) Take 1 tablet twice daily.       sulfaSALAzine ER (AZULFIDINE EN) 500 MG EC tablet Take 1 tablet (500 mg) by mouth 2 times daily 60 tablet 6         Allergies:        Allergies   Allergen Reactions     Lisinopril Cough        Social History:     Social History     Tobacco Use     Smoking status: Never Smoker     Smokeless tobacco: Never Used   Substance Use Topics     Alcohol use: Yes     Comment: 4 times a year       History   Drug Use No         Family History:       Family History   Problem Relation Age of Onset     Breast Cancer Sister 45     Arthritis Sister      Thyroid Disease Sister      Cancer Sister         colon     Arthritis Sister      Arthritis Mother      Hypertension Father      Cancer - colorectal  "Father      Prostate Cancer Father      Arthritis Father      Heart Disease Father      Lipids Father      Arthritis Sister      Asthma Daughter      Asthma Daughter      Cancer Daughter 58        lung     Cancer Other 81        pancreatic          Physical Exam:     BP (!) 164/76   Pulse 104   Temp 97.4  F (36.3  C) (Oral)   Resp 14   Ht 1.549 m (5' 1\")   Wt 54.6 kg (120 lb 4.8 oz)   SpO2 98%   BMI 22.73 kg/m    Body mass index is 22.73 kg/m .    General Appearance: healthy and alert, no distress     HEENT: no thyromegaly, no palpable nodules or masses        Cardiovascular: regular rate and rhythm, no gallops, rubs or murmurs     Respiratory: lungs clear, no rales, rhonchi or wheezes, normal diaphragmatic excursion    Musculoskeletal: extremities non tender and without edema    Skin: no lesions or rashes     Neurological: normal gait, no gross defects     Psychiatric: appropriate mood and affect                               Hematological: normal cervical, supraclavicular and inguinal lymph nodes     Gastrointestinal:       abdomen soft, non-tender, non-distended, no organomegaly or masses    Genitourinary: Right vulva with superficial separation and fibrinous material; stool in the vulva and near introitus; large hemorrhoid at anterior rectum; pinkness surrounding wound with some induration; malodorous. Dr. Onofre double examined and does not recommend any intervention at this time.       Assessment:    Roxanna Stark is a 89 year old woman with a diagnosis of THERESA III s/p WLE 9/27/19.   She is here today for an acute visit.     20 minutes were spent with this patient, over 50% of that time was spent in symptom management, treatment planning and in counseling and coordination of care.      Plan:     1.)       She will return next week for another check of her vulva; she was instructed to increase her use of the olimpia-bottle after voiding/BM, can apply ice/cool compress to vulva (not directly to skin) for " 10 minutes, and do a sitz bath. Reviewed use of her pain medications and she was given witting instruction on alternating Norco with Tylenol for the remainder of today and tomorrow. She was instructed to contact the clinic/go to the ED with any increase in discomfort or a fever >100.4. Return for scheduled post op visit. Reviewed discharge instructions. Discussed she can increase her pain medication if needed as well as use ice/cold compress (but not directly on the vulvar skin).      2.) Genetic risk factors were assessed and the patient does not meet the qualifications for a referral.      3.) Labs and/or tests ordered include:  None.      4.) Health maintenance issues addressed today include annual health maintenance and non-gynecologic issues with PCP. Encouraged to contact her PCP regarding her elevated BP readings over the past several weeks.     CARMELITA Adler, WHNP-BC, ANP-BC  Women's Health Nurse Practitioner  Adult Nurse Pracitioner  Division of Gynecologic Oncology          CC  Patient Care Team:  Swapna Gaines MD as PCP - General (Family Practice)  Swapna Gaines MD as Assigned PCP  Brandan Landin MD as MD (OB/Gyn)  Mono Ribeiro MD as MD (Gyn-Onc)  BRANDAN LANDIN

## 2019-10-07 ENCOUNTER — DOCUMENTATION ONLY (OUTPATIENT)
Dept: OTHER | Facility: CLINIC | Age: 84
End: 2019-10-07

## 2019-10-07 NOTE — PROGRESS NOTES
"            Follow Up Notes on Referred Patient    Date: 10/8/2019       Dr. Brandan Landin MD  [address] [facility] [place] [address]       RE: Roxanna Stark  : 1927  RUSSELL: 10/8/2019    Dear Dr. Brandan Landin:    Roxanna Stark is a 89 year old woman with a diagnosis of THERESA III s/p WLE 19.   She is here today for a follow up visit.     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 She does still notice the \"lump\" but is sitting better. She states her left side is much better and she still notices some discomfort on her right side especially with sitting for periods of time. She did take the Norco and Tylenol; now she is taking a Tylenol/day. She has been using the olimpia-bottle as well as sat in the tub a couple of times. She is drinking prune juice to help with her constipation which has improved.            Review of Systems:    Systemic           no weight changes; no fever; no chills; no night sweats; no appetite changes  Skin           no rashes, or lesions  Eye           no irritation; no changes in vision  Clara-Laryngeal           no dysphagia; no hoarseness   Pulmonary    no cough; no shortness of breath  Cardiovascular    no chest pain; no palpitations  Gastrointestinal    no diarrhea; no constipation; no abdominal pain; no changes in bowel  habits; no blood in stool  Genitourinary   no urinary frequency; no urinary urgency; no dysuria; no pain; no abnormal vaginal discharge; no abnormal vaginal bleeding  Breast   no breast discharge; no breast changes; no breast pain  Musculoskeletal    no myalgias; no " arthralgias; no back pain  Psychiatric           no depressed mood; no anxiety    Hematologic           no tender lymph nodes; no noticeable swellings or lumps   Endocrine    no hot flashes; no heat/cold intolerance         Neurological   no tremor; no numbness and tingling; no headaches; no difficulty  sleeping      Past Medical History:    Past Medical History:   Diagnosis Date     Actinic keratosis      Aortic stenosis 2014     Basal cell cancer 7/2014    left eye medial canthus      Basal cell carcinoma 9/30/08    left cheek     CKD (chronic kidney disease) stage 3, GFR 30-59 ml/min (H) 7/1/2019     HTN (hypertension)      Melanoma in situ (H) 9/30/08    left arm     Polymyalgia rheumatica (H) 11/99     Temporal arteritis (H) 11/99         Past Surgical History:    Past Surgical History:   Procedure Laterality Date     C SKIN TISSUE PROCEDURE UNLISTED  11/3/08    mmis skin cancer excision     CATARACT IOL, RT/LT  5/09    bilateral     COLONOSCOPY  2002     EXCISE LESION VULVA N/A 9/27/2019    Procedure: Wide Local Excision Of Vulva, Colposcopy;  Surgeon: Mono Ribeiro MD;  Location: [facility] OR     REPLACE VALVE AORTIC N/A 4/25/2016    Procedure: REPLACE VALVE AORTIC;  Surgeon: Sudeep Tsai MD;  Location:  OR         There are no preventive care reminders to display for this patient.    Current Medications:     Current Outpatient Medications   Medication Sig Dispense Refill     acetaminophen (TYLENOL) 325 MG tablet Take 2 tablets (650 mg) by mouth every 6 hours as needed for mild pain 50 tablet 0     amoxicillin (AMOXIL) 500 MG capsule TAKE 4 CAPSULES BY MOUTH 1 HOUR BEFORE DENTAL APPOINTMENT 4 capsule 0     aspirin  MG EC tablet Take 1 tablet (325 mg) by mouth daily (Patient taking differently: Take 325 mg by mouth every morning ) 90 tablet 3     calcium-vitamin D 500-125 MG-UNIT TABS        folic acid (FOLVITE) 1 MG tablet Take 1 tablet (1 mg) by mouth daily 30 tablet 6     furosemide (LASIX)  20 MG tablet TAKE 1 1 TABLET BY MOUTH EVERY DAY IF 2 TO 3 POUND WEIGHT GAIN OVER A 2 DAY PERIOD 90 tablet 0     gabapentin (NEURONTIN) 100 MG capsule TAKE 1 CAPSULE BY MOUTH THREE TIMES DAILY 90 capsule 0     ICAPS PO 2 tablets daily       losartan (COZAAR) 25 MG tablet TAKE 1 TABLET BY MOUTH EVERY DAY (Patient taking differently: 25 mg every morning ) 90 tablet 3     methotrexate sodium 2.5 MG TABS Take 8 tablets (20 mg) by mouth once a week . Take all 8 tablets on the same day of each week.  Do not take with amoxicillin. 32 tablet 4     metoprolol tartrate (LOPRESSOR) 25 MG tablet TAKE 1 TABLET(25 MG) BY MOUTH TWICE DAILY 180 tablet 3     Omega-3 Fatty Acids (OMEGA-3 FISH OIL PO) Take 1 tablet twice daily.       sulfaSALAzine ER (AZULFIDINE EN) 500 MG EC tablet Take 1 tablet (500 mg) by mouth 2 times daily 60 tablet 6         Allergies:        Allergies   Allergen Reactions     Lisinopril Cough        Social History:     Social History     Tobacco Use     Smoking status: Never Smoker     Smokeless tobacco: Never Used   Substance Use Topics     Alcohol use: Yes     Comment: 4 times a year       History   Drug Use No         Family History:       Family History   Problem Relation Age of Onset     Breast Cancer Sister 45     Arthritis Sister      Thyroid Disease Sister      Cancer Sister         colon     Arthritis Sister      Arthritis Mother      Hypertension Father      Cancer - colorectal Father      Prostate Cancer Father      Arthritis Father      Heart Disease Father      Lipids Father      Arthritis Sister      Asthma Daughter      Asthma Daughter      Cancer Daughter 58        lung     Cancer Other 81        pancreatic          Physical Exam:     /73   Pulse 75   Temp 97.6  F (36.4  C) (Oral)   Resp 14   Wt 54.4 kg (120 lb)   SpO2 100%   BMI 22.67 kg/m    Body mass index is 22.67 kg/m .    General Appearance: healthy and alert, no distress     HEENT: no thyromegaly, no palpable nodules or masses         Cardiovascular: regular rate and rhythm, no gallops, rubs or murmurs     Respiratory: lungs clear, no rales, rhonchi or wheezes, normal diaphragmatic excursion    Musculoskeletal: extremities non tender and without edema    Skin: no lesions or rashes     Neurological: normal gait, no gross defects     Psychiatric: appropriate mood and affect                               Hematological: normal cervical, supraclavicular and inguinal lymph nodes     Gastrointestinal:       abdomen soft, non-tender, non-distended, no organomegaly or masses    Genitourinary: Right vulva with ruptured sutures, fibrinous granulation tissue; periwound tissue pink with less induration on lower aspect. hemorrhoid slightly less firm. Dr. Ribeiro also examined and trimmed a couple sutures      Assessment:    Roxanna Stark is a 89 year old woman with a diagnosis of THERESA III s/p WLE 9/27/19.   She is here today for a follow up visit.     10 minutes were spent with this patient, over 50% of that time was spent in symptom management, treatment planning and in counseling and coordination of care.      Plan:     1.)        Return for scheduled post op visit next week. Discussed taking Tylenol 325 mg TID (can take 325-650 at bedtime if needed); this was written down for her. Continue to do sitz baths and use a olimpia bottle with each voiding/BM and monitor for any fevers or increase in discomfort. Reviewed signs and symptoms for when she should contact the clinic or seek additional care.     2.) Genetic risk factors were assessed and the patient does not meet the qualifications for a referral.      3.) Labs and/or tests ordered include:  None.      4.) Health maintenance issues addressed today include annual health maintenance and non-gynecologic issues with PCP.    CARMELITA Adler, WHNP-BC, ANP-BC  Women's Health Nurse Practitioner  Adult Nurse Pracitioner  Division of Gynecologic Oncology          CC  Patient Care Team:  Swapna Gaines MD as  PCP - General (Family Practice)  Swapna Gaines MD as Assigned PCP  Tom Landin MD as MD (OB/Gyn)  Mono Ribeiro MD as MD (Gyn-Onc)  TOM LANDIN

## 2019-10-08 ENCOUNTER — ONCOLOGY VISIT (OUTPATIENT)
Dept: ONCOLOGY | Facility: CLINIC | Age: 84
End: 2019-10-08
Attending: OBSTETRICS & GYNECOLOGY
Payer: MEDICARE

## 2019-10-08 VITALS
BODY MASS INDEX: 22.67 KG/M2 | OXYGEN SATURATION: 100 % | HEART RATE: 75 BPM | RESPIRATION RATE: 14 BRPM | DIASTOLIC BLOOD PRESSURE: 73 MMHG | TEMPERATURE: 97.6 F | WEIGHT: 120 LBS | SYSTOLIC BLOOD PRESSURE: 120 MMHG

## 2019-10-08 DIAGNOSIS — D07.1 VIN III (VULVAR INTRAEPITHELIAL NEOPLASIA III): Primary | ICD-10-CM

## 2019-10-08 PROCEDURE — G0463 HOSPITAL OUTPT CLINIC VISIT: HCPCS | Mod: ZF

## 2019-10-08 PROCEDURE — 99024 POSTOP FOLLOW-UP VISIT: CPT | Mod: ZP | Performed by: NURSE PRACTITIONER

## 2019-10-08 ASSESSMENT — PAIN SCALES - GENERAL: PAINLEVEL: NO PAIN (0)

## 2019-10-08 NOTE — NURSING NOTE
"Oncology Rooming Note    October 8, 2019 1:16 PM   Roxanna Stark is a 89 year old female who presents for:    Chief Complaint   Patient presents with     Oncology Clinic Visit     Return Vulvar Ulceration     Initial Vitals: /73   Pulse 75   Temp 97.6  F (36.4  C) (Oral)   Resp 14   Wt 54.4 kg (120 lb)   SpO2 100%   BMI 22.67 kg/m   Estimated body mass index is 22.67 kg/m  as calculated from the following:    Height as of 10/4/19: 1.549 m (5' 1\").    Weight as of this encounter: 54.4 kg (120 lb). Body surface area is 1.53 meters squared.  No Pain (0) Comment: Data Unavailable   No LMP recorded. Patient is postmenopausal.  Allergies reviewed: Yes  Medications reviewed: Yes    Medications: Medication refills not needed today.  Pharmacy name entered into [employer]:    [id number] - [place], [place] - [address] AT [facility] [address] [facility]    Clinical concerns: check on the vulvar ulceration       Brooklynn Mendieta CMA              "

## 2019-10-08 NOTE — LETTER
"10/8/2019       RE: Roxanna Stark  [address]  [place] [address]     Dear Colleague,    Thank you for referring your patient, Roxanna Stark, to the [facility]. Please see a copy of my visit note below.                Follow Up Notes on Referred Patient    Date: 10/8/2019       Dr. Brandan Landin MD  [address] [place] [address]       RE: Roxanna Stark  : 1927  RUSSELL: 10/8/2019    Dear Dr. Brandan Landin:    Roxanna Stark is a 89 year old woman with a diagnosis of THERESA III s/p WLE 19.   She is here today for a follow up visit.     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 She does still notice the \"lump\" but is sitting better. She states her left side is much better and she still notices some discomfort on her right side especially with sitting for periods of time. She did take the Norco and Tylenol; now she is taking a Tylenol/day. She has been using the olimpia-bottle as well as sat in the tub a couple of times. She is drinking prune juice to help with her constipation which has improved.            Review of Systems:    Systemic           no weight changes; no fever; no chills; no night sweats; no appetite changes  Skin           no rashes, or lesions  Eye           no irritation; no changes in vision  Clara-Laryngeal           no dysphagia; no hoarseness   Pulmonary    no cough; no shortness of breath  Cardiovascular    no chest pain; no palpitations  Gastrointestinal    no diarrhea; no constipation; no abdominal pain; no changes in bowel  habits; no blood in " stool  Genitourinary   no urinary frequency; no urinary urgency; no dysuria; no pain; no abnormal vaginal discharge; no abnormal vaginal bleeding  Breast   no breast discharge; no breast changes; no breast pain  Musculoskeletal    no myalgias; no arthralgias; no back pain  Psychiatric           no depressed mood; no anxiety    Hematologic           no tender lymph nodes; no noticeable swellings or lumps   Endocrine    no hot flashes; no heat/cold intolerance         Neurological   no tremor; no numbness and tingling; no headaches; no difficulty  sleeping      Past Medical History:    Past Medical History:   Diagnosis Date     Actinic keratosis      Aortic stenosis 2014     Basal cell cancer 7/2014    left eye medial canthus      Basal cell carcinoma 9/30/08    left cheek     CKD (chronic kidney disease) stage 3, GFR 30-59 ml/min (H) 7/1/2019     HTN (hypertension)      Melanoma in situ (H) 9/30/08    left arm     Polymyalgia rheumatica (H) 11/99     Temporal arteritis (H) 11/99         Past Surgical History:    Past Surgical History:   Procedure Laterality Date     C SKIN TISSUE PROCEDURE UNLISTED  11/3/08    mmis skin cancer excision     CATARACT IOL, RT/LT  5/09    bilateral     COLONOSCOPY  2002     EXCISE LESION VULVA N/A 9/27/2019    Procedure: Wide Local Excision Of Vulva, Colposcopy;  Surgeon: Mono Ribeiro MD;  Location:  OR     REPLACE VALVE AORTIC N/A 4/25/2016    Procedure: REPLACE VALVE AORTIC;  Surgeon: Sudeep Tsai MD;  Location:  OR         There are no preventive care reminders to display for this patient.    Current Medications:     Current Outpatient Medications   Medication Sig Dispense Refill     acetaminophen (TYLENOL) 325 MG tablet Take 2 tablets (650 mg) by mouth every 6 hours as needed for mild pain 50 tablet 0     amoxicillin (AMOXIL) 500 MG capsule TAKE 4 CAPSULES BY MOUTH 1 HOUR BEFORE DENTAL APPOINTMENT 4 capsule 0     aspirin  MG EC tablet Take 1 tablet (325 mg)  by mouth daily (Patient taking differently: Take 325 mg by mouth every morning ) 90 tablet 3     calcium-vitamin D 500-125 MG-UNIT TABS        folic acid (FOLVITE) 1 MG tablet Take 1 tablet (1 mg) by mouth daily 30 tablet 6     furosemide (LASIX) 20 MG tablet TAKE 1 1 TABLET BY MOUTH EVERY DAY IF 2 TO 3 POUND WEIGHT GAIN OVER A 2 DAY PERIOD 90 tablet 0     gabapentin (NEURONTIN) 100 MG capsule TAKE 1 CAPSULE BY MOUTH THREE TIMES DAILY 90 capsule 0     ICAPS PO 2 tablets daily       losartan (COZAAR) 25 MG tablet TAKE 1 TABLET BY MOUTH EVERY DAY (Patient taking differently: 25 mg every morning ) 90 tablet 3     methotrexate sodium 2.5 MG TABS Take 8 tablets (20 mg) by mouth once a week . Take all 8 tablets on the same day of each week.  Do not take with amoxicillin. 32 tablet 4     metoprolol tartrate (LOPRESSOR) 25 MG tablet TAKE 1 TABLET(25 MG) BY MOUTH TWICE DAILY 180 tablet 3     Omega-3 Fatty Acids (OMEGA-3 FISH OIL PO) Take 1 tablet twice daily.       sulfaSALAzine ER (AZULFIDINE EN) 500 MG EC tablet Take 1 tablet (500 mg) by mouth 2 times daily 60 tablet 6         Allergies:        Allergies   Allergen Reactions     Lisinopril Cough        Social History:     Social History     Tobacco Use     Smoking status: Never Smoker     Smokeless tobacco: Never Used   Substance Use Topics     Alcohol use: Yes     Comment: 4 times a year       History   Drug Use No         Family History:       Family History   Problem Relation Age of Onset     Breast Cancer Sister 45     Arthritis Sister      Thyroid Disease Sister      Cancer Sister         colon     Arthritis Sister      Arthritis Mother      Hypertension Father      Cancer - colorectal Father      Prostate Cancer Father      Arthritis Father      Heart Disease Father      Lipids Father      Arthritis Sister      Asthma Daughter      Asthma Daughter      Cancer Daughter 58        lung     Cancer Other 81        pancreatic          Physical Exam:     /73   Pulse  75   Temp 97.6  F (36.4  C) (Oral)   Resp 14   Wt 54.4 kg (120 lb)   SpO2 100%   BMI 22.67 kg/m     Body mass index is 22.67 kg/m .    General Appearance: healthy and alert, no distress     HEENT: no thyromegaly, no palpable nodules or masses        Cardiovascular: regular rate and rhythm, no gallops, rubs or murmurs     Respiratory: lungs clear, no rales, rhonchi or wheezes, normal diaphragmatic excursion    Musculoskeletal: extremities non tender and without edema    Skin: no lesions or rashes     Neurological: normal gait, no gross defects     Psychiatric: appropriate mood and affect                               Hematological: normal cervical, supraclavicular and inguinal lymph nodes     Gastrointestinal:       abdomen soft, non-tender, non-distended, no organomegaly or masses    Genitourinary: Right vulva with ruptured sutures, fibrinous granulation tissue; periwound tissue pink with less induration on lower aspect. hemorrhoid slightly less firm. Dr. Ribeiro also examined and trimmed a couple sutures      Assessment:    Roxanna Stark is a 89 year old woman with a diagnosis of THERESA III s/p WLE 9/27/19.   She is here today for a follow up visit.     10 minutes were spent with this patient, over 50% of that time was spent in symptom management, treatment planning and in counseling and coordination of care.      Plan:     1.)        Return for scheduled post op visit next week. Discussed taking Tylenol 325 mg TID (can take 325-650 at bedtime if needed); this was written down for her. Continue to do sitz baths and use a olimpia bottle with each voiding/BM and monitor for any fevers or increase in discomfort. Reviewed signs and symptoms for when she should contact the clinic or seek additional care.     2.) Genetic risk factors were assessed and the patient does not meet the qualifications for a referral.      3.) Labs and/or tests ordered include:  None.      4.) Health maintenance issues addressed today include  annual health maintenance and non-gynecologic issues with PCP.    CARMELITA Adler, WHNP-BC, ANP-BC  Women's Health Nurse Practitioner  Adult Nurse Pracitioner  Division of Gynecologic Oncology      CC  Patient Care Team:  Swapna Gaines MD as PCP - General (Family Practice)  Brandan Landin MD as MD (OB/Gyn)  Mono Ribeiro MD as MD (Gyn-Onc)

## 2019-10-09 ENCOUNTER — TELEPHONE (OUTPATIENT)
Dept: ONCOLOGY | Facility: CLINIC | Age: 84
End: 2019-10-09

## 2019-10-09 NOTE — TELEPHONE ENCOUNTER
I called Roxanna and asked if the time of her appt could be changed from 1pm to 1230pm to allow a new pt add. Roxanna confirmed the time change from 1pm to 1230pm on 10/17/19 with Dr Ribeiro.

## 2019-10-11 NOTE — TELEPHONE ENCOUNTER
10/3  1215  Pt called left message on voice mail  Feeling uncomfortable, feels like sand paper has been used on bottom    10/3  637 pm  Returned pt call left message on voice mail  Pt left message on voice mail  Has surgery-things are not going well  Has left messages and not gotten calls back  Just doesn't feel good    10/1  1221  Spoke with pt explained I had called her a few times over the last few days and left messages.  Pt states she has not checked her voice mail and does not answer the phone when she does not know the number    Finally had a BM today, had been very constipated and was drinking prune juice    Looked at vaginal area with a mirror, very hard to see, noted swelling, states it does not look good  Thinks surgery area is healed but area in vagina is painful and raw  Having a small amount of bleeding now  Having no issues with urination  Eating and drinking but has decreased appetite, encouraged increasing fluids.  States pain is not horrible, did take pain meds yesterday( 1/2 norco), had not been taking any pain medications up until now  Pt is using squirt bottle regularily after voiding, patting dry, avoiding tight clothing    Pending appt at 2pm for evaluatio

## 2019-10-17 ENCOUNTER — ONCOLOGY VISIT (OUTPATIENT)
Dept: ONCOLOGY | Facility: CLINIC | Age: 84
End: 2019-10-17
Attending: OBSTETRICS & GYNECOLOGY
Payer: MEDICARE

## 2019-10-17 VITALS
SYSTOLIC BLOOD PRESSURE: 150 MMHG | WEIGHT: 120.3 LBS | OXYGEN SATURATION: 100 % | HEART RATE: 60 BPM | TEMPERATURE: 97.6 F | BODY MASS INDEX: 22.73 KG/M2 | DIASTOLIC BLOOD PRESSURE: 78 MMHG

## 2019-10-17 DIAGNOSIS — D07.1 VIN III (VULVAR INTRAEPITHELIAL NEOPLASIA III): Primary | ICD-10-CM

## 2019-10-17 PROCEDURE — G0463 HOSPITAL OUTPT CLINIC VISIT: HCPCS | Mod: ZF

## 2019-10-17 PROCEDURE — 99213 OFFICE O/P EST LOW 20 MIN: CPT | Mod: 24 | Performed by: OBSTETRICS & GYNECOLOGY

## 2019-10-17 ASSESSMENT — PAIN SCALES - GENERAL: PAINLEVEL: MILD PAIN (3)

## 2019-10-17 NOTE — LETTER
10/17/2019       RE: Roxanna Stark  [address]  [place] [address]     Dear Colleague,    Thank you for referring your patient, Roxanna Stark, to the [facility]. Please see a copy of my visit note below.    Gynecologic Oncology Clinic - Established Patient Visit    Visit date: Oct 17, 2019     CC: THERESA III    Interval history: Roxanna Stark is a 89 year old female who presents to discuss management of her THERESA III s/p WLE excision complicated by wound breakdown. She reports continued pain on her perineum. It is slightly better but still painful most of the time. She is not putting anything on it.       Review of Systems:  Constitutional: no fevers, chills  Cv: no chest pain, palpitations,   Resp: no cough, shortness of breath  GI: +hemorrhoids  : + vulvar pain, +burning with urination, no bleednig    Past Medical History:  Past Medical History:   Diagnosis Date     Actinic keratosis      Aortic stenosis 2014     Basal cell cancer 7/2014    left eye medial canthus      Basal cell carcinoma 9/30/08    left cheek     CKD (chronic kidney disease) stage 3, GFR 30-59 ml/min (H) 7/1/2019     HTN (hypertension)      Melanoma in situ (H) 9/30/08    left arm     Polymyalgia rheumatica (H) 11/99     Temporal arteritis (H) 11/99       Past Surgical History:  Past Surgical History:   Procedure Laterality Date     C SKIN TISSUE PROCEDURE UNLISTED  11/3/08    mmis skin cancer excision     CATARACT IOL, RT/LT  5/09    bilateral     COLONOSCOPY  2002     EXCISE LESION VULVA N/A 9/27/2019    Procedure: Wide Local Excision Of Vulva, Colposcopy;  Surgeon: Mono Ribeiro MD;  Location: [facility] OR     REPLACE VALVE AORTIC N/A 4/25/2016    Procedure: REPLACE VALVE AORTIC;  Surgeon: Sudeep Tsai MD;  Location: [facility] OR        Physical Exam:  BP (!) 150/78   Pulse 60   Temp 97.6  F (36.4  C) (Oral)   Wt 54.6 kg (120 lb 4.8 oz)   SpO2 100%   BMI 22.73 kg/m      General appearance: no acute  distress, well groomed, sitting comfortably   :  External: there is separation of nearly the entire wound along the right side extending posteriorly and medially. There is pink granulation tissue along the base throughout. No bleeding. The wound is clean. There is a large external hemorrhoid which is slightly inflamed. There is minimal fibrinous exudate within the wound, no surrounding erythema.     Labs/Pathology:  FINAL DIAGNOSIS:   POSTERIOR VULVA, WIDE LOCAL EXCISION:   - High grade squamous intraepithelial lesion (vulvar intraepithelial   neoplasia 3)   - The 3:00 - 6:00 surgical margin is positive for [V]IN 3   - The remaining margins are negative for dysplasia     Assessment:  Roxanna is a 89 year old with THERESA III s/p excision with wound breakdown who has positive margins for THERESA III from 3-6 o'clock along the olimpia-anal margin. The wound appears to be granulating well.    Plan:  - Continue wound care as previously discussed. Keep area clean. Healing well. Offered wound check in another few weeks to month. Patient prefers to call if any issues.  - THERESA III: no evidence of cancer on specimen. Will continue to monitor closely for signs of recurrence or new lesions. Recommend vulvar exams every 6 months at a minimum. More often if concerns. Will plan for her to return to clinic in 4 months to assess wound healing and perform exam.    Fifteen minutes of today's 30 minute visit was spent in counseling about her diagnosis of THERESA III and risk for recurrence and recommendations for management.     Mono Ribeiro MD     Gynecologic Oncology

## 2019-10-17 NOTE — PROGRESS NOTES
Gynecologic Oncology Clinic - Established Patient Visit    Visit date: Oct 17, 2019     CC: THERESA III    Interval history: Roxanna Stark is a 89 year old female who presents to discuss management of her THERESA III s/p WLE excision complicated by wound breakdown. She reports continued pain on her perineum. It is slightly better but still painful most of the time. She is not putting anything on it.       Review of Systems:  Constitutional: no fevers, chills  Cv: no chest pain, palpitations,   Resp: no cough, shortness of breath  GI: +hemorrhoids  : + vulvar pain, +burning with urination, no bleednig    Past Medical History:  Past Medical History:   Diagnosis Date     Actinic keratosis      Aortic stenosis 2014     Basal cell cancer 7/2014    left eye medial canthus      Basal cell carcinoma 9/30/08    left cheek     CKD (chronic kidney disease) stage 3, GFR 30-59 ml/min (H) 7/1/2019     HTN (hypertension)      Melanoma in situ (H) 9/30/08    left arm     Polymyalgia rheumatica (H) 11/99     Temporal arteritis (H) 11/99       Past Surgical History:  Past Surgical History:   Procedure Laterality Date     C SKIN TISSUE PROCEDURE UNLISTED  11/3/08    mmis skin cancer excision     CATARACT IOL, RT/LT  5/09    bilateral     COLONOSCOPY  2002     EXCISE LESION VULVA N/A 9/27/2019    Procedure: Wide Local Excision Of Vulva, Colposcopy;  Surgeon: Mono Ribeiro MD;  Location: [facility] OR     REPLACE VALVE AORTIC N/A 4/25/2016    Procedure: REPLACE VALVE AORTIC;  Surgeon: Sudeep Tsai MD;  Location: [facility] OR        Physical Exam:  BP (!) 150/78   Pulse 60   Temp 97.6  F (36.4  C) (Oral)   Wt 54.6 kg (120 lb 4.8 oz)   SpO2 100%   BMI 22.73 kg/m     General appearance: no acute distress, well groomed, sitting comfortably   :  External: there is separation of nearly the entire wound along the right side extending posteriorly and medially. There is pink granulation tissue along the base throughout. No bleeding. The wound  is clean. There is a large external hemorrhoid which is slightly inflamed. There is minimal fibrinous exudate within the wound, no surrounding erythema.     Labs/Pathology:  FINAL DIAGNOSIS:   POSTERIOR VULVA, WIDE LOCAL EXCISION:   - High grade squamous intraepithelial lesion (vulvar intraepithelial   neoplasia 3)   - The 3:00 - 6:00 surgical margin is positive for [V]IN 3   - The remaining margins are negative for dysplasia     Assessment:  Roxanna is a 89 year old with THERESA III s/p excision with wound breakdown who has positive margins for THERESA III from 3-6 o'clock along the olimpia-anal margin. The wound appears to be granulating well.    Plan:  - Continue wound care as previously discussed. Keep area clean. Healing well. Offered wound check in another few weeks to month. Patient prefers to call if any issues.  - THERESA III: no evidence of cancer on specimen. Will continue to monitor closely for signs of recurrence or new lesions. Recommend vulvar exams every 6 months at a minimum. More often if concerns. Will plan for her to return to clinic in 4 months to assess wound healing and perform exam.    Fifteen minutes of today's 30 minute visit was spent in counseling about her diagnosis of THERESA III and risk for recurrence and recommendations for management.     Mono Ribeiro MD     Gynecologic Oncology

## 2019-10-17 NOTE — NURSING NOTE
Patient's rooming completed by Gail Robin, Student MA.      All steps completed per rooming protocol.    Shoshana Ríos CMA ([facility])

## 2019-10-17 NOTE — NURSING NOTE
"Oncology Rooming Note    October 17, 2019 12:00 PM   Roxanna Stark is a 89 year old female who presents for:    Chief Complaint   Patient presents with     Oncology Clinic Visit     Return; THERESA III     Initial Vitals: BP (!) 163/77 (BP Location: Right arm, Patient Position: Chair, Cuff Size: Adult Regular)   Pulse 60   Temp 97.6  F (36.4  C) (Oral)   Wt 54.6 kg (120 lb 4.8 oz)   SpO2 100%   BMI 22.73 kg/m   Estimated body mass index is 22.73 kg/m  as calculated from the following:    Height as of 10/4/19: 1.549 m (5' 1\").    Weight as of this encounter: 54.6 kg (120 lb 4.8 oz). Body surface area is 1.53 meters squared.  Mild Pain (3) Comment: Data Unavailable   No LMP recorded. Patient is postmenopausal.  Allergies reviewed: Yes  Medications reviewed: Yes    Medications: Medication refills not needed today.  Pharmacy name entered into [facility]:    [id number] [address] AT [facility]    Clinical concerns: Patient has no new concerns. Dr. Ribeiro was NOT notified.      SMA Raad              "

## 2019-10-18 NOTE — PATIENT INSTRUCTIONS
Plan:  - Continue wound care as previously discussed. Keep area clean. Healing well.. Patient will call if any issues.  - THERESA III: no evidence of cancer on specimen. Will continue to monitor closely for signs of recurrence or new lesions. Recommend vulvar exams every 6 months at a minimum. More often if concerns. Will plan for her to return to clinic in 4 months to assess wound healing and perform exam.

## 2019-10-21 DIAGNOSIS — G57.93 NEUROPATHY OF BOTH FEET: ICD-10-CM

## 2019-10-21 RX ORDER — GABAPENTIN 100 MG/1
CAPSULE ORAL
Qty: 90 CAPSULE | Refills: 0 | Status: SHIPPED | OUTPATIENT
Start: 2019-10-21 | End: 2019-11-21

## 2019-10-21 NOTE — TELEPHONE ENCOUNTER
gabapentin (NEURONTIN) 100 MG capsule      Last Written Prescription Date:  9-  Last Fill Quantity: 90,   # refills: 0  Last Office Visit: 7/1/2019  Future Office visit:       Routing refill request to provider for review/approval because:  Drug not on the [facility], [facility] or [facility] refill protocol or controlled substance

## 2019-11-21 DIAGNOSIS — G57.93 NEUROPATHY OF BOTH FEET: ICD-10-CM

## 2019-11-21 NOTE — TELEPHONE ENCOUNTER
gabapentin (NEURONTIN) 100 MG capsule      Last Written Prescription Date:  10/21/2019  Last Fill Quantity: 90,   # refills: 0  Last Office Visit: 7/1/2019  Future Office visit:       Routing refill request to provider for review/approval because:  Drug not on the [facility], [facility] or [facility] refill protocol or controlled substance

## 2019-11-22 RX ORDER — GABAPENTIN 100 MG/1
CAPSULE ORAL
Qty: 90 CAPSULE | Refills: 0 | Status: SHIPPED | OUTPATIENT
Start: 2019-11-22 | End: 2019-12-20

## 2019-12-18 DIAGNOSIS — M06.09 RHEUMATOID ARTHRITIS OF MULTIPLE SITES WITH NEGATIVE RHEUMATOID FACTOR (H): ICD-10-CM

## 2019-12-18 DIAGNOSIS — Z79.899 HIGH RISK MEDICATION USE: ICD-10-CM

## 2019-12-18 LAB
ALBUMIN SERPL-MCNC: 3.4 G/DL (ref 3.4–5)
ALP SERPL-CCNC: 85 U/L (ref 40–150)
ALT SERPL W P-5'-P-CCNC: 24 U/L (ref 0–50)
AST SERPL W P-5'-P-CCNC: 24 U/L (ref 0–45)
BASOPHILS # BLD AUTO: 0.1 10E9/L (ref 0–0.2)
BASOPHILS NFR BLD AUTO: 0.5 %
BILIRUB DIRECT SERPL-MCNC: <0.1 MG/DL (ref 0–0.2)
BILIRUB SERPL-MCNC: 0.3 MG/DL (ref 0.2–1.3)
CREAT SERPL-MCNC: 1.16 MG/DL (ref 0.52–1.04)
DIFFERENTIAL METHOD BLD: ABNORMAL
EOSINOPHIL # BLD AUTO: 0.4 10E9/L (ref 0–0.7)
EOSINOPHIL NFR BLD AUTO: 3.9 %
ERYTHROCYTE [DISTWIDTH] IN BLOOD BY AUTOMATED COUNT: 18.5 % (ref 10–15)
GFR SERPL CREATININE-BSD FRML MDRD: 41 ML/MIN/{1.73_M2}
HCT VFR BLD AUTO: 35.2 % (ref 35–47)
HGB BLD-MCNC: 10.8 G/DL (ref 11.7–15.7)
LYMPHOCYTES # BLD AUTO: 2.3 10E9/L (ref 0.8–5.3)
LYMPHOCYTES NFR BLD AUTO: 23.7 %
MCH RBC QN AUTO: 28.5 PG (ref 26.5–33)
MCHC RBC AUTO-ENTMCNC: 30.7 G/DL (ref 31.5–36.5)
MCV RBC AUTO: 93 FL (ref 78–100)
MONOCYTES # BLD AUTO: 0.7 10E9/L (ref 0–1.3)
MONOCYTES NFR BLD AUTO: 7.2 %
NEUTROPHILS # BLD AUTO: 6.3 10E9/L (ref 1.6–8.3)
NEUTROPHILS NFR BLD AUTO: 64.7 %
PLATELET # BLD AUTO: 200 10E9/L (ref 150–450)
PROT SERPL-MCNC: 7 G/DL (ref 6.8–8.8)
RBC # BLD AUTO: 3.79 10E12/L (ref 3.8–5.2)
WBC # BLD AUTO: 9.7 10E9/L (ref 4–11)

## 2019-12-18 PROCEDURE — 82565 ASSAY OF CREATININE: CPT | Performed by: INTERNAL MEDICINE

## 2019-12-18 PROCEDURE — 80076 HEPATIC FUNCTION PANEL: CPT | Performed by: INTERNAL MEDICINE

## 2019-12-18 PROCEDURE — 85025 COMPLETE CBC W/AUTO DIFF WBC: CPT | Performed by: INTERNAL MEDICINE

## 2019-12-18 PROCEDURE — 36415 COLL VENOUS BLD VENIPUNCTURE: CPT | Performed by: INTERNAL MEDICINE

## 2019-12-20 NOTE — RESULT ENCOUNTER NOTE
Ken Soriano, RN: Please call to notify Ms. Stark that labs are stable.  No change to treatment plan based on these labs.      Jamie Johnson MD  12/20/2019 2:26 PM

## 2020-01-09 DIAGNOSIS — I35.0 AORTIC VALVE STENOSIS: ICD-10-CM

## 2020-01-09 DIAGNOSIS — Z95.2 AORTIC VALVE REPLACED: Primary | ICD-10-CM

## 2020-01-18 NOTE — PROGRESS NOTES
Needs follow up visit with me in 4-6 months.    Rheumatology team: Please call to notify Ms. Stark that the labs showed an elevated creatinine, indicating reduced renal function compared to her previous labs.  This could be due to methotrexate or dehydration.  Therefore, please reduce methotrexate to 15mg (6 tablets) once weekly and ensure that she is well hydrated for her next labs.    Jamie Johnson MD  4/1/2018 9:33 PM

## 2020-02-13 ENCOUNTER — ONCOLOGY VISIT (OUTPATIENT)
Dept: ONCOLOGY | Facility: CLINIC | Age: 85
End: 2020-02-13
Attending: OBSTETRICS & GYNECOLOGY
Payer: MEDICARE

## 2020-02-13 VITALS
DIASTOLIC BLOOD PRESSURE: 77 MMHG | TEMPERATURE: 97.4 F | OXYGEN SATURATION: 96 % | HEART RATE: 60 BPM | RESPIRATION RATE: 14 BRPM | SYSTOLIC BLOOD PRESSURE: 173 MMHG | WEIGHT: 121.9 LBS | BODY MASS INDEX: 23.03 KG/M2

## 2020-02-13 DIAGNOSIS — D07.1 VIN III (VULVAR INTRAEPITHELIAL NEOPLASIA III): Primary | ICD-10-CM

## 2020-02-13 PROCEDURE — 56821 COLPOSCOPY VULVA W/BIOPSY: CPT

## 2020-02-13 PROCEDURE — G0463 HOSPITAL OUTPT CLINIC VISIT: HCPCS | Mod: 25

## 2020-02-13 PROCEDURE — G0463 HOSPITAL OUTPT CLINIC VISIT: HCPCS | Mod: ZF

## 2020-02-13 PROCEDURE — 56821 COLPOSCOPY VULVA W/BIOPSY: CPT | Performed by: OBSTETRICS & GYNECOLOGY

## 2020-02-13 PROCEDURE — 99213 OFFICE O/P EST LOW 20 MIN: CPT | Mod: 25 | Performed by: OBSTETRICS & GYNECOLOGY

## 2020-02-13 ASSESSMENT — PAIN SCALES - GENERAL: PAINLEVEL: NO PAIN (0)

## 2020-02-13 NOTE — PROGRESS NOTES
Gynecologic Oncology Clinic - Established Patient Visit    Visit date: Feb 13, 2020     CC: vulvar surveillance/vulvoscopy    Interval history: Roxanna Stark is a 89 year old with history of THERESA III s/p WLE excision 9/27/2019 complicated by wound breakdown. Her pathology at that time was notable for positive margin from 3-6 o'clock (near the anus). She returns today for repeat exam.     She is overall feeling well. No burning or itching. No vulvar pain. No new lesions. No vaginal bleeding or bleeding from the area. No changes to other health history.       Relevant history:  Vulvar lesion x 1 year, used creams for awhile, then ultimately presented to physician.   7/3/2019: vulvar biopsy of the right labia minora showing THERESA 2-3  9/27/2019: posterior simple vulvectomy, THERESA III with positive margin from 3-6 o'clock. Complicated by wound separation.      Review of Systems:  Constitutional: no fevers, chills  CV: no chest pain  GI: no change in bowel habits  : as per HPI    Past Medical History:  Past Medical History:   Diagnosis Date     Actinic keratosis      Aortic stenosis 2014     Basal cell cancer 7/2014    left eye medial canthus      Basal cell carcinoma 9/30/08    left cheek     CKD (chronic kidney disease) stage 3, GFR 30-59 ml/min (H) 7/1/2019     HTN (hypertension)      Melanoma in situ (H) 9/30/08    left arm     Polymyalgia rheumatica (H) 11/99     Temporal arteritis (H) 11/99       Past Surgical History:  Past Surgical History:   Procedure Laterality Date     C SKIN TISSUE PROCEDURE UNLISTED  11/3/08    mmis skin cancer excision     CATARACT IOL, RT/LT  5/09    bilateral     COLONOSCOPY  2002     EXCISE LESION VULVA N/A 9/27/2019    Procedure: Wide Local Excision Of Vulva, Colposcopy;  Surgeon: Mono Ribeiro MD;  Location: [facility] OR     REPLACE VALVE AORTIC N/A 4/25/2016    Procedure: REPLACE VALVE AORTIC;  Surgeon: Sudeep Tsai MD;  Location: [facility] OR        Physical Exam:  BP (!) 173/77 (BP  Location: Right arm, Patient Position: Chair, Cuff Size: Adult Regular)   Pulse 60   Temp 97.4  F (36.3  C) (Oral)   Resp 14   Wt 55.3 kg (121 lb 14.4 oz)   SpO2 96%   BMI 23.03 kg/m     General appearance: no acute distress, well groomed, sitting comfortably   Lymph: no inguinal lymphadenopathy  :  External: evidence of prior posterior vulvar resection. Pallor of the skin posteriorly from about 3-9 o'clock. There is some slight thickening around 5 o'clock on the vulva, but here is no prominence of this area with application of acetic acid. There are no areas of worrisome AWE with application of acetic acid.  Vagina: pale, palpably normal without lesions or masses. The introitus shows no AWE  Cervix: palpably normal without masses    Vulvoscopy:  5% acetic acid was applied to the external vulva after visual exam. This was then removed and the area examined with findings as noted above. No areas of AWE concerning for pre-malignant or malignant changes. No biopsies were indicated. Patient tolerated colposcopy well.    Labs/Pathology:  n/a    Imaging review:  n/a    Assessment:  Roxanna is a 89 year old with history of THERESA III s/p posterior simple vulvectomy 9/2019 with positive posterior margin presenting for follow-up exam without evidence of dysplasia.    Plan:  - Vulvoscopy as above. No areas concerning for high grade dysplasia or malignancy.   - Return to clinic in 6 months for repeat exam.    Mono Ribeiro MD     Gynecologic Oncology

## 2020-02-13 NOTE — NURSING NOTE
"Oncology Rooming Note    February 13, 2020 9:09 AM   Roxanna Stark is a 89 year old female who presents for:    Chief Complaint   Patient presents with     Oncology Clinic Visit     Return; THERESA III     Initial Vitals: BP (!) 173/77 (BP Location: Right arm, Patient Position: Chair, Cuff Size: Adult Regular)   Pulse 60   Temp 97.4  F (36.3  C) (Oral)   Resp 14   Wt 55.3 kg (121 lb 14.4 oz)   SpO2 96%   BMI 23.03 kg/m   Estimated body mass index is 23.03 kg/m  as calculated from the following:    Height as of 10/4/19: 1.549 m (5' 1\").    Weight as of this encounter: 55.3 kg (121 lb 14.4 oz). Body surface area is 1.54 meters squared.  No Pain (0) Comment: Data Unavailable   No LMP recorded. Patient is postmenopausal.  Allergies reviewed: Yes  Medications reviewed: Yes    Medications: Medication refills not needed today.  Pharmacy name entered into [employer]:    [id number] - [facility] [address] AT [facility] [address] [facility]    Clinical concerns: No new concerns.        Toshia France CMA              "

## 2020-02-13 NOTE — LETTER
2/13/2020       RE: Roxanna Stark  [address]  [place] [address]     Dear Colleague,    Thank you for referring your patient, Roxanna Stark, to the [facility]. Please see a copy of my visit note below.    Gynecologic Oncology Clinic - Established Patient Visit    Visit date: Feb 13, 2020     CC: vulvar surveillance/vulvoscopy    Interval history: Roxanna Stark is a 89 year old with history of THERESA III s/p WLE excision 9/27/2019 complicated by wound breakdown. Her pathology at that time was notable for positive margin from 3-6 o'clock (near the anus). She returns today for repeat exam.     She is overall feeling well. No burning or itching. No vulvar pain. No new lesions. No vaginal bleeding or bleeding from the area. No changes to other health history.       Relevant history:  Vulvar lesion x 1 year, used creams for awhile, then ultimately presented to physician.   7/3/2019: vulvar biopsy of the right labia minora showing THERESA 2-3  9/27/2019: posterior simple vulvectomy, THERESA III with positive margin from 3-6 o'clock. Complicated by wound separation.      Review of Systems:  Constitutional: no fevers, chills  CV: no chest pain  GI: no change in bowel habits  : as per HPI    Past Medical History:  Past Medical History:   Diagnosis Date     Actinic keratosis      Aortic stenosis 2014     Basal cell cancer 7/2014    left eye medial canthus      Basal cell carcinoma 9/30/08    left cheek     CKD (chronic kidney disease) stage 3, GFR 30-59 ml/min (H) 7/1/2019     HTN (hypertension)      Melanoma in situ (H) 9/30/08    left arm     Polymyalgia rheumatica (H) 11/99     Temporal arteritis (H) 11/99       Past Surgical History:  Past Surgical History:   Procedure Laterality Date     C SKIN TISSUE PROCEDURE UNLISTED  11/3/08    mmis skin cancer excision     CATARACT IOL, RT/LT  5/09    bilateral     COLONOSCOPY  2002     EXCISE LESION VULVA N/A 9/27/2019    Procedure: Wide Local  Excision Of Vulva, Colposcopy;  Surgeon: Mono Ribeiro MD;  Location: [facility] OR     REPLACE VALVE AORTIC N/A 4/25/2016    Procedure: REPLACE VALVE AORTIC;  Surgeon: Sudeep Tsai MD;  Location: [facility] OR        Physical Exam:  BP (!) 173/77 (BP Location: Right arm, Patient Position: Chair, Cuff Size: Adult Regular)   Pulse 60   Temp 97.4  F (36.3  C) (Oral)   Resp 14   Wt 55.3 kg (121 lb 14.4 oz)   SpO2 96%   BMI 23.03 kg/m      General appearance: no acute distress, well groomed, sitting comfortably   Lymph: no inguinal lymphadenopathy  :  External: evidence of prior posterior vulvar resection. Pallor of the skin posteriorly from about 3-9 o'clock. There is some slight thickening around 5 o'clock on the vulva, but here is no prominence of this area with application of acetic acid. There are no areas of worrisome AWE with application of acetic acid.  Vagina: pale, palpably normal without lesions or masses. The introitus shows no AWE  Cervix: palpably normal without masses    Vulvoscopy:  5% acetic acid was applied to the external vulva after visual exam. This was then removed and the area examined with findings as noted above. No areas of AWE concerning for pre-malignant or malignant changes. No biopsies were indicated. Patient tolerated colposcopy well.    Labs/Pathology:  n/a    Imaging review:  n/a    Assessment:  Roxanna is a 89 year old with history of THERESA III s/p posterior simple vulvectomy 9/2019 with positive posterior margin presenting for follow-up exam without evidence of dysplasia.    Plan:  - Vulvoscopy as above. No areas concerning for high grade dysplasia or malignancy.   - Return to clinic in 6 months for repeat exam.    Mono Ribeiro MD     Gynecologic Oncology

## 2020-02-19 DIAGNOSIS — I10 HYPERTENSION GOAL BP (BLOOD PRESSURE) < 140/90: ICD-10-CM

## 2020-02-19 RX ORDER — FUROSEMIDE 20 MG
TABLET ORAL
Qty: 90 TABLET | Refills: 0 | Status: SHIPPED | OUTPATIENT
Start: 2020-02-19 | End: 2020-08-13

## 2020-02-19 NOTE — TELEPHONE ENCOUNTER
"Routing refill request to provider for review/approval because:  Labs out of range:  BP, Cr    Requested Prescriptions   Pending Prescriptions Disp Refills     FUROSEMIDE 20 MG PO tablet [Pharmacy Med Name: FUROSEMIDE 20MG TABLETS] 90 tablet 0     Sig: TAKE 1 1 TABLET BY MOUTH EVERY DAY IF 2 TO 3 POUND WEIGHT GAIN OVER A 2 DAY PERIOD       Diuretics (Including Combos) Protocol Failed - 2/19/2020  7:10 AM        Failed - Blood pressure under 140/90 in past 12 months     BP Readings from Last 3 Encounters:   02/13/20 (!) 173/77   10/17/19 (!) 150/78   10/08/19 120/73                 Failed - Normal serum creatinine on file in past 12 months     Recent Labs   Lab Test 12/18/19  0828   CR 1.16*              Passed - Recent (12 mo) or future (30 days) visit within the authorizing provider's specialty     Patient has had an office visit with the authorizing provider or a provider within the authorizing providers department within the previous 12 mos or has a future within next 30 days. See \"Patient Info\" tab in inbasket, or \"Choose Columns\" in Meds & Orders section of the refill encounter.              Passed - Medication is active on med list        Passed - Patient is age 18 or older        Passed - No active pregancy on record        Passed - Normal serum potassium on file in past 12 months     Recent Labs   Lab Test 09/27/19  0950   POTASSIUM 3.9                    Passed - Normal serum sodium on file in past 12 months     Recent Labs   Lab Test 07/01/19  1256                 Passed - No positive pregnancy test in past 12 months        Roberta Preciado RN  "

## 2020-03-23 NOTE — PROGRESS NOTES
"Roxanna Stark is a 89 year old female who is being evaluated via a billable telephone visit.      The patient has been notified of following:     \"This telephone visit will be conducted via a call between you and your physician/provider. We have found that certain health care needs can be provided without the need for a physical exam.  This service lets us provide the care you need with a short phone conversation.  If a prescription is necessary we can send it directly to your pharmacy.  If lab work is needed we can place an order for that and you can then stop by our lab to have the test done at a later time.    If during the course of the call the physician/provider feels a telephone visit is not appropriate, you will not be charged for this service.\"     Roxanna Stark complains of    Chief Complaint   Patient presents with     Arthritis     RA, no flares but left hip has started hurting     I have reviewed and updated the patient's Past Medical History, Social History, Family History and Medication List.    ALLERGIES  Lisinopril    Additional provider notes:    Roxnana Stark is a 89 year old female with medical history significant for basal cell carcinoma, hypertension, aortic stenosis, right rotator cuff tear (previously evaluated by Dr. Bingham, orthopedic surgery, on 3/20/2015 where at that time Ms. Stark was not interested in surgical correction; she received an intra-articular steroid injection at that time that was effective for ~1year), temporal arteritis?, and seronegative erosive rheumatoid arthritis.      Today, she reports that she is doing well.  Tolerating medications well.  Morning stiffness for no more than 30 minutes.  No joint swelling.  happy with how well she is doing.      Denies fevers, chills, nausea, vomiting, constipation, diarrhea. No abdominal pain. No chest pain/pressure, palpitations, or shortness of breath. No oral or nasal sores. No neck pain. No rash.      Note that this is a " telephone call as there is a COVID-19 pandemic currently and therefore social distancing is recommended.  Telephone visits are for the safety of the patients and the clinic staff.       RF/CCP  Recent Labs   Lab Test 08/11/16  1124 08/04/16  1222 02/18/16  1543   CCPIGG 1  --   --    RHF  --  <20 <20     CBC  Recent Labs   Lab Test 12/18/19  0828 09/18/19  0913 06/12/19  0910   WBC 9.7 8.7 8.2   RBC 3.79* 3.42* 3.78*   HGB 10.8* 10.2* 11.3*   HCT 35.2 32.9* 35.5   MCV 93 96 94   RDW 18.5* 18.4* 20.1*    289 237   MCH 28.5 29.8 29.9   MCHC 30.7* 31.0* 31.8   NEUTROPHIL 64.7 73.6 64.7   LYMPH 23.7 16.6 24.3   MONOCYTE 7.2 6.5 6.4   EOSINOPHIL 3.9 2.6 3.7   BASOPHIL 0.5 0.7 0.9   ANEU 6.3 6.4 5.3   ALYM 2.3 1.5 2.0   IGNACIO 0.7 0.6 0.5   AEOS 0.4 0.2 0.3   ABAS 0.1 0.1 0.1     CMP  Recent Labs   Lab Test 12/18/19  0828 09/27/19  0950 09/18/19  0913 07/01/19  1256 06/12/19  0910  12/14/16  0849  07/12/16  1035   NA  --   --   --  139  --   --  140  --  138   POTASSIUM  --  3.9  --  4.2  --   --  4.5  --  4.6   CHLORIDE  --   --   --  103  --   --  105  --  102   CO2  --   --   --  30  --   --  26  --  28   ANIONGAP  --   --   --  6  --   --  9  --  8   GLC  --   --   --  94  --   --  172*  --  117*   BUN  --   --   --  22  --   --  28  --  17   CR 1.16*  --  1.07* 1.21* 1.18*   < > 1.19*   < > 1.04   GFRESTIMATED 41*  --  45* 39* 40*   < > 43*   < > 50*   GFRESTBLACK 47*  --  52* 45* 46*   < > 52*   < > 60*   ROEL  --   --   --  9.6  --   --  8.8  --  9.4   BILITOTAL 0.3  --  0.3  --  0.3   < >  --    < >  --    ALBUMIN 3.4  --  3.2*  --  3.5   < >  --    < >  --    PROTTOTAL 7.0  --  7.1  --  7.1   < >  --    < >  --    ALKPHOS 85  --  110  --  91   < >  --    < >  --    AST 24  --  40  --  38   < >  --    < >  --    ALT 24  --  38  --  44   < >  --    < >  --     < > = values in this interval not displayed.     Calcium/VitaminD  Recent Labs   Lab Test 07/01/19  1256 12/14/16  0849 07/12/16  1035   ROEL 9.6 8.8 9.4      ESR/CRP  Recent Labs   Lab Test 09/18/19  0913 06/12/19  0910 03/20/19  0846   SED 76* 41* 40*   CRP 23.2* 6.5 6.3     Hepatitis B  Recent Labs   Lab Test 11/10/16  0909   HBCAB Nonreactive   HEPBANG Nonreactive     Hepatitis C  Recent Labs   Lab Test 11/10/16  0909   HCVAB Nonreactive   Assay performance characteristics have not been established for newborns,   infants, and children       HIV Screening  Recent Labs   Lab Test 11/10/16  0909   HIAGAB Nonreactive   HIV-1 p24 Ag & HIV-1/HIV-2 Ab Not Detected         Assessment/Plan:  1. 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  She was treated with prednisone monotherapy for several years, being able to taper off without recurrence of symptoms. She then developed worsening symptoms in her hands and was diagnosed with rheumatoid arthritis. Initially, she was resistant to taking DMARD therapy.  She then was started on MTX that was effective; she reduced the dose with worsening symptoms. Currently on MTX 15mg wkly and SSZ 500mg BID (mild anemia possibly associated with SSZ so the dose was previously reduced).  RA is well controlled today.    - Continue methotrexate 20 mg once weekly   - Continue folic acid 1mg daily  - Continue sulfasalazine 500 mg twice daily   - Labs every 3 months (due now, but okay to delay a month, discussed weighing benefit of tox monitoring versus exposure): CBC, Creatinine, Hepatic Panel     2. Giant Cell Arteritis History?: 12/26/2005 Left TA biopsy negative per [facility] record review.  No symptoms of GCA at this time.      3. Right shoulder rotator cuff tear and pain: Previously evaluated by orthopedic surgery and her pain resolved for approximately one year after having a steroid injection in March 2015. She does not want to have surgical correction of her shoulder. Repeat steroid injections have been helpful; not  needed today.  Physical therapy was effective and she is doing exercises at home.      4. History of basal cell carcinoma: Following with dermatology; encouraged yearly f/u for eval     5. Bone Health: Managed by PCP already.     Phone call duration: 11 minutes and 11 seconds, starting at 8:35     Follow up: 4 mo follow-up appointment scheduled    Jamie Johnson MD  3/25/2020

## 2020-03-25 ENCOUNTER — VIRTUAL VISIT (OUTPATIENT)
Dept: RHEUMATOLOGY | Facility: CLINIC | Age: 85
End: 2020-03-25
Payer: MEDICARE

## 2020-03-25 DIAGNOSIS — Z79.899 HIGH RISK MEDICATION USE: ICD-10-CM

## 2020-03-25 DIAGNOSIS — M06.09 RHEUMATOID ARTHRITIS OF MULTIPLE SITES WITH NEGATIVE RHEUMATOID FACTOR (H): ICD-10-CM

## 2020-03-25 PROCEDURE — G2012 BRIEF CHECK IN BY MD/QHP: HCPCS | Performed by: INTERNAL MEDICINE

## 2020-03-25 RX ORDER — SULFASALAZINE 500 MG/1
500 TABLET, DELAYED RELEASE ORAL 2 TIMES DAILY
Qty: 60 TABLET | Refills: 4 | Status: SHIPPED | OUTPATIENT
Start: 2020-03-25 | End: 2020-07-31

## 2020-03-25 RX ORDER — METHOTREXATE 2.5 MG/1
20 TABLET ORAL WEEKLY
Qty: 32 TABLET | Refills: 4 | Status: SHIPPED | OUTPATIENT
Start: 2020-03-25 | End: 2020-07-31

## 2020-03-25 RX ORDER — FOLIC ACID 1 MG/1
1 TABLET ORAL DAILY
Qty: 30 TABLET | Refills: 4 | Status: SHIPPED | OUTPATIENT
Start: 2020-03-25 | End: 2020-07-31

## 2020-05-08 ENCOUNTER — VIRTUAL VISIT (OUTPATIENT)
Dept: CARDIOLOGY | Facility: CLINIC | Age: 85
End: 2020-05-08
Payer: MEDICARE

## 2020-05-08 ENCOUNTER — TELEPHONE (OUTPATIENT)
Dept: CARDIOLOGY | Facility: CLINIC | Age: 85
End: 2020-05-08

## 2020-05-08 VITALS — BODY MASS INDEX: 22.86 KG/M2 | WEIGHT: 121 LBS

## 2020-05-08 DIAGNOSIS — I50.30 HEART FAILURE WITH PRESERVED EJECTION FRACTION, BORDERLINE, CLASS III (H): ICD-10-CM

## 2020-05-08 DIAGNOSIS — Z95.2 S/P AVR: Primary | ICD-10-CM

## 2020-05-08 DIAGNOSIS — E78.2 MIXED HYPERLIPIDEMIA: ICD-10-CM

## 2020-05-08 DIAGNOSIS — I10 BENIGN ESSENTIAL HYPERTENSION: ICD-10-CM

## 2020-05-08 PROCEDURE — 99443 ZZC PHYSICIAN TELEPHONE EVALUATION 21-30 MIN: CPT | Performed by: INTERNAL MEDICINE

## 2020-05-08 NOTE — PATIENT INSTRUCTIONS
Thank you for coming to the [facility] [place]; please note the following instructions:    1. Echocardiogram ordered    2. Dr. Maynor Mary would like you to return for a cardiac follow up in 1 year  (May).  We will contact you regarding your appointment when the time draws closer or you may call [phone] option #1 to arrange an appointment.  Mean while, if you should have any questions or concerns regarding your heart health, please contact us.  Thank you for choosing [facility] for your care.        If you have any questions regarding your visit please contact your care team:     Cardiology  Telephone Number   Andressa MCGRATH, RN  Shoshana ABRAMS, RN   Linda HENDRIX, RMA  Toshia HINKLE, RMLEON QUIGLEY, LPN   [phone] (option 1)   For scheduling appts:     [phone] (select option 1)       For the Device Clinic (Pacemakers and ICD's)  RN's :  Rosa Knight   During business hours: [phone]    *After business hours:  [phone] (select option 4)      Normal test result notifications will be released via [employer] or mailed within 7 business days.  All other test results, will be communicated via telephone once reviewed by your cardiologist.    If you need a medication refill please contact your pharmacy.  Please allow 3 business days for your refill to be completed.    As always, thank you for trusting us with your health care needs!

## 2020-05-08 NOTE — TELEPHONE ENCOUNTER
Pt needs to have an essential echocardiogram (NOT LUCIANO) scheduled in the next 2-3 weeks at  or [facility] [place] [place]    This is follow up to clinic visit today with Dr Mary

## 2020-05-08 NOTE — PROGRESS NOTES
"Roxanna Stark is a 89 year old female who is being evaluated via a billable telephone visit.      The patient has been notified of following:     \"This telephone visit will be conducted via a call between you and your physician/provider. We have found that certain health care needs can be provided without the need for a physical exam.  This service lets us provide the care you need with a short phone conversation.  If a prescription is necessary we can send it directly to your pharmacy.  If lab work is needed we can place an order for that and you can then stop by our lab to have the test done at a later time.    Telephone visits are billed at different rates depending on your insurance coverage. During this emergency period, for some insurers they may be billed the same as an in-person visit.  Please reach out to your insurance provider with any questions.    If during the course of the call the physician/provider feels a telephone visit is not appropriate, you will not be charged for this service.\"    Patient has given verbal consent for Telephone visit?  Yes    What phone number would you like to be contacted at? cell    How would you like to obtain your AVS? [employer]    Phone call duration: 21 minutes    May 8, 2020  Roxanna Stark is an 89 year-old very female patient seen today in follow up, previously seen and followed by Dr. Navarro. She was initially seen for severe aortic stenosis and underwent AVR with a 21 mm tissue valve on 4/21/2016 by Dr. Tsai. Her postoperative course was uneventful.   She continues to do well, no new complaints. She is pretty sedentary at this time due to winter and also notes due to her age. However, no LE edema, no palpitations or other issues at this time    PAST MEDICAL HISTORY:  Past Medical History:   Diagnosis Date     Actinic keratosis      Aortic stenosis 2014     Basal cell cancer 7/2014    left eye medial canthus      Basal cell carcinoma 9/30/08    left cheek     CKD " (chronic kidney disease) stage 3, GFR 30-59 ml/min (H) 7/1/2019     HTN (hypertension)      Melanoma in situ (H) 9/30/08    left arm     Polymyalgia rheumatica (H) 11/99     Temporal arteritis (H) 11/99     FAMILY HISTORY:  Family History   Problem Relation Age of Onset     Breast Cancer Sister 45     Arthritis Sister      Thyroid Disease Sister      Cancer Sister         colon     Arthritis Sister      Arthritis Mother      Hypertension Father      Cancer - colorectal Father      Prostate Cancer Father      Arthritis Father      Heart Disease Father      Lipids Father      Arthritis Sister      Asthma Daughter      Asthma Daughter      Cancer Daughter 58        lung     Cancer Other 81        pancreatic      SOCIAL HISTORY:  Social History     Socioeconomic History     Marital status:      Spouse name: None     Number of children: None     Years of education: None     Highest education level: None   Occupational History     Employer: RETIRED   Social Needs     Financial resource strain: None     Food insecurity     Worry: None     Inability: None     Transportation needs     Medical: None     Non-medical: None   Tobacco Use     Smoking status: Never Smoker     Smokeless tobacco: Never Used   Substance and Sexual Activity     Alcohol use: Yes     Comment: 4 times a year     Drug use: No     Sexual activity: Never   Lifestyle     Physical activity     Days per week: None     Minutes per session: None     Stress: None   Relationships     Social connections     Talks on phone: None     Gets together: None     Attends Taoism service: None     Active member of club or organization: None     Attends meetings of clubs or organizations: None     Relationship status: None     Intimate partner violence     Fear of current or ex partner: None     Emotionally abused: None     Physically abused: None     Forced sexual activity: None   Other Topics Concern     Parent/sibling w/ CABG, MI or angioplasty before 65F 55M? No    Social History Narrative     None     CURRENT MEDICATIONS:  Current Outpatient Medications   Medication     acetaminophen (TYLENOL) 325 MG tablet     aspirin  MG EC tablet     calcium-vitamin D 500-125 MG-UNIT TABS     folic acid (FOLVITE) 1 MG tablet     FUROSEMIDE 20 MG PO tablet     gabapentin (NEURONTIN) 100 MG capsule     ICAPS PO     losartan (COZAAR) 25 MG tablet     methotrexate sodium 2.5 MG TABS     metoprolol tartrate (LOPRESSOR) 25 MG tablet     Omega-3 Fatty Acids (OMEGA-3 FISH OIL PO)     sulfaSALAzine ER (AZULFIDINE EN) 500 MG EC tablet     No current facility-administered medications for this visit.      ROS:   Constitutional: No fever, chills, or sweats. Weight is 121 lbs 0 oz  ENT: No visual disturbance, ear ache, epistaxis, sore throat.   Allergies/Immunologic: Negative.   Respiratory: No cough, hemoptysis.   Cardiovascular: As per HPI.   GI: No nausea, vomiting, hematemesis, melena, or hematochezia.   : No urinary frequency, dysuria, or hematuria.   Integrument: Negative.   Psychiatric: No evidence of major depression  Neuro: No new neurological complaints at this time. Non focal  Endocrinology: Negative.   Musculoskeletal: As per HPI.      EXAM:  Limited evaluation because of COVID. No LE edema and no abdominal bloating     Labs:  Lab Results   Component Value Date    WBC 9.7 2019    HGB 10.8 (L) 2019    HCT 35.2 2019     2019     2019    POTASSIUM 3.9 2019    CHLORIDE 103 2019    CO2 30 2019    BUN 22 2019    CR 1.16 (H) 2019    GLC 94 2019    SED 76 (H) 2019    AST 24 2019    ALT 24 2019    ALKPHOS 85 2019    BILITOTAL 0.3 2019    INR 1.41 (H) 2016     EK12.   Sinus Rhythm - occasional ectopic ventricular beat.    ECHOCARDIOGRAMS:   2016  Global and regional left ventricular function is normal with an EF of 60-65%.  There is at least moderate diastolic  dysfunction.  Global right ventricular function is normal.  Severe aortic stenosis is present. The mean gradient across the aortic valve is 61 mmHg. The peak aortic velocity is 5.1 m/sec. JAVIER 0.6 cm2.     10/13/2014   1. Normal biventricular systolic function. LVEF estimate 65%.   2. Moderate aortic stenosis (peak velocity: 3.9 m/s, mean gradient: 36 mmHg, calculated valve area: 1.2 cm2).   3. Normal IVC with preserved respiratory variability.   4. Compared to study dated 02/10/14 the aortic valve parameters have slightly worsened.     02/11/14  Global and regional left ventricular function is normal with an EF of 55-60%. Global right ventricular function is normal. Trace tricuspid insufficiency is present. Right ventricular systolic pressure is 19mmHg above the right atrial pressure. The inferior vena cava was normal in size with preserved respiratory variability. Small circumferential pericardial effusion is present without any hemodynamic significance. Chamber compression is not present; there is no evidence for tamponade.   Left Ventricle: Global and regional left ventricular function is normal with an EF of 55-60%. Left ventricular size is normal. Left ventricular Doppler filling pattern consistent with abnormal relaxation.   Right Ventricle: The right ventricle is normal size. Global right ventricular function is normal.   Atria: The right atria appears normal. Moderate left atrial enlargement is present.   Mitral Valve: Mild mitral annular calcification is present. Trace mitral insufficiency is present.   Aortic Valve: Mild aortic valve sclerosis is present. No aortic regurgitation is present.   Tricuspid Valve: The tricuspid valve is normal. Trace tricuspid insufficiency is present. Right ventricular systolic pressure is 19mmHg above the right atrial pressure.   Pulmonic Valve: The pulmonic valve is normal. Trace pulmonic insufficiency is present.   Vessels: The aorta root is normal. The inferior vena cava  was normal in size with preserved respiratory variability.   Pericardium: Small circumferential pericardial effusion is present without any hemodynamic significance. Chamber compression is not present; there is no evidence for tamponade.     05/11/12  Left ventricular function, chamber size, wall motion, and wall thickness are normal.The EF is > 65%. Paradoxic low flow aortic stenosis with moderate to severe reduction in valve area. Visually the valve appears moderately calcified with mild-moderate cusp restriction.   Left Ventricle: Left ventricular function, chamber size, wall motion, and wall thickness are normal.The EF is > 65%. Relative wall thickness is increased consistent with concentric remodeling.   Right Ventricle: Right ventricular function, chamber size, wall motion, and thickness are normal.   Atria: Both atria appear normal.   Mitral Valve: Mild to moderate mitral annular calcification is present. Systolic anterior motion without gradient.   Aortic Valve: The aortic valve is tricuspid. Mild aortic valve sclerosis is present.   Tricuspid Valve: The tricuspid valve is normal. Trace tricuspid insufficiency is present.   Pulmonic Valve: The pulmonic valve cannot be assessed.   Vessels: The aorta root is normal. The pulmonary artery is normal. The inferior vena cava is normal.   Pericardium: No pericardial effusion is present.     TTE  08/04/2017  Global and regional left ventricular function is normal with an EF of 60-65%.  Global right ventricular function is normal.  S/p AVR 21mm tissue valve. The mean gradient is 11 mmHg. The effective orifice  area is 1.4 cm^2. EOAI is 0.9 cm/m2, DVI is 0.55 (all normal.)  Mild mitral stenosis is present. The etiology of the mitral stenosis is mitral  annular calcification. Mean gradient is 4mmHg  IVC is normal in size with preserved respiratory variability. PAP is normal.  No pericardial effusion is present.  This study was compared with the study from 2/25/2016. The  patient is now s/p AVR for severe aortic stenosis.     ASSESSMENT AND PLAN:   1. Severe AS. S/p AVR with a #21 tissue prosthesis. Excellent post-surgical results.  2. Hypertension: not at target today, she does have lower numbers at home. Transitioned to losartan due to cough with lisinopril and tolerates this well. Will have her recheck her BP and if remains elevated would increase losartan or consider adding amlodipine. She was hesitant to any changes today  3. RA.  4. Plan:   - continue to monitor BP as much as possible  - TTE as soon as possible to reevaluate valve  - RTC in 12 months.     I appreciate the opportunity to participate in the care of Roxanna Stark . Please do not hesitate to contact me with any further questions.     Sincerely,    Maynor Mary MD     [facility] Division of Cardiology

## 2020-05-18 ENCOUNTER — ANCILLARY PROCEDURE (OUTPATIENT)
Dept: CARDIOLOGY | Facility: CLINIC | Age: 85
End: 2020-05-18
Attending: INTERNAL MEDICINE
Payer: MEDICARE

## 2020-05-18 DIAGNOSIS — E78.2 MIXED HYPERLIPIDEMIA: ICD-10-CM

## 2020-05-18 DIAGNOSIS — Z95.2 S/P AVR: ICD-10-CM

## 2020-05-18 DIAGNOSIS — I10 BENIGN ESSENTIAL HYPERTENSION: ICD-10-CM

## 2020-05-18 DIAGNOSIS — I50.30 HEART FAILURE WITH PRESERVED EJECTION FRACTION, BORDERLINE, CLASS III (H): ICD-10-CM

## 2020-05-18 PROCEDURE — 93306 TTE W/DOPPLER COMPLETE: CPT | Performed by: INTERNAL MEDICINE

## 2020-06-08 ENCOUNTER — TELEPHONE (OUTPATIENT)
Dept: FAMILY MEDICINE | Facility: CLINIC | Age: 85
End: 2020-06-08

## 2020-06-08 DIAGNOSIS — M79.676 PAIN OF TOE, UNSPECIFIED LATERALITY: Primary | ICD-10-CM

## 2020-06-08 NOTE — TELEPHONE ENCOUNTER
Reason for Call:  Other Referral    Detailed comments: Patient's daughter calling, she is looking to get referral for the patient to see a podiatrist. States the right is all swollen and it's bleeding. Please call back.    Phone Number Patient can be reached at: Other phone number:  [phone]    Best Time: any    Can we leave a detailed message on this number? YES    Call taken on 6/8/2020 at 5:55 PM by Mert Yates

## 2020-06-09 NOTE — TELEPHONE ENCOUNTER
Called and spoke with patient's daughter. Daughter reports big toe on left foot is swollen, red, and tight. Symptoms started 7 months ago and have been getting progressively worse. Daughter is wondering if Dr. Gaines can place a podiatry referral for patient to be evaluated. Forwarded to Dr. Gaines's team.    Ken Soriano RN....6/9/2020 10:53 AM

## 2020-06-10 NOTE — TELEPHONE ENCOUNTER
Reason for Call:  Other Referral    Detailed comments: Patient's daughter calling, she would like to clarify if the referral was purposely made to see an orthopedic specialist instead of a podiatrist. Please call back.    Phone Number Patient can be reached at: Other phone number:  [phone]    Best Time: any    Can we leave a detailed message on this number? YES    Call taken on 6/10/2020 at 5:10 PM by Mert Yates

## 2020-06-11 NOTE — TELEPHONE ENCOUNTER
Spoke to patients daughter and informed her of providers message.   Beatris LOWRY CMA ([facility])

## 2020-06-17 ENCOUNTER — TELEPHONE (OUTPATIENT)
Dept: FAMILY MEDICINE | Facility: CLINIC | Age: 85
End: 2020-06-17

## 2020-06-17 NOTE — TELEPHONE ENCOUNTER
Pt is scheduled with Dr. Musa on 06/24. Should call ortho scheduling line: [phone]. Please notify.

## 2020-06-17 NOTE — TELEPHONE ENCOUNTER
Reason for call:  Other   Patient called regarding (reason for call): call back  Additional comments: Patients daughter is calling to see if she can reschedule her appointment on June 24th. Please call daughter back to discuss.    Phone number to reach patient:  Other phone number:  [phone]    Best Time:  any    Can we leave a detailed message on this number?  YES    Travel screening: Negative

## 2020-06-18 NOTE — TELEPHONE ENCOUNTER
Called patients daughter and left detailed message giving her ortho scheduling line number. Okay to speak to anyone on red team if she has further questions.  Beatris LOWRY CMA ([facility])

## 2020-06-26 ENCOUNTER — OFFICE VISIT (OUTPATIENT)
Dept: PODIATRY | Facility: CLINIC | Age: 85
End: 2020-06-26
Payer: MEDICARE

## 2020-06-26 ENCOUNTER — ANCILLARY PROCEDURE (OUTPATIENT)
Dept: ULTRASOUND IMAGING | Facility: CLINIC | Age: 85
End: 2020-06-26
Attending: PODIATRIST
Payer: MEDICARE

## 2020-06-26 VITALS
SYSTOLIC BLOOD PRESSURE: 132 MMHG | WEIGHT: 120 LBS | HEART RATE: 90 BPM | BODY MASS INDEX: 22.67 KG/M2 | DIASTOLIC BLOOD PRESSURE: 66 MMHG

## 2020-06-26 DIAGNOSIS — I73.9 PERIPHERAL ARTERIAL DISEASE (H): Primary | ICD-10-CM

## 2020-06-26 DIAGNOSIS — L97.519 ULCER OF RIGHT FOOT, UNSPECIFIED ULCER STAGE (H): ICD-10-CM

## 2020-06-26 DIAGNOSIS — L97.529 ULCER OF LEFT FOOT, UNSPECIFIED ULCER STAGE (H): ICD-10-CM

## 2020-06-26 DIAGNOSIS — I73.9 PERIPHERAL ARTERIAL DISEASE (H): ICD-10-CM

## 2020-06-26 DIAGNOSIS — N18.30 CKD (CHRONIC KIDNEY DISEASE) STAGE 3, GFR 30-59 ML/MIN (H): ICD-10-CM

## 2020-06-26 DIAGNOSIS — M06.09 RHEUMATOID ARTHRITIS OF MULTIPLE SITES WITH NEGATIVE RHEUMATOID FACTOR (H): ICD-10-CM

## 2020-06-26 PROCEDURE — 11730 AVULSION NAIL PLATE SIMPLE 1: CPT | Mod: T5 | Performed by: PODIATRIST

## 2020-06-26 PROCEDURE — 93925 LOWER EXTREMITY STUDY: CPT | Performed by: RADIOLOGY

## 2020-06-26 PROCEDURE — 99204 OFFICE O/P NEW MOD 45 MIN: CPT | Mod: 25 | Performed by: PODIATRIST

## 2020-06-26 PROCEDURE — 93922 UPR/L XTREMITY ART 2 LEVELS: CPT | Performed by: RADIOLOGY

## 2020-06-26 NOTE — PROGRESS NOTES
Subjective:    Pt is seen today in consult from Dr. Gaines  for a foot exam.  Has CKD stage 3 and rheumatoid arthritis. Has a wound on her left hallux nail bed.  She has had this for over 6 months.  Is somewhat painful.  Patient on her left foot just a few days ago she developed a new wound on her left third interspace.  She has some discomfort with this.  She also has a loose nail on her right hallux.  It is somewhat moist here.  She denies any erythema or edema around any of these areas.  She denies any purulence or odor.  She denies any fever or chills.  Patient's father had heart disease.  She denies ever smoking.  She is retired.    ROS:  A 10-point review of systems was performed and is positive for that noted in the HPI and as seen above.  All other areas are negative.          Allergies   Allergen Reactions     Lisinopril Cough       Current Outpatient Medications   Medication Sig Dispense Refill     acetaminophen (TYLENOL) 325 MG tablet Take 2 tablets (650 mg) by mouth every 6 hours as needed for mild pain 50 tablet 0     aspirin  MG EC tablet Take 1 tablet (325 mg) by mouth daily 90 tablet 3     calcium-vitamin D 500-125 MG-UNIT TABS        folic acid (FOLVITE) 1 MG tablet Take 1 tablet (1 mg) by mouth daily 30 tablet 4     FUROSEMIDE 20 MG PO tablet TAKE 1 1 TABLET BY MOUTH EVERY DAY IF 2 TO 3 POUND WEIGHT GAIN OVER A 2 DAY PERIOD 90 tablet 0     gabapentin (NEURONTIN) 100 MG capsule TAKE 1 CAPSULE BY MOUTH THREE TIMES DAILY 90 capsule 11     ICAPS PO 2 tablets daily       losartan (COZAAR) 25 MG tablet TAKE 1 TABLET BY MOUTH EVERY DAY (Patient taking differently: 25 mg every morning ) 90 tablet 3     methotrexate sodium 2.5 MG TABS Take 8 tablets (20 mg) by mouth once a week . Take all 8 tablets on the same day of each week.  Do not take with amoxicillin. 32 tablet 4     metoprolol tartrate (LOPRESSOR) 25 MG tablet TAKE 1 TABLET(25 MG) BY MOUTH TWICE DAILY 180 tablet 3     Omega-3 Fatty Acids  (OMEGA-3 FISH OIL PO) Take 1 tablet twice daily.       sulfaSALAzine ER (AZULFIDINE EN) 500 MG EC tablet Take 1 tablet (500 mg) by mouth 2 times daily 60 tablet 4       Patient Active Problem List   Diagnosis     Polymyalgia rheumatica (H)     History of basal cell carcinoma     CARDIOVASCULAR SCREENING; LDL GOAL LESS THAN 130     Hypertension goal BP (blood pressure) < 140/90     Hip pain     Left atrial enlargement     Aortic stenosis     Status post coronary angiogram     Aortic valve stenosis     Aortic valve replaced     Rheumatoid arthritis of multiple sites with negative rheumatoid factor (H)     CKD (chronic kidney disease) stage 3, GFR 30-59 ml/min (H)     THERESA III (vulvar intraepithelial neoplasia III)       Past Medical History:   Diagnosis Date     Actinic keratosis      Aortic stenosis 2014     Basal cell cancer 7/2014    left eye medial canthus      Basal cell carcinoma 9/30/08    left cheek     CKD (chronic kidney disease) stage 3, GFR 30-59 ml/min (H) 7/1/2019     HTN (hypertension)      Melanoma in situ (H) 9/30/08    left arm     Polymyalgia rheumatica (H) 11/99     Temporal arteritis (H) 11/99       Past Surgical History:   Procedure Laterality Date     C SKIN TISSUE PROCEDURE UNLISTED  11/3/08    mmis skin cancer excision     CATARACT IOL, RT/LT  5/09    bilateral     COLONOSCOPY  2002     EXCISE LESION VULVA N/A 9/27/2019    Procedure: Wide Local Excision Of Vulva, Colposcopy;  Surgeon: Mono Ribeiro MD;  Location: [facility] OR     REPLACE VALVE AORTIC N/A 4/25/2016    Procedure: REPLACE VALVE AORTIC;  Surgeon: Sudeep Tsai MD;  Location: [facility] OR       Family History   Problem Relation Age of Onset     Breast Cancer Sister 45     Arthritis Sister      Thyroid Disease Sister      Cancer Sister         colon     Arthritis Sister      Arthritis Mother      Hypertension Father      Cancer - colorectal Father      Prostate Cancer Father      Arthritis Father      Heart Disease Father      Lipids  Father      Arthritis Sister      Asthma Daughter      Asthma Daughter      Cancer Daughter 58        lung     Cancer Other 81        pancreatic        Social History     Tobacco Use     Smoking status: Never Smoker     Smokeless tobacco: Never Used   Substance Use Topics     Alcohol use: Yes     Comment: 4 times a year         Exam:    Vitals: /66   Pulse 90   Wt 54.4 kg (120 lb)   BMI 22.67 kg/m    BMI: Body mass index is 22.67 kg/m .  Height: Data Unavailable    Constitutional/ general:  Pt is in no apparent distress, appears well-nourished.  Cooperative with history and physical exam.  Patient seen with her daughter today    Psych:  The patient answered questions appropriately.  Normal affect.  Seems to have reasonable expectations, in terms of treatment.     Eyes:  Visual scanning/ tracking without deficit.     Ears:  Response to auditory stimuli is normal.  negative hearing aid devices.  Auricles in proper alignment.     Lymphatic:  Popliteal lymph nodes not enlarged.     Lungs:  Non labored breathing, non labored speech. No cough.  No audible wheezing. Even, quiet breathing.       Vascular:  negative PT arteries.  DP 0/4.  CFT < 3 sec.   Ankle edema and varicosities noted.  No pedal hair growth.    Neuro:  Alert and oriented x 3. Coordinated gait.  Light touch sensation is intact to the L4, L5, S1 distributions. No obvious deficits.  No evidence of neurological-based weakness, spasticity, or contracture in the lower extremities.  Monofilament intact on all digits    Derm: skin thin, shiny, atrophic.  No erythema, ecchymosis, or cyanosis.      Musculoskeletal:    Lower extremity muscle strength is normal.  Patient is ambulatory without an assistive device or brace.  No gross deformities.  Normal arch.   MS 5/5 all compartments.  Normal ROM all fore foot and rearfoot joints.  All nails are thickened, elongated, discolored with subungual debris.  The left hallux nail is growing.  There is a wound in the  nailbed that measures 11 x 8 mm.  It is full-thickness.  It has a base of 50% fibrotic tissue.  There is no surrounding erythema or edema.  There is no purulence odor or sinus tracts.  On the left foot she also has superficial adjacent wounds in the dorsal proximal sulcus.  These are partial-thickness.  There is no sinus tracts purulence or odor.  The right hallux nail is quite loose.  After avulsing this there is an underlying nailbed ulceration.  It measures 6 x 5 mm.  It is superficial.  There is no sinus tracts purulence or odor.      A/P  Peripheral arterial disease  Left foot ulcers x2  Right hallux loose nail with underlying nailbed ulceration  Rheumatoid arthritis  Chronic kidney disease stage III.    Discussed avulsion of the right hallux nail is I am suspicious there is pathology underneath.  They are in agreement.  They gave verbal consent.  No block was needed.  We prepped this in the normal sterile manner.  All soft tissue was moved with the right hallux nail resected this in toto.  Underlying this was a 5 x 6 mm partial-thickness ulceration.  We dressed this with antibiotic ointment and a Band-Aid.  The patient on the procedure well.  We dressed the left hallux wound with antibiotic ointment and a Band-Aid.  She will continue these dressings.  We gave her a silicone pad to spread her fourth and third toes to offload the interspace ulceration.  She will not wear tight shoes.  We explained our shoes today are too tight for her toes.  We discussed that I cannot palpate her pulses and his wounds will not heal without adequate circulation.  We are going to order an arterial ultrasound.  We will call her once we have the results.  Thanks allowing me to participate in the care of this patient.        Pawel Musa, RUBINA CESPEDES, FACFAS

## 2020-06-26 NOTE — LETTER
6/26/2020         RE: Roxanna Stark  [address]  [place] [address]        Dear Colleague,    Thank you for referring your patient, Roxanna Stark, to the [facility]. Please see a copy of my visit note below.    Subjective:    Pt is seen today in consult from Dr. Gaines  for a foot exam.  Has CKD stage 3 and rheumatoid arthritis. Has a wound on her left hallux nail bed.  She has had this for over 6 months.  Is somewhat painful.  Patient on her left foot just a few days ago she developed a new wound on her left third interspace.  She has some discomfort with this.  She also has a loose nail on her right hallux.  It is somewhat moist here.  She denies any erythema or edema around any of these areas.  She denies any purulence or odor.  She denies any fever or chills.  Patient's father had heart disease.  She denies ever smoking.  She is retired.    ROS:  A 10-point review of systems was performed and is positive for that noted in the HPI and as seen above.  All other areas are negative.          Allergies   Allergen Reactions     Lisinopril Cough       Current Outpatient Medications   Medication Sig Dispense Refill     acetaminophen (TYLENOL) 325 MG tablet Take 2 tablets (650 mg) by mouth every 6 hours as needed for mild pain 50 tablet 0     aspirin  MG EC tablet Take 1 tablet (325 mg) by mouth daily 90 tablet 3     calcium-vitamin D 500-125 MG-UNIT TABS        folic acid (FOLVITE) 1 MG tablet Take 1 tablet (1 mg) by mouth daily 30 tablet 4     FUROSEMIDE 20 MG PO tablet TAKE 1 1 TABLET BY MOUTH EVERY DAY IF 2 TO 3 POUND WEIGHT GAIN OVER A 2 DAY PERIOD 90 tablet 0     gabapentin (NEURONTIN) 100 MG capsule TAKE 1 CAPSULE BY MOUTH THREE TIMES DAILY 90 capsule 11     ICAPS PO 2 tablets daily       losartan (COZAAR) 25 MG tablet TAKE 1 TABLET BY MOUTH EVERY DAY (Patient taking differently: 25 mg every morning ) 90 tablet 3     methotrexate sodium 2.5 MG TABS Take 8 tablets (20 mg)  by mouth once a week . Take all 8 tablets on the same day of each week.  Do not take with amoxicillin. 32 tablet 4     metoprolol tartrate (LOPRESSOR) 25 MG tablet TAKE 1 TABLET(25 MG) BY MOUTH TWICE DAILY 180 tablet 3     Omega-3 Fatty Acids (OMEGA-3 FISH OIL PO) Take 1 tablet twice daily.       sulfaSALAzine ER (AZULFIDINE EN) 500 MG EC tablet Take 1 tablet (500 mg) by mouth 2 times daily 60 tablet 4       Patient Active Problem List   Diagnosis     Polymyalgia rheumatica (H)     History of basal cell carcinoma     CARDIOVASCULAR SCREENING; LDL GOAL LESS THAN 130     Hypertension goal BP (blood pressure) < 140/90     Hip pain     Left atrial enlargement     Aortic stenosis     Status post coronary angiogram     Aortic valve stenosis     Aortic valve replaced     Rheumatoid arthritis of multiple sites with negative rheumatoid factor (H)     CKD (chronic kidney disease) stage 3, GFR 30-59 ml/min (H)     THERESA III (vulvar intraepithelial neoplasia III)       Past Medical History:   Diagnosis Date     Actinic keratosis      Aortic stenosis 2014     Basal cell cancer 7/2014    left eye medial canthus      Basal cell carcinoma 9/30/08    left cheek     CKD (chronic kidney disease) stage 3, GFR 30-59 ml/min (H) 7/1/2019     HTN (hypertension)      Melanoma in situ (H) 9/30/08    left arm     Polymyalgia rheumatica (H) 11/99     Temporal arteritis (H) 11/99       Past Surgical History:   Procedure Laterality Date     C SKIN TISSUE PROCEDURE UNLISTED  11/3/08    mmis skin cancer excision     CATARACT IOL, RT/LT  5/09    bilateral     COLONOSCOPY  2002     EXCISE LESION VULVA N/A 9/27/2019    Procedure: Wide Local Excision Of Vulva, Colposcopy;  Surgeon: Mono Ribeiro MD;  Location: [facility] OR     REPLACE VALVE AORTIC N/A 4/25/2016    Procedure: REPLACE VALVE AORTIC;  Surgeon: Sudeep Tsai MD;  Location: [facility] OR       Family History   Problem Relation Age of Onset     Breast Cancer Sister 45     Arthritis Sister       Thyroid Disease Sister      Cancer Sister         colon     Arthritis Sister      Arthritis Mother      Hypertension Father      Cancer - colorectal Father      Prostate Cancer Father      Arthritis Father      Heart Disease Father      Lipids Father      Arthritis Sister      Asthma Daughter      Asthma Daughter      Cancer Daughter 58        lung     Cancer Other 81        pancreatic        Social History     Tobacco Use     Smoking status: Never Smoker     Smokeless tobacco: Never Used   Substance Use Topics     Alcohol use: Yes     Comment: 4 times a year         Exam:    Vitals: /66   Pulse 90   Wt 54.4 kg (120 lb)   BMI 22.67 kg/m    BMI: Body mass index is 22.67 kg/m .  Height: Data Unavailable    Constitutional/ general:  Pt is in no apparent distress, appears well-nourished.  Cooperative with history and physical exam.  Patient seen with her daughter today    Psych:  The patient answered questions appropriately.  Normal affect.  Seems to have reasonable expectations, in terms of treatment.     Eyes:  Visual scanning/ tracking without deficit.     Ears:  Response to auditory stimuli is normal.  negative hearing aid devices.  Auricles in proper alignment.     Lymphatic:  Popliteal lymph nodes not enlarged.     Lungs:  Non labored breathing, non labored speech. No cough.  No audible wheezing. Even, quiet breathing.       Vascular:  negative PT arteries.  DP 0/4.  CFT < 3 sec.   Ankle edema and varicosities noted.  No pedal hair growth.    Neuro:  Alert and oriented x 3. Coordinated gait.  Light touch sensation is intact to the L4, L5, S1 distributions. No obvious deficits.  No evidence of neurological-based weakness, spasticity, or contracture in the lower extremities.  Monofilament intact on all digits    Derm: skin thin, shiny, atrophic.  No erythema, ecchymosis, or cyanosis.      Musculoskeletal:    Lower extremity muscle strength is normal.  Patient is ambulatory without an assistive device or  brace.  No gross deformities.  Normal arch.   MS 5/5 all compartments.  Normal ROM all fore foot and rearfoot joints.  All nails are thickened, elongated, discolored with subungual debris.  The left hallux nail is growing.  There is a wound in the nailbed that measures 11 x 8 mm.  It is full-thickness.  It has a base of 50% fibrotic tissue.  There is no surrounding erythema or edema.  There is no purulence odor or sinus tracts.  On the left foot she also has superficial adjacent wounds in the dorsal proximal sulcus.  These are partial-thickness.  There is no sinus tracts purulence or odor.  The right hallux nail is quite loose.  After avulsing this there is an underlying nailbed ulceration.  It measures 6 x 5 mm.  It is superficial.  There is no sinus tracts purulence or odor.      A/P  Peripheral arterial disease  Left foot ulcers x2  Right hallux loose nail with underlying nailbed ulceration  Rheumatoid arthritis  Chronic kidney disease stage III.    Discussed avulsion of the right hallux nail is I am suspicious there is pathology underneath.  They are in agreement.  They gave verbal consent.  No block was needed.  We prepped this in the normal sterile manner.  All soft tissue was moved with the right hallux nail resected this in toto.  Underlying this was a 5 x 6 mm partial-thickness ulceration.  We dressed this with antibiotic ointment and a Band-Aid.  The patient on the procedure well.  We dressed the left hallux wound with antibiotic ointment and a Band-Aid.  She will continue these dressings.  We gave her a silicone pad to spread her fourth and third toes to offload the interspace ulceration.  She will not wear tight shoes.  We explained our shoes today are too tight for her toes.  We discussed that I cannot palpate her pulses and his wounds will not heal without adequate circulation.  We are going to order an arterial ultrasound.  We will call her once we have the results.  Thanks allowing me to participate  in the care of this patient.        Pawel Musa DPM DPM, FACFAS             Again, thank you for allowing me to participate in the care of your patient.        Sincerely,        Pawel Musa DPM

## 2020-06-26 NOTE — PATIENT INSTRUCTIONS
We wish you continued good healing. If you have any questions or concerns, please do not hesitate to contact us at [phone]    Please remember to call and schedule a follow up appointment if one was recommended at your earliest convenience.   PODIATRY CLINIC HOURS  TELEPHONE NUMBER    Dr. Pawel Musa D.P.[facility]    Clinics:  [place]    Tiffani Chavez [facility]   Tuesday 1PM-6PM  [place]  Wednesday 7AM-2PM  [facility]  Thursday 10AM-6PM  [place]  Friday 7AM-3PM  [place]  Specialty schedulers:   [phone] to make an appointment with any Specialty Provider.        Urgent Care locations:    [facility] Monday-Friday 5 pm - 9 pm. Saturday-Sunday 9 am -5pm    Monday-Friday 11 am - 9 pm Saturday 9 am - 5 pm     Monday-Sunday 12 noon-8PM [phone]     If you need a medication refill, please contact us you may need lab work and/or a follow up visit prior to your refill (i.e. Antifungal medications).    [employer] (secure e-mail communication and access to your chart) to send a message or to make an appointment.    If MRI needed please call [place] at [phone]

## 2020-06-28 DIAGNOSIS — I10 HYPERTENSION GOAL BP (BLOOD PRESSURE) < 140/90: ICD-10-CM

## 2020-06-29 ENCOUNTER — TELEPHONE (OUTPATIENT)
Dept: RHEUMATOLOGY | Facility: CLINIC | Age: 85
End: 2020-06-29

## 2020-06-29 NOTE — TELEPHONE ENCOUNTER
Routing refill request to provider for review/approval because:  Labs out of range:    Creatinine   Date Value Ref Range Status   12/18/2019 1.16 (H) 0.52 - 1.04 mg/dL Final

## 2020-06-29 NOTE — TELEPHONE ENCOUNTER
Returned call to [facility] in [place] and was told that they do not have SSZ ER in stock however they do have regular SSZ. RN provided verbal to switch to regular per Dr. Johnson.    Ken Soriano RN....6/29/2020 3:08 PM

## 2020-06-29 NOTE — TELEPHONE ENCOUNTER
Reason for call:  Other   Patient called regarding (reason for call): prescription  Additional comments: Pharmacy calling stating that they don't have the Suflasalazine, wondering if something else can be sent. Please call to advise.     Phone number to reach patient:  Other phone number:  [phone]    Best Time:  Any     Can we leave a detailed message on this number?  YES    Travel screening: Not Applicable

## 2020-06-30 RX ORDER — LOSARTAN POTASSIUM 25 MG/1
TABLET ORAL
Qty: 90 TABLET | Refills: 3 | Status: SHIPPED | OUTPATIENT
Start: 2020-06-30 | End: 2021-06-07

## 2020-07-01 ENCOUNTER — TELEPHONE (OUTPATIENT)
Dept: OTHER | Facility: CLINIC | Age: 85
End: 2020-07-01

## 2020-07-01 DIAGNOSIS — I73.9 PERIPHERAL ARTERIAL DISEASE (H): Primary | ICD-10-CM

## 2020-07-01 DIAGNOSIS — L97.519 ULCER OF RIGHT FOOT, UNSPECIFIED ULCER STAGE (H): ICD-10-CM

## 2020-07-01 DIAGNOSIS — L97.529 ULCER OF LEFT FOOT, UNSPECIFIED ULCER STAGE (H): ICD-10-CM

## 2020-07-01 NOTE — TELEPHONE ENCOUNTER
Pt referred to [facility] by Pawel Musa DPM for PAD and bilateral foot ulcers.     Patient has FAVIOLA and bilateral arterial US in [facility] on 6/26/20.     Pt needs to be scheduled for in-person consult with vascular surgery.  Will route to scheduling to coordinate an appointment within next week.    Radha GARCIA, RN    [facility]  Office: [phone]  Fax: [phone]

## 2020-07-01 NOTE — TELEPHONE ENCOUNTER
July 1, 2020    Called patient regarding provider referral and scheduling a new patient consult appointment with Vascular Surgery.     Patient requested that [facility] scheduling call her back tomorrow (7/2/2020).     Tawnya Ang    [facility]  Office: [phone]  Fax [phone]

## 2020-07-08 NOTE — TELEPHONE ENCOUNTER
July 8, 2020    Patient is scheduled for New Patient Consult appointment on 7/21/2020 with Dr. Seth at [facility].     Patient was offered sooner appointments but declined due to location.     Tawnya Ang    [facility]  Office: [phone]  Fax [phone]

## 2020-07-10 ENCOUNTER — TELEPHONE (OUTPATIENT)
Dept: PODIATRY | Facility: CLINIC | Age: 85
End: 2020-07-10

## 2020-07-10 NOTE — TELEPHONE ENCOUNTER
Returned call to daughter. She wants to make sure her mom's appts are in the correct order if there is a correct order. I let her know I will consult Dr Musa to see if he has a preference for this. I let her know I will call her back but it may be Tuesday before I hear back. She verbalized understanding.    Mercedes Wells RN Specialty Triage 7/10/2020 10:06 AM

## 2020-07-10 NOTE — TELEPHONE ENCOUNTER
Reason for call:  Other   Patient called regarding (reason for call): call back  Additional comments:  Daughter calling. Roxanna has an appt to see you on 7-. She is to see the vascular dept also. She is scheduled on 7-.  Should she see vascular before you? Please call and let know.     Phone number to reach patient:  Cell number on file:    Telephone Information:   Mobile [phone]       Best Time:  Any     Can we leave a detailed message on this number?  YES    Travel screening: Not Applicable

## 2020-07-12 ENCOUNTER — NURSE TRIAGE (OUTPATIENT)
Dept: NURSING | Facility: CLINIC | Age: 85
End: 2020-07-12

## 2020-07-12 DIAGNOSIS — Z29.89 SBE (SUBACUTE BACTERIAL ENDOCARDITIS) PROPHYLAXIS CANDIDATE: ICD-10-CM

## 2020-07-12 NOTE — TELEPHONE ENCOUNTER
"Clinic Action Needed: Yes, please contact pt on her home number.  Pt has a dental appointment on Wednesday 07/15, requesting an antibiotic to take prior to appt.      Routed to: RN pool    Pharmacy:  [facility] [place]        Tameka Hugo RN/RUPESH    Reason for Disposition    Caller requesting a NON-URGENT new prescription or refill and triager unable to refill per unit policy    Additional Information    Negative: MORE THAN A DOUBLE DOSE of a prescription or over-the-counter (OTC) drug    Negative: [1] DOUBLE DOSE (an extra dose or lesser amount) of over-the-counter (OTC) drug AND [2] any symptoms (e.g., dizziness, nausea, pain, sleepiness)    Negative: [1] DOUBLE DOSE (an extra dose or lesser amount) of prescription drug AND [2] any symptoms (e.g., dizziness, nausea, pain, sleepiness)    Negative: Took another person's prescription drug    Negative: [1] DOUBLE DOSE (an extra dose or lesser amount) of prescription drug AND [2] NO symptoms (Exception: a double dose of antibiotics)    Negative: Diabetes drug error or overdose (e.g., insulin or extra dose)    Negative: [1] Request for URGENT new prescription or refill of \"essential\" medication (i.e., likelihood of harm to patient if not taken) AND [2] triager unable to fill per unit policy    Negative: [1] Prescription not at pharmacy AND [2] was prescribed today by PCP    Negative: Pharmacy calling with prescription questions and triager unable to answer question    Negative: Caller has urgent medication question about med that PCP prescribed and triager unable to answer question    Protocols used: MEDICATION QUESTION CALL-A-AH      "

## 2020-07-13 RX ORDER — AMOXICILLIN 500 MG/1
CAPSULE ORAL
Qty: 4 CAPSULE | Refills: 0 | Status: SHIPPED | OUTPATIENT
Start: 2020-07-13 | End: 2021-05-24

## 2020-07-13 RX ORDER — AMOXICILLIN 500 MG/1
CAPSULE ORAL
Qty: 4 CAPSULE | Refills: 0 | Status: SHIPPED | OUTPATIENT
Start: 2020-07-13 | End: 2020-07-24

## 2020-07-13 NOTE — TELEPHONE ENCOUNTER
AMOXICILLIN  500 MG CAPS      Last Written Prescription Date:  1-6-2020  Last Fill Quantity: 0,   # refills: 0  Last Office Visit: 7/1/2019  Future Office visit:    Next 5 appointments (look out 90 days)    Jul 16, 2020  9:00 AM CDT  Return Visit with Pawel Musa DPM  [facility] ([place] [id number] [facility] [address]  [phone]   Jul 29, 2020 10:40 AM CDT  Return Visit with Jamie Johnson MD  [facility] ([address] [address]  [phone]           Routing refill request to provider for review/approval because:  Drug not active on patient's medication list

## 2020-07-13 NOTE — TELEPHONE ENCOUNTER
"Requesting abx prior to dental appointment.   See [employer] message also for same request    Requested Prescriptions   Pending Prescriptions Disp Refills     amoxicillin (AMOXIL) 500 MG capsule [Pharmacy Med Name: AMOXICILLIN 500MG CAPSULES] 4 capsule 0     Sig: TAKE 4 CAPSULES BY MOUTH 1 HOUR BEFORE DENTAL APPOINTMENT       Oral Acne/Rosacea Medications Protocol Failed - 7/13/2020  7:28 AM        Failed - Recent (12 mo) or future (30 days) visit within the authorizing provider's specialty     Patient has had an office visit with the authorizing provider or a provider within the authorizing providers department within the previous 12 mos or has a future within next 30 days. See \"Patient Info\" tab in inbasket, or \"Choose Columns\" in Meds & Orders section of the refill encounter.              Failed - Confirmation of diagnosis is required     Please confirm diagnosis is acne or rosacea.     If NOT acne or rosacea; refer request to provider for further evaluation.    If diagnosis IS acne or rosacea, OK to refill BASED ON PREVIOUS REFILL CLINICAL NOTE RECOMMENDATION.          Failed - Medication is active on med list        Passed - Patient is 12 years of age or older        Passed - No active pregnancy on record        Passed - No positive prenancy test is past 12 months           Roberta Preciado RN  "

## 2020-07-13 NOTE — TELEPHONE ENCOUNTER
Please contact pt on her home number.  Pt has a dental appointment on Wednesday 07/15, requesting an antibiotic to take prior to appt    Roberta Preciado RN

## 2020-07-14 NOTE — TELEPHONE ENCOUNTER
Called patient and daughter Anna and advised that Dr. Musa would like patient to be evaluated by vascular surgery prior to seeing him. Anna verbalized understanding. Patient is seeing vascular surgeon on 7/21/20. Rescheduled patient's apt with Dr. Musa to 7/24/20.    Additionally patient is overdue for methotrexate toxicity labs for Dr. Johnson. Scheduled patient a lab apt. Also advised that visit with Dr. Johnson needs to be converted to a phone or video visit as Dr. Johnson is not doing clinic visits due to COVID-19 pandemic. Anna verbalized understanding and apt was converted to a phone visit.    Ken Soriano RN....7/14/2020 2:17 PM

## 2020-07-21 ENCOUNTER — OFFICE VISIT (OUTPATIENT)
Dept: VASCULAR SURGERY | Facility: CLINIC | Age: 85
End: 2020-07-21
Payer: MEDICARE

## 2020-07-21 VITALS
HEART RATE: 67 BPM | WEIGHT: 120 LBS | OXYGEN SATURATION: 96 % | SYSTOLIC BLOOD PRESSURE: 171 MMHG | BODY MASS INDEX: 23.56 KG/M2 | HEIGHT: 60 IN | DIASTOLIC BLOOD PRESSURE: 73 MMHG

## 2020-07-21 DIAGNOSIS — L97.529 ULCER OF LEFT FOOT, UNSPECIFIED ULCER STAGE (H): ICD-10-CM

## 2020-07-21 DIAGNOSIS — L97.519 ULCER OF RIGHT FOOT, UNSPECIFIED ULCER STAGE (H): ICD-10-CM

## 2020-07-21 DIAGNOSIS — I73.9 PERIPHERAL ARTERIAL DISEASE (H): ICD-10-CM

## 2020-07-21 PROCEDURE — 99205 OFFICE O/P NEW HI 60 MIN: CPT | Performed by: SURGERY

## 2020-07-21 ASSESSMENT — MIFFLIN-ST. JEOR: SCORE: 862.88

## 2020-07-21 NOTE — PROGRESS NOTES
[facility]     Roxanna Stark MRN# [id number]   YOB: 1927 Age: 89 year old        Reason for Clinic Visit: Left foot pain and wounds              History of Present Illness:   Roxanna Stark is a 89 year old female who presents for evaluation of foot pain and left foot ulcers.      She says her toenails started gradually changing about one year ago and she was using topical agents from the drugstore to help treat them. She cannot remember when her left toe wound started (per Dr. Musa's note, had been present from at least the beginning of this year).      She walks daily a few miles, and has no leg pain or cramping. Lately her toes and feet have started hurting with sharp and stinging pain when she walks, and this has stopped her ambulation somewhat; she can go about 2-3 blocks. She had one episode of pain that lasted overnight, about 6 months ago; she was given gabapentin and this has helped. She can get up and down a flight of stairs without difficulty.     On review of systems, she says that she is otherwise in reasonably good health. She lives alone at home. She says that recently she feels like her left eyelids are sticking together, without drainage, pain, or swelling, and she plans to see her ophthalmologist soon.           Past Medical History:   I have personally reviewed the following:   Past Medical History:   Diagnosis Date     Actinic keratosis      Aortic stenosis 2014     Basal cell cancer 7/2014    left eye medial canthus      Basal cell carcinoma 9/30/08    left cheek     CKD (chronic kidney disease) stage 3, GFR 30-59 ml/min (H) 7/1/2019     HTN (hypertension)      Melanoma in situ (H) 9/30/08    left arm     Polymyalgia rheumatica (H) 11/99     Temporal arteritis (H) 11/99             Past Surgical History:   I have personally reviewed the following:   Past Surgical History:   Procedure Laterality Date     C SKIN TISSUE PROCEDURE UNLISTED  11/3/08    mmis skin cancer  excision     CATARACT IOL, RT/LT  5/09    bilateral     COLONOSCOPY  2002     EXCISE LESION VULVA N/A 9/27/2019    Procedure: Wide Local Excision Of Vulva, Colposcopy;  Surgeon: Mono Ribeiro MD;  Location: [facility] OR     REPLACE VALVE AORTIC N/A 4/25/2016    Procedure: REPLACE VALVE AORTIC;  Surgeon: Sudeep Tsai MD;  Location: [facility] OR   AVR in 2016 with tissue valve, done for severe aortic stenosis         Social History:   I have personally reviewed the following:   Social History     Tobacco Use     Smoking status: Never Smoker     Smokeless tobacco: Never Used   Substance Use Topics     Alcohol use: Yes     Comment: 4 times a year     Never smoker. Drinks rare social alcohol. No illicit drug use. Worked for the [employer] as an . Shinto, [place], and housework are her preferred activities.           Family History:     Family History   Problem Relation Age of Onset     Breast Cancer Sister 45     Arthritis Sister      Thyroid Disease Sister      Cancer Sister         colon     Arthritis Sister      Arthritis Mother      Hypertension Father      Cancer - colorectal Father      Prostate Cancer Father      Arthritis Father      Heart Disease Father      Lipids Father      Arthritis Sister      Asthma Daughter      Asthma Daughter      Cancer Daughter 58        lung     Cancer Other 81        pancreatic              Allergies:     Allergies   Allergen Reactions     Lisinopril Cough             Medications:     Current Outpatient Medications Ordered in Epic   Medication     acetaminophen (TYLENOL) 325 MG tablet     amoxicillin (AMOXIL) 500 MG capsule     amoxicillin (AMOXIL) 500 MG capsule     aspirin  MG EC tablet     calcium-vitamin D 500-125 MG-UNIT TABS     folic acid (FOLVITE) 1 MG tablet     FUROSEMIDE 20 MG PO tablet     gabapentin (NEURONTIN) 100 MG capsule     ICAPS PO     losartan (COZAAR) 25 MG tablet     methotrexate sodium 2.5 MG TABS     metoprolol tartrate  (LOPRESSOR) 25 MG tablet     Omega-3 Fatty Acids (OMEGA-3 FISH OIL PO)     sulfaSALAzine ER (AZULFIDINE EN) 500 MG EC tablet     No current Epic-ordered facility-administered medications on file.              Review of Systems:   The 10 point Review of Systems is negative other than noted in the HPI          Physical Exam:   Vitals were reviewed      BP: (!) 171/73 Pulse: 67     SpO2: 96 %        General: sitting comfortably on exam table, NAD. Accompanied by family (grandson?).   Neuro/Psych: A&O x 4, pleasantly conversant but forgets information quickly and asks several times what we are discussing.  HENT: EOMI and conjugate. Moist mucous membranes. Wears glasses and hearing aids.  Cardiac: Regular rate and rhythm, no m/g appreciated.   Pulm: Lungs CTAB, no w/r/r.  Abd: Soft, non-distended, no tenderness to palpation.  Extrem: Grossly normal and symmetric ROM in all four extremities. Trace bipedal edema. Cap refill < 3 sec.   Skin: Wound under L 1st toenail nailbed, with granulation tissue present. Left 3-4th toe interspace with what looks like a very superficial small ulcer. (please see image)  Vasc: Non-palpable pedal pulses. Monophasic L DP doppler signal; biphasic R DP doppler signal; triphasic PT doppler signals bilaterally.           Data:   Labs:       Lab Results   Component Value Date     07/01/2019    Lab Results   Component Value Date    CHLORIDE 103 07/01/2019    Lab Results   Component Value Date    BUN 22 07/01/2019      Lab Results   Component Value Date    POTASSIUM 3.9 09/27/2019    Lab Results   Component Value Date    CO2 30 07/01/2019    Lab Results   Component Value Date    CR 1.16 12/18/2019        Lab Results   Component Value Date    WBC 9.7 12/18/2019    HGB 10.8 (L) 12/18/2019    HCT 35.2 12/18/2019    MCV 93 12/18/2019     12/18/2019       I have reviewed the following images:  Duplex Lower Extremity Arterial with ABIs (6/26/20): spectral broadening from the EIA onward on  both legs, with reduction in triphasic waveforms at the SFA bilaterally. No velocity elevations to suggest focal stenoses. Likely aortoiliac level disease with diffuse atherosclerosis in the legs. R FAVIOLA 1.5; L FAVIOLA noncompressible.            Assessment and Plan:   Ms. Stark is a 89 year old female with PMH of aortic stenosis, s/p AVR; stage 3 CKD; HTN; polymyalgia rheumatica. She has sharp stinging pain in her toes and prolonged wounds in the left great toe.       I extensively discussed with her potentially proceeding with a left leg angiogram, with possible endovascular revascularization    She is very hesitant to proceed with any intervention, and wants to know if there is a medication I could give her instead    I explained her pain is likely from the wounds or neuropathy. It does not sound typical for claudication.    She is having difficulty remembering what I am explaining--ie, about 5 minutes after describing an angiogram, she asks what kind of procedure I would do for the leg.    I explained the risks and benefits of angiography with her. I also explained that she likely has some degree of diffuse atherosclerotic disease, and that she may or may not have focal areas to intervene on (in other words, endovascular intervention may or may not improve her current state).     I also explained to her that I would be happy to do an angiogram for further diagnostic and therapeutic intervention if she decides to go ahead with the procedure      Given her age, I am not sure that she would have significant benefit from addition of a statin medication    I believe it is worthwhile for Podiatry to proceed as needed and for ongoing wound care to take place. I feel that she may have healing potential, with good wound care.     I will see her back in clinic in 3 months to evaluate her progress.    Edwige Seth MD

## 2020-07-24 ENCOUNTER — OFFICE VISIT (OUTPATIENT)
Dept: PODIATRY | Facility: CLINIC | Age: 85
End: 2020-07-24
Payer: MEDICARE

## 2020-07-24 VITALS
BODY MASS INDEX: 23.44 KG/M2 | DIASTOLIC BLOOD PRESSURE: 76 MMHG | HEART RATE: 66 BPM | HEIGHT: 60 IN | SYSTOLIC BLOOD PRESSURE: 132 MMHG

## 2020-07-24 DIAGNOSIS — Z79.899 HIGH RISK MEDICATION USE: ICD-10-CM

## 2020-07-24 DIAGNOSIS — M06.09 RHEUMATOID ARTHRITIS OF MULTIPLE SITES WITH NEGATIVE RHEUMATOID FACTOR (H): ICD-10-CM

## 2020-07-24 DIAGNOSIS — L97.529 ULCER OF LEFT FOOT, UNSPECIFIED ULCER STAGE (H): Primary | ICD-10-CM

## 2020-07-24 DIAGNOSIS — I73.9 PERIPHERAL ARTERIAL DISEASE (H): ICD-10-CM

## 2020-07-24 DIAGNOSIS — L03.116 CELLULITIS OF FOOT, LEFT: ICD-10-CM

## 2020-07-24 DIAGNOSIS — L97.519 ULCER OF RIGHT FOOT, UNSPECIFIED ULCER STAGE (H): ICD-10-CM

## 2020-07-24 LAB
ALBUMIN SERPL-MCNC: 3.2 G/DL (ref 3.4–5)
ALP SERPL-CCNC: 70 U/L (ref 40–150)
ALT SERPL W P-5'-P-CCNC: 34 U/L (ref 0–50)
AST SERPL W P-5'-P-CCNC: 30 U/L (ref 0–45)
BASOPHILS # BLD AUTO: 0.1 10E9/L (ref 0–0.2)
BASOPHILS NFR BLD AUTO: 0.8 %
BILIRUB DIRECT SERPL-MCNC: <0.1 MG/DL (ref 0–0.2)
BILIRUB SERPL-MCNC: 0.2 MG/DL (ref 0.2–1.3)
CREAT SERPL-MCNC: 1.64 MG/DL (ref 0.52–1.04)
DIFFERENTIAL METHOD BLD: ABNORMAL
EOSINOPHIL # BLD AUTO: 0.3 10E9/L (ref 0–0.7)
EOSINOPHIL NFR BLD AUTO: 2.5 %
ERYTHROCYTE [DISTWIDTH] IN BLOOD BY AUTOMATED COUNT: 19.2 % (ref 10–15)
GFR SERPL CREATININE-BSD FRML MDRD: 27 ML/MIN/{1.73_M2}
HCT VFR BLD AUTO: 32.7 % (ref 35–47)
HGB BLD-MCNC: 10.4 G/DL (ref 11.7–15.7)
LYMPHOCYTES # BLD AUTO: 2.5 10E9/L (ref 0.8–5.3)
LYMPHOCYTES NFR BLD AUTO: 22.5 %
MCH RBC QN AUTO: 31.2 PG (ref 26.5–33)
MCHC RBC AUTO-ENTMCNC: 31.8 G/DL (ref 31.5–36.5)
MCV RBC AUTO: 98 FL (ref 78–100)
MONOCYTES # BLD AUTO: 1.3 10E9/L (ref 0–1.3)
MONOCYTES NFR BLD AUTO: 12 %
NEUTROPHILS # BLD AUTO: 6.9 10E9/L (ref 1.6–8.3)
NEUTROPHILS NFR BLD AUTO: 62.2 %
PLATELET # BLD AUTO: 184 10E9/L (ref 150–450)
PROT SERPL-MCNC: 6.9 G/DL (ref 6.8–8.8)
RBC # BLD AUTO: 3.33 10E12/L (ref 3.8–5.2)
WBC # BLD AUTO: 11.1 10E9/L (ref 4–11)

## 2020-07-24 PROCEDURE — 36415 COLL VENOUS BLD VENIPUNCTURE: CPT | Performed by: INTERNAL MEDICINE

## 2020-07-24 PROCEDURE — 87077 CULTURE AEROBIC IDENTIFY: CPT | Performed by: PODIATRIST

## 2020-07-24 PROCEDURE — 85025 COMPLETE CBC W/AUTO DIFF WBC: CPT | Performed by: INTERNAL MEDICINE

## 2020-07-24 PROCEDURE — 87070 CULTURE OTHR SPECIMN AEROBIC: CPT | Performed by: PODIATRIST

## 2020-07-24 PROCEDURE — 99214 OFFICE O/P EST MOD 30 MIN: CPT | Performed by: PODIATRIST

## 2020-07-24 PROCEDURE — 87186 SC STD MICRODIL/AGAR DIL: CPT | Performed by: PODIATRIST

## 2020-07-24 PROCEDURE — 82565 ASSAY OF CREATININE: CPT | Performed by: INTERNAL MEDICINE

## 2020-07-24 PROCEDURE — 80076 HEPATIC FUNCTION PANEL: CPT | Performed by: INTERNAL MEDICINE

## 2020-07-24 RX ORDER — CEPHALEXIN 500 MG/1
500 CAPSULE ORAL 3 TIMES DAILY
Qty: 30 CAPSULE | Refills: 0 | Status: SHIPPED | OUTPATIENT
Start: 2020-07-24 | End: 2020-09-16

## 2020-07-24 NOTE — PROGRESS NOTES
Subjective:    6/26/20   Pt is seen today in consult from Dr. Gaines  for a foot exam.  Has CKD stage 3 and rheumatoid arthritis. Has a wound on her left hallux nail bed.  She has had this for over 6 months.  Is somewhat painful.  Patient on her left foot just a few days ago she developed a new wound on her left third interspace.  She has some discomfort with this.  She also has a loose nail on her right hallux.  It is somewhat moist here.  She denies any erythema or edema around any of these areas.  She denies any purulence or odor.  She denies any fever or chills.  Patient's father had heart disease.  She denies ever smoking.  She is retired.    7/24/20 patient returns for evaluation of bilateral foot ulcers.  She saw vascular surgery on 7/21/2020.  After days discussed options they decided for now the patient would just watch her wounds to see if they healed.  She states the right hallux wound is feeling much better.  It is not draining.  They believe it is almost healed.  They believe the left hallux wound is getting smaller.  She has had increased pain and erythema on the wound over her left fourth toe medially.  She is having a hard time keeping these toes .  She denies any purulence or odor.  She denies any fever or chills.  She states otherwise she is feeling well.         ROS:  See above         Allergies   Allergen Reactions     Lisinopril Cough       Current Outpatient Medications   Medication Sig Dispense Refill     acetaminophen (TYLENOL) 325 MG tablet Take 2 tablets (650 mg) by mouth every 6 hours as needed for mild pain 50 tablet 0     amoxicillin (AMOXIL) 500 MG capsule TAKE 4 CAPSULES BY MOUTH 1 HOUR BEFORE DENTAL APPOINTMENT 4 capsule 0     amoxicillin (AMOXIL) 500 MG capsule TAKE 4 CAPSULES BY MOUTH 1 HOUR BEFORE DENTAL APPOINTMENT 4 capsule 0     aspirin  MG EC tablet Take 1 tablet (325 mg) by mouth daily 90 tablet 3     calcium-vitamin D 500-125 MG-UNIT TABS        folic acid  (FOLVITE) 1 MG tablet Take 1 tablet (1 mg) by mouth daily 30 tablet 4     FUROSEMIDE 20 MG PO tablet TAKE 1 1 TABLET BY MOUTH EVERY DAY IF 2 TO 3 POUND WEIGHT GAIN OVER A 2 DAY PERIOD 90 tablet 0     gabapentin (NEURONTIN) 100 MG capsule TAKE 1 CAPSULE BY MOUTH THREE TIMES DAILY 90 capsule 11     ICAPS PO 2 tablets daily       losartan (COZAAR) 25 MG tablet TAKE 1 TABLET(25 MG) BY MOUTH DAILY 90 tablet 3     methotrexate sodium 2.5 MG TABS Take 8 tablets (20 mg) by mouth once a week . Take all 8 tablets on the same day of each week.  Do not take with amoxicillin. 32 tablet 4     metoprolol tartrate (LOPRESSOR) 25 MG tablet TAKE 1 TABLET(25 MG) BY MOUTH TWICE DAILY 180 tablet 3     Omega-3 Fatty Acids (OMEGA-3 FISH OIL PO) Take 1 tablet twice daily.       sulfaSALAzine ER (AZULFIDINE EN) 500 MG EC tablet Take 1 tablet (500 mg) by mouth 2 times daily 60 tablet 4       Patient Active Problem List   Diagnosis     Polymyalgia rheumatica (H)     History of basal cell carcinoma     CARDIOVASCULAR SCREENING; LDL GOAL LESS THAN 130     Hypertension goal BP (blood pressure) < 140/90     Hip pain     Left atrial enlargement     Aortic stenosis     Status post coronary angiogram     Aortic valve stenosis     Aortic valve replaced     Rheumatoid arthritis of multiple sites with negative rheumatoid factor (H)     CKD (chronic kidney disease) stage 3, GFR 30-59 ml/min (H)     THERESA III (vulvar intraepithelial neoplasia III)       Past Medical History:   Diagnosis Date     Actinic keratosis      Aortic stenosis 2014     Basal cell cancer 7/2014    left eye medial canthus      Basal cell carcinoma 9/30/08    left cheek     CKD (chronic kidney disease) stage 3, GFR 30-59 ml/min (H) 7/1/2019     HTN (hypertension)      Melanoma in situ (H) 9/30/08    left arm     Polymyalgia rheumatica (H) 11/99     Temporal arteritis (H) 11/99       Past Surgical History:   Procedure Laterality Date     C SKIN TISSUE PROCEDURE UNLISTED  11/3/08    mmis  skin cancer excision     CATARACT IOL, RT/LT  5/09    bilateral     COLONOSCOPY  2002     EXCISE LESION VULVA N/A 9/27/2019    Procedure: Wide Local Excision Of Vulva, Colposcopy;  Surgeon: Mono Ribeiro MD;  Location: [facility] OR     REPLACE VALVE AORTIC N/A 4/25/2016    Procedure: REPLACE VALVE AORTIC;  Surgeon: Sudeep Tsai MD;  Location: [facility] OR       Family History   Problem Relation Age of Onset     Breast Cancer Sister 45     Arthritis Sister      Thyroid Disease Sister      Cancer Sister         colon     Arthritis Sister      Arthritis Mother      Hypertension Father      Cancer - colorectal Father      Prostate Cancer Father      Arthritis Father      Heart Disease Father      Lipids Father      Arthritis Sister      Asthma Daughter      Asthma Daughter      Cancer Daughter 58        lung     Cancer Other 81        pancreatic        Social History     Tobacco Use     Smoking status: Never Smoker     Smokeless tobacco: Never Used   Substance Use Topics     Alcohol use: Yes     Comment: 4 times a year         Exam:    Vitals: There were no vitals taken for this visit.  BMI: There is no height or weight on file to calculate BMI.  Height: Data Unavailable    Constitutional/ general:  Pt is in no apparent distress, appears well-nourished.  Cooperative with history and physical exam.  Patient seen with her daughter today    Psych:  The patient answered questions appropriately.  Normal affect.  Seems to have reasonable expectations, in terms of treatment.     Eyes:  Visual scanning/ tracking without deficit.     Ears:  Response to auditory stimuli is normal.  negative hearing aid devices.  Auricles in proper alignment.     Lymphatic:  Popliteal lymph nodes not enlarged.     Lungs:  Non labored breathing, non labored speech. No cough.  No audible wheezing. Even, quiet breathing.       Vascular:  negative PT arteries.  DP 0/4.  CFT < 3 sec.   Ankle edema and varicosities noted.  No pedal hair  growth.    Neuro:  Alert and oriented x 3. Coordinated gait.  Light touch sensation is intact to the L4, L5, S1 distributions. No obvious deficits.  No evidence of neurological-based weakness, spasticity, or contracture in the lower extremities.  Monofilament intact on all digits    Derm: skin thin, shiny, atrophic.  No erythema, ecchymosis, or cyanosis.      Musculoskeletal:    Lower extremity muscle strength is normal.  Patient is ambulatory without an assistive device or brace.  No gross deformities.  Normal arch.   MS 5/5 all compartments.  Normal ROM all fore foot and rearfoot joints.  The wound in the nailbed of the right hallux is now healed.  There is no surrounding erythema or edema here.      The left hallux nailbed ulcer in the past measured 11 x 8 mm and today measures 6 x 5 mm.  It is full-thickness.  It has a base of granulation tissue.  There is no surrounding erythema or edema.  There is no purulence odor or sinus tracts.      On the left fourth toe dorsal medial there is a wound that measures 8 x 4 mm.  It is barely full-thickness.  The base is granulation tissue.  There is erythema and edema and pain on palpation on the fourth toe.  There is no crepitus noted.  There is no signs of underlying fluctuance.    A/P  Peripheral arterial disease  Rheumatoid arthritis  Chronic kidney disease stage III.  Left hallux nailbed ulcer  Left fourth toe ulcer with cellulitis    Discussed wound right hallux healed.  They will just watch this.  The wound in the left hallux nailbed is improved.  They will get some meta honey and start dressing this twice a day with this.  Discussed the wound on the fourth toe is worse.  We took a culture.  Discussed she has some surrounding cellulitis.  We are to start on Keflex 500 mg 1 p.o. 3 times daily since she has chronic kidney disease stage III.  We rolled up some moleskin to place distal to this and they will paper tape this in for offloading.  We performed this today and  with ambulation from a bed much better.  Return to the clinic in 1 week to discuss culture results and reevaluation        Pawel Musa DPM DPM, FACFAS

## 2020-07-24 NOTE — PATIENT INSTRUCTIONS
We wish you continued good healing. If you have any questions or concerns, please do not hesitate to contact us at [phone]    Please remember to call and schedule a follow up appointment if one was recommended at your earliest convenience.   PODIATRY CLINIC HOURS  TELEPHONE NUMBER    Dr. Pawel Musa D.P.M [facility]    Clinics:  [facility]    [facility]   Tuesday 1PM-6PM  [place]  Wednesday 7AM-2PM  [facility]  Thursday 10AM-6PM  [place]  Friday 7AM-3PM  [place]  Specialty schedulers:   [phone] to make an appointment with any Specialty Provider.        Urgent Care locations:    [facility] Monday-Friday 5 pm - 9 pm. Saturday-Sunday 9 am -5pm    Monday-Friday 11 am - 9 pm Saturday 9 am - 5 pm     Monday-Sunday 12 noon-8PM [phone]     If you need a medication refill, please contact us you may need lab work and/or a follow up visit prior to your refill (i.e. Antifungal medications).    [employer] (secure e-mail communication and access to your chart) to send a message or to make an appointment.    If MRI needed please call [facility] at [phone]

## 2020-07-24 NOTE — LETTER
7/24/2020         RE: Roxanna Stark  [address]  [place] [address]        Dear Colleague,    Thank you for referring your patient, Roxanna Stark, to the [facility]. Please see a copy of my visit note below.    Subjective:    6/26/20   Pt is seen today in consult from Dr. Gaines  for a foot exam.  Has CKD stage 3 and rheumatoid arthritis. Has a wound on her left hallux nail bed.  She has had this for over 6 months.  Is somewhat painful.  Patient on her left foot just a few days ago she developed a new wound on her left third interspace.  She has some discomfort with this.  She also has a loose nail on her right hallux.  It is somewhat moist here.  She denies any erythema or edema around any of these areas.  She denies any purulence or odor.  She denies any fever or chills.  Patient's father had heart disease.  She denies ever smoking.  She is retired.    7/24/20 patient returns for evaluation of bilateral foot ulcers.  She saw vascular surgery on 7/21/2020.  After days discussed options they decided for now the patient would just watch her wounds to see if they healed.  She states the right hallux wound is feeling much better.  It is not draining.  They believe it is almost healed.  They believe the left hallux wound is getting smaller.  She has had increased pain and erythema on the wound over her left fourth toe medially.  She is having a hard time keeping these toes .  She denies any purulence or odor.  She denies any fever or chills.  She states otherwise she is feeling well.         ROS:  See above         Allergies   Allergen Reactions     Lisinopril Cough       Current Outpatient Medications   Medication Sig Dispense Refill     acetaminophen (TYLENOL) 325 MG tablet Take 2 tablets (650 mg) by mouth every 6 hours as needed for mild pain 50 tablet 0     amoxicillin (AMOXIL) 500 MG capsule TAKE 4 CAPSULES BY MOUTH 1 HOUR BEFORE DENTAL APPOINTMENT 4 capsule 0      amoxicillin (AMOXIL) 500 MG capsule TAKE 4 CAPSULES BY MOUTH 1 HOUR BEFORE DENTAL APPOINTMENT 4 capsule 0     aspirin  MG EC tablet Take 1 tablet (325 mg) by mouth daily 90 tablet 3     calcium-vitamin D 500-125 MG-UNIT TABS        folic acid (FOLVITE) 1 MG tablet Take 1 tablet (1 mg) by mouth daily 30 tablet 4     FUROSEMIDE 20 MG PO tablet TAKE 1 1 TABLET BY MOUTH EVERY DAY IF 2 TO 3 POUND WEIGHT GAIN OVER A 2 DAY PERIOD 90 tablet 0     gabapentin (NEURONTIN) 100 MG capsule TAKE 1 CAPSULE BY MOUTH THREE TIMES DAILY 90 capsule 11     ICAPS PO 2 tablets daily       losartan (COZAAR) 25 MG tablet TAKE 1 TABLET(25 MG) BY MOUTH DAILY 90 tablet 3     methotrexate sodium 2.5 MG TABS Take 8 tablets (20 mg) by mouth once a week . Take all 8 tablets on the same day of each week.  Do not take with amoxicillin. 32 tablet 4     metoprolol tartrate (LOPRESSOR) 25 MG tablet TAKE 1 TABLET(25 MG) BY MOUTH TWICE DAILY 180 tablet 3     Omega-3 Fatty Acids (OMEGA-3 FISH OIL PO) Take 1 tablet twice daily.       sulfaSALAzine ER (AZULFIDINE EN) 500 MG EC tablet Take 1 tablet (500 mg) by mouth 2 times daily 60 tablet 4       Patient Active Problem List   Diagnosis     Polymyalgia rheumatica (H)     History of basal cell carcinoma     CARDIOVASCULAR SCREENING; LDL GOAL LESS THAN 130     Hypertension goal BP (blood pressure) < 140/90     Hip pain     Left atrial enlargement     Aortic stenosis     Status post coronary angiogram     Aortic valve stenosis     Aortic valve replaced     Rheumatoid arthritis of multiple sites with negative rheumatoid factor (H)     CKD (chronic kidney disease) stage 3, GFR 30-59 ml/min (H)     THERESA III (vulvar intraepithelial neoplasia III)       Past Medical History:   Diagnosis Date     Actinic keratosis      Aortic stenosis 2014     Basal cell cancer 7/2014    left eye medial canthus      Basal cell carcinoma 9/30/08    left cheek     CKD (chronic kidney disease) stage 3, GFR 30-59 ml/min (H) 7/1/2019      HTN (hypertension)      Melanoma in situ (H) 9/30/08    left arm     Polymyalgia rheumatica (H) 11/99     Temporal arteritis (H) 11/99       Past Surgical History:   Procedure Laterality Date     C SKIN TISSUE PROCEDURE UNLISTED  11/3/08    mmis skin cancer excision     CATARACT IOL, RT/LT  5/09    bilateral     COLONOSCOPY  2002     EXCISE LESION VULVA N/A 9/27/2019    Procedure: Wide Local Excision Of Vulva, Colposcopy;  Surgeon: Mono Ribeiro MD;  Location: [facility] OR     REPLACE VALVE AORTIC N/A 4/25/2016    Procedure: REPLACE VALVE AORTIC;  Surgeon: Sudeep Tsai MD;  Location: [facility] OR       Family History   Problem Relation Age of Onset     Breast Cancer Sister 45     Arthritis Sister      Thyroid Disease Sister      Cancer Sister         colon     Arthritis Sister      Arthritis Mother      Hypertension Father      Cancer - colorectal Father      Prostate Cancer Father      Arthritis Father      Heart Disease Father      Lipids Father      Arthritis Sister      Asthma Daughter      Asthma Daughter      Cancer Daughter 58        lung     Cancer Other 81        pancreatic        Social History     Tobacco Use     Smoking status: Never Smoker     Smokeless tobacco: Never Used   Substance Use Topics     Alcohol use: Yes     Comment: 4 times a year         Exam:    Vitals: There were no vitals taken for this visit.  BMI: There is no height or weight on file to calculate BMI.  Height: Data Unavailable    Constitutional/ general:  Pt is in no apparent distress, appears well-nourished.  Cooperative with history and physical exam.  Patient seen with her daughter today    Psych:  The patient answered questions appropriately.  Normal affect.  Seems to have reasonable expectations, in terms of treatment.     Eyes:  Visual scanning/ tracking without deficit.     Ears:  Response to auditory stimuli is normal.  negative hearing aid devices.  Auricles in proper alignment.     Lymphatic:  Popliteal lymph nodes not  enlarged.     Lungs:  Non labored breathing, non labored speech. No cough.  No audible wheezing. Even, quiet breathing.       Vascular:  negative PT arteries.  DP 0/4.  CFT < 3 sec.   Ankle edema and varicosities noted.  No pedal hair growth.    Neuro:  Alert and oriented x 3. Coordinated gait.  Light touch sensation is intact to the L4, L5, S1 distributions. No obvious deficits.  No evidence of neurological-based weakness, spasticity, or contracture in the lower extremities.  Monofilament intact on all digits    Derm: skin thin, shiny, atrophic.  No erythema, ecchymosis, or cyanosis.      Musculoskeletal:    Lower extremity muscle strength is normal.  Patient is ambulatory without an assistive device or brace.  No gross deformities.  Normal arch.   MS 5/5 all compartments.  Normal ROM all fore foot and rearfoot joints.  The wound in the nailbed of the right hallux is now healed.  There is no surrounding erythema or edema here.      The left hallux nailbed ulcer in the past measured 11 x 8 mm and today measures 6 x 5 mm.  It is full-thickness.  It has a base of granulation tissue.  There is no surrounding erythema or edema.  There is no purulence odor or sinus tracts.      On the left fourth toe dorsal medial there is a wound that measures 8 x 4 mm.  It is barely full-thickness.  The base is granulation tissue.  There is erythema and edema and pain on palpation on the fourth toe.  There is no crepitus noted.  There is no signs of underlying fluctuance.    A/P  Peripheral arterial disease  Rheumatoid arthritis  Chronic kidney disease stage III.  Left hallux nailbed ulcer  Left fourth toe ulcer with cellulitis    Discussed wound right hallux healed.  They will just watch this.  The wound in the left hallux nailbed is improved.  They will get some meta honey and start dressing this twice a day with this.  Discussed the wound on the fourth toe is worse.  We took a culture.  Discussed she has some surrounding cellulitis.   We are to start on Keflex 500 mg 1 p.o. 3 times daily since she has chronic kidney disease stage III.  We rolled up some moleskin to place distal to this and they will paper tape this in for offloading.  We performed this today and with ambulation from a bed much better.  Return to the clinic in 1 week to discuss culture results and reevaluation        Pawel Musa DPM DPM, FACFAS             Again, thank you for allowing me to participate in the care of your patient.        Sincerely,        Pawel Musa DPM

## 2020-07-27 LAB
BACTERIA SPEC CULT: ABNORMAL
Lab: ABNORMAL
SPECIMEN SOURCE: ABNORMAL

## 2020-07-31 ENCOUNTER — VIRTUAL VISIT (OUTPATIENT)
Dept: RHEUMATOLOGY | Facility: CLINIC | Age: 85
End: 2020-07-31
Payer: MEDICARE

## 2020-07-31 ENCOUNTER — TELEPHONE (OUTPATIENT)
Dept: RHEUMATOLOGY | Facility: CLINIC | Age: 85
End: 2020-07-31

## 2020-07-31 DIAGNOSIS — R79.89 ELEVATED SERUM CREATININE: ICD-10-CM

## 2020-07-31 DIAGNOSIS — M06.09 RHEUMATOID ARTHRITIS OF MULTIPLE SITES WITH NEGATIVE RHEUMATOID FACTOR (H): Primary | ICD-10-CM

## 2020-07-31 DIAGNOSIS — Z79.899 HIGH RISK MEDICATION USE: ICD-10-CM

## 2020-07-31 PROCEDURE — 99442 ZZC PHYSICIAN TELEPHONE EVALUATION 11-20 MIN: CPT | Performed by: INTERNAL MEDICINE

## 2020-07-31 RX ORDER — SULFASALAZINE 500 MG/1
500 TABLET, DELAYED RELEASE ORAL 2 TIMES DAILY
Qty: 60 TABLET | Refills: 6 | Status: SHIPPED | OUTPATIENT
Start: 2020-07-31 | End: 2021-01-11

## 2020-07-31 RX ORDER — METHOTREXATE 2.5 MG/1
20 TABLET ORAL WEEKLY
Qty: 32 TABLET | Refills: 6 | Status: SHIPPED | OUTPATIENT
Start: 2020-07-31 | End: 2021-01-29

## 2020-07-31 RX ORDER — FOLIC ACID 1 MG/1
1 TABLET ORAL DAILY
Qty: 30 TABLET | Refills: 6 | Status: SHIPPED | OUTPATIENT
Start: 2020-07-31 | End: 2021-01-29

## 2020-07-31 NOTE — TELEPHONE ENCOUNTER
Reason for call:  Other   Patient called regarding (reason for call): appointment  Additional comments: Patient is wanting to know when to make her lab appointments, she forgot what she discussed with Dr. Johnson. Please call to advise.     Phone number to reach patient:  Home number on file [phone] (home)    Best Time:  Any     Can we leave a detailed message on this number?  YES    Travel screening: Not Applicable

## 2020-07-31 NOTE — TELEPHONE ENCOUNTER
Called patient and advised her that she is due to get labs in 3 months and in 6 months. Scheduled both lab apts. Patient has no other questions.    Ken Soriano RN....7/31/2020 11:46 AM

## 2020-07-31 NOTE — PROGRESS NOTES
"Roxanna Stark is a 89 year old female who is being evaluated via a billable telephone visit.      The patient has been notified of following:     \"This telephone visit will be conducted via a call between you and your physician/provider. We have found that certain health care needs can be provided without the need for a physical exam.  This service lets us provide the care you need with a short phone conversation.  If a prescription is necessary we can send it directly to your pharmacy.  If lab work is needed we can place an order for that and you can then stop by our lab to have the test done at a later time.    Telephone visits are billed at different rates depending on your insurance coverage. During this emergency period, for some insurers they may be billed the same as an in-person visit.  Please reach out to your insurance provider with any questions.    If during the course of the call the physician/provider feels a telephone visit is not appropriate, you will not be charged for this service.\"    Patient has given verbal consent for Telephone visit?  Yes    What phone number would you like to be contacted at? [phone]    How would you like to obtain your AVS? Mail a copy        Rheumatology Telephone/Telehealth  Visit      Roxanna Stark MRN# [id number]   YOB: 1927 Age: 89 year old      Date of visit: 7/31/20   PCP: Dr. Swapna Gaines  Cardiology: Dr. Boston Navarro     Chief Complaint   Patient presents with:  Arthritis: RA    Assessment and Plan     1. 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  She was treated with prednisone monotherapy for several years, being able to taper off without recurrence of symptoms. She then developed worsening symptoms in her hands and was diagnosed with rheumatoid arthritis. Initially, she was resistant to taking DMARD therapy.  She then " was started on MTX that was effective; she reduced the dose with worsening symptoms. Currently on MTX 15mg wkly and SSZ 500mg BID (mild anemia possibly associated with SSZ so the dose was previously reduced).  RA is well controlled today.    - Continue methotrexate 20 mg once weekly   - Continue folic acid 1mg daily  - Continue sulfasalazine 500 mg twice daily   - Labs in 3 months: CBC, Creatinine, Hepatic Panel  - Labs in 6 months: CBC, Creatinine, Hepatic Panel, ESR, CRP     2. Giant Cell Arteritis History?: 12/26/2005 Left TA biopsy negative per [facility] record review.  No symptoms of GCA at this time.      3. Right shoulder rotator cuff tear and pain: Previously evaluated by orthopedic surgery and her pain resolved for approximately one year after having a steroid injection in March 2015. She does not want to have surgical correction of her shoulder. Repeat steroid injections have been helpful; not needed today.  Physical therapy was effective and she is doing exercises at home.      4. History of basal cell carcinoma: Following with dermatology; encouraged yearly f/u for eval     5. Elevated creatinine: recheck BMP; advised to be well hydrated.     6. Bone Health: Managed by PCP already.    # Relevant labs and imaging were reviewed with the patient    # High risk medication toxicity monitoring: discussion and labs reviewed; appropriate labs ordered. See above.  Instructed that if confirmed to have COVID-19 or exposure to someone with confirmed COVID-19 to call this clinic for directions on DMARD management.    # Note that this is a virtual visit to reduce the risk of COVID-19 exposure during this current pandemic.      # Considered to be at high risk of complications from the COVID-19 virus.  It is recommended to limit contact with other people and if possible to work remotely or provide a leave of absence to reduce the risk for COVID-19.      Ms. Stark verbalized agreement with and understanding of the rational  for the diagnosis and treatment plan.  All questions were answered to best of my ability and the patient's satisfaction. Ms. Stark was advised to contact the clinic with any questions that may arise after the clinic visit.      Thank you for involving me in the care of the patient    Return to clinic: 6 months      HPI   Roxanna Stark is a 89 year old female with medical history significant for basal cell carcinoma, hypertension, aortic stenosis, right rotator cuff tear (previously evaluated by Dr. Bingham, orthopedic surgery, on 3/20/2015 where at that time Ms. Stark was not interested in surgical correction; she received an intra-articular steroid injection at that time that was effective for ~1year), temporal arteritis?, and seronegative erosive rheumatoid arthritis.     Today, she reports that she is doing okay.  Some ache at all joints of the hands: MCPs, PIPs, DIPs; occurring about once every few days for a couple minutes.  Morning stiffness for about 30 min or less. +gelling.  No GCA or PMR symptoms.  Less physically active due to COVID19.     Denies fevers, chills, nausea, vomiting, constipation, diarrhea. No abdominal pain. No chest pain/pressure, palpitations, or shortness of breath. No oral or nasal sores. No neck pain. No rash. Swelling in her bilateral feet.       Tobacco: None  EtOH: No more than 1 drink per week  Drugs: None  Occupation: Used to work for the telephone company; now retired    ROS   GEN: No fevers, chills, night sweats, or weight change  SKIN: No itching, rashes, sores  HEENT: No oral or nasal ulcers.  CV: No chest pain, pressure, palpitations, or dyspnea on exertion.  PULM: No SOB, wheeze, cough.  GI: No nausea, vomiting, constipation, diarrhea. No blood in stool. No abdominal pain.  : No blood in urine.  MSK: See HPI.  NEURO: Says that gabapentin is helping her peripheral neuropathy - affecting the feet  ENDO: No heat/cold intolerance.  EXT: See HPI    Active Problem List      Patient Active Problem List   Diagnosis     Polymyalgia rheumatica (H)     History of basal cell carcinoma     CARDIOVASCULAR SCREENING; LDL GOAL LESS THAN 130     Hypertension goal BP (blood pressure) < 140/90     Hip pain     Left atrial enlargement     Aortic stenosis     Status post coronary angiogram     Aortic valve stenosis     Aortic valve replaced     Rheumatoid arthritis of multiple sites with negative rheumatoid factor (H)     CKD (chronic kidney disease) stage 3, GFR 30-59 ml/min (H)     THERESA III (vulvar intraepithelial neoplasia III)     Past Medical History     Past Medical History:   Diagnosis Date     Actinic keratosis      Aortic stenosis 2014     Basal cell cancer 7/2014    left eye medial canthus      Basal cell carcinoma 9/30/08    left cheek     CKD (chronic kidney disease) stage 3, GFR 30-59 ml/min (H) 7/1/2019     HTN (hypertension)      Melanoma in situ (H) 9/30/08    left arm     Polymyalgia rheumatica (H) 11/99     Temporal arteritis (H) 11/99     Past Surgical History     Past Surgical History:   Procedure Laterality Date     C SKIN TISSUE PROCEDURE UNLISTED  11/3/08    mmis skin cancer excision     CATARACT IOL, RT/LT  5/09    bilateral     COLONOSCOPY  2002     EXCISE LESION VULVA N/A 9/27/2019    Procedure: Wide Local Excision Of Vulva, Colposcopy;  Surgeon: Mono Ribeiro MD;  Location: [facility] OR     REPLACE VALVE AORTIC N/A 4/25/2016    Procedure: REPLACE VALVE AORTIC;  Surgeon: Sudeep Tsai MD;  Location: [facility] OR     Allergy     Allergies   Allergen Reactions     Lisinopril Cough        Current Medication List     Current Outpatient Medications   Medication Sig     acetaminophen (TYLENOL) 325 MG tablet Take 2 tablets (650 mg) by mouth every 6 hours as needed for mild pain     amoxicillin (AMOXIL) 500 MG capsule TAKE 4 CAPSULES BY MOUTH 1 HOUR BEFORE DENTAL APPOINTMENT     aspirin  MG EC tablet Take 1 tablet (325 mg) by mouth daily     calcium-vitamin D 500-125 MG-UNIT  TABS      cephALEXin (KEFLEX) 500 MG capsule Take 1 capsule (500 mg) by mouth 3 times daily     folic acid (FOLVITE) 1 MG tablet Take 1 tablet (1 mg) by mouth daily     FUROSEMIDE 20 MG PO tablet TAKE 1 1 TABLET BY MOUTH EVERY DAY IF 2 TO 3 POUND WEIGHT GAIN OVER A 2 DAY PERIOD     gabapentin (NEURONTIN) 100 MG capsule TAKE 1 CAPSULE BY MOUTH THREE TIMES DAILY     ICAPS PO 2 tablets daily     losartan (COZAAR) 25 MG tablet TAKE 1 TABLET(25 MG) BY MOUTH DAILY     methotrexate sodium 2.5 MG TABS Take 8 tablets (20 mg) by mouth once a week . Take all 8 tablets on the same day of each week.  Do not take with amoxicillin.     metoprolol tartrate (LOPRESSOR) 25 MG tablet TAKE 1 TABLET(25 MG) BY MOUTH TWICE DAILY     Omega-3 Fatty Acids (OMEGA-3 FISH OIL PO) Take 1 tablet twice daily.     sulfaSALAzine ER (AZULFIDINE EN) 500 MG EC tablet Take 1 tablet (500 mg) by mouth 2 times daily     No current facility-administered medications for this visit.        Social History   See HPI    Family History     Family History   Problem Relation Age of Onset     Breast Cancer Sister 45     Arthritis Sister      Thyroid Disease Sister      Cancer Sister         colon     Arthritis Sister      Arthritis Mother      Hypertension Father      Cancer - colorectal Father      Prostate Cancer Father      Arthritis Father      Heart Disease Father      Lipids Father      Arthritis Sister      Asthma Daughter      Asthma Daughter      Cancer Daughter 58        lung     Cancer Other 81        pancreatic      Physical Exam     Temp Readings from Last 3 Encounters:   02/13/20 97.4  F (36.3  C) (Oral)   10/17/19 97.6  F (36.4  C) (Oral)   10/08/19 97.6  F (36.4  C) (Oral)     BP Readings from Last 5 Encounters:   07/24/20 132/76   07/21/20 (!) 171/73   06/26/20 132/66   02/13/20 (!) 173/77   10/17/19 (!) 150/78     Pulse Readings from Last 1 Encounters:   07/24/20 66     Resp Readings from Last 1 Encounters:   02/13/20 14     Estimated body mass  index is 23.44 kg/m  as calculated from the following:    Height as of 7/24/20: 1.524 m (5').    Weight as of 7/21/20: 54.4 kg (120 lb).    Telephone Visit     Labs / Imaging (select studies)   RF/CCP  Recent Labs   Lab Test 08/11/16  1124 08/04/16  1222 02/18/16  1543   CCPIGG 1  --   --    RHF  --  <20 <20     CBC  Recent Labs   Lab Test 07/24/20  1328 12/18/19  0828 09/18/19  0913   WBC 11.1* 9.7 8.7   RBC 3.33* 3.79* 3.42*   HGB 10.4* 10.8* 10.2*   HCT 32.7* 35.2 32.9*   MCV 98 93 96   RDW 19.2* 18.5* 18.4*    200 289   MCH 31.2 28.5 29.8   MCHC 31.8 30.7* 31.0*   NEUTROPHIL 62.2 64.7 73.6   LYMPH 22.5 23.7 16.6   MONOCYTE 12.0 7.2 6.5   EOSINOPHIL 2.5 3.9 2.6   BASOPHIL 0.8 0.5 0.7   ANEU 6.9 6.3 6.4   ALYM 2.5 2.3 1.5   IGNACIO 1.3 0.7 0.6   AEOS 0.3 0.4 0.2   ABAS 0.1 0.1 0.1     CMP  Recent Labs   Lab Test 07/24/20  1328 12/18/19  0828 09/27/19  0950 09/18/19  0913 07/01/19  1256  12/14/16  0849  07/12/16  1035   NA  --   --   --   --  139  --  140  --  138   POTASSIUM  --   --  3.9  --  4.2  --  4.5  --  4.6   CHLORIDE  --   --   --   --  103  --  105  --  102   CO2  --   --   --   --  30  --  26  --  28   ANIONGAP  --   --   --   --  6  --  9  --  8   GLC  --   --   --   --  94  --  172*  --  117*   BUN  --   --   --   --  22  --  28  --  17   CR 1.64* 1.16*  --  1.07* 1.21*   < > 1.19*   < > 1.04   GFRESTIMATED 27* 41*  --  45* 39*   < > 43*   < > 50*   GFRESTBLACK 31* 47*  --  52* 45*   < > 52*   < > 60*   ROEL  --   --   --   --  9.6  --  8.8  --  9.4   BILITOTAL 0.2 0.3  --  0.3  --    < >  --    < >  --    ALBUMIN 3.2* 3.4  --  3.2*  --    < >  --    < >  --    PROTTOTAL 6.9 7.0  --  7.1  --    < >  --    < >  --    ALKPHOS 70 85  --  110  --    < >  --    < >  --    AST 30 24  --  40  --    < >  --    < >  --    ALT 34 24  --  38  --    < >  --    < >  --     < > = values in this interval not displayed.     Calcium/VitaminD  Recent Labs   Lab Test 07/01/19  1256 12/14/16  0849 07/12/16  1035   ROEL  9.6 8.8 9.4     ESR/CRP  Recent Labs   Lab Test 09/18/19  0913 06/12/19  0910 03/20/19  0846   SED 76* 41* 40*   CRP 23.2* 6.5 6.3     Hepatitis B  Recent Labs   Lab Test 11/10/16  0909   HBCAB Nonreactive   HEPBANG Nonreactive     Hepatitis C  Recent Labs   Lab Test 11/10/16  0909   HCVAB Nonreactive   Assay performance characteristics have not been established for newborns,   infants, and children         HIV Screening  Recent Labs   Lab Test 11/10/16  0909   HIAGAB Nonreactive   HIV-1 p24 Ag & HIV-1/HIV-2 Ab Not Detected           Immunization History     Immunization History   Administered Date(s) Administered     FLU 6-35 months 09/08/2010     Influenza (H1N1) 10/20/2016     Influenza (High Dose) 3 valent vaccine 10/16/2014, 10/06/2015, 10/23/2016, 10/03/2017, 08/23/2018, 09/18/2019     Influenza (IIV3) PF 11/05/1999, 12/14/2000, 10/26/2004, 10/04/2005, 09/16/2009, 10/20/2010, 11/09/2011, 09/22/2012, 09/15/2013, 10/15/2014     Pneumo Conj 13-V (2010&after) 03/17/2015     Pneumococcal 23 valent 11/03/2000, 12/13/2010     TD (ADULT, 7+) 01/12/2004     TDAP Vaccine (Adacel) 05/14/2013     Td (Adult), Adsorbed 01/12/2004     Zoster vaccine recombinant adjuvanted (SHINGRIX) 06/21/2018, 08/23/2018     Zoster vaccine, live 12/15/2006          Chart documentation done in part with Dragon Voice recognition Software. Although reviewed after completion, some word and grammatical error may remain.    Phone call start time: 10:49 AM  Phone call end time: 11:02 AM    This visit is equivalent to a [id number] visit    Location of patient: home  Location of provider: home    Follow up:  follow up appointment scheduled to be in Sina Johnson MD

## 2020-08-07 ENCOUNTER — OFFICE VISIT (OUTPATIENT)
Dept: PODIATRY | Facility: CLINIC | Age: 85
End: 2020-08-07
Payer: MEDICARE

## 2020-08-07 VITALS
RESPIRATION RATE: 16 BRPM | HEART RATE: 62 BPM | OXYGEN SATURATION: 95 % | WEIGHT: 120 LBS | BODY MASS INDEX: 23.44 KG/M2 | DIASTOLIC BLOOD PRESSURE: 80 MMHG | SYSTOLIC BLOOD PRESSURE: 174 MMHG

## 2020-08-07 DIAGNOSIS — I73.9 PERIPHERAL ARTERIAL DISEASE (H): ICD-10-CM

## 2020-08-07 DIAGNOSIS — N18.30 CKD (CHRONIC KIDNEY DISEASE) STAGE 3, GFR 30-59 ML/MIN (H): ICD-10-CM

## 2020-08-07 DIAGNOSIS — L03.116 CELLULITIS OF FOOT, LEFT: ICD-10-CM

## 2020-08-07 DIAGNOSIS — L97.529 ULCER OF LEFT FOOT, UNSPECIFIED ULCER STAGE (H): Primary | ICD-10-CM

## 2020-08-07 DIAGNOSIS — M06.09 RHEUMATOID ARTHRITIS OF MULTIPLE SITES WITH NEGATIVE RHEUMATOID FACTOR (H): ICD-10-CM

## 2020-08-07 PROCEDURE — 99214 OFFICE O/P EST MOD 30 MIN: CPT | Performed by: PODIATRIST

## 2020-08-07 ASSESSMENT — PAIN SCALES - GENERAL: PAINLEVEL: MILD PAIN (2)

## 2020-08-07 NOTE — LETTER
8/7/2020         RE: Roxanna Stark  [address]  [place] [address]        Dear Colleague,    Thank you for referring your patient, Roxanna Stark, to the [facility]. Please see a copy of my visit note below.    Subjective:    6/26/20   Pt is seen today in consult from Dr. Gaines  for a foot exam.  Has CKD stage 3 and rheumatoid arthritis. Has a wound on her left hallux nail bed.  She has had this for over 6 months.  Is somewhat painful.  Patient on her left foot just a few days ago she developed a new wound on her left third interspace.  She has some discomfort with this.  She also has a loose nail on her right hallux.  It is somewhat moist here.  She denies any erythema or edema around any of these areas.  She denies any purulence or odor.  She denies any fever or chills.  Patient's father had heart disease.  She denies ever smoking.  She is retired.    7/24/20 patient returns for evaluation of bilateral foot ulcers.  She saw vascular surgery on 7/21/2020.  After days discussed options they decided for now the patient would just watch her wounds to see if they healed.  She states the right hallux wound is feeling much better.  It is not draining.  They believe it is almost healed.  They believe the left hallux wound is getting smaller.  She has had increased pain and erythema on the wound over her left fourth toe medially.  She is having a hard time keeping these toes .  She denies any purulence or odor.  She denies any fever or chills.  She states otherwise she is feeling well.     8/7/20   patient states her left foot is feeling much better.  She has minimal to no pain now.  She has finished her antibiotics.  Patient tolerated her antibiotics well and denied any side effects such as nausea or GI distress.  She states erythema and edema is decreased on her left foot.  She states almost no drainage at all from the hallux ulcer.          ROS:  See above         Allergies    Allergen Reactions     Lisinopril Cough       Current Outpatient Medications   Medication Sig Dispense Refill     acetaminophen (TYLENOL) 325 MG tablet Take 2 tablets (650 mg) by mouth every 6 hours as needed for mild pain 50 tablet 0     amoxicillin (AMOXIL) 500 MG capsule TAKE 4 CAPSULES BY MOUTH 1 HOUR BEFORE DENTAL APPOINTMENT 4 capsule 0     aspirin  MG EC tablet Take 1 tablet (325 mg) by mouth daily 90 tablet 3     calcium-vitamin D 500-125 MG-UNIT TABS        cephALEXin (KEFLEX) 500 MG capsule Take 1 capsule (500 mg) by mouth 3 times daily 30 capsule 0     folic acid (FOLVITE) 1 MG tablet Take 1 tablet (1 mg) by mouth daily 30 tablet 6     FUROSEMIDE 20 MG PO tablet TAKE 1 1 TABLET BY MOUTH EVERY DAY IF 2 TO 3 POUND WEIGHT GAIN OVER A 2 DAY PERIOD 90 tablet 0     gabapentin (NEURONTIN) 100 MG capsule TAKE 1 CAPSULE BY MOUTH THREE TIMES DAILY 90 capsule 11     ICAPS PO 2 tablets daily       losartan (COZAAR) 25 MG tablet TAKE 1 TABLET(25 MG) BY MOUTH DAILY 90 tablet 3     methotrexate sodium 2.5 MG TABS Take 8 tablets (20 mg) by mouth once a week . Take all 8 tablets on the same day of each week.  Do not take with amoxicillin. 32 tablet 6     metoprolol tartrate (LOPRESSOR) 25 MG tablet TAKE 1 TABLET(25 MG) BY MOUTH TWICE DAILY 180 tablet 3     Omega-3 Fatty Acids (OMEGA-3 FISH OIL PO) Take 1 tablet twice daily.       sulfaSALAzine ER (AZULFIDINE EN) 500 MG EC tablet Take 1 tablet (500 mg) by mouth 2 times daily 60 tablet 6       Patient Active Problem List   Diagnosis     Polymyalgia rheumatica (H)     History of basal cell carcinoma     CARDIOVASCULAR SCREENING; LDL GOAL LESS THAN 130     Hypertension goal BP (blood pressure) < 140/90     Hip pain     Left atrial enlargement     Aortic stenosis     Status post coronary angiogram     Aortic valve stenosis     Aortic valve replaced     Rheumatoid arthritis of multiple sites with negative rheumatoid factor (H)     CKD (chronic kidney disease) stage 3,  GFR 30-59 ml/min (H)     THERESA III (vulvar intraepithelial neoplasia III)       Past Medical History:   Diagnosis Date     Actinic keratosis      Aortic stenosis 2014     Basal cell cancer 7/2014    left eye medial canthus      Basal cell carcinoma 9/30/08    left cheek     CKD (chronic kidney disease) stage 3, GFR 30-59 ml/min (H) 7/1/2019     HTN (hypertension)      Melanoma in situ (H) 9/30/08    left arm     Polymyalgia rheumatica (H) 11/99     Temporal arteritis (H) 11/99       Past Surgical History:   Procedure Laterality Date     C SKIN TISSUE PROCEDURE UNLISTED  11/3/08    mmis skin cancer excision     CATARACT IOL, RT/LT  5/09    bilateral     COLONOSCOPY  2002     EXCISE LESION VULVA N/A 9/27/2019    Procedure: Wide Local Excision Of Vulva, Colposcopy;  Surgeon: Mono Ribeiro MD;  Location: [facility] OR     REPLACE VALVE AORTIC N/A 4/25/2016    Procedure: REPLACE VALVE AORTIC;  Surgeon: Sudeep Tsai MD;  Location: [facility] OR       Family History   Problem Relation Age of Onset     Breast Cancer Sister 45     Arthritis Sister      Thyroid Disease Sister      Cancer Sister         colon     Arthritis Sister      Arthritis Mother      Hypertension Father      Cancer - colorectal Father      Prostate Cancer Father      Arthritis Father      Heart Disease Father      Lipids Father      Arthritis Sister      Asthma Daughter      Asthma Daughter      Cancer Daughter 58        lung     Cancer Other 81        pancreatic        Social History     Tobacco Use     Smoking status: Never Smoker     Smokeless tobacco: Never Used   Substance Use Topics     Alcohol use: Yes     Comment: 4 times a year         Exam:    Vitals: There were no vitals taken for this visit.  BMI: There is no height or weight on file to calculate BMI.  Height: Data Unavailable    Constitutional/ general:  Pt is in no apparent distress, appears well-nourished.  Cooperative with history and physical exam.  Patient seen with her daughter  today    Psych:  The patient answered questions appropriately.  Normal affect.  Seems to have reasonable expectations, in terms of treatment.     Eyes:  Visual scanning/ tracking without deficit.     Ears:  Response to auditory stimuli is normal.  negative hearing aid devices.  Auricles in proper alignment.     Lymphatic:  Popliteal lymph nodes not enlarged.     Lungs:  Non labored breathing, non labored speech. No cough.  No audible wheezing. Even, quiet breathing.       Vascular:  negative PT arteries.  DP 0/4.  CFT < 3 sec.   Ankle edema and varicosities noted.  No pedal hair growth.    Neuro:  Alert and oriented x 3. Coordinated gait.  Light touch sensation is intact to the L4, L5, S1 distributions. No obvious deficits.  No evidence of neurological-based weakness, spasticity, or contracture in the lower extremities.  Monofilament intact on all digits    Derm: skin thin, shiny, atrophic.  No erythema, ecchymosis, or cyanosis.      Musculoskeletal:    Lower extremity muscle strength is normal.  Patient is ambulatory without an assistive device or brace.  No gross deformities.  Normal arch.   MS 5/5 all compartments.  Normal ROM all fore foot and rearfoot joints.  The wound in the nailbed of the right hallux is now healed.  There is no surrounding erythema or edema here.      The left hallux nailbed ulcer in the past measured 11 x 8 mm, 6 x 5 mm, and today measures 2 x 2 mm.  It is partial thickness.  It has a base of granulation tissue.  There is no surrounding erythema or edema.  There is no purulence odor or sinus tracts.      On the left fourth toe dorsal medial there is a wound that in the past measured 8 x 4 mm and today measures 6 x 2 mm.  It is barely full-thickness.  The base is granulation tissue.  The erythema and edema and pain on palpation on the fourth toe has resolved.  There is no crepitus noted.  There is no signs of underlying fluctuance.    Culture results noted    A/P  Peripheral arterial  disease  Rheumatoid arthritis  Chronic kidney disease stage III.  Left hallux nailbed ulcer  Left fourth toe ulcer with cellulitis     The wound in the left hallux nailbed is improved.  They will continue dressings on here.  We discussed the culture results.  Discussed cellulitis appears to be resolved and they no longer need antibiotics.  We gave the patient a silicone pad now to spread the left third and fourth toes.  Discussed importance of keeping this offloaded until totally healed.  Return to the clinic in 3 weeks for reevaluation.  25 minutes spent in total time caring for this patient and at least half this time spent  face to face with patient regarding care.            Pawel Musa DPM DPM, FACFAS             Again, thank you for allowing me to participate in the care of your patient.        Sincerely,        Pawel Musa DPM

## 2020-08-07 NOTE — PATIENT INSTRUCTIONS
Roxanna to follow up with Primary Care provider regarding elevated blood pressure.  We wish you continued good healing. If you have any questions or concerns, please do not hesitate to contact us at [phone]    Please remember to call and schedule a follow up appointment if one was recommended at your earliest convenience.   PODIATRY CLINIC HOURS  TELEPHONE NUMBER    Dr. Pawel Musa D.P.M [facility]    Clinics:  [facility]    [facility]   Tuesday 1PM-6PM  [place]  Wednesday 7AM-2PM  [facility]  Thursday 10AM-6PM  [place]  Friday 7AM-3PM  [place]  Specialty schedulers:   [phone] to make an appointment with any Specialty Provider.        Urgent Care locations:    [facility] Monday-Friday 5 pm - 9 pm. Saturday-Sunday 9 am -5pm    Monday-Friday 11 am - 9 pm Saturday 9 am - 5 pm     Monday-Sunday 12 noon-8PM [phone]     If you need a medication refill, please contact us you may need lab work and/or a follow up visit prior to your refill (i.e. Antifungal medications).    [employer] (secure e-mail communication and access to your chart) to send a message or to make an appointment.    If MRI needed please call [facility] at [phone]

## 2020-08-07 NOTE — PROGRESS NOTES
Subjective:    6/26/20   Pt is seen today in consult from Dr. Gaines  for a foot exam.  Has CKD stage 3 and rheumatoid arthritis. Has a wound on her left hallux nail bed.  She has had this for over 6 months.  Is somewhat painful.  Patient on her left foot just a few days ago she developed a new wound on her left third interspace.  She has some discomfort with this.  She also has a loose nail on her right hallux.  It is somewhat moist here.  She denies any erythema or edema around any of these areas.  She denies any purulence or odor.  She denies any fever or chills.  Patient's father had heart disease.  She denies ever smoking.  She is retired.    7/24/20 patient returns for evaluation of bilateral foot ulcers.  She saw vascular surgery on 7/21/2020.  After days discussed options they decided for now the patient would just watch her wounds to see if they healed.  She states the right hallux wound is feeling much better.  It is not draining.  They believe it is almost healed.  They believe the left hallux wound is getting smaller.  She has had increased pain and erythema on the wound over her left fourth toe medially.  She is having a hard time keeping these toes .  She denies any purulence or odor.  She denies any fever or chills.  She states otherwise she is feeling well.     8/7/20   patient states her left foot is feeling much better.  She has minimal to no pain now.  She has finished her antibiotics.  Patient tolerated her antibiotics well and denied any side effects such as nausea or GI distress.  She states erythema and edema is decreased on her left foot.  She states almost no drainage at all from the hallux ulcer.          ROS:  See above         Allergies   Allergen Reactions     Lisinopril Cough       Current Outpatient Medications   Medication Sig Dispense Refill     acetaminophen (TYLENOL) 325 MG tablet Take 2 tablets (650 mg) by mouth every 6 hours as needed for mild pain 50 tablet 0      amoxicillin (AMOXIL) 500 MG capsule TAKE 4 CAPSULES BY MOUTH 1 HOUR BEFORE DENTAL APPOINTMENT 4 capsule 0     aspirin  MG EC tablet Take 1 tablet (325 mg) by mouth daily 90 tablet 3     calcium-vitamin D 500-125 MG-UNIT TABS        cephALEXin (KEFLEX) 500 MG capsule Take 1 capsule (500 mg) by mouth 3 times daily 30 capsule 0     folic acid (FOLVITE) 1 MG tablet Take 1 tablet (1 mg) by mouth daily 30 tablet 6     FUROSEMIDE 20 MG PO tablet TAKE 1 1 TABLET BY MOUTH EVERY DAY IF 2 TO 3 POUND WEIGHT GAIN OVER A 2 DAY PERIOD 90 tablet 0     gabapentin (NEURONTIN) 100 MG capsule TAKE 1 CAPSULE BY MOUTH THREE TIMES DAILY 90 capsule 11     ICAPS PO 2 tablets daily       losartan (COZAAR) 25 MG tablet TAKE 1 TABLET(25 MG) BY MOUTH DAILY 90 tablet 3     methotrexate sodium 2.5 MG TABS Take 8 tablets (20 mg) by mouth once a week . Take all 8 tablets on the same day of each week.  Do not take with amoxicillin. 32 tablet 6     metoprolol tartrate (LOPRESSOR) 25 MG tablet TAKE 1 TABLET(25 MG) BY MOUTH TWICE DAILY 180 tablet 3     Omega-3 Fatty Acids (OMEGA-3 FISH OIL PO) Take 1 tablet twice daily.       sulfaSALAzine ER (AZULFIDINE EN) 500 MG EC tablet Take 1 tablet (500 mg) by mouth 2 times daily 60 tablet 6       Patient Active Problem List   Diagnosis     Polymyalgia rheumatica (H)     History of basal cell carcinoma     CARDIOVASCULAR SCREENING; LDL GOAL LESS THAN 130     Hypertension goal BP (blood pressure) < 140/90     Hip pain     Left atrial enlargement     Aortic stenosis     Status post coronary angiogram     Aortic valve stenosis     Aortic valve replaced     Rheumatoid arthritis of multiple sites with negative rheumatoid factor (H)     CKD (chronic kidney disease) stage 3, GFR 30-59 ml/min (H)     THERESA III (vulvar intraepithelial neoplasia III)       Past Medical History:   Diagnosis Date     Actinic keratosis      Aortic stenosis 2014     Basal cell cancer 7/2014    left eye medial canthus      Basal cell  carcinoma 9/30/08    left cheek     CKD (chronic kidney disease) stage 3, GFR 30-59 ml/min (H) 7/1/2019     HTN (hypertension)      Melanoma in situ (H) 9/30/08    left arm     Polymyalgia rheumatica (H) 11/99     Temporal arteritis (H) 11/99       Past Surgical History:   Procedure Laterality Date     C SKIN TISSUE PROCEDURE UNLISTED  11/3/08    mmis skin cancer excision     CATARACT IOL, RT/LT  5/09    bilateral     COLONOSCOPY  2002     EXCISE LESION VULVA N/A 9/27/2019    Procedure: Wide Local Excision Of Vulva, Colposcopy;  Surgeon: Mono Ribeiro MD;  Location: [facility] OR     REPLACE VALVE AORTIC N/A 4/25/2016    Procedure: REPLACE VALVE AORTIC;  Surgeon: Sudeep Tsai MD;  Location: [facility] OR       Family History   Problem Relation Age of Onset     Breast Cancer Sister 45     Arthritis Sister      Thyroid Disease Sister      Cancer Sister         colon     Arthritis Sister      Arthritis Mother      Hypertension Father      Cancer - colorectal Father      Prostate Cancer Father      Arthritis Father      Heart Disease Father      Lipids Father      Arthritis Sister      Asthma Daughter      Asthma Daughter      Cancer Daughter 58        lung     Cancer Other 81        pancreatic        Social History     Tobacco Use     Smoking status: Never Smoker     Smokeless tobacco: Never Used   Substance Use Topics     Alcohol use: Yes     Comment: 4 times a year         Exam:    Vitals: There were no vitals taken for this visit.  BMI: There is no height or weight on file to calculate BMI.  Height: Data Unavailable    Constitutional/ general:  Pt is in no apparent distress, appears well-nourished.  Cooperative with history and physical exam.  Patient seen with her daughter today    Psych:  The patient answered questions appropriately.  Normal affect.  Seems to have reasonable expectations, in terms of treatment.     Eyes:  Visual scanning/ tracking without deficit.     Ears:  Response to auditory stimuli is normal.   negative hearing aid devices.  Auricles in proper alignment.     Lymphatic:  Popliteal lymph nodes not enlarged.     Lungs:  Non labored breathing, non labored speech. No cough.  No audible wheezing. Even, quiet breathing.       Vascular:  negative PT arteries.  DP 0/4.  CFT < 3 sec.   Ankle edema and varicosities noted.  No pedal hair growth.    Neuro:  Alert and oriented x 3. Coordinated gait.  Light touch sensation is intact to the L4, L5, S1 distributions. No obvious deficits.  No evidence of neurological-based weakness, spasticity, or contracture in the lower extremities.  Monofilament intact on all digits    Derm: skin thin, shiny, atrophic.  No erythema, ecchymosis, or cyanosis.      Musculoskeletal:    Lower extremity muscle strength is normal.  Patient is ambulatory without an assistive device or brace.  No gross deformities.  Normal arch.   MS 5/5 all compartments.  Normal ROM all fore foot and rearfoot joints.  The wound in the nailbed of the right hallux is now healed.  There is no surrounding erythema or edema here.      The left hallux nailbed ulcer in the past measured 11 x 8 mm, 6 x 5 mm, and today measures 2 x 2 mm.  It is partial thickness.  It has a base of granulation tissue.  There is no surrounding erythema or edema.  There is no purulence odor or sinus tracts.      On the left fourth toe dorsal medial there is a wound that in the past measured 8 x 4 mm and today measures 6 x 2 mm.  It is barely full-thickness.  The base is granulation tissue.  The erythema and edema and pain on palpation on the fourth toe has resolved.  There is no crepitus noted.  There is no signs of underlying fluctuance.    Culture results noted    A/P  Peripheral arterial disease  Rheumatoid arthritis  Chronic kidney disease stage III.  Left hallux nailbed ulcer  Left fourth toe ulcer with cellulitis     The wound in the left hallux nailbed is improved.  They will continue dressings on here.  We discussed the culture  results.  Discussed cellulitis appears to be resolved and they no longer need antibiotics.  We gave the patient a silicone pad now to spread the left third and fourth toes.  Discussed importance of keeping this offloaded until totally healed.  Return to the clinic in 3 weeks for reevaluation.  25 minutes spent in total time caring for this patient and at least half this time spent  face to face with patient regarding care.            Pawel Musa DPM DPM, FACFAS

## 2020-08-07 NOTE — NURSING NOTE
Roxanna to follow up with Primary Care provider regarding elevated blood pressure.    Brayan Owens MA

## 2020-08-13 DIAGNOSIS — I10 HYPERTENSION GOAL BP (BLOOD PRESSURE) < 140/90: ICD-10-CM

## 2020-08-13 RX ORDER — FUROSEMIDE 20 MG
TABLET ORAL
Qty: 30 TABLET | Refills: 0 | Status: SHIPPED | OUTPATIENT
Start: 2020-08-13 | End: 2020-09-28

## 2020-08-13 RX ORDER — METOPROLOL TARTRATE 25 MG/1
TABLET, FILM COATED ORAL
Qty: 60 TABLET | Refills: 0 | Status: SHIPPED | OUTPATIENT
Start: 2020-08-13 | End: 2020-09-15

## 2020-08-13 NOTE — TELEPHONE ENCOUNTER
Spoke to patient and made appointment for 9/16/2020.  Beatris LOWRY CMA ([facility])

## 2020-08-13 NOTE — TELEPHONE ENCOUNTER
Prescription approved per [facility] Refill Protocol.  30 day supply sent, needs visit for further refills (patient is scheduled on 9/16/20).  Jerilyn Orr RN

## 2020-08-13 NOTE — TELEPHONE ENCOUNTER
Patient over due for annual visit was last seen on 7/1/19.  Needs to schedule for refills.   Jerilyn Orr RN

## 2020-09-04 ENCOUNTER — OFFICE VISIT (OUTPATIENT)
Dept: PODIATRY | Facility: CLINIC | Age: 85
End: 2020-09-04
Payer: MEDICARE

## 2020-09-04 VITALS
BODY MASS INDEX: 23.83 KG/M2 | DIASTOLIC BLOOD PRESSURE: 74 MMHG | WEIGHT: 122 LBS | SYSTOLIC BLOOD PRESSURE: 144 MMHG | HEART RATE: 64 BPM

## 2020-09-04 DIAGNOSIS — M06.09 RHEUMATOID ARTHRITIS OF MULTIPLE SITES WITH NEGATIVE RHEUMATOID FACTOR (H): ICD-10-CM

## 2020-09-04 DIAGNOSIS — I73.9 PERIPHERAL ARTERIAL DISEASE (H): ICD-10-CM

## 2020-09-04 DIAGNOSIS — L97.529 ULCER OF LEFT FOOT, UNSPECIFIED ULCER STAGE (H): Primary | ICD-10-CM

## 2020-09-04 PROCEDURE — 99213 OFFICE O/P EST LOW 20 MIN: CPT | Performed by: PODIATRIST

## 2020-09-04 NOTE — PATIENT INSTRUCTIONS
We wish you continued good healing. If you have any questions or concerns, please do not hesitate to contact us at [phone]    Please remember to call and schedule a follow up appointment if one was recommended at your earliest convenience.   PODIATRY CLINIC HOURS  TELEPHONE NUMBER    Dr. Pawel Musa D.P.M [facility]    Clinics:  [facility]    [facility]iffani Chavez [facility]   Tuesday 1PM-6PM  [place]  Wednesday 7AM-2PM  [facility]  Thursday 10AM-6PM  [place]  Friday 7AM-3PM  [place]  Specialty schedulers:   [phone] to make an appointment with any Specialty Provider.        Urgent Care locations:    [facility] Monday-Friday 5 pm - 9 pm. Saturday-Sunday 9 am -5pm    Monday-Friday 11 am - 9 pm Saturday 9 am - 5 pm     Monday-Sunday 12 noon-8PM [phone]     If you need a medication refill, please contact us you may need lab work and/or a follow up visit prior to your refill (i.e. Antifungal medications).    [employer] (secure e-mail communication and access to your chart) to send a message or to make an appointment.    If MRI needed please call [facility] at [phone]

## 2020-09-04 NOTE — LETTER
9/4/2020         RE: Roxanna Stark  [address]  [place] [address]        Dear Colleague,    Thank you for referring your patient, Roxanna Stark, to the [facility]. Please see a copy of my visit note below.    Subjective:    6/26/20   Pt is seen today in consult from Dr. Gaines  for a foot exam.  Has CKD stage 3 and rheumatoid arthritis. Has a wound on her left hallux nail bed.  She has had this for over 6 months.  Is somewhat painful.  Patient on her left foot just a few days ago she developed a new wound on her left third interspace.  She has some discomfort with this.  She also has a loose nail on her right hallux.  It is somewhat moist here.  She denies any erythema or edema around any of these areas.  She denies any purulence or odor.  She denies any fever or chills.  Patient's father had heart disease.  She denies ever smoking.  She is retired.    7/24/20 patient returns for evaluation of bilateral foot ulcers.  She saw vascular surgery on 7/21/2020.  After days discussed options they decided for now the patient would just watch her wounds to see if they healed.  She states the right hallux wound is feeling much better.  It is not draining.  They believe it is almost healed.  They believe the left hallux wound is getting smaller.  She has had increased pain and erythema on the wound over her left fourth toe medially.  She is having a hard time keeping these toes .  She denies any purulence or odor.  She denies any fever or chills.  She states otherwise she is feeling well.     8/7/20   patient states her left foot is feeling much better.  She has minimal to no pain now.  She has finished her antibiotics.  Patient tolerated her antibiotics well and denied any side effects such as nausea or GI distress.  She states erythema and edema is decreased on her left foot.  She states almost no drainage at all from the hallux ulcer.    9/4/20   patient has had no pain in her  left foot for a week.  She believes her wounds are healed.  She denies any drainage.  She denies any erythema or edema.  She has no other new complaints.         ROS:  See above         Allergies   Allergen Reactions     Lisinopril Cough       Current Outpatient Medications   Medication Sig Dispense Refill     acetaminophen (TYLENOL) 325 MG tablet Take 2 tablets (650 mg) by mouth every 6 hours as needed for mild pain 50 tablet 0     amoxicillin (AMOXIL) 500 MG capsule TAKE 4 CAPSULES BY MOUTH 1 HOUR BEFORE DENTAL APPOINTMENT 4 capsule 0     aspirin  MG EC tablet Take 1 tablet (325 mg) by mouth daily 90 tablet 3     calcium-vitamin D 500-125 MG-UNIT TABS        cephALEXin (KEFLEX) 500 MG capsule Take 1 capsule (500 mg) by mouth 3 times daily 30 capsule 0     folic acid (FOLVITE) 1 MG tablet Take 1 tablet (1 mg) by mouth daily 30 tablet 6     furosemide (LASIX) 20 MG tablet TAKE 1 TABLET BY MOUTH ONCE DAILY IF 2 TO 3 LBS WEIGHT GAIN OVER A 2 DAY PERIOD. 30 tablet 0     gabapentin (NEURONTIN) 100 MG capsule TAKE 1 CAPSULE BY MOUTH THREE TIMES DAILY 90 capsule 11     ICAPS PO 2 tablets daily       losartan (COZAAR) 25 MG tablet TAKE 1 TABLET(25 MG) BY MOUTH DAILY 90 tablet 3     methotrexate sodium 2.5 MG TABS Take 8 tablets (20 mg) by mouth once a week . Take all 8 tablets on the same day of each week.  Do not take with amoxicillin. 32 tablet 6     metoprolol tartrate (LOPRESSOR) 25 MG tablet TAKE 1 TABLET(25 MG) BY MOUTH TWICE DAILY 60 tablet 0     Omega-3 Fatty Acids (OMEGA-3 FISH OIL PO) Take 1 tablet twice daily.       sulfaSALAzine ER (AZULFIDINE EN) 500 MG EC tablet Take 1 tablet (500 mg) by mouth 2 times daily 60 tablet 6       Patient Active Problem List   Diagnosis     Polymyalgia rheumatica (H)     History of basal cell carcinoma     CARDIOVASCULAR SCREENING; LDL GOAL LESS THAN 130     Hypertension goal BP (blood pressure) < 140/90     Hip pain     Left atrial enlargement     Aortic stenosis     Status  post coronary angiogram     Aortic valve stenosis     Aortic valve replaced     Rheumatoid arthritis of multiple sites with negative rheumatoid factor (H)     CKD (chronic kidney disease) stage 3, GFR 30-59 ml/min (H)     THERESA III (vulvar intraepithelial neoplasia III)       Past Medical History:   Diagnosis Date     Actinic keratosis      Aortic stenosis 2014     Basal cell cancer 7/2014    left eye medial canthus      Basal cell carcinoma 9/30/08    left cheek     CKD (chronic kidney disease) stage 3, GFR 30-59 ml/min (H) 7/1/2019     HTN (hypertension)      Melanoma in situ (H) 9/30/08    left arm     Polymyalgia rheumatica (H) 11/99     Temporal arteritis (H) 11/99       Past Surgical History:   Procedure Laterality Date     C SKIN TISSUE PROCEDURE UNLISTED  11/3/08    mmis skin cancer excision     CATARACT IOL, RT/LT  5/09    bilateral     COLONOSCOPY  2002     EXCISE LESION VULVA N/A 9/27/2019    Procedure: Wide Local Excision Of Vulva, Colposcopy;  Surgeon: Mono Ribeiro MD;  Location: [facility] OR     REPLACE VALVE AORTIC N/A 4/25/2016    Procedure: REPLACE VALVE AORTIC;  Surgeon: Sudeep Tsai MD;  Location: [facility] OR       Family History   Problem Relation Age of Onset     Breast Cancer Sister 45     Arthritis Sister      Thyroid Disease Sister      Cancer Sister         colon     Arthritis Sister      Arthritis Mother      Hypertension Father      Cancer - colorectal Father      Prostate Cancer Father      Arthritis Father      Heart Disease Father      Lipids Father      Arthritis Sister      Asthma Daughter      Asthma Daughter      Cancer Daughter 58        lung     Cancer Other 81        pancreatic        Social History     Tobacco Use     Smoking status: Never Smoker     Smokeless tobacco: Never Used   Substance Use Topics     Alcohol use: Yes     Comment: 4 times a year         Exam:    Vitals: BP (!) 144/74   Pulse 64   Wt 55.3 kg (122 lb)   BMI 23.83 kg/m    BMI: Body mass index is 23.83  kg/m .  Height: Data Unavailable    Constitutional/ general:  Pt is in no apparent distress, appears well-nourished.  Cooperative with history and physical exam.  Patient seen with her daughter today    Psych:  The patient answered questions appropriately.  Normal affect.  Seems to have reasonable expectations, in terms of treatment.     Eyes:  Visual scanning/ tracking without deficit.     Ears:  Response to auditory stimuli is normal.  negative hearing aid devices.  Auricles in proper alignment.     Lymphatic:  Popliteal lymph nodes not enlarged.     Lungs:  Non labored breathing, non labored speech. No cough.  No audible wheezing. Even, quiet breathing.       Vascular:  negative PT arteries.  DP 0/4.  CFT < 3 sec.   Ankle edema and varicosities noted.  No pedal hair growth.    Neuro:  Alert and oriented x 3. Coordinated gait.  Light touch sensation is intact to the L4, L5, S1 distributions. No obvious deficits.  No evidence of neurological-based weakness, spasticity, or contracture in the lower extremities.  Monofilament intact on all digits    Derm: skin thin, shiny, atrophic.  No erythema, ecchymosis, or cyanosis.      Musculoskeletal:    Lower extremity muscle strength is normal.  Patient is ambulatory without an assistive device or brace.  No gross deformities.  Normal arch.   MS 5/5 all compartments.  Normal ROM all fore foot and rearfoot joints.  The wound in the nailbed of the right hallux is now healed.  There is no surrounding erythema or edema here.      The left hallux nailbed now has small eschar.  This easily sloughed off and the underlying tissue is healthy.    On the left fourth toe dorsal medial wound is now healed.    No signs of infection on either foot    Culture results noted    A/P  Peripheral arterial disease  Rheumatoid arthritis  Chronic kidney disease stage III.  Left hallux nailbed ulcer  Left fourth toe ulcer with cellulitis    Discussed with patient both her wounds are healed.  She  will watch her feet carefully.  She will make sure that her shoes have plenty of room.  RTC PRN        Pawel Musa DPM DPM, FACFAS             Again, thank you for allowing me to participate in the care of your patient.        Sincerely,        Pawel Musa DPM

## 2020-09-04 NOTE — NURSING NOTE
Body mass index is 23.83 kg/m .        Roxanna to follow up with Primary Care provider regarding elevated blood pressure.

## 2020-09-04 NOTE — PROGRESS NOTES
Subjective:    6/26/20   Pt is seen today in consult from Dr. Gaines  for a foot exam.  Has CKD stage 3 and rheumatoid arthritis. Has a wound on her left hallux nail bed.  She has had this for over 6 months.  Is somewhat painful.  Patient on her left foot just a few days ago she developed a new wound on her left third interspace.  She has some discomfort with this.  She also has a loose nail on her right hallux.  It is somewhat moist here.  She denies any erythema or edema around any of these areas.  She denies any purulence or odor.  She denies any fever or chills.  Patient's father had heart disease.  She denies ever smoking.  She is retired.    7/24/20 patient returns for evaluation of bilateral foot ulcers.  She saw vascular surgery on 7/21/2020.  After days discussed options they decided for now the patient would just watch her wounds to see if they healed.  She states the right hallux wound is feeling much better.  It is not draining.  They believe it is almost healed.  They believe the left hallux wound is getting smaller.  She has had increased pain and erythema on the wound over her left fourth toe medially.  She is having a hard time keeping these toes .  She denies any purulence or odor.  She denies any fever or chills.  She states otherwise she is feeling well.     8/7/20   patient states her left foot is feeling much better.  She has minimal to no pain now.  She has finished her antibiotics.  Patient tolerated her antibiotics well and denied any side effects such as nausea or GI distress.  She states erythema and edema is decreased on her left foot.  She states almost no drainage at all from the hallux ulcer.    9/4/20   patient has had no pain in her left foot for a week.  She believes her wounds are healed.  She denies any drainage.  She denies any erythema or edema.  She has no other new complaints.         ROS:  See above         Allergies   Allergen Reactions     Lisinopril Cough        Current Outpatient Medications   Medication Sig Dispense Refill     acetaminophen (TYLENOL) 325 MG tablet Take 2 tablets (650 mg) by mouth every 6 hours as needed for mild pain 50 tablet 0     amoxicillin (AMOXIL) 500 MG capsule TAKE 4 CAPSULES BY MOUTH 1 HOUR BEFORE DENTAL APPOINTMENT 4 capsule 0     aspirin  MG EC tablet Take 1 tablet (325 mg) by mouth daily 90 tablet 3     calcium-vitamin D 500-125 MG-UNIT TABS        cephALEXin (KEFLEX) 500 MG capsule Take 1 capsule (500 mg) by mouth 3 times daily 30 capsule 0     folic acid (FOLVITE) 1 MG tablet Take 1 tablet (1 mg) by mouth daily 30 tablet 6     furosemide (LASIX) 20 MG tablet TAKE 1 TABLET BY MOUTH ONCE DAILY IF 2 TO 3 LBS WEIGHT GAIN OVER A 2 DAY PERIOD. 30 tablet 0     gabapentin (NEURONTIN) 100 MG capsule TAKE 1 CAPSULE BY MOUTH THREE TIMES DAILY 90 capsule 11     ICAPS PO 2 tablets daily       losartan (COZAAR) 25 MG tablet TAKE 1 TABLET(25 MG) BY MOUTH DAILY 90 tablet 3     methotrexate sodium 2.5 MG TABS Take 8 tablets (20 mg) by mouth once a week . Take all 8 tablets on the same day of each week.  Do not take with amoxicillin. 32 tablet 6     metoprolol tartrate (LOPRESSOR) 25 MG tablet TAKE 1 TABLET(25 MG) BY MOUTH TWICE DAILY 60 tablet 0     Omega-3 Fatty Acids (OMEGA-3 FISH OIL PO) Take 1 tablet twice daily.       sulfaSALAzine ER (AZULFIDINE EN) 500 MG EC tablet Take 1 tablet (500 mg) by mouth 2 times daily 60 tablet 6       Patient Active Problem List   Diagnosis     Polymyalgia rheumatica (H)     History of basal cell carcinoma     CARDIOVASCULAR SCREENING; LDL GOAL LESS THAN 130     Hypertension goal BP (blood pressure) < 140/90     Hip pain     Left atrial enlargement     Aortic stenosis     Status post coronary angiogram     Aortic valve stenosis     Aortic valve replaced     Rheumatoid arthritis of multiple sites with negative rheumatoid factor (H)     CKD (chronic kidney disease) stage 3, GFR 30-59 ml/min (H)     THERESA III (vulvar  intraepithelial neoplasia III)       Past Medical History:   Diagnosis Date     Actinic keratosis      Aortic stenosis 2014     Basal cell cancer 7/2014    left eye medial canthus      Basal cell carcinoma 9/30/08    left cheek     CKD (chronic kidney disease) stage 3, GFR 30-59 ml/min (H) 7/1/2019     HTN (hypertension)      Melanoma in situ (H) 9/30/08    left arm     Polymyalgia rheumatica (H) 11/99     Temporal arteritis (H) 11/99       Past Surgical History:   Procedure Laterality Date     C SKIN TISSUE PROCEDURE UNLISTED  11/3/08    mmis skin cancer excision     CATARACT IOL, RT/LT  5/09    bilateral     COLONOSCOPY  2002     EXCISE LESION VULVA N/A 9/27/2019    Procedure: Wide Local Excision Of Vulva, Colposcopy;  Surgeon: Mono Ribeiro MD;  Location: [facility] OR     REPLACE VALVE AORTIC N/A 4/25/2016    Procedure: REPLACE VALVE AORTIC;  Surgeon: Sudeep Tsai MD;  Location: [facility] OR       Family History   Problem Relation Age of Onset     Breast Cancer Sister 45     Arthritis Sister      Thyroid Disease Sister      Cancer Sister         colon     Arthritis Sister      Arthritis Mother      Hypertension Father      Cancer - colorectal Father      Prostate Cancer Father      Arthritis Father      Heart Disease Father      Lipids Father      Arthritis Sister      Asthma Daughter      Asthma Daughter      Cancer Daughter 58        lung     Cancer Other 81        pancreatic        Social History     Tobacco Use     Smoking status: Never Smoker     Smokeless tobacco: Never Used   Substance Use Topics     Alcohol use: Yes     Comment: 4 times a year         Exam:    Vitals: BP (!) 144/74   Pulse 64   Wt 55.3 kg (122 lb)   BMI 23.83 kg/m    BMI: Body mass index is 23.83 kg/m .  Height: Data Unavailable    Constitutional/ general:  Pt is in no apparent distress, appears well-nourished.  Cooperative with history and physical exam.  Patient seen with her daughter today    Psych:  The patient answered questions  appropriately.  Normal affect.  Seems to have reasonable expectations, in terms of treatment.     Eyes:  Visual scanning/ tracking without deficit.     Ears:  Response to auditory stimuli is normal.  negative hearing aid devices.  Auricles in proper alignment.     Lymphatic:  Popliteal lymph nodes not enlarged.     Lungs:  Non labored breathing, non labored speech. No cough.  No audible wheezing. Even, quiet breathing.       Vascular:  negative PT arteries.  DP 0/4.  CFT < 3 sec.   Ankle edema and varicosities noted.  No pedal hair growth.    Neuro:  Alert and oriented x 3. Coordinated gait.  Light touch sensation is intact to the L4, L5, S1 distributions. No obvious deficits.  No evidence of neurological-based weakness, spasticity, or contracture in the lower extremities.  Monofilament intact on all digits    Derm: skin thin, shiny, atrophic.  No erythema, ecchymosis, or cyanosis.      Musculoskeletal:    Lower extremity muscle strength is normal.  Patient is ambulatory without an assistive device or brace.  No gross deformities.  Normal arch.   MS 5/5 all compartments.  Normal ROM all fore foot and rearfoot joints.  The wound in the nailbed of the right hallux is now healed.  There is no surrounding erythema or edema here.      The left hallux nailbed now has small eschar.  This easily sloughed off and the underlying tissue is healthy.    On the left fourth toe dorsal medial wound is now healed.    No signs of infection on either foot    Culture results noted    A/P  Peripheral arterial disease  Rheumatoid arthritis  Chronic kidney disease stage III.  Left hallux nailbed ulcer  Left fourth toe ulcer with cellulitis    Discussed with patient both her wounds are healed.  She will watch her feet carefully.  She will make sure that her shoes have plenty of room.  RTC PRN        Pawel Musa, RUBIAN CESPEDES, FACFAS

## 2020-09-08 ENCOUNTER — OFFICE VISIT (OUTPATIENT)
Dept: ONCOLOGY | Facility: CLINIC | Age: 85
End: 2020-09-08
Attending: OBSTETRICS & GYNECOLOGY
Payer: MEDICARE

## 2020-09-08 VITALS
BODY MASS INDEX: 24.64 KG/M2 | DIASTOLIC BLOOD PRESSURE: 77 MMHG | RESPIRATION RATE: 16 BRPM | HEART RATE: 64 BPM | SYSTOLIC BLOOD PRESSURE: 175 MMHG | HEIGHT: 60 IN | TEMPERATURE: 97.5 F | WEIGHT: 125.5 LBS | OXYGEN SATURATION: 99 %

## 2020-09-08 DIAGNOSIS — D07.1 VIN III (VULVAR INTRAEPITHELIAL NEOPLASIA III): Primary | ICD-10-CM

## 2020-09-08 PROCEDURE — 56820 COLPOSCOPY VULVA: CPT | Mod: ZP | Performed by: OBSTETRICS & GYNECOLOGY

## 2020-09-08 PROCEDURE — 56820 COLPOSCOPY VULVA: CPT | Mod: ZF

## 2020-09-08 PROCEDURE — 99214 OFFICE O/P EST MOD 30 MIN: CPT | Mod: 25 | Performed by: OBSTETRICS & GYNECOLOGY

## 2020-09-08 PROCEDURE — G0463 HOSPITAL OUTPT CLINIC VISIT: HCPCS | Mod: ZF

## 2020-09-08 PROCEDURE — G0463 HOSPITAL OUTPT CLINIC VISIT: HCPCS | Mod: 25

## 2020-09-08 ASSESSMENT — MIFFLIN-ST. JEOR: SCORE: 895.76

## 2020-09-08 ASSESSMENT — PAIN SCALES - GENERAL: PAINLEVEL: NO PAIN (0)

## 2020-09-08 NOTE — PROGRESS NOTES
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 Her pathology at that time was notable for positive margin from 3-6 o'clock (near the anus).       Relevant history:  Vulvar lesion x 1 year, used creams for awhile, then ultimately presented to physician.   7/3/2019: vulvar biopsy of the right labia minora showing THERESA 2-3  9/27/2019: posterior simple vulvectomy, THERESA III with positive margin from 3-6 o'clock. Complicated by wound separation.     2/13/20: Vulvar colposcopy, no biopsy    The patient returns today for follow-up. No specific concerns.  No vulvar lesions, no itching, no burning, no bleeding. No complaints.    Past Medical History:    Past Medical History:   Diagnosis Date     Actinic keratosis      Aortic stenosis 2014     Basal cell cancer 7/2014    left eye medial canthus      Basal cell carcinoma 9/30/08    left cheek     CKD (chronic kidney disease) stage 3, GFR 30-59 ml/min (H) 7/1/2019     HTN (hypertension)      Melanoma in situ (H) 9/30/08    left arm     Polymyalgia rheumatica (H) 11/99     Temporal arteritis (H) 11/99         Past Surgical History:    Past Surgical History:   Procedure Laterality Date     C SKIN TISSUE PROCEDURE UNLISTED  11/3/08    mmis skin cancer excision     CATARACT IOL, RT/LT  5/09    bilateral     COLONOSCOPY  2002     EXCISE LESION VULVA N/A 9/27/2019    Procedure: Wide Local Excision Of Vulva, Colposcopy;  Surgeon: Mono Ribeiro MD;  Location:  OR     REPLACE VALVE AORTIC N/A 4/25/2016    Procedure: REPLACE VALVE AORTIC;  Surgeon: Sudeep Tsai MD;  Location:  OR         Health Maintenance:  Health Maintenance Due   Topic Date Due     MEDICARE ANNUAL WELLNESS VISIT  07/01/2020     BMP  07/01/2020     FALL RISK  ASSESSMENT  07/01/2020     INFLUENZA VACCINE (1) 09/01/2020       Current Medications:     has a current medication list which includes the following prescription(s): acetaminophen, amoxicillin, aspirin, calcium-vitamin d, cephalexin, folic acid, furosemide, gabapentin, multiple vitamins-minerals, losartan, methotrexate sodium, metoprolol tartrate, omega-3 fatty acids, and sulfasalazine er.       Allergies:     [unfilled]        Social History:     Social History     Tobacco Use     Smoking status: Never Smoker     Smokeless tobacco: Never Used   Substance Use Topics     Alcohol use: Yes     Comment: 4 times a year       History   Drug Use No           Family History:     The patient's family history is notable for :    Family History   Problem Relation Age of Onset     Breast Cancer Sister 45     Arthritis Sister      Thyroid Disease Sister      Cancer Sister         colon     Arthritis Sister      Arthritis Mother      Hypertension Father      Cancer - colorectal Father      Prostate Cancer Father      Arthritis Father      Heart Disease Father      Lipids Father      Arthritis Sister      Asthma Daughter      Asthma Daughter      Cancer Daughter 58        lung     Cancer Other 81        pancreatic          Physical Exam:     There were no vitals taken for this visit.  There is no height or weight on file to calculate BMI.    General Appearance: healthy and alert, no distress     Musculoskeletal: extremities non tender and without edema    Skin: no lesions or rashes     Neurological: normal gait, no gross defects     Psychiatric: appropriate mood and affect                               Hematological: normal cervical, supraclavicular and inguinal lymph nodes     Gastrointestinal:       abdomen soft, non-tender, non-distended, no organomegaly or masses    Genitourinary: Vulvar colposcopy performed after consent from patient.  5% acetic acid solution placed on vulva, perineal and perianal tissue.   Viewed under  colposcopic enhancement.  Widened scar right vulva, perineal body abutting perianal tissue.  Thicker band of scar tissue at introitus.  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 Continue close follow-up. Return in six months    Questions answered, patient expressed understanding of plan of care    Zaira Jacques MD  Gynecologic Oncology  [facility]        CC  Patient Care Team:  Swapna Gaines MD as PCP - General (Family Practice)  Brandan Landin MD as MD (OB/Gyn)  Mono Ribeiro MD as MD (Gyn-Onc)  Maynor Mary MD as Assigned PCP  SELF, REFERRED

## 2020-09-08 NOTE — NURSING NOTE
Oncology Rooming Note    September 8, 2020 11:15 AM   Roxanna Stark is a 89 year old female who presents for:    Chief Complaint   Patient presents with     Oncology Clinic Visit     Return; THERESA III     Initial Vitals: BP (!) 175/77 (BP Location: Right arm, Patient Position: Sitting, Cuff Size: Adult Regular)   Pulse 64   Temp 97.5  F (36.4  C) (Oral)   Resp 16   Ht 1.524 m (5')   Wt 56.9 kg (125 lb 8 oz)   SpO2 99%   BMI 24.51 kg/m   Estimated body mass index is 24.51 kg/m  as calculated from the following:    Height as of this encounter: 1.524 m (5').    Weight as of this encounter: 56.9 kg (125 lb 8 oz). Body surface area is 1.55 meters squared.  No Pain (0) Comment: Data Unavailable   No LMP recorded. Patient is postmenopausal.  Allergies reviewed: Yes  Medications reviewed: Yes    Medications: Medication refills not needed today.  Pharmacy name entered into [employer]:    [id number] [address] AT [facility] [address] [facility]    Clinical concerns: No new concerns.        Toshia France, [facility]

## 2020-09-08 NOTE — PATIENT INSTRUCTIONS
Colposcopy vulva, no lesions, no biopsies    Return in six months for colposcopy    Zaira Jacques MD  Gynecologic Oncology  [facility]

## 2020-09-08 NOTE — LETTER
9/8/2020         RE: Roxanna Stark  [address]  [place] [address]        Dear Colleague,    Thank you for referring your patient, Roxanna Stark, to the [facility]. Please see a copy of my visit note below.                            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 Her pathology at that time was notable for positive margin from 3-6 o'clock (near the anus).       Relevant history:  Vulvar lesion x 1 year, used creams for awhile, then ultimately presented to physician.   7/3/2019: vulvar biopsy of the right labia minora showing THERESA 2-3  9/27/2019: posterior simple vulvectomy, THERESA III with positive margin from 3-6 o'clock. Complicated by wound separation.     2/13/20: Vulvar colposcopy, no biopsy    The patient returns today for follow-up. No specific concerns.  No vulvar lesions, no itching, no burning, no bleeding. No complaints.    Past Medical History:    Past Medical History:   Diagnosis Date     Actinic keratosis      Aortic stenosis 2014     Basal cell cancer 7/2014    left eye medial canthus      Basal cell carcinoma 9/30/08    left cheek     CKD (chronic kidney disease) stage 3, GFR 30-59 ml/min (H) 7/1/2019     HTN (hypertension)      Melanoma in situ (H) 9/30/08    left arm     Polymyalgia rheumatica (H) 11/99     Temporal arteritis (H) 11/99         Past Surgical History:    Past Surgical History:   Procedure Laterality Date     C SKIN TISSUE PROCEDURE UNLISTED  11/3/08    mmis skin cancer excision     CATARACT IOL, RT/LT  5/09    bilateral     COLONOSCOPY  2002     EXCISE LESION VULVA N/A 9/27/2019    Procedure: Wide Local Excision Of Vulva, Colposcopy;  Surgeon: Mono Ribeiro MD;  Location: [facility] OR     REPLACE VALVE AORTIC N/A  4/25/2016    Procedure: REPLACE VALVE AORTIC;  Surgeon: Sudeep Tsai MD;  Location:  OR         Health Maintenance:  Health Maintenance Due   Topic Date Due     MEDICARE ANNUAL WELLNESS VISIT  07/01/2020     BMP  07/01/2020     FALL RISK ASSESSMENT  07/01/2020     INFLUENZA VACCINE (1) 09/01/2020       Current Medications:     has a current medication list which includes the following prescription(s): acetaminophen, amoxicillin, aspirin, calcium-vitamin d, cephalexin, folic acid, furosemide, gabapentin, multiple vitamins-minerals, losartan, methotrexate sodium, metoprolol tartrate, omega-3 fatty acids, and sulfasalazine er.       Allergies:     [unfilled]        Social History:     Social History     Tobacco Use     Smoking status: Never Smoker     Smokeless tobacco: Never Used   Substance Use Topics     Alcohol use: Yes     Comment: 4 times a year       History   Drug Use No           Family History:     The patient's family history is notable for :    Family History   Problem Relation Age of Onset     Breast Cancer Sister 45     Arthritis Sister      Thyroid Disease Sister      Cancer Sister         colon     Arthritis Sister      Arthritis Mother      Hypertension Father      Cancer - colorectal Father      Prostate Cancer Father      Arthritis Father      Heart Disease Father      Lipids Father      Arthritis Sister      Asthma Daughter      Asthma Daughter      Cancer Daughter 58        lung     Cancer Other 81        pancreatic          Physical Exam:     There were no vitals taken for this visit.  There is no height or weight on file to calculate BMI.    General Appearance: healthy and alert, no distress     Musculoskeletal: extremities non tender and without edema    Skin: no lesions or rashes     Neurological: normal gait, no gross defects     Psychiatric: appropriate mood and affect                               Hematological: normal cervical, supraclavicular and inguinal lymph  nodes     Gastrointestinal:       abdomen soft, non-tender, non-distended, no organomegaly or masses    Genitourinary: Vulvar colposcopy performed after consent from patient.  5% acetic acid solution placed on vulva, perineal and perianal tissue.   Viewed under colposcopic enhancement.  Widened scar right vulva, perineal body abutting perianal tissue.  Thicker band of scar tissue at introitus.  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 Continue close follow-up. Return in six months    Questions answered, patient expressed understanding of plan of care    Zaira Jacques MD  Gynecologic Oncology  [facility]        CC  Patient Care Team:  Swapna Gaines MD as PCP - General (Family Practice)  Brandan Landin MD as MD (OB/Gyn)  Mono Ribeiro MD as MD (Gyn-Onc)  Maynor Mary MD as Assigned PCP

## 2020-09-16 ENCOUNTER — OFFICE VISIT (OUTPATIENT)
Dept: FAMILY MEDICINE | Facility: CLINIC | Age: 85
End: 2020-09-16
Payer: MEDICARE

## 2020-09-16 VITALS
OXYGEN SATURATION: 98 % | TEMPERATURE: 97.6 F | RESPIRATION RATE: 20 BRPM | HEIGHT: 60 IN | HEART RATE: 76 BPM | DIASTOLIC BLOOD PRESSURE: 66 MMHG | BODY MASS INDEX: 24.54 KG/M2 | WEIGHT: 125 LBS | SYSTOLIC BLOOD PRESSURE: 132 MMHG

## 2020-09-16 DIAGNOSIS — Z79.899 HIGH RISK MEDICATION USE: ICD-10-CM

## 2020-09-16 DIAGNOSIS — Z00.01 ENCOUNTER FOR ROUTINE ADULT PHYSICAL EXAM WITH ABNORMAL FINDINGS: ICD-10-CM

## 2020-09-16 DIAGNOSIS — N18.30 CKD (CHRONIC KIDNEY DISEASE) STAGE 3, GFR 30-59 ML/MIN (H): ICD-10-CM

## 2020-09-16 DIAGNOSIS — Z23 NEED FOR PROPHYLACTIC VACCINATION AND INOCULATION AGAINST INFLUENZA: ICD-10-CM

## 2020-09-16 DIAGNOSIS — M35.3 POLYMYALGIA RHEUMATICA (H): ICD-10-CM

## 2020-09-16 DIAGNOSIS — M06.09 RHEUMATOID ARTHRITIS OF MULTIPLE SITES WITH NEGATIVE RHEUMATOID FACTOR (H): ICD-10-CM

## 2020-09-16 DIAGNOSIS — I10 HYPERTENSION GOAL BP (BLOOD PRESSURE) < 140/90: ICD-10-CM

## 2020-09-16 DIAGNOSIS — D07.1 VIN III (VULVAR INTRAEPITHELIAL NEOPLASIA III): ICD-10-CM

## 2020-09-16 LAB
ANION GAP SERPL CALCULATED.3IONS-SCNC: 4 MMOL/L (ref 3–14)
BUN SERPL-MCNC: 28 MG/DL (ref 7–30)
CALCIUM SERPL-MCNC: 9.2 MG/DL (ref 8.5–10.1)
CHLORIDE SERPL-SCNC: 107 MMOL/L (ref 94–109)
CO2 SERPL-SCNC: 29 MMOL/L (ref 20–32)
CREAT SERPL-MCNC: 1.24 MG/DL (ref 0.52–1.04)
GFR SERPL CREATININE-BSD FRML MDRD: 37 ML/MIN/{1.73_M2}
GLUCOSE SERPL-MCNC: 102 MG/DL (ref 70–99)
POTASSIUM SERPL-SCNC: 4.2 MMOL/L (ref 3.4–5.3)
SODIUM SERPL-SCNC: 140 MMOL/L (ref 133–144)

## 2020-09-16 PROCEDURE — G0439 PPPS, SUBSEQ VISIT: HCPCS | Performed by: FAMILY MEDICINE

## 2020-09-16 PROCEDURE — 80048 BASIC METABOLIC PNL TOTAL CA: CPT | Performed by: FAMILY MEDICINE

## 2020-09-16 PROCEDURE — 36415 COLL VENOUS BLD VENIPUNCTURE: CPT | Performed by: FAMILY MEDICINE

## 2020-09-16 RX ORDER — METOPROLOL TARTRATE 25 MG/1
25 TABLET, FILM COATED ORAL 2 TIMES DAILY
Qty: 180 TABLET | Refills: 3 | Status: SHIPPED | OUTPATIENT
Start: 2020-09-16 | End: 2021-09-23

## 2020-09-16 ASSESSMENT — PAIN SCALES - GENERAL: PAINLEVEL: NO PAIN (0)

## 2020-09-16 ASSESSMENT — MIFFLIN-ST. JEOR: SCORE: 893.5

## 2020-09-16 ASSESSMENT — ACTIVITIES OF DAILY LIVING (ADL): CURRENT_FUNCTION: NO ASSISTANCE NEEDED

## 2020-09-16 NOTE — PROGRESS NOTES
"SUBJECTIVE:   Roxanna Stark is a 89 year old female who presents for Preventive Visit.      Patient has been advised of split billing requirements and indicates understanding: Yes   Are you in the first 12 months of your Medicare coverage?  No    Healthy Habits:     In general, how would you rate your overall health?  Good    Frequency of exercise:  4-5 days/week    Duration of exercise:  Greater than 60 minutes    Do you usually eat at least 4 servings of fruit and vegetables a day, include whole grains    & fiber and avoid regularly eating high fat or \"junk\" foods?  No    Taking medications regularly:  0    Barriers to taking medications:  None    Medication side effects:  None    Ability to successfully perform activities of daily living:  No assistance needed    Home Safety:  No safety concerns identified    Hearing Impairment:  Difficulty following a conversation in a noisy restaurant or crowded room and need to ask people to speak up or repeat themselves    In the past 6 months, have you been bothered by leaking of urine? Yes    In general, how would you rate your overall mental or emotional health?  Very good      PHQ-2 Total Score: 0    Additional concerns today:  No  History of Present Illness        Hypertension: She presents for follow up of hypertension.  She does not check blood pressure  regularly outside of the clinic. Outpatient blood pressures have not been over 140/90. She follows a low salt diet.     She eats 2-3 servings of fruits and vegetables daily.She consumes 0 sweetened beverage(s) daily.She exercises with enough effort to increase her heart rate 30 to 60 minutes per day.  She exercises with enough effort to increase her heart rate 5 days per week.   She is taking medications regularly.  She is not taking prescribed medications regularly due to None.    Do you feel safe in your environment? Yes    Have you ever done Advance Care Planning? (For example, a Health Directive, POLST, or a " discussion with a medical provider or your loved ones about your wishes): Yes, advance care planning is on file.    Has Hearing aids  Fall risk  Fallen 2 or more times in the past year?: No  Any fall with injury in the past year?: No    Cognitive Screening   1) Repeat 3 items (Leader, Season, Table)    2) Clock draw: NORMAL  3) 3 item recall: Recalls 2 objects   Results: NORMAL clock, 1-2 items recalled: COGNITIVE IMPAIRMENT LESS LIKELY    Mini-CogTM Copyright PASTOR Holden. Licensed by the author for use in [facility]; reprinted with permission (thiago@[facility]). All rights reserved.      Do you have sleep apnea, excessive snoring or daytime drowsiness?: no    Reviewed and updated as needed this visit by clinical staff  Tobacco  Allergies  Meds  Problems  Med Hx  Surg Hx  Fam Hx  Soc Hx        Pt sees Rheumatology  Also so GYN onc for her Vulvar issues  Overall stable   Reviewed and updated as needed this visit by Provider  Tobacco  Allergies  Meds  Problems  Med Hx  Surg Hx  Fam Hx        Social History     Tobacco Use     Smoking status: Never Smoker     Smokeless tobacco: Never Used   Substance Use Topics     Alcohol use: Yes     Comment: 4 times a year     If you drink alcohol do you typically have >3 drinks per day or >7 drinks per week? No              Current providers sharing in care for this patient include:   Patient Care Team:  Swapna Gaines MD as PCP - General (Family Practice)  Brandan Landin MD as MD (OB/Gyn)  Mono Ribeiro MD as MD (Gyn-Onc)  Maynor Mary MD as Assigned PCP    The following health maintenance items are reviewed in Epic and correct as of today:  Health Maintenance   Topic Date Due     MEDICARE ANNUAL WELLNESS VISIT  07/01/2020     BMP  07/01/2020     FALL RISK ASSESSMENT  07/01/2020     INFLUENZA VACCINE (1) 09/01/2020     CREATININE  07/24/2021     DTAP/TDAP/TD IMMUNIZATION (3 - Td) 05/14/2023     ADVANCE CARE PLANNING  10/07/2024     PHQ-2  Completed      PNEUMOCOCCAL IMMUNIZATION 65+ LOW/MEDIUM RISK  Completed     ZOSTER IMMUNIZATION  Completed     IPV IMMUNIZATION  Aged Out     MENINGITIS IMMUNIZATION  Aged Out     HEPATITIS B IMMUNIZATION  Aged Out     Lab work is in process  Labs reviewed in EPIC  BP Readings from Last 3 Encounters:   09/16/20 132/66   09/08/20 (!) 175/77   09/04/20 (!) 144/74    Wt Readings from Last 3 Encounters:   09/16/20 56.7 kg (125 lb)   09/08/20 56.9 kg (125 lb 8 oz)   09/04/20 55.3 kg (122 lb)                  Patient Active Problem List   Diagnosis     Polymyalgia rheumatica (H)     History of basal cell carcinoma     CARDIOVASCULAR SCREENING; LDL GOAL LESS THAN 130     Hypertension goal BP (blood pressure) < 140/90     Hip pain     Left atrial enlargement     Status post coronary angiogram     Aortic valve replaced     Rheumatoid arthritis of multiple sites with negative rheumatoid factor (H)     CKD (chronic kidney disease) stage 3, GFR 30-59 ml/min (H)     THERESA III (vulvar intraepithelial neoplasia III)     Past Surgical History:   Procedure Laterality Date     C SKIN TISSUE PROCEDURE UNLISTED  11/3/08    mmis skin cancer excision     CATARACT IOL, RT/LT  5/09    bilateral     COLONOSCOPY  2002     EXCISE LESION VULVA N/A 9/27/2019    Procedure: Wide Local Excision Of Vulva, Colposcopy;  Surgeon: Mono Ribeiro MD;  [facility]     REPLACE VALVE AORTIC N/A 4/25/2016    Procedure: REPLACE VALVE AORTIC;  Surgeon: Sudeep Tsai MD;  Location:  OR       Social History     Tobacco Use     Smoking status: Never Smoker     Smokeless tobacco: Never Used   Substance Use Topics     Alcohol use: Yes     Comment: 4 times a year     Family History   Problem Relation Age of Onset     Breast Cancer Sister 45     Arthritis Sister      Thyroid Disease Sister      Cancer Sister         colon     Arthritis Sister      Arthritis Mother      Hypertension Father      Cancer - colorectal Father      Prostate Cancer Father       Arthritis Father      Heart Disease Father      Lipids Father      Arthritis Sister      Asthma Daughter      Asthma Daughter      Cancer Daughter 58        lung     Cancer Other 81        pancreatic          Current Outpatient Medications   Medication Sig Dispense Refill     acetaminophen (TYLENOL) 325 MG tablet Take 2 tablets (650 mg) by mouth every 6 hours as needed for mild pain 50 tablet 0     amoxicillin (AMOXIL) 500 MG capsule TAKE 4 CAPSULES BY MOUTH 1 HOUR BEFORE DENTAL APPOINTMENT 4 capsule 0     aspirin  MG EC tablet Take 1 tablet (325 mg) by mouth daily 90 tablet 3     calcium-vitamin D 500-125 MG-UNIT TABS        folic acid (FOLVITE) 1 MG tablet Take 1 tablet (1 mg) by mouth daily 30 tablet 6     furosemide (LASIX) 20 MG tablet TAKE 1 TABLET BY MOUTH ONCE DAILY IF 2 TO 3 LBS WEIGHT GAIN OVER A 2 DAY PERIOD. 30 tablet 0     gabapentin (NEURONTIN) 100 MG capsule TAKE 1 CAPSULE BY MOUTH THREE TIMES DAILY 90 capsule 11     ICAPS PO 2 tablets daily       losartan (COZAAR) 25 MG tablet TAKE 1 TABLET(25 MG) BY MOUTH DAILY 90 tablet 3     methotrexate sodium 2.5 MG TABS Take 8 tablets (20 mg) by mouth once a week . Take all 8 tablets on the same day of each week.  Do not take with amoxicillin. 32 tablet 6     metoprolol tartrate (LOPRESSOR) 25 MG tablet Take 1 tablet (25 mg) by mouth 2 times daily 180 tablet 3     Omega-3 Fatty Acids (OMEGA-3 FISH OIL PO) Take 1 tablet twice daily.       sulfaSALAzine ER (AZULFIDINE EN) 500 MG EC tablet Take 1 tablet (500 mg) by mouth 2 times daily 60 tablet 6     Allergies   Allergen Reactions     Lisinopril Cough         Review of Systems  CONSTITUTIONAL: NEGATIVE for fever, chills, change in weight  INTEGUMENTARY/SKIN: NEGATIVE for worrisome rashes, moles or lesions  EYES: NEGATIVE for vision changes or irritation  ENT/MOUTH: NEGATIVE for ear, mouth and throat problems  RESP: NEGATIVE for significant cough or SOB  BREAST: NEGATIVE for masses, tenderness or  discharge  CV: NEGATIVE for chest pain, palpitations or peripheral edema  GI: NEGATIVE for nausea, abdominal pain, heartburn, or change in bowel habits   female: some Urinary leaking chronic-wears pads  MUSCULOSKELETAL:RA  NEURO: NEGATIVE for weakness, dizziness or paresthesias  ENDOCRINE: NEGATIVE for temperature intolerance, skin/hair changes  HEME: NEGATIVE for bleeding problems  PSYCHIATRIC: NEGATIVE for changes in mood or affect    OBJECTIVE:   /66   Pulse 76   Temp 97.6  F (36.4  C) (Oral)   Resp 20   Ht 1.524 m (5')   Wt 56.7 kg (125 lb)   SpO2 98%   BMI 24.41 kg/m   Estimated body mass index is 24.41 kg/m  as calculated from the following:    Height as of this encounter: 1.524 m (5').    Weight as of this encounter: 56.7 kg (125 lb).  Physical Exam  GENERAL APPEARANCE: alert, no distress and elderly  EYES: Eyes grossly normal to inspection, PERRL and conjunctivae and sclerae normal  HENT: ear canals and TM's normal, nose and mouth without ulcers or lesions, oropharynx clear and oral mucous membranes moist  NECK: no adenopathy, no asymmetry, masses, or scars and thyroid normal to palpation  RESP: lungs clear to auscultation - no rales, rhonchi or wheezes  BREAST: normal without masses, tenderness or nipple discharge and no palpable axillary masses or adenopathy  CV: regular rate and rhythm, normal S1 S2, no S3 or S4, no murmur, click or rub, no peripheral edema and peripheral pulses strong  ABDOMEN: soft, nontender, no hepatosplenomegaly, no masses and bowel sounds normal  MS: no musculoskeletal defects are noted and gait is age appropriate without ataxia  SKIN: no suspicious lesions or rashes  NEURO: Normal strength and tone, sensory exam grossly normal, mentation intact and speech normal  PSYCH: mentation appears normal and affect normal/bright    Diagnostic Test Results:  Labs reviewed in Epic    ASSESSMENT / PLAN:   1. Encounter for routine adult physical exam with abnormal findings      2.  CKD (chronic kidney disease) stage 3, GFR 30-59 ml/min (H)  Labs pending     3. Polymyalgia rheumatica (H)      4. Rheumatoid arthritis of multiple sites with negative rheumatoid factor (H)  Sees Rheumatology  Stable     5. Hypertension goal BP (blood pressure) < 140/90  Stable   - BASIC METABOLIC PANEL  - metoprolol tartrate (LOPRESSOR) 25 MG tablet; Take 1 tablet (25 mg) by mouth 2 times daily  Dispense: 180 tablet; Refill: 3    6. THERESA III (vulvar intraepithelial neoplasia III)  Sees GYN onc-notes reviewed     7. High risk medication use      8. Need for prophylactic vaccination and inoculation against influenza  Advised        .  COUNSELING:  Reviewed preventive health counseling, as reflected in patient instructions       Regular exercise       Healthy diet/nutrition       Vision screening       Hearing screening       Dental care       Bladder control       Fall risk prevention       Immunizations    Vaccinated for: Influenza             Osteoporosis Prevention/Bone Health       Advanced Planning     Estimated body mass index is 24.41 kg/m  as calculated from the following:    Height as of this encounter: 1.524 m (5').    Weight as of this encounter: 56.7 kg (125 lb).        She reports that she has never smoked. She has never used smokeless tobacco.      Appropriate preventive services were discussed with this patient, including applicable screening as appropriate for cardiovascular disease, diabetes, osteopenia/osteoporosis, and glaucoma.  As appropriate for age/gender, discussed screening for colorectal cancer, prostate cancer, breast cancer, and cervical cancer. Checklist reviewing preventive services available has been given to the patient.    Reviewed patients plan of care and provided an AVS. The Intermediate Care Plan ( asthma action plan, low back pain action plan, and migraine action plan) for Roxanna meets the Care Plan requirement. This Care Plan has been established and reviewed with the  Patient.    Counseling Resources:  ATP IV Guidelines  Pooled Cohorts Equation Calculator  Breast Cancer Risk Calculator  Breast Cancer: Medication to Reduce Risk  FRAX Risk Assessment  ICSI Preventive Guidelines  Dietary Guidelines for Americans, 2010  USDA's MyPlate  ASA Prophylaxis  Lung CA Screening    Swapna Gaines MD  [facility]    Identified Health Risks:

## 2020-09-25 DIAGNOSIS — I10 HYPERTENSION GOAL BP (BLOOD PRESSURE) < 140/90: ICD-10-CM

## 2020-09-25 NOTE — TELEPHONE ENCOUNTER
Routing refill request to provider for review/approval because:  Labs out of range:  Cr    Creatinine   Date Value Ref Range Status   09/16/2020 1.24 (H) 0.52 - 1.04 mg/dL Final

## 2020-09-28 RX ORDER — FUROSEMIDE 20 MG
TABLET ORAL
Qty: 30 TABLET | Refills: 0 | Status: SHIPPED | OUTPATIENT
Start: 2020-09-28 | End: 2020-10-28

## 2020-10-27 DIAGNOSIS — I10 HYPERTENSION GOAL BP (BLOOD PRESSURE) < 140/90: ICD-10-CM

## 2020-10-28 RX ORDER — FUROSEMIDE 20 MG
TABLET ORAL
Qty: 30 TABLET | Refills: 0 | Status: SHIPPED | OUTPATIENT
Start: 2020-10-28 | End: 2020-12-01

## 2020-10-28 NOTE — TELEPHONE ENCOUNTER
Routing refill request to provider for review/approval because:  Labs out of range.  Creatinine   Date Value Ref Range Status   09/16/2020 1.24 (H) 0.52 - 1.04 mg/dL Final     Patient has upcoming lab appointment 10/30/20  Please advise  Ruthie Barreto RN

## 2020-10-30 DIAGNOSIS — Z79.899 HIGH RISK MEDICATION USE: ICD-10-CM

## 2020-10-30 DIAGNOSIS — M06.09 RHEUMATOID ARTHRITIS OF MULTIPLE SITES WITH NEGATIVE RHEUMATOID FACTOR (H): ICD-10-CM

## 2020-10-30 LAB
ALBUMIN SERPL-MCNC: 3.3 G/DL (ref 3.4–5)
ALP SERPL-CCNC: 108 U/L (ref 40–150)
ALT SERPL W P-5'-P-CCNC: 28 U/L (ref 0–50)
AST SERPL W P-5'-P-CCNC: 28 U/L (ref 0–45)
BASOPHILS # BLD AUTO: 0.1 10E9/L (ref 0–0.2)
BASOPHILS NFR BLD AUTO: 0.6 %
BILIRUB DIRECT SERPL-MCNC: <0.1 MG/DL (ref 0–0.2)
BILIRUB SERPL-MCNC: 0.4 MG/DL (ref 0.2–1.3)
CREAT SERPL-MCNC: 1.38 MG/DL (ref 0.52–1.04)
CRP SERPL-MCNC: 5.4 MG/L (ref 0–8)
DIFFERENTIAL METHOD BLD: ABNORMAL
EOSINOPHIL # BLD AUTO: 0.4 10E9/L (ref 0–0.7)
EOSINOPHIL NFR BLD AUTO: 3.5 %
ERYTHROCYTE [DISTWIDTH] IN BLOOD BY AUTOMATED COUNT: 18 % (ref 10–15)
ERYTHROCYTE [SEDIMENTATION RATE] IN BLOOD BY WESTERGREN METHOD: 41 MM/H (ref 0–30)
GFR SERPL CREATININE-BSD FRML MDRD: 33 ML/MIN/{1.73_M2}
HCT VFR BLD AUTO: 37.8 % (ref 35–47)
HGB BLD-MCNC: 11.8 G/DL (ref 11.7–15.7)
LYMPHOCYTES # BLD AUTO: 3.2 10E9/L (ref 0.8–5.3)
LYMPHOCYTES NFR BLD AUTO: 29.5 %
MCH RBC QN AUTO: 29.2 PG (ref 26.5–33)
MCHC RBC AUTO-ENTMCNC: 31.2 G/DL (ref 31.5–36.5)
MCV RBC AUTO: 94 FL (ref 78–100)
MONOCYTES # BLD AUTO: 1.6 10E9/L (ref 0–1.3)
MONOCYTES NFR BLD AUTO: 14.7 %
NEUTROPHILS # BLD AUTO: 5.6 10E9/L (ref 1.6–8.3)
NEUTROPHILS NFR BLD AUTO: 51.7 %
PLATELET # BLD AUTO: 203 10E9/L (ref 150–450)
PROT SERPL-MCNC: 7.6 G/DL (ref 6.8–8.8)
RBC # BLD AUTO: 4.04 10E12/L (ref 3.8–5.2)
WBC # BLD AUTO: 10.8 10E9/L (ref 4–11)

## 2020-10-30 PROCEDURE — 82565 ASSAY OF CREATININE: CPT | Performed by: INTERNAL MEDICINE

## 2020-10-30 PROCEDURE — 86140 C-REACTIVE PROTEIN: CPT | Performed by: INTERNAL MEDICINE

## 2020-10-30 PROCEDURE — 80076 HEPATIC FUNCTION PANEL: CPT | Performed by: INTERNAL MEDICINE

## 2020-10-30 PROCEDURE — 36415 COLL VENOUS BLD VENIPUNCTURE: CPT | Performed by: INTERNAL MEDICINE

## 2020-10-30 PROCEDURE — 85025 COMPLETE CBC W/AUTO DIFF WBC: CPT | Performed by: INTERNAL MEDICINE

## 2020-10-30 PROCEDURE — 85652 RBC SED RATE AUTOMATED: CPT | Performed by: INTERNAL MEDICINE

## 2020-11-29 DIAGNOSIS — I10 HYPERTENSION GOAL BP (BLOOD PRESSURE) < 140/90: ICD-10-CM

## 2020-12-01 RX ORDER — FUROSEMIDE 20 MG
TABLET ORAL
Qty: 30 TABLET | Refills: 0 | Status: SHIPPED | OUTPATIENT
Start: 2020-12-01 | End: 2021-01-27

## 2020-12-01 NOTE — TELEPHONE ENCOUNTER
Routing refill request to provider for review/approval because:  Labs out of range:  Creatinine    Creatinine   Date Value Ref Range Status   10/30/2020 1.38 (H) 0.52 - 1.04 mg/dL Final     Alma Pérez BSN, RN

## 2020-12-08 ENCOUNTER — OFFICE VISIT (OUTPATIENT)
Dept: FAMILY MEDICINE | Facility: CLINIC | Age: 85
End: 2020-12-08
Payer: MEDICARE

## 2020-12-08 ENCOUNTER — ANCILLARY PROCEDURE (OUTPATIENT)
Dept: GENERAL RADIOLOGY | Facility: CLINIC | Age: 85
End: 2020-12-08
Attending: FAMILY MEDICINE
Payer: MEDICARE

## 2020-12-08 VITALS
SYSTOLIC BLOOD PRESSURE: 132 MMHG | DIASTOLIC BLOOD PRESSURE: 70 MMHG | HEART RATE: 75 BPM | HEIGHT: 60 IN | OXYGEN SATURATION: 96 % | TEMPERATURE: 97.5 F | RESPIRATION RATE: 15 BRPM | BODY MASS INDEX: 24.54 KG/M2 | WEIGHT: 125 LBS

## 2020-12-08 DIAGNOSIS — R26.9 GAIT DIFFICULTY: ICD-10-CM

## 2020-12-08 DIAGNOSIS — M25.551 HIP PAIN, RIGHT: ICD-10-CM
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
## 2020-12-08 DIAGNOSIS — S30.0XXD CONTUSION OF LOWER BACK, SUBSEQUENT ENCOUNTER: Primary | ICD-10-CM

## 2020-12-08 DIAGNOSIS — Z79.899 HIGH RISK MEDICATION USE: ICD-10-CM

## 2020-12-08 DIAGNOSIS — S06.0X0D CONCUSSION WITHOUT LOSS OF CONSCIOUSNESS, SUBSEQUENT ENCOUNTER: ICD-10-CM

## 2020-12-08 DIAGNOSIS — M06.09 RHEUMATOID ARTHRITIS OF MULTIPLE SITES WITH NEGATIVE RHEUMATOID FACTOR (H): ICD-10-CM

## 2020-12-08 PROCEDURE — 99213 OFFICE O/P EST LOW 20 MIN: CPT | Performed by: FAMILY MEDICINE

## 2020-12-08 PROCEDURE — 73502 X-RAY EXAM HIP UNI 2-3 VIEWS: CPT | Mod: RT | Performed by: RADIOLOGY

## 2020-12-08 ASSESSMENT — MIFFLIN-ST. JEOR: SCORE: 893.5

## 2020-12-08 NOTE — PROGRESS NOTES
Subjective     Roxanna Stark is a 89 year old female who presents to clinic today for the following health issues:    HPI         ED/UC Followup:    Facility:  11/29/20  Date of visit: 11/29/20  Reason for visit: fall  Current Status: back pain      1. Concussion with loss of consciousness of 30 minutes or less, initial encounter S06.0X1A   2. Contusion of lower back, initial encounter S30.0XXA     3. Fall, initial encounter W19.XXXA                Pt still has pain back and Right Hip  Is able to walk  Pertinent negatives include no fever, no numbness, no abdominal pain, no abdominal swelling, no bowel incontinence, no perianal numbness, no bladder incontinence, no dysuria, no leg pain, no paresthesias, no paresis, no tingling and no weakness.    Pt had several Imaging in the ER  These were reviewed  ER notes reviewed   No syncope  Rest of the ROS is Negative except see above and Problem list [stable]    Objective    Pulse 75   Temp 97.5  F (36.4  C)   Resp 15   Ht 1.524 m (5')   Wt 56.7 kg (125 lb)   SpO2 96%   BMI 24.41 kg/m    Body mass index is 24.41 kg/m .  Physical Exam   GENERAL: healthy, alert and no distress  EYES: Eyes grossly normal to inspection, PERRL and conjunctivae and sclerae normal  NECK: no adenopathy, no asymmetry, masses, or scars and thyroid normal to palpation  RESP: lungs clear to auscultation - no rales, rhonchi or wheezes  CV: regular rate and rhythm, normal S1 S2, no S3 or S4, no murmur, click or rub, no peripheral edema and peripheral pulses strong  ABDOMEN: soft, nontender, no hepatosplenomegaly, no masses and bowel sounds normal  MS: no gross musculoskeletal defects noted, no edema  SKIN: no suspicious lesions or rashes  NEURO: Normal strength and tone, mentation intact and speech normal  No spine tenderness   Has pain with Flexion Lowe back  Mild tenderness Right Hip area  Pt walks slowly  Is able to get onto the table    Pending         Assessment & Plan     Roxanna was seen  today for urgent care.    Diagnoses and all orders for this visit:    Contusion of lower back, subsequent encounter  -     [facility], [facility] CHIR[facility]RACTIC REFERRAL; Future    Fall, subsequent encounter  -     [facility], [facility] CHIR[facility]RACTIC REFERRAL; Future    Concussion without loss of consciousness, subsequent encounter  -     [facility] HAND, AND CHIROPRACTIC REFERRAL; Future    Hip pain, right  -     XR Hip Right 2-3 Views; Future          Advised make appointment PT  Tylenol otc  Advised use cane  Advised evaluation for falling down -use of cane  Follow up 1 month      Return in about 1 month (around 1/8/2021) for recheck/Please schedule appointment.    Swapna Gaines MD  [facility]

## 2020-12-09 RX ORDER — METHOTREXATE 2.5 MG/1
20 TABLET ORAL WEEKLY
Qty: 32 TABLET | Refills: 6 | OUTPATIENT
Start: 2020-12-09

## 2020-12-09 NOTE — TELEPHONE ENCOUNTER
Rheumatology team: Please call Ms. Lincoln and her pharmacy to check on the refill request for methotrexate.  Based on last refill date and quantity a refill should not yet be needed.    Jamie Johnson MD  12/9/2020 1:52 AM

## 2020-12-24 DIAGNOSIS — G57.93 NEUROPATHY OF BOTH FEET: ICD-10-CM

## 2020-12-24 NOTE — TELEPHONE ENCOUNTER
Reason for Call:  Medication or medication refill:    Do you use a [facility]?  Name of the pharmacy and phone number for the current request:  [facility] [phone]    Name of the medication requested: gabapentin    Other request: Pt says she is completely out.    Can we leave a detailed message on this number? YES    Phone number patient can be reached at: Home number on file [phone] (home)    Best Time: any    Call taken on 12/24/2020 at 11:38 AM by Hallie Truong

## 2020-12-28 RX ORDER — GABAPENTIN 100 MG/1
100 CAPSULE ORAL 3 TIMES DAILY
Qty: 90 CAPSULE | Refills: 0 | Status: SHIPPED | OUTPATIENT
Start: 2020-12-28 | End: 2021-01-12

## 2020-12-28 NOTE — TELEPHONE ENCOUNTER
Routing refill request to provider for review/approval because:  Drug not on the [facility] refill protocol     Beatris Maldonado RN  [facility]

## 2021-01-06 DIAGNOSIS — M06.09 RHEUMATOID ARTHRITIS OF MULTIPLE SITES WITH NEGATIVE RHEUMATOID FACTOR (H): ICD-10-CM

## 2021-01-06 DIAGNOSIS — Z79.899 HIGH RISK MEDICATION USE: ICD-10-CM

## 2021-01-08 RX ORDER — SULFASALAZINE 500 MG/1
500 TABLET, DELAYED RELEASE ORAL 2 TIMES DAILY
Qty: 60 TABLET | Refills: 6 | OUTPATIENT
Start: 2021-01-08

## 2021-01-08 NOTE — TELEPHONE ENCOUNTER
Requested Prescriptions   Pending Prescriptions Disp Refills    sulfaSALAzine ER (AZULFIDINE EN) 500 MG EC tablet 60 tablet 6     Sig: Take 1 tablet (500 mg) by mouth 2 times daily       There is no refill protocol information for this order        Routing refill request to provider for review/approval because:  Drug not on the [facility] refill protocol   Bree GARCIA-RN  Triage Nurse  [facility]: [facility]

## 2021-01-11 RX ORDER — SULFASALAZINE 500 MG/1
500 TABLET, DELAYED RELEASE ORAL 2 TIMES DAILY
Qty: 60 TABLET | Refills: 0 | Status: SHIPPED | OUTPATIENT
Start: 2021-01-11 | End: 2021-01-29

## 2021-01-12 ENCOUNTER — OFFICE VISIT (OUTPATIENT)
Dept: FAMILY MEDICINE | Facility: CLINIC | Age: 86
End: 2021-01-12
Payer: MEDICARE

## 2021-01-12 VITALS
OXYGEN SATURATION: 98 % | DIASTOLIC BLOOD PRESSURE: 64 MMHG | TEMPERATURE: 98 F | WEIGHT: 126 LBS | RESPIRATION RATE: 20 BRPM | SYSTOLIC BLOOD PRESSURE: 110 MMHG | HEART RATE: 65 BPM | BODY MASS INDEX: 24.74 KG/M2 | HEIGHT: 60 IN

## 2021-01-12 DIAGNOSIS — N18.30 STAGE 3 CHRONIC KIDNEY DISEASE, UNSPECIFIED WHETHER STAGE 3A OR 3B CKD (H): ICD-10-CM

## 2021-01-12 DIAGNOSIS — G57.93 NEUROPATHY OF BOTH FEET: ICD-10-CM

## 2021-01-12 DIAGNOSIS — L97.529 ULCER OF LEFT FOOT, UNSPECIFIED ULCER STAGE (H): ICD-10-CM

## 2021-01-12 DIAGNOSIS — Z95.2 AORTIC VALVE REPLACED: ICD-10-CM

## 2021-01-12 DIAGNOSIS — Z79.899 HIGH RISK MEDICATION USE: ICD-10-CM

## 2021-01-12 DIAGNOSIS — Z85.828 HISTORY OF BASAL CELL CARCINOMA: ICD-10-CM

## 2021-01-12 DIAGNOSIS — D07.1 VIN III (VULVAR INTRAEPITHELIAL NEOPLASIA III): ICD-10-CM

## 2021-01-12 DIAGNOSIS — I73.9 PERIPHERAL ARTERIAL DISEASE (H): ICD-10-CM

## 2021-01-12 DIAGNOSIS — M06.09 RHEUMATOID ARTHRITIS OF MULTIPLE SITES WITH NEGATIVE RHEUMATOID FACTOR (H): ICD-10-CM

## 2021-01-12 DIAGNOSIS — I10 HYPERTENSION GOAL BP (BLOOD PRESSURE) < 140/90: ICD-10-CM

## 2021-01-12 PROCEDURE — 99214 OFFICE O/P EST MOD 30 MIN: CPT | Performed by: FAMILY MEDICINE

## 2021-01-12 RX ORDER — GABAPENTIN 100 MG/1
100 CAPSULE ORAL 3 TIMES DAILY
Qty: 90 CAPSULE | Refills: 3 | Status: SHIPPED | OUTPATIENT
Start: 2021-01-12 | End: 2021-06-02

## 2021-01-12 RX ORDER — SULFASALAZINE 500 MG/1
500 TABLET, DELAYED RELEASE ORAL 2 TIMES DAILY
Qty: 60 TABLET | Refills: 0 | Status: CANCELLED | OUTPATIENT
Start: 2021-01-12

## 2021-01-12 ASSESSMENT — PAIN SCALES - GENERAL: PAINLEVEL: NO PAIN (0)

## 2021-01-12 ASSESSMENT — MIFFLIN-ST. JEOR: SCORE: 898.03

## 2021-01-12 NOTE — PROGRESS NOTES
Assessment & Plan     Rheumatoid arthritis of multiple sites with negative rheumatoid factor (H)  Stable -sees Dr Johnson    High risk medication use      Neuropathy of both feet  refilled  - gabapentin (NEURONTIN) 100 MG capsule; Take 1 capsule (100 mg) by mouth 3 times daily    Aortic valve replaced  Stable   Had AVR in 2016        Hypertension goal BP (blood pressure) < 140/90  Stable     THERESA III (vulvar intraepithelial neoplasia III)  Sees Oncologist    Ulcer of left foot, unspecified ulcer stage (H)  resolved    Peripheral arterial disease (H)  Stable     Stage 3 chronic kidney disease, unspecified whether stage 3a or 3b CKD  Stable       Review of external notes as documented above                          No follow-ups on file.    Swapna Gaines MD  [facility] [place] is a 89 year old who presents to clinic today for the following health issues     HPI     Chief Complaint   Patient presents with     RECHECK     feeling a lot better     Wants refill of gabapentin  Doing well on her medicines  Patient presents for evaluation of hypertension.  She indicates that she is feeling well and denies any symptoms referable to her elevated blood pressure. Specifically denies chest pain, palpitations, dyspnea, orthopnea, PND or peripheral edema. Current medication regimen is as listed below. Patient denies any side effects of medication.  Pt has RA and sees Dr Johnson  And feels better  {Review of Systems   CONSTITUTIONAL: NEGATIVE for fever, chills, change in weight  ENT/MOUTH: NEGATIVE for ear, mouth and throat problems  RESP: NEGATIVE for significant cough or SOB  CV: NEGATIVE for chest pain, palpitations or peripheral edema  GI: NEGATIVE for nausea, abdominal pain, heartburn, or change in bowel habits  MUSCULOSKELETAL: arthritis  ROS otherwise negative      Objective    /64   Pulse 65   Temp 98  F (36.7  C) (Oral)   Resp 20   Ht 1.524 m (5')   Wt 57.2 kg (126 lb)    SpO2 98%   BMI 24.61 kg/m    Body mass index is 24.61 kg/m .  Physical Exam   GENERAL: healthy, alert and no distress  NECK: no adenopathy, no asymmetry, masses, or scars and thyroid normal to palpation  RESP: lungs clear to auscultation - no rales, rhonchi or wheezes  CV: regular rate and rhythm, normal S1 S2, no S3 or S4, no murmur, click or rub, no peripheral edema and peripheral pulses strong  ABDOMEN: soft, nontender, no hepatosplenomegaly, no masses and bowel sounds normal  MS: no gross musculoskeletal defects noted, no edema  PSYCH: mentation appears normal    Orders Only on 10/30/2020   Component Date Value Ref Range Status     Bilirubin Direct 10/30/2020 <0.1  0.0 - 0.2 mg/dL Final     Bilirubin Total 10/30/2020 0.4  0.2 - 1.3 mg/dL Final     Albumin 10/30/2020 3.3* 3.4 - 5.0 g/dL Final     Protein Total 10/30/2020 7.6  6.8 - 8.8 g/dL Final     Alkaline Phosphatase 10/30/2020 108  40 - 150 U/L Final     ALT 10/30/2020 28  0 - 50 U/L Final     AST 10/30/2020 28  0 - 45 U/L Final     CRP Inflammation 10/30/2020 5.4  0.0 - 8.0 mg/L Final     Sed Rate 10/30/2020 41* 0 - 30 mm/h Final     Creatinine 10/30/2020 1.38* 0.52 - 1.04 mg/dL Final     GFR Estimate 10/30/2020 33* >60 mL/min/[1.73_m2] Final    Comment: Non  GFR Calc  Starting 12/18/2018, serum creatinine based estimated GFR (eGFR) will be   calculated using the Chronic Kidney Disease Epidemiology Collaboration   (CKD-EPI) equation.       GFR Estimate If Black 10/30/2020 38* >60 mL/min/[1.73_m2] Final    Comment:  GFR Calc  Starting 12/18/2018, serum creatinine based estimated GFR (eGFR) will be   calculated using the Chronic Kidney Disease Epidemiology Collaboration   (CKD-EPI) equation.       WBC 10/30/2020 10.8  4.0 - 11.0 10e9/L Final     RBC Count 10/30/2020 4.04  3.8 - 5.2 10e12/L Final     Hemoglobin 10/30/2020 11.8  11.7 - 15.7 g/dL Final     Hematocrit 10/30/2020 37.8  35.0 - 47.0 % Final     MCV 10/30/2020 94  78  - 100 fl Final     MCH 10/30/2020 29.2  26.5 - 33.0 pg Final     MCHC 10/30/2020 31.2* 31.5 - 36.5 g/dL Final    Results confirmed by repeat test     RDW 10/30/2020 18.0* 10.0 - 15.0 % Final     Platelet Count 10/30/2020 203  150 - 450 10e9/L Final     % Neutrophils 10/30/2020 51.7  % Final     % Lymphocytes 10/30/2020 29.5  % Final     % Monocytes 10/30/2020 14.7  % Final     % Eosinophils 10/30/2020 3.5  % Final     % Basophils 10/30/2020 0.6  % Final     Absolute Neutrophil 10/30/2020 5.6  1.6 - 8.3 10e9/L Final     Absolute Lymphocytes 10/30/2020 3.2  0.8 - 5.3 10e9/L Final     Absolute Monocytes 10/30/2020 1.6* 0.0 - 1.3 10e9/L Final     Absolute Eosinophils 10/30/2020 0.4  0.0 - 0.7 10e9/L Final     Absolute Basophils 10/30/2020 0.1  0.0 - 0.2 10e9/L Final     Diff Method 10/30/2020 Automated Method   Final

## 2021-01-12 NOTE — TELEPHONE ENCOUNTER
Rheumatology team: Please call to notify Ms. Stark that sulfasalazine has been refilled.  Jamie Johnson MD  1/11/2021 11:14 PM

## 2021-01-22 DIAGNOSIS — Z79.899 HIGH RISK MEDICATION USE: ICD-10-CM

## 2021-01-22 DIAGNOSIS — M06.09 RHEUMATOID ARTHRITIS OF MULTIPLE SITES WITH NEGATIVE RHEUMATOID FACTOR (H): ICD-10-CM

## 2021-01-22 LAB
ALBUMIN SERPL-MCNC: 3.6 G/DL (ref 3.4–5)
ALP SERPL-CCNC: 118 U/L (ref 40–150)
ALT SERPL W P-5'-P-CCNC: 25 U/L (ref 0–50)
AST SERPL W P-5'-P-CCNC: 23 U/L (ref 0–45)
BASOPHILS # BLD AUTO: 0.1 10E9/L (ref 0–0.2)
BASOPHILS NFR BLD AUTO: 0.7 %
BILIRUB DIRECT SERPL-MCNC: <0.1 MG/DL (ref 0–0.2)
BILIRUB SERPL-MCNC: 0.2 MG/DL (ref 0.2–1.3)
CREAT SERPL-MCNC: 1.17 MG/DL (ref 0.52–1.04)
CRP SERPL-MCNC: 3.4 MG/L (ref 0–8)
DIFFERENTIAL METHOD BLD: ABNORMAL
EOSINOPHIL # BLD AUTO: 0.3 10E9/L (ref 0–0.7)
EOSINOPHIL NFR BLD AUTO: 3.3 %
ERYTHROCYTE [DISTWIDTH] IN BLOOD BY AUTOMATED COUNT: 19.4 % (ref 10–15)
ERYTHROCYTE [SEDIMENTATION RATE] IN BLOOD BY WESTERGREN METHOD: 34 MM/H (ref 0–30)
GFR SERPL CREATININE-BSD FRML MDRD: 40 ML/MIN/{1.73_M2}
HCT VFR BLD AUTO: 38 % (ref 35–47)
HGB BLD-MCNC: 11.9 G/DL (ref 11.7–15.7)
LYMPHOCYTES # BLD AUTO: 2.2 10E9/L (ref 0.8–5.3)
LYMPHOCYTES NFR BLD AUTO: 27 %
MCH RBC QN AUTO: 30.3 PG (ref 26.5–33)
MCHC RBC AUTO-ENTMCNC: 31.3 G/DL (ref 31.5–36.5)
MCV RBC AUTO: 97 FL (ref 78–100)
MONOCYTES # BLD AUTO: 1.2 10E9/L (ref 0–1.3)
MONOCYTES NFR BLD AUTO: 15 %
NEUTROPHILS # BLD AUTO: 4.3 10E9/L (ref 1.6–8.3)
NEUTROPHILS NFR BLD AUTO: 54 %
PLATELET # BLD AUTO: 228 10E9/L (ref 150–450)
PROT SERPL-MCNC: 7.7 G/DL (ref 6.8–8.8)
RBC # BLD AUTO: 3.93 10E12/L (ref 3.8–5.2)
WBC # BLD AUTO: 8.1 10E9/L (ref 4–11)

## 2021-01-22 PROCEDURE — 82565 ASSAY OF CREATININE: CPT | Performed by: INTERNAL MEDICINE

## 2021-01-22 PROCEDURE — 85652 RBC SED RATE AUTOMATED: CPT | Performed by: INTERNAL MEDICINE

## 2021-01-22 PROCEDURE — 86140 C-REACTIVE PROTEIN: CPT | Performed by: INTERNAL MEDICINE

## 2021-01-22 PROCEDURE — 85025 COMPLETE CBC W/AUTO DIFF WBC: CPT | Performed by: INTERNAL MEDICINE

## 2021-01-22 PROCEDURE — 36415 COLL VENOUS BLD VENIPUNCTURE: CPT | Performed by: INTERNAL MEDICINE

## 2021-01-22 PROCEDURE — 80076 HEPATIC FUNCTION PANEL: CPT | Performed by: INTERNAL MEDICINE

## 2021-01-24 DIAGNOSIS — G57.93 NEUROPATHY OF BOTH FEET: ICD-10-CM

## 2021-01-24 RX ORDER — GABAPENTIN 100 MG/1
CAPSULE ORAL
Qty: 90 CAPSULE | Refills: 3 | OUTPATIENT
Start: 2021-01-24

## 2021-01-26 DIAGNOSIS — I10 HYPERTENSION GOAL BP (BLOOD PRESSURE) < 140/90: ICD-10-CM

## 2021-01-27 RX ORDER — FUROSEMIDE 20 MG
TABLET ORAL
Qty: 30 TABLET | Refills: 0 | Status: SHIPPED | OUTPATIENT
Start: 2021-01-27 | End: 2021-03-05

## 2021-01-29 ENCOUNTER — VIRTUAL VISIT (OUTPATIENT)
Dept: RHEUMATOLOGY | Facility: CLINIC | Age: 86
End: 2021-01-29
Payer: MEDICARE

## 2021-01-29 DIAGNOSIS — Z79.899 HIGH RISK MEDICATION USE: ICD-10-CM

## 2021-01-29 DIAGNOSIS — M06.09 RHEUMATOID ARTHRITIS OF MULTIPLE SITES WITH NEGATIVE RHEUMATOID FACTOR (H): Primary | ICD-10-CM

## 2021-01-29 PROCEDURE — 99442 PR PHYSICIAN TELEPHONE EVALUATION 11-20 MIN: CPT | Performed by: INTERNAL MEDICINE

## 2021-01-29 RX ORDER — SULFASALAZINE 500 MG/1
500 TABLET, DELAYED RELEASE ORAL 2 TIMES DAILY
Qty: 60 TABLET | Refills: 6 | Status: SHIPPED | OUTPATIENT
Start: 2021-01-29 | End: 2021-09-02 | Stop reason: ALTCHOICE

## 2021-01-29 RX ORDER — METHOTREXATE 2.5 MG/1
20 TABLET ORAL WEEKLY
Qty: 32 TABLET | Refills: 6 | Status: SHIPPED | OUTPATIENT
Start: 2021-01-29 | End: 2022-01-31

## 2021-01-29 RX ORDER — FOLIC ACID 1 MG/1
1 TABLET ORAL DAILY
Qty: 30 TABLET | Refills: 6 | Status: SHIPPED | OUTPATIENT
Start: 2021-01-29 | End: 2021-09-01

## 2021-01-29 NOTE — PATIENT INSTRUCTIONS
RHEUMATOLOGY    Dr. Jamie Johnson    [facility]  [address] [place] [address]    Our new phone number for Rheumatology is [phone], this number will be able to help you schedule appointments for Dr. Johnson or if you have any message you would like sent to us.    Thank you for choosing [facility]!  Twila Atkins [facility] Rheumatology

## 2021-01-29 NOTE — LETTER
[facility]  [address]  [place] [address]  [phone]          January 29, 2021    Roxanna Stark                                                                                                                     [address] [place] [address] [address]  [place] [address]            Dear Roxanna,        Spine conditioning information is enclosed.        Sincerely,         Ken SCHAEFFER RN....1/29/2021 10:46 AM

## 2021-01-29 NOTE — PROGRESS NOTES
Roxanna Stark is a 89 year old year old female who is being evaluated via a billable telephone visit.      What phone number would you like to be contacted at? [phone]  How would you like to obtain your AVS? Mail a copy     Rheumatology Telephone/Telehealth  Visit      Roxanna Stark MRN# [id number]   YOB: 1927 Age: 89 year old      Date of visit: 1/29/21   PCP: Dr. Swapna Gaines  Cardiology: Dr. Boston Navarro     Chief Complaint   Patient presents with:  Arthritis: RA    Assessment and Plan     1. 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  She was treated with prednisone monotherapy for several years, being able to taper off without recurrence of symptoms. She then developed worsening symptoms in her hands and was diagnosed with rheumatoid arthritis. Initially, she was resistant to taking DMARD therapy.  She then was started on MTX that was effective; she reduced the dose with worsening symptoms. Currently on MTX 15mg wkly and SSZ 500mg BID (mild anemia possibly associated with SSZ so the dose was previously reduced).  RA is well controlled today.  chronic illness, stable  - Continue methotrexate 20 mg once weekly   - Continue folic acid 1mg daily  - Continue sulfasalazine 500 mg twice daily   - Labs in 3 months: CBC, Creatinine, Hepatic Panel  - Labs in 6 months: CBC, Creatinine, Hepatic Panel, ESR, CRP     High risk medication requiring intensive toxicity monitoring at least quarterly: labs ordered include CBC, Creatinine, Hepatic panel to monitor for cytopenia and hepatotoxicity; checking creatinine as it affects clearance of medication.      2. Giant Cell Arteritis History?: 12/26/2005 Left TA biopsy negative per [facility] record review.  No symptoms of GCA at this time.      3. Right shoulder rotator cuff tear and pain: Previously evaluated by orthopedic surgery and her pain  resolved for approximately one year after having a steroid injection in March 2015. She does not want to have surgical correction of her shoulder. Repeat steroid injections have been helpful; not needed today.  Physical therapy was effective and she is doing exercises at home. Chronic illness, stable     4. History of basal cell carcinoma: Following with dermatology; encouraged yearly f/u for eval     5. Elevated creatinine: recheck BMP; advised to be well hydrated.     6. Bone Health: Managed by PCP already. Normal DEXA in 2015.    7. Mild low back pain radiating down the posterior thighs to the knees: improving with home exercises; will have the [facility] PT sheet available online for low back pain mailed to her. She refused PT to reduced COVID19 exposure risk.  Chronic illness, progressive    # Discussed the available mRNA COVID-19 vaccines available from [employer] and [employer]. At this time the vaccine from Pfizer and Moderna for COVID19 is recommended based on our knowledge of this vaccine and vaccines in the past.  However, there is no data currently available to establish safety and efficacy for this vaccine in immunocompromised people.  The phone numbers for [facility] vaccination program and the [place] Dept of Health given, both for the COVID19 vaccine programs.    #  Instructed that if confirmed to have COVID-19 or exposure to someone with confirmed COVID-19 to call this clinic for directions on DMARD management.    # This is a virtual visit to reduce the risk of COVID-19 exposure during this current pandemic.      # Considered to be at high risk of complications from the COVID-19 virus.  It is recommended to limit contact with other people and if possible to work remotely or provide a leave of absence to reduce the risk for COVID-19.      Total minutes spent in evaluation with patient, documentation, and review of pertinent lab tests, imaging studies, and chart notes: 17     Ms. Lincoln verbalized agreement with  and understanding of the rational for the diagnosis and treatment plan.  All questions were answered to best of my ability and the patient's satisfaction. Ms. Stark was advised to contact the clinic with any questions that may arise after the clinic visit.      Thank you for involving me in the care of the patient    Return to clinic: 6 months      HPI   Roxanna Stark is a 89 year old female with medical history significant for basal cell carcinoma, hypertension, aortic stenosis, right rotator cuff tear (previously evaluated by Dr. Bingham, orthopedic surgery, on 3/20/2015 where at that time Ms. Stark was not interested in surgical correction; she received an intra-articular steroid injection at that time that was effective for ~1year), temporal arteritis?, and seronegative erosive rheumatoid arthritis.     Today, 1/29/2021: reports that overall she is doing well; chronic right shoulder ache from time to time but not to the point of needing another steroid injection.  Doing exercises at home for mild low back pain with improvement of the back pain; she notes that the back pain radiates down her buttock and to her knees. Use a walker as a precaution. She would like to do exercises at home; does not want formal PT to reduce risk for COVID19 exposure.   No GCA or PMR symptoms.     Denies fevers, chills, nausea, vomiting, constipation, diarrhea. No abdominal pain. No chest pain/pressure, palpitations, or shortness of breath. No oral or nasal sores. No neck pain. No rash. Swelling in her bilateral feet.       Tobacco: None  EtOH: No more than 1 drink per week  Drugs: None  Occupation: Used to work for the [employer] company; now retired    ROS   GEN: No fevers, chills, night sweats, or weight change  SKIN: No itching, rashes, sores  HEENT: No oral or nasal ulcers.  CV: No chest pain, pressure, palpitations, or dyspnea on exertion.  PULM: No SOB, wheeze, cough.  GI: No nausea, vomiting, constipation, diarrhea. No blood  in stool. No abdominal pain.  : No blood in urine.  MSK: See HPI.  NEURO: Says that gabapentin is helping her peripheral neuropathy - affecting the feet  ENDO: No heat/cold intolerance.  EXT: See HPI    Active Problem List     Patient Active Problem List   Diagnosis     Polymyalgia rheumatica (H)     History of basal cell carcinoma     CARDIOVASCULAR SCREENING; LDL GOAL LESS THAN 130     Hypertension goal BP (blood pressure) < 140/90     Hip pain     Left atrial enlargement     Status post coronary angiogram     Aortic valve replaced     Rheumatoid arthritis of multiple sites with negative rheumatoid factor (H)     CKD (chronic kidney disease) stage 3, GFR 30-59 ml/min     THERESA III (vulvar intraepithelial neoplasia III)     Ulcer of left foot, unspecified ulcer stage (H)     Peripheral arterial disease (H)     Past Medical History     Past Medical History:   Diagnosis Date     Actinic keratosis      Aortic stenosis 2014     Basal cell cancer 7/2014    left eye medial canthus      Basal cell carcinoma 9/30/08    left cheek     CKD (chronic kidney disease) stage 3, GFR 30-59 ml/min 7/1/2019     HTN (hypertension)      Melanoma in situ (H) 9/30/08    left arm     Polymyalgia rheumatica (H) 11/99     Temporal arteritis (H) 11/99     Past Surgical History     Past Surgical History:   Procedure Laterality Date     C SKIN TISSUE PROCEDURE UNLISTED  11/3/08    mmis skin cancer excision     CATARACT IOL, RT/LT  5/09    bilateral     COLONOSCOPY  2002     EXCISE LESION VULVA N/A 9/27/2019    Procedure: Wide Local Excision Of Vulva, Colposcopy;  Surgeon: Mono Ribeiro MD;  Location: [facility] OR     REPLACE VALVE AORTIC N/A 4/25/2016    Procedure: REPLACE VALVE AORTIC;  Surgeon: Sudeep Tsai MD;  Location: [facility] OR     Allergy     Allergies   Allergen Reactions     Lisinopril Cough        Current Medication List     Current Outpatient Medications   Medication Sig     acetaminophen (TYLENOL) 325 MG tablet Take 2 tablets  (650 mg) by mouth every 6 hours as needed for mild pain     aspirin  MG EC tablet Take 1 tablet (325 mg) by mouth daily     calcium-vitamin D 500-125 MG-UNIT TABS      folic acid (FOLVITE) 1 MG tablet Take 1 tablet (1 mg) by mouth daily     furosemide (LASIX) 20 MG tablet TAKE 1 TABLET BY MOUTH DAILY IF 2 TO 3 LBS WEIGHT GAIN OVER A 2 DAY PERIOD     gabapentin (NEURONTIN) 100 MG capsule Take 1 capsule (100 mg) by mouth 3 times daily     ICAPS PO 2 tablets daily     losartan (COZAAR) 25 MG tablet TAKE 1 TABLET(25 MG) BY MOUTH DAILY     methotrexate sodium 2.5 MG TABS Take 8 tablets (20 mg) by mouth once a week . Take all 8 tablets on the same day of each week.  Do not take with amoxicillin.     metoprolol tartrate (LOPRESSOR) 25 MG tablet Take 1 tablet (25 mg) by mouth 2 times daily     Omega-3 Fatty Acids (OMEGA-3 FISH OIL PO) Take 1 tablet twice daily.     sulfaSALAzine ER (AZULFIDINE EN) 500 MG EC tablet Take 1 tablet (500 mg) by mouth 2 times daily     amoxicillin (AMOXIL) 500 MG capsule TAKE 4 CAPSULES BY MOUTH 1 HOUR BEFORE DENTAL APPOINTMENT     No current facility-administered medications for this visit.        Social History   See HPI    Family History     Family History   Problem Relation Age of Onset     Breast Cancer Sister 45     Arthritis Sister      Thyroid Disease Sister      Cancer Sister         colon     Arthritis Sister      Arthritis Mother      Hypertension Father      Cancer - colorectal Father      Prostate Cancer Father      Arthritis Father      Heart Disease Father      Lipids Father      Arthritis Sister      Asthma Daughter      Asthma Daughter      Cancer Daughter 58        lung     Cancer Other 81        pancreatic      Physical Exam     Temp Readings from Last 3 Encounters:   01/12/21 98  F (36.7  C) (Oral)   12/08/20 97.5  F (36.4  C)   09/16/20 97.6  F (36.4  C) (Oral)     BP Readings from Last 5 Encounters:   01/12/21 110/64   12/08/20 132/70   09/16/20 132/66   09/08/20 (!)  175/77   09/04/20 (!) 144/74     Pulse Readings from Last 1 Encounters:   01/12/21 65     Resp Readings from Last 1 Encounters:   01/12/21 20     Estimated body mass index is 24.61 kg/m  as calculated from the following:    Height as of 1/12/21: 1.524 m (5').    Weight as of 1/12/21: 57.2 kg (126 lb).    GEN: alert and no distress  PSYCH: Alert; coherent speech, normal rate and volume, able to articulate logical thoughts, able   to abstract reason, no tangential thoughts. Normal affect.   RESP: No cough, no audible wheezing, able to talk in full sentences  Remainder of exam unable to be completed due to telephone visits      Labs / Imaging (select studies)     RF/CCP  Recent Labs   Lab Test 08/11/16  1124 08/04/16  1222 02/18/16  1543   CCPIGG 1  --   --    RHF  --  <20 <20     CBC  Recent Labs   Lab Test 01/22/21  0933 10/30/20  0903 07/24/20  1328   WBC 8.1 10.8 11.1*   RBC 3.93 4.04 3.33*   HGB 11.9 11.8 10.4*   HCT 38.0 37.8 32.7*   MCV 97 94 98   RDW 19.4* 18.0* 19.2*    203 184   MCH 30.3 29.2 31.2   MCHC 31.3* 31.2* 31.8   NEUTROPHIL 54.0 51.7 62.2   LYMPH 27.0 29.5 22.5   MONOCYTE 15.0 14.7 12.0   EOSINOPHIL 3.3 3.5 2.5   BASOPHIL 0.7 0.6 0.8   ANEU 4.3 5.6 6.9   ALYM 2.2 3.2 2.5   IGNACIO 1.2 1.6* 1.3   AEOS 0.3 0.4 0.3   ABAS 0.1 0.1 0.1     CMP  Recent Labs   Lab Test 01/22/21  0933 10/30/20  0903 09/16/20  1008 07/24/20  1328 09/27/19  0950 09/27/19  0950 07/01/19  1256 07/01/19  1256 12/14/16  0849 12/14/16  0849   NA  --   --  140  --   --   --   --  139  --  140   POTASSIUM  --   --  4.2  --   --  3.9  --  4.2  --  4.5   CHLORIDE  --   --  107  --   --   --   --  103  --  105   CO2  --   --  29  --   --   --   --  30  --  26   ANIONGAP  --   --  4  --   --   --   --  6  --  9   GLC  --   --  102*  --   --   --   --  94  --  172*   BUN  --   --  28  --   --   --   --  22  --  28   CR 1.17* 1.38* 1.24* 1.64*   < >  --    < > 1.21*   < > 1.19*   GFRESTIMATED 40* 33* 37* 27*   < >  --    < > 39*   < >  43*   GFRESTBLACK 46* 38* 43* 31*   < >  --    < > 45*   < > 52*   ROEL  --   --  9.2  --   --   --   --  9.6  --  8.8   BILITOTAL 0.2 0.4  --  0.2   < >  --    < >  --    < >  --    ALBUMIN 3.6 3.3*  --  3.2*   < >  --    < >  --    < >  --    PROTTOTAL 7.7 7.6  --  6.9   < >  --    < >  --    < >  --    ALKPHOS 118 108  --  70   < >  --    < >  --    < >  --    AST 23 28  --  30   < >  --    < >  --    < >  --    ALT 25 28  --  34   < >  --    < >  --    < >  --     < > = values in this interval not displayed.     Calcium/VitaminD  Recent Labs   Lab Test 09/16/20  1008 07/01/19  1256 12/14/16  0849   ROEL 9.2 9.6 8.8     ESR/CRP  Recent Labs   Lab Test 01/22/21  0933 10/30/20  0903 09/18/19  0913   SED 34* 41* 76*   CRP 3.4 5.4 23.2*     Hepatitis B  Recent Labs   Lab Test 11/10/16  0909   HBCAB Nonreactive   HEPBANG Nonreactive     Hepatitis C  Recent Labs   Lab Test 11/10/16  0909   HCVAB Nonreactive   Assay performance characteristics have not been established for newborns,   infants, and children       HIV Screening  Recent Labs   Lab Test 11/10/16  0909   HIAGAB Nonreactive   HIV-1 p24 Ag & HIV-1/HIV-2 Ab Not Detected       Immunization History     Immunization History   Administered Date(s) Administered     FLU 6-35 months 09/08/2010     Influenza (H1N1) 10/20/2016     Influenza (High Dose) 3 valent vaccine 10/16/2014, 10/06/2015, 10/23/2016, 10/03/2017, 08/23/2018, 09/18/2019     Influenza (IIV3) PF 11/05/1999, 12/14/2000, 10/26/2004, 10/04/2005, 09/16/2009, 10/20/2010, 11/09/2011, 09/22/2012, 09/15/2013, 10/15/2014     Influenza, Quad, High Dose, Pf, 65yr + 09/02/2020     Pneumo Conj 13-V (2010&after) 03/17/2015     Pneumococcal 23 valent 11/03/2000, 12/13/2010     TD (ADULT, 7+) 01/12/2004     TDAP Vaccine (Adacel) 05/14/2013     Td (Adult), Adsorbed 01/12/2004     Zoster vaccine recombinant adjuvanted (SHINGRIX) 06/21/2018, 08/23/2018     Zoster vaccine, live 12/15/2006          Chart documentation done in  part with Dragon Voice recognition Software. Although reviewed after completion, some word and grammatical error may remain.    Phone call duration with patient (in minutes): 17    Location of patient: [place], [place]   Location of provider: deana Johnson MD

## 2021-02-16 ENCOUNTER — IMMUNIZATION (OUTPATIENT)
Dept: PEDIATRICS | Facility: CLINIC | Age: 86
End: 2021-02-16
Payer: MEDICARE

## 2021-02-16 PROCEDURE — 0001A PR COVID VAC PFIZER DIL RECON 30 MCG/0.3 ML IM: CPT

## 2021-02-16 PROCEDURE — 91300 PR COVID VAC PFIZER DIL RECON 30 MCG/0.3 ML IM: CPT

## 2021-03-03 DIAGNOSIS — I10 HYPERTENSION GOAL BP (BLOOD PRESSURE) < 140/90: ICD-10-CM

## 2021-03-05 RX ORDER — FUROSEMIDE 20 MG
TABLET ORAL
Qty: 30 TABLET | Refills: 0 | Status: SHIPPED | OUTPATIENT
Start: 2021-03-05 | End: 2021-03-23

## 2021-03-05 NOTE — TELEPHONE ENCOUNTER
Routing refill request to provider for review/approval because:  Labs out of range:    Creatinine   Date Value Ref Range Status   01/22/2021 1.17 (H) 0.52 - 1.04 mg/dL Final

## 2021-03-09 ENCOUNTER — IMMUNIZATION (OUTPATIENT)
Dept: PEDIATRICS | Facility: CLINIC | Age: 86
End: 2021-03-09
Attending: INTERNAL MEDICINE
Payer: MEDICARE

## 2021-03-09 PROCEDURE — 91300 PR COVID VAC PFIZER DIL RECON 30 MCG/0.3 ML IM: CPT

## 2021-03-09 PROCEDURE — 0002A PR COVID VAC PFIZER DIL RECON 30 MCG/0.3 ML IM: CPT

## 2021-03-23 ENCOUNTER — OFFICE VISIT (OUTPATIENT)
Dept: FAMILY MEDICINE | Facility: CLINIC | Age: 86
End: 2021-03-23
Payer: MEDICARE

## 2021-03-23 VITALS
OXYGEN SATURATION: 99 % | BODY MASS INDEX: 24.35 KG/M2 | WEIGHT: 124 LBS | DIASTOLIC BLOOD PRESSURE: 65 MMHG | TEMPERATURE: 98.2 F | RESPIRATION RATE: 18 BRPM | SYSTOLIC BLOOD PRESSURE: 128 MMHG | HEIGHT: 60 IN | HEART RATE: 77 BPM

## 2021-03-23 DIAGNOSIS — Z79.899 HIGH RISK MEDICATION USE: ICD-10-CM

## 2021-03-23 DIAGNOSIS — M06.09 RHEUMATOID ARTHRITIS OF MULTIPLE SITES WITH NEGATIVE RHEUMATOID FACTOR (H): ICD-10-CM

## 2021-03-23 DIAGNOSIS — N18.32 STAGE 3B CHRONIC KIDNEY DISEASE (H): Primary | ICD-10-CM

## 2021-03-23 DIAGNOSIS — I12.9 BENIGN HYPERTENSION WITH CKD (CHRONIC KIDNEY DISEASE) STAGE III (H): ICD-10-CM

## 2021-03-23 DIAGNOSIS — L98.9 LEG SKIN LESION, LEFT: ICD-10-CM

## 2021-03-23 DIAGNOSIS — I73.9 PERIPHERAL ARTERIAL DISEASE (H): ICD-10-CM

## 2021-03-23 DIAGNOSIS — R73.9 HYPERGLYCEMIA: ICD-10-CM

## 2021-03-23 DIAGNOSIS — N18.30 BENIGN HYPERTENSION WITH CKD (CHRONIC KIDNEY DISEASE) STAGE III (H): ICD-10-CM

## 2021-03-23 LAB
CREAT UR-MCNC: 69 MG/DL
ERYTHROCYTE [DISTWIDTH] IN BLOOD BY AUTOMATED COUNT: 17.7 % (ref 10–15)
HCT VFR BLD AUTO: 35.4 % (ref 35–47)
HGB BLD-MCNC: 11 G/DL (ref 11.7–15.7)
MCH RBC QN AUTO: 31 PG (ref 26.5–33)
MCHC RBC AUTO-ENTMCNC: 31.1 G/DL (ref 31.5–36.5)
MCV RBC AUTO: 100 FL (ref 78–100)
MICROALBUMIN UR-MCNC: 32 MG/L
MICROALBUMIN/CREAT UR: 46.31 MG/G CR (ref 0–25)
PLATELET # BLD AUTO: 234 10E9/L (ref 150–450)
RBC # BLD AUTO: 3.55 10E12/L (ref 3.8–5.2)
WBC # BLD AUTO: 9.4 10E9/L (ref 4–11)

## 2021-03-23 PROCEDURE — 99214 OFFICE O/P EST MOD 30 MIN: CPT | Performed by: FAMILY MEDICINE

## 2021-03-23 PROCEDURE — 85027 COMPLETE CBC AUTOMATED: CPT | Performed by: FAMILY MEDICINE

## 2021-03-23 PROCEDURE — 80069 RENAL FUNCTION PANEL: CPT | Performed by: FAMILY MEDICINE

## 2021-03-23 PROCEDURE — 82043 UR ALBUMIN QUANTITATIVE: CPT | Performed by: FAMILY MEDICINE

## 2021-03-23 PROCEDURE — 36415 COLL VENOUS BLD VENIPUNCTURE: CPT | Performed by: FAMILY MEDICINE

## 2021-03-23 RX ORDER — FUROSEMIDE 20 MG
TABLET ORAL
Qty: 30 TABLET | Refills: 3 | Status: SHIPPED | OUTPATIENT
Start: 2021-03-23 | End: 2021-08-04

## 2021-03-23 ASSESSMENT — MIFFLIN-ST. JEOR: SCORE: 888.96

## 2021-03-23 ASSESSMENT — PAIN SCALES - GENERAL: PAINLEVEL: NO PAIN (0)

## 2021-03-23 NOTE — PROGRESS NOTES
Assessment & Plan     Stage 3b chronic kidney disease  Labs pending   - Renal panel (Alb, BUN, Ca, Cl, CO2, Creat, Gluc, Phos, K, Na)  - Albumin Random Urine Quantitative with Creat Ratio  - CBC with platelets    Peripheral arterial disease (H)  Stable     Rheumatoid arthritis of multiple sites with negative rheumatoid factor (H)  Sees Dr Johnson    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  Stable     Hypertension goal BP (blood pressure) < 140/90  refilled  - furosemide (LASIX) 20 MG tablet; TAKE 1 TABLET BY MOUTH DAILY IF 2 TO 3 POUNDS WEIGHT GAIN OVER A 2 DAY PERIOD.    Leg skin lesion, left  Advised   - DERMATOLOGY ADULT REFERRAL; Future    High risk medication use        Return in about 6 months (around 9/23/2021) for Medicare wellness Exam.    Swapna Gaines MD  [facility] [place] is a 89 year old who presents for the following health issues HPI     Medication Followup of Lasix 20 mg    Taking Medication as prescribed: yes    Side Effects:  None    Medication Helping Symptoms:  NO-not really     Hypertension Follow-up      Do you check your blood pressure regularly outside of the clinic? No     Are you following a low salt diet? Yes    Are your blood pressures ever more than 140 on the top number (systolic) OR more   than 90 on the bottom number (diastolic), for example 140/90? No    Chronic Kidney Disease Follow-up      Do you take any over the counter pain medicine?: No     Pt has a lesion left calf which is Not healing    Review of Systems   CONSTITUTIONAL: NEGATIVE for fever, chills, change in weight  ENT/MOUTH: NEGATIVE for ear, mouth and throat problems  RESP: NEGATIVE for significant cough or SOB  CV: NEGATIVE for chest pain, palpitations or peripheral edema  GI: NEGATIVE for nausea, abdominal pain, heartburn, or change in bowel habits  PSYCHIATRIC: NEGATIVE for changes in mood or affect  ROS otherwise negative      Objective    /65    Pulse 77   Temp 98.2  F (36.8  C) (Oral)   Resp 18   Ht 1.524 m (5')   Wt 56.2 kg (124 lb)   SpO2 99%   BMI 24.22 kg/m    Body mass index is 24.22 kg/m .  Physical Exam   GENERAL: alert, no distress and elderly  NECK: no adenopathy, no asymmetry, masses, or scars and thyroid normal to palpation  RESP: lungs clear to auscultation - no rales, rhonchi or wheezes  CV: regular rate and rhythm, normal S1 S2, no S3 or S4, no murmur, click or rub, no peripheral edema and peripheral pulses strong  ABDOMEN: soft, nontender, no hepatosplenomegaly, no masses and bowel sounds normal  MS: no gross musculoskeletal defects noted, no edema  PSYCH: mentation appears normal  Scab left calf which is now Dry

## 2021-03-24 LAB
ALBUMIN SERPL-MCNC: 3.5 G/DL (ref 3.4–5)
ANION GAP SERPL CALCULATED.3IONS-SCNC: 4 MMOL/L (ref 3–14)
BUN SERPL-MCNC: 27 MG/DL (ref 7–30)
CALCIUM SERPL-MCNC: 9.3 MG/DL (ref 8.5–10.1)
CHLORIDE SERPL-SCNC: 103 MMOL/L (ref 94–109)
CO2 SERPL-SCNC: 31 MMOL/L (ref 20–32)
CREAT SERPL-MCNC: 1.4 MG/DL (ref 0.52–1.04)
GFR SERPL CREATININE-BSD FRML MDRD: 32 ML/MIN/{1.73_M2}
GLUCOSE SERPL-MCNC: 182 MG/DL (ref 70–99)
PHOSPHATE SERPL-MCNC: 3.8 MG/DL (ref 2.5–4.5)
POTASSIUM SERPL-SCNC: 4.3 MMOL/L (ref 3.4–5.3)
SODIUM SERPL-SCNC: 138 MMOL/L (ref 133–144)

## 2021-04-05 NOTE — PATIENT INSTRUCTIONS
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  Treatment History:  -9/27/2019: Posterior simple vulvectomy (removal of pre-cancerous cells from the vulva).      PLAN:  1) VIN3: Surveillance as follows:  6 months (completed) then annually indefinitely with application of dilute acetic acid and digital anorectal exam at each visit.    Please call in the interim if you have any persistent vulvar itching, note new lesions, or have other concerning symptoms.      Aminta Garcia MD, MS, FACOG, FACS  4/6/2021  8:43 AM

## 2021-04-05 NOTE — PROGRESS NOTES
Follow-up Note on Referred Patient    Date: 4/6/2021       Chief Complaint: Vulvar histologic HSIL. Surveillance visit.       History of Present Illness:  Roxanna Stark is a 89 year old with history of vulvar histologic HSIL. History as follows:     7/3/2019: Biopsy of the right labia minora.  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   9/27/2019: Posterior simple vulvectomy.  -Pathology: THERESA 3 with positive margin from 3-6 o'clock.   -Complicated by wound separation.       Subjective:  Roxanna returns to the gyn onc clinic today for her scheduled surveillance visit. Roxanna reports no concerning symptoms.    No vulvar lesions, no itching, no burning, no bleeding.   No changes in health history.       Past Medical History:  Past Medical History:   Diagnosis Date     Actinic keratosis      Aortic stenosis 2014     Basal cell cancer 7/2014    left eye medial canthus      Basal cell carcinoma 9/30/08    left cheek     CKD (chronic kidney disease) stage 3, GFR 30-59 ml/min 7/1/2019     HTN (hypertension)      Melanoma in situ (H) 9/30/08    left arm     Polymyalgia rheumatica (H) 11/99     Temporal arteritis (H) 11/99       Past Surgical History:  Past Surgical History:   Procedure Laterality Date     C SKIN TISSUE PROCEDURE UNLISTED  11/3/08    mmis skin cancer excision     CATARACT IOL, RT/LT  5/09    bilateral     COLONOSCOPY  2002     EXCISE LESION VULVA N/A 9/27/2019    Procedure: Wide Local Excision Of Vulva, Colposcopy;  Surgeon: Mono Ribeiro MD;  Location:  OR     REPLACE VALVE AORTIC N/A 4/25/2016    Procedure: REPLACE VALVE AORTIC;  Surgeon: Sudeep Tsai MD;  Location:  OR       Current Medications:   has a current medication list which includes the following prescription(s): acetaminophen, amoxicillin, aspirin, calcium-vitamin d, folic acid, furosemide, gabapentin, multiple vitamins-minerals, losartan, methotrexate sodium, metoprolol tartrate, omega-3 fatty acids, and sulfasalazine er.        Allergies:   Allergies   Allergen Reactions     Lisinopril Cough         Social History:   Social History     Tobacco Use     Smoking status: Never Smoker     Smokeless tobacco: Never Used   Substance Use Topics     Alcohol use: Yes     Comment: 4 times a year       History   Drug Use No       Family History:     The patient's family history is notable for a first-degree paternal relative with colon and prostate cancer, a first-degree relative with colon cancer, and a first-degree relative with breast cancer.   Family History   Problem Relation Age of Onset     Breast Cancer Sister 45     Arthritis Sister      Thyroid Disease Sister      Arthritis Sister      Colon Cancer Sister         colon     Arthritis Mother      Hypertension Father      Prostate Cancer Father      Arthritis Father      Heart Disease Father      Lipids Father      Colon Cancer Father      Arthritis Sister      Asthma Daughter      Asthma Daughter      Lung Cancer Daughter 58        lung     Pancreatic Cancer Other 81        pancreatic        Physical Exam:   BP (!) 166/71 (BP Location: Left arm, Patient Position: Sitting, Cuff Size: Adult Regular)   Pulse 67   Temp 97.5  F (36.4  C) (Oral)   Resp 16   Ht 1.524 m (5')   Wt 56.4 kg (124 lb 4.8 oz)   SpO2 98%   BMI 24.28 kg/m    Body mass index is 24.28 kg/m .    General Appearance: healthy and alert, no distress     Musculoskeletal: extremities non tender and without edema    Skin: no lesions or rashes     Neurological: normal gait, no gross defects     Psychiatric: appropriate mood and affect                               Genitourinary: External genitalia are atrophic but otherwise normal in appearance. No gross lesions. Bimanual exam with normal cervix and vagina. Uterus small and mobile. On digital anorectal exam, there are no masses nor nodularity.       Procedure Risk Statement:  The patient was counseled regarding risks, benefits and alternatives to vulvoscopy, possible  biopsies.  Risks discussed include but not limited to:  Bleeding; infection; injury to adjacent organs; inadequate sample or false negative result.  The patient's questions were answered, and informed consent signed.      Procedure:  After informed consent was signed and time-out procedure was performed, dilute acetic acid was applied to the vulva x1 minute. No acetowhite changes or other lesions. No biopsies indicated.       Performance Status:  ECOG Grade 0.       Assessment:  Roxanna Stark is a 89 year old woman with a history of VIN3, currently without evidence of disease.      A total of 10 minutes was spent with the patient, 2 minutes of which were spent in counseling the patient and/or treatment planning, 8 minutes of which were spent in the above procedure.       Plan:   1.)   VIN3:   Surveillance as follows (per ACOG guidelines): 6 months (completed) then annually indefinitely with vulvoscopy and digital anorectal exam at each visit.   She will call in the interim if she has any persistent vulvar itching, or notes new lesions.       Aminta Garcia MD, MS, FACOG, FACS  4/6/2021  8:42 AM          CC  Patient Care Team:  Swapna Gaines MD as PCP - General (Family Practice)  Brandan Landin MD as MD (OB/Gyn)  Mono Ribeiro MD as MD (Gynecologic Oncology)  Swapna Gaines MD as Assigned PCP  Elisa Whatley APRN CNP as Assigned OBGYN Provider  Edwige Seth MD as Assigned Heart and Vascular Provider  Mono Ribeiro MD as Assigned Cancer Care Provider  Pawel Musa DPM as Assigned Musculoskeletal Provider  SELF, REFERRED

## 2021-04-06 ENCOUNTER — OFFICE VISIT (OUTPATIENT)
Dept: ONCOLOGY | Facility: CLINIC | Age: 86
End: 2021-04-06
Attending: OBSTETRICS & GYNECOLOGY
Payer: MEDICARE

## 2021-04-06 VITALS
SYSTOLIC BLOOD PRESSURE: 166 MMHG | WEIGHT: 124.3 LBS | DIASTOLIC BLOOD PRESSURE: 71 MMHG | BODY MASS INDEX: 24.4 KG/M2 | RESPIRATION RATE: 16 BRPM | HEART RATE: 67 BPM | TEMPERATURE: 97.5 F | HEIGHT: 60 IN | OXYGEN SATURATION: 98 %

## 2021-04-06 DIAGNOSIS — D07.1 VIN III (VULVAR INTRAEPITHELIAL NEOPLASIA III): Primary | ICD-10-CM

## 2021-04-06 PROCEDURE — G0463 HOSPITAL OUTPT CLINIC VISIT: HCPCS

## 2021-04-06 PROCEDURE — 56820 COLPOSCOPY VULVA: CPT | Performed by: OBSTETRICS & GYNECOLOGY

## 2021-04-06 PROCEDURE — 56820 COLPOSCOPY VULVA: CPT

## 2021-04-06 ASSESSMENT — PAIN SCALES - GENERAL: PAINLEVEL: NO PAIN (0)

## 2021-04-06 ASSESSMENT — MIFFLIN-ST. JEOR: SCORE: 890.32

## 2021-04-06 NOTE — NURSING NOTE
Oncology Rooming Note    April 6, 2021 8:27 AM   Roxanna Stark is a 89 year old female who presents for:    No chief complaint on file.    Initial Vitals: BP (!) 166/71 (BP Location: Left arm, Patient Position: Sitting, Cuff Size: Adult Regular)   Pulse 67   Temp 97.5  F (36.4  C) (Oral)   Resp 16   Ht 1.524 m (5')   Wt 56.4 kg (124 lb 4.8 oz)   SpO2 98%   BMI 24.28 kg/m   Estimated body mass index is 24.28 kg/m  as calculated from the following:    Height as of this encounter: 1.524 m (5').    Weight as of this encounter: 56.4 kg (124 lb 4.8 oz). Body surface area is 1.55 meters squared.  No Pain (0) Comment: Data Unavailable   No LMP recorded. Patient is postmenopausal.  Allergies reviewed: Yes  Medications reviewed: Yes    Medications: Medication refills not needed today.  Pharmacy name entered into [employer]:    [id number] - [facility] [address] [facility]    Clinical concerns: N/A    Khushi Dumont CMA

## 2021-04-06 NOTE — LETTER
4/6/2021         RE: Roxanna Stark  [address]  [place] [address]        Dear Colleague,    Thank you for referring your patient, Roxanna Stark, to the [facility]. Please see a copy of my visit note below.                            Follow-up Note on Referred Patient    Date: 4/6/2021       Chief Complaint: Vulvar histologic HSIL. Surveillance visit.       History of Present Illness:  Roxanna Stark is a 89 year old with history of vulvar histologic HSIL. History as follows:     7/3/2019: Biopsy of the right labia minora.  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   9/27/2019: Posterior simple vulvectomy.  -Pathology: THERESA 3 with positive margin from 3-6 o'clock.   -Complicated by wound separation.       Subjective:  Roxanna returns to the gyn onc clinic today for her scheduled surveillance visit. Roxanna reports no concerning symptoms.    No vulvar lesions, no itching, no burning, no bleeding.   No changes in health history.       Past Medical History:  Past Medical History:   Diagnosis Date     Actinic keratosis      Aortic stenosis 2014     Basal cell cancer 7/2014    left eye medial canthus      Basal cell carcinoma 9/30/08    left cheek     CKD (chronic kidney disease) stage 3, GFR 30-59 ml/min 7/1/2019     HTN (hypertension)      Melanoma in situ (H) 9/30/08    left arm     Polymyalgia rheumatica (H) 11/99     Temporal arteritis (H) 11/99       Past Surgical History:  Past Surgical History:   Procedure Laterality Date     C SKIN TISSUE PROCEDURE UNLISTED  11/3/08    mmis skin cancer excision     CATARACT IOL, RT/LT  5/09    bilateral     COLONOSCOPY  2002     EXCISE LESION VULVA N/A 9/27/2019    Procedure: Wide Local Excision Of Vulva, Colposcopy;  Surgeon: Mono Ribeiro MD;  Location: [facility] OR     REPLACE VALVE AORTIC N/A 4/25/2016    Procedure: REPLACE VALVE AORTIC;  Surgeon: Sudeep Tsai MD;  Location: [facility] OR       Current Medications:   has a current medication list which  includes the following prescription(s): acetaminophen, amoxicillin, aspirin, calcium-vitamin d, folic acid, furosemide, gabapentin, multiple vitamins-minerals, losartan, methotrexate sodium, metoprolol tartrate, omega-3 fatty acids, and sulfasalazine er.       Allergies:   Allergies   Allergen Reactions     Lisinopril Cough         Social History:   Social History     Tobacco Use     Smoking status: Never Smoker     Smokeless tobacco: Never Used   Substance Use Topics     Alcohol use: Yes     Comment: 4 times a year       History   Drug Use No       Family History:     The patient's family history is notable for a first-degree paternal relative with colon and prostate cancer, a first-degree relative with colon cancer, and a first-degree relative with breast cancer.   Family History   Problem Relation Age of Onset     Breast Cancer Sister 45     Arthritis Sister      Thyroid Disease Sister      Arthritis Sister      Colon Cancer Sister         colon     Arthritis Mother      Hypertension Father      Prostate Cancer Father      Arthritis Father      Heart Disease Father      Lipids Father      Colon Cancer Father      Arthritis Sister      Asthma Daughter      Asthma Daughter      Lung Cancer Daughter 58        lung     Pancreatic Cancer Other 81        pancreatic        Physical Exam:   BP (!) 166/71 (BP Location: Left arm, Patient Position: Sitting, Cuff Size: Adult Regular)   Pulse 67   Temp 97.5  F (36.4  C) (Oral)   Resp 16   Ht 1.524 m (5')   Wt 56.4 kg (124 lb 4.8 oz)   SpO2 98%   BMI 24.28 kg/m    Body mass index is 24.28 kg/m .    General Appearance: healthy and alert, no distress     Musculoskeletal: extremities non tender and without edema    Skin: no lesions or rashes     Neurological: normal gait, no gross defects     Psychiatric: appropriate mood and affect                               Genitourinary: External genitalia are atrophic but otherwise normal in appearance. No gross lesions. Bimanual  exam with normal cervix and vagina. Uterus small and mobile. On digital anorectal exam, there are no masses nor nodularity.       Procedure Risk Statement:  The patient was counseled regarding risks, benefits and alternatives to vulvoscopy, possible biopsies.  Risks discussed include but not limited to:  Bleeding; infection; injury to adjacent organs; inadequate sample or false negative result.  The patient's questions were answered, and informed consent signed.      Procedure:  After informed consent was signed and time-out procedure was performed, dilute acetic acid was applied to the vulva x1 minute. No acetowhite changes or other lesions. No biopsies indicated.       Performance Status:  ECOG Grade 0.       Assessment:  Roxanna Stark is a 89 year old woman with a history of VIN3, currently without evidence of disease.      A total of 10 minutes was spent with the patient, 2 minutes of which were spent in counseling the patient and/or treatment planning, 8 minutes of which were spent in the above procedure.       Plan:   1.)   VIN3:   Surveillance as follows (per ACOG guidelines): 6 months (completed) then annually indefinitely with vulvoscopy and digital anorectal exam at each visit.   She will call in the interim if she has any persistent vulvar itching, or notes new lesions.       Aminta Garcia MD, MS, FACOG, FACS  4/6/2021  8:42 AM          CC  Patient Care Team:  Swapna Gaines MD as PCP - General (Family Practice)  Brandan Landin MD as MD (OB/Gyn)  Mono Ribeiro MD as MD (Gynecologic Oncology)  Swapna Gaines MD as Assigned PCP  Elisa Whatley APRN CNP as Assigned OBGYN Provider  Edwige Seth MD as Assigned Heart and Vascular Providerr  Pawel Musa DPM as Assigned Musculoskeletal Provider

## 2021-05-03 ENCOUNTER — OFFICE VISIT (OUTPATIENT)
Dept: FAMILY MEDICINE | Facility: CLINIC | Age: 86
End: 2021-05-03
Payer: MEDICARE

## 2021-05-03 VITALS
DIASTOLIC BLOOD PRESSURE: 76 MMHG | BODY MASS INDEX: 23.84 KG/M2 | TEMPERATURE: 97.6 F | RESPIRATION RATE: 20 BRPM | WEIGHT: 121.4 LBS | HEIGHT: 60 IN | HEART RATE: 76 BPM | SYSTOLIC BLOOD PRESSURE: 148 MMHG | OXYGEN SATURATION: 99 %

## 2021-05-03 DIAGNOSIS — S51.812D SKIN TEAR OF LEFT FOREARM WITHOUT COMPLICATION, SUBSEQUENT ENCOUNTER: Primary | ICD-10-CM

## 2021-05-03 DIAGNOSIS — N18.30 BENIGN HYPERTENSION WITH CKD (CHRONIC KIDNEY DISEASE) STAGE III (H): ICD-10-CM

## 2021-05-03 DIAGNOSIS — I12.9 BENIGN HYPERTENSION WITH CKD (CHRONIC KIDNEY DISEASE) STAGE III (H): ICD-10-CM

## 2021-05-03 DIAGNOSIS — S00.81XD ABRASION OF FACE, SUBSEQUENT ENCOUNTER: ICD-10-CM

## 2021-05-03 PROCEDURE — 99214 OFFICE O/P EST MOD 30 MIN: CPT | Performed by: FAMILY MEDICINE

## 2021-05-03 ASSESSMENT — PAIN SCALES - GENERAL: PAINLEVEL: MILD PAIN (2)

## 2021-05-03 ASSESSMENT — MIFFLIN-ST. JEOR: SCORE: 877.17

## 2021-05-03 NOTE — PROGRESS NOTES
Assessment & Plan     Skin tear of left forearm without complication, subsequent encounter  Well-healing without evidence of infection    Abrasion of face, subsequent encounter  Well-healing without evidence of infection    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  Her hypertension is under control currently.  We will have her follow-up to see me in a couple of weeks to recheck when she is healed from this traumatic event        32 minutes spent on the date of the encounter doing chart review, review of outside records, review of test results, interpretation of tests, patient visit and documentation            Return in about 2 weeks (around 5/17/2021) for BP Recheck.    Jennifer Ascencio DO  [facility]    Corbin Mcknight is a 89 year old who presents for the following health issues     HPI     ED/UC Followup:    Facility:  [facility]  Date of visit: 4/30/21  Reason for visit: Fall/ face abrasion  Current Status: doing okay- nothing broken       ER assessment and plan copied and pasted below  Disposition:  The patient was discharged to home.    Impression and Plan:   Roxanna Stark is a 89 y.o. female who presents with fall and facial injury, as well as hand abrasion. No evidence of fractures or intracranial injury. No septal hematoma on exam. She was evaluated by physical therapy and is ambulating with assistance of a walker. She was provided with one of those for home. We discussed admission versus discharge to home, the patient is comfortable going home. Pain is well controlled at this time. She is hemodynamically stable, no other significant injuries identified. Nothing suggestive of cardiovascular cause. Labs and EKG were not indicated. She is stable for discharge to home at this time.     Diagnosis:   ENCOUNTER DIAGNOSES   ICD-10-CM   1. Abrasion of face, initial encounter S00.81XA Walker   2. Skin tear of hand without complication, left,  initial encounter S61.412A Walker   3. Contusion of nose, initial encounter S00.33XA Walker   4. Fall, initial encounter W19.XXXA         Review of Systems   Constitutional, HEENT, cardiovascular, pulmonary, gi and gu systems are negative, except as otherwise noted.      Objective    BP (!) 148/76   Pulse 76   Temp 97.6  F (36.4  C) (Oral)   Resp 20   Ht 1.524 m (5')   Wt 55.1 kg (121 lb 6.4 oz)   SpO2 99%   BMI 23.71 kg/m    Body mass index is 23.71 kg/m .  Physical Exam   GENERAL: healthy, alert and no distress  EYES: Eyes grossly normal to inspection, PERRL and conjunctivae and sclerae normal  HENT: ear canals and TM's normal, nose and mouth without ulcers or lesions  NECK: no adenopathy, no asymmetry, masses, or scars and thyroid normal to palpation  Skin: Massive amounts of bruising to her eyelids cheeks upper and lower lips and chin.  Abrasion with scabbing over her lower lip,  4-5 less than 1 cm skin tears on her left forearm, no erythema with tenderness.  Minimal swelling in her nose

## 2021-05-24 DIAGNOSIS — Z29.89 SBE (SUBACUTE BACTERIAL ENDOCARDITIS) PROPHYLAXIS CANDIDATE: ICD-10-CM

## 2021-05-24 RX ORDER — AMOXICILLIN 500 MG/1
CAPSULE ORAL
Qty: 4 CAPSULE | Refills: 0 | Status: SHIPPED | OUTPATIENT
Start: 2021-05-24 | End: 2021-06-08

## 2021-05-24 NOTE — TELEPHONE ENCOUNTER
Routing refill request to provider for review/approval because:  Confirmation of diagnosis          Pending Prescriptions:                       Disp   Refills    amoxicillin (AMOXIL) 500 MG capsule        4 caps*0        Sig: TAKE 4 CAPSULES BY MOUTH 1 HOUR BEFORE DENTAL           APPOINTMENT        Gilson Bermudez RN

## 2021-05-24 NOTE — TELEPHONE ENCOUNTER
Reason for Call:  Medication or medication refill:    Do you use a [facility]?  Name of the pharmacy and phone number for the current request:  [id number] - [facility] [address] AT [facility]    Name of the medication requested: amoxicillin (AMOXIL) 500 MG capsule    Other request: Pt requesting medication for she has an upcoming dental procedure and is needing that prior to her appointment.    Can we leave a detailed message on this number? YES    Phone number patient can be reached at: Home number on file [phone] (home)    Best Time: anytime    Call taken on 5/24/2021 at 9:07 AM by Kimo Avina

## 2021-05-27 NOTE — TELEPHONE ENCOUNTER
Patient not available by telephone. Rich Jordan L.P.N.     Spinal Block    Patient location during procedure: OR  Start time: 4/9/2019 7:35 AM  End time: 4/9/2019 7:40 AM  Reason for block: primary anesthetic    Staffing:  Performing  Anesthesiologist: Lg Solis MD    Preanesthetic Checklist  Completed: patient identified, risks, benefits, and alternatives discussed, timeout performed, consent obtained, airway assessed, oxygen available, suction available, emergency drugs available and hand hygiene performed  Spinal Block  Patient position: sitting  Prep: ChloraPrep  Patient monitoring: heart rate, cardiac monitor and continuous pulse ox  Approach: midline  Location: L4-5  Injection technique: single-shot  Needle type: pencil-tip   Needle gauge: 24 G    Assessment  Sensory level: T8

## 2021-05-28 NOTE — PATIENT INSTRUCTIONS
Surgery scheduled 9/27/19  Post operative visit 10/17/19  Will make an appt with cardiology for clearance  
The patient is a 1y Male complaining of cold symptoms.

## 2021-06-01 DIAGNOSIS — G57.93 NEUROPATHY OF BOTH FEET: ICD-10-CM

## 2021-06-02 RX ORDER — GABAPENTIN 100 MG/1
CAPSULE ORAL
Qty: 90 CAPSULE | Refills: 3 | Status: SHIPPED | OUTPATIENT
Start: 2021-06-02 | End: 2021-10-06

## 2021-06-07 ENCOUNTER — OFFICE VISIT (OUTPATIENT)
Dept: FAMILY MEDICINE | Facility: CLINIC | Age: 86
End: 2021-06-07
Payer: MEDICARE

## 2021-06-07 VITALS
WEIGHT: 120.2 LBS | OXYGEN SATURATION: 100 % | BODY MASS INDEX: 23.6 KG/M2 | HEART RATE: 64 BPM | TEMPERATURE: 98.1 F | RESPIRATION RATE: 12 BRPM | DIASTOLIC BLOOD PRESSURE: 80 MMHG | SYSTOLIC BLOOD PRESSURE: 146 MMHG | HEIGHT: 60 IN

## 2021-06-07 DIAGNOSIS — I12.9 BENIGN HYPERTENSION WITH CKD (CHRONIC KIDNEY DISEASE) STAGE III (H): Primary | ICD-10-CM

## 2021-06-07 DIAGNOSIS — N18.30 BENIGN HYPERTENSION WITH CKD (CHRONIC KIDNEY DISEASE) STAGE III (H): Primary | ICD-10-CM

## 2021-06-07 PROCEDURE — 99214 OFFICE O/P EST MOD 30 MIN: CPT | Performed by: FAMILY MEDICINE

## 2021-06-07 RX ORDER — LOSARTAN POTASSIUM 50 MG/1
50 TABLET ORAL DAILY
Qty: 30 TABLET | Refills: 1 | Status: SHIPPED | OUTPATIENT
Start: 2021-06-07 | End: 2021-08-03

## 2021-06-07 ASSESSMENT — MIFFLIN-ST. JEOR: SCORE: 866.72

## 2021-06-07 ASSESSMENT — PAIN SCALES - GENERAL: PAINLEVEL: NO PAIN (0)

## 2021-06-07 NOTE — PROGRESS NOTES
Assessment & Plan     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    Her hypertension is uncontrolled so we will increase her Cozaar from 25 to 50 mg daily we will continue her metoprolol at 25 mg twice daily    - losartan (COZAAR) 50 MG tablet; Take 1 tablet (50 mg) by mouth daily      30 minutes spent on the date of the encounter doing chart review, patient visit and documentation         Return in about 6 weeks (around 7/19/2021) for BP Recheck.    Jennifer Ascencio DO  [facility]    Corbin Mcknight is a 89 year old who presents for the following health issues     HPI     Hypertension Follow-up      Do you check your blood pressure regularly outside of the clinic? No     Are you following a low salt diet? Yes    Are your blood pressures ever more than 140 on the top number (systolic) OR more   than 90 on the bottom number (diastolic), for example 140/90? Yes   The patient is a new patient to me on 5/3/2021.  She was following up to see me after a fall.  The blood pressure was noted to be elevated at that office visit.  She stated it usually was under better control and that she was anxious seeing a new physician in a new clinic.  Her medication was not adjusted at that time.    For her hypertension she takes Cozaar 25 mg daily and metoprolol 25 mg twice a day      How many servings of fruits and vegetables do you eat daily?  0-1    On average, how many sweetened beverages do you drink each day (Examples: soda, juice, sweet tea, etc.  Do NOT count diet or artificially sweetened beverages)?   0    How many days per week do you exercise enough to make your heart beat faster? 7    How many minutes a day do you exercise enough to make your heart beat faster? 20 - 29  How many days per week do you miss taking your medication? 1    What makes it hard for you to take your medications?  remembering to take      Review of Systems   Constitutional, HEENT, cardiovascular,  pulmonary, gi and gu systems are negative, except as otherwise noted.      Objective    BP (!) 154/82 (BP Location: Right arm, Patient Position: Sitting, Cuff Size: Adult Regular)   Pulse 64   Temp 98.1  F (36.7  C) (Oral)   Resp 12   Ht 1.524 m (5')   Wt 54.5 kg (120 lb 3.2 oz)   SpO2 100%   BMI 23.47 kg/m    Body mass index is 23.47 kg/m .  Physical Exam   GENERAL: healthy, alert and no distress  NECK: no adenopathy, no asymmetry, masses, or scars and thyroid normal to palpation  RESP: lungs clear to auscultation - no rales, rhonchi or wheezes  CV: regular rate and rhythm, normal S1 S2, no S3 or S4, no murmur, click or rub, no peripheral edema and peripheral pulses strong  MS: no gross musculoskeletal defects noted, no edema  PSYCH: mentation appears normal, affect normal/bright

## 2021-06-08 DIAGNOSIS — Z29.89 SBE (SUBACUTE BACTERIAL ENDOCARDITIS) PROPHYLAXIS CANDIDATE: ICD-10-CM

## 2021-06-08 RX ORDER — AMOXICILLIN 500 MG/1
500 CAPSULE ORAL 2 TIMES DAILY
Status: CANCELLED | OUTPATIENT
Start: 2021-06-08

## 2021-06-08 RX ORDER — AMOXICILLIN 500 MG/1
CAPSULE ORAL
Qty: 4 CAPSULE | Refills: 1 | Status: SHIPPED | OUTPATIENT
Start: 2021-06-08 | End: 2021-12-06

## 2021-06-08 NOTE — TELEPHONE ENCOUNTER
Patient calling regarding needing a prescription for amoxicillin for an upcoming dental procedure on 6/10/21. She is having a root canal.     She states if she does not have this prescription they will not work on her teeth.      medication pended with pharmacy    Maria D Colbert RN

## 2021-06-08 NOTE — TELEPHONE ENCOUNTER
Called patient and let her know a prescription was sent with an additional refill      Maria D Colbert RN

## 2021-07-30 ENCOUNTER — VIRTUAL VISIT (OUTPATIENT)
Dept: RHEUMATOLOGY | Facility: CLINIC | Age: 86
End: 2021-07-30
Payer: MEDICARE

## 2021-07-30 DIAGNOSIS — M06.09 RHEUMATOID ARTHRITIS OF MULTIPLE SITES WITH NEGATIVE RHEUMATOID FACTOR (H): Primary | ICD-10-CM

## 2021-07-30 DIAGNOSIS — Z79.899 HIGH RISK MEDICATION USE: ICD-10-CM

## 2021-07-30 PROCEDURE — 99441 PR PHYSICIAN TELEPHONE EVALUATION 5-10 MIN: CPT | Mod: 95 | Performed by: INTERNAL MEDICINE

## 2021-07-30 NOTE — PROGRESS NOTES
Roxanna Stark is a 89 year old year old female who is being evaluated via a billable telephone visit.      What phone number would you like to be contacted at? [phone]  How would you like to obtain your AVS? Mail a copy    Rheumatology Telephone/Telehealth  Visit      Roxanna Stark MRN# [id number]   YOB: 1927 Age: 89 year old      Date of visit: 7/30/21   PCP: Dr. Swapna Gaines  Cardiology: Dr. Boston Navarro     Chief Complaint   Patient presents with:  Arthritis: RA. Doing good    Assessment and Plan     1. 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  She was treated with prednisone monotherapy for several years, being able to taper off without recurrence of symptoms. She then developed worsening symptoms in her hands and was diagnosed with rheumatoid arthritis. Initially, she was resistant to taking DMARD therapy.  She then was started on MTX that was effective; she reduced the dose with worsening symptoms. Currently on MTX 20mg wkly and SSZ 500mg BID (mild anemia possibly associated with SSZ so the dose was previously reduced).  RA is well controlled today.  chronic illness, stable  - Continue methotrexate 20 mg once weekly   - Continue folic acid 1mg daily  - Continue sulfasalazine 500 mg twice daily   - Labs now: CBC, Cr, Hepatic Panel, ESR, CRP  - Labs in 3 months: CBC, Creatinine, Hepatic Panel  - Labs in 6 months: CBC, Creatinine, Hepatic Panel, ESR, CRP     High risk medication requiring intensive toxicity monitoring at least quarterly: labs ordered include CBC, Creatinine, Hepatic panel to monitor for cytopenia and hepatotoxicity; checking creatinine as it affects clearance of medication.      2. Giant Cell Arteritis History?: 12/26/2005 Left TA biopsy negative per [facility] record review.  No symptoms of GCA at this time.      3. Right shoulder rotator cuff tear and  pain: Previously evaluated by orthopedic surgery and her pain resolved for approximately one year after having a steroid injection in March 2015. She does not want to have surgical correction of her shoulder. Repeat steroid injections have been helpful; not needed today.  Physical therapy was effective and she is doing exercises at home. Chronic illness, stable     4. History of basal cell carcinoma: Following with dermatology; encouraged yearly f/u for eval     5. Bone Health: Managed by PCP already.     6.  Vaccinations:     - Influenza: encouraged yearly vaccination  - [id number]: up to date  - [id number]: up to date  - Shingrix: Up to date  - COVID-19: has received the Pfizer COVID-19 vaccine on 2/16/2021 and 3/9/2021    Total minutes spent in evaluation with patient, documentation, , and review of pertinent studies and chart notes: 10     Ms. Stark verbalized agreement with and understanding of the rational for the diagnosis and treatment plan.  All questions were answered to best of my ability and the patient's satisfaction. Ms. Stark was advised to contact the clinic with any questions that may arise after the clinic visit.      Thank you for involving me in the care of the patient    Return to clinic: 6 months      HPI   Roxanna Stark is a 89 year old female with medical history significant for basal cell carcinoma, hypertension, aortic stenosis, right rotator cuff tear (previously evaluated by Dr. Bingham, orthopedic surgery, on 3/20/2015 where at that time Ms. Stark was not interested in surgical correction; she received an intra-articular steroid injection at that time that was effective for ~1year), temporal arteritis?, and seronegative erosive rheumatoid arthritis.     Today, 7/30/2021: Doing well at this time.  No joint pain or swelling.  No morning stiffness or gelling phenomenon.  Arthritis does not prevent her from doing any of her daily activities.  Confirms that she is tolerating and  taking her medications as prescribed.  She is happy with how well she is doing.    Denies fevers, chills, nausea, vomiting, constipation, diarrhea. No abdominal pain. No chest pain/pressure, palpitations, or shortness of breath. No oral or nasal sores. No neck pain. No rash. Swelling in her bilateral feet.       Tobacco: None  EtOH: No more than 1 drink per week  Drugs: None  Occupation: Used to work for the [employer] company; now retired    ROS   12 point review of system was completed and negative except as noted in the HPI     Active Problem List     Patient Active Problem List   Diagnosis     Polymyalgia rheumatica (H)     History of basal cell carcinoma     CARDIOVASCULAR SCREENING; LDL GOAL LESS THAN 130     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     Hip pain     Left atrial enlargement     Status post coronary angiogram     Aortic valve replaced     Rheumatoid arthritis of multiple sites with negative rheumatoid factor (H)     CKD (chronic kidney disease) stage 3, GFR 30-59 ml/min     THERESA III (vulvar intraepithelial neoplasia III)     Ulcer of left foot, unspecified ulcer stage (H)     Peripheral arterial disease (H)     Past Medical History     Past Medical History:   Diagnosis Date     Actinic keratosis      Aortic stenosis 2014     Basal cell cancer 7/2014    left eye medial canthus      Basal cell carcinoma 9/30/08    left cheek     CKD (chronic kidney disease) stage 3, GFR 30-59 ml/min 7/1/2019     HTN (hypertension)      Melanoma in situ (H) 9/30/08    left arm     Polymyalgia rheumatica (H) 11/99     Temporal arteritis (H) 11/99     Past Surgical History     Past Surgical History:   Procedure Laterality Date     C SKIN TISSUE PROCEDURE UNLISTED  11/3/08    mmis skin cancer excision     CATARACT IOL, RT/LT  5/09    bilateral     COLONOSCOPY  2002     EXCISE LESION VULVA N/A 9/27/2019    Procedure: Wide Local Excision Of Vulva, Colposcopy;  Surgeon: Mono Ribeiro MD;  Location:  [facility] OR     REPLACE VALVE AORTIC N/A 4/25/2016    Procedure: REPLACE VALVE AORTIC;  Surgeon: Sudeep Tsai MD;  Location: [facility] OR     Allergy     Allergies   Allergen Reactions     Lisinopril Cough        Current Medication List     Current Outpatient Medications   Medication Sig     aspirin  MG EC tablet Take 1 tablet (325 mg) by mouth daily     calcium-vitamin D 500-125 MG-UNIT TABS      folic acid (FOLVITE) 1 MG tablet Take 1 tablet (1 mg) by mouth daily     gabapentin (NEURONTIN) 100 MG capsule TAKE 1 CAPSULE(100 MG) BY MOUTH THREE TIMES DAILY     ICAPS PO 2 tablets daily     losartan (COZAAR) 50 MG tablet Take 1 tablet (50 mg) by mouth daily     methotrexate sodium 2.5 MG TABS Take 8 tablets (20 mg) by mouth once a week . Take all 8 tablets on the same day of each week.  Do not take with amoxicillin.     metoprolol tartrate (LOPRESSOR) 25 MG tablet Take 1 tablet (25 mg) by mouth 2 times daily     Omega-3 Fatty Acids (OMEGA-3 FISH OIL PO) Take 1 tablet twice daily.     sulfaSALAzine ER (AZULFIDINE EN) 500 MG EC tablet Take 1 tablet (500 mg) by mouth 2 times daily     acetaminophen (TYLENOL) 325 MG tablet Take 2 tablets (650 mg) by mouth every 6 hours as needed for mild pain (Patient not taking: Reported on 7/30/2021)     amoxicillin (AMOXIL) 500 MG capsule TAKE 4 CAPSULES BY MOUTH 1 HOUR BEFORE DENTAL APPOINTMENT     furosemide (LASIX) 20 MG tablet TAKE 1 TABLET BY MOUTH DAILY IF 2 TO 3 POUNDS WEIGHT GAIN OVER A 2 DAY PERIOD. (Patient not taking: Reported on 7/30/2021)     No current facility-administered medications for this visit.       Social History   See HPI    Family History     Family History   Problem Relation Age of Onset     Breast Cancer Sister 45     Arthritis Sister      Thyroid Disease Sister      Arthritis Sister      Colon Cancer Sister         colon     Arthritis Mother      Hypertension Father      Prostate Cancer Father      Arthritis Father      Heart Disease Father      Lipids  Father      Colon Cancer Father      Arthritis Sister      Asthma Daughter      Asthma Daughter      Lung Cancer Daughter 58        lung     Pancreatic Cancer Other 81        pancreatic      Physical Exam     Temp Readings from Last 3 Encounters:   06/07/21 98.1  F (36.7  C) (Oral)   05/03/21 97.6  F (36.4  C) (Oral)   04/06/21 97.5  F (36.4  C) (Oral)     BP Readings from Last 5 Encounters:   06/07/21 (!) 146/80   05/03/21 (!) 148/76   04/06/21 (!) 166/71   03/23/21 128/65   01/12/21 110/64     Pulse Readings from Last 1 Encounters:   06/07/21 64     Resp Readings from Last 1 Encounters:   06/07/21 12     Estimated body mass index is 23.47 kg/m  as calculated from the following:    Height as of 6/7/21: 1.524 m (5').    Weight as of 6/7/21: 54.5 kg (120 lb 3.2 oz).    GEN: alert and no distress  PSYCH: Alert; coherent speech, normal rate and volume, able to articulate logical thoughts, able   to abstract reason, no tangential thoughts. Normal affect.   RESP: No cough, no audible wheezing, able to talk in full sentences  Remainder of exam unable to be completed due to telephone visits      Labs / Imaging (select studies)     RF/CCP  Recent Labs   Lab Test 08/11/16  1124 08/04/16  1222 02/18/16  1543   CCPIGG 1  --   --    RHF  --  <20 <20     CBC  Recent Labs   Lab Test 03/23/21  1526 01/22/21  0933 10/30/20  0903 07/24/20  1328   WBC 9.4 8.1 10.8 11.1*   RBC 3.55* 3.93 4.04 3.33*   HGB 11.0* 11.9 11.8 10.4*   HCT 35.4 38.0 37.8 32.7*    97 94 98   RDW 17.7* 19.4* 18.0* 19.2*    228 203 184   MCH 31.0 30.3 29.2 31.2   MCHC 31.1* 31.3* 31.2* 31.8   NEUTROPHIL  --  54.0 51.7 62.2   LYMPH  --  27.0 29.5 22.5   MONOCYTE  --  15.0 14.7 12.0   EOSINOPHIL  --  3.3 3.5 2.5   BASOPHIL  --  0.7 0.6 0.8   ANEU  --  4.3 5.6 6.9   ALYM  --  2.2 3.2 2.5   IGNACIO  --  1.2 1.6* 1.3   AEOS  --  0.3 0.4 0.3   ABAS  --  0.1 0.1 0.1     CMP  Recent Labs   Lab Test 03/23/21  1526 01/22/21  0933 10/30/20  0903 09/16/20  1008  07/24/20  1328 07/24/20  1328 09/27/19  0950 07/01/19  1256     --   --  140  --   --   --  139   POTASSIUM 4.3  --   --  4.2  --   --  3.9 4.2   CHLORIDE 103  --   --  107  --   --   --  103   CO2 31  --   --  29  --   --   --  30   ANIONGAP 4  --   --  4  --   --   --  6   *  --   --  102*  --   --   --  94   BUN 27  --   --  28  --   --   --  22   CR 1.40* 1.17* 1.38* 1.24*   < > 1.64*  --  1.21*   GFRESTIMATED 32* 40* 33* 37*   < > 27*  --  39*   GFRESTBLACK 37* 46* 38* 43*   < > 31*  --  45*   ROEL 9.3  --   --  9.2  --   --   --  9.6   BILITOTAL  --  0.2 0.4  --   --  0.2  --   --    ALBUMIN 3.5 3.6 3.3*  --   --  3.2*  --   --    PROTTOTAL  --  7.7 7.6  --   --  6.9  --   --    ALKPHOS  --  118 108  --   --  70  --   --    AST  --  23 28  --   --  30  --   --    ALT  --  25 28  --   --  34  --   --     < > = values in this interval not displayed.     Calcium/VitaminD  Recent Labs   Lab Test 03/23/21  1526 09/16/20  1008 07/01/19  1256   ROEL 9.3 9.2 9.6     ESR/CRP  Recent Labs   Lab Test 01/22/21  0933 10/30/20  0903 09/18/19  0913   SED 34* 41* 76*   CRP 3.4 5.4 23.2*     Hepatitis B  Recent Labs   Lab Test 11/10/16  0909   HBCAB Nonreactive   HEPBANG Nonreactive     Hepatitis C  Recent Labs   Lab Test 11/10/16  0909   HCVAB Nonreactive   Assay performance characteristics have not been established for newborns,   infants, and children       HIV Screening  Recent Labs   Lab Test 11/10/16  0909   HIAGAB Nonreactive   HIV-1 p24 Ag & HIV-1/HIV-2 Ab Not Detected       Immunization History     Immunization History   Administered Date(s) Administered     COVID-19,PF,Pfizer 02/16/2021, 03/09/2021     FLU 6-35 months 09/08/2010     Influenza (H1N1) 10/20/2016     Influenza (High Dose) 3 valent vaccine 10/16/2014, 10/06/2015, 10/23/2016, 10/03/2017, 08/23/2018, 09/18/2019     Influenza (IIV3) PF 11/05/1999, 12/14/2000, 10/26/2004, 10/04/2005, 09/16/2009, 10/20/2010, 11/09/2011, 09/22/2012, 09/15/2013,  10/15/2014     Influenza, Quad, High Dose, Pf, 65yr + 09/02/2020     Pneumo Conj 13-V (2010&after) 03/17/2015     Pneumococcal 23 valent 11/03/2000, 12/13/2010     TD (ADULT, 7+) 01/12/2004     TDAP Vaccine (Adacel) 05/14/2013     Td (Adult), Adsorbed 01/12/2004     Zoster vaccine recombinant adjuvanted (SHINGRIX) 06/21/2018, 08/23/2018     Zoster vaccine, live 12/15/2006          Chart documentation done in part with Dragon Voice recognition Software. Although reviewed after completion, some word and grammatical error may remain.    Phone call duration with patient (in minutes): 7    Location of patient: [place], [place]   Location of provider: [facility] [place]

## 2021-07-30 NOTE — PATIENT INSTRUCTIONS
RHEUMATOLOGY    Dr. Jamie Johnson    [facility]  [address] [place] [address]    Our new phone number for Rheumatology is [phone], this number will be able to help you schedule appointments for Dr. Johnson or if you have any message you would like sent to us.    Thank you for choosing [facility]!    Twila Atkins [facility]

## 2021-08-03 DIAGNOSIS — I12.9 BENIGN HYPERTENSION WITH CKD (CHRONIC KIDNEY DISEASE) STAGE III (H): ICD-10-CM

## 2021-08-03 DIAGNOSIS — N18.30 BENIGN HYPERTENSION WITH CKD (CHRONIC KIDNEY DISEASE) STAGE III (H): ICD-10-CM

## 2021-08-03 RX ORDER — LOSARTAN POTASSIUM 50 MG/1
TABLET ORAL
Qty: 30 TABLET | Refills: 0 | Status: SHIPPED | OUTPATIENT
Start: 2021-08-03 | End: 2021-09-09

## 2021-08-03 NOTE — TELEPHONE ENCOUNTER
This patient was supposed to to live hypertension follow-up last month.  I have done a 30-day supply.  She needs follow-up.    Jennifer Ascencio DO

## 2021-08-03 NOTE — TELEPHONE ENCOUNTER
Routing refill request to provider for review/approval because:  Labs out of range:    Creatinine   Date Value Ref Range Status   03/23/2021 1.40 (H) 0.52 - 1.04 mg/dL Final     BP under 140/90 in past 12 months    Katarina Gardiner RN

## 2021-08-04 RX ORDER — FUROSEMIDE 20 MG
TABLET ORAL
Qty: 30 TABLET | Refills: 3 | Status: SHIPPED | OUTPATIENT
Start: 2021-08-04 | End: 2021-12-05

## 2021-08-04 NOTE — TELEPHONE ENCOUNTER
"Med listed as not taking 7/30/21. Attempted to leave message, no voicemail available. My Chart message sent to triage request further.      Maru Harris, RN    Requested Prescriptions   Pending Prescriptions Disp Refills     furosemide (LASIX) 20 MG tablet [Pharmacy Med Name: FUROSEMIDE 20MG TABLETS] 30 tablet 3     Sig: TAKE 1 TABLET BY MOUTH DAILY IF 2 TO 3 POUND WEIGHT GAIN OVER A 2 DAY PERIOD       Diuretics (Including Combos) Protocol Failed - 8/3/2021  4:04 AM        Failed - Blood pressure under 140/90 in past 12 months     BP Readings from Last 3 Encounters:   06/07/21 (!) 146/80   05/03/21 (!) 148/76   04/06/21 (!) 166/71                 Failed - Normal serum creatinine on file in past 12 months     Recent Labs   Lab Test 03/23/21  1526   CR 1.40*              Passed - Recent (12 mo) or future (30 days) visit within the authorizing provider's specialty     Patient has had an office visit with the authorizing provider or a provider within the authorizing providers department within the previous 12 mos or has a future within next 30 days. See \"Patient Info\" tab in inbasket, or \"Choose Columns\" in Meds & Orders section of the refill encounter.              Passed - Medication is active on med list        Passed - Patient is age 18 or older        Passed - No active pregancy on record        Passed - Normal serum potassium on file in past 12 months     Recent Labs   Lab Test 03/23/21  1526   POTASSIUM 4.3                    Passed - Normal serum sodium on file in past 12 months     Recent Labs   Lab Test 03/23/21  1526                 Passed - No positive pregnancy test in past 12 months             " calm

## 2021-08-06 ENCOUNTER — LAB (OUTPATIENT)
Dept: LAB | Facility: CLINIC | Age: 86
End: 2021-08-06
Payer: MEDICARE

## 2021-08-06 DIAGNOSIS — M06.09 RHEUMATOID ARTHRITIS OF MULTIPLE SITES WITH NEGATIVE RHEUMATOID FACTOR (H): ICD-10-CM

## 2021-08-06 DIAGNOSIS — Z79.899 HIGH RISK MEDICATION USE: ICD-10-CM

## 2021-08-06 LAB
ALBUMIN SERPL-MCNC: 3.7 G/DL (ref 3.4–5)
ALP SERPL-CCNC: 100 U/L (ref 40–150)
ALT SERPL W P-5'-P-CCNC: 47 U/L (ref 0–50)
AST SERPL W P-5'-P-CCNC: 37 U/L (ref 0–45)
BILIRUB DIRECT SERPL-MCNC: <0.1 MG/DL (ref 0–0.2)
BILIRUB SERPL-MCNC: 0.3 MG/DL (ref 0.2–1.3)
CREAT SERPL-MCNC: 1.21 MG/DL (ref 0.52–1.04)
CRP SERPL-MCNC: 4.2 MG/L (ref 0–8)
ERYTHROCYTE [SEDIMENTATION RATE] IN BLOOD BY WESTERGREN METHOD: 33 MM/HR (ref 0–30)
GFR SERPL CREATININE-BSD FRML MDRD: 38 ML/MIN/1.73M2
PROT SERPL-MCNC: 7.6 G/DL (ref 6.8–8.8)

## 2021-08-06 PROCEDURE — 86140 C-REACTIVE PROTEIN: CPT

## 2021-08-06 PROCEDURE — 85652 RBC SED RATE AUTOMATED: CPT

## 2021-08-06 PROCEDURE — 82565 ASSAY OF CREATININE: CPT

## 2021-08-06 PROCEDURE — 36415 COLL VENOUS BLD VENIPUNCTURE: CPT

## 2021-08-06 PROCEDURE — 80076 HEPATIC FUNCTION PANEL: CPT

## 2021-08-08 NOTE — PATIENT INSTRUCTIONS
[facility]    If you have any questions regarding to your visit please contact your care team:       Team Red:   Clinic Hours Telephone Number   [address] 7am-7pm  Monday - Thursday   7am-5pm  Fridays  [phone]  (Appointment scheduling available 24/7)    Questions about your recent visit?   Team Line  [phone]   Urgent Care - [place] and [facility] - 11am-9pm Monday-Friday Saturday-Sunday- 9am-5pm   [place] - 5pm-9pm Monday-Friday Saturday-Sunday- 9am-5pm  [phone] - [place]  [phone] - [place]       What options do I have for a visit other than an office visit? We offer electronic visits (e-visits) and telephone visits, when medically appropriate.  Please check with your medical insurance to see if these types of visits are covered, as you will be responsible for any charges that are not paid by your insurance.      You can use [employer] (secure electronic communication) to access to your chart, send your primary care provider a message, or make an appointment. Ask a team member how to get started.     For a price quote for your services, please call our Consumer Price Line at [phone] or our Imaging Cost estimation line at [phone] (for imaging tests).    
No

## 2021-08-13 ENCOUNTER — OFFICE VISIT (OUTPATIENT)
Dept: FAMILY MEDICINE | Facility: CLINIC | Age: 86
End: 2021-08-13
Payer: MEDICARE

## 2021-08-13 VITALS
TEMPERATURE: 99.3 F | RESPIRATION RATE: 26 BRPM | SYSTOLIC BLOOD PRESSURE: 140 MMHG | HEART RATE: 96 BPM | DIASTOLIC BLOOD PRESSURE: 60 MMHG | OXYGEN SATURATION: 95 % | BODY MASS INDEX: 23.83 KG/M2 | WEIGHT: 122 LBS

## 2021-08-13 DIAGNOSIS — R26.89 BALANCE PROBLEMS: ICD-10-CM

## 2021-08-13 DIAGNOSIS — J01.10 ACUTE NON-RECURRENT FRONTAL SINUSITIS: Primary | ICD-10-CM

## 2021-08-13 PROCEDURE — 99213 OFFICE O/P EST LOW 20 MIN: CPT | Performed by: FAMILY MEDICINE

## 2021-08-13 RX ORDER — AZITHROMYCIN 250 MG/1
TABLET, FILM COATED ORAL
Qty: 6 TABLET | Refills: 0 | Status: SHIPPED | OUTPATIENT
Start: 2021-08-13 | End: 2021-08-18

## 2021-08-13 RX ORDER — CALCIUM CARBONATE 160(400)MG
TABLET,CHEWABLE ORAL
COMMUNITY
Start: 2021-04-30 | End: 2024-04-11

## 2021-08-13 RX ORDER — FLUTICASONE PROPIONATE 50 MCG
1 SPRAY, SUSPENSION (ML) NASAL
Qty: 15.8 ML | Refills: 0 | Status: SHIPPED | OUTPATIENT
Start: 2021-08-13 | End: 2021-10-24

## 2021-08-13 ASSESSMENT — PAIN SCALES - GENERAL: PAINLEVEL: NO PAIN (0)

## 2021-08-13 NOTE — PROGRESS NOTES
Assessment & Plan     Acute non-recurrent frontal sinusitis  Advised for supportive care.  - azithromycin (ZITHROMAX) 250 MG tablet; Take 2 tablets (500 mg) by mouth daily for 1 day, THEN 1 tablet (250 mg) daily for 4 days.  - fluticasone (FLONASE) 50 MCG/ACT nasal spray; Spray 1 spray into both nostrils nightly as needed for rhinitis or allergies    Balance problems  No acute finding.  Patient previously had a head CT scan, cervical CT scan, April of this year both were negative for acute finding.  Advised patient to use her walker or cane especially when she goes out for a walk.  Advised patient with lower extremity strengthening exercises.    Return in about 3 months (around 11/13/2021).    Sadi Hoskins MD  [facility]    Corbin Mcknight is a 89 year old who presents for the following health issues  accompanied by her daughter:  She has been having sinus congestion, fullness and pressure for the past 5 days or so.  Minor cough.  No fever no chills.  Denies headache, denies dizziness.  No chest pain or short of breath.     Daughter reports her balance seems to be off, sometimes when she walks she feels wobbly.  Otherwise , patient denies lower extremity weakness, she has no numbness. dizziness, denies spinning.  Denies headache.  Denies chest pain or short of breath, b Denies pain with urination, she has no nausea no vomiting.  Denies neck pain or low back pain.      Acute Illness  Acute illness concerns: cough  Onset/Duration: Wednesday  Symptoms:  Fever: no  Chills/Sweats: no  Headache (location?): no  Sinus Pressure: no  Conjunctivitis:  no  Ear Pain: no  Rhinorrhea: no  Congestion: YES  Sore Throat: no  Cough: YES-non-productive  Wheeze: no  Decreased Appetite: YES  Nausea: no  Vomiting: no  Diarrhea: no  Dysuria/Freq.: no  Dysuria or Hematuria: no  Fatigue/Achiness: no  Sick/Strep Exposure: no  Therapies tried and outcome: None      Review of Systems   Constitutional,  HEENT, cardiovascular, pulmonary, GI, , musculoskeletal, neuro, skin, endocrine and psych systems are negative, except as otherwise noted.      Objective    There were no vitals taken for this visit.  There is no height or weight on file to calculate BMI.  Physical Exam   GENERAL: healthy, alert and no distress  NECK: no adenopathy, no asymmetry, masses, or scars and thyroid normal to palpation  RESP: lungs clear to auscultation - no rales, rhonchi or wheezes  CV: regular rate and rhythm, normal S1 S2, no S3 or S4, no murmur, click or rub, no peripheral edema and peripheral pulses strong  ABDOMEN: soft, nontender, no hepatosplenomegaly, no masses and bowel sounds normal  MS: no gross musculoskeletal defects noted, no edema  BACK: no CVA tenderness, no paralumbar tenderness  Comprehensive back pain exam:  No tenderness, Range of motion not limited by pain, Lower extremity strength functional and equal on both sides, Lower extremity reflexes within normal limits bilaterally, Lower extremity sensation normal and equal on both sides and Straight leg raise negative bilaterally  PSYCH: mentation appears normal, affect normal/bright      Sadi Hoskins MD

## 2021-08-30 ENCOUNTER — ANCILLARY PROCEDURE (OUTPATIENT)
Dept: GENERAL RADIOLOGY | Facility: CLINIC | Age: 86
End: 2021-08-30
Attending: NURSE PRACTITIONER
Payer: MEDICARE

## 2021-08-30 ENCOUNTER — OFFICE VISIT (OUTPATIENT)
Dept: FAMILY MEDICINE | Facility: CLINIC | Age: 86
End: 2021-08-30
Payer: MEDICARE

## 2021-08-30 VITALS
DIASTOLIC BLOOD PRESSURE: 52 MMHG | RESPIRATION RATE: 16 BRPM | BODY MASS INDEX: 20.81 KG/M2 | HEIGHT: 60 IN | HEART RATE: 94 BPM | SYSTOLIC BLOOD PRESSURE: 151 MMHG | TEMPERATURE: 97.5 F | OXYGEN SATURATION: 98 % | WEIGHT: 106 LBS

## 2021-08-30 DIAGNOSIS — R50.9 FEVER, UNSPECIFIED FEVER CAUSE: Primary | ICD-10-CM

## 2021-08-30 DIAGNOSIS — N30.00 ACUTE CYSTITIS WITHOUT HEMATURIA: ICD-10-CM

## 2021-08-30 LAB
ALBUMIN SERPL-MCNC: 3.6 G/DL (ref 3.4–5)
ALBUMIN UR-MCNC: NEGATIVE MG/DL
ALP SERPL-CCNC: 56 U/L (ref 40–150)
ALT SERPL W P-5'-P-CCNC: 21 U/L (ref 0–50)
ANION GAP SERPL CALCULATED.3IONS-SCNC: 7 MMOL/L (ref 3–14)
APPEARANCE UR: CLEAR
AST SERPL W P-5'-P-CCNC: 26 U/L (ref 0–45)
BILIRUB SERPL-MCNC: 0.4 MG/DL (ref 0.2–1.3)
BILIRUB UR QL STRIP: NEGATIVE
BUN SERPL-MCNC: 36 MG/DL (ref 7–30)
CALCIUM SERPL-MCNC: 9.8 MG/DL (ref 8.5–10.1)
CHLORIDE BLD-SCNC: 104 MMOL/L (ref 94–109)
CK SERPL-CCNC: 64 U/L (ref 30–225)
CO2 SERPL-SCNC: 27 MMOL/L (ref 20–32)
COLOR UR AUTO: YELLOW
CREAT SERPL-MCNC: 1.32 MG/DL (ref 0.52–1.04)
ERYTHROCYTE [DISTWIDTH] IN BLOOD BY AUTOMATED COUNT: 18.7 % (ref 10–15)
ERYTHROCYTE [SEDIMENTATION RATE] IN BLOOD BY WESTERGREN METHOD: 64 MM/HR (ref 0–30)
FERRITIN SERPL-MCNC: 200 NG/ML (ref 8–252)
GFR SERPL CREATININE-BSD FRML MDRD: 35 ML/MIN/1.73M2
GLUCOSE BLD-MCNC: 86 MG/DL (ref 70–99)
GLUCOSE UR STRIP-MCNC: NEGATIVE MG/DL
HCT VFR BLD AUTO: 37.2 % (ref 35–47)
HGB BLD-MCNC: 12 G/DL (ref 11.7–15.7)
HGB UR QL STRIP: NEGATIVE
HOLD SPECIMEN: NORMAL
IRON SATN MFR SERPL: 18 % (ref 15–46)
IRON SERPL-MCNC: 51 UG/DL (ref 35–180)
KETONES UR STRIP-MCNC: ABNORMAL MG/DL
LEUKOCYTE ESTERASE UR QL STRIP: ABNORMAL
MCH RBC QN AUTO: 32 PG (ref 26.5–33)
MCHC RBC AUTO-ENTMCNC: 32.3 G/DL (ref 31.5–36.5)
MCV RBC AUTO: 99 FL (ref 78–100)
NITRATE UR QL: NEGATIVE
PH UR STRIP: 6 [PH] (ref 5–7)
PLATELET # BLD AUTO: 451 10E3/UL (ref 150–450)
POTASSIUM BLD-SCNC: 4.5 MMOL/L (ref 3.4–5.3)
PROT SERPL-MCNC: 7.9 G/DL (ref 6.8–8.8)
RBC # BLD AUTO: 3.75 10E6/UL (ref 3.8–5.2)
RBC #/AREA URNS AUTO: ABNORMAL /HPF
SODIUM SERPL-SCNC: 138 MMOL/L (ref 133–144)
SP GR UR STRIP: 1.01 (ref 1–1.03)
SQUAMOUS #/AREA URNS AUTO: ABNORMAL /LPF
TIBC SERPL-MCNC: 281 UG/DL (ref 240–430)
UROBILINOGEN UR STRIP-ACNC: 0.2 E.U./DL
WBC # BLD AUTO: 8.6 10E3/UL (ref 4–11)
WBC #/AREA URNS AUTO: ABNORMAL /HPF

## 2021-08-30 PROCEDURE — 85027 COMPLETE CBC AUTOMATED: CPT | Performed by: NURSE PRACTITIONER

## 2021-08-30 PROCEDURE — 82728 ASSAY OF FERRITIN: CPT | Performed by: NURSE PRACTITIONER

## 2021-08-30 PROCEDURE — 85652 RBC SED RATE AUTOMATED: CPT | Performed by: NURSE PRACTITIONER

## 2021-08-30 PROCEDURE — 36415 COLL VENOUS BLD VENIPUNCTURE: CPT | Performed by: NURSE PRACTITIONER

## 2021-08-30 PROCEDURE — 81001 URINALYSIS AUTO W/SCOPE: CPT | Performed by: NURSE PRACTITIONER

## 2021-08-30 PROCEDURE — 99215 OFFICE O/P EST HI 40 MIN: CPT | Performed by: NURSE PRACTITIONER

## 2021-08-30 PROCEDURE — 80053 COMPREHEN METABOLIC PANEL: CPT | Performed by: NURSE PRACTITIONER

## 2021-08-30 PROCEDURE — 82550 ASSAY OF CK (CPK): CPT | Performed by: NURSE PRACTITIONER

## 2021-08-30 PROCEDURE — 83615 LACTATE (LD) (LDH) ENZYME: CPT | Performed by: NURSE PRACTITIONER

## 2021-08-30 PROCEDURE — 71046 X-RAY EXAM CHEST 2 VIEWS: CPT | Performed by: RADIOLOGY

## 2021-08-30 PROCEDURE — 83550 IRON BINDING TEST: CPT | Performed by: NURSE PRACTITIONER

## 2021-08-30 RX ORDER — CEPHALEXIN 500 MG/1
500 CAPSULE ORAL 2 TIMES DAILY
Qty: 20 CAPSULE | Refills: 0 | Status: SHIPPED | OUTPATIENT
Start: 2021-08-30 | End: 2021-09-09

## 2021-08-30 ASSESSMENT — PAIN SCALES - GENERAL: PAINLEVEL: NO PAIN (0)

## 2021-08-30 ASSESSMENT — MIFFLIN-ST. JEOR: SCORE: 802.31

## 2021-08-30 NOTE — PATIENT INSTRUCTIONS

## 2021-08-30 NOTE — PROGRESS NOTES
Assessment & Plan     Fever, unspecified fever cause  Treated for sinus infection 2 weeks ago and nasal congestion has resolved. She continues to have fatigue and generally not feeling well. Had one day of feeling feverish 3 days ago, non since. Chest xray today without acute changes. Counseled on self-care measures including: hydration, rest, Boost or Ensure to support nutrition while not feeling well and red flags of when to return including worsening or change in symptoms, nausea, vomiting, diarrhea, fever.  - UA reflex to Microscopic and Culture  - XR Chest 2 Views  - Urine Microscopic  - ESR: Erythrocyte sedimentation rate  - CK total  - Lactate Dehydrogenase  - Comprehensive metabolic panel  - CBC with Platelets and Reflex to Iron Studies  - Iron & Iron Binding Capacity  - Ferritin    Acute cystitis without hematuria  Possible infection, will start treatment and send urine to culture.   - cephALEXin (KEFLEX) 500 MG capsule; Take 1 capsule (500 mg) by mouth 2 times daily for 10 days    Review of the result(s) of each unique test - UA, cxr  Ordering of each unique test  Prescription drug management  43 minutes spent on the date of the encounter doing chart review, history and exam, documentation and further activities per the note     See Patient Instructions    Return in about 10 days (around 9/9/2021) for Follow up clinic visit.    Cintia Avendaño, CARMELITA CNP  [facility] ELIAS Mcknight is a 89 year old who presents for the following health issues  accompanied by her daughter:    HPI     Patient finished Zpak (started 8/13/2021), sinus infection has improved. She was unable to really use the nasal spray. Continues to have weakness, fatigue, appetite has been decreased, lightheadedness when standing, and her memory difficulties for the past 3 weeks.  Nasal congestion has improved. Denies cough, vomiting, nausea, diarrhea, constipation, urinary symptoms. Patient reported fever  on 8/27 or 28th, did not take temp. None the past 2 days.    Review of Systems   Constitutional, HEENT, cardiovascular, pulmonary, gi and gu systems are negative, except as otherwise noted.      Objective    BP (!) 151/52   Pulse 94   Temp 97.5  F (36.4  C) (Oral)   Resp 16   Ht 1.524 m (5')   Wt 48.1 kg (106 lb)   SpO2 98%   BMI 20.70 kg/m    Body mass index is 20.7 kg/m .  Physical Exam   GENERAL: alert, no distress, frail and fatigued  EYES: Eyes grossly normal to inspection, PERRL and conjunctivae and sclerae normal  HENT: normal cephalic/atraumatic, both ears: clear effusion, nose and mouth without ulcers or lesions, oropharynx clear, oral mucous membranes moist and sinuses: not tender  NECK: no adenopathy, no asymmetry, masses, or scars and thyroid normal to palpation  RESP: decreased breath sounds throughout  CV: regular rate and rhythm, normal S1 S2, no S3 or S4, no murmur, click or rub, no peripheral edema and peripheral pulses strong  ABDOMEN: soft, no hepatosplenomegaly, no masses and bowel sounds normal, suprapubic tender to palpation   SKIN: no suspicious lesions or rashes  NEURO: Normal strength and tone, mentation intact and speech normal  PSYCH: mentation appears normal, affect normal/bright    Results for orders placed or performed in visit on 08/30/21 (from the past 24 hour(s))   UA reflex to Microscopic and Culture    Specimen: Urine, Midstream   Result Value Ref Range    Color Urine Yellow Colorless, Straw, Light Yellow, Yellow    Appearance Urine Clear Clear    Glucose Urine Negative Negative mg/dL    Bilirubin Urine Negative Negative    Ketones Urine Trace (A) Negative mg/dL    Specific Gravity Urine 1.010 1.003 - 1.035    Blood Urine Negative Negative    pH Urine 6.0 5.0 - 7.0    Protein Albumin Urine Negative Negative mg/dL    Urobilinogen Urine 0.2 0.2, 1.0 E.U./dL    Nitrite Urine Negative Negative    Leukocyte Esterase Urine Small (A) Negative   Urine Microscopic   Result Value Ref  Range    RBC Urine 0-2 0-2 /HPF /HPF    WBC Urine 5-10 (A) 0-5 /HPF /HPF    Squamous Epithelials Urine Few (A) None Seen /LPF    Narrative    Urine Culture not indicated   XR Chest 2 Views    Narrative    XR CHEST TWO VIEWS   8/30/2021 3:21 PM     HISTORY: Fever, unspecified fever cause.    COMPARISON: Chest x-ray on 6/20/2018      Impression    IMPRESSION: PA and lateral views of the chest were obtained.  Postsurgical changes of cardiac surgery with median sternotomy wires  and surgical clips. High lung volumes, likely due to underlying COPD  changes. Mild left basilar pulmonary opacities, likely atelectasis. No  significant pleural effusion or pneumothorax. Multiple calcified  pulmonary granulomas.    KELVIN MILTON MD         SYSTEM ID:  [id number]   Extra Tube    Narrative    The following orders were created for panel order Extra Tube.  Procedure                               Abnormality         Status                     ---------                               -----------         ------                     Extra Serum Separator Tu...[[id number]]                                                 Extra Green Top (Lithium...[[id number]]                                                   Please view results for these tests on the individual orders.   CBC with Platelets and Reflex to Iron Studies    Narrative    The following orders were created for panel order CBC with Platelets and Reflex to Iron Studies.  Procedure                               Abnormality         Status                     ---------                               -----------         ------                     CBC with Platelets and R...[[id number]]  Abnormal            Final result               Extra Green Top (Lithium...[[id number]]                      In process                   Please view results for these tests on the individual orders.   CBC with Platelets and Reflex to Iron Studies   Result Value Ref Range    WBC Count 8.6 4.0 - 11.0  10e3/uL    RBC Count 3.75 (L) 3.80 - 5.20 10e6/uL    Hemoglobin 12.0 11.7 - 15.7 g/dL    Hematocrit 37.2 35.0 - 47.0 %    MCV 99 78 - 100 fL    MCH 32.0 26.5 - 33.0 pg    MCHC 32.3 31.5 - 36.5 g/dL    RDW 18.7 (H) 10.0 - 15.0 %    Platelet Count 451 (H) 150 - 450 10e3/uL

## 2021-08-31 ENCOUNTER — TELEPHONE (OUTPATIENT)
Dept: FAMILY MEDICINE | Facility: CLINIC | Age: 86
End: 2021-08-31

## 2021-08-31 LAB — LDH SERPL L TO P-CCNC: 305 U/L (ref 81–234)

## 2021-08-31 NOTE — TELEPHONE ENCOUNTER
Patient's daughter Anna called stating that she just got a call from Cintia Avendaño regarding her lab results and was told to take Roxanna to the ED. Anna called unsure of what to tell the ED when patient gets there. RN reviewed labs and did not find any result notes indicating or mentioning the ED. RN reassured that ED would have access to patient's records.Anna said that she would call her sister to take patient to ED.  Latonya George RN on 8/31/2021 at 2:26 PM

## 2021-08-31 NOTE — TELEPHONE ENCOUNTER
Called patient with results of labs. Patient sounded very weak and fatigued. States that she is not feeling any better and is just so tired. I recommended that she go to the ED for further evaluation and treatment. Patient confused about which daughter is nearby to help her get to ED. I called her daughter Anna with lab results and recommendation that her mother go the the ED today for further evaluation and treatment. Daughter agrees that she will arrange to bring her mother to the ED now.     Cintia Avendaño, CARMELITA, CNP

## 2021-09-01 DIAGNOSIS — M06.09 RHEUMATOID ARTHRITIS OF MULTIPLE SITES WITH NEGATIVE RHEUMATOID FACTOR (H): ICD-10-CM

## 2021-09-01 RX ORDER — FOLIC ACID 1 MG/1
1 TABLET ORAL DAILY
Qty: 30 TABLET | Refills: 12 | Status: SHIPPED | OUTPATIENT
Start: 2021-09-01 | End: 2022-01-31

## 2021-09-01 NOTE — TELEPHONE ENCOUNTER
Medication:   Folic acid 1 mg  Last written on:   1/29/2021  Quantity:   30    Refills:   6    Last office visit:   7/30/2021  Next office visit:   1/31/2022  Last labs:   8/6/2021    Twila Atkins CMA Rheumatology  9/1/2021 4:31 PM

## 2021-09-02 ENCOUNTER — TELEPHONE (OUTPATIENT)
Dept: RHEUMATOLOGY | Facility: CLINIC | Age: 86
End: 2021-09-02

## 2021-09-02 DIAGNOSIS — M35.3 POLYMYALGIA RHEUMATICA (H): Primary | ICD-10-CM

## 2021-09-02 RX ORDER — SULFASALAZINE 500 MG/1
500 TABLET ORAL 2 TIMES DAILY
Qty: 60 TABLET | Refills: 6 | Status: SHIPPED | OUTPATIENT
Start: 2021-09-02 | End: 2022-03-25

## 2021-09-02 NOTE — TELEPHONE ENCOUNTER
Patient's daughter, Rachel, is calling on patient's behalf. Dr Johnson prescribed patient SulfaSALazine. Pt's pharmacy, [facility] ([place] does not have this medication available due to it being on back order. They are wondering if there is another drug you might prescribe, or another pharmacy that might have some available, or if she even needs the medication. Please give patient and her daughter a call back to discuss. OKTLDM

## 2021-09-03 ENCOUNTER — TELEPHONE (OUTPATIENT)
Dept: FAMILY MEDICINE | Facility: CLINIC | Age: 86
End: 2021-09-03

## 2021-09-03 ENCOUNTER — VIRTUAL VISIT (OUTPATIENT)
Dept: FAMILY MEDICINE | Facility: CLINIC | Age: 86
End: 2021-09-03
Payer: MEDICARE

## 2021-09-03 DIAGNOSIS — U07.1 INFECTION DUE TO 2019 NOVEL CORONAVIRUS: Primary | ICD-10-CM

## 2021-09-03 PROCEDURE — 99441 PR PHYSICIAN TELEPHONE EVALUATION 5-10 MIN: CPT | Performed by: NURSE PRACTITIONER

## 2021-09-03 NOTE — TELEPHONE ENCOUNTER
"Spoke with pt's daughter, Anna. States pt had a virtual appt today and wanted to know outcome of appt. Reviewed assessment and plan with Anna: \"   Infection due to 2019 novel coronavirus  Fever and weakness is improved. Patient is feeling better. Continue cephalexin for UTI until finished. She has not started her sulfasalazine from rheumatology yet, recommended that she wait to start and contact specialist for further recommendations. Counseled on self-care measures including: salt-water gurgles, cough drops, OTC acetaminophen PRN, hydration, rest, self quarantine, masking, handwashing, and red flags of when to return including difficulty breathing.     Return in about 1 week (around 9/10/2021), or if symptoms worsen or fail to improve.     CARMELITA Lopez CNP  [facility]\"    Pt has an in person appt scheduled for 9/10 with Cintia. Daughter agrees with plan, verbalized understand and had no further questions or concerns.    Beatris Maldonado RN  [facility] [place]    "

## 2021-09-03 NOTE — PROGRESS NOTES
Roxanna is a 89 year old who is being evaluated via a billable telephone visit.      What phone number would you like to be contacted at? [phone]  How would you like to obtain your AVS? MyChart    Assessment & Plan     Infection due to 2019 novel coronavirus  No need for retest as PCR test is very accurate. Fever and weakness is improved. Patient is feeling much better. Continue cephalexin for UTI until finished. She has not started her sulfasalazine from rheumatology yet, recommended that she wait to start and contact specialist for further recommendations. Counseled on self-care measures including: salt-water gurgles, cough drops, OTC acetaminophen PRN, hydration, rest, self quarantine, masking, handwashing, and red flags of when to return including difficulty breathing.    Return in about 1 week (around 9/10/2021), or if symptoms worsen or fail to improve.    Cintia Avendaño, CARMELITA CNP  [facility]    Corbin Mcknight is a 89 year old who presents for the following health issues     HPI     ED/UC Followup:    Facility:  [place]  Date of visit: 8/31/2021  Reason for visit: Fever and abnormal results  Current Status:  Patient is feeling much better, moving easier, not as weak. Still taking her antibiotics for UTI. Denies urinary symptoms, fever, chills, cough, runny nose, dyspnea, loss of taste, loss of smell. Wondering if she should get retested for COVID because she feels good and is not sure that she has it.   Review of Systems   Constitutional, HEENT, cardiovascular, pulmonary, gi and gu systems are negative, except as otherwise noted.      Objective       Vitals:  No vitals were obtained today due to virtual visit.    Physical Exam   healthy, alert and no distress  PSYCH: Alert and oriented times 3; coherent speech, normal   rate and volume, able to articulate logical thoughts, able   to abstract reason, no tangential thoughts, no hallucinations   or delusions  Her affect is  normal  RESP: No cough, no audible wheezing, able to talk in full sentences  Remainder of exam unable to be completed due to telephone visits    Office Visit on 08/30/2021   Component Date Value Ref Range Status     Color Urine 08/30/2021 Yellow  Colorless, Straw, Light Yellow, Yellow Final     Appearance Urine 08/30/2021 Clear  Clear Final     Glucose Urine 08/30/2021 Negative  Negative mg/dL Final     Bilirubin Urine 08/30/2021 Negative  Negative Final     Ketones Urine 08/30/2021 Trace* Negative mg/dL Final     Specific Gravity Urine 08/30/2021 1.010  1.003 - 1.035 Final     Blood Urine 08/30/2021 Negative  Negative Final     pH Urine 08/30/2021 6.0  5.0 - 7.0 Final     Protein Albumin Urine 08/30/2021 Negative  Negative mg/dL Final     Urobilinogen Urine 08/30/2021 0.2  0.2, 1.0 E.U./dL Final     Nitrite Urine 08/30/2021 Negative  Negative Final     Leukocyte Esterase Urine 08/30/2021 Small* Negative Final     RBC Urine 08/30/2021 0-2  0-2 /HPF /HPF Final     WBC Urine 08/30/2021 5-10* 0-5 /HPF /HPF Final     Squamous Epithelials Urine 08/30/2021 Few* None Seen /LPF Final     Erythrocyte Sedimentation Rate 08/30/2021 64* 0 - 30 mm/hr Final     CK 08/30/2021 64  30 - 225 U/L Final     Lactate Dehydrogenase 08/30/2021 305* 81 - 234 U/L Final     Sodium 08/30/2021 138  133 - 144 mmol/L Final     Potassium 08/30/2021 4.5  3.4 - 5.3 mmol/L Final     Chloride 08/30/2021 104  94 - 109 mmol/L Final     Carbon Dioxide (CO2) 08/30/2021 27  20 - 32 mmol/L Final     Anion Gap 08/30/2021 7  3 - 14 mmol/L Final     Urea Nitrogen 08/30/2021 36* 7 - 30 mg/dL Final     Creatinine 08/30/2021 1.32* 0.52 - 1.04 mg/dL Final     Calcium 08/30/2021 9.8  8.5 - 10.1 mg/dL Final     Glucose 08/30/2021 86  70 - 99 mg/dL Final     Alkaline Phosphatase 08/30/2021 56  40 - 150 U/L Final     AST 08/30/2021 26  0 - 45 U/L Final     ALT 08/30/2021 21  0 - 50 U/L Final     Protein Total 08/30/2021 7.9  6.8 - 8.8 g/dL Final     Albumin 08/30/2021  3.6  3.4 - 5.0 g/dL Final     Bilirubin Total 08/30/2021 0.4  0.2 - 1.3 mg/dL Final     GFR Estimate 08/30/2021 35* >60 mL/min/1.73m2 Final    As of July 11, 2021, eGFR is calculated by the CKD-EPI creatinine equation, without race adjustment. eGFR can be influenced by muscle mass, exercise, and diet. The reported eGFR is an estimation only and is only applicable if the renal function is stable.     WBC Count 08/30/2021 8.6  4.0 - 11.0 10e3/uL Final     RBC Count 08/30/2021 3.75* 3.80 - 5.20 10e6/uL Final     Hemoglobin 08/30/2021 12.0  11.7 - 15.7 g/dL Final     Hematocrit 08/30/2021 37.2  35.0 - 47.0 % Final     MCV 08/30/2021 99  78 - 100 fL Final     MCH 08/30/2021 32.0  26.5 - 33.0 pg Final     MCHC 08/30/2021 32.3  31.5 - 36.5 g/dL Final     RDW 08/30/2021 18.7* 10.0 - 15.0 % Final     Platelet Count 08/30/2021 451* 150 - 450 10e3/uL Final     Hold Specimen 08/30/2021 JIC   Final     Iron 08/30/2021 51  35 - 180 ug/dL Final     Iron Binding Capacity 08/30/2021 281  240 - 430 ug/dL Final     Iron Sat Index 08/30/2021 18  15 - 46 % Final     Ferritin 08/30/2021 200  8 - 252 ng/mL Final               Phone call duration: 9 minutes

## 2021-09-09 DIAGNOSIS — I12.9 BENIGN HYPERTENSION WITH CKD (CHRONIC KIDNEY DISEASE) STAGE III (H): ICD-10-CM

## 2021-09-09 DIAGNOSIS — N18.30 BENIGN HYPERTENSION WITH CKD (CHRONIC KIDNEY DISEASE) STAGE III (H): ICD-10-CM

## 2021-09-09 RX ORDER — LOSARTAN POTASSIUM 50 MG/1
TABLET ORAL
Qty: 90 TABLET | Refills: 0 | Status: SHIPPED | OUTPATIENT
Start: 2021-09-09 | End: 2021-09-23

## 2021-09-09 NOTE — TELEPHONE ENCOUNTER
Routing refill request to provider for review/approval because:  BP Readings from Last 3 Encounters:   08/30/21 (!) 151/52   08/13/21 (!) 140/60   06/07/21 (!) 146/80     Creatinine   Date Value Ref Range Status   08/30/2021 1.32 (H) 0.52 - 1.04 mg/dL Final   03/23/2021 1.40 (H) 0.52 - 1.04 mg/dL Final       Mandi Parrish, RN, BSN, PHN  [facility] [place]

## 2021-09-10 ENCOUNTER — TELEPHONE (OUTPATIENT)
Dept: FAMILY MEDICINE | Facility: CLINIC | Age: 86
End: 2021-09-10

## 2021-09-10 NOTE — TELEPHONE ENCOUNTER
If patient still has weakness a month after diagnosis of COVID-19, then she should schedule a follow up appointment to talk about possible referral to our post-COVID clinic. Until then, she should rest, stay well hydrated with water, acetaminophen PRN, and go to ED/UC if she develops difficulty breathing.     Cintia Avendaño, APRN, CNP

## 2021-09-10 NOTE — TELEPHONE ENCOUNTER
Patient daughter notified of provider's message as written. Verbalized understanding of this and will reach back out with any further questions.     KATHY JeanN RN  [facility] [place]

## 2021-09-10 NOTE — TELEPHONE ENCOUNTER
Reason for Call: Request for an order or referral:    Order or referral being requested: Patients daughter called wanting a referral for Physical Therapy to regain strength after being positive for Covid     Date needed: as soon as possible    Has the patient been seen by the PCP for this problem? YES    Additional comments: Please call daughter once referral has been placed     Phone number Patient can be reached at:  Home number on file [phone] (home)    Best Time:  Day    Can we leave a detailed message on this number?  YES    Call taken on 9/10/2021 at 10:18 AM by SERENA BURT

## 2021-09-23 ENCOUNTER — TELEPHONE (OUTPATIENT)
Dept: FAMILY MEDICINE | Facility: CLINIC | Age: 86
End: 2021-09-23

## 2021-09-23 ENCOUNTER — OFFICE VISIT (OUTPATIENT)
Dept: FAMILY MEDICINE | Facility: CLINIC | Age: 86
End: 2021-09-23
Payer: MEDICARE

## 2021-09-23 VITALS
BODY MASS INDEX: 23.63 KG/M2 | SYSTOLIC BLOOD PRESSURE: 175 MMHG | DIASTOLIC BLOOD PRESSURE: 75 MMHG | WEIGHT: 121 LBS | HEART RATE: 85 BPM | OXYGEN SATURATION: 95 % | TEMPERATURE: 97.8 F

## 2021-09-23 DIAGNOSIS — M06.09 RHEUMATOID ARTHRITIS OF MULTIPLE SITES WITH NEGATIVE RHEUMATOID FACTOR (H): ICD-10-CM

## 2021-09-23 DIAGNOSIS — N18.30 BENIGN HYPERTENSION WITH CKD (CHRONIC KIDNEY DISEASE) STAGE III (H): ICD-10-CM

## 2021-09-23 DIAGNOSIS — H91.93 DECREASED HEARING OF BOTH EARS: ICD-10-CM

## 2021-09-23 DIAGNOSIS — Z86.16 HISTORY OF COVID-19: ICD-10-CM

## 2021-09-23 DIAGNOSIS — N18.32 STAGE 3B CHRONIC KIDNEY DISEASE (H): ICD-10-CM

## 2021-09-23 DIAGNOSIS — Z00.00 ENCOUNTER FOR MEDICARE ANNUAL WELLNESS EXAM: Primary | ICD-10-CM

## 2021-09-23 DIAGNOSIS — D07.1 VIN III (VULVAR INTRAEPITHELIAL NEOPLASIA III): ICD-10-CM

## 2021-09-23 DIAGNOSIS — Z95.2 AORTIC VALVE REPLACED: ICD-10-CM

## 2021-09-23 DIAGNOSIS — I12.9 BENIGN HYPERTENSION WITH CKD (CHRONIC KIDNEY DISEASE) STAGE III (H): ICD-10-CM

## 2021-09-23 PROCEDURE — G0439 PPPS, SUBSEQ VISIT: HCPCS | Performed by: FAMILY MEDICINE

## 2021-09-23 RX ORDER — LOSARTAN POTASSIUM 50 MG/1
50 TABLET ORAL DAILY
Qty: 90 TABLET | Refills: 3 | Status: SHIPPED | OUTPATIENT
Start: 2021-09-23 | End: 2021-09-23

## 2021-09-23 RX ORDER — METOPROLOL TARTRATE 25 MG/1
25 TABLET, FILM COATED ORAL 2 TIMES DAILY
Qty: 180 TABLET | Refills: 3 | Status: SHIPPED | OUTPATIENT
Start: 2021-09-23 | End: 2022-08-15

## 2021-09-23 RX ORDER — LOSARTAN POTASSIUM 50 MG/1
75 TABLET ORAL DAILY
Qty: 135 TABLET | Refills: 0 | Status: SHIPPED | OUTPATIENT
Start: 2021-09-23 | End: 2021-10-14

## 2021-09-23 ASSESSMENT — ACTIVITIES OF DAILY LIVING (ADL): CURRENT_FUNCTION: NO ASSISTANCE NEEDED

## 2021-09-23 NOTE — TELEPHONE ENCOUNTER
Swapna Gaines MD  [facility] R[facility] Triage Pool  Please call   Increase Losartan to 75 mg daily   Follow up with me 2-3 weeks recheck

## 2021-09-23 NOTE — PROGRESS NOTES
"SUBJECTIVE:   Roxanna Stark is a 89 year old female who presents for Preventive Visit.    Patient has been advised of split billing requirements and indicates understanding: Yes   Are you in the first 12 months of your Medicare coverage?  No    Healthy Habits:    In general, how would you rate your overall health?  Fair    Frequency of exercise:  2-3 days/week    Duration of exercise:  30-45 minutes    Do you usually eat at least 4 servings of fruit and vegetables a day, include whole grains    & fiber and avoid regularly eating high fat or \"junk\" foods?  No    Taking medications regularly:  Yes    Barriers to taking medications:  None    Medication side effects:  None    Ability to successfully perform activities of daily living:  No assistance needed    Home Safety:  No safety concerns identified    Hearing Impairment:  No hearing concerns    In the past 6 months, have you been bothered by leaking of urine?  No    In general, how would you rate your overall mental or emotional health?  Good      PHQ-2 Total Score:    Additional concerns today:  No    Do you feel safe in your environment? Yes    Have you ever done Advance Care Planning? (For example, a Health Directive, POLST, or a discussion with a medical provider or your loved ones about your wishes): Yes, advance care planning is on file.       Fall risk  Fallen 2 or more times in the past year?: No  Any fall with injury in the past year?: No    Cognitive Screening   1) Repeat 3 items (Leader, Season, Table)    2) Clock draw: NORMAL  3) 3 item recall: Recalls NO objects   Results: 0 items recalled: PROBABLE COGNITIVE IMPAIRMENT, **INFORM PROVIDER**    Do you have sleep apnea, excessive snoring or daytime drowsiness?: no    Reviewed and updated as needed this visit by clinical staff  Tobacco  Allergies  Meds   Med Hx  Surg Hx  Fam Hx  Soc Hx        Reviewed and updated as needed this visit by Provider                Social History     Tobacco Use     " Smoking status: Never Smoker     Smokeless tobacco: Never Used   Substance Use Topics     Alcohol use: Yes     Comment: rarely     If you drink alcohol do you typically have >3 drinks per day or >7 drinks per week? No    No flowsheet data found.    Hypertension Follow-up      Do you check your blood pressure regularly outside of the clinic? No     Are you following a low salt diet? Yes    Are your blood pressures ever more than 140 on the top number (systolic) OR more   than 90 on the bottom number (diastolic), for example 140/90? No      Current providers sharing in care for this patient include:   Patient Care Team:  Jennifer Ascencio DO as PCP - General (Family Medicine)  Brandan Landin MD as MD (OB/Gyn)  Mono Ribeiro MD as MD (Gynecologic Oncology)  Swapna Gaines MD as Assigned PCP  Edwige Seth MD as Assigned Heart and Vascular Provider  Pawel Musa DPM as Assigned Musculoskeletal Provider  Aminta Garcia MD as Assigned Cancer Care Provider    The following health maintenance items are reviewed in Epic and correct as of today:  Health Maintenance Due   Topic Date Due     COVID-19 Vaccine (3 - Pfizer risk 3-dose series) 04/06/2021     FALL RISK ASSESSMENT  09/16/2021     Lab work is in process  Labs reviewed in EPIC  BP Readings from Last 3 Encounters:   09/23/21 (!) 175/75   08/30/21 (!) 151/52   08/13/21 (!) 140/60    Wt Readings from Last 3 Encounters:   09/23/21 54.9 kg (121 lb)   08/30/21 48.1 kg (106 lb)   08/13/21 55.3 kg (122 lb)                  Patient Active Problem List   Diagnosis     Polymyalgia rheumatica (H)     History of basal cell carcinoma     CARDIOVASCULAR SCREENING; LDL GOAL LESS THAN 130     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     Hip pain     Left atrial enlargement     Status post coronary angiogram     Aortic valve replaced     Rheumatoid arthritis of multiple sites with negative rheumatoid factor (H)     CKD (chronic  kidney disease) stage 3, GFR 30-59 ml/min     THERESA III (vulvar intraepithelial neoplasia III)     Ulcer of left foot, unspecified ulcer stage (H)     Peripheral arterial disease (H)     Past Surgical History:   Procedure Laterality Date     C SKIN TISSUE PROCEDURE UNLISTED  11/3/08    mmis skin cancer excision     CATARACT IOL, RT/LT  5/09    bilateral     COLONOSCOPY  2002     EXCISE LESION VULVA N/A 9/27/2019    Procedure: Wide Local Excision Of Vulva, Colposcopy;  Surgeon: Mono Ribeiro MD;  Location: [facility] OR     REPLACE VALVE AORTIC N/A 4/25/2016    Procedure: REPLACE VALVE AORTIC;  Surgeon: Sudeep Tsai MD;  Location: [facility] OR       Social History     Tobacco Use     Smoking status: Never Smoker     Smokeless tobacco: Never Used   Substance Use Topics     Alcohol use: Yes     Comment: rarely     Family History   Problem Relation Age of Onset     Breast Cancer Sister 45     Arthritis Sister      Thyroid Disease Sister      Arthritis Sister      Colon Cancer Sister         colon     Arthritis Mother      Hypertension Father      Prostate Cancer Father      Arthritis Father      Heart Disease Father      Lipids Father      Colon Cancer Father      Arthritis Sister      Asthma Daughter      Asthma Daughter      Lung Cancer Daughter 58        lung     Pancreatic Cancer Other 81        pancreatic          Current Outpatient Medications   Medication Sig Dispense Refill     calcium-vitamin D 500-125 MG-UNIT TABS        fluticasone (FLONASE) 50 MCG/ACT nasal spray Spray 1 spray into both nostrils nightly as needed for rhinitis or allergies 15.8 mL 0     folic acid (FOLVITE) 1 MG tablet Take 1 tablet (1 mg) by mouth daily 30 tablet 12     furosemide (LASIX) 20 MG tablet TAKE 1 TABLET BY MOUTH DAILY IF 2 TO 3 POUND WEIGHT GAIN OVER A 2 DAY PERIOD 30 tablet 3     gabapentin (NEURONTIN) 100 MG capsule TAKE 1 CAPSULE(100 MG) BY MOUTH THREE TIMES DAILY 90 capsule 3     ICAPS PO 2 tablets daily       losartan (COZAAR)  50 MG tablet Take 1 tablet (50 mg) by mouth daily 90 tablet 3     methotrexate sodium 2.5 MG TABS Take 8 tablets (20 mg) by mouth once a week . Take all 8 tablets on the same day of each week.  Do not take with amoxicillin. 32 tablet 6     metoprolol tartrate (LOPRESSOR) 25 MG tablet Take 1 tablet (25 mg) by mouth 2 times daily 180 tablet 3     Misc. Devices (ROLLATOR ULTRA-LIGHT) MISC Walker with front wheels for home use, 99 months       Omega-3 Fatty Acids (OMEGA-3 FISH OIL PO) Take 1 tablet twice daily.       acetaminophen (TYLENOL) 325 MG tablet Take 2 tablets (650 mg) by mouth every 6 hours as needed for mild pain (Patient not taking: Reported on 7/30/2021) 50 tablet 0     amoxicillin (AMOXIL) 500 MG capsule TAKE 4 CAPSULES BY MOUTH 1 HOUR BEFORE DENTAL APPOINTMENT (Patient not taking: Reported on 8/13/2021) 4 capsule 1     aspirin  MG EC tablet Take 1 tablet (325 mg) by mouth daily (Patient not taking: Reported on 8/13/2021) 90 tablet 3     sulfaSALAzine (AZULFIDINE) 500 MG tablet Take 1 tablet (500 mg) by mouth 2 times daily (Patient not taking: Reported on 9/3/2021) 60 tablet 6     Allergies   Allergen Reactions     Lisinopril Cough       Review of Systems  CONSTITUTIONAL: NEGATIVE for fever, chills, change in weight  INTEGUMENTARY/SKIN: NEGATIVE for worrisome rashes, moles or lesions  EYES: NEGATIVE for vision changes or irritation  ENT/MOUTH: NEGATIVE for ear, mouth and throat problems  RESP: NEGATIVE for significant cough or SOB  BREAST: NEGATIVE for masses, tenderness or discharge  CV: NEGATIVE for chest pain, palpitations or peripheral edema  GI: NEGATIVE for nausea, abdominal pain, heartburn, or change in bowel habits  : NEGATIVE for frequency, dysuria, or hematuria  MUSCULOSKELETAL:walks slowly  NEURO: NEGATIVE for weakness, dizziness or paresthesias  ENDOCRINE: NEGATIVE for temperature intolerance, skin/hair changes  HEME: NEGATIVE for bleeding problems  PSYCHIATRIC: NEGATIVE for changes in  mood or affect    OBJECTIVE:   BP (!) 175/75 (BP Location: Right arm, Patient Position: Chair, Cuff Size: Adult Regular)   Pulse 85   Temp 97.8  F (36.6  C) (Oral)   Wt 54.9 kg (121 lb)   SpO2 95%   BMI 23.63 kg/m   Estimated body mass index is 23.63 kg/m  as calculated from the following:    Height as of 8/30/21: 1.524 m (5').    Weight as of this encounter: 54.9 kg (121 lb).  Physical Exam  GENERAL APPEARANCE: alert, no distress and elderly  EYES: Eyes grossly normal to inspection, PERRL and conjunctivae and sclerae normal  HENT: ear canals and TM's normal, nose and mouth without ulcers or lesions, oropharynx clear and oral mucous membranes moist  NECK: no adenopathy, no asymmetry, masses, or scars and thyroid normal to palpation  RESP: lungs clear to auscultation - no rales, rhonchi or wheezes  BREAST: normal without masses, tenderness or nipple discharge and no palpable axillary masses or adenopathy  CV: regular rate and rhythm, normal S1 S2, no S3 or S4, no murmur, click or rub, no peripheral edema and peripheral pulses strong  ABDOMEN: soft, nontender, no hepatosplenomegaly, no masses and bowel sounds normal  MS: no musculoskeletal defects are noted and gait is age appropriate without ataxia  SKIN: no suspicious lesions or rashes  NEURO: Normal strength and tone, sensory exam grossly normal, mentation intact and speech normal  PSYCH: mentation appears normal and affect normal/bright    Diagnostic Test Results:  Labs reviewed in Epic    ASSESSMENT / PLAN:   (Z00.00) Encounter for Medicare annual wellness exam  (primary encounter diagnosis)  Comment:   Plan:     (Z86.16) History of COVID-19  Comment: 8/2021  Plan: doing well now    (I12.9,  N18.30) 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  Comment: High  Plan: losartan (COZAAR) 75 mg daily-increase  Continue metoprolol        will repeat in 22-3 weeks    (N18.32) Stage 3b chronic kidney disease  Comment: needs better Blood Pressure  control      (M06.09) Rheumatoid arthritis of multiple sites with negative rheumatoid factor (H)  Comment: sees Rheumatology      (D07.1) THERESA III (vulvar intraepithelial neoplasia III)  Comment: sees Gyn onc  Plan: Notes reviewed     (Z95.2) Aortic valve replaced  Comment: stable     (I10) Hypertension goal BP (blood pressure) < 140/90  Comment: as above  Plan: metoprolol tartrate (LOPRESSOR) 25 MG tablet      Decreased hearing-has Hearing aids        Patient has been advised of split billing requirements and indicates understanding: Yes  COUNSELING:  Reviewed preventive health counseling, as reflected in patient instructions       Regular exercise       Healthy diet/nutrition       Vision screening       Hearing screening       Dental care       Bladder control       Fall risk prevention       Advanced Planning     Estimated body mass index is 23.63 kg/m  as calculated from the following:    Height as of 8/30/21: 1.524 m (5').    Weight as of this encounter: 54.9 kg (121 lb).        She reports that she has never smoked. She has never used smokeless tobacco.      Appropriate preventive services were discussed with this patient, including applicable screening as appropriate for cardiovascular disease, diabetes, osteopenia/osteoporosis, and glaucoma.  As appropriate for age/gender, discussed screening for colorectal cancer, prostate cancer, breast cancer, and cervical cancer. Checklist reviewing preventive services available has been given to the patient.    Reviewed patients plan of care and provided an AVS. The Intermediate Care Plan ( asthma action plan, low back pain action plan, and migraine action plan) for Roxanna meets the Care Plan requirement. This Care Plan has been established and reviewed with the Patient.    Counseling Resources:  ATP IV Guidelines  Pooled Cohorts Equation Calculator  Breast Cancer Risk Calculator  Breast Cancer: Medication to Reduce Risk  FRAX Risk Assessment  ICSI Preventive  Guidelines  Dietary Guidelines for Americans, 2010  USDA's MyPlate  ASA Prophylaxis  Lung CA Screening    Swapna Gaines MD  [facility]    Identified Health Risks:

## 2021-09-23 NOTE — PATIENT INSTRUCTIONS
Patient Education   Personalized Prevention Plan  You are due for the preventive services outlined below.  Your care team is available to assist you in scheduling these services.  If you have already completed any of these items, please share that information with your care team to update in your medical record.  Health Maintenance Due   Topic Date Due     COVID-19 Vaccine (3 - Pfizer risk 3-dose series) 04/06/2021     FALL RISK ASSESSMENT  09/16/2021     Your Health Risk Assessment indicates you feel you are not in good health    A healthy lifestyle helps keep the body fit and the mind alert. It helps protect you from disease, helps you fight disease, and helps prevent chronic disease (disease that doesn't go away) from getting worse. This is important as you get older and begin to notice twinges in muscles and joints and a decline in the strength and stamina you once took for granted. A healthy lifestyle includes good healthcare, good nutrition, weight control, recreation, and regular exercise. Avoid harmful substances and do what you can to keep safe. Another part of a healthy lifestyle is stay mentally active and socially involved.    Good healthcare     Have a wellness visit every year.     If you have new symptoms, let us know right away. Don't wait until the next checkup.     Take medicines exactly as prescribed and keep your medicines in a safe place. Tell us if your medicine causes problems.   Healthy diet and weight control     Eat 3 or 4 small, nutritious, low-fat, high-fiber meals a day. Include a variety of fruits, vegetables, and whole-grain foods.     Make sure you get enough calcium in your diet. Calcium, vitamin D, and exercise help prevent osteoporosis (bone thinning).     If you live alone, try eating with others when you can. That way you get a good meal and have company while you eat it.     Try to keep a healthy weight. If you eat more calories than your body uses for energy, it will be  stored as fat and you will gain weight.     Recreation   Recreation is not limited to sports and team events. It includes any activity that provides relaxation, interest, enjoyment, and exercise. Recreation provides an outlet for physical, mental, and social energy. It can give a sense of worth and achievement. It can help you stay healthy.    Mental Exercise and Social Involvement  Mental and emotional health is as important as physical health. Keep in touch with friends and family. Stay as active as possible. Continue to learn and challenge yourself.   Things you can do to stay mentally active are:    Learn something new, like a foreign language or musical instrument.     Play SCRABBLE or do crossword puzzles. If you cannot find people to play these games with you at home, you can play them with others on your computer through the Internet.     Join a games club--anything from card games to chess or checkers or lawn bowling.     Start a new hobby.     Go back to school.     Volunteer.     Read.   Keep up with world events.    Understanding USDA MyPlate  The USDA has guidelines to help you make healthy food choices. These are called MyPlate. MyPlate shows the food groups that make up healthy meals using the image of a place setting. Before you eat, think about the healthiest choices for what to put on your plate or in your cup or bowl. To learn more about building a healthy plate, visit www.choosemyplate.gov.    The food groups    Fruits. Any fruit or 100% fruit juice counts as part of the Fruit Group. Fruits may be fresh, canned, frozen, or dried, and may be whole, cut-up, or pureed. Make 1/2 of your plate fruits and vegetables.    Vegetables. Any vegetable or 100% vegetable juice counts as a member of the Vegetable Group. Vegetables may be fresh, frozen, canned, or dried. They can be served raw or cooked and may be whole, cut-up, or mashed. Make 1/2 of your plate fruits and vegetables.    Grains. All foods made  from grains are part of the Grains Group. These include wheat, rice, oats, cornmeal, and barley. Grains are often used to make foods such as bread, pasta, oatmeal, cereal, tortillas, and grits. Grains should be no more than 1/4 of your plate. At least half of your grains should be whole grains.    Protein. This group includes meat, poultry, seafood, beans and peas, eggs, processed soy products (such as tofu), nuts (including nut butters), and seeds. Make protein choices no more than 1/4 of your plate. Meat and poultry choices should be lean or low fat.    Dairy. The Dairy Group includes all fluid milk products and foods made from milk that contain calcium, such as yogurt and cheese. (Foods that have little calcium, such as cream, butter, and cream cheese, are not part of this group.) Most dairy choices should be low-fat or fat-free.    Oils. Oils aren't a food group, but they do contain essential nutrients. However it's important to watch your intake of oils. These are fats that are liquid at room temperature. They include canola, corn, olive, soybean, vegetable, and sunflower oil. Foods that are mainly oil include mayonnaise, certain salad dressings, and soft margarines. You likely already get your daily oil allowance from the foods you eat.  Things to limit  Eating healthy also means limiting these things in your diet:       Salt (sodium). Many processed foods have a lot of sodium. To keep sodium intake down, eat fresh vegetables, meats, poultry, and seafood when possible. Purchase low-sodium, reduced-sodium, or no-salt-added food products at the store. And don't add salt to your meals at home. Instead, season them with herbs and spices such as dill, oregano, cumin, and paprika. Or try adding flavor with lemon or lime zest and juice.    Saturated fat. Saturated fats are most often found in animal products such as beef, pork, and chicken. They are often solid at room temperature, such as butter. To reduce your  saturated fat intake, choose leaner cuts of meat and poultry. And try healthier cooking methods such as grilling, broiling, roasting, or baking. For a simple lower-fat swap, use plain nonfat yogurt instead of mayonnaise when making potato salad or macaroni salad.    Added sugars. These are sugars added to foods. They are in foods such as ice cream, candy, soda, fruit drinks, sports drinks, energy drinks, cookies, pastries, jams, and syrups. Cut down on added sugars by sharing sweet treats with a family member or friend. You can also choose fruit for dessert, and drink water or other unsweetened beverages.     [employer] last reviewed this educational content on 6/1/2020 2000-2021 [employer]. All rights reserved. This information is not intended as a substitute for professional medical care. Always follow your healthcare professional's instructions.

## 2021-09-23 NOTE — TELEPHONE ENCOUNTER
Patient notified of provider message as written. Patient verbalized good understanding.     Cindy CHAVEZN, RN  [facility]

## 2021-09-24 ENCOUNTER — TELEPHONE (OUTPATIENT)
Dept: FAMILY MEDICINE | Facility: CLINIC | Age: 86
End: 2021-09-24

## 2021-09-24 NOTE — TELEPHONE ENCOUNTER
Swapna Gaines MD  [facility] Team Red  Call to follow up with me 2-3 weeks recheck Blood Pressure       Called pt and scheduled BP follow up with Eder on 10/14/21 at 8:40am.  Nakia Li-  Shahab

## 2021-10-06 DIAGNOSIS — G57.93 NEUROPATHY OF BOTH FEET: ICD-10-CM

## 2021-10-06 RX ORDER — GABAPENTIN 100 MG/1
CAPSULE ORAL
Qty: 90 CAPSULE | Refills: 3 | Status: SHIPPED | OUTPATIENT
Start: 2021-10-06 | End: 2022-02-28

## 2021-10-06 NOTE — TELEPHONE ENCOUNTER
Routing refill request to provider for review/approval because:  Drug not on the [facility] refill protocol     Seen 9/23/21  Last refill 6/2/21

## 2021-10-14 ENCOUNTER — OFFICE VISIT (OUTPATIENT)
Dept: FAMILY MEDICINE | Facility: CLINIC | Age: 86
End: 2021-10-14
Payer: MEDICARE

## 2021-10-14 VITALS
HEART RATE: 76 BPM | HEIGHT: 60 IN | WEIGHT: 120.2 LBS | BODY MASS INDEX: 23.6 KG/M2 | TEMPERATURE: 97.8 F | DIASTOLIC BLOOD PRESSURE: 78 MMHG | SYSTOLIC BLOOD PRESSURE: 136 MMHG | OXYGEN SATURATION: 98 % | RESPIRATION RATE: 18 BRPM

## 2021-10-14 DIAGNOSIS — I12.9 BENIGN HYPERTENSION WITH CKD (CHRONIC KIDNEY DISEASE) STAGE III (H): ICD-10-CM

## 2021-10-14 DIAGNOSIS — N18.30 BENIGN HYPERTENSION WITH CKD (CHRONIC KIDNEY DISEASE) STAGE III (H): ICD-10-CM

## 2021-10-14 DIAGNOSIS — Z23 HIGH PRIORITY FOR 2019-NCOV VACCINE: Primary | ICD-10-CM

## 2021-10-14 LAB — POTASSIUM BLD-SCNC: 4.4 MMOL/L (ref 3.4–5.3)

## 2021-10-14 PROCEDURE — 0003A COVID-19,PF,PFIZER (12+ YRS): CPT | Performed by: FAMILY MEDICINE

## 2021-10-14 PROCEDURE — 91300 COVID-19,PF,PFIZER (12+ YRS): CPT | Performed by: FAMILY MEDICINE

## 2021-10-14 PROCEDURE — 84132 ASSAY OF SERUM POTASSIUM: CPT | Performed by: FAMILY MEDICINE

## 2021-10-14 PROCEDURE — 36415 COLL VENOUS BLD VENIPUNCTURE: CPT | Performed by: FAMILY MEDICINE

## 2021-10-14 PROCEDURE — 99214 OFFICE O/P EST MOD 30 MIN: CPT | Mod: 25 | Performed by: FAMILY MEDICINE

## 2021-10-14 RX ORDER — LOSARTAN POTASSIUM 50 MG/1
75 TABLET ORAL DAILY
Qty: 135 TABLET | Refills: 3 | Status: SHIPPED | OUTPATIENT
Start: 2021-10-14 | End: 2022-03-25

## 2021-10-14 ASSESSMENT — MIFFLIN-ST. JEOR: SCORE: 866.72

## 2021-10-14 NOTE — PROGRESS NOTES
Assessment & Plan     High priority for 2019-nCoV vaccine  done  - COVID-19,PF,PFIZER    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)  Stable   - losartan (COZAAR) 50 MG tablet; Take 1.5 tablets (75 mg) by mouth daily Stop previous  - Potassium; Future  Check BP at pharmacy monthly and see MD if over 140/90  Return in about 6 months (around 4/14/2022) for Med check.    Swapna Gaines MD  [facility] ELIAS Mcknight is a 89 year old who presents for the following health issues    History of Present Illness       Hypertension: She presents for follow up of hypertension.  She does not check blood pressure  regularly outside of the clinic. : does not check. She follows a low salt diet.     She eats 2-3 servings of fruits and vegetables daily.She consumes 0 sweetened beverage(s) daily.She exercises with enough effort to increase her heart rate 9 or less minutes per day.  She exercises with enough effort to increase her heart rate 3 or less days per week.   She is taking medications regularly.     o add (Optional):[id number]}    Review of Systems   Constitutional, HEENT, cardiovascular, pulmonary, gi and gu systems are negative, except as otherwise noted.      Objective    BP (!) 182/73   Pulse 76   Temp 97.8  F (36.6  C) (Oral)   Resp 18   Ht 1.524 m (5')   Wt 54.5 kg (120 lb 3.2 oz)   SpO2 98%   BMI 23.47 kg/m    Body mass index is 23.47 kg/m .  Physical Exam   GENERAL: healthy, alert and no distress  NECK: no adenopathy, no asymmetry, masses, or scars and thyroid normal to palpation  RESP: lungs clear to auscultation - no rales, rhonchi or wheezes  CV: regular rate and rhythm, normal S1 S2, no S3 or S4, no murmur, click or rub, no peripheral edema and peripheral pulses strong  ABDOMEN: soft, nontender, no hepatosplenomegaly, no masses and bowel sounds normal  MS: no gross musculoskeletal defects noted, no edema  Pending labs

## 2021-10-22 DIAGNOSIS — J01.10 ACUTE NON-RECURRENT FRONTAL SINUSITIS: ICD-10-CM

## 2021-10-24 RX ORDER — FLUTICASONE PROPIONATE 50 MCG
1 SPRAY, SUSPENSION (ML) NASAL
Qty: 15.8 ML | Refills: 0 | Status: SHIPPED | OUTPATIENT
Start: 2021-10-24 | End: 2021-11-05

## 2021-11-05 ENCOUNTER — TELEPHONE (OUTPATIENT)
Dept: FAMILY MEDICINE | Facility: CLINIC | Age: 86
End: 2021-11-05

## 2021-11-05 NOTE — TELEPHONE ENCOUNTER
Patient called to ask why the pharmacy gave her a nasal spray. Patient says she doesn't remember getting prescribed a nasal spray. RN reviewed chart and informed patient that is appears she saw Dr. Hoskins for a sinus problem in August this year. Patient says she does not remember and doesn't think she has ever had the nasal spray before now. Patient states that she is not going to use Flonase and agrees to have RN discontinue on her medication list so that pharmacy does not order refill automatically. RN encouraged patient to talk with daughter to help remember event.  Latonya George RN on 11/5/2021 at 11:41 AM

## 2021-11-24 NOTE — TELEPHONE ENCOUNTER
Did a PA through cover my meds today. Pharmacy [facility], [phone], Fax [phone].  Cintia Rebolledo MA     admission

## 2021-12-02 DIAGNOSIS — N18.30 BENIGN HYPERTENSION WITH CKD (CHRONIC KIDNEY DISEASE) STAGE III (H): ICD-10-CM

## 2021-12-02 DIAGNOSIS — I12.9 BENIGN HYPERTENSION WITH CKD (CHRONIC KIDNEY DISEASE) STAGE III (H): ICD-10-CM

## 2021-12-04 NOTE — TELEPHONE ENCOUNTER
Routing refill request to provider for review/approval because:  Labs out of range:    Creatinine   Date Value Ref Range Status   08/30/2021 1.32 (H) 0.52 - 1.04 mg/dL Final   03/23/2021 1.40 (H) 0.52 - 1.04 mg/dL Final

## 2021-12-05 DIAGNOSIS — Z29.89 SBE (SUBACUTE BACTERIAL ENDOCARDITIS) PROPHYLAXIS CANDIDATE: ICD-10-CM

## 2021-12-05 RX ORDER — FUROSEMIDE 20 MG
TABLET ORAL
Qty: 30 TABLET | Refills: 3 | Status: SHIPPED | OUTPATIENT
Start: 2021-12-05 | End: 2022-06-14

## 2021-12-06 RX ORDER — AMOXICILLIN 500 MG/1
CAPSULE ORAL
Qty: 4 CAPSULE | Refills: 1 | Status: SHIPPED | OUTPATIENT
Start: 2021-12-06 | End: 2022-12-02

## 2022-01-21 ENCOUNTER — LAB (OUTPATIENT)
Dept: LAB | Facility: CLINIC | Age: 87
End: 2022-01-21
Payer: MEDICARE

## 2022-01-21 DIAGNOSIS — M06.09 RHEUMATOID ARTHRITIS OF MULTIPLE SITES WITH NEGATIVE RHEUMATOID FACTOR (H): ICD-10-CM

## 2022-01-21 DIAGNOSIS — Z79.899 HIGH RISK MEDICATION USE: ICD-10-CM

## 2022-01-21 DIAGNOSIS — Z13.220 SCREENING FOR HYPERLIPIDEMIA: Primary | ICD-10-CM

## 2022-01-21 LAB
ALBUMIN SERPL-MCNC: 3.5 G/DL (ref 3.4–5)
ALP SERPL-CCNC: 97 U/L (ref 40–150)
ALT SERPL W P-5'-P-CCNC: 24 U/L (ref 0–50)
AST SERPL W P-5'-P-CCNC: 25 U/L (ref 0–45)
BASOPHILS # BLD AUTO: 0 10E3/UL (ref 0–0.2)
BASOPHILS NFR BLD AUTO: 0 %
BILIRUB DIRECT SERPL-MCNC: <0.1 MG/DL (ref 0–0.2)
BILIRUB SERPL-MCNC: 0.3 MG/DL (ref 0.2–1.3)
CREAT SERPL-MCNC: 1.14 MG/DL (ref 0.52–1.04)
CRP SERPL-MCNC: 11.1 MG/L (ref 0–8)
EOSINOPHIL # BLD AUTO: 0.2 10E3/UL (ref 0–0.7)
EOSINOPHIL NFR BLD AUTO: 2 %
ERYTHROCYTE [DISTWIDTH] IN BLOOD BY AUTOMATED COUNT: 16.2 % (ref 10–15)
ERYTHROCYTE [SEDIMENTATION RATE] IN BLOOD BY WESTERGREN METHOD: 45 MM/HR (ref 0–30)
GFR SERPL CREATININE-BSD FRML MDRD: 44 ML/MIN/1.73M2
HCT VFR BLD AUTO: 39.9 % (ref 35–47)
HGB BLD-MCNC: 12.4 G/DL (ref 11.7–15.7)
LYMPHOCYTES # BLD AUTO: 1.7 10E3/UL (ref 0.8–5.3)
LYMPHOCYTES NFR BLD AUTO: 18 %
MCH RBC QN AUTO: 28.9 PG (ref 26.5–33)
MCHC RBC AUTO-ENTMCNC: 31.1 G/DL (ref 31.5–36.5)
MCV RBC AUTO: 93 FL (ref 78–100)
MONOCYTES # BLD AUTO: 1 10E3/UL (ref 0–1.3)
MONOCYTES NFR BLD AUTO: 10 %
NEUTROPHILS # BLD AUTO: 6.5 10E3/UL (ref 1.6–8.3)
NEUTROPHILS NFR BLD AUTO: 69 %
PLATELET # BLD AUTO: 188 10E3/UL (ref 150–450)
PROT SERPL-MCNC: 7.9 G/DL (ref 6.8–8.8)
RBC # BLD AUTO: 4.29 10E6/UL (ref 3.8–5.2)
WBC # BLD AUTO: 9.5 10E3/UL (ref 4–11)

## 2022-01-21 PROCEDURE — 85025 COMPLETE CBC W/AUTO DIFF WBC: CPT

## 2022-01-21 PROCEDURE — 36415 COLL VENOUS BLD VENIPUNCTURE: CPT

## 2022-01-21 PROCEDURE — 86140 C-REACTIVE PROTEIN: CPT

## 2022-01-21 PROCEDURE — 82565 ASSAY OF CREATININE: CPT

## 2022-01-21 PROCEDURE — 85652 RBC SED RATE AUTOMATED: CPT

## 2022-01-21 PROCEDURE — 80076 HEPATIC FUNCTION PANEL: CPT

## 2022-01-31 ENCOUNTER — OFFICE VISIT (OUTPATIENT)
Dept: RHEUMATOLOGY | Facility: CLINIC | Age: 87
End: 2022-01-31
Payer: MEDICARE

## 2022-01-31 VITALS
DIASTOLIC BLOOD PRESSURE: 81 MMHG | OXYGEN SATURATION: 98 % | BODY MASS INDEX: 24.5 KG/M2 | HEART RATE: 76 BPM | WEIGHT: 124.8 LBS | SYSTOLIC BLOOD PRESSURE: 138 MMHG | HEIGHT: 60 IN

## 2022-01-31 DIAGNOSIS — Z79.899 HIGH RISK MEDICATION USE: ICD-10-CM

## 2022-01-31 DIAGNOSIS — M06.09 RHEUMATOID ARTHRITIS OF MULTIPLE SITES WITH NEGATIVE RHEUMATOID FACTOR (H): Primary | ICD-10-CM

## 2022-01-31 PROCEDURE — 99214 OFFICE O/P EST MOD 30 MIN: CPT | Performed by: INTERNAL MEDICINE

## 2022-01-31 ASSESSMENT — MIFFLIN-ST. JEOR: SCORE: 887.59

## 2022-01-31 NOTE — NURSING NOTE
RAPID3 (0-30) Cumulative Score  1.8          RAPID3 Weighted Score (divide #4 by 3 and that is the weighted score)  0.6

## 2022-01-31 NOTE — PROGRESS NOTES
Rheumatology Clinic Visit      Roxanna Stark MRN# [id number]   YOB: 1927 Age: 89 year old      Date of visit: 1/31/22   PCP: Dr. Swapna Gaines  Cardiology: Dr. Boston Navarro     Chief Complaint   Patient presents with:  Rheumatoid Arthritis    Assessment and Plan     1. 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  She was treated with prednisone monotherapy for several years, being able to taper off without recurrence of symptoms. She then developed worsening symptoms in her hands and was diagnosed with rheumatoid arthritis. Initially, she was resistant to taking DMARD therapy.  She then was started on MTX that was effective; she reduced the dose with worsening symptoms and then SSZ was added; at one point was doing well on MTX 20mg wkly and SSZ 500mg BID (mild anemia possibly associated with SSZ so the dose was previously reduced); however, today she is taking only SSZ and not MTX. ESR and CRP elevated but RA appears well controlled and she reports doing well. To be safe with her medications I asked that she have a nurse come to her house to do a med check but she refused and says that she can look through her meds and will call to give us an update today.  Chronic illness, stable.    - Remove from medication list because she is not taking: methotrexate 20 mg once weekly   - Discontinue folic acid 1mg daily because methotrexate has been stopped  - Continue sulfasalazine 500 mg twice daily   - Labs in 3 months: CBC, Creatinine, Hepatic Panel, ESR, CRP    High risk medication requiring intensive toxicity monitoring at least quarterly: labs ordered include CBC, Creatinine, Hepatic panel to monitor for cytopenia and hepatotoxicity; checking creatinine as it affects clearance of medication.      2. Giant Cell Arteritis History?: 12/26/2005 Left TA biopsy negative per [facility] record  review.  No symptoms of GCA at this time.      3. Right shoulder rotator cuff tear and history of pain: Previously evaluated by orthopedic surgery and her pain resolved for approximately one year after having a steroid injection in March 2015. She does not want to have surgical correction of her shoulder. Repeat steroid injections have been helpful; not needed today.  Physical therapy was effective previously. Not an issue today.      4. History of basal cell carcinoma: Following with dermatology; encouraged yearly dermatology evaluation     5. Bone Health: Managed by PCP already.     6.  Vaccinations:     - Influenza: encouraged yearly vaccination  - [id number]: up to date  - [id number]: up to date  - Shingrix: Up to date  - COVID-19: has received the Pfizer COVID-19 vaccine on 2/16/2021 and 3/9/2021 and 10/14/2021; 4th mRNA COVID19 vaccine is due on or shortly after 3/14/2022    Total minutes spent in evaluation with patient, documentation, , and review of pertinent studies and chart notes: 24     Ms. Stark verbalized agreement with and understanding of the rational for the diagnosis and treatment plan.  All questions were answered to best of my ability and the patient's satisfaction. Ms. Stark was advised to contact the clinic with any questions that may arise after the clinic visit.      Thank you for involving me in the care of the patient    Return to clinic: 3 months      HPI   Roxanna Stark is a 89 year old female with medical history significant for basal cell carcinoma, hypertension, aortic stenosis, right rotator cuff tear (previously evaluated by Dr. Bingham, orthopedic surgery, on 3/20/2015 where at that time Ms. Stark was not interested in surgical correction; she received an intra-articular steroid injection at that time that was effective for ~1year), temporal arteritis?, and seronegative erosive rheumatoid arthritis.     Today, 1/31/2022: doing well at this time. No longer taking MTX  and hasn't for some time.  Note that Twila, MA, called to check her pharmacy and MTX and SSZ were last filled in July 2021.  Roxanna says that she is happy with how well she is doing; no joint pain; morning stiffness <20 min. Arthritis is not limiting any of her daily activities.  No difficulty raising her arms above her head or standing up from a chair. No vision change. No jaw or tongue claudication. No scalp tenderness. No new headache.     Denies fevers, chills, nausea, vomiting, constipation, diarrhea. No abdominal pain. No chest pain/pressure, palpitations, or shortness of breath. No oral or nasal sores. No neck pain. No rash.     Tobacco: None  EtOH: No more than 1 drink per week  Drugs: None  Occupation: Used to work for the [employer] company; now retired    ROS   12 point review of system was completed and negative except as noted in the HPI     Active Problem List     Patient Active Problem List   Diagnosis     Polymyalgia rheumatica (H)     History of basal cell carcinoma     CARDIOVASCULAR SCREENING; LDL GOAL LESS THAN 130     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)     Hip pain     Left atrial enlargement     Status post coronary angiogram     Aortic valve replaced     Rheumatoid arthritis of multiple sites with negative rheumatoid factor (H)     CKD (chronic kidney disease) stage 3, GFR 30-59 ml/min (H)     THERESA III (vulvar intraepithelial neoplasia III)     Ulcer of left foot, unspecified ulcer stage (H)     Peripheral arterial disease (H)     Decreased hearing of both ears     Past Medical History     Past Medical History:   Diagnosis Date     Actinic keratosis      Aortic stenosis 2014     Basal cell cancer 7/2014    left eye medial canthus      Basal cell carcinoma 9/30/08    left cheek     CKD (chronic kidney disease) stage 3, GFR 30-59 ml/min (H) 7/1/2019     HTN (hypertension)      Melanoma in situ (H) 9/30/08    left arm     Polymyalgia rheumatica (H) 11/99      Rheumatoid arthritis of multiple sites with negative rheumatoid factor (H)      Temporal arteritis (H) 11/99     Past Surgical History     Past Surgical History:   Procedure Laterality Date     CATARACT IOL, RT/LT  5/09    bilateral     COLONOSCOPY  2002     EXCISE LESION VULVA N/A 9/27/2019    Procedure: Wide Local Excision Of Vulva, Colposcopy;  Surgeon: Mono Ribeiro MD;  Location:  OR     REPLACE VALVE AORTIC N/A 4/25/2016    Procedure: REPLACE VALVE AORTIC;  Surgeon: Sudeep Tsai MD;  Location:  OR     [facility] SKIN TISSUE PROCEDURE UNLISTED  11/3/08    mmis skin cancer excision     Allergy     Allergies   Allergen Reactions     Lisinopril Cough        Current Medication List     Current Outpatient Medications   Medication Sig     acetaminophen (TYLENOL) 325 MG tablet Take 2 tablets (650 mg) by mouth every 6 hours as needed for mild pain     calcium-vitamin D 500-125 MG-UNIT TABS      folic acid (FOLVITE) 1 MG tablet Take 1 tablet (1 mg) by mouth daily     furosemide (LASIX) 20 MG tablet TAKE 1 TABLET BY MOUTH DAILY IF 2 TO 3 POUND WEIGHT GAIN OVER A 2 DAY PERIOD     gabapentin (NEURONTIN) 100 MG capsule TAKE 1 CAPSULE(100 MG) BY MOUTH THREE TIMES DAILY     ICAPS PO 2 tablets daily     losartan (COZAAR) 50 MG tablet Take 1.5 tablets (75 mg) by mouth daily Stop previous     metoprolol tartrate (LOPRESSOR) 25 MG tablet Take 1 tablet (25 mg) by mouth 2 times daily     Misc. Devices (ROLLATOR ULTRA-LIGHT) MISC Walker with front wheels for home use, 99 months     Omega-3 Fatty Acids (OMEGA-3 FISH OIL PO) Take 1 tablet twice daily.     sulfaSALAzine (AZULFIDINE) 500 MG tablet Take 1 tablet (500 mg) by mouth 2 times daily     amoxicillin (AMOXIL) 500 MG capsule TAKE 4 CAPSULES BY MOUTH 1 HOUR BEFORE DENTAL APPOINTMENT     aspirin  MG EC tablet Take 1 tablet (325 mg) by mouth daily (Patient not taking: Reported on 8/13/2021)     methotrexate sodium 2.5 MG TABS Take 8 tablets (20 mg) by mouth once a  week . Take all 8 tablets on the same day of each week.  Do not take with amoxicillin. (Patient not taking: Reported on 1/31/2022)     No current facility-administered medications for this visit.       Social History   See HPI    Family History     Family History   Problem Relation Age of Onset     Breast Cancer Sister 45     Arthritis Sister      Thyroid Disease Sister      Arthritis Sister      Colon Cancer Sister         colon     Arthritis Mother      Hypertension Father      Prostate Cancer Father      Arthritis Father      Heart Disease Father      Lipids Father      Colon Cancer Father      Arthritis Sister      Asthma Daughter      Asthma Daughter      Lung Cancer Daughter 58        lung     Pancreatic Cancer Other 81        pancreatic      Physical Exam     Temp Readings from Last 3 Encounters:   10/14/21 97.8  F (36.6  C) (Oral)   09/23/21 97.8  F (36.6  C) (Oral)   08/30/21 97.5  F (36.4  C) (Oral)     BP Readings from Last 5 Encounters:   01/31/22 138/81   10/14/21 136/78   09/23/21 (!) 175/75   08/30/21 (!) 151/52   08/13/21 (!) 140/60     Pulse Readings from Last 1 Encounters:   01/31/22 76     Resp Readings from Last 1 Encounters:   10/14/21 18     Estimated body mass index is 24.37 kg/m  as calculated from the following:    Height as of this encounter: 1.524 m (5').    Weight as of this encounter: 56.6 kg (124 lb 12.8 oz).      GEN: NAD. Healthy appearing adult.   HEENT:  Anicteric, noninjected sclera. No obvious external lesions of the ear and nose. Hearing intact.  PULM: No increased work of breathing.   MSK: MCPs, PIPs, DIPs without swelling or tenderness to palpation.  Squaring at the bilateral 1st CMC joints; nontender to palpation. Large heberdens and tomás's nodes with angulation at several of the DIPs and PIPs. Wrists without swelling or tenderness to palpation.  Elbows and shoulders without swelling or tenderness to palpation.  Able to raise arms above head. Able to stand up from the  chair. Knees, ankles, and MTPs without swelling or tenderness to palpation.    SKIN: No rash or jaundice seen  PSYCH: Alert. Appropriate.        Labs / Imaging (select studies)     RF/CCP  Recent Labs   Lab Test 08/11/16  1124 08/04/16  1222 02/18/16  1543   CCPIGG 1  --   --    RHF  --  <20 <20     CBC  Recent Labs   Lab Test 01/21/22  1311 08/30/21  1559 03/23/21  1526 01/22/21  0933 10/30/20  0903 07/24/20  1328   WBC 9.5 8.6 9.4 8.1 10.8 11.1*   RBC 4.29 3.75* 3.55* 3.93 4.04 3.33*   HGB 12.4 12.0 11.0* 11.9 11.8 10.4*   HCT 39.9 37.2 35.4 38.0 37.8 32.7*   MCV 93 99 100 97 94 98   RDW 16.2* 18.7* 17.7* 19.4* 18.0* 19.2*    451* 234 228 203 184   MCH 28.9 32.0 31.0 30.3 29.2 31.2   MCHC 31.1* 32.3 31.1* 31.3* 31.2* 31.8   NEUTROPHIL 69  --   --  54.0 51.7 62.2   LYMPH 18  --   --  27.0 29.5 22.5   MONOCYTE 10  --   --  15.0 14.7 12.0   EOSINOPHIL 2  --   --  3.3 3.5 2.5   BASOPHIL 0  --   --  0.7 0.6 0.8   ANEU  --   --   --  4.3 5.6 6.9   ALYM  --   --   --  2.2 3.2 2.5   IGNACIO  --   --   --  1.2 1.6* 1.3   AEOS  --   --   --  0.3 0.4 0.3   ABAS  --   --   --  0.1 0.1 0.1   ANEUTAUTO 6.5  --   --   --   --   --    ALYMPAUTO 1.7  --   --   --   --   --    AMONOAUTO 1.0  --   --   --   --   --    AEOSAUTO 0.2  --   --   --   --   --    ABSBASO 0.0  --   --   --   --   --      CMP  Recent Labs   Lab Test 01/21/22  1311 10/14/21  0946 08/30/21  1559 08/06/21  1305 03/23/21  1526 01/22/21  0933 10/30/20  0903 09/16/20  1008   NA  --   --  138  --  138  --   --  140   POTASSIUM  --  4.4 4.5  --  4.3  --   --  4.2   CHLORIDE  --   --  104  --  103  --   --  107   CO2  --   --  27  --  31  --   --  29   ANIONGAP  --   --  7  --  4  --   --  4   GLC  --   --  86  --  182*  --   --  102*   BUN  --   --  36*  --  27  --   --  28   CR 1.14*  --  1.32* 1.21* 1.40* 1.17* 1.38* 1.24*   GFRESTIMATED 44*  --  35* 38* 32* 40* 33* 37*   GFRESTBLACK  --   --   --   --  37* 46* 38* 43*   ROEL  --   --  9.8  --  9.3  --   --   9.2   BILITOTAL 0.3  --  0.4 0.3  --  0.2 0.4  --    ALBUMIN 3.5  --  3.6 3.7 3.5 3.6 3.3*  --    PROTTOTAL 7.9  --  7.9 7.6  --  7.7 7.6  --    ALKPHOS 97  --  56 100  --  118 108  --    AST 25  --  26 37  --  23 28  --    ALT 24  --  21 47  --  25 28  --      Calcium/VitaminD  Recent Labs   Lab Test 08/30/21  1559 03/23/21  1526 09/16/20  1008   ROEL 9.8 9.3 9.2     ESR/CRP  Recent Labs   Lab Test 01/21/22  1311 08/30/21  1559 08/06/21  1305 01/22/21  0933   SED 45* 64* 33* 34*   CRP 11.1*  --  4.2 3.4     Lipid Panel  Recent Labs   Lab Test 03/17/15  0923   CHOL 228*   TRIG 200*   HDL 72      VLDL 40*   CHOLHDLRATIO 3.2     Hepatitis B  Recent Labs   Lab Test 11/10/16  0909   HBCAB Nonreactive   HEPBANG Nonreactive     Hepatitis C  Recent Labs   Lab Test 11/10/16  0909   HCVAB Nonreactive   Assay performance characteristics have not been established for newborns,   infants, and children         HIV Screening  Recent Labs   Lab Test 11/10/16  0909   HIAGAB Nonreactive   HIV-1 p24 Ag & HIV-1/HIV-2 Ab Not Detected         Immunization History     Immunization History   Administered Date(s) Administered     COVID-19,PF,Pfizer (12+ Yrs) 02/16/2021, 03/09/2021, 10/14/2021     FLU 6-35 months 09/08/2010     FLUAD(HD)65+ QUAD 09/17/2021     Influenza (H1N1) 10/20/2016     Influenza (High Dose) 3 valent vaccine 10/16/2014, 10/06/2015, 10/23/2016, 10/03/2017, 08/23/2018, 09/18/2019     Influenza (IIV3) PF 11/05/1999, 12/14/2000, 10/26/2004, 10/04/2005, 09/16/2009, 10/20/2010, 11/09/2011, 09/22/2012, 09/15/2013, 10/15/2014     Influenza, Quad, High Dose, Pf, 65yr+ (Fluzone HD) 09/02/2020     Pneumo Conj 13-V (2010&after) 03/17/2015     Pneumococcal 23 valent 11/03/2000, 12/13/2010     TD (ADULT, 7+) 01/12/2004     TDAP Vaccine (Adacel) 05/14/2013     Td (Adult), Adsorbed 01/12/2004     Zoster vaccine recombinant adjuvanted (SHINGRIX) 06/21/2018, 08/23/2018     Zoster vaccine, live 12/15/2006          Chart  documentation done in part with Dragon Voice recognition Software. Although reviewed after completion, some word and grammatical error may remain.      Jamie Johnson MD

## 2022-01-31 NOTE — PATIENT INSTRUCTIONS
RHEUMATOLOGY    Dr. Jamie Johnson    [facility]  [address] [place] [address]  [place] [address]  Phone number: [phone]  Fax number: [phone]      Thank you for choosing [facility]!    Twila Atkins CMA Rheumatology    --------------------------    Please call us to give an update on your medications.  Have you been taking methotrexate (once weekly) and sulfasalazine (twice daily)

## 2022-02-22 NOTE — PROGRESS NOTES
CARDIOLOGY CLINIC FOLLOW UP    HPI: Roxanna Stark is an 89 year-old very pleasant female patient seen today in follow up. She was initially seen for severe aortic stenosis and underwent AVR with a 21 mm tissue valve on 4/21/2016 by Dr. Tsai. Her postoperative course was uneventful. She is very active. She denies chest discomfort, dyspnea, PND, orthopnea, pedal edema, palpitations, lightheadedness, and syncope. When she was last seen in clinic, I discontinued her furosemide and K supplement. Today, she returns to clinic and is asymptomatic.    PAST MEDICAL HISTORY:  Past Medical History:   Diagnosis Date     Actinic keratosis      Aortic stenosis 2014     Basal cell cancer 7/2014    left eye medial canthus      Basal cell carcinoma 9/30/08    left cheek     HTN (hypertension)      melanoma in situ 9/30/2008    LEFT ARM     Melanoma in situ (H) 9/30/08    left arm     Polymyalgia rheumatica (H) 11/99     Temporal arteritis (H) 11/99     CURRENT MEDICATIONS:  Current Outpatient Prescriptions   Medication Sig Dispense Refill     methotrexate sodium 2.5 MG TABS Take 10 mg by mouth once a week . Take all 4 tablets on the same day of each week. 16 tablet 3     folic acid (FOLVITE) 1 MG tablet Take 1 tablet (1 mg) by mouth daily 100 tablet 3     furosemide (LASIX) 20 MG tablet TAKE 1 TABLET BY MOUTH EVERY DAY IF 2 TO 3 POUNDS WEIGHT GAIN OVER 2 DAY PERIOD 90 tablet 3     metoprolol (LOPRESSOR) 25 MG tablet Take 1 tablet (25 mg) by mouth 2 times daily 180 tablet 3     lisinopril (PRINIVIL,ZESTRIL) 5 MG tablet Take 1 tablet (5 mg) by mouth daily 90 tablet 3     calcium-vitamin D 500-125 MG-UNIT TABS        aspirin  MG EC tablet Take 1 tablet (325 mg) by mouth daily 90 tablet 3     Omega-3 Fatty Acids (OMEGA-3 FISH OIL PO) Take 2 g by mouth daily       ICAPS PO 2 tablets daily       econazole nitrate 1 % cream Apply to red rash of legs and feet twice a day till rash is gone (Patient not taking: Reported on 8/4/2017) 85 g  3     PAST SURGICAL HISTORY:  Past Surgical History:   Procedure Laterality Date     C SKIN TISSUE PROCEDURE UNLISTED  11/3/08    mmis skin cancer excision     CATARACT IOL, RT/LT  5/09    bilateral     COLONOSCOPY  2002     REPLACE VALVE AORTIC N/A 4/25/2016    Procedure: REPLACE VALVE AORTIC;  Surgeon: Sudeep Tsai MD;  Location: [facility] OR     ALLERGIES  Review of patient's allergies indicates no known allergies.    FAMILY HX:  Family History   Problem Relation Age of Onset     Breast Cancer Sister      Arthritis Sister      CANCER Sister      breast     Thyroid Disease Sister      CANCER Sister      colon     Arthritis Sister      Arthritis Mother      Hypertension Father      Cancer - colorectal Father      Prostate Cancer Father      Arthritis Father      HEART DISEASE Father      Lipids Father      Arthritis Sister      Asthma Daughter      Asthma Daughter      CANCER Daughter 58     lung     CANCER Other 81     pancreatic      SOCIAL HX:  History     Social History     Marital Status:      Spouse Name: N/A     Number of Children: N/A     Years of Education: N/A     Occupational History      Retired     Social History Main Topics     Smoking status: Never Smoker      Smokeless tobacco: Never Used     Alcohol Use: Yes     Drug Use: No     Sexual Activity: No     Other Topics Concern     Not on file     Social History Narrative     ROS:  Constitutional: No fever, chills, or sweats. No weight gain/loss.   ENT: No visual disturbance, ear ache, epistaxis, sore throat.   Allergies/Immunologic: Negative.   Respiratory: No cough, hemoptysis.   Cardiovascular: As per HPI.   GI: No nausea, vomiting, hematemesis, melena, or hematochezia.   : No urinary frequency, dysuria, or hematuria.   Integument: Negative.   Psychiatric: Negative.   Neuro: Negative.   Endocrinology: Negative.   Musculoskeletal: No myalgia. The sternal wound is healing well without erythema.    VITAL SIGNS:  /80 (BP Location:  Left arm, Patient Position: Chair, Cuff Size: Adult Regular)  Pulse 63  SpO2 100%  There is no height or weight on file to calculate BMI.  Wt Readings from Last 2 Encounters:   17 120 lb 3.2 oz (54.5 kg)   17 122 lb (55.3 kg)     PHYSICAL EXAM  Roxanna Stark is an 89 year old female in no acute distress.  HEENT: Unremarkable.  Neck: JVP normal.  Carotids +4/4 bilaterally without bruits.  Lungs: CTA.  Cor: RRR. Normal S1 and S2, soft MICHAELA.  Abd: Soft, nontender, nondistended.  NABS.  No pulsatile mass.  Extremities: Trace to mild bilateral LE edema.  Pulses +4/4 symmetric in upper and lower extremities.  Neuro: Grossly intact.    LABS    WBC     12.5   2012  RBC     4.50   2012  HGB     13.9   2012  HCT     40.5   2012  No components found with this name: mct  MCV       90   2012  MCH     30.8   2012  MCHC     34.2   2012  RDW     13.3   2012  PLT      296   2012   Recent Labs   Lab Test  14   1426  13   1403   POTASSIUM  4.4  4.3   CR  1.10*  0.90     EK12.   Sinus Rhythm - occasional ectopic ventricular beat.    ECHOCARDIOGRAMS:   2016  Global and regional left ventricular function is normal with an EF of 60-65%.  There is at least moderate diastolic dysfunction.  Global right ventricular function is normal.  Severe aortic stenosis is present. The mean gradient across the aortic valve is 61 mmHg. The peak aortic velocity is 5.1 m/sec. JAVIER 0.6 cm2.    10/13/2014   1. Normal biventricular systolic function. LVEF estimate 65%.   2. Moderate aortic stenosis (peak velocity: 3.9 m/s, mean gradient: 36 mmHg, calculated valve area: 1.2 cm2).   3. Normal IVC with preserved respiratory variability.   4. Compared to study dated 02/10/14 the aortic valve parameters have slightly worsened.    14  Global and regional left ventricular function is normal with an EF of 55-60%. Global right ventricular function is normal. Trace tricuspid insufficiency  is present. Right ventricular systolic pressure is 19mmHg above the right atrial pressure. The inferior vena cava was normal in size with preserved respiratory variability. Small circumferential pericardial effusion is present without any hemodynamic significance. Chamber compression is not present; there is no evidence for tamponade.   Left Ventricle: Global and regional left ventricular function is normal with an EF of 55-60%. Left ventricular size is normal. Left ventricular Doppler filling pattern consistent with abnormal relaxation.   Right Ventricle: The right ventricle is normal size. Global right ventricular function is normal.   Atria: The right atria appears normal. Moderate left atrial enlargement is present.   Mitral Valve: Mild mitral annular calcification is present. Trace mitral insufficiency is present.   Aortic Valve: Mild aortic valve sclerosis is present. No aortic regurgitation is present.   Tricuspid Valve: The tricuspid valve is normal. Trace tricuspid insufficiency is present. Right ventricular systolic pressure is 19mmHg above the right atrial pressure.   Pulmonic Valve: The pulmonic valve is normal. Trace pulmonic insufficiency is present.   Vessels: The aorta root is normal. The inferior vena cava was normal in size with preserved respiratory variability.   Pericardium: Small circumferential pericardial effusion is present without any hemodynamic significance. Chamber compression is not present; there is no evidence for tamponade.    05/11/12  Left ventricular function, chamber size, wall motion, and wall thickness are normal.The EF is > 65%. Paradoxic low flow aortic stenosis with moderate to severe reduction in valve area. Visually the valve appears moderately calcified with mild-moderate cusp restriction.   Left Ventricle: Left ventricular function, chamber size, wall motion, and wall thickness are normal.The EF is > 65%. Relative wall thickness is increased consistent with concentric  remodeling.   Right Ventricle: Right ventricular function, chamber size, wall motion, and thickness are normal.   Atria: Both atria appear normal.   Mitral Valve: Mild to moderate mitral annular calcification is present. Systolic anterior motion without gradient.   Aortic Valve: The aortic valve is tricuspid. Mild aortic valve sclerosis is present.   Tricuspid Valve: The tricuspid valve is normal. Trace tricuspid insufficiency is present.   Pulmonic Valve: The pulmonic valve cannot be assessed.   Vessels: The aorta root is normal. The pulmonary artery is normal. The inferior vena cava is normal.   Pericardium: No pericardial effusion is present.     08/04/2017  Global and regional left ventricular function is normal with an EF of 60-65%.  Global right ventricular function is normal.  S/p AVR 21mm tissue valve. The mean gradient is 11 mmHg. The effective orifice  area is 1.4 cm^2. EOAI is 0.9 cm/m2, DVI is 0.55 (all normal.)  Mild mitral stenosis is present. The etiology of the mitral stenosis is mitral  annular calcification. Mean gradient is 4mmHg  IVC is normal in size with preserved respiratory variability. PAP is normal.  No pericardial effusion is present.  This study was compared with the study from 2/25/2016. The patient is now s/p AVR for severe aortic stenosis.      Left Ventricle  Global and regional left ventricular function is normal with an EF of 60-65%.  Left ventricular size is normal. Mild concentric wall thickening consistent with left ventricular hypertrophy is present. Diastolic function not assessed  due to severe mitral annular calcification.     Right Ventricle  The right ventricle is normal size. Global right ventricular function is normal.     Atria  Moderate left atrial enlargement is present. Mild right atrial enlargement is  present. The atrial septum is intact as assessed by color Doppler .     Mitral Valve  Moderate mitral annular calcification is present. Mild mitral insufficiency is  present. Mild mitral stenosis is present. The mean mitral valve gradient is 4.0 mmHg. The etiology of the mitral stenosis is mitral annular  calcification .        Aortic Valve  The mean gradient is 11 mmHg. The effective orifice area is 1.4 cm^2.     Tricuspid Valve  Right ventricular systolic pressure is 32mmHg above the right atrial pressure.  Mild tricuspid insufficiency is present.     Pulmonic Valve  Trace pulmonic insufficiency is present.     Vessels  The aorta root is normal. The inferior vena cava was normal in size with preserved respiratory variability. Estimated mean right atrial pressure is 3  mmHg.     Pericardium  No pericardial effusion is present.        Compared to Previous Study  This study was compared with the study from 2016 . The patient is now s/p AVR for severe aortic stenosis.     IVSd: 1.4 cm  LVIDd: 3.5 cm  LVIDs: 2.7 cm  LVPWd: 1.1 cm  FS: 21.3 %  EDV(Teich): 50.2 ml  ESV(Teich): 28.0 ml  LV mass(C)d: 144.4 grams  LV mass(C)dI: 96.7 grams/m2  MV Diam: 3.0 cm  Ao root diam: 2.6 cm  LA dimension: 3.8 cm  LA/Ao: 1.5  LVOT diam: 1.8 cm  LVOT area: 2.5 cm2  LA Volume (BP): 72.0 ml  LA Volume Index (BP): 48.3 ml/m2  MV E max damien: 161.0 cm/sec  MV A max damien: 122.0 cm/sec  MV E/A: 1.3  MV max P.2 mmHg  MV mean P.0 mmHg  MV V2 VTI: 57.9 cm  MVA(VTI): 1.3 cm2  MV Flow area(1diam): 6.8 cm2  MV dec time: 0.26 sec  Ao V2 max: 227.0 cm/sec  Ao max P.0 mmHg  Ao V2 mean: 158.5 cm/sec  Ao mean P.0 mmHg  Ao V2 VTI: 55.0 cm  JAVIER(I,D): 1.4 cm2  JAVIER(V,D): 1.4 cm2  LV V1 max P.3 mmHg  LV V1 max: 125.0 cm/sec  LV V1 VTI: 30.5 cm  SV(MV 1 diam): 395.7 ml  SI(MV 1 diam): 265.0 ml/m2  SV(LVOT): 77.6 ml  SI(LVOT): 52.0 ml/m2  TR max damien: 284.7 cm/sec  TR max P.4 mmHg  RF(MV,Ao)(1 diam): 0.26  JAVIER Index (cm2/m2): 0.95  Lateral E/e': 25.9  Medial E/e': 30.5    ASSESSMENT AND PLAN:   1. Severe AS. S/p AVR with a #21 tissue prosthesis. Excellent post-surgical results.  2.  Hypertension: not at target.  3. RA.  4. Plan:   - Increase lisinopril to 10 mg PO QD.  - Check BP at home weekly. Call the office with an update in 2-3 weeks.  - RTC in 12 months.     Boston Navarro MD, PhD            independent IMPROVE-DD Application Not Available

## 2022-02-28 DIAGNOSIS — G57.93 NEUROPATHY OF BOTH FEET: ICD-10-CM

## 2022-02-28 RX ORDER — GABAPENTIN 100 MG/1
CAPSULE ORAL
Qty: 90 CAPSULE | Refills: 3 | Status: SHIPPED | OUTPATIENT
Start: 2022-02-28 | End: 2022-07-21

## 2022-03-24 ENCOUNTER — TELEPHONE (OUTPATIENT)
Dept: RHEUMATOLOGY | Facility: CLINIC | Age: 87
End: 2022-03-24
Payer: MEDICARE

## 2022-03-24 DIAGNOSIS — M06.09 RHEUMATOID ARTHRITIS OF MULTIPLE SITES WITH NEGATIVE RHEUMATOID FACTOR (H): Primary | ICD-10-CM

## 2022-03-24 DIAGNOSIS — M35.3 POLYMYALGIA RHEUMATICA (H): ICD-10-CM

## 2022-03-24 NOTE — TELEPHONE ENCOUNTER
Daughter is calling and wants to make sure someone will call her Mom today.  She also wants to make sure the methotrexate gets refilled.

## 2022-03-24 NOTE — TELEPHONE ENCOUNTER
[facility] Call Center    Phone Message    May a detailed message be left on voicemail: yes     Reason for Call: Symptoms or Concerns     If patient has red-flag symptoms, warm transfer to triage line    Current symptom or concern: Flare up; joint pain all over    Symptoms have been present for:   1-2 week(s)    Has patient previously been seen for this? Yes    By : Dr. Johnson      Are there any new or worsening symptoms? Yes: worsening each day, today is the worst.    Pt is wondering if she could restart methotrexate of if there is something she can take to help?  Pt did take some tylenol and is helping a little bit.     Pt states she has not been able to get sulfasalazine from her pharmacy the last two times she went in to  medications.      Action Taken: Other: [facility]

## 2022-03-24 NOTE — TELEPHONE ENCOUNTER
Called patient who reports she is flaring all over with joint pain. It started about 2-3 months ago and has been getting progressively worse. The pain is the worst in her shoulders and left hip. The pain is worse in the morning when she gets out of bed. Moving around helps the pain. She denies stiffness, swelling, redness, or warmth. She needs a refill of her sulfasalazine and is wondering if she should restart methotrexate. She has not taken sulfasalazine since February because the drug store does not have a prescription. She had stopped taking it prior to her last visit. Toxicity monitoring labs were completed on 1/21/22. Please advise.    Ken SCHAEFFER RN....3/24/2022 1:24 PM

## 2022-03-24 NOTE — TELEPHONE ENCOUNTER
Refill request for methotrexate was received from [facility] in [place]. Last visit note from 1/31/22 states: - Remove from medication list because she is not taking: methotrexate 20 mg once weekly. Sent [facility] a fax stating patient is not taking this anymore.    Ken SCHAEFFER RN....3/24/2022 11:20 AM

## 2022-03-25 DIAGNOSIS — I12.9 BENIGN HYPERTENSION WITH CKD (CHRONIC KIDNEY DISEASE) STAGE III (H): ICD-10-CM

## 2022-03-25 DIAGNOSIS — N18.30 BENIGN HYPERTENSION WITH CKD (CHRONIC KIDNEY DISEASE) STAGE III (H): ICD-10-CM

## 2022-03-25 RX ORDER — LOSARTAN POTASSIUM 50 MG/1
TABLET ORAL
Qty: 135 TABLET | Refills: 3 | Status: SHIPPED | OUTPATIENT
Start: 2022-03-25 | End: 2023-05-16

## 2022-03-25 RX ORDER — PREDNISONE 5 MG/1
5 TABLET ORAL DAILY
Qty: 30 TABLET | Refills: 0 | Status: SHIPPED | OUTPATIENT
Start: 2022-03-25 | End: 2022-06-03

## 2022-03-25 RX ORDER — SULFASALAZINE 500 MG/1
500 TABLET ORAL 2 TIMES DAILY
Qty: 60 TABLET | Refills: 2 | Status: SHIPPED | OUTPATIENT
Start: 2022-03-25 | End: 2022-07-10

## 2022-03-25 NOTE — TELEPHONE ENCOUNTER
RN: Per discussion with the patient on 1/31/2022, she had stopped taking methotrexate on her own, and was taking sulfasalazine 500 mg twice daily at that time.  Sulfasalazine was prescribed for a 7-month supply in September 2021 so she should not be out of sulfasalazine.  It is not clear to me why she is not able to fill sulfasalazine.  Nevertheless, as she was doing well on sulfasalazine monotherapy when seen in January 2022, sulfasalazine has been refilled to ensure that her pharmacy has a prescription for this.  She may also start using prednisone 5 mg daily for the next 30 days to help with current arthritis symptoms.    Rheumatoid arthritis of multiple sites with negative rheumatoid factor (H)  - sulfaSALAzine (AZULFIDINE) 500 MG tablet; Take 1 tablet (500 mg) by mouth 2 times daily  Dispense: 60 tablet; Refill: 2  - predniSONE (DELTASONE) 5 MG tablet; Take 1 tablet (5 mg) by mouth daily  Dispense: 30 tablet; Refill: 0    Jamie Johnson MD  3/25/2022

## 2022-03-25 NOTE — TELEPHONE ENCOUNTER
Returned call to daughter and patient.  Daughter explained that patient was not taking her methotrexate because the pharmacy had none, unclear if the pharmacy did not have refill request or medication was out of stock.  Patient and daughter both endorse pain and did not want to discontinue methotrexate.  Patient would like to know if she can resume methotrexate or if there is a different medication she should be on.  Writer thanked daughter and patient for clarification message will be routed to provider to advise on.    Mercedes QUINTERO RN Specialty Triage 3/25/2022 3:10 PM

## 2022-03-25 NOTE — TELEPHONE ENCOUNTER
"Routing refill request to provider for review/approval because:  Labs out of range:  Cr      Requested Prescriptions   Pending Prescriptions Disp Refills     losartan (COZAAR) 50 MG tablet [Pharmacy Med Name: LOSARTAN 50MG TABLETS] 135 tablet 3     Sig: TAKE 1.5 TABLETS BY MOUTH DAILY       Angiotensin-II Receptors Failed - 3/25/2022  4:02 AM        Failed - Normal serum creatinine on file in past 12 months     Recent Labs   Lab Test 01/21/22  1311   CR 1.14*       Ok to refill medication if creatinine is low          Passed - Last blood pressure under 140/90 in past 12 months     BP Readings from Last 3 Encounters:   01/31/22 138/81   10/14/21 136/78   09/23/21 (!) 175/75                 Passed - Recent (12 mo) or future (30 days) visit within the authorizing provider's specialty     Patient has had an office visit with the authorizing provider or a provider within the authorizing providers department within the previous 12 mos or has a future within next 30 days. See \"Patient Info\" tab in inbasket, or \"Choose Columns\" in Meds & Orders section of the refill encounter.              Passed - Medication is active on med list        Passed - Patient is age 18 or older        Passed - No active pregnancy on record        Passed - Normal serum potassium on file in past 12 months     Recent Labs   Lab Test 10/14/21  0946   POTASSIUM 4.4                    Passed - No positive pregnancy test in past 12 months           Syeda ABRAMS RN, BSN  [facility]    "

## 2022-03-25 NOTE — TELEPHONE ENCOUNTER
Called patient and relayed Dr. Johnson's message below. Patient verbalized understanding and agreed with the plan. Also called patient's daughter Anna per patient's request. Anna did not answer so a message was left to return my call.    Ken SCHAEFFER RN....3/25/2022 8:11 AM

## 2022-03-25 NOTE — TELEPHONE ENCOUNTER
Reason for call:  Other   Patient called regarding (reason for call): call back  Additional comments: Please return call. Thank you    Phone number to reach patient:  Cell number on file:    Telephone Information:   Mobile [phone]       Best Time:      Can we leave a detailed message on this number?  YES    Travel screening: Not Applicable

## 2022-03-28 NOTE — TELEPHONE ENCOUNTER
Anna returned the call and was informed of Dr. Johnson's message below. Patient's daughter is concerned that prednisone 5 mg daily will not be sufficient to manage arthritic symptoms but she will have her mom try it. She will call us if symptoms persist or worsen.    Ken SCHAEFFER RN....3/28/2022 12:38 PM

## 2022-03-30 ENCOUNTER — VIRTUAL VISIT (OUTPATIENT)
Dept: RHEUMATOLOGY | Facility: CLINIC | Age: 87
End: 2022-03-30
Payer: MEDICARE

## 2022-03-30 DIAGNOSIS — M06.09 RHEUMATOID ARTHRITIS OF MULTIPLE SITES WITH NEGATIVE RHEUMATOID FACTOR (H): Primary | ICD-10-CM

## 2022-03-30 PROCEDURE — 99442 PR PHYSICIAN TELEPHONE EVALUATION 11-20 MIN: CPT | Mod: 95 | Performed by: INTERNAL MEDICINE

## 2022-03-30 RX ORDER — SULFASALAZINE 500 MG/1
500 TABLET ORAL 2 TIMES DAILY
Qty: 60 TABLET | Refills: 2 | Status: SHIPPED | OUTPATIENT
Start: 2022-03-30 | End: 2022-07-08

## 2022-03-30 NOTE — TELEPHONE ENCOUNTER
Dr Johnson had a phone visit with patient 3/30/2022.  Twila Atkins [facility]  3/30/2022

## 2022-03-30 NOTE — TELEPHONE ENCOUNTER
Rheumatology team: Please call to notify Roxanna that we need to discuss her medications, as the telephone call information does not line up with the reported history given by the patient at her last visit.  Need more information before adjusting medications.  I have scheduled her for a virtual visit at 8:40 AM on Wednesday, March 30, 2022; if she would like to accept this visit and please check her in for the visit.  If she does not want this visit then please cancel the appointment and notify me.    Jamie Johnson MD  3/29/2022 9:31 PM

## 2022-03-30 NOTE — PROGRESS NOTES
Roxanna Stark is a 89 year old year old who is being evaluated via a billable telephone visit.      What phone number would you like to be contacted at? [phone]   How would you like to obtain your AVS? Mail a copy    Rheumatology Telephone/Telehealth  Visit      Roxanna Stark MRN# [id number]   YOB: 1927 Age: 89 year old      Date of visit: 3/30/22   PCP: Dr. Swapna Gaines  Cardiology: Dr. Boston Navarro     Chief Complaint   Patient presents with:  Rheumatoid Arthritis    Assessment and Plan     1. 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  She was treated with prednisone monotherapy for several years, being able to taper off without recurrence of symptoms. She then developed worsening symptoms in her hands and was diagnosed with rheumatoid arthritis. Initially, she was resistant to taking DMARD therapy.  She then was started on MTX that was effective; she reduced the dose with worsening symptoms and then SSZ was added; at one point was doing well on MTX 20mg wkly and SSZ 500mg BID (mild anemia possibly associated with SSZ so the dose was previously reduced); however, when seen in January 2022 she reported that she had stopped taking methotrexate and was only using sulfasalazine.  Then in March it was reported that she stopped methotrexate 6 months ago and sulfasalazine was stopped 2 months ago.  Telephone visit today to clarify her treatment plan; discussed with both the patient and her daughter to get an accurate history.  She is currently off all treatment.  Most recently was on sulfasalazine monotherapy and doing well.  Therefore, restart sulfasalazine.  Avoiding methotrexate at this time because of reduced creatinine clearance.  Prednisone to help with current symptoms   - Prednisone 5 mg daily x30 days, then stop  - Restart sulfasalazine 500 mg twice daily    - Labs every  3 months: CBC, Creatinine, Hepatic Panel, ESR, CRP    High risk medication requiring intensive toxicity monitoring at least quarterly: labs ordered include CBC, Creatinine, Hepatic panel to monitor for cytopenia and hepatotoxicity; checking creatinine as it affects clearance of medication.      2. Giant Cell Arteritis History?: 12/26/2005 Left TA biopsy negative per [facility] record review.  No symptoms of GCA at this time.      3. Right shoulder rotator cuff tear and history of pain: Previously evaluated by orthopedic surgery and her pain resolved for approximately one year after having a steroid injection in March 2015. She does not want to have surgical correction of her shoulder. Repeat steroid injections have been helpful; not needed today.  Physical therapy was effective previously.  Not discussed today.     4. History of basal cell carcinoma: Following with dermatology; encouraged yearly dermatology evaluation     5. Bone Health: Managed by PCP already.     6.  Vaccinations:     - Influenza: encouraged yearly vaccination  - [id number]: up to date  - [id number]: up to date  - Shingrix: Up to date  - COVID-19: has received the Pfizer COVID-19 vaccine on 2/16/2021 and 3/9/2021 and 10/14/2021; 4th mRNA COVID19 vaccine is due now    Total minutes spent in evaluation with patient, documentation, , and review of pertinent studies and chart notes: 24     Ms. Stark verbalized agreement with and understanding of the rational for the diagnosis and treatment plan.  All questions were answered to best of my ability and the patient's satisfaction. Ms. Stark was advised to contact the clinic with any questions that may arise after the clinic visit.      Thank you for involving me in the care of the patient    Return to clinic: 3 months      HPI   Roxanna Stark is a 89 year old female with medical history significant for basal cell carcinoma, hypertension, aortic stenosis, right rotator cuff tear (previously  evaluated by Dr. Bingham, orthopedic surgery, on 3/20/2015 where at that time Ms. Stark was not interested in surgical correction; she received an intra-articular steroid injection at that time that was effective for ~1year), temporal arteritis?, and seronegative erosive rheumatoid arthritis.     1/31/2022: doing well at this time. No longer taking MTX and hasn't for some time.  Note that VESTA Mattson, called to check her pharmacy and MTX and SSZ were last filled in July 2021.  Roxanna says that she is happy with how well she is doing; no joint pain; morning stiffness <20 min. Arthritis is not limiting any of her daily activities.  No difficulty raising her arms above her head or standing up from a chair. No vision change. No jaw or tongue claudication. No scalp tenderness. No new headache.     Today, 3/30/2022: Telephone call regarding patient's medication raise question about what she was actually taking so a visit was scheduled for today to review.  Roxanna, and Roxanna's daughter Anna.  Reportedly methotrexate was stopped about 6 months ago.  Sulfasalazine was stopped a couple months ago.  Worsening joint pain at the MCPs, PIPs, wrists, MTPs, knees; pain is worse in the morning and improves with topical BenGay and moving.  Positive gelling phenomenon.  Morning stiffness for at least 1 hour.  No jaw or tongue claudication.  No scalp tenderness.  No new headache.  No vision change.    Denies fevers, chills, nausea, vomiting, constipation, diarrhea. No abdominal pain. No chest pain/pressure, palpitations, or shortness of breath. No oral or nasal sores. No neck pain. No rash.     Tobacco: None  EtOH: No more than 1 drink per week  Drugs: None  Occupation: Used to work for the telephone company; now retired    ROS   12 point review of system was completed and negative except as noted in the HPI     Active Problem List     Patient Active Problem List   Diagnosis     Polymyalgia rheumatica (H)     History of basal cell  carcinoma     CARDIOVASCULAR SCREENING; LDL GOAL LESS THAN 130     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)     Hip pain     Left atrial enlargement     Status post coronary angiogram     Aortic valve replaced     Rheumatoid arthritis of multiple sites with negative rheumatoid factor (H)     CKD (chronic kidney disease) stage 3, GFR 30-59 ml/min (H)     THERESA III (vulvar intraepithelial neoplasia III)     Ulcer of left foot, unspecified ulcer stage (H)     Peripheral arterial disease (H)     Decreased hearing of both ears     Past Medical History     Past Medical History:   Diagnosis Date     Actinic keratosis      Aortic stenosis 2014     Basal cell cancer 7/2014    left eye medial canthus      Basal cell carcinoma 9/30/08    left cheek     CKD (chronic kidney disease) stage 3, GFR 30-59 ml/min (H) 7/1/2019     HTN (hypertension)      Melanoma in situ (H) 9/30/08    left arm     Polymyalgia rheumatica (H) 11/99     Rheumatoid arthritis of multiple sites with negative rheumatoid factor (H)      Temporal arteritis (H) 11/99     Past Surgical History     Past Surgical History:   Procedure Laterality Date     CATARACT IOL, RT/LT  5/09    bilateral     COLONOSCOPY  2002     EXCISE LESION VULVA N/A 9/27/2019    Procedure: Wide Local Excision Of Vulva, Colposcopy;  Surgeon: Mono Ribeiro MD;  Location:  OR     REPLACE VALVE AORTIC N/A 4/25/2016    Procedure: REPLACE VALVE AORTIC;  Surgeon: Sudeep Tsai MD;  Location:  OR     [facility] SKIN TISSUE PROCEDURE UNLISTED  11/3/08    mmis skin cancer excision     Allergy     Allergies   Allergen Reactions     Lisinopril Cough        Current Medication List     Current Outpatient Medications   Medication Sig     sulfaSALAzine (AZULFIDINE) 500 MG tablet Take 1 tablet (500 mg) by mouth 2 times daily     acetaminophen (TYLENOL) 325 MG tablet Take 2 tablets (650 mg) by mouth every 6 hours as needed for mild pain     amoxicillin (AMOXIL) 500 MG  capsule TAKE 4 CAPSULES BY MOUTH 1 HOUR BEFORE DENTAL APPOINTMENT     aspirin  MG EC tablet Take 1 tablet (325 mg) by mouth daily (Patient not taking: Reported on 8/13/2021)     calcium-vitamin D 500-125 MG-UNIT TABS      furosemide (LASIX) 20 MG tablet TAKE 1 TABLET BY MOUTH DAILY IF 2 TO 3 POUND WEIGHT GAIN OVER A 2 DAY PERIOD     gabapentin (NEURONTIN) 100 MG capsule TAKE 1 CAPSULE(100 MG) BY MOUTH THREE TIMES DAILY     ICAPS PO 2 tablets daily     losartan (COZAAR) 50 MG tablet TAKE 1.5 TABLETS BY MOUTH DAILY     metoprolol tartrate (LOPRESSOR) 25 MG tablet Take 1 tablet (25 mg) by mouth 2 times daily     Misc. Devices (ROLLATOR ULTRA-LIGHT) MISC Walker with front wheels for home use, 99 months     Omega-3 Fatty Acids (OMEGA-3 FISH OIL PO) Take 1 tablet twice daily.     predniSONE (DELTASONE) 5 MG tablet Take 1 tablet (5 mg) by mouth daily     sulfaSALAzine (AZULFIDINE) 500 MG tablet Take 1 tablet (500 mg) by mouth 2 times daily     No current facility-administered medications for this visit.       Social History   See HPI    Family History     Family History   Problem Relation Age of Onset     Breast Cancer Sister 45     Arthritis Sister      Thyroid Disease Sister      Arthritis Sister      Colon Cancer Sister         colon     Arthritis Mother      Hypertension Father      Prostate Cancer Father      Arthritis Father      Heart Disease Father      Lipids Father      Colon Cancer Father      Arthritis Sister      Asthma Daughter      Asthma Daughter      Lung Cancer Daughter 58        lung     Pancreatic Cancer Other 81        pancreatic      Physical Exam     Temp Readings from Last 3 Encounters:   10/14/21 97.8  F (36.6  C) (Oral)   09/23/21 97.8  F (36.6  C) (Oral)   08/30/21 97.5  F (36.4  C) (Oral)     BP Readings from Last 5 Encounters:   01/31/22 138/81   10/14/21 136/78   09/23/21 (!) 175/75   08/30/21 (!) 151/52   08/13/21 (!) 140/60     Pulse Readings from Last 1 Encounters:   01/31/22 76      Resp Readings from Last 1 Encounters:   10/14/21 18     Estimated body mass index is 24.37 kg/m  as calculated from the following:    Height as of 1/31/22: 1.524 m (5').    Weight as of 1/31/22: 56.6 kg (124 lb 12.8 oz).    GEN: alert and no distress  PSYCH: Alert; coherent speech, normal rate and volume, able to articulate logical thoughts, able   to abstract reason, no tangential thoughts. Normal affect.   RESP: No cough, no audible wheezing, able to talk in full sentences  Remainder of exam unable to be completed due to telephone visits      Labs / Imaging (select studies)     RF/CCP  Recent Labs   Lab Test 08/11/16  1124 08/04/16  1222 02/18/16  1543   CCPIGG 1  --   --    RHF  --  <20 <20     CBC  Recent Labs   Lab Test 01/21/22  1311 08/30/21  1559 03/23/21  1526 01/22/21  0933 10/30/20  0903 07/24/20  1328   WBC 9.5 8.6 9.4 8.1 10.8 11.1*   RBC 4.29 3.75* 3.55* 3.93 4.04 3.33*   HGB 12.4 12.0 11.0* 11.9 11.8 10.4*   HCT 39.9 37.2 35.4 38.0 37.8 32.7*   MCV 93 99 100 97 94 98   RDW 16.2* 18.7* 17.7* 19.4* 18.0* 19.2*    451* 234 228 203 184   MCH 28.9 32.0 31.0 30.3 29.2 31.2   MCHC 31.1* 32.3 31.1* 31.3* 31.2* 31.8   NEUTROPHIL 69  --   --  54.0 51.7 62.2   LYMPH 18  --   --  27.0 29.5 22.5   MONOCYTE 10  --   --  15.0 14.7 12.0   EOSINOPHIL 2  --   --  3.3 3.5 2.5   BASOPHIL 0  --   --  0.7 0.6 0.8   ANEU  --   --   --  4.3 5.6 6.9   ALYM  --   --   --  2.2 3.2 2.5   IGNACIO  --   --   --  1.2 1.6* 1.3   AEOS  --   --   --  0.3 0.4 0.3   ABAS  --   --   --  0.1 0.1 0.1   ANEUTAUTO 6.5  --   --   --   --   --    ALYMPAUTO 1.7  --   --   --   --   --    AMONOAUTO 1.0  --   --   --   --   --    AEOSAUTO 0.2  --   --   --   --   --    ABSBASO 0.0  --   --   --   --   --      CMP  Recent Labs   Lab Test 01/21/22  1311 10/14/21  0946 08/30/21  1559 08/06/21  1305 03/23/21  1526 01/22/21  0933 10/30/20  0903 09/16/20  1008   NA  --   --  138  --  138  --   --  140   POTASSIUM  --  4.4 4.5  --  4.3  --   --   4.2   CHLORIDE  --   --  104  --  103  --   --  107   CO2  --   --  27  --  31  --   --  29   ANIONGAP  --   --  7  --  4  --   --  4   GLC  --   --  86  --  182*  --   --  102*   BUN  --   --  36*  --  27  --   --  28   CR 1.14*  --  1.32* 1.21* 1.40* 1.17* 1.38* 1.24*   GFRESTIMATED 44*  --  35* 38* 32* 40* 33* 37*   GFRESTBLACK  --   --   --   --  37* 46* 38* 43*   ROEL  --   --  9.8  --  9.3  --   --  9.2   BILITOTAL 0.3  --  0.4 0.3  --  0.2 0.4  --    ALBUMIN 3.5  --  3.6 3.7 3.5 3.6 3.3*  --    PROTTOTAL 7.9  --  7.9 7.6  --  7.7 7.6  --    ALKPHOS 97  --  56 100  --  118 108  --    AST 25  --  26 37  --  23 28  --    ALT 24  --  21 47  --  25 28  --      Calcium/VitaminD  Recent Labs   Lab Test 08/30/21  1559 03/23/21  1526 09/16/20  1008   ROEL 9.8 9.3 9.2     ESR/CRP  Recent Labs   Lab Test 01/21/22  1311 08/30/21  1559 08/06/21  1305 01/22/21  0933   SED 45* 64* 33* 34*   CRP 11.1*  --  4.2 3.4     Lipid Panel  Recent Labs   Lab Test 03/17/15  0923   CHOL 228*   TRIG 200*   HDL 72      VLDL 40*   CHOLHDLRATIO 3.2     Hepatitis B  Recent Labs   Lab Test 11/10/16  0909   HBCAB Nonreactive   HEPBANG Nonreactive     Hepatitis C  Recent Labs   Lab Test 11/10/16  0909   HCVAB Nonreactive   Assay performance characteristics have not been established for newborns,   infants, and children       HIV Screening  Recent Labs   Lab Test 11/10/16  0909   HIAGAB Nonreactive   HIV-1 p24 Ag & HIV-1/HIV-2 Ab Not Detected         Immunization History     Immunization History   Administered Date(s) Administered     COVID-19,PF,Pfizer (12+ Yrs) 02/16/2021, 03/09/2021, 10/14/2021     FLU 6-35 months 09/08/2010     FLUAD(HD)65+ QUAD 09/17/2021     Influenza (H1N1) 10/20/2016     Influenza (High Dose) 3 valent vaccine 10/16/2014, 10/06/2015, 10/23/2016, 10/03/2017, 08/23/2018, 09/18/2019     Influenza (IIV3) PF 11/05/1999, 12/14/2000, 10/26/2004, 10/04/2005, 09/16/2009, 10/20/2010, 11/09/2011, 09/22/2012, 09/15/2013,  10/15/2014     Influenza, Quad, High Dose, Pf, 65yr+ (Fluzone HD) 09/02/2020     Pneumo Conj 13-V (2010&after) 03/17/2015     Pneumococcal 23 valent 11/03/2000, 12/13/2010     TD (ADULT, 7+) 01/12/2004     TDAP Vaccine (Adacel) 05/14/2013     Td (Adult), Adsorbed 01/12/2004     Zoster vaccine recombinant adjuvanted (SHINGRIX) 06/21/2018, 08/23/2018     Zoster vaccine, live 12/15/2006          Chart documentation done in part with Dragon Voice recognition Software. Although reviewed after completion, some word and grammatical error may remain.    Phone call duration with patient (in minutes): 18    Location of patient: [place], [place]   Location of provider: [facility], [place]    Jamie Johnson MD

## 2022-04-04 NOTE — PATIENT INSTRUCTIONS
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  Treatment History:  -9/27/2019: Posterior simple vulvectomy (removal of pre-cancerous cells from the vulva).      PLAN:  1) VIN3: Surveillance as follows:  6 months (completed) then annually indefinitely with application of dilute acetic acid and digital anorectal exam at each visit.    Please call in the interim if you have any persistent vulvar itching, note new lesions, or have other concerning symptoms.        Aminta Garcia MD, MS, FACOG, FACS  4/5/2022  10:36 AM

## 2022-04-04 NOTE — PROGRESS NOTES
Follow-up Note on Referred Patient    Date: 4/5/2022       Chief Complaint: Vulvar histologic HSIL. Surveillance visit.       History of Present Illness:  Roxanna Stark is a 89 year old with history of vulvar histologic HSIL. History as follows:     7/3/2019: Biopsy of the right labia minora.  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   9/27/2019: Posterior simple vulvectomy.  -Pathology: THERESA 3 with positive margin from 3-6 o'clock.   -Complicated by wound separation.       Subjective:  Roxanna returns to the gyn onc clinic today for her scheduled surveillance visit. She is accompanied by her daughter. Roxanna reports no concerning symptoms. No vulvar lesions, no itching, no burning, no bleeding. No changes in health history.   She and her family are planning her 89th birthday party in May.       Past Medical History:  Past Medical History:   Diagnosis Date     Actinic keratosis      Aortic stenosis 2014     Basal cell cancer 7/2014    left eye medial canthus      Basal cell carcinoma 9/30/08    left cheek     CKD (chronic kidney disease) stage 3, GFR 30-59 ml/min (H) 7/1/2019     HTN (hypertension)      Melanoma in situ (H) 9/30/08    left arm     Polymyalgia rheumatica (H) 11/99     Rheumatoid arthritis of multiple sites with negative rheumatoid factor (H)      Temporal arteritis (H) 11/99       Past Surgical History:  Past Surgical History:   Procedure Laterality Date     CATARACT IOL, RT/LT  5/09    bilateral     COLONOSCOPY  2002     EXCISE LESION VULVA N/A 9/27/2019    Procedure: Wide Local Excision Of Vulva, Colposcopy;  Surgeon: Mono Ribeiro MD;  Location:  OR     REPLACE VALVE AORTIC N/A 4/25/2016    Procedure: REPLACE VALVE AORTIC;  Surgeon: Sudeep Tsai MD;  Location:  OR     [facility] SKIN TISSUE PROCEDURE UNLISTED  11/3/08    mmis skin cancer excision       Current Medications:   has a current medication list which includes the following prescription(s): acetaminophen, amoxicillin,  calcium-vitamin d, furosemide, gabapentin, multiple vitamins-minerals, losartan, metoprolol tartrate, rollator ultra-light, omega-3 fatty acids, prednisone, sulfasalazine, sulfasalazine, and aspirin.       Allergies:   Allergies   Allergen Reactions     Lisinopril Cough         Social History:   Social History     Tobacco Use     Smoking status: Never Smoker     Smokeless tobacco: Never Used   Substance Use Topics     Alcohol use: Yes     Comment: rarely       History   Drug Use No       Family History:     The patient's family history is notable for a first-degree paternal relative with colon and prostate cancer, a first-degree relative with colon cancer, and a first-degree relative with breast cancer.   Family History   Problem Relation Age of Onset     Breast Cancer Sister 45     Arthritis Sister      Thyroid Disease Sister      Arthritis Sister      Colon Cancer Sister         colon     Arthritis Mother      Hypertension Father      Prostate Cancer Father      Arthritis Father      Heart Disease Father      Lipids Father      Colon Cancer Father      Arthritis Sister      Asthma Daughter      Asthma Daughter      Lung Cancer Daughter 58        lung     Pancreatic Cancer Other 81        pancreatic        Physical Exam:   BP (!) 155/79 (BP Location: Right arm, Patient Position: Sitting, Cuff Size: Adult Small)   Pulse 79   Temp 97.8  F (36.6  C) (Oral)   Ht 1.524 m (5')   Wt 53.3 kg (117 lb 8 oz)   SpO2 99%   BMI 22.95 kg/m    Body mass index is 22.95 kg/m .    General Appearance: healthy and alert, no distress     Musculoskeletal: extremities non tender and without edema    Skin: no lesions or rashes     Neurological: normal gait, no gross defects     Psychiatric: appropriate mood and affect                               Genitourinary: External genitalia are atrophic but otherwise normal in appearance. No gross lesions. On digital anorectal exam, there are no masses nor nodularity.       Procedure Risk  Statement:  The patient was counseled regarding risks, benefits and alternatives to vulvoscopy, possible biopsies.  Risks discussed include but not limited to:  Bleeding; infection; injury to adjacent organs; inadequate sample or false negative result.  The patient's questions were answered, and informed consent signed.      Procedure:   After time-out procedure was performed, dilute acetic acid was applied to the vulva x1 minute. No acetowhite changes or other lesions. No biopsies indicated.       Performance Status:  ECOG Grade 0.       Assessment:  Roxanna Stark is a 89 year old woman with a history of VIN3, currently without evidence of disease.      A total of 20 minutes was spent with the patient, 10 minutes of which were spent in counseling the patient and/or treatment planning, 10 minutes of which were spent in the above procedure.       Plan:   1.)   VIN3:   Surveillance as follows (per ACOG guidelines): 6 months (completed) then annually indefinitely with vulvoscopy and digital anorectal exam at each visit.   She will call in the interim if she has any persistent vulvar itching, or notes new lesions.       Aminta Garcia MD, MS, FACOG, FACS  4/5/2022  10:35 AM              CC  Patient Care Team:  Jennifer Ascencio DO as PCP - General (Family Medicine)  Brandan Landin MD as MD (OB/Gyn)  Mono Ribeiro MD as MD (Gynecologic Oncology)  Swapna Gaines MD as Assigned PCP  Aminta Garcia MD as Assigned Cancer Care Provider  Jamie Johnson MD as Assigned Rheumatology Provider  SELF, REFERRED

## 2022-04-05 ENCOUNTER — ONCOLOGY VISIT (OUTPATIENT)
Dept: ONCOLOGY | Facility: CLINIC | Age: 87
End: 2022-04-05
Attending: OBSTETRICS & GYNECOLOGY
Payer: MEDICARE

## 2022-04-05 VITALS
BODY MASS INDEX: 23.07 KG/M2 | OXYGEN SATURATION: 99 % | HEART RATE: 79 BPM | HEIGHT: 60 IN | TEMPERATURE: 97.8 F | SYSTOLIC BLOOD PRESSURE: 155 MMHG | DIASTOLIC BLOOD PRESSURE: 79 MMHG | WEIGHT: 117.5 LBS

## 2022-04-05 DIAGNOSIS — D07.1 VIN III (VULVAR INTRAEPITHELIAL NEOPLASIA III): ICD-10-CM

## 2022-04-05 PROCEDURE — G0463 HOSPITAL OUTPT CLINIC VISIT: HCPCS

## 2022-04-05 PROCEDURE — 56820 COLPOSCOPY VULVA: CPT

## 2022-04-05 PROCEDURE — 99213 OFFICE O/P EST LOW 20 MIN: CPT | Performed by: OBSTETRICS & GYNECOLOGY

## 2022-04-05 PROCEDURE — G0463 HOSPITAL OUTPT CLINIC VISIT: HCPCS | Mod: 25

## 2022-04-05 ASSESSMENT — PAIN SCALES - GENERAL: PAINLEVEL: NO PAIN (0)

## 2022-04-05 NOTE — NURSING NOTE
Oncology Rooming Note    April 5, 2022 10:14 AM   Roxanna Stark is a 89 year old female who presents for:    Chief Complaint   Patient presents with     Oncology Clinic Visit     THERESA III      Initial Vitals: BP (!) 155/79 (BP Location: Right arm, Patient Position: Sitting, Cuff Size: Adult Small)   Pulse 79   Temp 97.8  F (36.6  C) (Oral)   Ht 1.524 m (5')   Wt 53.3 kg (117 lb 8 oz)   SpO2 99%   BMI 22.95 kg/m   Estimated body mass index is 22.95 kg/m  as calculated from the following:    Height as of this encounter: 1.524 m (5').    Weight as of this encounter: 53.3 kg (117 lb 8 oz). Body surface area is 1.5 meters squared.  No Pain (0) Comment: Data Unavailable   No LMP recorded. Patient is postmenopausal.  Allergies reviewed: Yes  Medications reviewed: Yes    Medications: Medication refills not needed today.  Pharmacy name entered into [employer] In[employer]:    [id number] [address] [facility]    Clinical concerns: None     David Trinidad

## 2022-04-05 NOTE — LETTER
4/5/2022         RE: Roxanna Stark  [address]  [place] [address]        Dear Colleague,    Thank you for referring your patient, Roxanna Stark, to the [facility]. Please see a copy of my visit note below.                            Follow-up Note on Referred Patient    Date: 4/5/2022       Chief Complaint: Vulvar histologic HSIL. Surveillance visit.       History of Present Illness:  Roxanna Stark is a 89 year old with history of vulvar histologic HSIL. History as follows:     7/3/2019: Biopsy of the right labia minora.  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   9/27/2019: Posterior simple vulvectomy.  -Pathology: THERESA 3 with positive margin from 3-6 o'clock.   -Complicated by wound separation.       Subjective:  Roxanna returns to the gyn onc clinic today for her scheduled surveillance visit. She is accompanied by her daughter. Roxanna reports no concerning symptoms. No vulvar lesions, no itching, no burning, no bleeding. No changes in health history.   She and her family are planning her 89th birthday party in May.       Past Medical History:  Past Medical History:   Diagnosis Date     Actinic keratosis      Aortic stenosis 2014     Basal cell cancer 7/2014    left eye medial canthus      Basal cell carcinoma 9/30/08    left cheek     CKD (chronic kidney disease) stage 3, GFR 30-59 ml/min (H) 7/1/2019     HTN (hypertension)      Melanoma in situ (H) 9/30/08    left arm     Polymyalgia rheumatica (H) 11/99     Rheumatoid arthritis of multiple sites with negative rheumatoid factor (H)      Temporal arteritis (H) 11/99       Past Surgical History:  Past Surgical History:   Procedure Laterality Date     CATARACT IOL, RT/LT  5/09    bilateral     COLONOSCOPY  2002     EXCISE LESION VULVA N/A 9/27/2019    Procedure: Wide Local Excision Of Vulva, Colposcopy;  Surgeon: Mono Ribeiro MD;  Location: [facility] OR     REPLACE VALVE AORTIC N/A 4/25/2016    Procedure: REPLACE VALVE AORTIC;  Surgeon: Eulalio  Sudeep Hunter MD;  Location:  OR     [facility] SKIN TISSUE PROCEDURE UNLISTED  11/3/08    mmis skin cancer excision       Current Medications:   has a current medication list which includes the following prescription(s): acetaminophen, amoxicillin, calcium-vitamin d, furosemide, gabapentin, multiple vitamins-minerals, losartan, metoprolol tartrate, rollator ultra-light, omega-3 fatty acids, prednisone, sulfasalazine, sulfasalazine, and aspirin.       Allergies:   Allergies   Allergen Reactions     Lisinopril Cough         Social History:   Social History     Tobacco Use     Smoking status: Never Smoker     Smokeless tobacco: Never Used   Substance Use Topics     Alcohol use: Yes     Comment: rarely       History   Drug Use No       Family History:     The patient's family history is notable for a first-degree paternal relative with colon and prostate cancer, a first-degree relative with colon cancer, and a first-degree relative with breast cancer.   Family History   Problem Relation Age of Onset     Breast Cancer Sister 45     Arthritis Sister      Thyroid Disease Sister      Arthritis Sister      Colon Cancer Sister         colon     Arthritis Mother      Hypertension Father      Prostate Cancer Father      Arthritis Father      Heart Disease Father      Lipids Father      Colon Cancer Father      Arthritis Sister      Asthma Daughter      Asthma Daughter      Lung Cancer Daughter 58        lung     Pancreatic Cancer Other 81        pancreatic        Physical Exam:   BP (!) 155/79 (BP Location: Right arm, Patient Position: Sitting, Cuff Size: Adult Small)   Pulse 79   Temp 97.8  F (36.6  C) (Oral)   Ht 1.524 m (5')   Wt 53.3 kg (117 lb 8 oz)   SpO2 99%   BMI 22.95 kg/m    Body mass index is 22.95 kg/m .    General Appearance: healthy and alert, no distress     Musculoskeletal: extremities non tender and without edema    Skin: no lesions or rashes     Neurological: normal gait, no gross  defects     Psychiatric: appropriate mood and affect                               Genitourinary: External genitalia are atrophic but otherwise normal in appearance. No gross lesions. On digital anorectal exam, there are no masses nor nodularity.       Procedure Risk Statement:  The patient was counseled regarding risks, benefits and alternatives to vulvoscopy, possible biopsies.  Risks discussed include but not limited to:  Bleeding; infection; injury to adjacent organs; inadequate sample or false negative result.  The patient's questions were answered, and informed consent signed.      Procedure:   After time-out procedure was performed, dilute acetic acid was applied to the vulva x1 minute. No acetowhite changes or other lesions. No biopsies indicated.       Performance Status:  ECOG Grade 0.       Assessment:  Roxanna Stark is a 89 year old woman with a history of VIN3, currently without evidence of disease.      A total of 20 minutes was spent with the patient, 10 minutes of which were spent in counseling the patient and/or treatment planning, 10 minutes of which were spent in the above procedure.       Plan:   1.)   VIN3:   Surveillance as follows (per ACOG guidelines): 6 months (completed) then annually indefinitely with vulvoscopy and digital anorectal exam at each visit.   She will call in the interim if she has any persistent vulvar itching, or notes new lesions.     Aminta Garcia MD, MS, FACOG, FACS  4/5/2022  10:35 AM    CC  Patient Care Team:  Jennifer Ascencio DO as PCP - General (Family Medicine)  Brandan Landin MD as MD (OB/Gyn)  Mono Ribeiro MD as MD (Gynecologic Oncology)  Swapna Gaines MD as Assigned PCP  Aminta Garcia MD as Assigned Cancer Care Provider  Jamie Johnson MD as Assigned Rheumatology Provider

## 2022-04-28 ENCOUNTER — LAB (OUTPATIENT)
Dept: LAB | Facility: CLINIC | Age: 87
End: 2022-04-28
Payer: MEDICARE

## 2022-04-28 DIAGNOSIS — Z79.899 HIGH RISK MEDICATION USE: ICD-10-CM

## 2022-04-28 DIAGNOSIS — M06.09 RHEUMATOID ARTHRITIS OF MULTIPLE SITES WITH NEGATIVE RHEUMATOID FACTOR (H): ICD-10-CM

## 2022-04-28 DIAGNOSIS — N18.30 CKD (CHRONIC KIDNEY DISEASE) STAGE 3, GFR 30-59 ML/MIN (H): Primary | ICD-10-CM

## 2022-04-28 LAB
ALBUMIN SERPL-MCNC: 3.3 G/DL (ref 3.4–5)
ALP SERPL-CCNC: 86 U/L (ref 40–150)
ALT SERPL W P-5'-P-CCNC: 22 U/L (ref 0–50)
AST SERPL W P-5'-P-CCNC: 17 U/L (ref 0–45)
BASOPHILS # BLD AUTO: 0 10E3/UL (ref 0–0.2)
BASOPHILS NFR BLD AUTO: 0 %
BILIRUB DIRECT SERPL-MCNC: <0.1 MG/DL (ref 0–0.2)
BILIRUB SERPL-MCNC: 0.3 MG/DL (ref 0.2–1.3)
CREAT SERPL-MCNC: 1.22 MG/DL (ref 0.52–1.04)
CRP SERPL-MCNC: 9.4 MG/L (ref 0–8)
EOSINOPHIL # BLD AUTO: 0.4 10E3/UL (ref 0–0.7)
EOSINOPHIL NFR BLD AUTO: 3 %
ERYTHROCYTE [DISTWIDTH] IN BLOOD BY AUTOMATED COUNT: 17.5 % (ref 10–15)
ERYTHROCYTE [SEDIMENTATION RATE] IN BLOOD BY WESTERGREN METHOD: 38 MM/HR (ref 0–30)
GFR SERPL CREATININE-BSD FRML MDRD: 41 ML/MIN/1.73M2
HCT VFR BLD AUTO: 36.6 % (ref 35–47)
HGB BLD-MCNC: 11.4 G/DL (ref 11.7–15.7)
LYMPHOCYTES # BLD AUTO: 2.6 10E3/UL (ref 0.8–5.3)
LYMPHOCYTES NFR BLD AUTO: 23 %
MCH RBC QN AUTO: 28.5 PG (ref 26.5–33)
MCHC RBC AUTO-ENTMCNC: 31.1 G/DL (ref 31.5–36.5)
MCV RBC AUTO: 92 FL (ref 78–100)
MONOCYTES # BLD AUTO: 1.1 10E3/UL (ref 0–1.3)
MONOCYTES NFR BLD AUTO: 10 %
NEUTROPHILS # BLD AUTO: 7.2 10E3/UL (ref 1.6–8.3)
NEUTROPHILS NFR BLD AUTO: 64 %
PLATELET # BLD AUTO: 179 10E3/UL (ref 150–450)
PROT SERPL-MCNC: 7.3 G/DL (ref 6.8–8.8)
RBC # BLD AUTO: 4 10E6/UL (ref 3.8–5.2)
WBC # BLD AUTO: 11.3 10E3/UL (ref 4–11)

## 2022-04-28 PROCEDURE — 85652 RBC SED RATE AUTOMATED: CPT

## 2022-04-28 PROCEDURE — 85025 COMPLETE CBC W/AUTO DIFF WBC: CPT

## 2022-04-28 PROCEDURE — 36415 COLL VENOUS BLD VENIPUNCTURE: CPT

## 2022-04-28 PROCEDURE — 82565 ASSAY OF CREATININE: CPT

## 2022-04-28 PROCEDURE — 86140 C-REACTIVE PROTEIN: CPT

## 2022-04-28 PROCEDURE — 80076 HEPATIC FUNCTION PANEL: CPT

## 2022-06-03 ENCOUNTER — TELEPHONE (OUTPATIENT)
Dept: RHEUMATOLOGY | Facility: CLINIC | Age: 87
End: 2022-06-03
Payer: MEDICARE

## 2022-06-03 DIAGNOSIS — M06.09 RHEUMATOID ARTHRITIS OF MULTIPLE SITES WITH NEGATIVE RHEUMATOID FACTOR (H): ICD-10-CM

## 2022-06-03 RX ORDER — PREDNISONE 5 MG/1
5 TABLET ORAL DAILY
Qty: 90 TABLET | Refills: 0 | Status: SHIPPED | OUTPATIENT
Start: 2022-06-03 | End: 2022-07-08

## 2022-06-03 NOTE — TELEPHONE ENCOUNTER
Rheumatology team: Please call to notify Ms. Stark and Anna that prednisone 5 mg daily has been refilled.  Jamie Johnson MD  6/3/2022 3:35 PM

## 2022-06-03 NOTE — TELEPHONE ENCOUNTER
"[facility]    Phone Message    May a detailed message be left on voicemail: yes     Reason for Call: Medication Refill Request    Has the patient contacted the pharmacy for the refill? Yes   Name of medication being requested: Prednisone Provider who prescribed the medication: Dr. Jamie Johnson  Ph[facility]acy: [facility]  Date medication is needed: ASAP     Call received from pt's daughter-Anna-she reports that several months ago, her mother's medication got all \"screwed up\" (mom seems to be having more and more memory issues), and she ended up going off her medications completely-which caused her symptoms to drastically flare up.   They spoke with Dr. Johnson about this (3/25/22) and he prescribed her prednisone 5 mg/day with the intention that she would take this until she was able to see him in-person at her 5/19/22 appt.   Daughter reports that there was some confusion with her mother around her appt time, and she ended up arriving at the clinic too late in the day to keep her appt on 5/19/22.   Pt now has another appt scheduled with Dr. Johnson on 7/8/22. Daughter plans to drive down here to make sure her mom makes it to this appt, but in the meantime, is wondering if Dr. Johnson could refill her prednisone until then? Says her mom's symptoms are still not under control and feels like the prednisone was really helping.     Please call and speak with daughter Anna--if you speak with pt herself, she may respond appropriately, but she does not remember conversations.     Action Taken: Message routed to:  Other:  RHEUMATOLOGY PATIENT CARE POOL    Travel Screening: Not Applicable  "

## 2022-06-13 DIAGNOSIS — I12.9 BENIGN HYPERTENSION WITH CKD (CHRONIC KIDNEY DISEASE) STAGE III (H): ICD-10-CM

## 2022-06-13 DIAGNOSIS — N18.30 BENIGN HYPERTENSION WITH CKD (CHRONIC KIDNEY DISEASE) STAGE III (H): ICD-10-CM

## 2022-06-13 NOTE — LETTER
June 14, 2022        Roxanna Stark  [address]  [place] [address]                Dear Roxanna,       Your provider has sent a 30 day andrew refill of furosemide (LASIX) 20 MG tablet. You are due for an appointment for further refills. Appointment options could include: an in person office visit, telephone visit or Evisit through [facility]. Please contact the clinic to schedule an appointment for further refills.     Sincerely,     Your Care Team

## 2022-06-14 RX ORDER — FUROSEMIDE 20 MG
TABLET ORAL
Qty: 30 TABLET | Refills: 0 | Status: SHIPPED | OUTPATIENT
Start: 2022-06-14 | End: 2022-07-21

## 2022-06-14 NOTE — TELEPHONE ENCOUNTER
Routing refill request to provider for review/approval because:  Drug not on the [facility] refill protocol   BP Readings from Last 3 Encounters:   04/05/22 (!) 155/79   01/31/22 138/81   10/14/21 136/78     Potassium   Date Value Ref Range Status   10/14/2021 4.4 3.4 - 5.3 mmol/L Final   03/23/2021 4.3 3.4 - 5.3 mmol/L Final     Creatinine   Date Value Ref Range Status   04/28/2022 1.22 (H) 0.52 - 1.04 mg/dL Final   03/23/2021 1.40 (H) 0.52 - 1.04 mg/dL Final

## 2022-07-08 ENCOUNTER — OFFICE VISIT (OUTPATIENT)
Dept: RHEUMATOLOGY | Facility: CLINIC | Age: 87
End: 2022-07-08
Payer: MEDICARE

## 2022-07-08 VITALS
HEART RATE: 68 BPM | OXYGEN SATURATION: 99 % | HEIGHT: 60 IN | SYSTOLIC BLOOD PRESSURE: 179 MMHG | DIASTOLIC BLOOD PRESSURE: 70 MMHG | WEIGHT: 117 LBS | BODY MASS INDEX: 22.97 KG/M2

## 2022-07-08 DIAGNOSIS — Z79.899 HIGH RISK MEDICATION USE: ICD-10-CM

## 2022-07-08 DIAGNOSIS — Z79.52 LONG TERM SYSTEMIC STEROID USER: ICD-10-CM

## 2022-07-08 DIAGNOSIS — M06.09 RHEUMATOID ARTHRITIS OF MULTIPLE SITES WITH NEGATIVE RHEUMATOID FACTOR (H): Primary | ICD-10-CM

## 2022-07-08 LAB
ALBUMIN SERPL-MCNC: 3.4 G/DL (ref 3.4–5)
ALP SERPL-CCNC: 76 U/L (ref 40–150)
ALT SERPL W P-5'-P-CCNC: 20 U/L (ref 0–50)
AST SERPL W P-5'-P-CCNC: 20 U/L (ref 0–45)
BASOPHILS # BLD AUTO: 0 10E3/UL (ref 0–0.2)
BASOPHILS NFR BLD AUTO: 0 %
BILIRUB DIRECT SERPL-MCNC: <0.1 MG/DL (ref 0–0.2)
BILIRUB SERPL-MCNC: 0.3 MG/DL (ref 0.2–1.3)
CREAT SERPL-MCNC: 1.36 MG/DL (ref 0.52–1.04)
CRP SERPL-MCNC: 6.6 MG/L (ref 0–8)
EOSINOPHIL # BLD AUTO: 0.2 10E3/UL (ref 0–0.7)
EOSINOPHIL NFR BLD AUTO: 2 %
ERYTHROCYTE [DISTWIDTH] IN BLOOD BY AUTOMATED COUNT: 18.5 % (ref 10–15)
ERYTHROCYTE [SEDIMENTATION RATE] IN BLOOD BY WESTERGREN METHOD: 26 MM/HR (ref 0–30)
GFR SERPL CREATININE-BSD FRML MDRD: 36 ML/MIN/1.73M2
GLUCOSE BLD-MCNC: 110 MG/DL (ref 70–99)
HCT VFR BLD AUTO: 38 % (ref 35–47)
HGB BLD-MCNC: 11.7 G/DL (ref 11.7–15.7)
LYMPHOCYTES # BLD AUTO: 1.4 10E3/UL (ref 0.8–5.3)
LYMPHOCYTES NFR BLD AUTO: 14 %
MCH RBC QN AUTO: 28.3 PG (ref 26.5–33)
MCHC RBC AUTO-ENTMCNC: 30.8 G/DL (ref 31.5–36.5)
MCV RBC AUTO: 92 FL (ref 78–100)
MONOCYTES # BLD AUTO: 0.8 10E3/UL (ref 0–1.3)
MONOCYTES NFR BLD AUTO: 8 %
NEUTROPHILS # BLD AUTO: 7.8 10E3/UL (ref 1.6–8.3)
NEUTROPHILS NFR BLD AUTO: 76 %
PLATELET # BLD AUTO: 182 10E3/UL (ref 150–450)
PROT SERPL-MCNC: 7 G/DL (ref 6.8–8.8)
RBC # BLD AUTO: 4.13 10E6/UL (ref 3.8–5.2)
WBC # BLD AUTO: 10.2 10E3/UL (ref 4–11)

## 2022-07-08 PROCEDURE — 82947 ASSAY GLUCOSE BLOOD QUANT: CPT | Performed by: INTERNAL MEDICINE

## 2022-07-08 PROCEDURE — 36415 COLL VENOUS BLD VENIPUNCTURE: CPT | Performed by: INTERNAL MEDICINE

## 2022-07-08 PROCEDURE — 85025 COMPLETE CBC W/AUTO DIFF WBC: CPT | Performed by: INTERNAL MEDICINE

## 2022-07-08 PROCEDURE — 85652 RBC SED RATE AUTOMATED: CPT | Performed by: INTERNAL MEDICINE

## 2022-07-08 PROCEDURE — 86140 C-REACTIVE PROTEIN: CPT | Performed by: INTERNAL MEDICINE

## 2022-07-08 PROCEDURE — 82565 ASSAY OF CREATININE: CPT | Performed by: INTERNAL MEDICINE

## 2022-07-08 PROCEDURE — 99214 OFFICE O/P EST MOD 30 MIN: CPT | Performed by: INTERNAL MEDICINE

## 2022-07-08 PROCEDURE — 80076 HEPATIC FUNCTION PANEL: CPT | Performed by: INTERNAL MEDICINE

## 2022-07-08 RX ORDER — SULFASALAZINE 500 MG/1
500 TABLET ORAL 2 TIMES DAILY
Qty: 60 TABLET | Refills: 0 | Status: SHIPPED | OUTPATIENT
Start: 2022-07-08 | End: 2022-07-10

## 2022-07-08 RX ORDER — PREDNISONE 1 MG/1
4 TABLET ORAL DAILY
Qty: 120 TABLET | Refills: 3 | Status: SHIPPED | OUTPATIENT
Start: 2022-07-08 | End: 2022-08-15

## 2022-07-08 NOTE — PROGRESS NOTES
Rheumatology Clinic  Visit      Roxanna Stark MRN# [id number]   YOB: 1927 Age: 89 year old      Date of visit: 7/08/22   PCP: Dr. Swapna Gaines  Cardiology: Dr. Boston Navarro     Chief Complaint   Patient presents with:  RECHECK: RA    Assessment and Plan     1. 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  She was treated with prednisone monotherapy for several years, being able to taper off without recurrence of symptoms. She then developed worsening symptoms in her hands and was diagnosed with rheumatoid arthritis. Initially, she was resistant to taking DMARD therapy.  She then was started on MTX that was effective; she reduced the dose with worsening symptoms and then SSZ was added; at one point was doing well on MTX 20mg wkly and SSZ 500mg BID (mild anemia possibly associated with SSZ so the dose was previously reduced); however, when seen in January 2022 she reported that she had stopped taking methotrexate and was only using sulfasalazine.  Then in March it was reported that she stopped methotrexate 6 months ago and sulfasalazine was stopped 2 months ago.  Telephone visit on 6/30/2022 today to clarify her treatment plan and discussed with both the patient and her daughter to get an accurate history; the patient at that time was off all treatment.  Based on the history provided she was on sulfasalazine monotherapy for a while and doing well.  Then there was a prednisone refill request later because it helped with her symptoms so that was given.  Currently on sulfasalazine 500 mg twice daily and prednisone 5 mg daily.  Start slow prednisone dose reduction.  Chronic illness  - Reduce prednisone from 5 mg daily, to 4 mg daily  - Continue sulfasalazine 500 mg twice daily    - Labs today and in 3 months: CBC, Creatinine, Hepatic Panel, ESR, CRP, glucose              Rapid 3,  cumulative scores                      7/8/2022:  0 (SSZ 500mg BID, prednisone 5mg daily)    High risk medication requiring intensive toxicity monitoring at least quarterly: labs ordered include CBC, Creatinine, Hepatic panel to monitor for cytopenia and hepatotoxicity; checking creatinine as it affects clearance of medication.      2. Giant Cell Arteritis History?: 12/26/2005 Left TA biopsy negative per [facility] record review.  No symptoms of GCA at this time.      3. Right shoulder rotator cuff tear and history of pain: Previously evaluated by orthopedic surgery and her pain resolved for approximately one year after having a steroid injection in March 2015. She does not want to have surgical correction of her shoulder. Repeat steroid injections have been helpful; not needed today.  Physical therapy was effective previously.  Not an issue today.     4. History of basal cell carcinoma: Following with dermatology; encouraged yearly dermatology evaluation     5. Bone Health: Managed by PCP already.     6.  Vaccinations:     - Influenza: encouraged yearly vaccination  - [id number]: up to date  - [id number]: up to date  - Shingrix: Up to date  - COVID-19: has received the Pfizer COVID-19 vaccine on 2/16/2021 and 3/9/2021 and 10/14/2021; she is not sure if she received a fourth mRNA COVID-19 vaccination yet.  We reviewed when to get the fourth and fifth COVID-19 vaccines.  Hold sulfasalazine for 1-2 weeks after each COVID-19 vaccination.    Total minutes spent in evaluation with patient, documentation, , and review of pertinent studies and chart notes: 18     Ms. Stark verbalized agreement with and understanding of the rational for the diagnosis and treatment plan.  All questions were answered to best of my ability and the patient's satisfaction. Ms. Stark was advised to contact the clinic with any questions that may arise after the clinic visit.      Thank you for involving me in the care of the  patient    Return to clinic: 3 months      HPI   Roxanna Stark is a 89 year old female with medical history significant for basal cell carcinoma, hypertension, aortic stenosis, right rotator cuff tear (previously evaluated by Dr. Bingham, orthopedic surgery, on 3/20/2015 where at that time Ms. Stark was not interested in surgical correction; she received an intra-articular steroid injection at that time that was effective for ~1year), temporal arteritis?, and seronegative erosive rheumatoid arthritis.     1/31/2022: doing well at this time. No longer taking MTX and hasn't for some time.  Note that VESTA Mattson, called to check her pharmacy and MTX and SSZ were last filled in July 2021.  Roxanna says that she is happy with how well she is doing; no joint pain; morning stiffness <20 min. Arthritis is not limiting any of her daily activities.  No difficulty raising her arms above her head or standing up from a chair. No vision change. No jaw or tongue claudication. No scalp tenderness. No new headache.     3/30/2022: Telephone call regarding patient's medication raise question about what she was actually taking so a visit was scheduled for today to review.  Roxanna, and Roxanna's daughter Anna.  Reportedly methotrexate was stopped about 6 months ago.  Sulfasalazine was stopped a couple months ago.  Worsening joint pain at the MCPs, PIPs, wrists, MTPs, knees; pain is worse in the morning and improves with topical BenGay and moving.  Positive gelling phenomenon.  Morning stiffness for at least 1 hour.  No jaw or tongue claudication.  No scalp tenderness.  No new headache.  No vision change.    Today, 7/8/2022: Currently doing well.  No joint pain or swelling.  No morning stiffness.  Positive gelling phenomenon that resolves within 5 minutes and only occurs after sitting for a very long time.  Confirms that she is taking prednisone 5 mg daily and sulfasalazine 500 mg twice daily.  She also has questions about Lasix and says that  she will talk with her primary care provider regarding this.  She is accompanied by her daughter today.    Denies fevers, chills, nausea, vomiting, constipation, diarrhea. No abdominal pain. No chest pain/pressure, palpitations, or shortness of breath. No oral or nasal sores. No neck pain. No rash.     Tobacco: None  EtOH: No more than 1 drink per week  Drugs: None  Occupation: Used to work for the [employer] company; now retired    ROS   12 point review of system was completed and negative except as noted in the HPI     Active Problem List     Patient Active Problem List   Diagnosis     Polymyalgia rheumatica (H)     History of basal cell carcinoma     CARDIOVASCULAR SCREENING; LDL GOAL LESS THAN 130     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)     Hip pain     Left atrial enlargement     Status post coronary angiogram     Aortic valve replaced     Rheumatoid arthritis of multiple sites with negative rheumatoid factor (H)     CKD (chronic kidney disease) stage 3, GFR 30-59 ml/min (H)     THERESA III (vulvar intraepithelial neoplasia III)     Ulcer of left foot, unspecified ulcer stage (H)     Peripheral arterial disease (H)     Decreased hearing of both ears     Past Medical History     Past Medical History:   Diagnosis Date     Actinic keratosis      Aortic stenosis 2014     Basal cell cancer 7/2014    left eye medial canthus      Basal cell carcinoma 9/30/08    left cheek     CKD (chronic kidney disease) stage 3, GFR 30-59 ml/min (H) 7/1/2019     HTN (hypertension)      Melanoma in situ (H) 9/30/08    left arm     Polymyalgia rheumatica (H) 11/99     Rheumatoid arthritis of multiple sites with negative rheumatoid factor (H)      Temporal arteritis (H) 11/99     Past Surgical History     Past Surgical History:   Procedure Laterality Date     CATARACT IOL, RT/LT  5/09    bilateral     COLONOSCOPY  2002     EXCISE LESION VULVA N/A 9/27/2019    Procedure: Wide Local Excision Of Vulva,  Colposcopy;  Surgeon: Mono Ribeiro MD;  Location:  OR     REPLACE VALVE AORTIC N/A 4/25/2016    Procedure: REPLACE VALVE AORTIC;  Surgeon: Sudeep Tsai MD;  Location:  OR     [facility] SKIN TISSUE PROCEDURE UNLISTED  11/3/08    mmis skin cancer excision     Allergy     Allergies   Allergen Reactions     Lisinopril Cough        Current Medication List     Current Outpatient Medications   Medication Sig     acetaminophen (TYLENOL) 325 MG tablet Take 2 tablets (650 mg) by mouth every 6 hours as needed for mild pain     amoxicillin (AMOXIL) 500 MG capsule TAKE 4 CAPSULES BY MOUTH 1 HOUR BEFORE DENTAL APPOINTMENT     calcium-vitamin D 500-125 MG-UNIT TABS      furosemide (LASIX) 20 MG tablet TAKE TABLET BY MOUTH DAILY IF 2 TO 3 POUND WEIGHT GAIN OVER A 2 DAY PERIOD     gabapentin (NEURONTIN) 100 MG capsule TAKE 1 CAPSULE(100 MG) BY MOUTH THREE TIMES DAILY     ICAPS PO 2 tablets daily     losartan (COZAAR) 50 MG tablet TAKE 1.5 TABLETS BY MOUTH DAILY     metoprolol tartrate (LOPRESSOR) 25 MG tablet Take 1 tablet (25 mg) by mouth 2 times daily     Misc. Devices (ROLLATOR ULTRA-LIGHT) MISC Walker with front wheels for home use, 99 months     Omega-3 Fatty Acids (OMEGA-3 FISH OIL PO) Take 1 tablet twice daily.     predniSONE (DELTASONE) 5 MG tablet Take 1 tablet (5 mg) by mouth daily     sulfaSALAzine (AZULFIDINE) 500 MG tablet Take 1 tablet (500 mg) by mouth 2 times daily     sulfaSALAzine (AZULFIDINE) 500 MG tablet Take 1 tablet (500 mg) by mouth 2 times daily     aspirin  MG EC tablet Take 1 tablet (325 mg) by mouth daily (Patient not taking: No sig reported)     No current facility-administered medications for this visit.       Social History   See HPI    Family History     Family History   Problem Relation Age of Onset     Breast Cancer Sister 45     Arthritis Sister      Thyroid Disease Sister      Arthritis Sister      Colon Cancer Sister         colon     Arthritis Mother      Hypertension Father       Prostate Cancer Father      Arthritis Father      Heart Disease Father      Lipids Father      Colon Cancer Father      Arthritis Sister      Asthma Daughter      Asthma Daughter      Lung Cancer Daughter 58        lung     Pancreatic Cancer Other 81        pancreatic      Physical Exam     Temp Readings from Last 3 Encounters:   04/05/22 97.8  F (36.6  C) (Oral)   10/14/21 97.8  F (36.6  C) (Oral)   09/23/21 97.8  F (36.6  C) (Oral)     BP Readings from Last 5 Encounters:   04/05/22 (!) 155/79   01/31/22 138/81   10/14/21 136/78   09/23/21 (!) 175/75   08/30/21 (!) 151/52     Pulse Readings from Last 1 Encounters:   04/05/22 79     Resp Readings from Last 1 Encounters:   10/14/21 18     Estimated body mass index is 22.95 kg/m  as calculated from the following:    Height as of 4/5/22: 1.524 m (5').    Weight as of 4/5/22: 53.3 kg (117 lb 8 oz).      GEN: NAD.  HEENT:  Anicteric, noninjected sclera. No obvious external lesions of the ear and nose. Hearing intact.  CV: S1, S2. RRR. No m/r/g  PULM: No increased work of breathing. CTA bilaterally   MSK: MCPs, PIPs, DIPs without swelling or tenderness to palpation.  Heberden's and Gracie's nodes present.  Wrists without swelling or tenderness to palpation.  Elbows and shoulders without swelling or tenderness to palpation.    Knees, ankles, and MTPs without swelling or tenderness to palpation.    SKIN: No rash or jaundice seen  PSYCH: Alert. Appropriate.        Labs / Imaging (select studies)   RF/CCP  Recent Labs   Lab Test 08/11/16  1124 08/04/16  1222 02/18/16  1543   CCPIGG 1  --   --    RHF  --  <20 <20     CBC  Recent Labs   Lab Test 04/28/22  0907 01/21/22  1311 08/30/21  1559 03/23/21  1526 01/22/21  0933 10/30/20  0903 07/24/20  1328   WBC 11.3* 9.5 8.6   < > 8.1 10.8 11.1*   RBC 4.00 4.29 3.75*   < > 3.93 4.04 3.33*   HGB 11.4* 12.4 12.0   < > 11.9 11.8 10.4*   HCT 36.6 39.9 37.2   < > 38.0 37.8 32.7*   MCV 92 93 99   < > 97 94 98   RDW 17.5* 16.2* 18.7*    < > 19.4* 18.0* 19.2*    188 451*   < > 228 203 184   MCH 28.5 28.9 32.0   < > 30.3 29.2 31.2   MCHC 31.1* 31.1* 32.3   < > 31.3* 31.2* 31.8   NEUTROPHIL 64 69  --   --  54.0 51.7 62.2   LYMPH 23 18  --   --  27.0 29.5 22.5   MONOCYTE 10 10  --   --  15.0 14.7 12.0   EOSINOPHIL 3 2  --   --  3.3 3.5 2.5   BASOPHIL 0 0  --   --  0.7 0.6 0.8   ANEU  --   --   --   --  4.3 5.6 6.9   ALYM  --   --   --   --  2.2 3.2 2.5   IGNACIO  --   --   --   --  1.2 1.6* 1.3   AEOS  --   --   --   --  0.3 0.4 0.3   ABAS  --   --   --   --  0.1 0.1 0.1   ANEUTAUTO 7.2 6.5  --   --   --   --   --    ALYMPAUTO 2.6 1.7  --   --   --   --   --    AMONOAUTO 1.1 1.0  --   --   --   --   --    AEOSAUTO 0.4 0.2  --   --   --   --   --    ABSBASO 0.0 0.0  --   --   --   --   --     < > = values in this interval not displayed.     CMP  Recent Labs   Lab Test 04/28/22  0907 01/21/22  1311 10/14/21  0946 08/30/21  1559 08/06/21  1305 03/23/21  1526 01/22/21  0933 10/30/20  0903 09/16/20  1008   NA  --   --   --  138  --  138  --   --  140   POTASSIUM  --   --  4.4 4.5  --  4.3  --   --  4.2   CHLORIDE  --   --   --  104  --  103  --   --  107   CO2  --   --   --  27  --  31  --   --  29   ANIONGAP  --   --   --  7  --  4  --   --  4   GLC  --   --   --  86  --  182*  --   --  102*   BUN  --   --   --  36*  --  27  --   --  28   CR 1.22* 1.14*  --  1.32*   < > 1.40* 1.17* 1.38* 1.24*   GFRESTIMATED 41* 44*  --  35*   < > 32* 40* 33* 37*   GFRESTBLACK  --   --   --   --   --  37* 46* 38* 43*   ROEL  --   --   --  9.8  --  9.3  --   --  9.2   BILITOTAL 0.3 0.3  --  0.4   < >  --  0.2 0.4  --    ALBUMIN 3.3* 3.5  --  3.6   < > 3.5 3.6 3.3*  --    PROTTOTAL 7.3 7.9  --  7.9   < >  --  7.7 7.6  --    ALKPHOS 86 97  --  56   < >  --  118 108  --    AST 17 25  --  26   < >  --  23 28  --    ALT 22 24  --  21   < >  --  25 28  --     < > = values in this interval not displayed.     Calcium/VitaminD  Recent Labs   Lab Test 08/30/21  1559 03/23/21  1528  09/16/20  1008   ROEL 9.8 9.3 9.2     ESR/CRP  Recent Labs   Lab Test 04/28/22  0907 01/21/22  1311 08/30/21  1559 08/06/21  1305   SED 38* 45* 64* 33*   CRP 9.4* 11.1*  --  4.2     Lipid Panel  Recent Labs   Lab Test 03/17/15  0923   CHOL 228*   TRIG 200*   HDL 72      VLDL 40*   CHOLHDLRATIO 3.2     Hepatitis B  Recent Labs   Lab Test 11/10/16  0909   HBCAB Nonreactive   HEPBANG Nonreactive     Hepatitis C  Recent Labs   Lab Test 11/10/16  0909   HCVAB Nonreactive   Assay performance characteristics have not been established for newborns,   infants, and children       HIV Screening  Recent Labs   Lab Test 11/10/16  0909   HIAGAB Nonreactive   HIV-1 p24 Ag & HIV-1/HIV-2 Ab Not Detected         Immunization History     Immunization History   Administered Date(s) Administered     COVID-19,PF,Pfizer (12+ Yrs) 02/16/2021, 03/09/2021, 10/14/2021     FLU 6-35 months 09/08/2010     FLUAD(HD)65+ QUAD 09/17/2021     Influenza (H1N1) 10/20/2016     Influenza (High Dose) 3 valent vaccine 10/16/2014, 10/06/2015, 10/23/2016, 10/03/2017, 08/23/2018, 09/18/2019     Influenza (IIV3) PF 11/05/1999, 12/14/2000, 10/26/2004, 10/04/2005, 09/16/2009, 10/20/2010, 11/09/2011, 09/22/2012, 09/15/2013, 10/15/2014     Influenza, Quad, High Dose, Pf, 65yr+ (Fluzone HD) 09/02/2020     Pneumo Conj 13-V (2010&after) 03/17/2015     Pneumococcal 23 valent 11/03/2000, 12/13/2010     TD (ADULT, 7+) 01/12/2004     TDAP Vaccine (Adacel) 05/14/2013     Td (Adult), Adsorbed 01/12/2004     Zoster vaccine recombinant adjuvanted (SHINGRIX) 06/21/2018, 08/23/2018     Zoster vaccine, live 12/15/2006          Chart documentation done in part with Dragon Voice recognition Software. Although reviewed after completion, some word and grammatical error may remain.        Jamie Johnson MD

## 2022-07-08 NOTE — PATIENT INSTRUCTIONS
Continue sulfasalazine 500 mg twice daily for now    Reduce prednisone to 4 mg (four 1 mg tablets) daily    Labs today and in 3 months

## 2022-07-08 NOTE — NURSING NOTE
Chief Complaint   Patient presents with     RECHECK     RA       Initial BP (!) 189/68 (BP Location: Right arm, Patient Position: Sitting, Cuff Size: Adult Regular)   Pulse 68   Ht 1.524 m (5')   Wt 53.1 kg (117 lb)   SpO2 99%   BMI 22.85 kg/m   Estimated body mass index is 22.85 kg/m  as calculated from the following:    Height as of this encounter: 1.524 m (5').    Weight as of this encounter: 53.1 kg (117 lb).  BP completed using cuff size: regular  Medications and allergies reviewed.      Syeda CAMPBELL MA

## 2022-07-10 DIAGNOSIS — M06.09 RHEUMATOID ARTHRITIS OF MULTIPLE SITES WITH NEGATIVE RHEUMATOID FACTOR (H): ICD-10-CM

## 2022-07-10 RX ORDER — SULFASALAZINE 500 MG/1
500 TABLET ORAL 2 TIMES DAILY
Qty: 60 TABLET | Refills: 2 | Status: SHIPPED | OUTPATIENT
Start: 2022-07-10 | End: 2022-10-14

## 2022-07-15 ENCOUNTER — TELEPHONE (OUTPATIENT)
Dept: RHEUMATOLOGY | Facility: CLINIC | Age: 87
End: 2022-07-15

## 2022-07-15 NOTE — TELEPHONE ENCOUNTER
Called and discussed results with patient.  Patient verbalized understanding and has no questions.    Ken Soriano RN....7/15/2022 2:33 PM

## 2022-07-15 NOTE — TELEPHONE ENCOUNTER
"RN: Roxanna Stark did not read the result note sent by "[employer] message based on notification from Epic stating that it was not read.  Therefore, please call Roxanna to provide the information in the message.     \" Roxanna,     The creatinine, a measure of kidney function, remains elevated similar to previous labs.  The glucose is elevated at 110 and can be elevated due to prednisone use.  Inflammatory markers are normal.  Other labs are stable.  Continue with the treatment plan discussed during your rheumatology visit.     Sincerely,  Jamie Johnson MD \"    "

## 2022-07-19 DIAGNOSIS — N18.30 BENIGN HYPERTENSION WITH CKD (CHRONIC KIDNEY DISEASE) STAGE III (H): ICD-10-CM

## 2022-07-19 DIAGNOSIS — I12.9 BENIGN HYPERTENSION WITH CKD (CHRONIC KIDNEY DISEASE) STAGE III (H): ICD-10-CM

## 2022-07-19 DIAGNOSIS — G57.93 NEUROPATHY OF BOTH FEET: ICD-10-CM

## 2022-07-21 RX ORDER — FUROSEMIDE 20 MG
TABLET ORAL
Qty: 30 TABLET | Refills: 0 | Status: SHIPPED | OUTPATIENT
Start: 2022-07-21 | End: 2022-08-26

## 2022-07-21 RX ORDER — GABAPENTIN 100 MG/1
CAPSULE ORAL
Qty: 90 CAPSULE | Refills: 3 | Status: SHIPPED | OUTPATIENT
Start: 2022-07-21 | End: 2022-11-23

## 2022-07-21 NOTE — TELEPHONE ENCOUNTER
Routing refill request to provider for review/approval because:  Drug not on the [facility] refill protocol   BP Readings from Last 3 Encounters:   07/08/22 (!) 179/70   04/05/22 (!) 155/79   01/31/22 138/81

## 2022-08-15 ENCOUNTER — OFFICE VISIT (OUTPATIENT)
Dept: FAMILY MEDICINE | Facility: CLINIC | Age: 87
End: 2022-08-15
Payer: MEDICARE

## 2022-08-15 VITALS
SYSTOLIC BLOOD PRESSURE: 130 MMHG | TEMPERATURE: 97.8 F | DIASTOLIC BLOOD PRESSURE: 80 MMHG | WEIGHT: 125.2 LBS | HEART RATE: 68 BPM | BODY MASS INDEX: 24.45 KG/M2 | OXYGEN SATURATION: 97 %

## 2022-08-15 DIAGNOSIS — N18.30 BENIGN HYPERTENSION WITH CKD (CHRONIC KIDNEY DISEASE) STAGE III (H): ICD-10-CM

## 2022-08-15 DIAGNOSIS — N18.31 STAGE 3A CHRONIC KIDNEY DISEASE (H): Primary | ICD-10-CM

## 2022-08-15 DIAGNOSIS — I73.9 PERIPHERAL ARTERIAL DISEASE (H): ICD-10-CM

## 2022-08-15 DIAGNOSIS — Z23 HIGH PRIORITY FOR 2019-NCOV VACCINE: ICD-10-CM

## 2022-08-15 DIAGNOSIS — I12.9 BENIGN HYPERTENSION WITH CKD (CHRONIC KIDNEY DISEASE) STAGE III (H): ICD-10-CM

## 2022-08-15 PROBLEM — L97.529 ULCER OF LEFT FOOT, UNSPECIFIED ULCER STAGE (H): Status: RESOLVED | Noted: 2021-01-12 | Resolved: 2022-08-15

## 2022-08-15 LAB
ANION GAP SERPL CALCULATED.3IONS-SCNC: 2 MMOL/L (ref 3–14)
BUN SERPL-MCNC: 44 MG/DL (ref 7–30)
CALCIUM SERPL-MCNC: 9.5 MG/DL (ref 8.5–10.1)
CHLORIDE BLD-SCNC: 105 MMOL/L (ref 94–109)
CO2 SERPL-SCNC: 33 MMOL/L (ref 20–32)
CREAT SERPL-MCNC: 1.38 MG/DL (ref 0.52–1.04)
CREAT UR-MCNC: 58 MG/DL
GFR SERPL CREATININE-BSD FRML MDRD: 35 ML/MIN/1.73M2
GLUCOSE BLD-MCNC: 146 MG/DL (ref 70–99)
MICROALBUMIN UR-MCNC: 54 MG/L
MICROALBUMIN/CREAT UR: 93.1 MG/G CR (ref 0–25)
POTASSIUM BLD-SCNC: 4.1 MMOL/L (ref 3.4–5.3)
SODIUM SERPL-SCNC: 140 MMOL/L (ref 133–144)

## 2022-08-15 PROCEDURE — 82043 UR ALBUMIN QUANTITATIVE: CPT | Performed by: FAMILY MEDICINE

## 2022-08-15 PROCEDURE — 99213 OFFICE O/P EST LOW 20 MIN: CPT | Mod: 25 | Performed by: FAMILY MEDICINE

## 2022-08-15 PROCEDURE — 0054A COVID-19,PF,PFIZER (12+ YRS): CPT | Performed by: FAMILY MEDICINE

## 2022-08-15 PROCEDURE — 80048 BASIC METABOLIC PNL TOTAL CA: CPT | Performed by: FAMILY MEDICINE

## 2022-08-15 PROCEDURE — 36415 COLL VENOUS BLD VENIPUNCTURE: CPT | Performed by: FAMILY MEDICINE

## 2022-08-15 PROCEDURE — 91305 COVID-19,PF,PFIZER (12+ YRS): CPT | Performed by: FAMILY MEDICINE

## 2022-08-15 RX ORDER — METOPROLOL TARTRATE 25 MG/1
25 TABLET, FILM COATED ORAL 2 TIMES DAILY
Qty: 180 TABLET | Refills: 3 | Status: SHIPPED | OUTPATIENT
Start: 2022-08-15 | End: 2023-06-26

## 2022-08-15 RX ORDER — FOLIC ACID 1 MG/1
1 TABLET ORAL DAILY
COMMUNITY
Start: 2022-08-03 | End: 2022-10-14

## 2022-08-15 ASSESSMENT — PAIN SCALES - GENERAL: PAINLEVEL: NO PAIN (0)

## 2022-08-15 NOTE — LETTER
August 25, 2022    Roxanna Stark  [address]   [place] [address]          Dear ,    We are writing to inform you of your test results.    Lab test are stable   Please drink more fluids     Resulted Orders   BASIC METABOLIC PANEL   Result Value Ref Range    Sodium 140 133 - 144 mmol/L    Potassium 4.1 3.4 - 5.3 mmol/L    Chloride 105 94 - 109 mmol/L    Carbon Dioxide (CO2) 33 (H) 20 - 32 mmol/L    Anion Gap 2 (L) 3 - 14 mmol/L    Urea Nitrogen 44 (H) 7 - 30 mg/dL    Creatinine 1.38 (H) 0.52 - 1.04 mg/dL    Calcium 9.5 8.5 - 10.1 mg/dL    Glucose 146 (H) 70 - 99 mg/dL    GFR Estimate 35 (L) >60 mL/min/1.73m2      Comment:      Effective December 21, 2021 eGFRcr in adults is calculated using the 2021 CKD-EPI creatinine equation which includes age and gender (Carla et al., NEJM, DOI: 10.1056/WKASnd4420203)   Albumin Random Urine Quantitative with Creat Ratio   Result Value Ref Range    Creatinine Urine mg/dL 58 mg/dL    Albumin Urine mg/L 54 mg/L    Albumin Urine mg/g Cr 93.10 (H) 0.00 - 25.00 mg/g Cr     If you have any questions or concerns, please call the clinic at the number listed above.     Sincerely,    Swapna Gaines MD

## 2022-08-15 NOTE — PROGRESS NOTES
Assessment & Plan     CKD (chronic kidney disease) stage 3, GFR 30-59 ml/min (H)  Stable     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)  controlled  - BASIC METABOLIC PANEL; Future  - metoprolol tartrate (LOPRESSOR) 25 MG tablet; Take 1 tablet (25 mg) by mouth 2 times daily  - Albumin Random Urine Quantitative with Creat Ratio; Future  - BASIC METABOLIC PANEL  - Albumin Random Urine Quantitative with Creat Ratio    High priority for 2019-nCoV vaccine    - COVID-19,PF,PFIZER (12+ Yrs GRAY LABEL)    Peripheral arterial disease (H)  Stable       Return in about 3 months (around 11/15/2022) for Medicare wellness Exam.    Swapna Gaines MD  [facility] ELIAS Mcknight is a 89 year old, presenting for the following health issues:  Hypertension and Imm/Inj (COVID-19 VACCINE)      HPI     Hypertension Follow-up      Do you check your blood pressure regularly outside of the clinic? Yes     Are you following a low salt diet? Yes    Are your blood pressures ever more than 140 on the top number (systolic) OR more   than 90 on the bottom number (diastolic), for example 140/90? No      How many servings of fruits and vegetables do you eat daily?  0-1    On average, how many sweetened beverages do you drink each day (Examples: soda, juice, sweet tea, etc.  Do NOT count diet or artificially sweetened beverages)?   0    How many days per week do you exercise enough to make your heart beat faster? 7    How many minutes a day do you exercise enough to make your heart beat faster? 30 - 60    How many days per week do you miss taking your medication? 0        Review of Systems   CONSTITUTIONAL: NEGATIVE for fever, chills, change in weight  ENT/MOUTH: NEGATIVE for ear, mouth and throat problems  RESP: NEGATIVE for significant cough or SOB  CV: NEGATIVE for chest pain, palpitations or peripheral edema  MUSCULOSKELETAL: NEGATIVE for significant arthralgias or myalgia  PSYCHIATRIC:  NEGATIVE for changes in mood or affect      Objective    /80   Pulse 68   Temp 97.8  F (36.6  C) (Temporal)   Wt 56.8 kg (125 lb 3.2 oz)   SpO2 97%   BMI 24.45 kg/m    Body mass index is 24.45 kg/m .  Physical Exam   GENERAL: healthy, alert and no distress  NECK: no adenopathy, no asymmetry, masses, or scars and thyroid normal to palpation  RESP: lungs clear to auscultation - no rales, rhonchi or wheezes  CV: regular rate and rhythm, normal S1 S2, no S3 or S4, no murmur, click or rub, no peripheral edema and peripheral pulses strong  ABDOMEN: soft, nontender, no hepatosplenomegaly, no masses and bowel sounds normal  MS: no gross musculoskeletal defects noted, no edema  PSYCH: mentation appears normal, affect normal/bright    Pending         .  ..

## 2022-08-16 ENCOUNTER — TELEPHONE (OUTPATIENT)
Dept: FAMILY MEDICINE | Facility: CLINIC | Age: 87
End: 2022-08-16

## 2022-08-16 NOTE — TELEPHONE ENCOUNTER
Message left for patient to return call. Please help patient schedule annual wellness exam with a RN. Jannette Carreon MA

## 2022-08-16 NOTE — TELEPHONE ENCOUNTER
----- Message from Syeda Estrada RN sent at 8/15/2022  1:46 PM CDT -----  Please assist the pt with scheduling an annual wellness visit with the RNs.  ----- Message -----  From: Swapna Gaines MD  Sent: 8/15/2022  11:25 AM CDT  To: [facility] Rn Triage Pool    3 months medicare wellness

## 2022-08-18 NOTE — TELEPHONE ENCOUNTER
Patient returned call to schedule a wellness visit with RN    She will out of her house till this afternoon around 2:30.  She is asking for a phone call at that time.        Maria D Colbert RN

## 2022-08-19 NOTE — TELEPHONE ENCOUNTER
Called patient and phone was picked up but nothing on the other line, will try again later.         Guera HENDRIX CMA ([facility])

## 2022-08-25 DIAGNOSIS — I12.9 BENIGN HYPERTENSION WITH CKD (CHRONIC KIDNEY DISEASE) STAGE III (H): ICD-10-CM

## 2022-08-25 DIAGNOSIS — N18.30 BENIGN HYPERTENSION WITH CKD (CHRONIC KIDNEY DISEASE) STAGE III (H): ICD-10-CM

## 2022-08-26 RX ORDER — FUROSEMIDE 20 MG
TABLET ORAL
Qty: 30 TABLET | Refills: 0 | Status: SHIPPED | OUTPATIENT
Start: 2022-08-26 | End: 2022-09-26

## 2022-08-26 NOTE — TELEPHONE ENCOUNTER
Routing refill request to provider for review/approval because:  Labs out of range:    Creatinine   Date Value Ref Range Status   08/15/2022 1.38 (H) 0.52 - 1.04 mg/dL Final   03/23/2021 1.40 (H) 0.52 - 1.04 mg/dL Final

## 2022-09-24 DIAGNOSIS — I12.9 BENIGN HYPERTENSION WITH CKD (CHRONIC KIDNEY DISEASE) STAGE III (H): ICD-10-CM

## 2022-09-24 DIAGNOSIS — N18.30 BENIGN HYPERTENSION WITH CKD (CHRONIC KIDNEY DISEASE) STAGE III (H): ICD-10-CM

## 2022-09-26 RX ORDER — FUROSEMIDE 20 MG
TABLET ORAL
Qty: 30 TABLET | Refills: 0 | Status: SHIPPED | OUTPATIENT
Start: 2022-09-26 | End: 2022-10-24

## 2022-09-28 ENCOUNTER — OFFICE VISIT (OUTPATIENT)
Dept: FAMILY MEDICINE | Facility: CLINIC | Age: 87
End: 2022-09-28
Payer: MEDICARE

## 2022-09-28 VITALS
HEART RATE: 59 BPM | OXYGEN SATURATION: 97 % | BODY MASS INDEX: 24.62 KG/M2 | SYSTOLIC BLOOD PRESSURE: 138 MMHG | TEMPERATURE: 98.6 F | WEIGHT: 125.4 LBS | HEIGHT: 60 IN | DIASTOLIC BLOOD PRESSURE: 76 MMHG

## 2022-09-28 DIAGNOSIS — M06.09 RHEUMATOID ARTHRITIS OF MULTIPLE SITES WITH NEGATIVE RHEUMATOID FACTOR (H): ICD-10-CM

## 2022-09-28 DIAGNOSIS — Z87.39 HISTORY OF POLYMYALGIA RHEUMATICA: ICD-10-CM

## 2022-09-28 DIAGNOSIS — Z23 NEED FOR PROPHYLACTIC VACCINATION AND INOCULATION AGAINST INFLUENZA: ICD-10-CM

## 2022-09-28 DIAGNOSIS — D07.1 VIN III (VULVAR INTRAEPITHELIAL NEOPLASIA III): ICD-10-CM

## 2022-09-28 DIAGNOSIS — Z97.4 HEARING AID WORN: ICD-10-CM

## 2022-09-28 DIAGNOSIS — N18.31 STAGE 3A CHRONIC KIDNEY DISEASE (H): ICD-10-CM

## 2022-09-28 DIAGNOSIS — R26.9 GAIT ABNORMALITY: ICD-10-CM

## 2022-09-28 DIAGNOSIS — I73.9 PERIPHERAL ARTERIAL DISEASE (H): ICD-10-CM

## 2022-09-28 DIAGNOSIS — Z95.2 AORTIC VALVE REPLACED: ICD-10-CM

## 2022-09-28 DIAGNOSIS — Z00.00 ENCOUNTER FOR MEDICARE ANNUAL WELLNESS EXAM: ICD-10-CM

## 2022-09-28 PROCEDURE — 90662 IIV NO PRSV INCREASED AG IM: CPT | Performed by: FAMILY MEDICINE

## 2022-09-28 PROCEDURE — G0008 ADMIN INFLUENZA VIRUS VAC: HCPCS | Performed by: FAMILY MEDICINE

## 2022-09-28 PROCEDURE — G0439 PPPS, SUBSEQ VISIT: HCPCS | Performed by: FAMILY MEDICINE

## 2022-09-28 ASSESSMENT — ENCOUNTER SYMPTOMS
WEAKNESS: 0
FREQUENCY: 0
CONSTIPATION: 0
MYALGIAS: 0
CHILLS: 0
FEVER: 0
DYSURIA: 0
NAUSEA: 0
PARESTHESIAS: 0
PALPITATIONS: 0
HEADACHES: 0
JOINT SWELLING: 0
BREAST MASS: 0
HEMATOCHEZIA: 0
EYE PAIN: 0
ARTHRALGIAS: 0
HEARTBURN: 0
HEMATURIA: 0
DIARRHEA: 0
ABDOMINAL PAIN: 0
NERVOUS/ANXIOUS: 0
SORE THROAT: 0
SHORTNESS OF BREATH: 0
COUGH: 0
DIZZINESS: 0

## 2022-09-28 ASSESSMENT — PAIN SCALES - GENERAL: PAINLEVEL: NO PAIN (0)

## 2022-09-28 ASSESSMENT — ACTIVITIES OF DAILY LIVING (ADL): CURRENT_FUNCTION: NO ASSISTANCE NEEDED

## 2022-09-28 NOTE — PROGRESS NOTES
"SUBJECTIVE:   Roxanna is a 89 year old who presents for Preventive Visit.    Patient has been advised of split billing requirements and indicates understanding: Yes  Are you in the first 12 months of your Medicare coverage?  No    Healthy Habits:     In general, how would you rate your overall health?  Good    Frequency of exercise:  6-7 days/week    Duration of exercise:  30-45 minutes    Do you usually eat at least 4 servings of fruit and vegetables a day, include whole grains    & fiber and avoid regularly eating high fat or \"junk\" foods?  Yes    Taking medications regularly:  Yes    Medication side effects:  None    Ability to successfully perform activities of daily living:  No assistance needed    Home Safety:  No safety concerns identified    Hearing Impairment:  Need to ask people to speak up or repeat themselves, find that men's voices are easier to understand than woman's and difficulty understanding soft or whispered speech    In the past 6 months, have you been bothered by leaking of urine?  No    In general, how would you rate your overall mental or emotional health?  Good      PHQ-2 Total Score: 0    Additional concerns today:  Yes (1. lump right elbow, unsure how long it has been there it is not painful. 2 Rash on lower left leg)    Do you feel safe in your environment? Yes    Have you ever done Advance Care Planning? (For example, a Health Directive, POLST, or a discussion with a medical provider or your loved ones about your wishes): Yes, advance care planning is on file.       Fall risk  Fallen 2 or more times in the past year?: No  Any fall with injury in the past year?: No    Cognitive Screening   1) Repeat 3 items (Leader, Season, Table)    2) Clock draw: NORMAL  3) 3 item recall: Recalls 1 object   Results: NORMAL clock, 1-2 items recalled: COGNITIVE IMPAIRMENT LESS LIKELY    Mini-CogTM Copyright [employer]. Licensed by the author for use in [facility]; reprinted with permission " (thiago@[facility]). All rights reserved.          Reviewed and updated as needed this visit by clinical staff   Tobacco  Allergies  Meds                Reviewed and updated as needed this visit by Provider                   Social History     Tobacco Use     Smoking status: Never Smoker     Smokeless tobacco: Never Used   Substance Use Topics     Alcohol use: Yes     Comment: rarely         Alcohol Use 9/28/2022   Prescreen: >3 drinks/day or >7 drinks/week? No   Prescreen: >3 drinks/day or >7 drinks/week? -     Sees Rheumatology for RA-notes reviewed   Sees Oncology for her GYN checks  Notes reviewed side tests to abstract? :[id number]}        Current providers sharing in care for this patient include:   Patient Care Team:  Swapna Gaines MD as PCP - General (Family Medicine)  Brandan Landin MD as MD (OB/Gyn)  Mono Ribeiro MD as MD (Gynecologic Oncology)  Swapna Gaines MD as Assigned PCP  Aminta Garcia MD as Assigned Cancer Care Provider  Jamie Johnson MD as Assigned Rheumatology Provider    The following health maintenance items are reviewed in Epic and correct as of today:  Health Maintenance   Topic Date Due     LIPID  03/17/2016     COVID-19 Vaccine (5 - Booster for Pfizer series) 10/10/2022     DTAP/TDAP/TD IMMUNIZATION (2 - Td or Tdap) 05/14/2023     HEMOGLOBIN  07/08/2023     BMP  08/15/2023     MICROALBUMIN  08/15/2023     CREATININE  08/15/2023     ANNUAL REVIEW OF HM ORDERS  08/15/2023     MEDICARE ANNUAL WELLNESS VISIT  09/28/2023     FALL RISK ASSESSMENT  09/28/2023     ADVANCE CARE PLANNING  09/28/2027     PHQ-2 (once per calendar year)  Completed     INFLUENZA VACCINE  Completed     Pneumococcal Vaccine: 65+ Years  Completed     URINALYSIS  Completed     ZOSTER IMMUNIZATION  Completed     IPV IMMUNIZATION  Aged Out     MENINGITIS IMMUNIZATION  Aged Out     HEPATITIS B IMMUNIZATION  Aged Out     Review of Systems   Constitutional: Negative for chills and fever.   HENT: Negative for  congestion, ear pain, hearing loss and sore throat.    Eyes: Negative for pain and visual disturbance.   Respiratory: Negative for cough and shortness of breath.    Cardiovascular: Negative for chest pain, palpitations and peripheral edema.   Gastrointestinal: Negative for abdominal pain, constipation, diarrhea, heartburn, hematochezia and nausea.   Breasts:  Negative for tenderness, breast mass and discharge.   Genitourinary: Negative for dysuria, frequency, genital sores, hematuria, pelvic pain, urgency, vaginal bleeding and vaginal discharge.   Musculoskeletal: Negative for arthralgias, joint swelling and myalgias.   Skin: Negative for rash.   Neurological: Negative for dizziness, weakness, headaches and paresthesias.   Psychiatric/Behavioral: Negative for mood changes. The patient is not nervous/anxious.        OBJECTIVE:   /76   Pulse 59   Temp 98.6  F (37  C) (Temporal)   Ht 1.524 m (5')   Wt 56.9 kg (125 lb 6.4 oz)   SpO2 97%   BMI 24.49 kg/m   Estimated body mass index is 24.49 kg/m  as calculated from the following:    Height as of this encounter: 1.524 m (5').    Weight as of this encounter: 56.9 kg (125 lb 6.4 oz).  Physical Exam  GENERAL APPEARANCE: alert, no distress and elderly  EYES: Eyes grossly normal to inspection, PERRL and conjunctivae and sclerae normal  HENT: ear canals and TM's normal, nose and mouth without ulcers or lesions, oropharynx clear and oral mucous membranes moist  NECK: no adenopathy, no asymmetry, masses, or scars and thyroid normal to palpation  RESP: lungs clear to auscultation - no rales, rhonchi or wheezes  BREAST: normal without masses, tenderness or nipple discharge and no palpable axillary masses or adenopathy  CV: regular rate and rhythm, normal S1 S2, no S3 or S4, no murmur, click or rub, no peripheral edema and peripheral pulses strong  ABDOMEN: soft, nontender, no hepatosplenomegaly, no masses and bowel sounds normal  MS: no musculoskeletal defects are noted  and gait is age appropriate without ataxia  SKIN: no suspicious lesions or rashes  NEURO: Normal strength and tone, sensory exam grossly normal, mentation intact and speech normal  PSYCH: mentation appears normal and affect normal/bright    Diagnostic Test Results:  Labs reviewed in Epic    ASSESSMENT / PLAN:   (Z00.00) Encounter for Medicare annual wellness exam  Comment:   Plan:     (M06.09) Rheumatoid arthritis of multiple sites with negative rheumatoid factor (H)  Comment: sees Rheumatology  Plan: Notes reviewed     (N18.31) Stage 3a chronic kidney disease (H)  Comment: stable   Plan:     (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)  Comment: sees GYN oncology-notes reviewed     (R26.9) Gait abnormality  Comment: uses cane  No falls    (Z23) Need for prophylactic vaccination and inoculation against influenza  Comment: advised   Plan: INFLUENZA, QUAD, HIGH DOSE, PF, 65YR + (FLUZONE        HD)              Patient has been advised of split billing requirements and indicates understanding: Yes    COUNSELING:  Reviewed preventive health counseling, as reflected in patient instructions       Regular exercise       Healthy diet/nutrition       Vision screening       Hearing screening       Dental care       Bladder control       Fall risk prevention       Osteoporosis prevention/bone health       Advanced Planning     Estimated body mass index is 24.49 kg/m  as calculated from the following:    Height as of this encounter: 1.524 m (5').    Weight as of this encounter: 56.9 kg (125 lb 6.4 oz).        She reports that she has never smoked. She has never used smokeless tobacco.      Appropriate preventive services were discussed with this patient, including  applicable screening as appropriate for cardiovascular disease, diabetes, osteopenia/osteoporosis, and glaucoma.  As appropriate for age/gender, discussed screening for colorectal cancer, prostate cancer, breast cancer, and cervical cancer. Checklist reviewing preventive services available has been given to the patient.    Reviewed patients plan of care and provided an AVS. The Intermediate Care Plan ( asthma action plan, low back pain action plan, and migraine action plan) for Roxanna meets the Care Plan requirement. This Care Plan has been established and reviewed with the Patient.    Counseling Resources:  ATP IV Guidelines  Pooled Cohorts Equation Calculator  Breast Cancer Risk Calculator  Breast Cancer: Medication to Reduce Risk  FRAX Risk Assessment  ICSI Preventive Guidelines  Dietary Guidelines for Americans, 2010  USDA's MyPlate  ASA Prophylaxis  Lung CA Screening    Swapna Gaines MD  [facility]    Identified Health Risks:

## 2022-09-28 NOTE — PATIENT INSTRUCTIONS
Patient Education   Personalized Prevention Plan  You are due for the preventive services outlined below.  Your care team is available to assist you in scheduling these services.  If you have already completed any of these items, please share that information with your care team to update in your medical record.  Health Maintenance Due   Topic Date Due     Cholesterol Lab  03/17/2016       Signs of Hearing Loss      Hearing much better with one ear can be a sign of hearing loss.   Hearing loss is a problem shared by many people. In fact, it is one of the most common health problems, particularly as people age. Most people age 65 and older have some hearing loss. By age 80, almost everyone does. Hearing loss often occurs slowly over the years. So you may not realize your hearing has gotten worse.  Have your hearing checked  Call your healthcare provider if you:    Have to strain to hear normal conversation    Have to watch other people s faces very carefully to follow what they re saying    Need to ask people to repeat what they ve said    Often misunderstand what people are saying    Turn the volume of the television or radio up so high that others complain    Feel that people are mumbling when they re talking to you    Find that the effort to hear leaves you feeling tired and irritated    Notice, when using the phone, that you hear better with one ear than the other  [employer] last reviewed this educational content on 1/1/2020 2000-2021 [employer]. All rights reserved. This information is not intended as a substitute for professional medical care. Always follow your healthcare professional's instructions.

## 2022-09-28 NOTE — PROGRESS NOTES
"    The patient was provided with written information regarding signs of hearing loss.  Answers for HPI/ROS submitted by the patient on 9/28/2022  In general, how would you rate your overall physical health?: good  Frequency of exercise:: 6-7 days/week  Do you usually eat at least 4 servings of fruit and vegetables a day, include whole grains & fiber, and avoid regularly eating high fat or \"junk\" foods? : Yes  Taking medications regularly:: Yes  Medication side effects:: None  Activities of Daily Living: no assistance needed  Home safety: no safety concerns identified  Hearing Impairment:: need to ask people to speak up or repeat themselves, find that men's voices are easier to understand than woman's, difficulty understanding soft or whispered speech  In the past 6 months, have you been bothered by leaking of urine?: No  abdominal pain: No  Blood in stool: No  Blood in urine: No  chest pain: No  chills: No  congestion: No  constipation: No  cough: No  diarrhea: No  dizziness: No  ear pain: No  eye pain: No  nervous/anxious: No  fever: No  frequency: No  genital sores: No  headaches: No  hearing loss: No  heartburn: No  arthralgias: No  joint swelling: No  peripheral edema: No  mood changes: No  myalgias: No  nausea: No  dysuria: No  palpitations: No  Skin sensation changes: No  sore throat: No  urgency: No  rash: No  shortness of breath: No  visual disturbance: No  weakness: No  pelvic pain: No  vaginal bleeding: No  vaginal discharge: No  tenderness: No  breast mass: No  breast discharge: No  In general, how would you rate your overall mental or emotional health?: good  Additional concerns today:: Yes  Duration of exercise:: 30-45 minutes        "

## 2022-10-10 ENCOUNTER — LAB (OUTPATIENT)
Dept: LAB | Facility: CLINIC | Age: 87
End: 2022-10-10
Payer: MEDICARE

## 2022-10-10 DIAGNOSIS — Z79.52 LONG TERM SYSTEMIC STEROID USER: ICD-10-CM

## 2022-10-10 DIAGNOSIS — Z79.899 HIGH RISK MEDICATION USE: ICD-10-CM

## 2022-10-10 DIAGNOSIS — M06.09 RHEUMATOID ARTHRITIS OF MULTIPLE SITES WITH NEGATIVE RHEUMATOID FACTOR (H): ICD-10-CM

## 2022-10-10 LAB
ALBUMIN SERPL-MCNC: 3.4 G/DL (ref 3.4–5)
ALP SERPL-CCNC: 91 U/L (ref 40–150)
ALT SERPL W P-5'-P-CCNC: 22 U/L (ref 0–50)
AST SERPL W P-5'-P-CCNC: 26 U/L (ref 0–45)
BASOPHILS # BLD AUTO: 0 10E3/UL (ref 0–0.2)
BASOPHILS NFR BLD AUTO: 0 %
BILIRUB DIRECT SERPL-MCNC: 0.1 MG/DL (ref 0–0.2)
BILIRUB SERPL-MCNC: 0.3 MG/DL (ref 0.2–1.3)
CREAT SERPL-MCNC: 1.05 MG/DL (ref 0.52–1.04)
CRP SERPL-MCNC: 11.2 MG/L (ref 0–8)
EOSINOPHIL # BLD AUTO: 0.2 10E3/UL (ref 0–0.7)
EOSINOPHIL NFR BLD AUTO: 2 %
ERYTHROCYTE [DISTWIDTH] IN BLOOD BY AUTOMATED COUNT: 16.4 % (ref 10–15)
ERYTHROCYTE [SEDIMENTATION RATE] IN BLOOD BY WESTERGREN METHOD: 33 MM/HR (ref 0–30)
FASTING STATUS PATIENT QL REPORTED: YES
GFR SERPL CREATININE-BSD FRML MDRD: 49 ML/MIN/1.73M2
GLUCOSE BLD-MCNC: 109 MG/DL (ref 70–99)
HCT VFR BLD AUTO: 40.8 % (ref 35–47)
HGB BLD-MCNC: 12.8 G/DL (ref 11.7–15.7)
LYMPHOCYTES # BLD AUTO: 1.6 10E3/UL (ref 0.8–5.3)
LYMPHOCYTES NFR BLD AUTO: 19 %
MCH RBC QN AUTO: 29.2 PG (ref 26.5–33)
MCHC RBC AUTO-ENTMCNC: 31.4 G/DL (ref 31.5–36.5)
MCV RBC AUTO: 93 FL (ref 78–100)
MONOCYTES # BLD AUTO: 0.9 10E3/UL (ref 0–1.3)
MONOCYTES NFR BLD AUTO: 10 %
NEUTROPHILS # BLD AUTO: 5.8 10E3/UL (ref 1.6–8.3)
NEUTROPHILS NFR BLD AUTO: 68 %
PLATELET # BLD AUTO: 237 10E3/UL (ref 150–450)
PROT SERPL-MCNC: 7.7 G/DL (ref 6.8–8.8)
RBC # BLD AUTO: 4.39 10E6/UL (ref 3.8–5.2)
WBC # BLD AUTO: 8.4 10E3/UL (ref 4–11)

## 2022-10-10 PROCEDURE — 86140 C-REACTIVE PROTEIN: CPT

## 2022-10-10 PROCEDURE — 85652 RBC SED RATE AUTOMATED: CPT

## 2022-10-10 PROCEDURE — 82565 ASSAY OF CREATININE: CPT

## 2022-10-10 PROCEDURE — 82947 ASSAY GLUCOSE BLOOD QUANT: CPT

## 2022-10-10 PROCEDURE — 80076 HEPATIC FUNCTION PANEL: CPT

## 2022-10-10 PROCEDURE — 36415 COLL VENOUS BLD VENIPUNCTURE: CPT

## 2022-10-10 PROCEDURE — 85025 COMPLETE CBC W/AUTO DIFF WBC: CPT

## 2022-10-14 ENCOUNTER — OFFICE VISIT (OUTPATIENT)
Dept: RHEUMATOLOGY | Facility: CLINIC | Age: 87
End: 2022-10-14
Payer: MEDICARE

## 2022-10-14 VITALS
OXYGEN SATURATION: 96 % | WEIGHT: 123.2 LBS | SYSTOLIC BLOOD PRESSURE: 168 MMHG | HEART RATE: 91 BPM | DIASTOLIC BLOOD PRESSURE: 64 MMHG | BODY MASS INDEX: 24.06 KG/M2

## 2022-10-14 DIAGNOSIS — Z79.899 HIGH RISK MEDICATION USE: ICD-10-CM

## 2022-10-14 DIAGNOSIS — M06.09 RHEUMATOID ARTHRITIS OF MULTIPLE SITES WITH NEGATIVE RHEUMATOID FACTOR (H): Primary | ICD-10-CM

## 2022-10-14 PROCEDURE — 99214 OFFICE O/P EST MOD 30 MIN: CPT | Performed by: INTERNAL MEDICINE

## 2022-10-14 RX ORDER — SULFASALAZINE 500 MG/1
500 TABLET ORAL 2 TIMES DAILY
Qty: 60 TABLET | Refills: 3 | Status: SHIPPED | OUTPATIENT
Start: 2022-10-14 | End: 2023-04-25

## 2022-10-14 NOTE — PROGRESS NOTES
Rheumatology Clinic Visit      Roxanna Stark MRN# [id number]   YOB: 1927 Age: 89 year old      Date of visit: 10/14/22   PCP: Dr. Swapna Gaines  Cardiology: Dr. Boston Navarro     Chief Complaint   Patient presents with:  Rheumatoid Arthritis    Assessment and Plan     1. 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  She was treated with prednisone monotherapy for several years, being able to taper off without recurrence of symptoms. She then developed worsening symptoms in her hands and was diagnosed with rheumatoid arthritis. Initially, she was resistant to taking DMARD therapy.  She then was started on MTX that was effective; she reduced the dose with worsening symptoms and then SSZ was added; at one point was doing well on MTX 20mg wkly and SSZ 500mg BID (mild anemia possibly associated with SSZ so the dose was previously reduced); however, when seen in January 2022 she reported that she had stopped taking methotrexate and was only using sulfasalazine.  Then in March it was reported that she stopped methotrexate 6 months ago and sulfasalazine was stopped 2 months ago.  Telephone visit on 6/30/2022 today to clarify her treatment plan and discussed with both the patient and her daughter to get an accurate history; the patient at that time was off all treatment, and based on the history provided at that time she was on sulfasalazine monotherapy for a while and doing well.  Most recently prednisone was discontinued and she continues to do well on sulfasalazine monotherapy; no longer taking prednisone.  I advised that for future appointments she should bring all of her medications with her to ensure that she is taking the correct medications.  Chronic illness, stable.    - Continue sulfasalazine 500 mg twice daily    - Labs in 3 months: CBC, Creatinine, Hepatic Panel, ESR,  CRP    High risk medication requiring intensive toxicity monitoring at least quarterly: labs ordered include CBC, Creatinine, Hepatic panel to monitor for cytopenia and hepatotoxicity; checking creatinine as it affects clearance of medication.      2. Giant Cell Arteritis History?: 12/26/2005 Left TA biopsy negative per [facility] record review.  No symptoms of GCA at this time.      3. Right shoulder rotator cuff tear and history of pain: Previously evaluated by orthopedic surgery and her pain resolved for approximately one year after having a steroid injection in March 2015. She does not want to have surgical correction of her shoulder. Repeat steroid injections have been helpful; not needed today.  Physical therapy was effective previously.  Not an issue today.     4. History of basal cell carcinoma: Following with dermatology; encouraged yearly dermatology evaluation     5. Bone Health: Managed by PCP already.     6.  Vaccinations:     - Influenza: encouraged yearly vaccination  - [id number]: up to date  - [id number]: up to date  - Shingrix: Up to date  - COVID-19: Advised updating, and can be received tomorrow based on her last dose that was given on 8/15/2022.  Do not take sulfasalazine for 2 weeks after COVID-19 vaccination.    7. Elevated blood pressure:  Roxanna to follow up with Primary Care provider regarding elevated blood pressure.     Total minutes spent in evaluation with patient, documentation, , and review of pertinent studies and chart notes: 16     Ms. Stark verbalized agreement with and understanding of the rational for the diagnosis and treatment plan.  All questions were answered to best of my ability and the patient's satisfaction. Ms. Stark was advised to contact the clinic with any questions that may arise after the clinic visit.      Thank you for involving me in the care of the patient    Return to clinic: 6 months      HPI   Roxanna Stark is a 89 year old female with medical  history significant for basal cell carcinoma, hypertension, aortic stenosis, right rotator cuff tear (previously evaluated by Dr. Bingham, orthopedic surgery, on 3/20/2015 where at that time Ms. Stark was not interested in surgical correction; she received an intra-articular steroid injection at that time that was effective for ~1year), temporal arteritis?, and seronegative erosive rheumatoid arthritis.     1/31/2022: doing well at this time. No longer taking MTX and hasn't for some time.  Note that VESTA Mattson, called to check her pharmacy and MTX and SSZ were last filled in July 2021.  Roxanna says that she is happy with how well she is doing; no joint pain; morning stiffness <20 min. Arthritis is not limiting any of her daily activities.  No difficulty raising her arms above her head or standing up from a chair. No vision change. No jaw or tongue claudication. No scalp tenderness. No new headache.     3/30/2022: Telephone call regarding patient's medication raise question about what she was actually taking so a visit was scheduled for today to review.  Roxanna, and Roxanna's daughter Anna.  Reportedly methotrexate was stopped about 6 months ago.  Sulfasalazine was stopped a couple months ago.  Worsening joint pain at the MCPs, PIPs, wrists, MTPs, knees; pain is worse in the morning and improves with topical BenGay and moving.  Positive gelling phenomenon.  Morning stiffness for at least 1 hour.  No jaw or tongue claudication.  No scalp tenderness.  No new headache.  No vision change.    7/8/2022: Currently doing well.  No joint pain or swelling.  No morning stiffness.  Positive gelling phenomenon that resolves within 5 minutes and only occurs after sitting for a very long time.  Confirms that she is taking prednisone 5 mg daily and sulfasalazine 500 mg twice daily.  She also has questions about Lasix and says that she will talk with her primary care provider regarding this.  She is accompanied by her daughter  today.    Today, 10/14/2022: Currently doing well.  She reports that she is taking sulfasalazine 500 mg twice daily.  No longer taking prednisone.  She noticed no worsening of symptoms with stopping prednisone.  She reports that she has chronic changes of her hands but no swelling and no pain.  Morning stiffness for no more than 10 minutes.    Denies fevers, chills, nausea, vomiting, constipation, diarrhea. No abdominal pain. No chest pain/pressure, palpitations, or shortness of breath. No oral or nasal sores. No neck pain. No rash.     Tobacco: None  EtOH: No more than 1 drink per week  Drugs: None  Occupation: Used to work for the [employer] company; now retired    ROS   12 point review of system was completed and negative except as noted in the HPI     Active Problem List     Patient Active Problem List   Diagnosis     History of polymyalgia rheumatica     History of basal cell carcinoma     CARDIOVASCULAR SCREENING; LDL GOAL LESS THAN 130     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)     Hip pain     Left atrial enlargement     Status post coronary angiogram     Aortic valve replaced     Rheumatoid arthritis of multiple sites with negative rheumatoid factor (H)     CKD (chronic kidney disease) stage 3, GFR 30-59 ml/min (H)     THERESA III (vulvar intraepithelial neoplasia III)     Peripheral arterial disease (H)     Decreased hearing of both ears     Hearing aid worn     Gait abnormality     Past Medical History     Past Medical History:   Diagnosis Date     Actinic keratosis      Aortic stenosis 2014     Basal cell cancer 7/2014    left eye medial canthus      Basal cell carcinoma 9/30/08    left cheek     CKD (chronic kidney disease) stage 3, GFR 30-59 ml/min (H) 7/1/2019     HTN (hypertension)      Melanoma in situ (H) 9/30/08    left arm     Polymyalgia rheumatica (H) 11/99     Rheumatoid arthritis of multiple sites with negative rheumatoid factor (H)      Temporal arteritis (H) 11/99      Past Surgical History     Past Surgical History:   Procedure Laterality Date     CATARACT IOL, RT/LT  5/09    bilateral     COLONOSCOPY  2002     EXCISE LESION VULVA N/A 9/27/2019    Procedure: Wide Local Excision Of Vulva, Colposcopy;  Surgeon: Mono Ribeiro MD;  Location:  OR     REPLACE VALVE AORTIC N/A 4/25/2016    Procedure: REPLACE VALVE AORTIC;  Surgeon: Sudeep Tsai MD;  Location:  OR     [facility] SKIN TISSUE PROCEDURE UNLISTED  11/3/08    mmis skin cancer excision     Allergy     Allergies   Allergen Reactions     Lisinopril Cough        Current Medication List     Current Outpatient Medications   Medication Sig     acetaminophen (TYLENOL) 325 MG tablet Take 2 tablets (650 mg) by mouth every 6 hours as needed for mild pain     amoxicillin (AMOXIL) 500 MG capsule TAKE 4 CAPSULES BY MOUTH 1 HOUR BEFORE DENTAL APPOINTMENT     calcium-vitamin D 500-125 MG-UNIT TABS      furosemide (LASIX) 20 MG tablet TAKE 1 TABLET BY MOUTH DAILY IF 2 TO 3 POUND WEIGHT GAIN OVER A 2 DAY PERIOD     gabapentin (NEURONTIN) 100 MG capsule TAKE 1 CAPSULE(100 MG) BY MOUTH THREE TIMES DAILY     ICAPS PO 2 tablets daily     losartan (COZAAR) 50 MG tablet TAKE 1.5 TABLETS BY MOUTH DAILY     metoprolol tartrate (LOPRESSOR) 25 MG tablet Take 1 tablet (25 mg) by mouth 2 times daily     Misc. Devices (ROLLATOR ULTRA-LIGHT) MISC Walker with front wheels for home use, 99 months     Omega-3 Fatty Acids (OMEGA-3 FISH OIL PO) Take 1 tablet twice daily.     sulfaSALAzine (AZULFIDINE) 500 MG tablet Take 1 tablet (500 mg) by mouth 2 times daily     No current facility-administered medications for this visit.       Social History   See HPI    Family History     Family History   Problem Relation Age of Onset     Breast Cancer Sister 45     Arthritis Sister      Thyroid Disease Sister      Arthritis Sister      Colon Cancer Sister         colon     Arthritis Mother      Hypertension Father      Prostate Cancer Father      Arthritis  Father      Heart Disease Father      Lipids Father      Colon Cancer Father      Arthritis Sister      Asthma Daughter      Asthma Daughter      Lung Cancer Daughter 58        lung     Pancreatic Cancer Other 81        pancreatic      Physical Exam     Temp Readings from Last 3 Encounters:   09/28/22 98.6  F (37  C) (Temporal)   08/15/22 97.8  F (36.6  C) (Temporal)   04/05/22 97.8  F (36.6  C) (Oral)     BP Readings from Last 5 Encounters:   10/14/22 (!) 168/64   09/28/22 138/76   08/15/22 130/80   07/08/22 (!) 179/70   04/05/22 (!) 155/79     Pulse Readings from Last 1 Encounters:   10/14/22 91     Resp Readings from Last 1 Encounters:   10/14/21 18     Estimated body mass index is 24.06 kg/m  as calculated from the following:    Height as of 9/28/22: 1.524 m (5').    Weight as of this encounter: 55.9 kg (123 lb 3.2 oz).    GEN: NAD.  HEENT:  Anicteric, noninjected sclera. No obvious external lesions of the ear and nose. Hearing intact.  CV: S1, S2. RRR. No m/r/g  PULM: No increased work of breathing. CTA bilaterally   MSK: MCPs, PIPs, DIPs without swelling or tenderness to palpation.  Heberden's and Gracie's nodes present.  Wrists without swelling or tenderness to palpation.  Elbows and shoulders without swelling or tenderness to palpation.    Knees, ankles, and MTPs without swelling or tenderness to palpation.    SKIN: No rash or jaundice seen  PSYCH: Alert. Appropriate.      Labs / Imaging (select studies)     RF/CCP  Recent Labs   Lab Test 08/11/16  1124 08/04/16  1222 02/18/16  1543   CCPIGG 1  --   --    RHF  --  <20 <20     CBC  Recent Labs   Lab Test 10/10/22  1035 07/08/22  0945 04/28/22  0907 03/23/21  1526 01/22/21  0933 10/30/20  0903 07/24/20  1328   WBC 8.4 10.2 11.3*   < > 8.1 10.8 11.1*   RBC 4.39 4.13 4.00   < > 3.93 4.04 3.33*   HGB 12.8 11.7 11.4*   < > 11.9 11.8 10.4*   HCT 40.8 38.0 36.6   < > 38.0 37.8 32.7*   MCV 93 92 92   < > 97 94 98   RDW 16.4* 18.5* 17.5*   < > 19.4* 18.0* 19.2*     182 179   < > 228 203 184   MCH 29.2 28.3 28.5   < > 30.3 29.2 31.2   MCHC 31.4* 30.8* 31.1*   < > 31.3* 31.2* 31.8   NEUTROPHIL 68 76 64   < > 54.0 51.7 62.2   LYMPH 19 14 23   < > 27.0 29.5 22.5   MONOCYTE 10 8 10   < > 15.0 14.7 12.0   EOSINOPHIL 2 2 3   < > 3.3 3.5 2.5   BASOPHIL 0 0 0   < > 0.7 0.6 0.8   ANEU  --   --   --   --  4.3 5.6 6.9   ALYM  --   --   --   --  2.2 3.2 2.5   IGNACIO  --   --   --   --  1.2 1.6* 1.3   AEOS  --   --   --   --  0.3 0.4 0.3   ABAS  --   --   --   --  0.1 0.1 0.1   ANEUTAUTO 5.8 7.8 7.2   < >  --   --   --    ALYMPAUTO 1.6 1.4 2.6   < >  --   --   --    AMONOAUTO 0.9 0.8 1.1   < >  --   --   --    AEOSAUTO 0.2 0.2 0.4   < >  --   --   --    ABSBASO 0.0 0.0 0.0   < >  --   --   --     < > = values in this interval not displayed.     CMP  Recent Labs   Lab Test 10/10/22  1035 08/15/22  1149 07/08/22  0945 04/28/22  0907 01/21/22  1311 10/14/21  0946 08/30/21  1559 08/06/21  1305 03/23/21  1526 01/22/21  0933 10/30/20  0903   NA  --  140  --   --   --   --  138  --  138  --   --    POTASSIUM  --  4.1  --   --   --  4.4 4.5  --  4.3  --   --    CHLORIDE  --  105  --   --   --   --  104  --  103  --   --    CO2  --  33*  --   --   --   --  27  --  31  --   --    ANIONGAP  --  2*  --   --   --   --  7  --  4  --   --    * 146* 110*  --   --   --  86   < > 182*  --   --    BUN  --  44*  --   --   --   --  36*  --  27  --   --    CR 1.05* 1.38* 1.36* 1.22*   < >  --  1.32*   < > 1.40* 1.17* 1.38*   GFRESTIMATED 49* 35* 36* 41*   < >  --  35*   < > 32* 40* 33*   GFRESTBLACK  --   --   --   --   --   --   --   --  37* 46* 38*   ROEL  --  9.5  --   --   --   --  9.8  --  9.3  --   --    BILITOTAL 0.3  --  0.3 0.3   < >  --  0.4   < >  --  0.2 0.4   ALBUMIN 3.4  --  3.4 3.3*   < >  --  3.6   < > 3.5 3.6 3.3*   PROTTOTAL 7.7  --  7.0 7.3   < >  --  7.9   < >  --  7.7 7.6   ALKPHOS 91  --  76 86   < >  --  56   < >  --  118 108   AST 26  --  20 17   < >  --  26   < >  --  23 28    ALT 22  --  20 22   < >  --  21   < >  --  25 28    < > = values in this interval not displayed.     Calcium/VitaminD  Recent Labs   Lab Test 08/15/22  1149 08/30/21  1559 03/23/21  1526   ROEL 9.5 9.8 9.3     ESR/CRP  Recent Labs   Lab Test 10/10/22  1035 07/08/22  0945 04/28/22  0907   SED 33* 26 38*   CRP 11.2* 6.6 9.4*     Hepatitis B  Recent Labs   Lab Test 11/10/16  0909   HBCAB Nonreactive   HEPBANG Nonreactive     Hepatitis C  Recent Labs   Lab Test 11/10/16  0909   HCVAB Nonreactive   Assay performance characteristics have not been established for newborns,   infants, and children       HIV Screening  Recent Labs   Lab Test 11/10/16  0909   HIAGAB Nonreactive   HIV-1 p24 Ag & HIV-1/HIV-2 Ab Not Detected       Immunization History     Immunization History   Administered Date(s) Administered     COVID-19,PF,Pfizer (12+ Yrs) 02/16/2021, 03/09/2021, 10/14/2021     COVID-19,PF,Pfizer 12+ Yrs (2022 and After) 08/15/2022     FLU 6-35 months 09/08/2010     FLUAD(HD)65+ QUAD 09/17/2021     Influenza (H1N1) 10/20/2016     Influenza (High Dose) 3 valent vaccine 10/16/2014, 10/06/2015, 10/23/2016, 10/03/2017, 08/23/2018, 09/18/2019     Influenza (IIV3) PF 11/05/1999, 12/14/2000, 10/26/2004, 10/04/2005, 09/16/2009, 10/20/2010, 11/09/2011, 09/22/2012, 09/15/2013, 10/15/2014     Influenza, Quad, High Dose, Pf, 65yr+ (Fluzone HD) 09/02/2020, 09/28/2022     Pneumo Conj 13-V (2010&after) 03/17/2015     Pneumococcal 23 valent 11/03/2000, 12/13/2010     TD (ADULT, 7+) 01/12/2004     TDAP Vaccine (Adacel) 05/14/2013     Td (Adult), Adsorbed 01/12/2004     Zoster vaccine recombinant adjuvanted (SHINGRIX) 06/21/2018, 08/23/2018     Zoster vaccine, live 12/15/2006          Chart documentation done in part with Dragon Voice recognition Software. Although reviewed after completion, some word and grammatical error may remain.    Jamie Johnson MD

## 2022-10-14 NOTE — PATIENT INSTRUCTIONS
RHEUMATOLOGY    Dr. Jamie Johnson    [facility] [place]  [address] [place] [address]  [place] [address]  Phone number: [phone]  Fax number: [phone]    You may schedule your FLU shot by calling [phone] or if you would like to get your shot at a [facility] pharmacy you may schedule online at [email or web address].    Thank you for choosing [facility]!    Twila Atkins CMA Rheumatology      Please bring all your prescriptions with you to all your appointments.

## 2022-10-14 NOTE — NURSING NOTE
Blood pressure rechecked after visit                    168/64  Twila Atkins CMA Rheumatology  10/14/2022              RAPID3 (0-30) Cumulative Score  7.0          RAPID3 Weighted Score (divide #4 by 3 and that is the weighted score)  2.3

## 2022-10-24 DIAGNOSIS — I12.9 BENIGN HYPERTENSION WITH CKD (CHRONIC KIDNEY DISEASE) STAGE III (H): ICD-10-CM

## 2022-10-24 DIAGNOSIS — N18.30 BENIGN HYPERTENSION WITH CKD (CHRONIC KIDNEY DISEASE) STAGE III (H): ICD-10-CM

## 2022-10-24 RX ORDER — FUROSEMIDE 20 MG
TABLET ORAL
Qty: 30 TABLET | Refills: 0 | Status: SHIPPED | OUTPATIENT
Start: 2022-10-24 | End: 2022-11-23

## 2022-11-23 DIAGNOSIS — I12.9 BENIGN HYPERTENSION WITH CKD (CHRONIC KIDNEY DISEASE) STAGE III (H): ICD-10-CM

## 2022-11-23 DIAGNOSIS — G57.93 NEUROPATHY OF BOTH FEET: ICD-10-CM

## 2022-11-23 DIAGNOSIS — N18.30 BENIGN HYPERTENSION WITH CKD (CHRONIC KIDNEY DISEASE) STAGE III (H): ICD-10-CM

## 2022-11-23 RX ORDER — FUROSEMIDE 20 MG
TABLET ORAL
Qty: 30 TABLET | Refills: 0 | Status: SHIPPED | OUTPATIENT
Start: 2022-11-23 | End: 2022-12-23

## 2022-11-23 RX ORDER — GABAPENTIN 100 MG/1
CAPSULE ORAL
Qty: 90 CAPSULE | Refills: 3 | Status: SHIPPED | OUTPATIENT
Start: 2022-11-23 | End: 2023-03-20

## 2022-12-02 DIAGNOSIS — Z29.89 SBE (SUBACUTE BACTERIAL ENDOCARDITIS) PROPHYLAXIS CANDIDATE: ICD-10-CM

## 2022-12-05 RX ORDER — AMOXICILLIN 500 MG/1
CAPSULE ORAL
Qty: 4 CAPSULE | Refills: 1 | Status: SHIPPED | OUTPATIENT
Start: 2022-12-05 | End: 2023-12-26

## 2022-12-23 DIAGNOSIS — N18.30 BENIGN HYPERTENSION WITH CKD (CHRONIC KIDNEY DISEASE) STAGE III (H): ICD-10-CM

## 2022-12-23 DIAGNOSIS — I12.9 BENIGN HYPERTENSION WITH CKD (CHRONIC KIDNEY DISEASE) STAGE III (H): ICD-10-CM

## 2022-12-23 RX ORDER — FUROSEMIDE 20 MG
TABLET ORAL
Qty: 30 TABLET | Refills: 0 | Status: SHIPPED | OUTPATIENT
Start: 2022-12-23 | End: 2023-01-24

## 2023-01-20 ENCOUNTER — LAB (OUTPATIENT)
Dept: LAB | Facility: CLINIC | Age: 88
End: 2023-01-20
Payer: MEDICARE

## 2023-01-20 DIAGNOSIS — Z13.220 SCREENING FOR HYPERLIPIDEMIA: ICD-10-CM

## 2023-01-20 DIAGNOSIS — M06.09 RHEUMATOID ARTHRITIS OF MULTIPLE SITES WITH NEGATIVE RHEUMATOID FACTOR (H): ICD-10-CM

## 2023-01-20 DIAGNOSIS — Z79.899 HIGH RISK MEDICATION USE: ICD-10-CM

## 2023-01-20 LAB
ALBUMIN SERPL-MCNC: 3.3 G/DL (ref 3.4–5)
ALP SERPL-CCNC: 81 U/L (ref 40–150)
ALT SERPL W P-5'-P-CCNC: 19 U/L (ref 0–50)
AST SERPL W P-5'-P-CCNC: 19 U/L (ref 0–45)
BASOPHILS # BLD AUTO: 0 10E3/UL (ref 0–0.2)
BASOPHILS NFR BLD AUTO: 0 %
BILIRUB DIRECT SERPL-MCNC: 0.1 MG/DL (ref 0–0.2)
BILIRUB SERPL-MCNC: 0.3 MG/DL (ref 0.2–1.3)
CHOLEST SERPL-MCNC: 245 MG/DL
CREAT SERPL-MCNC: 1.26 MG/DL (ref 0.52–1.04)
CRP SERPL-MCNC: 9 MG/L (ref 0–8)
EOSINOPHIL # BLD AUTO: 0.2 10E3/UL (ref 0–0.7)
EOSINOPHIL NFR BLD AUTO: 2 %
ERYTHROCYTE [DISTWIDTH] IN BLOOD BY AUTOMATED COUNT: 16.3 % (ref 10–15)
ERYTHROCYTE [SEDIMENTATION RATE] IN BLOOD BY WESTERGREN METHOD: 45 MM/HR (ref 0–30)
FASTING STATUS PATIENT QL REPORTED: YES
GFR SERPL CREATININE-BSD FRML MDRD: 39 ML/MIN/1.73M2
HCT VFR BLD AUTO: 38.5 % (ref 35–47)
HDLC SERPL-MCNC: 73 MG/DL
HGB BLD-MCNC: 12.2 G/DL (ref 11.7–15.7)
LDLC SERPL CALC-MCNC: 145 MG/DL
LYMPHOCYTES # BLD AUTO: 2.5 10E3/UL (ref 0.8–5.3)
LYMPHOCYTES NFR BLD AUTO: 26 %
MCH RBC QN AUTO: 29.3 PG (ref 26.5–33)
MCHC RBC AUTO-ENTMCNC: 31.7 G/DL (ref 31.5–36.5)
MCV RBC AUTO: 93 FL (ref 78–100)
MONOCYTES # BLD AUTO: 1.1 10E3/UL (ref 0–1.3)
MONOCYTES NFR BLD AUTO: 11 %
NEUTROPHILS # BLD AUTO: 5.7 10E3/UL (ref 1.6–8.3)
NEUTROPHILS NFR BLD AUTO: 60 %
NONHDLC SERPL-MCNC: 172 MG/DL
PLATELET # BLD AUTO: 206 10E3/UL (ref 150–450)
PROT SERPL-MCNC: 7.6 G/DL (ref 6.8–8.8)
RBC # BLD AUTO: 4.16 10E6/UL (ref 3.8–5.2)
TRIGL SERPL-MCNC: 134 MG/DL
WBC # BLD AUTO: 9.5 10E3/UL (ref 4–11)

## 2023-01-20 PROCEDURE — 85652 RBC SED RATE AUTOMATED: CPT

## 2023-01-20 PROCEDURE — 80061 LIPID PANEL: CPT

## 2023-01-20 PROCEDURE — 80076 HEPATIC FUNCTION PANEL: CPT

## 2023-01-20 PROCEDURE — 36415 COLL VENOUS BLD VENIPUNCTURE: CPT

## 2023-01-20 PROCEDURE — 86140 C-REACTIVE PROTEIN: CPT

## 2023-01-20 PROCEDURE — 82565 ASSAY OF CREATININE: CPT

## 2023-01-20 PROCEDURE — 85025 COMPLETE CBC W/AUTO DIFF WBC: CPT

## 2023-01-22 DIAGNOSIS — N18.30 BENIGN HYPERTENSION WITH CKD (CHRONIC KIDNEY DISEASE) STAGE III (H): ICD-10-CM

## 2023-01-22 DIAGNOSIS — I12.9 BENIGN HYPERTENSION WITH CKD (CHRONIC KIDNEY DISEASE) STAGE III (H): ICD-10-CM

## 2023-01-24 RX ORDER — FUROSEMIDE 20 MG
TABLET ORAL
Qty: 30 TABLET | Refills: 0 | Status: SHIPPED | OUTPATIENT
Start: 2023-01-24 | End: 2023-02-22

## 2023-02-08 NOTE — PROGRESS NOTES
2023  Lora Scott is a 31 y.o., female  for primary  delivery.  Pt has a hx of Crohn's on humira (last dose 2 weeks ago).  She had multiple perirectal abscesses drained at [facility]  under spinal anesthesia. Since that time, wounds have been cared for by home healthcare nurse and wound care weekly.      Pre-op Assessment    I have reviewed the Patient Summary Reports.     I have reviewed the Nursing Notes.    I have reviewed the Medications.     Review of Systems  Anesthesia Hx:  No problems with previous Anesthesia  Neg history of prior surgery. Denies Family Hx of Anesthesia complications.   Denies Personal Hx of Anesthesia complications.   Social:  Non-Smoker, No Alcohol Use    Hematology/Oncology:  Hematology Normal   Oncology Normal     EENT/Dental:EENT/Dental Normal   Cardiovascular:  Cardiovascular Normal Exercise tolerance: good     Pulmonary:   Asthma    Renal/:  Renal/ Normal     Hepatic/GI:  Hepatic/GI Normal Crohn's colitis   Musculoskeletal:  Musculoskeletal Normal    Neurological:  Neurology Normal    Endocrine:  Endocrine Normal    Dermatological:  Skin Normal Diffuse eczema   Psych:  Psychiatric Normal           Physical Exam  General: Cooperative  Disheveled appearing  Dental:  Edentulous    Skin:  Surgical Incision, Recent, Psoriatic Plaque  Dressing on prior buttock I and D site clean dry and intact.      Anesthesia Plan  Type of Anesthesia, risks & benefits discussed:    Anesthesia Type: Spinal, Gen ETT  Intra-op Monitoring Plan: Standard ASA Monitors  Post Op Pain Control Plan: multimodal analgesia and intrathecal opioid  Induction:  IV  Informed Consent: Informed consent signed with the Patient and all parties understand the risks and agree with anesthesia plan.  All questions answered. Patient consented to blood products? Yes  ASA Score: 3    Ready For Surgery    SUBJECTIVE:                                                            Roxanna Stark is a 89 year old female who presents for Preventive Visit.      Are you in the first 12 months of your Medicare Part B coverage?  No    Healthy Habits:    Do you get at least three servings of calcium containing foods daily (dairy, green leafy vegetables, etc.)? yes    Amount of exercise or daily activities, outside of work: 7 day(s) per week    Problems taking medications regularly No    Medication side effects: No    Have you had an eye exam in the past two years? yes    Do you see a dentist twice per year? yes    Do you have sleep apnea, excessive snoring or daytime drowsiness?no    COGNITIVE SCREEN  1) Repeat 3 items (Banana, Sunrise, Chair)    2) Clock draw: NORMAL  3) 3 item recall: Recalls 3 objects  Results: 3 items recalled: COGNITIVE IMPAIRMENT LESS LIKELY    Mini-CogTM Copyright S Navin. Licensed by the author for use in [facility]; reprinted with permission (thiago@[facility]). All rights reserved.                 Reviewed and updated as needed this visit by clinical staff  Tobacco  Allergies  Meds  Problems  Med Hx  Surg Hx  Fam Hx  Soc Hx          Reviewed and updated as needed this visit by Provider  Allergies  Meds  Problems        Social History   Substance Use Topics     Smoking status: Never Smoker     Smokeless tobacco: Never Used     Alcohol use Yes       The patient does not drink >3 drinks per day nor >7 drinks per week.    Today's PHQ-2 Score:   PHQ-2 ( 1999 Pf[employer]) 6/28/2017 6/22/2017   Q1: Little interest or pleasure in doing things 0 0   Q2: Feeling down, depressed or hopeless 0 0   PHQ-2 Score 0 0       Do you feel safe in your environment - Yes    Do you have a Health Care Directive?: Yes: Advance Directive has been received and scanned.    Current providers sharing in care for this patient include:   Patient Care Team:  Letha Grissom MD as PCP - General      Hearing  From Anesthesia Perspective.     .       impairment: No    Ability to successfully perform activities of daily living: Yes, no assistance needed     Fall risk:  Fallen 2 or more times in the past year?: No  Any fall with injury in the past year?: Yes  Timed Up and Go Test (>13.5 is fall risk; contact physician) : 8    Home safety:  none identified      The following health maintenance items are reviewed in Epic and correct as of today:  Health Maintenance   Topic Date Due     MEDICARE ANNUAL WELLNESS VISIT  05/20/1945     ADVANCE DIRECTIVE PLANNING Q5 YRS  07/07/2016     INFLUENZA VACCINE (SYSTEM ASSIGNED)  09/01/2017     FALL RISK ASSESSMENT  03/09/2018     HI QUESTIONNAIRE 1 YEAR  03/09/2018     PHQ-9 Q1YR  03/09/2018     CREATININE Q1 YEAR  05/02/2018     TETANUS IMMUNIZATION (SYSTEM ASSIGNED)  05/14/2023     PNEUMOCOCCAL  Completed            Immunization History   Administered Date(s) Administered     Influenza (H1N1) 10/20/2016     Influenza (IIV3) 10/04/2005, 10/20/2010, 11/09/2011, 09/22/2012, 09/16/2013, 10/15/2014     Pneumococcal (PCV 13) 03/17/2015     Pneumococcal 23 valent 11/03/2000, 12/13/2010     TD (ADULT, 7+) 01/12/2004     TDAP Vaccine (Adacel) 05/14/2013     Zoster vaccine, live 12/15/2006         ROS:  This 89 year old female is here today for annual female exam. She lives in a townhouse and still drives.  She is doing well with her rheumatoid arthritis and is tapering down on her prednisone. She feels an increased abdomen girth since her aortic valve replacement, though her weight is unchanged. Just doesn't like it. Pants feel tighter. She was cautioned by her cardiologist that if her abdomen ever feels tight, she needs to call. She will see cardiologist again soon.   Patient was seen by me 1 week ago for infected abrasion on her left anterior tibial area. It is slowly improving but still very tender all around. Wound is not weeping as much.   She has decreased hearing in left ear off and on.   C: NEGATIVE for fever, chills, change  in weight  I: NEGATIVE for worrisome rashes, moles or lesions  E: NEGATIVE for vision changes or irritation  E/M: NEGATIVE for ear, mouth and throat problems  R: NEGATIVE for significant cough or SOB  B: NEGATIVE for masses, tenderness or discharge  CV: NEGATIVE for chest pain, palpitations or peripheral edema  GI: NEGATIVE for nausea, abdominal pain, heartburn, or change in bowel habits  : NEGATIVE for frequency, dysuria, or hematuria  M: NEGATIVE for significant arthralgias or myalgia  N: NEGATIVE for weakness, dizziness or paresthesias  E: NEGATIVE for temperature intolerance, skin/hair changes  H: NEGATIVE for bleeding problems  P: NEGATIVE for changes in mood or affect    Labs reviewed in EPIC  BP Readings from Last 3 Encounters:   06/28/17 122/72   06/22/17 134/72   05/05/17 122/62    Wt Readings from Last 3 Encounters:   06/28/17 122 lb (55.3 kg)   06/22/17 123 lb (55.8 kg)   05/05/17 119 lb 12.8 oz (54.3 kg)                  Patient Active Problem List   Diagnosis     Polymyalgia rheumatica (H)     History of basal cell carcinoma     CARDIOVASCULAR SCREENING; LDL GOAL LESS THAN 130     Advanced directives, counseling/discussion     Hypertension goal BP (blood pressure) < 140/90     Hip pain     Left atrial enlargement     Aortic stenosis     Status post coronary angiogram     Aortic valve stenosis     Aortic valve replaced     Rheumatoid arthritis of multiple sites with negative rheumatoid factor (H)     Past Surgical History:   Procedure Laterality Date     C SKIN TISSUE PROCEDURE UNLISTED  11/3/08    mmis skin cancer excision     CATARACT IOL, RT/LT  5/09    bilateral     COLONOSCOPY  2002     REPLACE VALVE AORTIC N/A 4/25/2016    Procedure: REPLACE VALVE AORTIC;  Surgeon: Sudeep Tsai MD;  Location:  OR       Social History   Substance Use Topics     Smoking status: Never Smoker     Smokeless tobacco: Never Used     Alcohol use Yes     Family History   Problem Relation Age of Onset      "Breast Cancer Sister      Arthritis Sister      CANCER Sister      breast     Thyroid Disease Sister      CANCER Sister      colon     Arthritis Sister      Arthritis Mother      Hypertension Father      Cancer - colorectal Father      Prostate Cancer Father      Arthritis Father      HEART DISEASE Father      Lipids Father      Arthritis Sister      Asthma Daughter      Asthma Daughter      CANCER Daughter 58     lung     CANCER Other 81     pancreatic          Current Outpatient Prescriptions   Medication Sig Dispense Refill     cephALEXin (KEFLEX) 500 MG capsule Take 1 capsule (500 mg) by mouth 3 times daily 30 capsule 0     furosemide (LASIX) 20 MG tablet TAKE 1 TABLET BY MOUTH EVERY DAY IF 2 TO 3 POUNDS WEIGHT GAIN OVER 2 DAY PERIOD 90 tablet 3     methotrexate sodium 2.5 MG TABS Take 10 mg by mouth once a week . Take all 4 tablets on the same day of each week. 16 tablet 3     predniSONE (DELTASONE) 1 MG tablet Prednisone 4mg daily x2weeks, then 3mg daily x2weeks, then 2mg daily x2weeks, then 1mg daily x2weeks, then stop. 140 tablet 0     econazole nitrate 1 % cream Apply to red rash of legs and feet twice a day till rash is gone 85 g 3     folic acid (FOLVITE) 1 MG tablet Take 1 tablet (1 mg) by mouth daily 100 tablet 3     metoprolol (LOPRESSOR) 25 MG tablet Take 1 tablet (25 mg) by mouth 2 times daily 180 tablet 3     lisinopril (PRINIVIL,ZESTRIL) 5 MG tablet Take 1 tablet (5 mg) by mouth daily 90 tablet 3     calcium-vitamin D 500-125 MG-UNIT TABS        aspirin  MG EC tablet Take 1 tablet (325 mg) by mouth daily 90 tablet 3     Omega-3 Fatty Acids (OMEGA-3 FISH OIL PO) Take 2 g by mouth daily       ICAPS PO 2 tablets daily       OBJECTIVE:                                                            /72 (BP Location: Left arm, Patient Position: Chair, Cuff Size: Adult Regular)  Pulse 71  Temp 98.2  F (36.8  C)  Ht 5' 1\" (1.549 m)  Wt 122 lb (55.3 kg)  SpO2 98%  BMI 23.05 kg/m2 Estimated " "body mass index is 23.05 kg/(m^2) as calculated from the following:    Height as of this encounter: 5' 1\" (1.549 m).    Weight as of this encounter: 122 lb (55.3 kg).  EXAM:   GENERAL APPEARANCE: healthy, alert and no distress  EYES: Eyes grossly normal to inspection, PERRL and conjunctivae and sclerae normal  HENT:  Left ear canal is occluded with wax. ear canals and TM's normal after large amount of wax removed from let ear canal. nose and mouth without ulcers or lesions, oropharynx clear and oral mucous membranes moist  NECK: no adenopathy, no asymmetry, masses, or scars and thyroid normal to palpation  RESP: lungs clear to auscultation - no rales, rhonchi or wheezes  BREAST: normal without masses, tenderness or nipple discharge and no palpable axillary masses or adenopathy  CV: regular rate and rhythm, normal S1 S2, no S3 or S4, no murmur, click or rub, no peripheral edema and peripheral pulses strong  ABDOMEN: soft, nontender, no hepatosplenomegaly, no masses and bowel sounds normal. She denies constipation, but I feel some type of a slightly moveable mass in her abdomen, more in her left lower abdomen.   MS: no musculoskeletal defects are noted and gait is age appropriate without ataxia  SKIN: no suspicious lesions or rashes. Left anterior tibial area has a healing crusted abrasion with mild local surrounding cellulitis. It is very tender all around it.   NEURO: Normal strength and tone, sensory exam grossly normal, mentation intact and speech normal  PSYCH: mentation appears normal and affect normal/bright    ASSESSMENT / PLAN:                                                            1. Encounter for routine adult health examination without abnormal findings  Healthy lady for her age     2. Impacted cerumen of left ear  Resolved   - REMOVE IMPACTED CERUMEN    3. Increased abdominal girth  As above   - US Abdomen Complete; Future    End of Life Planning:  Patient currently has an advanced directive: Yes.  " "Practitioner is supportive of decision, but I offered her a class to attend tomorrow in [place] about honoring choices.     COUNSELING:  Reviewed preventive health counseling, as reflected in patient instructions       Regular exercise       Healthy diet/nutrition        Estimated body mass index is 23.05 kg/(m^2) as calculated from the following:    Height as of this encounter: 5' 1\" (1.549 m).    Weight as of this encounter: 122 lb (55.3 kg).     reports that she has never smoked. She has never used smokeless tobacco.      Appropriate preventive services were discussed with this patient, including applicable screening as appropriate for cardiovascular disease, diabetes, osteopenia/osteoporosis, and glaucoma.  As appropriate for age/gender, discussed screening for colorectal cancer, prostate cancer, breast cancer, and cervical cancer. Checklist reviewing preventive services available has been given to the patient.    Reviewed patients plan of care and provided an AVS. The Basic Care Plan (routine screening as documented in Health Maintenance) for Roxanna meets the Care Plan requirement. This Care Plan has been established and reviewed with the Patient.    Counseling Resources:  ATP IV Guidelines  Pooled Cohorts Equation Calculator  Breast Cancer Risk Calculator  FRAX Risk Assessment  ICSI Preventive Guidelines  Dietary Guidelines for Americans, 2010  USDA's MyPlate  ASA Prophylaxis  Lung CA Screening    YISSEL LIGHT MD  [facility]  "

## 2023-02-21 DIAGNOSIS — I12.9 BENIGN HYPERTENSION WITH CKD (CHRONIC KIDNEY DISEASE) STAGE III (H): ICD-10-CM

## 2023-02-21 DIAGNOSIS — N18.30 BENIGN HYPERTENSION WITH CKD (CHRONIC KIDNEY DISEASE) STAGE III (H): ICD-10-CM

## 2023-02-22 RX ORDER — FUROSEMIDE 20 MG
TABLET ORAL
Qty: 30 TABLET | Refills: 0 | Status: SHIPPED | OUTPATIENT
Start: 2023-02-22 | End: 2023-03-23

## 2023-03-23 DIAGNOSIS — I12.9 BENIGN HYPERTENSION WITH CKD (CHRONIC KIDNEY DISEASE) STAGE III (H): ICD-10-CM

## 2023-03-23 DIAGNOSIS — N18.30 BENIGN HYPERTENSION WITH CKD (CHRONIC KIDNEY DISEASE) STAGE III (H): ICD-10-CM

## 2023-03-23 RX ORDER — FUROSEMIDE 20 MG
TABLET ORAL
Qty: 30 TABLET | Refills: 0 | Status: SHIPPED | OUTPATIENT
Start: 2023-03-23 | End: 2023-04-24

## 2023-04-22 DIAGNOSIS — N18.30 BENIGN HYPERTENSION WITH CKD (CHRONIC KIDNEY DISEASE) STAGE III (H): ICD-10-CM

## 2023-04-22 DIAGNOSIS — I12.9 BENIGN HYPERTENSION WITH CKD (CHRONIC KIDNEY DISEASE) STAGE III (H): ICD-10-CM

## 2023-04-24 RX ORDER — FUROSEMIDE 20 MG
TABLET ORAL
Qty: 30 TABLET | Refills: 0 | Status: SHIPPED | OUTPATIENT
Start: 2023-04-24 | End: 2023-05-16

## 2023-05-01 ENCOUNTER — OFFICE VISIT (OUTPATIENT)
Dept: RHEUMATOLOGY | Facility: CLINIC | Age: 88
End: 2023-05-01
Payer: MEDICARE

## 2023-05-01 VITALS
SYSTOLIC BLOOD PRESSURE: 121 MMHG | HEART RATE: 98 BPM | DIASTOLIC BLOOD PRESSURE: 68 MMHG | BODY MASS INDEX: 24.54 KG/M2 | WEIGHT: 125 LBS | HEIGHT: 60 IN

## 2023-05-01 DIAGNOSIS — M19.041 PRIMARY OSTEOARTHRITIS OF BOTH HANDS: ICD-10-CM

## 2023-05-01 DIAGNOSIS — M06.09 RHEUMATOID ARTHRITIS OF MULTIPLE SITES WITH NEGATIVE RHEUMATOID FACTOR (H): Primary | ICD-10-CM

## 2023-05-01 DIAGNOSIS — Z79.899 HIGH RISK MEDICATION USE: ICD-10-CM

## 2023-05-01 DIAGNOSIS — M19.042 PRIMARY OSTEOARTHRITIS OF BOTH HANDS: ICD-10-CM

## 2023-05-01 LAB
ALBUMIN SERPL-MCNC: 3.6 G/DL (ref 3.4–5)
ALP SERPL-CCNC: 89 U/L (ref 40–150)
ALT SERPL W P-5'-P-CCNC: 20 U/L (ref 0–50)
AST SERPL W P-5'-P-CCNC: 24 U/L (ref 0–45)
BASOPHILS # BLD AUTO: 0 10E3/UL (ref 0–0.2)
BASOPHILS NFR BLD AUTO: 0 %
BILIRUB DIRECT SERPL-MCNC: <0.1 MG/DL (ref 0–0.2)
BILIRUB SERPL-MCNC: 0.3 MG/DL (ref 0.2–1.3)
CREAT SERPL-MCNC: 1.27 MG/DL (ref 0.52–1.04)
CRP SERPL-MCNC: 22.6 MG/L (ref 0–8)
EOSINOPHIL # BLD AUTO: 0.2 10E3/UL (ref 0–0.7)
EOSINOPHIL NFR BLD AUTO: 2 %
ERYTHROCYTE [DISTWIDTH] IN BLOOD BY AUTOMATED COUNT: 16 % (ref 10–15)
ERYTHROCYTE [SEDIMENTATION RATE] IN BLOOD BY WESTERGREN METHOD: 48 MM/HR (ref 0–30)
GFR SERPL CREATININE-BSD FRML MDRD: 39 ML/MIN/1.73M2
HCT VFR BLD AUTO: 39.9 % (ref 35–47)
HGB BLD-MCNC: 12.6 G/DL (ref 11.7–15.7)
LYMPHOCYTES # BLD AUTO: 1.8 10E3/UL (ref 0.8–5.3)
LYMPHOCYTES NFR BLD AUTO: 19 %
MCH RBC QN AUTO: 29 PG (ref 26.5–33)
MCHC RBC AUTO-ENTMCNC: 31.6 G/DL (ref 31.5–36.5)
MCV RBC AUTO: 92 FL (ref 78–100)
MONOCYTES # BLD AUTO: 1.2 10E3/UL (ref 0–1.3)
MONOCYTES NFR BLD AUTO: 12 %
NEUTROPHILS # BLD AUTO: 6.7 10E3/UL (ref 1.6–8.3)
NEUTROPHILS NFR BLD AUTO: 67 %
PLATELET # BLD AUTO: 258 10E3/UL (ref 150–450)
PROT SERPL-MCNC: 8.2 G/DL (ref 6.8–8.8)
RBC # BLD AUTO: 4.34 10E6/UL (ref 3.8–5.2)
WBC # BLD AUTO: 9.9 10E3/UL (ref 4–11)

## 2023-05-01 PROCEDURE — 36415 COLL VENOUS BLD VENIPUNCTURE: CPT | Performed by: INTERNAL MEDICINE

## 2023-05-01 PROCEDURE — 86140 C-REACTIVE PROTEIN: CPT | Performed by: INTERNAL MEDICINE

## 2023-05-01 PROCEDURE — 85652 RBC SED RATE AUTOMATED: CPT | Performed by: INTERNAL MEDICINE

## 2023-05-01 PROCEDURE — 85025 COMPLETE CBC W/AUTO DIFF WBC: CPT | Performed by: INTERNAL MEDICINE

## 2023-05-01 PROCEDURE — 80076 HEPATIC FUNCTION PANEL: CPT | Performed by: INTERNAL MEDICINE

## 2023-05-01 PROCEDURE — 82565 ASSAY OF CREATININE: CPT | Performed by: INTERNAL MEDICINE

## 2023-05-01 PROCEDURE — 99214 OFFICE O/P EST MOD 30 MIN: CPT | Performed by: INTERNAL MEDICINE

## 2023-05-01 RX ORDER — HYDROXYCHLOROQUINE SULFATE 200 MG/1
200 TABLET, FILM COATED ORAL DAILY
Qty: 30 TABLET | Refills: 3 | Status: SHIPPED | OUTPATIENT
Start: 2023-05-01 | End: 2023-08-15

## 2023-05-01 RX ORDER — SULFASALAZINE 500 MG/1
500 TABLET ORAL 2 TIMES DAILY
Qty: 60 TABLET | Refills: 3 | Status: SHIPPED | OUTPATIENT
Start: 2023-05-01 | End: 2023-08-15

## 2023-05-01 NOTE — PATIENT INSTRUCTIONS
Continue sulfasalazine 500 mg twice daily    Start hydroxychloroquine 200 mg daily    Call to schedule an ophthalmology appointment for hydroxychloroquine toxicity monitoring    Bring your medications with you at your next appointment to ensure you are taking the above medications.

## 2023-05-01 NOTE — PROGRESS NOTES
Rheumatology Clinic Visit      Roxanna Stark MRN# [id number]   YOB: 1927 Age: 89 year old      Date of visit: 5/01/23   PCP: Dr. Swapna Gaines  Cardiology: Dr. Boston Navarro     Chief Complaint   Patient presents with:  RECHECK    Assessment and Plan     1. 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  She was treated with prednisone monotherapy for several years, being able to taper off without recurrence of symptoms. She then developed worsening symptoms in her hands and was diagnosed with rheumatoid arthritis. Initially, she was resistant to taking DMARD therapy.  She then was started on MTX that was effective; she reduced the dose with worsening symptoms and then SSZ was added; at one point was doing well on MTX 20mg wkly and SSZ 500mg BID (mild anemia possibly associated with SSZ so the dose was previously reduced); however, when seen in January 2022 she reported that she had stopped taking methotrexate and was only using sulfasalazine.  Then in March it was reported that she stopped methotrexate 6 months ago and sulfasalazine was stopped 2 months ago.  Telephone visit on 6/30/2022 to clarify her treatment plan and discussed with both the patient and her daughter to get an accurate history; the patient at that time was off all treatment, and based on the history provided at that time she was on sulfasalazine monotherapy for a while and doing well. Prednisone was then stopped and she continued to do well on sulfasalazine until today where she has mild synovitis of the left second MCP.  We discussed increasing sulfasalazine but caution with doing so because of anemia associated with higher doses of sulfasalazine in the past, restarting methotrexate, or using hydroxychloroquine; after thorough discussion she is in agreement with starting hydroxychloroquine.  I advised previously  that for future appointments she should bring all of her medications with her to ensure that she is taking the correct medication but she did not do so today; she says that she will write a reminder on her calendar so that she brings her medications with her next time.  s.  Chronic illness, stable.    - Continue sulfasalazine 500 mg twice daily    - Start hydroxychloroquine 200 mg daily  - Labs today and in 3 months: CBC, Creatinine, Hepatic Panel, ESR, CRP    High risk medication requiring intensive toxicity monitoring at least quarterly: labs ordered include CBC, Creatinine, Hepatic panel to monitor for cytopenia and hepatotoxicity; checking creatinine as it affects clearance of medication.      # Hydroxychloroquine (Plaquenil) Risks and Benefits:  The risks and benefits of hydroxychloroquine were discussed in detail and the patient verbalized understanding; the patient also verbalized agreement to get the required ophthalmologic toxicity monitoring.  The risks discussed include, but are not limited to, the risk for hypersensitivity, anaphylaxis, anaphylactoid reactions, irreversible retinal damage, rare hematologic reactions, and rare cardiomyopathy.  Patients with G6PD deficiency or hepatic impairment may be at an increased risk for adverse effects.  I encouraged reviewing the package insert and asking any questions about the medication.       2. Giant Cell Arteritis History?: 12/26/2005 Left TA biopsy negative per [facility] record review.  No symptoms of GCA at this time.      3. Right shoulder rotator cuff tear and history of pain: Previously evaluated by orthopedic surgery and her pain resolved for approximately one year after having a steroid injection in March 2015. She does not want to have surgical correction of her shoulder. Repeat steroid injections have been helpful; not needed today.  Physical therapy was effective previously.  Not an issue today.    4.  Osteoarthritis of the hands: Heberden's nodes  present.  Reviewed the difference between osteoarthritis and rheumatoid arthritis.  Not symptomatic at the DIPs at this time.     5. History of basal cell carcinoma: Following with dermatology; encouraged yearly dermatology evaluation     6. Bone Health: Managed by PCP already.     7.  Vaccinations:     - Influenza: encouraged yearly vaccination  - [id number]: up to date  - [id number]: up to date  - Shingrix: Up to date  - COVID-19: Advised updating, and to hold sulfasalazine for 2 weeks afterward    Total minutes spent in evaluation with patient, documentation, , and review of pertinent studies and chart notes: 20     Ms. Stark verbalized agreement with and understanding of the rational for the diagnosis and treatment plan.  All questions were answered to best of my ability and the patient's satisfaction. Ms. Stark was advised to contact the clinic with any questions that may arise after the clinic visit.      Thank you for involving me in the care of the patient    Return to clinic: 3 months      HPI   Roxanna Stark is a 89 year old female with medical history significant for basal cell carcinoma, hypertension, aortic stenosis, right rotator cuff tear (previously evaluated by Dr. Bingham, orthopedic surgery, on 3/20/2015 where at that time Ms. Stark was not interested in surgical correction; she received an intra-articular steroid injection at that time that was effective for ~1year), temporal arteritis?, and seronegative erosive rheumatoid arthritis.     1/31/2022: doing well at this time. No longer taking MTX and hasn't for some time.  Note that VESTA Mattson, called to check her pharmacy and MTX and SSZ were last filled in July 2021.  Roxanna says that she is happy with how well she is doing; no joint pain; morning stiffness <20 min. Arthritis is not limiting any of her daily activities.  No difficulty raising her arms above her head or standing up from a chair. No vision change. No jaw or tongue  claudication. No scalp tenderness. No new headache.     3/30/2022: Telephone call regarding patient's medication raise question about what she was actually taking so a visit was scheduled for today to review.  Roxanna, and Roxanna's daughter Anna.  Reportedly methotrexate was stopped about 6 months ago.  Sulfasalazine was stopped a couple months ago.  Worsening joint pain at the MCPs, PIPs, wrists, MTPs, knees; pain is worse in the morning and improves with topical BenGay and moving.  Positive gelling phenomenon.  Morning stiffness for at least 1 hour.  No jaw or tongue claudication.  No scalp tenderness.  No new headache.  No vision change.    7/8/2022: Currently doing well.  No joint pain or swelling.  No morning stiffness.  Positive gelling phenomenon that resolves within 5 minutes and only occurs after sitting for a very long time.  Confirms that she is taking prednisone 5 mg daily and sulfasalazine 500 mg twice daily.  She also has questions about Lasix and says that she will talk with her primary care provider regarding this.  She is accompanied by her daughter today.    10/14/2022: Currently doing well.  She reports that she is taking sulfasalazine 500 mg twice daily.  No longer taking prednisone.  She noticed no worsening of symptoms with stopping prednisone.  She reports that she has chronic changes of her hands but no swelling and no pain.  Morning stiffness for no more than 10 minutes.    Today, 5/1/2023: Pain at the left second MCP where she has limited range of motion and stiffness.  No increased warmth or overlying erythema of the left second MCP.  Also with small bony hypertrophy at the DIPs that are intermittently achy but not symptomatic at this time.  PIPs without swelling or pain.  Wrists, elbows, shoulders, knees, ankles, and MTPs without swelling or pain.  Taking sulfasalazine 500 mg twice daily; she did not bring her medications with her but she confirms that she knows she is taking sulfasalazine  twice daily.    Denies fevers, chills, nausea, vomiting, constipation, diarrhea. No abdominal pain. No chest pain/pressure, palpitations, or shortness of breath. No oral or nasal sores. No neck pain. No rash.     Tobacco: None  EtOH: No more than 1 drink per week  Drugs: None  Occupation: Used to work for the telephone company; now retired    ROS   12 point review of system was completed and negative except as noted in the HPI     Active Problem List     Patient Active Problem List   Diagnosis     History of polymyalgia rheumatica     History of basal cell carcinoma     CARDIOVASCULAR SCREENING; LDL GOAL LESS THAN 130     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)     Hip pain     Left atrial enlargement     Status post coronary angiogram     Aortic valve replaced     Rheumatoid arthritis of multiple sites with negative rheumatoid factor (H)     CKD (chronic kidney disease) stage 3, GFR 30-59 ml/min (H)     THERESA III (vulvar intraepithelial neoplasia III)     Peripheral arterial disease (H)     Decreased hearing of both ears     Hearing aid worn     Gait abnormality     Past Medical History     Past Medical History:   Diagnosis Date     Actinic keratosis      Aortic stenosis 2014     Basal cell cancer 7/2014    left eye medial canthus      Basal cell carcinoma 9/30/08    left cheek     CKD (chronic kidney disease) stage 3, GFR 30-59 ml/min (H) 7/1/2019     HTN (hypertension)      Melanoma in situ (H) 9/30/08    left arm     Polymyalgia rheumatica (H) 11/99     Rheumatoid arthritis of multiple sites with negative rheumatoid factor (H)      Temporal arteritis (H) 11/99     Past Surgical History     Past Surgical History:   Procedure Laterality Date     CATARACT IOL, RT/LT  5/09    bilateral     COLONOSCOPY  2002     EXCISE LESION VULVA N/A 9/27/2019    Procedure: Wide Local Excision Of Vulva, Colposcopy;  Surgeon: Mono Ribeiro MD;  Location: [facility] OR     REPLACE VALVE AORTIC N/A 4/25/2016     Procedure: REPLACE VALVE AORTIC;  Surgeon: Sudeep Tsai MD;  Location:  OR     [facility] SKIN TISSUE PROCEDURE UNLISTED  11/3/08    mmis skin cancer excision     Allergy     Allergies   Allergen Reactions     Lisinopril Cough        Current Medication List     Current Outpatient Medications   Medication Sig     acetaminophen (TYLENOL) 325 MG tablet Take 2 tablets (650 mg) by mouth every 6 hours as needed for mild pain     amoxicillin (AMOXIL) 500 MG capsule Take 4 capsules by mouth one hour before dental appointment     calcium-vitamin D 500-125 MG-UNIT TABS      furosemide (LASIX) 20 MG tablet TAKE 1 TABLET BY MOUTH EVERY DAY IF GAIN OF 2 TO 3 LBS OVER 2 DAYS     gabapentin (NEURONTIN) 100 MG capsule TAKE 1 CAPSULE(100 MG) BY MOUTH THREE TIMES DAILY     hydroxychloroquine (PLAQUENIL) 200 MG tablet Take 1 tablet (200 mg) by mouth daily     ICAPS PO 2 tablets daily     losartan (COZAAR) 50 MG tablet TAKE 1.5 TABLETS BY MOUTH DAILY     metoprolol tartrate (LOPRESSOR) 25 MG tablet Take 1 tablet (25 mg) by mouth 2 times daily     Misc. Devices (ROLLATOR ULTRA-LIGHT) MISC Walker with front wheels for home use, 99 months     Omega-3 Fatty Acids (OMEGA-3 FISH OIL PO) Take 1 tablet twice daily.     sulfaSALAzine (AZULFIDINE) 500 MG tablet Take 1 tablet (500 mg) by mouth 2 times daily     No current facility-administered medications for this visit.       Social History   See HPI    Family History     Family History   Problem Relation Age of Onset     Breast Cancer Sister 45     Arthritis Sister      Thyroid Disease Sister      Arthritis Sister      Colon Cancer Sister         colon     Arthritis Mother      Hypertension Father      Prostate Cancer Father      Arthritis Father      Heart Disease Father      Lipids Father      Colon Cancer Father      Arthritis Sister      Asthma Daughter      Asthma Daughter      Lung Cancer Daughter 58        lung     Pancreatic Cancer Other 81        pancreatic      Physical Exam      Temp Readings from Last 3 Encounters:   09/28/22 98.6  F (37  C) (Temporal)   08/15/22 97.8  F (36.6  C) (Temporal)   04/05/22 97.8  F (36.6  C) (Oral)     BP Readings from Last 5 Encounters:   05/01/23 121/68   10/14/22 (!) 168/64   09/28/22 138/76   08/15/22 130/80   07/08/22 (!) 179/70     Pulse Readings from Last 1 Encounters:   05/01/23 98     Resp Readings from Last 1 Encounters:   10/14/21 18     Estimated body mass index is 24.41 kg/m  as calculated from the following:    Height as of this encounter: 1.524 m (5').    Weight as of this encounter: 56.7 kg (125 lb).    GEN: NAD.  HEENT:  Anicteric, noninjected sclera. No obvious external lesions of the ear and nose. Hearing intact.  CV: S1, S2. RRR. No m/r/g  PULM: No increased work of breathing. CTA bilaterally   MSK: Left second MCP with mild synovial swelling and tenderness to palpation.  Other MCPs without swelling or tenderness to palpation.  PIPs without synovial swelling or tenderness to palpation.  Heberden's and Gracie's nodes present.  DIPs nontender to palpation.  Wrists without swelling or tenderness to palpation.  Elbows and shoulders without swelling or tenderness to palpation.    Knees, ankles, and MTPs without swelling or tenderness to palpation.    SKIN: No rash or jaundice seen  PSYCH: Alert. Appropriate.      Labs / Imaging (select studies)     RF/CCP  Recent Labs   Lab Test 08/11/16  1124 08/04/16  1222 02/18/16  1543   CCPIGG 1  --   --    RHF  --  <20 <20     CBC  Recent Labs   Lab Test 01/20/23  0847 10/10/22  1035 07/08/22  0945 03/23/21  1526 01/22/21  0933 10/30/20  0903 07/24/20  1328   WBC 9.5 8.4 10.2   < > 8.1 10.8 11.1*   RBC 4.16 4.39 4.13   < > 3.93 4.04 3.33*   HGB 12.2 12.8 11.7   < > 11.9 11.8 10.4*   HCT 38.5 40.8 38.0   < > 38.0 37.8 32.7*   MCV 93 93 92   < > 97 94 98   RDW 16.3* 16.4* 18.5*   < > 19.4* 18.0* 19.2*    237 182   < > 228 203 184   MCH 29.3 29.2 28.3   < > 30.3 29.2 31.2   MCHC 31.7 31.4* 30.8*    < > 31.3* 31.2* 31.8   NEUTROPHIL 60 68 76   < > 54.0 51.7 62.2   LYMPH 26 19 14   < > 27.0 29.5 22.5   MONOCYTE 11 10 8   < > 15.0 14.7 12.0   EOSINOPHIL 2 2 2   < > 3.3 3.5 2.5   BASOPHIL 0 0 0   < > 0.7 0.6 0.8   ANEU  --   --   --   --  4.3 5.6 6.9   ALYM  --   --   --   --  2.2 3.2 2.5   IGNACIO  --   --   --   --  1.2 1.6* 1.3   AEOS  --   --   --   --  0.3 0.4 0.3   ABAS  --   --   --   --  0.1 0.1 0.1   ANEUTAUTO 5.7 5.8 7.8   < >  --   --   --    ALYMPAUTO 2.5 1.6 1.4   < >  --   --   --    AMONOAUTO 1.1 0.9 0.8   < >  --   --   --    AEOSAUTO 0.2 0.2 0.2   < >  --   --   --    ABSBASO 0.0 0.0 0.0   < >  --   --   --     < > = values in this interval not displayed.     CMP  Recent Labs   Lab Test 01/20/23  0847 10/10/22  1035 08/15/22  1149 07/08/22  0945 01/21/22  1311 10/14/21  0946 08/30/21  1559 08/06/21  1305 03/23/21  1526 01/22/21  0933 10/30/20  0903   NA  --   --  140  --   --   --  138  --  138  --   --    POTASSIUM  --   --  4.1  --   --  4.4 4.5  --  4.3  --   --    CHLORIDE  --   --  105  --   --   --  104  --  103  --   --    CO2  --   --  33*  --   --   --  27  --  31  --   --    ANIONGAP  --   --  2*  --   --   --  7  --  4  --   --    GLC  --  109* 146* 110*  --   --  86   < > 182*  --   --    BUN  --   --  44*  --   --   --  36*  --  27  --   --    CR 1.26* 1.05* 1.38* 1.36*   < >  --  1.32*   < > 1.40* 1.17* 1.38*   GFRESTIMATED 39* 49* 35* 36*   < >  --  35*   < > 32* 40* 33*   GFRESTBLACK  --   --   --   --   --   --   --   --  37* 46* 38*   ROEL  --   --  9.5  --   --   --  9.8  --  9.3  --   --    BILITOTAL 0.3 0.3  --  0.3   < >  --  0.4   < >  --  0.2 0.4   ALBUMIN 3.3* 3.4  --  3.4   < >  --  3.6   < > 3.5 3.6 3.3*   PROTTOTAL 7.6 7.7  --  7.0   < >  --  7.9   < >  --  7.7 7.6   ALKPHOS 81 91  --  76   < >  --  56   < >  --  118 108   AST 19 26  --  20   < >  --  26   < >  --  23 28   ALT 19 22  --  20   < >  --  21   < >  --  25 28    < > = values in this interval not displayed.      Calcium/VitaminD  Recent Labs   Lab Test 08/15/22  1149 08/30/21  1559 03/23/21  1526   ROEL 9.5 9.8 9.3     ESR/CRP  Recent Labs   Lab Test 01/20/23  0847 10/10/22  1035 07/08/22  0945   SED 45* 33* 26   CRP 9.0* 11.2* 6.6     Hepatitis B  Recent Labs   Lab Test 11/10/16  0909   HBCAB Nonreactive   HEPBANG Nonreactive     Hepatitis C  Recent Labs   Lab Test 11/10/16  0909   HCVAB Nonreactive   Assay performance characteristics have not been established for newborns,   infants, and children       HIV Screening  Recent Labs   Lab Test 11/10/16  0909   HIAGAB Nonreactive   HIV-1 p24 Ag & HIV-1/HIV-2 Ab Not Detected       Immunization History     Immunization History   Administered Date(s) Administered     COVID-19 Vaccine 12+ (Pfizer 2022) 08/15/2022     COVID-19 Vaccine 12+ (Pfizer) 02/16/2021, 03/09/2021, 10/14/2021     FLU 6-35 months 09/08/2010     FLUAD(HD)65+ QUAD 09/17/2021     Influenza (H1N1) 10/20/2016     Influenza (High Dose) 3 valent vaccine 10/16/2014, 10/06/2015, 10/23/2016, 10/03/2017, 08/23/2018, 09/18/2019     Influenza (IIV3) PF 11/05/1999, 12/14/2000, 10/26/2004, 10/04/2005, 09/16/2009, 10/20/2010, 11/09/2011, 09/22/2012, 09/15/2013, 10/15/2014     Influenza Vaccine 65+ (Fluzone HD) 09/02/2020, 09/28/2022     Pneumo Conj 13-V (2010&after) 03/17/2015     Pneumococcal 23 valent 11/03/2000, 12/13/2010     TD,PF 7+ (Tenivac) 01/12/2004     TDAP Vaccine (Adacel) 05/14/2013     Td (Adult), Adsorbed 01/12/2004     Zoster recombinant adjuvanted (SHINGRIX) 06/21/2018, 08/23/2018     Zoster vaccine, live 12/15/2006          Chart documentation done in part with Dragon Voice recognition Software. Although reviewed after completion, some word and grammatical error may remain.    Jamie Johnson MD

## 2023-05-03 NOTE — PROGRESS NOTES
Follow-up Note on Referred Patient  Gynecologic [facility]      Date: 5/4/2023       Chief Complaint: Vulvar HSIL. Surveillance visit.       History of Present Illness:  Roxanna Stark (she/her/hers)  is a 89 year old  with history of vulvar histologic HSIL. History as follows:     7/3/2019: Biopsy of the right labia minora.  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   9/27/2019: Posterior simple vulvectomy.  -Pathology: THERESA 3 with positive margin from 3-6 o'clock.   -Complicated by wound separation.       Subjective:  Roxanna returns to the gyn onc clinic today for her scheduled surveillance visit. She is unaccompanied.  Roxanna reports no concerning symptoms. No vulvar lesions, no itching, no burning, no bleeding. No changes in health history (that she can remember).       Past Medical History:  Past Medical History:   Diagnosis Date     Actinic keratosis      Aortic stenosis 2014     Basal cell cancer 7/2014    left eye medial canthus      Basal cell carcinoma 9/30/08    left cheek     CKD (chronic kidney disease) stage 3, GFR 30-59 ml/min (H) 7/1/2019     HTN (hypertension)      Melanoma in situ (H) 9/30/08    left arm     Polymyalgia rheumatica (H) 11/99     Rheumatoid arthritis of multiple sites with negative rheumatoid factor (H)      Temporal arteritis (H) 11/99       Past Surgical History:  Past Surgical History:   Procedure Laterality Date     CATARACT IOL, RT/LT  5/09    bilateral     COLONOSCOPY  2002     EXCISE LESION VULVA N/A 9/27/2019    Procedure: Wide Local Excision Of Vulva, Colposcopy;  Surgeon: Mono Ribeiro MD;  Location:  OR     REPLACE VALVE AORTIC N/A 4/25/2016    Procedure: REPLACE VALVE AORTIC;  Surgeon: Sudeep Tsai MD;  Location:  OR     [facility] SKIN TISSUE PROCEDURE UNLISTED  11/3/08    mmis skin cancer excision       Current Medications:   has a current medication list which includes the following prescription(s): gabapentin, multiple vitamins-minerals, losartan, metoprolol tartrate,  omega-3 fatty acids, sulfasalazine, acetaminophen, amoxicillin, calcium-vitamin d, furosemide, hydroxychloroquine, and rollator ultra-light.       Allergies:   Allergies   Allergen Reactions     Lisinopril Cough         Social History:   Social History     Tobacco Use     Smoking status: Never     Passive exposure: Never     Smokeless tobacco: Never   Vaping Use     Vaping status: Never Used     Passive vaping exposure: Yes   Substance Use Topics     Alcohol use: Yes     Comment: rarely       History   Drug Use No       Family History:   The patient's family history is notable for a first-degree paternal relative with colon and prostate cancer, a first-degree relative with colon cancer, and a first-degree relative with breast cancer.   Family History   Problem Relation Age of Onset     Breast Cancer Sister 45     Arthritis Sister      Thyroid Disease Sister      Arthritis Sister      Colon Cancer Sister         colon     Arthritis Mother      Hypertension Father      Prostate Cancer Father      Arthritis Father      Heart Disease Father      Lipids Father      Colon Cancer Father      Arthritis Sister      Asthma Daughter      Asthma Daughter      Lung Cancer Daughter 58        lung     Pancreatic Cancer Other 81        pancreatic        Physical Exam:   BP (!) 157/81   Pulse 89   Temp 97.4  F (36.3  C) (Oral)   Resp 16   Wt 55.2 kg (121 lb 11.2 oz)   SpO2 98%   BMI 23.77 kg/m    Body mass index is 23.77 kg/m .    General Appearance: healthy and alert, no distress     Musculoskeletal: extremities non tender and without edema    Skin: no lesions or rashes     Neurological: normal gait, no gross defects     Psychiatric: appropriate mood and affect                               Genitourinary: External genitalia are atrophic but otherwise normal in appearance. No gross lesions. On digital anorectal exam, there are no masses nor nodularity.       Procedure Risk Statement:  The patient was counseled regarding risks,  benefits and alternatives to vulvoscopy, possible biopsies.  Risks discussed include but not limited to:  Bleeding; infection; injury to adjacent organs; inadequate sample or false negative result.  The patient's questions were answered, and informed consent signed.      Procedure:   After time-out procedure was performed, dilute acetic acid was applied to the vulva x1 minute. No acetowhite change, but there is a hyperemic 1 cm raised, firm area on the right medial labium majorum. Biopsy taken at this site:  Local anesthesia administered with a subcutaneous injection of 1% lidocaine 2 mL. 3 mm punch biopsy taken. Hemostasis achieved with application of pressure and silver nitrate.             Performance Status:  ECOG Grade 0.       Assessment:  Roxanna Stark (she/her/hers) is a 89 year old  woman with a history of VIN3, currently without evidence of disease.      A total of 20 minutes was spent with the patient, 12 minutes of which were spent in counseling the patient and/or treatment planning, 8 minutes of which were spent in the above procedure; an additional 5 minutes was spent in chart review/documentation.       Plan:   1.)   Vulvar HSIL: We will notify Roxanna of the pathology results via telephone (okay to leave a detailed message).   -If HSIL: RTC with her daughter to discuss management options.  -If LSIL or negative: RTC in 1 year for a surveillance visit.  Surveillance as follows (per ACOG guidelines): 6 months (completed) then annually indefinitely with vulvoscopy and digital anorectal exam at each visit.   She will call in the interim if she has any persistent vulvar itching, or notes new lesions.     2.) Labs: Surgical pathology: right vulvar biopsy.       Aminta Garcia MD, MS, FACOG, FACS  5/4/2023  12:53 PM                CC  Patient Care Team:  Swapna Gaines MD as PCP - General (Family Medicine)  Brandan Landin MD as MD (OB/Gyn)  Mono Ribeiro MD as MD (Gynecologic Oncology)  Eder  MD Swapna as Assigned PCP  Aminta Garcia MD as Assigned Cancer Care Provider  Jamie Johnson MD as Assigned Rheumatology Provider  Whitney Rogers MD as MD (Ophthalmology)  SELF, REFERRED

## 2023-05-03 NOTE — PATIENT INSTRUCTIONS
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  Treatment History:  -9/27/2019: Posterior simple vulvectomy (removal of pre-cancerous cells from the vulva).      PLAN:  1) Vulvar HSIL: We will notify you of the biopsy result via telephone.  -If HSIL: Will schedule follow-up visit with your daughter to discuss treatment options.  -If LSIL or normal: Return to clinic in 1 year for your next surveillance visit.  Surveillance as follows:  6 months (completed) then annually indefinitely with application of dilute acetic acid and digital anorectal exam at each visit.    Please call in the interim if you have any persistent vulvar itching, note new lesions, or have other concerning symptoms.      Aminta Garcia MD, MS, FACOG, FACS  5/4/2023  12:55 PM

## 2023-05-04 ENCOUNTER — OFFICE VISIT (OUTPATIENT)
Dept: ONCOLOGY | Facility: CLINIC | Age: 88
End: 2023-05-04
Attending: OBSTETRICS & GYNECOLOGY
Payer: MEDICARE

## 2023-05-04 VITALS
SYSTOLIC BLOOD PRESSURE: 157 MMHG | BODY MASS INDEX: 23.77 KG/M2 | OXYGEN SATURATION: 98 % | TEMPERATURE: 97.4 F | WEIGHT: 121.7 LBS | HEART RATE: 89 BPM | RESPIRATION RATE: 16 BRPM | DIASTOLIC BLOOD PRESSURE: 81 MMHG

## 2023-05-04 DIAGNOSIS — D07.1 VIN III (VULVAR INTRAEPITHELIAL NEOPLASIA III): Primary | ICD-10-CM

## 2023-05-04 PROCEDURE — 88305 TISSUE EXAM BY PATHOLOGIST: CPT | Mod: TC | Performed by: OBSTETRICS & GYNECOLOGY

## 2023-05-04 PROCEDURE — 99213 OFFICE O/P EST LOW 20 MIN: CPT | Mod: 25 | Performed by: OBSTETRICS & GYNECOLOGY

## 2023-05-04 PROCEDURE — 56821 COLPOSCOPY VULVA W/BIOPSY: CPT | Performed by: OBSTETRICS & GYNECOLOGY

## 2023-05-04 PROCEDURE — G0463 HOSPITAL OUTPT CLINIC VISIT: HCPCS | Mod: 25 | Performed by: OBSTETRICS & GYNECOLOGY

## 2023-05-04 PROCEDURE — 88305 TISSUE EXAM BY PATHOLOGIST: CPT | Mod: 26 | Performed by: PATHOLOGY

## 2023-05-04 ASSESSMENT — PAIN SCALES - GENERAL: PAINLEVEL: NO PAIN (0)

## 2023-05-04 NOTE — NURSING NOTE
Oncology Rooming Note    May 4, 2023 12:26 PM   Roxanna Stark is a 89 year old female who presents for:    Chief Complaint   Patient presents with     Oncology Clinic Visit     vulvar intraepithelial neoplasia III     Initial Vitals: BP (!) 157/81   Pulse 89   Temp 97.4  F (36.3  C) (Oral)   Resp 16   Wt 55.2 kg (121 lb 11.2 oz)   SpO2 98%   BMI 23.77 kg/m   Estimated body mass index is 23.77 kg/m  as calculated from the following:    Height as of 5/1/23: 1.524 m (5').    Weight as of this encounter: 55.2 kg (121 lb 11.2 oz). Body surface area is 1.53 meters squared.  No Pain (0) Comment: Data Unavailable   No LMP recorded. Patient is postmenopausal.  Allergies reviewed: Yes  Medications reviewed: Yes    Medications: Medication refills not needed today.  Pharmacy name entered into [employer]:    [id number] [address] AT [facility]    Clinical concerns: Patient states there are no new concerns to discuss with provider.    Zechariah Atkins

## 2023-05-04 NOTE — LETTER
5/4/2023         RE: Roxanna Stark  [address]  [place] [address]        Dear Colleague,    Thank you for referring your patient, Roxanna Stark, to the [facility]. Please see a copy of my visit note below.    Follow-up Note on Referred Patient  Gynecologic Oncology HPV Clinic      Date: 5/4/2023       Chief Complaint: Vulvar HSIL. Surveillance visit.       History of Present Illness:  Roxanna Stark (she/her/hers)  is a 89 year old  with history of vulvar histologic HSIL. History as follows:     7/3/2019: Biopsy of the right labia minora.  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   9/27/2019: Posterior simple vulvectomy.  -Pathology: THERESA 3 with positive margin from 3-6 o'clock.   -Complicated by wound separation.       Subjective:  Roxanna returns to the gyn onc clinic today for her scheduled surveillance visit. She is unaccompanied.  Roxanna reports no concerning symptoms. No vulvar lesions, no itching, no burning, no bleeding. No changes in health history (that she can remember).       Past Medical History:  Past Medical History:   Diagnosis Date    Actinic keratosis     Aortic stenosis 2014    Basal cell cancer 7/2014    left eye medial canthus     Basal cell carcinoma 9/30/08    left cheek    CKD (chronic kidney disease) stage 3, GFR 30-59 ml/min (H) 7/1/2019    HTN (hypertension)     Melanoma in situ (H) 9/30/08    left arm    Polymyalgia rheumatica (H) 11/99    Rheumatoid arthritis of multiple sites with negative rheumatoid factor (H)     Temporal arteritis (H) 11/99       Past Surgical History:  Past Surgical History:   Procedure Laterality Date    CATARACT IOL, RT/LT  5/09    bilateral    COLONOSCOPY  2002    EXCISE LESION VULVA N/A 9/27/2019    Procedure: Wide Local Excision Of Vulva, Colposcopy;  Surgeon: Mono Ribeiro MD;  Location:  OR    REPLACE VALVE AORTIC N/A 4/25/2016    Procedure: REPLACE VALVE AORTIC;  Surgeon: Sudeep Tsai MD;  Location:  OR    [facility] SKIN  TISSUE PROCEDURE UNLISTED  11/3/08    mmis skin cancer excision       Current Medications:   has a current medication list which includes the following prescription(s): gabapentin, multiple vitamins-minerals, losartan, metoprolol tartrate, omega-3 fatty acids, sulfasalazine, acetaminophen, amoxicillin, calcium-vitamin d, furosemide, hydroxychloroquine, and rollator ultra-light.       Allergies:   Allergies   Allergen Reactions    Lisinopril Cough         Social History:   Social History     Tobacco Use    Smoking status: Never     Passive exposure: Never    Smokeless tobacco: Never   Vaping Use    Vaping status: Never Used     Passive vaping exposure: Yes   Substance Use Topics    Alcohol use: Yes     Comment: rarely       History   Drug Use No       Family History:   The patient's family history is notable for a first-degree paternal relative with colon and prostate cancer, a first-degree relative with colon cancer, and a first-degree relative with breast cancer.   Family History   Problem Relation Age of Onset    Breast Cancer Sister 45    Arthritis Sister     Thyroid Disease Sister     Arthritis Sister     Colon Cancer Sister         colon    Arthritis Mother     Hypertension Father     Prostate Cancer Father     Arthritis Father     Heart Disease Father     Lipids Father     Colon Cancer Father     Arthritis Sister     Asthma Daughter     Asthma Daughter     Lung Cancer Daughter 58        lung    Pancreatic Cancer Other 81        pancreatic        Physical Exam:   BP (!) 157/81   Pulse 89   Temp 97.4  F (36.3  C) (Oral)   Resp 16   Wt 55.2 kg (121 lb 11.2 oz)   SpO2 98%   BMI 23.77 kg/m    Body mass index is 23.77 kg/m .    General Appearance: healthy and alert, no distress     Musculoskeletal: extremities non tender and without edema    Skin: no lesions or rashes     Neurological: normal gait, no gross defects     Psychiatric: appropriate mood and affect                                Genitourinary: External genitalia are atrophic but otherwise normal in appearance. No gross lesions. On digital anorectal exam, there are no masses nor nodularity.       Procedure Risk Statement:  The patient was counseled regarding risks, benefits and alternatives to vulvoscopy, possible biopsies.  Risks discussed include but not limited to:  Bleeding; infection; injury to adjacent organs; inadequate sample or false negative result.  The patient's questions were answered, and informed consent signed.      Procedure:   After time-out procedure was performed, dilute acetic acid was applied to the vulva x1 minute. No acetowhite change, but there is a hyperemic 1 cm raised, firm area on the right medial labium majorum. Biopsy taken at this site:  Local anesthesia administered with a subcutaneous injection of 1% lidocaine 2 mL. 3 mm punch biopsy taken. Hemostasis achieved with application of pressure and silver nitrate.             Performance Status:  ECOG Grade 0.       Assessment:  Roxanna Stark (she/her/hers) is a 89 year old  woman with a history of VIN3, currently without evidence of disease.      A total of 20 minutes was spent with the patient, 12 minutes of which were spent in counseling the patient and/or treatment planning, 8 minutes of which were spent in the above procedure; an additional 5 minutes was spent in chart review/documentation.       Plan:   1.)   Vulvar HSIL: We will notify Roxanna of the pathology results via telephone (okay to leave a detailed message).   -If HSIL: RTC with her daughter to discuss management options.  -If LSIL or negative: RTC in 1 year for a surveillance visit.  Surveillance as follows (per ACOG guidelines): 6 months (completed) then annually indefinitely with vulvoscopy and digital anorectal exam at each visit.   She will call in the interim if she has any persistent vulvar itching, or notes new lesions.     2.) Labs: Surgical pathology: right vulvar biopsy.       Aminta COLON  MD Jose, MS, FACOG, FACS  5/4/2023  12:53 PM          CC  Patient Care Team:  Swapna Gaines MD as PCP - General (Family Medicine)

## 2023-05-08 LAB
PATH REPORT.COMMENTS IMP SPEC: NORMAL
PATH REPORT.COMMENTS IMP SPEC: NORMAL
PATH REPORT.FINAL DX SPEC: NORMAL
PATH REPORT.GROSS SPEC: NORMAL
PATH REPORT.MICROSCOPIC SPEC OTHER STN: NORMAL
PATH REPORT.RELEVANT HX SPEC: NORMAL
PHOTO IMAGE: NORMAL

## 2023-05-10 ENCOUNTER — TELEPHONE (OUTPATIENT)
Dept: ONCOLOGY | Facility: CLINIC | Age: 88
End: 2023-05-10
Payer: MEDICARE

## 2023-05-10 ENCOUNTER — PATIENT OUTREACH (OUTPATIENT)
Dept: ONCOLOGY | Facility: CLINIC | Age: 88
End: 2023-05-10
Payer: MEDICARE

## 2023-05-10 NOTE — TELEPHONE ENCOUNTER
Unable to reach Roxanna via telephone, and unable to leave voicemail. Called Roxanna's daughter Anna, as Roxanna verbalized that she would like her daughter involved in any management decision making.    I informed Anna of the vulvar biopsy result, which showed recurrence of the vulvar HSIL. Discussed that we should set-up a follow-up visit in the next few weeks to discuss management. At Roxanna's visit last week, she indicated that she would like Anna present at the follow-up visit to participate in any management decisions. This visit can be scheduled in my gyn onc clinic or HPV clinic. It can be a virtual visit, and thus can be scheduled through any HPV clinic location.    Will have my clinic reach out to Anna re: scheduling. I also provided Anna with my RNs contact information so that she can call to schedule.    Aminta Garcia MD, MS, FACOG, FACS  5/10/2023  12:12 PM

## 2023-05-16 DIAGNOSIS — G57.93 NEUROPATHY OF BOTH FEET: ICD-10-CM

## 2023-05-16 DIAGNOSIS — I12.9 BENIGN HYPERTENSION WITH CKD (CHRONIC KIDNEY DISEASE) STAGE III (H): ICD-10-CM

## 2023-05-16 DIAGNOSIS — N18.30 BENIGN HYPERTENSION WITH CKD (CHRONIC KIDNEY DISEASE) STAGE III (H): ICD-10-CM

## 2023-05-16 RX ORDER — GABAPENTIN 100 MG/1
CAPSULE ORAL
Qty: 90 CAPSULE | Refills: 1 | Status: SHIPPED | OUTPATIENT
Start: 2023-05-16 | End: 2023-06-26

## 2023-05-16 RX ORDER — LOSARTAN POTASSIUM 50 MG/1
TABLET ORAL
Qty: 135 TABLET | Refills: 0 | Status: SHIPPED | OUTPATIENT
Start: 2023-05-16 | End: 2023-06-01

## 2023-05-16 RX ORDER — FUROSEMIDE 20 MG
TABLET ORAL
Qty: 90 TABLET | Refills: 0 | Status: SHIPPED | OUTPATIENT
Start: 2023-05-16 | End: 2023-06-26

## 2023-05-16 NOTE — LETTER
"10/4/2019       RE: Roxanna Stark  [address]  [place] [address]     Dear Colleague,    Thank you for referring your patient, Roxanna Stark, to the [facility]CER [facility]. Please see a copy of my visit note below.                Follow Up Notes on Referred Patient    Date: 10/4/2019       Dr. Brandan Landin MD  [address] [place] [address]       RE: Roxanna Stark  : 1927  RUSSELL: 10/4/2019    Dear Dr. Brandan Landin:    Roxanna Stark is a 89 year old woman with a diagnosis of THERESA III s/p WLE 19.   She is here today for an acute visit.     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 She is unsure if excess skin, lump or swelling. Denied drainage, bleeding, pain. Uncomfortable to sit, has been alternating Norco and Tylenol.\". she was having constipation but had a BM this morning and now feels better. She states she has been taking 1/2 Norco in the morning and Tylenol 325 mg at bedtime; she has not taken any pain medications today. She denies any fever/chills, no issues urinating, and has used her squirt bottle when able although she does endorse not being able to use if very well. She has not had any bleeding or discharge from her wound/vulva.         Review of Systems:    Systemic           no weight changes; no fever; no chills; no night sweats; no appetite changes  Skin           no rashes, or lesions  Eye           no irritation; no changes in vision  Clara-Laryngeal           no dysphagia; " no hoarseness   Pulmonary    no cough; no shortness of breath  Cardiovascular    no chest pain; no palpitations; + HTN  Gastrointestinal    no diarrhea; no constipation; no abdominal pain; no changes in bowel habits; no blood in stool  Genitourinary   no urinary frequency; no urinary urgency; no dysuria; no pain; no abnormal vaginal discharge; no abnormal vaginal bleeding  Breast    no breast discharge; no breast changes; no breast pain  Musculoskeletal    no myalgias; no arthralgias; no back pain  Psychiatric           no depressed mood; no anxiety    Hematologic              no tender lymph nodes; no noticeable swellings or lumps   Endocrine    no hot flashes; no heat/cold intolerance         Neurological   no tremor; no numbness and tingling; no headaches; no difficulty sleeping      Past Medical History:    Past Medical History:   Diagnosis Date     Actinic keratosis      Aortic stenosis 2014     Basal cell cancer 7/2014    left eye medial canthus      Basal cell carcinoma 9/30/08    left cheek     CKD (chronic kidney disease) stage 3, GFR 30-59 ml/min (H) 7/1/2019     HTN (hypertension)      Melanoma in situ (H) 9/30/08    left arm     Polymyalgia rheumatica (H) 11/99     Temporal arteritis (H) 11/99         Past Surgical History:    Past Surgical History:   Procedure Laterality Date     C SKIN TISSUE PROCEDURE UNLISTED  11/3/08    mmis skin cancer excision     CATARACT IOL, RT/LT  5/09    bilateral     COLONOSCOPY  2002     EXCISE LESION VULVA N/A 9/27/2019    Procedure: Wide Local Excision Of Vulva, Colposcopy;  Surgeon: Mono Ribeiro MD;  Location:  OR     REPLACE VALVE AORTIC N/A 4/25/2016    Procedure: REPLACE VALVE AORTIC;  Surgeon: Sudeep Tsai MD;  Location:  OR         Health Maintenance Due   Topic Date Due     ADVANCE CARE PLANNING  07/07/2016       Current Medications:     Current Outpatient Medications   Medication Sig Dispense Refill     acetaminophen (TYLENOL) 325 MG tablet Take  2 tablets (650 mg) by mouth every 6 hours as needed for mild pain 50 tablet 0     amoxicillin (AMOXIL) 500 MG capsule TAKE 4 CAPSULES BY MOUTH 1 HOUR BEFORE DENTAL APPOINTMENT 4 capsule 0     aspirin  MG EC tablet Take 1 tablet (325 mg) by mouth daily (Patient taking differently: Take 325 mg by mouth every morning ) 90 tablet 3     calcium-vitamin D 500-125 MG-UNIT TABS        folic acid (FOLVITE) 1 MG tablet Take 1 tablet (1 mg) by mouth daily 30 tablet 6     furosemide (LASIX) 20 MG tablet TAKE 1 1 TABLET BY MOUTH EVERY DAY IF 2 TO 3 POUND WEIGHT GAIN OVER A 2 DAY PERIOD 90 tablet 0     furosemide (LASIX) 20 MG tablet TAKE 1 TABLET BY MOUTH EVERY DAILY IF 2 TO 3 POUNDS WEIGHT GAIN OVER 2 DAY PERIOD 90 tablet 0     gabapentin (NEURONTIN) 100 MG capsule TAKE 1 CAPSULE BY MOUTH THREE TIMES DAILY 90 capsule 0     HYDROcodone-acetaminophen (NORCO) 5-325 MG tablet Take 0.5 tablets by mouth every 6 hours as needed for pain or severe pain 5 tablet 0     ICAPS PO 2 tablets daily       losartan (COZAAR) 25 MG tablet TAKE 1 TABLET BY MOUTH EVERY DAY (Patient taking differently: 25 mg every morning ) 90 tablet 3     methotrexate sodium 2.5 MG TABS Take 8 tablets (20 mg) by mouth once a week . Take all 8 tablets on the same day of each week.  Do not take with amoxicillin. 32 tablet 4     metoprolol tartrate (LOPRESSOR) 25 MG tablet TAKE 1 TABLET(25 MG) BY MOUTH TWICE DAILY 180 tablet 3     Omega-3 Fatty Acids (OMEGA-3 FISH OIL PO) Take 1 tablet twice daily.       sulfaSALAzine ER (AZULFIDINE EN) 500 MG EC tablet Take 1 tablet (500 mg) by mouth 2 times daily 60 tablet 6         Allergies:        Allergies   Allergen Reactions     Lisinopril Cough        Social History:     Social History     Tobacco Use     Smoking status: Never Smoker     Smokeless tobacco: Never Used   Substance Use Topics     Alcohol use: Yes     Comment: 4 times a year       History   Drug Use No         Family History:       Family History   Problem  "Relation Age of Onset     Breast Cancer Sister 45     Arthritis Sister      Thyroid Disease Sister      Cancer Sister         colon     Arthritis Sister      Arthritis Mother      Hypertension Father      Cancer - colorectal Father      Prostate Cancer Father      Arthritis Father      Heart Disease Father      Lipids Father      Arthritis Sister      Asthma Daughter      Asthma Daughter      Cancer Daughter 58        lung     Cancer Other 81        pancreatic          Physical Exam:     BP (!) 164/76   Pulse 104   Temp 97.4  F (36.3  C) (Oral)   Resp 14   Ht 1.549 m (5' 1\")   Wt 54.6 kg (120 lb 4.8 oz)   SpO2 98%   BMI 22.73 kg/m     Body mass index is 22.73 kg/m .    General Appearance: healthy and alert, no distress     HEENT: no thyromegaly, no palpable nodules or masses        Cardiovascular: regular rate and rhythm, no gallops, rubs or murmurs     Respiratory: lungs clear, no rales, rhonchi or wheezes, normal diaphragmatic excursion    Musculoskeletal: extremities non tender and without edema    Skin: no lesions or rashes     Neurological: normal gait, no gross defects     Psychiatric: appropriate mood and affect                               Hematological: normal cervical, supraclavicular and inguinal lymph nodes     Gastrointestinal:       abdomen soft, non-tender, non-distended, no organomegaly or masses    Genitourinary: Right vulva with superficial separation and fibrinous material; stool in the vulva and near introitus; large hemorrhoid at anterior rectum; pinkness surrounding wound with some induration; malodorous. Dr. Onofre double examined and does not recommend any intervention at this time.       Assessment:    Roxanna Stark is a 89 year old woman with a diagnosis of THERESA III s/p WLE 9/27/19.   She is here today for an acute visit.     20 minutes were spent with this patient, over 50% of that time was spent in symptom management, treatment planning and in counseling and coordination of " care.      Plan:     1.)       She will return next week for another check of her vulva; she was instructed to increase her use of the olimpia-bottle after voiding/BM, can apply ice/cool compress to vulva (not directly to skin) for 10 minutes, and do a sitz bath. Reviewed use of her pain medications and she was given witting instruction on alternating Norco with Tylenol for the remainder of today and tomorrow. She was instructed to contact the clinic/go to the ED with any increase in discomfort or a fever >100.4. Return for scheduled post op visit. Reviewed discharge instructions. Discussed she can increase her pain medication if needed as well as use ice/cold compress (but not directly on the vulvar skin).      2.) Genetic risk factors were assessed and the patient does not meet the qualifications for a referral.      3.) Labs and/or tests ordered include:  None.      4.) Health maintenance issues addressed today include annual health maintenance and non-gynecologic issues with PCP. Encouraged to contact her PCP regarding her elevated BP readings over the past several weeks.     CARMELITA Adler, WHNP-BC, ANP-BC  Women's Health Nurse Practitioner  Adult Nurse Pracitioner  Division of Gynecologic Oncology          CC  Patient Care Team:  Swapna Gaines MD as PCP - General (Family Practice)  Brandan Landin MD as MD (OB/Gyn)  Mono Ribeiro MD as MD (Gyn-Onc)       normal patient pattern

## 2023-05-23 ENCOUNTER — OFFICE VISIT (OUTPATIENT)
Dept: OPHTHALMOLOGY | Facility: CLINIC | Age: 88
End: 2023-05-23
Attending: INTERNAL MEDICINE
Payer: MEDICARE

## 2023-05-23 DIAGNOSIS — H02.109 ECTROPION OF LOWER EYELID: ICD-10-CM

## 2023-05-23 DIAGNOSIS — H02.9 LESION OF LEFT UPPER EYELID: ICD-10-CM

## 2023-05-23 DIAGNOSIS — M06.09 RHEUMATOID ARTHRITIS OF MULTIPLE SITES WITH NEGATIVE RHEUMATOID FACTOR (H): Primary | ICD-10-CM

## 2023-05-23 DIAGNOSIS — Z79.899 HIGH RISK MEDICATION USE: ICD-10-CM

## 2023-05-23 PROCEDURE — 99203 OFFICE O/P NEW LOW 30 MIN: CPT | Performed by: STUDENT IN AN ORGANIZED HEALTH CARE EDUCATION/TRAINING PROGRAM

## 2023-05-23 PROCEDURE — 92134 CPTRZ OPH DX IMG PST SGM RTA: CPT | Performed by: STUDENT IN AN ORGANIZED HEALTH CARE EDUCATION/TRAINING PROGRAM

## 2023-05-23 PROCEDURE — 92083 EXTENDED VISUAL FIELD XM: CPT | Performed by: STUDENT IN AN ORGANIZED HEALTH CARE EDUCATION/TRAINING PROGRAM

## 2023-05-23 ASSESSMENT — REFRACTION_WEARINGRX
OD_ADD: +2.75
OD_AXIS: 180
OS_SPHERE: -0.75
OS_AXIS: 023
OS_CYLINDER: +0.50
OS_ADD: +2.75
OD_SPHERE: PLANO
OD_CYLINDER: +0.75

## 2023-05-23 ASSESSMENT — VISUAL ACUITY
OS_CC: 20/30
METHOD: SNELLEN - LINEAR
OD_CC+: -1
OS_CC+: -1
OD_CC: 20/40

## 2023-05-23 ASSESSMENT — SLIT LAMP EXAM - LIDS: COMMENTS: 2+ DERMATOCHALASIS, 2+ BLEPHARITIS

## 2023-05-23 NOTE — PROGRESS NOTES
Current Eye Medications:  none     Subjective: here for high risk medication monitoring. Patient has a bump on KISHORE that has been growing for the last 6 mos.     Ohx: growth removed left eyelid - cancerous 15-20+ yrs ago    Hydroxychloroquine 200mg every day for RA - Dr. Johnson      Objective:  See Ophthalmology Exam.      Assessment:  Roxanna Stark is a 89 year old female who presents with:   Encounter Diagnoses   Name Primary?     Rheumatoid arthritis of multiple sites with negative rheumatoid factor (H) Plaquenil started 5/1/23. Follows with .    Macular SD-OCT within normal limits both eyes, Loss of foveal contour right>left, normal ellipsoid layer both eyes.     Hobbs visual field (HVF) 10-2:1st test, low reliability, superior loss both eyes     No signs of Plaquenil retinal toxicity at present.        High risk medication use      Lesion of left upper eyelid Cutaneous horn left upper lid. 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    Referral to Dr. Bullard, oculoplastics, for eyelid lesion removal (previous Basal cell carcinoma  removed from left upper eyelid)    Whitney Rogers MD  [phone]

## 2023-05-23 NOTE — LETTER
5/23/2023         RE: Roxanna Stark  [address]  [place] [address]        Dear Colleague,    Thank you for referring your patient, Roxanna Stark, to the [facility]. Please see a copy of my visit note below.     Current Eye Medications:  none     Subjective: here for high risk medication monitoring. Patient has a bump on KISHORE that has been growing for the last 6 mos.     Ohx: growth removed left eyelid - cancerous 15-20+ yrs ago    Hydroxychloroquine 200mg every day for RA - Dr. Johnson      Objective:  See Ophthalmology Exam.      Assessment:  Roxanna Stark is a 89 year old female who presents with:   Encounter Diagnoses   Name Primary?     Rheumatoid arthritis of multiple sites with negative rheumatoid factor (H) Plaquenil started 5/1/23. Follows with .    Macular SD-OCT within normal limits both eyes, Loss of foveal contour right>left, normal ellipsoid layer both eyes.     Hobbs visual field (HVF) 10-2:1st test, low reliability, superior loss both eyes     No signs of Plaquenil retinal toxicity at present.        High risk medication use      Lesion of left upper eyelid Cutaneous horn left upper lid. 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    Referral to Dr. Bullard, oculoplastics, for eyelid lesion removal (previous Basal cell carcinoma  removed from left upper eyelid)    Whitney Rogers MD  [phone]          Again, thank you for allowing me to participate in the care of your patient.        Sincerely,        Whitney Rogers MD

## 2023-05-23 NOTE — PATIENT INSTRUCTIONS
Normal Plaquenil eye tests today.    Referral to Dr. Bullard, oculoplastics, for eyelid lesion removal (previous Basal cell carcinoma  removed from left upper eyelid)    Whitney Rogers MD  [phone]

## 2023-06-01 ENCOUNTER — OFFICE VISIT (OUTPATIENT)
Dept: FAMILY MEDICINE | Facility: CLINIC | Age: 88
End: 2023-06-01
Payer: MEDICARE

## 2023-06-01 VITALS
OXYGEN SATURATION: 98 % | WEIGHT: 119 LBS | DIASTOLIC BLOOD PRESSURE: 73 MMHG | HEIGHT: 61 IN | SYSTOLIC BLOOD PRESSURE: 152 MMHG | TEMPERATURE: 97.8 F | HEART RATE: 88 BPM | BODY MASS INDEX: 22.47 KG/M2

## 2023-06-01 DIAGNOSIS — D07.1 VIN III (VULVAR INTRAEPITHELIAL NEOPLASIA III): ICD-10-CM

## 2023-06-01 DIAGNOSIS — N18.31 STAGE 3A CHRONIC KIDNEY DISEASE (H): ICD-10-CM

## 2023-06-01 DIAGNOSIS — Z23 HIGH PRIORITY FOR 2019-NCOV VACCINE: ICD-10-CM

## 2023-06-01 DIAGNOSIS — I12.9 BENIGN HYPERTENSION WITH CKD (CHRONIC KIDNEY DISEASE) STAGE III (H): ICD-10-CM

## 2023-06-01 DIAGNOSIS — I73.9 PERIPHERAL ARTERIAL DISEASE (H): ICD-10-CM

## 2023-06-01 DIAGNOSIS — H91.93 DECREASED HEARING OF BOTH EARS: ICD-10-CM

## 2023-06-01 DIAGNOSIS — N18.30 BENIGN HYPERTENSION WITH CKD (CHRONIC KIDNEY DISEASE) STAGE III (H): ICD-10-CM

## 2023-06-01 DIAGNOSIS — M06.09 RHEUMATOID ARTHRITIS OF MULTIPLE SITES WITH NEGATIVE RHEUMATOID FACTOR (H): ICD-10-CM

## 2023-06-01 DIAGNOSIS — Z23 NEED FOR DIPHTHERIA-TETANUS-PERTUSSIS (TDAP) VACCINE: ICD-10-CM

## 2023-06-01 PROCEDURE — 91312 COVID-19 BIVALENT 12+ (PFIZER): CPT | Performed by: FAMILY MEDICINE

## 2023-06-01 PROCEDURE — 99214 OFFICE O/P EST MOD 30 MIN: CPT | Mod: 25 | Performed by: FAMILY MEDICINE

## 2023-06-01 PROCEDURE — 0121A COVID-19 BIVALENT 12+ (PFIZER): CPT | Performed by: FAMILY MEDICINE

## 2023-06-01 RX ORDER — LOSARTAN POTASSIUM 100 MG/1
100 TABLET ORAL DAILY
Qty: 30 TABLET | Refills: 0 | Status: SHIPPED | OUTPATIENT
Start: 2023-06-01 | End: 2023-06-26

## 2023-06-01 NOTE — PROGRESS NOTES
Assessment & Plan     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)  Advised increase cozaar   Follow up 3-4 weeks  Labs will be done at visit   - losartan (COZAAR) 100 MG tablet; Take 1 tablet (100 mg) by mouth daily    Stage 3a chronic kidney disease (H)  Pending labs next visit      THERESA III (vulvar intraepithelial neoplasia III)  Sees specialist   Has Vulvar HGSIL and schedule for appointment with her specialist to discuss Treatment   Peripheral arterial disease (H)  Doing well     Decreased hearing of both ears  Has Hearing aids     Need for diphtheria-tetanus-pertussis (Tdap) vaccine    - Tdap, tetanus-diptheria-acell pertussis, (BOOSTRIX) 5-2.5-18.5 LF-MCG/0.5 FELICITAS injection; Inject 0.5 mLs into the muscle once for 1 dose    High priority for 2019-nCoV vaccine  Advised   - COVID-19 BIVALENT 12+ (PFIZER)    Rheumatoid arthritis of multiple sites with negative rheumatoid factor (H)  Sees specialist  meds reviewed       Review of external notes as documented elsewhere in note  Prescription drug management  78539}     Follow up 3-4 weeks BP check    Swapna Gaines MD  [facility] ELIAS Mcknight is a 89 year old, presenting for the following health issues:  Recheck Medication and Imm/Inj (COVID-19 VACCINE)        6/1/2023     9:57 AM   Additional Questions   Roomed by Xin VITAL     Hypertension Follow-up      Do you check your blood pressure regularly outside of the clinic? No     Are you following a low salt diet? Yes    Are your blood pressures ever more than 140 on the top number (systolic) OR more   than 90 on the bottom number (diastolic), for example 140/90? Does not check    Chronic Kidney Disease Follow-up      Do you take any over the counter pain medicine?: No      Pt has PAD -is doing well    No pain    She sees GYN for Vulcar neoplasia     She also sees a Rheumatologist and on meds    Notes reviewed     She is overall doing well      Review of  "Systems   CONSTITUTIONAL: NEGATIVE for fever, chills, change in weight  ENT/MOUTH: NEGATIVE for ear, mouth and throat problems  RESP: NEGATIVE for significant cough or SOB  CV: NEGATIVE for chest pain, palpitations or peripheral edema  GI: NEGATIVE for nausea, abdominal pain, heartburn, or change in bowel habits  MUSCULOSKELETAL: RA  PSYCHIATRIC: NEGATIVE for changes in mood or affect    Rest of the ROS is Negative except see above and Problem list [stable]    Objective    BP (!) 152/73 (BP Location: Left arm, Patient Position: Sitting, Cuff Size: Adult Regular)   Pulse 88   Temp 97.8  F (36.6  C) (Oral)   Ht 1.543 m (5' 0.75\")   Wt 54 kg (119 lb)   SpO2 98%   BMI 22.67 kg/m    Body mass index is 22.67 kg/m .   Repeat /76  Physical Exam   GENERAL: alert, no distress and elderly  EYES: Eyes grossly normal to inspection  NECK: no adenopathy, no asymmetry, masses, or scars and thyroid normal to palpation  RESP: lungs clear to auscultation - no rales, rhonchi or wheezes  CV: regular rate and rhythm, normal S1 S2, no S3 or S4, no murmur, click or rub, no peripheral edema and peripheral pulses strong  ABDOMEN: soft, nontender, no hepatosplenomegaly, no masses and bowel sounds normal  MS: no gross musculoskeletal defects noted, no edema  PSYCH: mentation appears normal, affect normal/bright    Labs will be checked in 3 weeks            "

## 2023-06-01 NOTE — PROGRESS NOTES
{PROVIDER CHARTING PREFERENCE:727533}    Corbin Mcknight is a 89 year old, presenting for the following health issues:  Recheck Medication         View : No data to display.              HPI     Medication Followup of All meds    Taking Medication as prescribed: yes    Side Effects:  None    Medication Helping Symptoms:  yes          Review of Systems   {ROS COMP (Optional):255498}      Objective    There were no vitals taken for this visit.  There is no height or weight on file to calculate BMI.  Physical Exam   {Exam List (Optional):443812}    {Diagnostic Test Results (Optional):129957}    {AMBULATORY ATTESTATION (Optional):049461}

## 2023-06-04 NOTE — PROGRESS NOTES
Virtual Visit Details    Type of service:  Video Visit   Video Start Time: 12:28p  Video End Time: 12:44p    Originating Location (pt. Location): Home   Distant Location (provider location):  On-site  Platform used for Video Visit: Eugenia       Follow-up Note on Referred Patient  Gynecologic Oncology HPV Clinic  Video Visit      Date: 6/6/2023      This visit is being conducted as a video visit due to the COVID-19 pandemic and efforts to reduce patients' exposure risk.  Patient location: Home  Provider location: Clinic  Video start time: 12:28p  Video end time: 12:44p  Total video time: 16 minutes        Reason for visit: Vulvar HSIL. Surveillance visit.       History of Present Illness:  Roxanna Stark (she/her/hers)  is a 89 year old  with history of vulvar histologic HSIL. History as follows:     7/3/2019: Biopsy of the right labia minora.  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   9/27/2019: Posterior simple vulvectomy.  -Pathology: Vulvar HSIL (THERESA 3) with positive margin from 3-6 o'clock.   -Complicated by wound separation.    5/4/23: Surveillance visit.   -Findings: No acetowhite change, but there is a hyperemic 1 cm raised, firm area on the right medial labium majorum (see visit note or media for photo).   -Pathology: Vulvar HSIL (VIN3)      Subjective:  Roxanna returns via video visit today for a scheduled follow-up visit to discuss management options for recurrent vulvar HSIL, accompanied by her 2 daughters. Roxanna reports feeling well. No new symptoms or concerns.      Past Medical History:  Past Medical History:   Diagnosis Date     Actinic keratosis      Aortic stenosis 2014     Basal cell cancer 7/2014    left eye medial canthus      Basal cell carcinoma 9/30/08    left cheek     CKD (chronic kidney disease) stage 3, GFR 30-59 ml/min (H) 7/1/2019     HTN (hypertension)      Melanoma in situ (H) 9/30/08    left arm     Polymyalgia rheumatica (H) 11/99     Rheumatoid arthritis of multiple sites with negative rheumatoid factor  (H)      Temporal arteritis (H) 11/99       Past Surgical History:  Past Surgical History:   Procedure Laterality Date     CATARACT IOL, RT/LT  5/09    bilateral     COLONOSCOPY  2002     EXCISE LESION VULVA N/A 9/27/2019    Procedure: Wide Local Excision Of Vulva, Colposcopy;  Surgeon: Mono Ribeiro MD;  Location:  OR     REPLACE VALVE AORTIC N/A 4/25/2016    Procedure: REPLACE VALVE AORTIC;  Surgeon: Sudeep Tsai MD;  Location:  OR     [facility] SKIN TISSUE PROCEDURE UNLISTED  11/3/08    mmis skin cancer excision       Current Medications:   has a current medication list which includes the following prescription(s): amoxicillin, calcium-vitamin d, furosemide, gabapentin, hydroxychloroquine, multiple vitamins-minerals, losartan, metoprolol tartrate, rollator ultra-light, omega-3 fatty acids, and sulfasalazine.       Allergies:   Allergies   Allergen Reactions     Lisinopril Cough         Social History:   Social History     Tobacco Use     Smoking status: Never     Passive exposure: Never     Smokeless tobacco: Never   Vaping Use     Vaping status: Never Used     Passive vaping exposure: Yes   Substance Use Topics     Alcohol use: Yes     Comment: rarely       History   Drug Use No       Family History:   The patient's family history is notable for a first-degree paternal relative with colon and prostate cancer, a first-degree relative with colon cancer, and a first-degree relative with breast cancer.   Family History   Problem Relation Age of Onset     Breast Cancer Sister 45     Arthritis Sister      Thyroid Disease Sister      Arthritis Sister      Colon Cancer Sister         colon     Arthritis Mother      Hypertension Father      Prostate Cancer Father      Arthritis Father      Heart Disease Father      Lipids Father      Colon Cancer Father      Arthritis Sister      Asthma Daughter      Asthma Daughter      Lung Cancer Daughter 58        lung     Pancreatic Cancer Other 81        pancreatic         Physical Exam:   Constitutional: healthy and alert. No acute distress    HEENT: Grossly normal. Eyes clear, no erythema.    Cardiovascular: No pallor.    Respiratory: No cough, no labored breathing    Musculoskeletal: Muscles well-developed, no gross defects. No obvious edema.    Skin: No visible rashes nor lesions    Neurologic: Grossly normal.    Psych: Appropriate mood and affect    Abdomen: No obvious distension nor masses.     See 5/4/23 clinic note for vulvar exam findings.       Performance Status:  ECOG Grade 0.       Assessment:  Roxanna Stark (she/her/hers) is a 89 year old  woman with a history of vulvar HSIL, currently with recurrent disease.      A total of 16 minutes was spent with the patient, 100% of which were spent in counseling the patient and/or treatment planning; an additional 5 minutes was spent in chart review/documentation.       Plan:   1.)   Vulvar HSIL: I reviewed the pathology results with Roxanna, and will send her a copy of the pathology report via mail. Discussed recommended management and alternatives. Recommend ablative therapy; alternatives are wide local excision or topical therapy, or continued monitoring without treatment given her age, although this needs to be balanced with Roxanna's overall good health status.    After discussion of risks/benefits of each option, Roxanna is considering surgical management, but wants to continue monitoring for now. Plan as follows:  -RTC in 3 months for exam and continued discussion of management.   -She will call in the interim if she notes any increase in the lesion, or other concerning symptoms.     2.) Labs: None.    3.) Health maintenance issues discussed today: None.    4.) Prescriptions: None.           Aminta Garcia MD, MS, FACOG, FACS  6/6/2023  12:47 PM                  CC  Patient Care Team:  Swapna Gaines MD as PCP - General (Family Medicine)  Brandan Landin MD as MD (OB/Gyn)  Mono Ribeiro MD as MD (Gynecologic  Oncology)  Swapna Gaines MD as Assigned PCP  Aminta Garcia MD as Assigned Cancer Care Provider  Jamie Johnson MD as Assigned Rheumatology Provider  Whitney Rogers MD as MD (Ophthalmology)  Abdon Bullard MD as MD (Ophthalmology)  Whitney oRgers MD as Assigned Surgical Provider  SELF, REFERRED

## 2023-06-04 NOTE — PATIENT INSTRUCTIONS
SCHEDULING:  -RTC in 3 months for follow-up and continued management discussion (MD, in-person, HPV clinic) --order(s) placed       DIAGNOSIS:  Vulvar HSIL (VIN3), currently with recurrent disease.   Treatment History:  -9/27/2019: Posterior simple vulvectomy (removal of pre-cancerous cells from the vulva).      PLAN:  1) Vulvar HSIL: Recommend ablative therapy (laser or CUSA to destroy the pre-cancerous cells); alternatives are excision of the abnormal cells or topical therapy.    Plan as follows:  -Return to clinic in 3 months for re-examination and continued management discussion.     Please call in the interim if you note the lesion increasing in size, or other concerning symptoms. [phone]       Aminta Garcia MD, MS, FACOG, FACS  6/6/2023  12:50 PM

## 2023-06-06 ENCOUNTER — VIRTUAL VISIT (OUTPATIENT)
Dept: ONCOLOGY | Facility: CLINIC | Age: 88
End: 2023-06-06
Attending: OBSTETRICS & GYNECOLOGY
Payer: MEDICARE

## 2023-06-06 DIAGNOSIS — D07.1 VIN III (VULVAR INTRAEPITHELIAL NEOPLASIA III): Primary | ICD-10-CM

## 2023-06-06 PROCEDURE — 99213 OFFICE O/P EST LOW 20 MIN: CPT | Mod: 95 | Performed by: OBSTETRICS & GYNECOLOGY

## 2023-06-06 NOTE — NURSING NOTE
Is the patient currently in the state of [place]? YES    Visit mode:VIDEO    If the visit is dropped, the patient can be reconnected by: VIDEO VISIT: Text to cell phone: [phone]    Will anyone else be joining the visit? Yes, daughters Anna and Jyotsna are there with her.     How would you like to obtain your AVS? MyChart    Are changes needed to the allergy or medication list? NO    Reason for visit: RECHECK (Video visit )  Mariama Lawson

## 2023-06-06 NOTE — LETTER
6/6/2023         RE: Roxanna Stark  [address]  [place] [address]        Dear Colleague,    Thank you for referring your patient, Roxanna Stark, to the [facility]. Please see a copy of my visit note below.          Follow-up Note on Referred Patient  Gynecologic Oncology HPV Clinic  Video Visit      Date: 6/6/2023      This visit is being conducted as a video visit due to the COVID-19 pandemic and efforts to reduce patients' exposure risk.  Patient location: Home  Provider location: Clinic  Video start time: 12:28p  Video end time: 12:44p  Total video time: 16 minutes        Reason for visit: Vulvar HSIL. Surveillance visit.       History of Present Illness:  Roxanna Stark (she/her/hers)  is a 89 year old  with history of vulvar histologic HSIL. History as follows:     7/3/2019: Biopsy of the right labia minora.  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   9/27/2019: Posterior simple vulvectomy.  -Pathology: Vulvar HSIL (THERESA 3) with positive margin from 3-6 o'clock.   -Complicated by wound separation.    5/4/23: Surveillance visit.   -Findings: No acetowhite change, but there is a hyperemic 1 cm raised, firm area on the right medial labium majorum (see visit note or media for photo).   -Pathology: Vulvar HSIL (VIN3)      Subjective:  Roxanna returns via video visit today for a scheduled follow-up visit to discuss management options for recurrent vulvar HSIL, accompanied by her 2 daughters. Roxanna reports feeling well. No new symptoms or concerns.      Past Medical History:  Past Medical History:   Diagnosis Date    Actinic keratosis     Aortic stenosis 2014    Basal cell cancer 7/2014    left eye medial canthus     Basal cell carcinoma 9/30/08    left cheek    CKD (chronic kidney disease) stage 3, GFR 30-59 ml/min (H) 7/1/2019    HTN (hypertension)     Melanoma in situ (H) 9/30/08    left arm    Polymyalgia rheumatica (H) 11/99    Rheumatoid arthritis of multiple sites with negative  rheumatoid factor (H)     Temporal arteritis (H) 11/99       Past Surgical History:  Past Surgical History:   Procedure Laterality Date    CATARACT IOL, RT/LT  5/09    bilateral    COLONOSCOPY  2002    EXCISE LESION VULVA N/A 9/27/2019    Procedure: Wide Local Excision Of Vulva, Colposcopy;  Surgeon: Mono Ribeiro MD;  Location:  OR    REPLACE VALVE AORTIC N/A 4/25/2016    Procedure: REPLACE VALVE AORTIC;  Surgeon: Sudeep Tsai MD;  Location:  OR    [facility] SKIN TISSUE PROCEDURE UNLISTED  11/3/08    mmis skin cancer excision       Current Medications:   has a current medication list which includes the following prescription(s): amoxicillin, calcium-vitamin d, furosemide, gabapentin, hydroxychloroquine, multiple vitamins-minerals, losartan, metoprolol tartrate, rollator ultra-light, omega-3 fatty acids, and sulfasalazine.       Allergies:   Allergies   Allergen Reactions    Lisinopril Cough         Social History:   Social History     Tobacco Use    Smoking status: Never     Passive exposure: Never    Smokeless tobacco: Never   Vaping Use    Vaping status: Never Used     Passive vaping exposure: Yes   Substance Use Topics    Alcohol use: Yes     Comment: rarely       History   Drug Use No       Family History:   The patient's family history is notable for a first-degree paternal relative with colon and prostate cancer, a first-degree relative with colon cancer, and a first-degree relative with breast cancer.   Family History   Problem Relation Age of Onset    Breast Cancer Sister 45    Arthritis Sister     Thyroid Disease Sister     Arthritis Sister     Colon Cancer Sister         colon    Arthritis Mother     Hypertension Father     Prostate Cancer Father     Arthritis Father     Heart Disease Father     Lipids Father     Colon Cancer Father     Arthritis Sister     Asthma Daughter     Asthma Daughter     Lung Cancer Daughter 58        lung    Pancreatic Cancer Other 81        pancreatic         Physical Exam:   Constitutional: healthy and alert. No acute distress    HEENT: Grossly normal. Eyes clear, no erythema.    Cardiovascular: No pallor.    Respiratory: No cough, no labored breathing    Musculoskeletal: Muscles well-developed, no gross defects. No obvious edema.    Skin: No visible rashes nor lesions    Neurologic: Grossly normal.    Psych: Appropriate mood and affect    Abdomen: No obvious distension nor masses.     See 5/4/23 clinic note for vulvar exam findings.       Performance Status:  ECOG Grade 0.       Assessment:  Roxanna Stark (she/her/hers) is a 89 year old  woman with a history of vulvar HSIL, currently with recurrent disease.      A total of 16 minutes was spent with the patient, 100% of which were spent in counseling the patient and/or treatment planning; an additional 5 minutes was spent in chart review/documentation.       Plan:   1.)   Vulvar HSIL: I reviewed the pathology results with Roxanna, and will send her a copy of the pathology report via mail. Discussed recommended management and alternatives. Recommend ablative therapy; alternatives are wide local excision or topical therapy, or continued monitoring without treatment given her age, although this needs to be balanced with Roxanna's overall good health status.    After discussion of risks/benefits of each option, Roxanna is considering surgical management, but wants to continue monitoring for now. Plan as follows:  -RTC in 3 months for exam and continued discussion of management.   -She will call in the interim if she notes any increase in the lesion, or other concerning symptoms.     2.) Labs: None.    3.) Health maintenance issues discussed today: None.    4.) Prescriptions: None.           Aminta Garcia MD, MS, FACOG, FACS  6/6/2023  12:47 PM                  CC  Patient Care Team:  Swapna Gaines MD as PCP - General (Family Medicine)

## 2023-06-26 ENCOUNTER — OFFICE VISIT (OUTPATIENT)
Dept: FAMILY MEDICINE | Facility: CLINIC | Age: 88
End: 2023-06-26
Payer: MEDICARE

## 2023-06-26 VITALS
WEIGHT: 122 LBS | TEMPERATURE: 98.6 F | BODY MASS INDEX: 23.95 KG/M2 | DIASTOLIC BLOOD PRESSURE: 72 MMHG | HEIGHT: 60 IN | SYSTOLIC BLOOD PRESSURE: 136 MMHG | RESPIRATION RATE: 18 BRPM | OXYGEN SATURATION: 98 % | HEART RATE: 64 BPM

## 2023-06-26 DIAGNOSIS — G57.93 NEUROPATHY OF BOTH FEET: ICD-10-CM

## 2023-06-26 DIAGNOSIS — N18.31 STAGE 3A CHRONIC KIDNEY DISEASE (H): Primary | ICD-10-CM

## 2023-06-26 DIAGNOSIS — Z23 NEED FOR DIPHTHERIA-TETANUS-PERTUSSIS (TDAP) VACCINE: ICD-10-CM

## 2023-06-26 DIAGNOSIS — N18.30 BENIGN HYPERTENSION WITH CKD (CHRONIC KIDNEY DISEASE) STAGE III (H): ICD-10-CM

## 2023-06-26 DIAGNOSIS — M06.09 RHEUMATOID ARTHRITIS OF MULTIPLE SITES WITH NEGATIVE RHEUMATOID FACTOR (H): ICD-10-CM

## 2023-06-26 DIAGNOSIS — I12.9 BENIGN HYPERTENSION WITH CKD (CHRONIC KIDNEY DISEASE) STAGE III (H): ICD-10-CM

## 2023-06-26 LAB
ANION GAP SERPL CALCULATED.3IONS-SCNC: 11 MMOL/L (ref 7–15)
BUN SERPL-MCNC: 32.2 MG/DL (ref 8–23)
CALCIUM SERPL-MCNC: 9.5 MG/DL (ref 8.2–9.6)
CHLORIDE SERPL-SCNC: 103 MMOL/L (ref 98–107)
CREAT SERPL-MCNC: 1.39 MG/DL (ref 0.51–0.95)
CREAT UR-MCNC: 9.2 MG/DL
DEPRECATED HCO3 PLAS-SCNC: 27 MMOL/L (ref 22–29)
GFR SERPL CREATININE-BSD FRML MDRD: 35 ML/MIN/1.73M2
GLUCOSE SERPL-MCNC: 90 MG/DL (ref 70–99)
MICROALBUMIN UR-MCNC: <12 MG/L
MICROALBUMIN/CREAT UR: NORMAL MG/G{CREAT}
POTASSIUM SERPL-SCNC: 4.5 MMOL/L (ref 3.4–5.3)
SODIUM SERPL-SCNC: 141 MMOL/L (ref 136–145)

## 2023-06-26 PROCEDURE — 36415 COLL VENOUS BLD VENIPUNCTURE: CPT | Performed by: FAMILY MEDICINE

## 2023-06-26 PROCEDURE — 82570 ASSAY OF URINE CREATININE: CPT | Performed by: FAMILY MEDICINE

## 2023-06-26 PROCEDURE — 82043 UR ALBUMIN QUANTITATIVE: CPT | Performed by: FAMILY MEDICINE

## 2023-06-26 PROCEDURE — 80048 BASIC METABOLIC PNL TOTAL CA: CPT | Performed by: FAMILY MEDICINE

## 2023-06-26 PROCEDURE — 99213 OFFICE O/P EST LOW 20 MIN: CPT | Performed by: FAMILY MEDICINE

## 2023-06-26 RX ORDER — SULFASALAZINE 500 MG/1
500 TABLET ORAL 2 TIMES DAILY
Qty: 60 TABLET | Refills: 3 | Status: CANCELLED | OUTPATIENT
Start: 2023-06-26

## 2023-06-26 RX ORDER — GABAPENTIN 100 MG/1
100 CAPSULE ORAL 3 TIMES DAILY
Qty: 90 CAPSULE | Refills: 1 | Status: CANCELLED | OUTPATIENT
Start: 2023-06-26

## 2023-06-26 RX ORDER — HYDROXYCHLOROQUINE SULFATE 200 MG/1
200 TABLET, FILM COATED ORAL DAILY
Qty: 30 TABLET | Refills: 3 | Status: CANCELLED | OUTPATIENT
Start: 2023-06-26

## 2023-06-26 RX ORDER — FUROSEMIDE 20 MG
TABLET ORAL
Qty: 90 TABLET | Refills: 3 | Status: ON HOLD | OUTPATIENT
Start: 2023-06-26 | End: 2024-03-15

## 2023-06-26 RX ORDER — METOPROLOL TARTRATE 25 MG/1
25 TABLET, FILM COATED ORAL 2 TIMES DAILY
Qty: 180 TABLET | Refills: 3 | Status: ON HOLD | OUTPATIENT
Start: 2023-06-26 | End: 2024-03-15

## 2023-06-26 RX ORDER — GABAPENTIN 100 MG/1
100 CAPSULE ORAL DAILY
Qty: 90 CAPSULE | Refills: 3 | Status: ON HOLD | OUTPATIENT
Start: 2023-06-26 | End: 2024-03-15

## 2023-06-26 RX ORDER — LOSARTAN POTASSIUM 100 MG/1
100 TABLET ORAL DAILY
Qty: 90 TABLET | Refills: 3 | Status: ON HOLD | OUTPATIENT
Start: 2023-06-26 | End: 2024-02-01

## 2023-06-26 NOTE — PROGRESS NOTES
Assessment & Plan     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)  controlled  - furosemide (LASIX) 20 MG tablet; TAKE 1 TABLET BY MOUTH EVERY DAY IF GAIN OF 2 TO 3 POUNDS OVER 2 DAYS  - losartan (COZAAR) 100 MG tablet; Take 1 tablet (100 mg) by mouth daily  - metoprolol tartrate (LOPRESSOR) 25 MG tablet; Take 1 tablet (25 mg) by mouth 2 times daily  - Basic metabolic panel  (Ca, Cl, CO2, Creat, Gluc, K, Na, BUN); Future  - Albumin Random Urine Quantitative with Creat Ratio; Future    Neuropathy of both feet  Advised decrease dise  - gabapentin (NEURONTIN) 100 MG capsule; Take 1 capsule (100 mg) by mouth daily    Rheumatoid arthritis of multiple sites with negative rheumatoid factor (H)  Sees Dr Cruz    Need for diphtheria-tetanus-pertussis (Tdap) vaccine  Can get at pharmacy    Stage 3a chronic kidney disease (H)  Stable     MD LUDWIG [facility] ELIAS Mcknight is a 89 year old, presenting for the following health issues:  Hypertension        6/26/2023    10:02 AM   Additional Questions   Roomed by Ruthie LUNA CMA   Accompanied by Daughter     History of Present Illness       Hypertension: She presents for follow up of hypertension.  She does not check blood pressure  regularly outside of the clinic. Outpatient blood pressures have not been over 140/90. She does not follow a low salt diet.     She eats 2-3 servings of fruits and vegetables daily.She consumes 0 sweetened beverage(s) daily.She exercises with enough effort to increase her heart rate 9 or less minutes per day.  She exercises with enough effort to increase her heart rate 3 or less days per week.   She is taking medications regularly.       Hypertension Follow-up      Do you check your blood pressure regularly outside of the clinic? No     Are you following a low salt diet? No    Are your blood pressures ever more than 140 on the top number (systolic) OR more   than 90 on the bottom number  "(diastolic), for example 140/90? No          Review of Systems   CONSTITUTIONAL: NEGATIVE for fever, chills, change in weight  ENT/MOUTH: NEGATIVE for ear, mouth and throat problems  RESP: NEGATIVE for significant cough or SOB  CV: NEGATIVE for chest pain, palpitations or peripheral edema  ROS otherwise negative      Objective    /72   Pulse 64   Temp 98.6  F (37  C) (Temporal)   Resp 18   Ht 1.526 m (5' 0.08\")   Wt 55.3 kg (122 lb)   SpO2 98%   BMI 23.77 kg/m    Body mass index is 23.77 kg/m .  Physical Exam   GENERAL: healthy, alert and no distress  NECK: no adenopathy, no asymmetry, masses, or scars and thyroid normal to palpation  RESP: lungs clear to auscultation - no rales, rhonchi or wheezes  CV: regular rate and rhythm, normal S1 S2, no S3 or S4, no murmur, click or rub, no peripheral edema and peripheral pulses strong  ABDOMEN: soft, nontender, no hepatosplenomegaly, no masses and bowel sounds normal  MS: no gross musculoskeletal defects noted, no edema    Pending             "

## 2023-07-19 ENCOUNTER — OFFICE VISIT (OUTPATIENT)
Dept: OPHTHALMOLOGY | Facility: CLINIC | Age: 88
End: 2023-07-19
Attending: STUDENT IN AN ORGANIZED HEALTH CARE EDUCATION/TRAINING PROGRAM
Payer: MEDICARE

## 2023-07-19 DIAGNOSIS — H02.109 ECTROPION OF LOWER EYELID: ICD-10-CM

## 2023-07-19 DIAGNOSIS — L98.9 SKIN LESIONS: Primary | ICD-10-CM

## 2023-07-19 DIAGNOSIS — H02.9 LESION OF LEFT UPPER EYELID: ICD-10-CM

## 2023-07-19 PROCEDURE — 67810 INCAL BX EYELID SKN LID MRGN: CPT | Mod: E1 | Performed by: OPHTHALMOLOGY

## 2023-07-19 PROCEDURE — 88341 IMHCHEM/IMCYTCHM EA ADD ANTB: CPT | Performed by: OPHTHALMOLOGY

## 2023-07-19 PROCEDURE — 88342 IMHCHEM/IMCYTCHM 1ST ANTB: CPT | Performed by: OPHTHALMOLOGY

## 2023-07-19 PROCEDURE — 67917 REPAIR EYELID DEFECT: CPT | Mod: E2 | Performed by: OPHTHALMOLOGY

## 2023-07-19 PROCEDURE — 99214 OFFICE O/P EST MOD 30 MIN: CPT | Mod: 25 | Performed by: OPHTHALMOLOGY

## 2023-07-19 PROCEDURE — 88305 TISSUE EXAM BY PATHOLOGIST: CPT | Performed by: OPHTHALMOLOGY

## 2023-07-19 PROCEDURE — 92285 EXTERNAL OCULAR PHOTOGRAPHY: CPT | Performed by: OPHTHALMOLOGY

## 2023-07-19 RX ORDER — ERYTHROMYCIN 5 MG/G
OINTMENT OPHTHALMIC ONCE
Status: COMPLETED | OUTPATIENT
Start: 2023-07-19 | End: 2023-07-19

## 2023-07-19 RX ADMIN — ERYTHROMYCIN: 5 OINTMENT OPHTHALMIC at 08:24

## 2023-07-19 ASSESSMENT — TONOMETRY
OS_IOP_MMHG: 09
OD_IOP_MMHG: 10
IOP_METHOD: ICARE

## 2023-07-19 ASSESSMENT — SLIT LAMP EXAM - LIDS: COMMENTS: 2+ DERMATOCHALASIS, 2+ BLEPHARITIS

## 2023-07-19 ASSESSMENT — VISUAL ACUITY
OD_CC+: -1
OS_CC: 20/25
METHOD: SNELLEN - LINEAR
CORRECTION_TYPE: GLASSES
OD_CC: 20/30

## 2023-07-19 NOTE — PATIENT INSTRUCTIONS
Use cold compresses 10 minutes every hour for the first 48 hours while awake to minimize bruising and swelling. It works well to put a washcloth in a bowl of ice water and use the cold washcloth.     Use a warm compress 5-10 minutes 4 times per day for the next 2 days or as needed prior to next appointment.     Apply the prescribed/provided ointment to the incision three times a day for 1 week.

## 2023-07-19 NOTE — NURSING NOTE
Chief Complaints and History of Present Illnesses   Patient presents with     Ectropion Evaluation     Lesion On Left Upper Lid       Chief Complaint(s) and History of Present Illness(es)     Ectropion Evaluation            Laterality: left lower lid          Lesion On Left Upper Lid            Laterality: left upper lid          Comments    Patient referred by Dr. Xiong for lesion evaluation, KISHORE and ectropion evaluation.   Patient has had a lesion present for 3-4 months on her KISHORE, it continues to get bigger and protrudes straight out.   It it weighing down her lid and gets in her vision.  Had a smaller lesion a long time ago that fell off on its own.   No pain, some possible discharge, but minor.  Left eye has been tearing more frequently and appears more red. LLL has been drooping more over the past year.                  Pérez Rios, Ophthalmic Assistant

## 2023-07-19 NOTE — PROGRESS NOTES
Chief Complaint(s) and History of Present Illness(es)     Ectropion Evaluation            Laterality: left lower lid          Lesion On Left Upper Lid            Laterality: left upper lid          Comments    Patient referred by Dr. Xiong for lesion evaluation, KISHORE and ectropion   evaluation.   Patient has had a lesion present for 3-4 months on her KISHORE, it continues   to get bigger and protrudes straight out.   It it weighing down her lid and gets in her vision.  Had a smaller lesion   a long time ago that fell off on its own.   No pain, some possible discharge.  Left eye has been tearing more frequently and appears more red. LLL has   been drooping more over the past year.         Many years ago had a skin cancer excised from left medial canthus - basal cell carcinoma 2008.   History of melanoma in situ left arm 2008  Vulvar intraepithelial neoplasia          Assessment & Plan     Roxanna Stark is a 89 year old female with the following diagnoses:   Encounter Diagnoses   Name Primary?     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 Has ectropion right lower eyelid as well but minimally symptomatic. She has punctal stenosis both eyes, but will observe that for now as not causing.     Plan:  Biopsy left upper eyelid lesion/cutaneous horn. Discussed possible it could be skin cancer in which case we would need to proceed with Mohs/recon    Left lower eyelid ectropion repair, can perform in clinic today. she is not interested in doing it with sedation.     Today with Roxanna Stark  and her daughter, I reviewed the indications, risks, benefits, and alternatives of the proposed surgical procedure including, but not limited to, failure obtain the desired result  and need for additional surgery, bleeding, infection, loss of vision, loss of the eye.  I provided multiple opportunities for the questions, answered all questions to the  best of my ability, and confirmed that my answers and my discussion were understood.   Abdon Bullard MD      Also has multiple other lesion, notably what looks like an AK on her right temple. She should see dermatology. Referral will be placed.        Operative Note - Eyelid Biopsy      Pre-operative Diagnosis: Lesion left upper eyelid    Post-operative Diagnosis: Same.    Procedure: Excision of eyelid neoplasm.    Surgeon: Abdon Bullard MD    Anesthesia: Local infiltration with 1% Lidocaine and Epinephrine.    Complications: None.    Estimated blood loss: <5 mL    Specimen: Eyelid neoplasm to pathology.    Procedure: The patient was brought to the minor procedure room and placed supine on the operating table.  The involved eyelid was infiltrated with local anesthetic.  The area was prepped and draped in the typical sterile fashion.  A tooth forceps was used to elevate the lesion and it was excised at its base with a Riya scissors.  Hemostasis was obtained with a high temperature cautery.  The excised diameter measured 8 mm.      Closure of wound: 6-0 Plain gut    Dressing: The wound was dressed with Erythromycin ophthalmic ointment.     The patient left the minor procedure room in stable condition.    I was present for the entire procedure. Abdon Bullard MD     PREOPERATIVE DIAGNOSIS: Left lower eyelid ectropion  POSTOPERATIVE DIAGNOSIS: Left lower eyelid ectropion  PROCEDURE:  Left lower eyelid ectropion repair by tarsal strip procedure   ANESTHESIA: 1% lidocaine with epinephrine   SURGEON: Abdon Bullard MD.  COMPLICATIONS: None.   ESTIMATED BLOOD LOSS: Less than 5 mL.   HISTORY: Roxanna Stark  presented with tearing  due to lower lid ectropion. After the risks, benefits and alternatives to the proposed procedure were explained, informed consent was obtained.   DESCRIPTION OF PROCEDURE: The left lower lids and lateral canthal areas were infiltrated with local anesthetic. The area was prepped  and draped in the typical fashion. Attention was directed to the left side. Lateral canthotomy and inferior cantholysis was performed with Cayden scissors. A lateral tarsal strip was fashioned with the high temperature cautery and the cayden scissors. The tarsal strip was secured to the lateral orbital rim periosteum with a double-armed 5-0 vicryl suture in a horizontal mattress fashion. Lateral canthal angle was closed with a gray line to gray line suture of 5-0 Vicryl tied internally. Skin was closed with interrupted 6-0 plain gut sutures.  The patient tolerated the procedure well.  Antibiotic ointment was applied to the incisions. Roxanna Stark left the room in stable condition.   I was present for the entire procedure. Abdon Bullard MD    Patient disposition:   No follow-ups on file.        Attending Physician Attestation: Complete documentation of historical and exam elements from today's encounter can be found in the full encounter summary report (not reduplicated in this progress note). I personally obtained the chief complaint(s) and history of present illness. I confirmed and edited as necessary the review of systems, past medical/surgical history, family history, social history, and examination findings as documented by others; and I examined the patient myself. I personally reviewed the relevant tests, images, and reports as documented above. I formulated and edited as necessary the assessment and plan and discussed the findings and management plan with the patient.  -Abdon Bullard MD

## 2023-07-19 NOTE — LETTER
2023         RE:  :  MRN: Roxanna Stark  [id number]  [id number]     Dear Dr. Whitney Rogers,    Thank you for asking me to see your patient, Roxanna Stark, for an oculoplastic   consultation.  My assessment and plan are below.  For further details, please see my attached clinic note.      Chief Complaint(s) and History of Present Illness(es)     Ectropion Evaluation            Laterality: left lower lid          Lesion On Left Upper Lid            Laterality: left upper lid          Comments    Patient referred by Dr. Xiong for lesion evaluation, KISHORE and ectropion   evaluation.   Patient has had a lesion present for 3-4 months on her KISHORE, it continues   to get bigger and protrudes straight out.   It it weighing down her lid and gets in her vision.  Had a smaller lesion   a long time ago that fell off on its own.   No pain, some possible discharge.  Left eye has been tearing more frequently and appears more red. LLL has   been drooping more over the past year.         Many years ago had a skin cancer excised from left medial canthus - basal cell carcinoma .   History of melanoma in situ left arm 2008  Vulvar intraepithelial neoplasia          Assessment & Plan     Roxanna Stark is a 89 year old female with the following diagnoses:   Encounter Diagnoses   Name Primary?     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 Has ectropion right lower eyelid as well but minimally symptomatic. She has punctal stenosis both eyes, but will observe that for now as not causing.     Plan:  Biopsy left upper eyelid lesion/cutaneous horn. Discussed possible it could be skin cancer in which case we would need to proceed with Mohs/recon    Left lower eyelid ectropion repair, can perform in clinic today. she is not interested in doing it with sedation.     Also has multiple other lesion, notably what looks like an AK on her right  temple. She should see dermatology. Referral will be placed.        Operative Note - Eyelid Biopsy      Pre-operative Diagnosis: Lesion left upper eyelid    Post-operative Diagnosis: Same.    Procedure: Excision of eyelid neoplasm.    Surgeon: Abdon Bullard MD    Anesthesia: Local infiltration with 1% Lidocaine and Epinephrine.    Complications: None.    Estimated blood loss: <5 mL    Specimen: Eyelid neoplasm to pathology.    Procedure: The patient was brought to the minor procedure room and placed supine on the operating table.  The involved eyelid was infiltrated with local anesthetic.  The area was prepped and draped in the typical sterile fashion.  A tooth forceps was used to elevate the lesion and it was excised at its base with a Riya scissors.  Hemostasis was obtained with a high temperature cautery.  The excised diameter measured 8 mm.      Closure of wound: 6-0 Plain gut    Dressing: The wound was dressed with Erythromycin ophthalmic ointment.     The patient left the minor procedure room in stable condition.    I was present for the entire procedure. Abdon Bullard MD     PREOPERATIVE DIAGNOSIS: Left lower eyelid ectropion  POSTOPERATIVE DIAGNOSIS: Left lower eyelid ectropion  PROCEDURE:  Left lower eyelid ectropion repair by tarsal strip procedure   ANESTHESIA: 1% lidocaine with epinephrine   SURGEON: Abdon Bullard MD.  COMPLICATIONS: None.   ESTIMATED BLOOD LOSS: Less than 5 mL.   HISTORY: Roxanna Stark  presented with tearing  due to lower lid ectropion. After the risks, benefits and alternatives to the proposed procedure were explained, informed consent was obtained.   DESCRIPTION OF PROCEDURE: The left lower lids and lateral canthal areas were infiltrated with local anesthetic. The area was prepped and draped in the typical fashion. Attention was directed to the left side. Lateral canthotomy and inferior cantholysis was performed with Riya scissors. A lateral tarsal strip was  fashioned with the high temperature cautery and the cayden scissors. The tarsal strip was secured to the lateral orbital rim periosteum with a double-armed 5-0 vicryl suture in a horizontal mattress fashion. Lateral canthal angle was closed with a gray line to gray line suture of 5-0 Vicryl tied internally. Skin was closed with interrupted 6-0 plain gut sutures.  The patient tolerated the procedure well.  Antibiotic ointment was applied to the incisions. Roxanna Stark left the room in stable condition.   I was present for the entire procedure. Abdon Bullard MD    Patient disposition:   No follow-ups on file.         Again, thank you for allowing me to participate in the care of your patient.      Sincerely,    Abdon Bullard MD  Department of Ophthalmology and Visual Neurosciences  [facility]    CC: Whitney Rogers MD  [id number] [id number]  Via In Basket

## 2023-07-25 ENCOUNTER — TELEPHONE (OUTPATIENT)
Dept: OPHTHALMOLOGY | Facility: CLINIC | Age: 88
End: 2023-07-25
Payer: MEDICARE

## 2023-07-25 LAB
PATH REPORT.COMMENTS IMP SPEC: ABNORMAL
PATH REPORT.COMMENTS IMP SPEC: ABNORMAL
PATH REPORT.COMMENTS IMP SPEC: YES
PATH REPORT.FINAL DX SPEC: ABNORMAL
PATH REPORT.GROSS SPEC: ABNORMAL
PATH REPORT.MICROSCOPIC SPEC OTHER STN: ABNORMAL
PATH REPORT.RELEVANT HX SPEC: ABNORMAL
PHOTO IMAGE: ABNORMAL

## 2023-07-25 NOTE — TELEPHONE ENCOUNTER
Called patient in regards to her left upper eyelid biopsy results. Talked to patient and explained that biopsy result was positive for squamous cell carcinoma, but that SCC was completely excised per pathology report. Return precautions were given if lesion recurs and to look out for symptoms. Patient verbalized understand, and phone number for clinic was given.    Jefferson Armendariz MD  PGY-2 Ophthalmology Resident

## 2023-08-02 ENCOUNTER — OFFICE VISIT (OUTPATIENT)
Dept: OPHTHALMOLOGY | Facility: CLINIC | Age: 88
End: 2023-08-02
Payer: MEDICARE

## 2023-08-02 DIAGNOSIS — C44.121 SQUAMOUS CELL CANCER OF SKIN OF LEFT EYELID: Primary | ICD-10-CM

## 2023-08-02 PROCEDURE — 99024 POSTOP FOLLOW-UP VISIT: CPT | Performed by: OPHTHALMOLOGY

## 2023-08-02 ASSESSMENT — TONOMETRY
OS_IOP_MMHG: 10
IOP_METHOD: ICARE
OD_IOP_MMHG: 10

## 2023-08-02 ASSESSMENT — VISUAL ACUITY
METHOD: SNELLEN - LINEAR
OS_CC: 20/25
OD_CC: 20/30
CORRECTION_TYPE: GLASSES
OD_CC+: -2

## 2023-08-02 NOTE — NURSING NOTE
Chief Complaints and History of Present Illnesses   Patient presents with    Post Op (Ophthalmology) Left Eye       Chief Complaint(s) and History of Present Illness(es)       Post Op (Ophthalmology) Left Eye              Laterality: left eye              Comments    S/p left lower eyelid ectropion repair and excision of eyelid neoplasm 7/19/23. Feels things are healing well. Using erythromycin ointment twice daily                   Jaz Jeter COT

## 2023-08-02 NOTE — PROGRESS NOTES
"Chief Complaint(s) and History of Present Illness(es)     Post Op (Ophthalmology) Left Eye            Laterality: left eye          Comments    S/p left lower eyelid ectropion repair and excision of eyelid neoplasm   7/19/23. Feels things are healing well. Using erythromycin ointment twice daily. Tearing has resolved.     On exam, well healing upper eyelid excision site. Lower lid in significantly improved position, mild residual punctal ectropion, but resolved tearing. Looks better than preop picture.       Doing well. Happy with outcome.  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     If she has recurrence of the lesion she should return. I recommend she have a full body skin check. An order was placed for dermatology.     Patient Instructions   - Apply warm compresses for 1 minute three times a day until your bruising has resolved.  - Cool compresses can help with swelling and itching, you can alternate cool compresses with warm compresses if you find it helpful.  - Apply erythromycin ophthalmic ointment to your incision at bedtime until your tube is finished.   - If you have symptoms of eye irritation, it is good to use over the counter artificial tears. Good brands include Refresh, Blink, and Systane. Do NOT get drops that are for \"red eyes.\"   - It is normal for the incision to appear raised, red, itch and have small lumps. You can gently massage any small bumps along the incision line. These can take up to six months to resolve.    Return if symptoms worsen or fail to improve.      Attending Physician Attestation: Complete documentation of historical and exam elements from today's encounter can be found in the full encounter summary report (not reduplicated in this progress note). I personally obtained the chief complaint(s) and history of present illness. I confirmed and edited as " necessary the review of systems, past medical/surgical history, family history, social history, and examination findings as documented by others; and I examined the patient myself. I personally reviewed the relevant tests, images, and reports as documented above. I formulated and edited as necessary the assessment and plan and discussed the findings and management plan with the patient and family. I personally reviewed the ophthalmic test(s) associated with this encounter, agree with the interpretation(s) as documented by the resident/fellow, and have edited the corresponding report(s) as necessary. Abdon Bullard MD

## 2023-08-02 NOTE — Clinical Note
Thanks for sending her! Path was squamous cell carcinoma but I did take a nice margin for the biopsy and margins are negative. I did her ectropion in clinic as well.

## 2023-08-02 NOTE — PATIENT INSTRUCTIONS
"- Apply warm compresses for 1 minute three times a day until your bruising has resolved, you can continue this for one more month.   - Apply erythromycin ophthalmic ointment to your incision at bedtime until your tube is finished.   - If you have symptoms of eye irritation, it is good to use over the counter artificial tears. Good brands include Refresh, Blink, and Systane. Do NOT get drops that are for \"red eyes.\"   - It is normal for the incision to appear raised, red, itch and have small lumps. You can gently massage any small bumps along the incision line. These can take up to six months to resolve.    "

## 2023-08-10 ENCOUNTER — LAB (OUTPATIENT)
Dept: LAB | Facility: CLINIC | Age: 88
End: 2023-08-10
Payer: MEDICARE

## 2023-08-10 DIAGNOSIS — M06.09 RHEUMATOID ARTHRITIS OF MULTIPLE SITES WITH NEGATIVE RHEUMATOID FACTOR (H): ICD-10-CM

## 2023-08-10 DIAGNOSIS — Z79.899 HIGH RISK MEDICATION USE: ICD-10-CM

## 2023-08-10 LAB
ALBUMIN SERPL BCG-MCNC: 4.1 G/DL (ref 3.5–5.2)
ALP SERPL-CCNC: 94 U/L (ref 35–104)
ALT SERPL W P-5'-P-CCNC: 19 U/L (ref 0–50)
AST SERPL W P-5'-P-CCNC: 28 U/L (ref 0–45)
BASOPHILS # BLD AUTO: 0.1 10E3/UL (ref 0–0.2)
BASOPHILS NFR BLD AUTO: 1 %
BILIRUB DIRECT SERPL-MCNC: <0.2 MG/DL (ref 0–0.3)
BILIRUB SERPL-MCNC: 0.2 MG/DL
CREAT SERPL-MCNC: 1.52 MG/DL (ref 0.51–0.95)
CRP SERPL-MCNC: 6.49 MG/L
EOSINOPHIL # BLD AUTO: 0.2 10E3/UL (ref 0–0.7)
EOSINOPHIL NFR BLD AUTO: 2 %
ERYTHROCYTE [DISTWIDTH] IN BLOOD BY AUTOMATED COUNT: 16.2 % (ref 10–15)
ERYTHROCYTE [SEDIMENTATION RATE] IN BLOOD BY WESTERGREN METHOD: 38 MM/HR (ref 0–30)
GFR SERPL CREATININE-BSD FRML MDRD: 31 ML/MIN/1.73M2
HCT VFR BLD AUTO: 37.1 % (ref 35–47)
HGB BLD-MCNC: 11.7 G/DL (ref 11.7–15.7)
IMM GRANULOCYTES # BLD: 0 10E3/UL
IMM GRANULOCYTES NFR BLD: 0 %
LYMPHOCYTES # BLD AUTO: 2.1 10E3/UL (ref 0.8–5.3)
LYMPHOCYTES NFR BLD AUTO: 24 %
MCH RBC QN AUTO: 29 PG (ref 26.5–33)
MCHC RBC AUTO-ENTMCNC: 31.5 G/DL (ref 31.5–36.5)
MCV RBC AUTO: 92 FL (ref 78–100)
MONOCYTES # BLD AUTO: 0.9 10E3/UL (ref 0–1.3)
MONOCYTES NFR BLD AUTO: 10 %
NEUTROPHILS # BLD AUTO: 5.6 10E3/UL (ref 1.6–8.3)
NEUTROPHILS NFR BLD AUTO: 63 %
PLATELET # BLD AUTO: 173 10E3/UL (ref 150–450)
PROT SERPL-MCNC: 7.7 G/DL (ref 6.4–8.3)
RBC # BLD AUTO: 4.03 10E6/UL (ref 3.8–5.2)
WBC # BLD AUTO: 9 10E3/UL (ref 4–11)

## 2023-08-10 PROCEDURE — 85025 COMPLETE CBC W/AUTO DIFF WBC: CPT

## 2023-08-10 PROCEDURE — 36415 COLL VENOUS BLD VENIPUNCTURE: CPT

## 2023-08-10 PROCEDURE — 85652 RBC SED RATE AUTOMATED: CPT

## 2023-08-10 PROCEDURE — 82565 ASSAY OF CREATININE: CPT

## 2023-08-10 PROCEDURE — 80076 HEPATIC FUNCTION PANEL: CPT

## 2023-08-10 PROCEDURE — 86140 C-REACTIVE PROTEIN: CPT

## 2023-08-15 ENCOUNTER — OFFICE VISIT (OUTPATIENT)
Dept: RHEUMATOLOGY | Facility: CLINIC | Age: 88
End: 2023-08-15
Payer: MEDICARE

## 2023-08-15 VITALS
SYSTOLIC BLOOD PRESSURE: 196 MMHG | RESPIRATION RATE: 18 BRPM | OXYGEN SATURATION: 95 % | WEIGHT: 120.8 LBS | TEMPERATURE: 98 F | DIASTOLIC BLOOD PRESSURE: 74 MMHG | HEART RATE: 60 BPM | BODY MASS INDEX: 23.53 KG/M2

## 2023-08-15 DIAGNOSIS — M19.041 PRIMARY OSTEOARTHRITIS OF BOTH HANDS: ICD-10-CM

## 2023-08-15 DIAGNOSIS — M19.042 PRIMARY OSTEOARTHRITIS OF BOTH HANDS: ICD-10-CM

## 2023-08-15 DIAGNOSIS — M06.09 RHEUMATOID ARTHRITIS OF MULTIPLE SITES WITH NEGATIVE RHEUMATOID FACTOR (H): Primary | ICD-10-CM

## 2023-08-15 DIAGNOSIS — Z79.899 HIGH RISK MEDICATION USE: ICD-10-CM

## 2023-08-15 PROCEDURE — 99214 OFFICE O/P EST MOD 30 MIN: CPT | Performed by: INTERNAL MEDICINE

## 2023-08-15 RX ORDER — SULFASALAZINE 500 MG/1
500 TABLET ORAL 2 TIMES DAILY
Qty: 60 TABLET | Refills: 3 | Status: SHIPPED | OUTPATIENT
Start: 2023-08-15 | End: 2023-11-28

## 2023-08-15 RX ORDER — HYDROXYCHLOROQUINE SULFATE 200 MG/1
200 TABLET, FILM COATED ORAL DAILY
Qty: 30 TABLET | Refills: 3 | Status: SHIPPED | OUTPATIENT
Start: 2023-08-15 | End: 2023-11-28

## 2023-08-15 NOTE — PATIENT INSTRUCTIONS
RHEUMATOLOGY    [facility] [place]  [address] [place] [address]  [place] [address]    Phone number: [phone]  Fax number: [phone]    If you need a medication refill, please contact us as you may need lab work and/or a follow up visit prior to your refill.      Thank you for choosing [facility]!    Twila Atkins CMA Rheumatology

## 2023-08-15 NOTE — PROGRESS NOTES
Rheumatology Clinic Visit      Roxanna Stark MRN# [id number]   YOB: 1927 Age: 89 year old      Date of visit: 8/15/23   PCP: Dr. Swapna Gaines  Cardiology: Dr. Boston Navarro     Chief Complaint   Patient presents with:  Rheumatoid Arthritis    Assessment and Plan     1. 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  She was treated with prednisone monotherapy for several years, being able to taper off without recurrence of symptoms. She then developed worsening symptoms in her hands and was diagnosed with rheumatoid arthritis. Initially, she was resistant to taking DMARD therapy.  She then was started on MTX that was effective; she reduced the dose with worsening symptoms and then SSZ was added; at one point was doing well on MTX 20mg wkly and SSZ 500mg BID (mild anemia possibly associated with SSZ so the dose was previously reduced); however, when seen in January 2022 she reported that she had stopped taking methotrexate and was only using sulfasalazine.  Then in March 2022 it was reported that she stopped methotrexate 6 months prior and sulfasalazine was stopped 2 months prior.  Telephone visit on 6/30/2022 to clarify her treatment plan and discussed with both the patient and her daughter to get an accurate history; the patient at that time was off all treatment, and based on the history provided at that time she was on sulfasalazine monotherapy for a while and doing well. Prednisone was then stopped and she continued to do well on sulfasalazine for a while but eventually required addition of some hydroxychloroquine.  To ensure she is taking the correct medications I have asked that she bring her medications with her to each appointment but she did not do so today.  She is present with her daughter, who states that the patient manages her own medications.  The patient reports that  she might have been missing sulfasalazine for about 1 week.  She states that she will take the medication list and compared to the medications she is taking at home and if they do not match up then she will notify this clinic and her primary care provider's clinic.  Arthritis controlled at this time.    Chronic illness, stable.    - Continue sulfasalazine 500 mg twice daily    - Continue hydroxychloroquine 200 mg daily  - Labs in 3 months: CBC, Creatinine, Hepatic Panel, ESR, CRP    High risk medication requiring intensive toxicity monitoring at least quarterly    2. Giant Cell Arteritis History?: 12/26/2005 Left TA biopsy negative per [facility] record review.  No symptoms of GCA at this time.      3. Right shoulder rotator cuff tear and history of pain: Previously evaluated by orthopedic surgery and her pain resolved for approximately one year after having a steroid injection in March 2015. She does not want to have surgical correction of her shoulder. Repeat steroid injections have been helpful; not needed today.  Physical therapy was effective previously.  Not an issue today.    4.  Osteoarthritis of the hands: Heberden's nodes present.    Not symptomatic at the DIPs at this time.     5. History of basal cell carcinoma: Following with dermatology; encouraged yearly dermatology evaluation     6. Bone Health: Managed by PCP already.     7.  Vaccinations:     - Influenza: encouraged yearly vaccination  - [id number]: up to date  - [id number]: up to date  - Shingrix: Up to date  - COVID-19: Advised updating, and to hold sulfasalazine for 2 weeks afterward    8.  Creatinine elevation: Mildly elevated compared to baseline.  GFR similar to previous.  Recheck with next toxicity monitoring labs.  Avoid nephrotoxic agents such as NSAIDs.  CKD management per PCP.    9. Elevated blood pressure:  Roxanna to follow up with Primary Care provider regarding elevated blood pressure.     Total minutes spent in evaluation with patient,  documentation, , and review of pertinent studies and chart notes: 20     Ms. Stark verbalized agreement with and understanding of the rational for the diagnosis and treatment plan.  All questions were answered to best of my ability and the patient's satisfaction. Ms. Stark was advised to contact the clinic with any questions that may arise after the clinic visit.      Thank you for involving me in the care of the patient    Return to clinic: 3 months      HPI   Roxanna Stark is a 89 year old female with medical history significant for basal cell carcinoma, hypertension, aortic stenosis, right rotator cuff tear (previously evaluated by Dr. Bingham, orthopedic surgery, on 3/20/2015 where at that time Ms. Stark was not interested in surgical correction; she received an intra-articular steroid injection at that time that was effective for ~1year), temporal arteritis?, and seronegative erosive rheumatoid arthritis.     1/31/2022: doing well at this time. No longer taking MTX and hasn't for some time.  Note that VESTA Mattson, called to check her pharmacy and MTX and SSZ were last filled in July 2021.  Roxanna says that she is happy with how well she is doing; no joint pain; morning stiffness <20 min. Arthritis is not limiting any of her daily activities.  No difficulty raising her arms above her head or standing up from a chair. No vision change. No jaw or tongue claudication. No scalp tenderness. No new headache.     3/30/2022: Telephone call regarding patient's medication raise question about what she was actually taking so a visit was scheduled for today to review.  Roxanna, and Roxanna's daughter Anna.  Reportedly methotrexate was stopped about 6 months ago.  Sulfasalazine was stopped a couple months ago.  Worsening joint pain at the MCPs, PIPs, wrists, MTPs, knees; pain is worse in the morning and improves with topical BenGay and moving.  Positive gelling phenomenon.  Morning stiffness for at least 1 hour.  No  jaw or tongue claudication.  No scalp tenderness.  No new headache.  No vision change.    7/8/2022: Currently doing well.  No joint pain or swelling.  No morning stiffness.  Positive gelling phenomenon that resolves within 5 minutes and only occurs after sitting for a very long time.  Confirms that she is taking prednisone 5 mg daily and sulfasalazine 500 mg twice daily.  She also has questions about Lasix and says that she will talk with her primary care provider regarding this.  She is accompanied by her daughter today.    10/14/2022: Currently doing well.  She reports that she is taking sulfasalazine 500 mg twice daily.  No longer taking prednisone.  She noticed no worsening of symptoms with stopping prednisone.  She reports that she has chronic changes of her hands but no swelling and no pain.  Morning stiffness for no more than 10 minutes.    5/1/2023: Pain at the left second MCP where she has limited range of motion and stiffness.  No increased warmth or overlying erythema of the left second MCP.  Also with small bony hypertrophy at the DIPs that are intermittently achy but not symptomatic at this time.  PIPs without swelling or pain.  Wrists, elbows, shoulders, knees, ankles, and MTPs without swelling or pain.  Taking sulfasalazine 500 mg twice daily; she did not bring her medications with her but she confirms that she knows she is taking sulfasalazine twice daily.    Today, 8/15/2023: Today she reports that she is doing well.  No joint pain or swelling.  No morning stiffness or gelling phenomenon.  Reports that she has been taking hydroxychloroquine and sulfasalazine as prescribed, except for the past 1 week where she believes that she has not been taking sulfasalazine so she will check on this.  She forgot to bring her medications with her today to today's appointment.  The patient's daughter is with her today.    Denies fevers, chills, nausea, vomiting, constipation, diarrhea. No abdominal pain. No chest  pain/pressure, palpitations, or shortness of breath. No oral or nasal sores. No neck pain. No rash.     Tobacco: None  EtOH: No more than 1 drink per week  Drugs: None  Occupation: Used to work for the [employer]; now retired    ROS   12 point review of system was completed and negative except as noted in the HPI     Active Problem List     Patient Active Problem List   Diagnosis    History of polymyalgia rheumatica    History of basal cell carcinoma    CARDIOVASCULAR SCREENING; LDL GOAL LESS THAN 130    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)    Hip pain    Left atrial enlargement    Status post coronary angiogram    Aortic valve replaced    Rheumatoid arthritis of multiple sites with negative rheumatoid factor (H)    CKD (chronic kidney disease) stage 3, GFR 30-59 ml/min (H)    THERESA III (vulvar intraepithelial neoplasia III)    Peripheral arterial disease (H)    Decreased hearing of both ears    Hearing aid worn    Gait abnormality     Past Medical History     Past Medical History:   Diagnosis Date    Actinic keratosis     Aortic stenosis 2014    Basal cell cancer 7/2014    left eye medial canthus     Basal cell carcinoma 9/30/08    left cheek    CKD (chronic kidney disease) stage 3, GFR 30-59 ml/min (H) 7/1/2019    HTN (hypertension)     Melanoma in situ (H) 9/30/08    left arm    Polymyalgia rheumatica (H) 11/99    Rheumatoid arthritis of multiple sites with negative rheumatoid factor (H)     Temporal arteritis (H) 11/99     Past Surgical History     Past Surgical History:   Procedure Laterality Date    CATARACT IOL, RT/LT  5/09    bilateral    COLONOSCOPY  2002    EXCISE LESION VULVA N/A 9/27/2019    Procedure: Wide Local Excision Of Vulva, Colposcopy;  Surgeon: Mono Ribeiro MD;  Location:  OR    REPLACE VALVE AORTIC N/A 4/25/2016    Procedure: REPLACE VALVE AORTIC;  Surgeon: Sudeep Tsai MD;  Location:  OR    [facility] SKIN TISSUE PROCEDURE UNLISTED  11/3/08     mmis skin cancer excision     Allergy     Allergies   Allergen Reactions    Lisinopril Cough        Current Medication List     Current Outpatient Medications   Medication Sig    calcium-vitamin D 500-125 MG-UNIT TABS     furosemide (LASIX) 20 MG tablet TAKE 1 TABLET BY MOUTH EVERY DAY IF GAIN OF 2 TO 3 POUNDS OVER 2 DAYS    gabapentin (NEURONTIN) 100 MG capsule Take 1 capsule (100 mg) by mouth daily    hydroxychloroquine (PLAQUENIL) 200 MG tablet Take 1 tablet (200 mg) by mouth daily    ICAPS PO 2 tablets daily    losartan (COZAAR) 100 MG tablet Take 1 tablet (100 mg) by mouth daily    metoprolol tartrate (LOPRESSOR) 25 MG tablet Take 1 tablet (25 mg) by mouth 2 times daily    Omega-3 Fatty Acids (OMEGA-3 FISH OIL PO) Take 1 tablet twice daily.    sulfaSALAzine (AZULFIDINE) 500 MG tablet Take 1 tablet (500 mg) by mouth 2 times daily    amoxicillin (AMOXIL) 500 MG capsule Take 4 capsules by mouth one hour before dental appointment    Misc. Devices (ROLLATOR ULTRA-LIGHT) MISC      No current facility-administered medications for this visit.       Social History   See HPI    Family History     Family History   Problem Relation Age of Onset    Breast Cancer Sister 45    Arthritis Sister     Thyroid Disease Sister     Arthritis Sister     Colon Cancer Sister         colon    Arthritis Mother     Hypertension Father     Prostate Cancer Father     Arthritis Father     Heart Disease Father     Lipids Father     Colon Cancer Father     Arthritis Sister     Asthma Daughter     Asthma Daughter     Lung Cancer Daughter 58        lung    Pancreatic Cancer Other 81        pancreatic      Physical Exam     Temp Readings from Last 3 Encounters:   08/15/23 98  F (36.7  C) (Oral)   06/26/23 98.6  F (37  C) (Temporal)   06/01/23 97.8  F (36.6  C) (Oral)     BP Readings from Last 5 Encounters:   08/15/23 (!) 196/74   06/26/23 136/72   06/01/23 (!) 152/73   05/04/23 (!) 157/81   05/01/23 121/68     Pulse Readings from Last 1  "Encounters:   08/15/23 60     Resp Readings from Last 1 Encounters:   08/15/23 18     Estimated body mass index is 23.53 kg/m  as calculated from the following:    Height as of 6/26/23: 1.526 m (5' 0.08\").    Weight as of this encounter: 54.8 kg (120 lb 12.8 oz).    GEN: NAD.  HEENT:  Anicteric, noninjected sclera. No obvious external lesions of the ear and nose. Hearing intact.  CV: S1, S2. RRR. No m/r/g  PULM: No increased work of breathing. CTA bilaterally   MSK: MCPs and PIPs without synovial swelling or tenderness to palpation.  Heberden's and Gracie's nodes present.  DIPs nontender to palpation.  Wrists without swelling or tenderness to palpation.  Elbows and shoulders without swelling or tenderness to palpation.    Knees, ankles, and MTPs without swelling or tenderness to palpation.    SKIN: No rash or jaundice seen  PSYCH: Alert. Appropriate.      Labs / Imaging (select studies)     RF/CCP  Recent Labs   Lab Test 08/11/16  1124 08/04/16  1222 02/18/16  1543   CCPIGG 1  --   --    RHF  --  <20 <20     CBC  Recent Labs   Lab Test 08/10/23  0852 05/01/23  0908 01/20/23  0847 03/23/21  1526 01/22/21  0933 10/30/20  0903 07/24/20  1328   WBC 9.0 9.9 9.5   < > 8.1 10.8 11.1*   RBC 4.03 4.34 4.16   < > 3.93 4.04 3.33*   HGB 11.7 12.6 12.2   < > 11.9 11.8 10.4*   HCT 37.1 39.9 38.5   < > 38.0 37.8 32.7*   MCV 92 92 93   < > 97 94 98   RDW 16.2* 16.0* 16.3*   < > 19.4* 18.0* 19.2*    258 206   < > 228 203 184   MCH 29.0 29.0 29.3   < > 30.3 29.2 31.2   MCHC 31.5 31.6 31.7   < > 31.3* 31.2* 31.8   NEUTROPHIL 63 67 60   < > 54.0 51.7 62.2   LYMPH 24 19 26   < > 27.0 29.5 22.5   MONOCYTE 10 12 11   < > 15.0 14.7 12.0   EOSINOPHIL 2 2 2   < > 3.3 3.5 2.5   BASOPHIL 1 0 0   < > 0.7 0.6 0.8   ANEU  --   --   --   --  4.3 5.6 6.9   ALYM  --   --   --   --  2.2 3.2 2.5   IGNACIO  --   --   --   --  1.2 1.6* 1.3   AEOS  --   --   --   --  0.3 0.4 0.3   ABAS  --   --   --   --  0.1 0.1 0.1   ANEUTAUTO 5.6 6.7 5.7   < >  " --   --   --    ALYMPAUTO 2.1 1.8 2.5   < >  --   --   --    AMONOAUTO 0.9 1.2 1.1   < >  --   --   --    AEOSAUTO 0.2 0.2 0.2   < >  --   --   --    ABSBASO 0.1 0.0 0.0   < >  --   --   --     < > = values in this interval not displayed.     CMP  Recent Labs   Lab Test 08/10/23  0852 06/26/23  1035 05/01/23  0908 01/20/23  0847 10/10/22  1035 08/15/22  1149 01/21/22  1311 10/14/21  0946 08/30/21  1559 08/06/21  1305 03/23/21  1526 01/22/21  0933 10/30/20  0903   NA  --  141  --   --   --  140  --   --  138  --  138  --   --    POTASSIUM  --  4.5  --   --   --  4.1  --  4.4 4.5   < > 4.3  --   --    CHLORIDE  --  103  --   --   --  105  --   --  104  --  103  --   --    CO2  --  27  --   --   --  33*  --   --  27  --  31  --   --    ANIONGAP  --  11  --   --   --  2*  --   --  7  --  4  --   --    GLC  --  90  --   --  109* 146*   < >  --  86  --  182*  --   --    BUN  --  32.2*  --   --   --  44*  --   --  36*  --  27  --   --    CR 1.52* 1.39* 1.27* 1.26* 1.05* 1.38*   < >  --  1.32*   < > 1.40* 1.17* 1.38*   GFRESTIMATED 31* 35* 39* 39* 49* 35*   < >  --  35*   < > 32* 40* 33*   GFRESTBLACK  --   --   --   --   --   --   --   --   --   --  37* 46* 38*   ROEL  --  9.5  --   --   --  9.5  --   --  9.8  --  9.3  --   --    BILITOTAL 0.2  --  0.3 0.3 0.3  --    < >  --  0.4   < >  --  0.2 0.4   ALBUMIN 4.1  --  3.6 3.3* 3.4  --    < >  --  3.6   < > 3.5 3.6 3.3*   PROTTOTAL 7.7  --  8.2 7.6 7.7  --    < >  --  7.9   < >  --  7.7 7.6   ALKPHOS 94  --  89 81 91  --    < >  --  56   < >  --  118 108   AST 28  --  24 19 26  --    < >  --  26   < >  --  23 28   ALT 19  --  20 19 22  --    < >  --  21   < >  --  25 28    < > = values in this interval not displayed.     Calcium/VitaminD  Recent Labs   Lab Test 06/26/23  1035 08/15/22  1149 08/30/21  1559   ROEL 9.5 9.5 9.8     ESR/CRP  Recent Labs   Lab Test 08/10/23  0852 05/01/23  0908 01/20/23  0847 10/10/22  1035   SED 38* 48* 45* 33*   CRP  --  22.6* 9.0* 11.2*   CRPI  6.49*  --   --   --        Hepatitis B  Recent Labs   Lab Test 11/10/16  0909   HBCAB Nonreactive   HEPBANG Nonreactive     Hepatitis C  Recent Labs   Lab Test 11/10/16  0909   HCVAB Nonreactive   Assay performance characteristics have not been established for newborns,   infants, and children       HIV Screening  Recent Labs   Lab Test 11/10/16  0909   HIAGAB Nonreactive   HIV-1 p24 Ag & HIV-1/HIV-2 Ab Not Detected       Immunization History     Immunization History   Administered Date(s) Administered    COVID-19 Bivalent 12+ (Pfizer) 06/01/2023    COVID-19 MONOVALENT 12+ (Pfizer) 02/16/2021, 03/09/2021, 10/14/2021    COVID-19 Monovalent 12+ (Pfizer 2022) 08/15/2022    FLU 6-35 months 09/08/2010    FLUAD(HD)65+ QUAD 09/17/2021, 10/25/2022    Influenza (H1N1) 10/20/2016    Influenza (High Dose) 3 valent vaccine 10/16/2014, 10/06/2015, 10/23/2016, 10/03/2017, 08/23/2018, 09/18/2019    Influenza (IIV3) PF 11/05/1999, 12/14/2000, 10/26/2004, 10/04/2005, 09/16/2009, 10/20/2010, 11/09/2011, 09/22/2012, 09/15/2013, 10/15/2014    Influenza Vaccine 65+ (Fluzone HD) 09/02/2020, 09/28/2022    Pneumo Conj 13-V (2010&after) 03/17/2015    Pneumococcal 23 valent 11/03/2000, 12/13/2010    TD,PF 7+ (Tenivac) 01/12/2004    TDAP Vaccine (Adacel) 05/14/2013    Td (Adult), Adsorbed 01/12/2004    Zoster recombinant adjuvanted (SHINGRIX) 06/21/2018, 08/23/2018    Zoster vaccine, live 12/15/2006          Chart documentation done in part with Dragon Voice recognition Software. Although reviewed after completion, some word and grammatical error may remain.    Jamie Johnson MD

## 2023-08-29 ENCOUNTER — PATIENT OUTREACH (OUTPATIENT)
Dept: CARE COORDINATION | Facility: CLINIC | Age: 88
End: 2023-08-29
Payer: MEDICARE

## 2023-09-12 ENCOUNTER — PATIENT OUTREACH (OUTPATIENT)
Dept: CARE COORDINATION | Facility: CLINIC | Age: 88
End: 2023-09-12
Payer: MEDICARE

## 2023-10-29 ENCOUNTER — NURSE TRIAGE (OUTPATIENT)
Dept: NURSING | Facility: CLINIC | Age: 88
End: 2023-10-29
Payer: MEDICARE

## 2023-10-29 NOTE — TELEPHONE ENCOUNTER
"Caller:   patient    Situation:   Left arm started to hurt from wrist to shoulder started yesterday; says she hardly slept last night  it's not as bad as yesterday  says it's a burning type of pain   Current pain level 8-9/10  Able to move it  pain is constant  Denies fall or trauma to arm  Denies chest or jaw pain  Does not radiate    Background:  No cardiac hx      Assessment  Needs to be evaluated  Age > 40 and unknown cause      Recommendation:  Disposition: Go to ED now  Reviewed care advise with patient.   Informed to call back w/ any questions or new concerns.    Caller verbalized understanding of care advice.  Caller agrees with advise/plan. Patient will to go [facility].        Charisma Gayle RN, BSN  Triage Nurse Advisor      Reason for Disposition   [1] Age > 40 AND [2] no obvious cause AND [3] pain even when not moving the arm  (Exception: Pain is clearly made worse by moving arm or bending neck.)    Additional Information   Negative: Shock suspected (e.g., cold/pale/clammy skin, too weak to stand, low BP, rapid pulse)   Negative: [1] Similar pain previously AND [2] it was from \"heart attack\"   Negative: Sounds like a life-threatening emergency to the triager   Negative: [1] Similar pain previously AND [2] it was from \"angina\" AND [3] not relieved by nitroglycerin   Negative: Difficulty breathing or unusual sweating (e.g., sweating without exertion)   Negative: [1] Age > 40 AND [2] associated chest or jaw pain AND [3] pain lasts > 5 minutes    Protocols used: Arm Pain-A-AH    "

## 2023-11-04 ENCOUNTER — OFFICE VISIT (OUTPATIENT)
Dept: URGENT CARE | Facility: URGENT CARE | Age: 88
End: 2023-11-04
Payer: MEDICARE

## 2023-11-04 ENCOUNTER — NURSE TRIAGE (OUTPATIENT)
Dept: NURSING | Facility: CLINIC | Age: 88
End: 2023-11-04
Payer: MEDICARE

## 2023-11-04 VITALS
BODY MASS INDEX: 22.52 KG/M2 | SYSTOLIC BLOOD PRESSURE: 160 MMHG | HEART RATE: 66 BPM | OXYGEN SATURATION: 98 % | WEIGHT: 115.6 LBS | TEMPERATURE: 98.2 F | RESPIRATION RATE: 20 BRPM | DIASTOLIC BLOOD PRESSURE: 79 MMHG

## 2023-11-04 DIAGNOSIS — B02.9 HERPES ZOSTER WITHOUT COMPLICATION: Primary | ICD-10-CM

## 2023-11-04 PROCEDURE — 99213 OFFICE O/P EST LOW 20 MIN: CPT | Performed by: PHYSICIAN ASSISTANT

## 2023-11-04 RX ORDER — HYDROCODONE BITARTRATE AND ACETAMINOPHEN 5; 325 MG/1; MG/1
1 TABLET ORAL EVERY 6 HOURS PRN
Qty: 10 TABLET | Refills: 0 | Status: SHIPPED | OUTPATIENT
Start: 2023-11-04 | End: 2023-11-07

## 2023-11-04 RX ORDER — PREDNISONE 20 MG/1
40 TABLET ORAL DAILY
Qty: 14 TABLET | Refills: 0 | Status: SHIPPED | OUTPATIENT
Start: 2023-11-04 | End: 2023-11-11

## 2023-11-04 ASSESSMENT — ENCOUNTER SYMPTOMS
SHORTNESS OF BREATH: 0
WHEEZING: 0
JOINT SWELLING: 0
ARTHRALGIAS: 1
CHEST TIGHTNESS: 0
MYALGIAS: 1
NECK STIFFNESS: 0
WOUND: 0
COUGH: 0
CHILLS: 0
PALPITATIONS: 0
FATIGUE: 0
CONSTITUTIONAL NEGATIVE: 1
COLOR CHANGE: 1
BACK PAIN: 0
CARDIOVASCULAR NEGATIVE: 1
FEVER: 0
RESPIRATORY NEGATIVE: 1

## 2023-11-04 NOTE — TELEPHONE ENCOUNTER
Nurse Triage SBAR    Is this a 2nd Level Triage? NO    Pt and her daughter are calling.  Pt was seen for shingles 10/29/23.  She is having a lot of pain in L arm.    Arm up to shoulder.  7-8/10.  Pain medication is not holding.  Some areas are blistered over.       Protocol Recommended Disposition:   See PCP Within 24 Hours    Recommendation:       Reason for Disposition   SEVERE pain (e.g., excruciating)    Additional Information   Negative: Difficult to awaken or acting confused (e.g., disoriented, slurred speech)   Negative: Sounds like a life-threatening emergency to the triager   Negative: [1] Localized rash AND [2] doesn't match the SYMPTOMS of shingles   Negative: [1] Back pain AND [2] doesn't match the SYMPTOMS of shingles   Negative: Shingles vaccine (Recombinant Zoster Vaccine; RZV; Shingrix), questions about   Negative: Patient sounds very sick or weak to the triager   Negative: [1] Shingles rash (matches SYMPTOMS) AND [2] weak immune system (e.g., HIV positive, cancer chemotherapy, chronic steroid treatment, splenectomy) AND [3] NOT taking antiviral medication   Negative: Shingles rash on the eyelid or tip of the nose   Negative: [1] Shingles rash of face AND [2] eye pain or blurred vision   Negative: [1] Shingles rash of face AND [2] facial weakness   Negative: [1] Shingles rash of face or ear AND [2] earache or ringing in the ear   Negative: [1] Shingles rash AND [2] spots start appearing other places on body   Negative: Fever > 100.4 F (38.0 C)    Protocols used: [facility]    Josefina Crockett RN on 11/4/2023 at 12:25 PM

## 2023-11-04 NOTE — PROGRESS NOTES
Subjective   Roxanna is a 89 year old, presenting for the following health issues with Mom:  Shingles (Dx Monday; would like a refill on medication)    HPI   Concern - shingles pain  Onset: 5days ago  Description:  Was diagnosed with shingles of her L arm 6days ago in the ER and was given valtrex and norco with some relief but she is running out of the norco.  Intensity: moderate  Progression of Symptoms:  same  Accompanying Signs & Symptoms: No radicular pain, numbness, tingling or weakness.  No swelling, redness, drainage or fevers.  No trauma or injuries.  Previous history of similar problem: none  Precipitating factors:        Worsened by: none  Alleviating factors:        Improved by: none  Therapies tried and outcome: valtrex, norco with some relief    Patient Active Problem List   Diagnosis    History of polymyalgia rheumatica    History of basal cell carcinoma    CARDIOVASCULAR SCREENING; LDL GOAL LESS THAN 130    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)    Hip pain    Left atrial enlargement    Status post coronary angiogram    Aortic valve replaced    Rheumatoid arthritis of multiple sites with negative rheumatoid factor (H)    CKD (chronic kidney disease) stage 3, GFR 30-59 ml/min (H)    THERESA III (vulvar intraepithelial neoplasia III)    Peripheral arterial disease (H24)    Decreased hearing of both ears    Hearing aid worn    Gait abnormality     Current Outpatient Medications   Medication    amoxicillin (AMOXIL) 500 MG capsule    calcium-vitamin D 500-125 MG-UNIT TABS    furosemide (LASIX) 20 MG tablet    gabapentin (NEURONTIN) 100 MG capsule    hydroxychloroquine (PLAQUENIL) 200 MG tablet    ICAPS PO    losartan (COZAAR) 100 MG tablet    metoprolol tartrate (LOPRESSOR) 25 MG tablet    Misc. Devices (ROLLATOR ULTRA-LIGHT) MISC    Omega-3 Fatty Acids (OMEGA-3 FISH OIL PO)    sulfaSALAzine (AZULFIDINE) 500 MG tablet     No current facility-administered medications for this visit.         Allergies   Allergen Reactions    Lisinopril Cough       Review of Systems   Constitutional: Negative.  Negative for chills, fatigue and fever.   Respiratory: Negative.  Negative for cough, chest tightness, shortness of breath and wheezing.    Cardiovascular: Negative.  Negative for chest pain, palpitations and peripheral edema.   Musculoskeletal:  Positive for arthralgias and myalgias. Negative for back pain, gait problem, joint swelling and neck stiffness.   Skin:  Positive for color change and rash. Negative for pallor and wound.   All other systems reviewed and are negative.           Objective    BP (!) 160/79   Pulse 66   Temp 98.2  F (36.8  C) (Tympanic)   Resp 20   Wt 52.4 kg (115 lb 9.6 oz)   SpO2 98%   BMI 22.52 kg/m    Body mass index is 22.52 kg/m .  Physical Exam  Vitals and nursing note reviewed.   Constitutional:       General: She is not in acute distress.     Appearance: Normal appearance. She is well-developed and normal weight. She is not ill-appearing.   Skin:     General: Skin is warm and dry.      Findings: Rash (clustered group of vesicles on erythematous base present along dermatomal pattern C5.  moderate tenderness but no drainage) present. No abrasion, abscess, acne, ecchymosis, erythema, signs of injury, laceration, petechiae or wound. Rash is vesicular. Rash is not crusting, macular, nodular, papular, purpuric, pustular, scaling or urticarial.   Neurological:      Mental Status: She is alert and oriented to person, place, and time.   Psychiatric:         Mood and Affect: Mood normal.         Behavior: Behavior normal.         Thought Content: Thought content normal.         Judgment: Judgment normal.          Assessment/Plan:  Herpes zoster without complication:  Will refill the norco and add prednisone as well.  Continue with the valtrex as well.   Discussed risks and benefits of medication along with side effects, direction for use.  No driving or operating machinery due to  sedation. Educated patient on pathophysiology, course and complications of shingles.  She is considered contagious until all lesions have crusted over.  Keep cover and avoid direct contact with lesions.   Recheck in clinic if symptoms worsen or if symptoms do not improve.     -     HYDROcodone-acetaminophen (NORCO) 5-325 MG tablet; Take 1 tablet by mouth every 6 hours as needed for moderate to severe pain  -     predniSONE (DELTASONE) 20 MG tablet; Take 2 tablets (40 mg) by mouth daily for 7 days        Barbara Rios PA-C

## 2023-11-08 ENCOUNTER — TELEPHONE (OUTPATIENT)
Dept: FAMILY MEDICINE | Facility: CLINIC | Age: 88
End: 2023-11-08

## 2023-11-08 NOTE — TELEPHONE ENCOUNTER
Patient Quality Outreach    Patient is due for the following:   Physical Annual Wellness Visit    Next Steps:   Schedule a Annual Wellness Visit    Type of outreach:    Sent [facility] [employer] message.    Next Steps:  Reach out within 90 days via Letter.    Max number of attempts reached: No. Will try again in 90 days if patient still on fail list.    Questions for provider review:    None           JOHN DUNHAM MA  Chart routed to self.

## 2023-11-21 ENCOUNTER — LAB (OUTPATIENT)
Dept: LAB | Facility: CLINIC | Age: 88
End: 2023-11-21
Payer: MEDICARE

## 2023-11-21 DIAGNOSIS — M06.09 RHEUMATOID ARTHRITIS OF MULTIPLE SITES WITH NEGATIVE RHEUMATOID FACTOR (H): ICD-10-CM

## 2023-11-21 DIAGNOSIS — Z79.899 HIGH RISK MEDICATION USE: ICD-10-CM

## 2023-11-21 LAB
ALBUMIN SERPL BCG-MCNC: 4 G/DL (ref 3.5–5.2)
ALP SERPL-CCNC: 82 U/L (ref 40–150)
ALT SERPL W P-5'-P-CCNC: 15 U/L (ref 0–50)
AST SERPL W P-5'-P-CCNC: 26 U/L (ref 0–45)
BASOPHILS # BLD AUTO: 0.1 10E3/UL (ref 0–0.2)
BASOPHILS NFR BLD AUTO: 1 %
BILIRUB DIRECT SERPL-MCNC: <0.2 MG/DL (ref 0–0.3)
BILIRUB SERPL-MCNC: 0.3 MG/DL
CREAT SERPL-MCNC: 1.21 MG/DL (ref 0.51–0.95)
CRP SERPL-MCNC: 4.8 MG/L
EGFRCR SERPLBLD CKD-EPI 2021: 41 ML/MIN/1.73M2
EOSINOPHIL # BLD AUTO: 0.1 10E3/UL (ref 0–0.7)
EOSINOPHIL NFR BLD AUTO: 1 %
ERYTHROCYTE [DISTWIDTH] IN BLOOD BY AUTOMATED COUNT: 16.5 % (ref 10–15)
ERYTHROCYTE [SEDIMENTATION RATE] IN BLOOD BY WESTERGREN METHOD: 28 MM/HR (ref 0–30)
HCT VFR BLD AUTO: 39.8 % (ref 35–47)
HGB BLD-MCNC: 12.6 G/DL (ref 11.7–15.7)
IMM GRANULOCYTES # BLD: 0 10E3/UL
IMM GRANULOCYTES NFR BLD: 0 %
LYMPHOCYTES # BLD AUTO: 1.4 10E3/UL (ref 0.8–5.3)
LYMPHOCYTES NFR BLD AUTO: 15 %
MCH RBC QN AUTO: 30 PG (ref 26.5–33)
MCHC RBC AUTO-ENTMCNC: 31.7 G/DL (ref 31.5–36.5)
MCV RBC AUTO: 95 FL (ref 78–100)
MONOCYTES # BLD AUTO: 0.8 10E3/UL (ref 0–1.3)
MONOCYTES NFR BLD AUTO: 9 %
NEUTROPHILS # BLD AUTO: 6.8 10E3/UL (ref 1.6–8.3)
NEUTROPHILS NFR BLD AUTO: 74 %
PLATELET # BLD AUTO: 160 10E3/UL (ref 150–450)
PROT SERPL-MCNC: 7.3 G/DL (ref 6.4–8.3)
RBC # BLD AUTO: 4.2 10E6/UL (ref 3.8–5.2)
WBC # BLD AUTO: 9.2 10E3/UL (ref 4–11)

## 2023-11-21 PROCEDURE — 82565 ASSAY OF CREATININE: CPT

## 2023-11-21 PROCEDURE — 36415 COLL VENOUS BLD VENIPUNCTURE: CPT

## 2023-11-21 PROCEDURE — 80076 HEPATIC FUNCTION PANEL: CPT

## 2023-11-21 PROCEDURE — 85025 COMPLETE CBC W/AUTO DIFF WBC: CPT

## 2023-11-21 PROCEDURE — 86140 C-REACTIVE PROTEIN: CPT

## 2023-11-21 PROCEDURE — 85652 RBC SED RATE AUTOMATED: CPT

## 2023-11-28 ENCOUNTER — OFFICE VISIT (OUTPATIENT)
Dept: RHEUMATOLOGY | Facility: CLINIC | Age: 88
End: 2023-11-28
Payer: MEDICARE

## 2023-11-28 VITALS
WEIGHT: 114.6 LBS | BODY MASS INDEX: 22.33 KG/M2 | DIASTOLIC BLOOD PRESSURE: 68 MMHG | HEART RATE: 85 BPM | RESPIRATION RATE: 20 BRPM | SYSTOLIC BLOOD PRESSURE: 158 MMHG | OXYGEN SATURATION: 100 %

## 2023-11-28 DIAGNOSIS — M06.09 RHEUMATOID ARTHRITIS OF MULTIPLE SITES WITH NEGATIVE RHEUMATOID FACTOR (H): Primary | ICD-10-CM

## 2023-11-28 DIAGNOSIS — Z79.899 HIGH RISK MEDICATION USE: ICD-10-CM

## 2023-11-28 PROCEDURE — 99214 OFFICE O/P EST MOD 30 MIN: CPT | Performed by: INTERNAL MEDICINE

## 2023-11-28 RX ORDER — SULFASALAZINE 500 MG/1
500 TABLET ORAL 2 TIMES DAILY
Qty: 60 TABLET | Refills: 3 | Status: ON HOLD | OUTPATIENT
Start: 2023-11-28 | End: 2024-03-02

## 2023-11-28 RX ORDER — HYDROXYCHLOROQUINE SULFATE 200 MG/1
200 TABLET, FILM COATED ORAL DAILY
Qty: 30 TABLET | Refills: 3 | Status: ON HOLD | OUTPATIENT
Start: 2023-11-28 | End: 2024-03-15

## 2023-11-28 NOTE — PROGRESS NOTES
Rheumatology Clinic Visit      Roxanna Stark MRN# [id number]   YOB: 1927 Age: 89 year old      Date of visit: 11/28/23   PCP: Dr. Swapna Gaines  Cardiology: Dr. Boston Navarro     Chief Complaint   Patient presents with:  Rheumatoid Arthritis    Assessment and Plan     1. 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  She was treated with prednisone monotherapy for several years, being able to taper off without recurrence of symptoms. She then developed worsening symptoms in her hands and was diagnosed with rheumatoid arthritis. Initially, she was resistant to taking DMARD therapy.  She then was started on MTX that was effective; she reduced the dose with worsening symptoms and then SSZ was added; at one point was doing well on MTX 20mg wkly and SSZ 500mg BID (mild anemia possibly associated with SSZ so the dose was previously reduced); however, when seen in January 2022 she reported that she had stopped taking methotrexate and was only using sulfasalazine.  Then in March 2022 it was reported that she stopped methotrexate 6 months prior and sulfasalazine was stopped 2 months prior.  Telephone visit on 6/30/2022 to clarify her treatment plan and discussed with both the patient and her daughter to get an accurate history; the patient at that time was off all treatment, and based on the history provided at that time she was on sulfasalazine monotherapy for a while and doing well. Prednisone was then stopped and she continued to do well on sulfasalazine for a while but eventually required addition of some hydroxychloroquine.  To ensure she is taking the correct medications I have asked that she bring her medications with her to each appointment but she did not do so today or at the previous visit.  At this time her RA is well-controlled and she reports 100% compliance with sulfasalazine  and hydroxychloroquine..  She is present with her daughter, who states that the patient manages her own medications.      Chronic illness, stable.    - Continue sulfasalazine 500 mg twice daily    - Continue hydroxychloroquine 200 mg daily (last eye exam by Dr. Rogers on 5/23/2023)  - Labs in 3 months: CBC, Creatinine, Hepatic Panel, ESR, CRP    High risk medication requiring intensive toxicity monitoring at least quarterly    2. Giant Cell Arteritis History?: 12/26/2005 Left TA biopsy negative per [facility] record review.  No symptoms of GCA at this time.      3. Right shoulder rotator cuff tear and history of pain: Previously evaluated by orthopedic surgery and her pain resolved for approximately one year after having a steroid injection in March 2015. She does not want to have surgical correction of her shoulder. Repeat steroid injections have been helpful; not needed today.  Physical therapy was effective previously.  Not an issue today.    4.  Osteoarthritis of the hands: Heberden's nodes present.    Not symptomatic at the DIPs at this time.     5. History of basal cell carcinoma: Following with dermatology; encouraged yearly dermatology evaluation     6. Bone Health: Managed by PCP.    7.  Vaccinations:     - Influenza: encouraged yearly vaccination  - [id number]: up to date  - [id number]: up to date  - Shingrix: Up to date  - COVID-19: Advised keeping updated, and to hold sulfasalazine for 2 weeks afterward    8.  Creatinine elevation:  Avoid nephrotoxic agents such as NSAIDs.  CKD management per PCP.    9. Elevated blood pressure:  Roxanna to follow up with Primary Care provider regarding elevated blood pressure.     Total minutes spent in evaluation with patient, documentation, , and review of pertinent studies and chart notes: 16     Ms. Stark verbalized agreement with and understanding of the rational for the diagnosis and treatment plan.  All questions were answered to best of my ability and the  patient's satisfaction. Ms. Stark was advised to contact the clinic with any questions that may arise after the clinic visit.      Thank you for involving me in the care of the patient    Return to clinic: 3 months      HPI   Roxanna Stark is a 89 year old female with medical history significant for basal cell carcinoma, hypertension, aortic stenosis, right rotator cuff tear (previously evaluated by Dr. Bingham, orthopedic surgery, on 3/20/2015 where at that time Ms. Stark was not interested in surgical correction; she received an intra-articular steroid injection at that time that was effective for ~1year), temporal arteritis?, and seronegative erosive rheumatoid arthritis.     1/31/2022: doing well at this time. No longer taking MTX and hasn't for some time.  Note that VESTA Mattson, called to check her pharmacy and MTX and SSZ were last filled in July 2021.  Roxanna says that she is happy with how well she is doing; no joint pain; morning stiffness <20 min. Arthritis is not limiting any of her daily activities.  No difficulty raising her arms above her head or standing up from a chair. No vision change. No jaw or tongue claudication. No scalp tenderness. No new headache.     3/30/2022: Telephone call regarding patient's medication raise question about what she was actually taking so a visit was scheduled for today to review.  Roxanna, and Roxanna's daughter Anna.  Reportedly methotrexate was stopped about 6 months ago.  Sulfasalazine was stopped a couple months ago.  Worsening joint pain at the MCPs, PIPs, wrists, MTPs, knees; pain is worse in the morning and improves with topical BenGay and moving.  Positive gelling phenomenon.  Morning stiffness for at least 1 hour.  No jaw or tongue claudication.  No scalp tenderness.  No new headache.  No vision change.    7/8/2022: Currently doing well.  No joint pain or swelling.  No morning stiffness.  Positive gelling phenomenon that resolves within 5 minutes and only occurs  after sitting for a very long time.  Confirms that she is taking prednisone 5 mg daily and sulfasalazine 500 mg twice daily.  She also has questions about Lasix and says that she will talk with her primary care provider regarding this.  She is accompanied by her daughter today.    10/14/2022: Currently doing well.  She reports that she is taking sulfasalazine 500 mg twice daily.  No longer taking prednisone.  She noticed no worsening of symptoms with stopping prednisone.  She reports that she has chronic changes of her hands but no swelling and no pain.  Morning stiffness for no more than 10 minutes.    5/1/2023: Pain at the left second MCP where she has limited range of motion and stiffness.  No increased warmth or overlying erythema of the left second MCP.  Also with small bony hypertrophy at the DIPs that are intermittently achy but not symptomatic at this time.  PIPs without swelling or pain.  Wrists, elbows, shoulders, knees, ankles, and MTPs without swelling or pain.  Taking sulfasalazine 500 mg twice daily; she did not bring her medications with her but she confirms that she knows she is taking sulfasalazine twice daily.    8/15/2023: Today she reports that she is doing well.  No joint pain or swelling.  No morning stiffness or gelling phenomenon.  Reports that she has been taking hydroxychloroquine and sulfasalazine as prescribed, except for the past 1 week where she believes that she has not been taking sulfasalazine so she will check on this.  She forgot to bring her medications with her today to today's appointment.  The patient's daughter is with her today.    Today, 11/28/2023: RA well-controlled.  Reports 100% compliance with hydroxychloroquine and sulfasalazine.  Had shingles affecting her left upper extremity; all vesicles have crusted over and she is not having new vesicles but she still has pain where the shingles was; following with her PCP for management of postherpetic neuralgia.  No morning  stiffness.  No joint swelling.  Arthritis does not limit daily activities.      Denies fevers, chills, nausea, vomiting, constipation, diarrhea. No abdominal pain. No chest pain/pressure, palpitations, or shortness of breath. No oral or nasal sores. No neck pain. No rash.     Tobacco: None  EtOH: No more than 1 drink per week  Drugs: None  Occupation: Used to work for the [employer] company; now retired    ROS   12 point review of system was completed and negative except as noted in the HPI     Active Problem List     Patient Active Problem List   Diagnosis    History of polymyalgia rheumatica    History of basal cell carcinoma    CARDIOVASCULAR SCREENING; LDL GOAL LESS THAN 130    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)    Hip pain    Left atrial enlargement    Status post coronary angiogram    Aortic valve replaced    Rheumatoid arthritis of multiple sites with negative rheumatoid factor (H)    CKD (chronic kidney disease) stage 3, GFR 30-59 ml/min (H)    THERESA III (vulvar intraepithelial neoplasia III)    Peripheral arterial disease (H24)    Decreased hearing of both ears    Hearing aid worn    Gait abnormality     Past Medical History     Past Medical History:   Diagnosis Date    Actinic keratosis     Aortic stenosis 2014    Basal cell cancer 7/2014    left eye medial canthus     Basal cell carcinoma 9/30/08    left cheek    CKD (chronic kidney disease) stage 3, GFR 30-59 ml/min (H) 7/1/2019    HTN (hypertension)     Melanoma in situ (H) 9/30/08    left arm    Polymyalgia rheumatica (H24) 11/99    Rheumatoid arthritis of multiple sites with negative rheumatoid factor (H)     Temporal arteritis (H) 11/99     Past Surgical History     Past Surgical History:   Procedure Laterality Date    CATARACT IOL, RT/LT  5/09    bilateral    COLONOSCOPY  2002    EXCISE LESION VULVA N/A 9/27/2019    Procedure: Wide Local Excision Of Vulva, Colposcopy;  Surgeon: Mono Ribeiro MD;  Location: [facility]     REPLACE VALVE AORTIC N/A 4/25/2016    Procedure: REPLACE VALVE AORTIC;  Surgeon: Sudeep Tsai MD;  Location:  OR    [facility] [place] SKIN TISSUE PROCEDURE UNLISTED  11/3/08    mmis skin cancer excision     Allergy     Allergies   Allergen Reactions    Lisinopril Cough        Current Medication List     Current Outpatient Medications   Medication Sig    calcium-vitamin D 500-125 MG-UNIT TABS     furosemide (LASIX) 20 MG tablet TAKE 1 TABLET BY MOUTH EVERY DAY IF GAIN OF 2 TO 3 POUNDS OVER 2 DAYS    gabapentin (NEURONTIN) 100 MG capsule Take 1 capsule (100 mg) by mouth daily    hydroxychloroquine (PLAQUENIL) 200 MG tablet Take 1 tablet (200 mg) by mouth daily    ICAPS PO 2 tablets daily    losartan (COZAAR) 100 MG tablet Take 1 tablet (100 mg) by mouth daily    metoprolol tartrate (LOPRESSOR) 25 MG tablet Take 1 tablet (25 mg) by mouth 2 times daily    Omega-3 Fatty Acids (OMEGA-3 FISH OIL PO) Take 1 tablet twice daily.    sulfaSALAzine (AZULFIDINE) 500 MG tablet Take 1 tablet (500 mg) by mouth 2 times daily    amoxicillin (AMOXIL) 500 MG capsule Take 4 capsules by mouth one hour before dental appointment    Misc. Devices (ROLLATOR ULTRA-LIGHT) MISC      No current facility-administered medications for this visit.       Social History   See HPI    Family History     Family History   Problem Relation Age of Onset    Breast Cancer Sister 45    Arthritis Sister     Thyroid Disease Sister     Arthritis Sister     Colon Cancer Sister         colon    Arthritis Mother     Hypertension Father     Prostate Cancer Father     Arthritis Father     Heart Disease Father     Lipids Father     Colon Cancer Father     Arthritis Sister     Asthma Daughter     Asthma Daughter     Lung Cancer Daughter 58        lung    Pancreatic Cancer Other 81        pancreatic      Physical Exam     Temp Readings from Last 3 Encounters:   11/04/23 98.2  F (36.8  C) (Tympanic)   08/15/23 98  F (36.7  C) (Oral)   06/26/23 98.6  F (37  C) (Temporal)  "    BP Readings from Last 5 Encounters:   11/28/23 (!) 172/64   11/04/23 (!) 160/79   08/15/23 (!) 196/74   06/26/23 136/72   06/01/23 (!) 152/73     Pulse Readings from Last 1 Encounters:   11/28/23 85     Resp Readings from Last 1 Encounters:   11/28/23 20     Estimated body mass index is 22.33 kg/m  as calculated from the following:    Height as of 6/26/23: 1.526 m (5' 0.08\").    Weight as of this encounter: 52 kg (114 lb 9.6 oz).    GEN: NAD.  HEENT:  Anicteric, noninjected sclera. No obvious external lesions of the ear and nose. Hearing intact.  CV: S1, S2. RRR. No m/r/g  PULM: No increased work of breathing. CTA bilaterally   MSK: MCPs and PIPs without synovial swelling or tenderness to palpation.  Heberden's and Gracie's nodes present.  DIPs nontender to palpation.  Wrists without swelling or tenderness to palpation.  Elbows and shoulders without swelling or tenderness to palpation.    Knees, ankles, and MTPs without swelling or tenderness to palpation.    PSYCH: Alert. Appropriate.      Labs / Imaging (select studies)     RF/CCP  Recent Labs   Lab Test 08/11/16  1124 08/04/16  1222 02/18/16  1543   CCPIGG 1  --   --    RHF  --  <20 <20     CBC  Recent Labs   Lab Test 11/21/23  1053 08/10/23  0852 05/01/23  0908 03/23/21  1526 01/22/21  0933 10/30/20  0903 07/24/20  1328   WBC 9.2 9.0 9.9   < > 8.1 10.8 11.1*   RBC 4.20 4.03 4.34   < > 3.93 4.04 3.33*   HGB 12.6 11.7 12.6   < > 11.9 11.8 10.4*   HCT 39.8 37.1 39.9   < > 38.0 37.8 32.7*   MCV 95 92 92   < > 97 94 98   RDW 16.5* 16.2* 16.0*   < > 19.4* 18.0* 19.2*    173 258   < > 228 203 184   MCH 30.0 29.0 29.0   < > 30.3 29.2 31.2   MCHC 31.7 31.5 31.6   < > 31.3* 31.2* 31.8   NEUTROPHIL 74 63 67   < > 54.0 51.7 62.2   LYMPH 15 24 19   < > 27.0 29.5 22.5   MONOCYTE 9 10 12   < > 15.0 14.7 12.0   EOSINOPHIL 1 2 2   < > 3.3 3.5 2.5   BASOPHIL 1 1 0   < > 0.7 0.6 0.8   ANEU  --   --   --   --  4.3 5.6 6.9   ALYM  --   --   --   --  2.2 3.2 2.5   IGNACIO  " --   --   --   --  1.2 1.6* 1.3   AEOS  --   --   --   --  0.3 0.4 0.3   ABAS  --   --   --   --  0.1 0.1 0.1   ANEUTAUTO 6.8 5.6 6.7   < >  --   --   --    ALYMPAUTO 1.4 2.1 1.8   < >  --   --   --    AMONOAUTO 0.8 0.9 1.2   < >  --   --   --    AEOSAUTO 0.1 0.2 0.2   < >  --   --   --    ABSBASO 0.1 0.1 0.0   < >  --   --   --     < > = values in this interval not displayed.     [facility]  Recent Labs   Lab Test 11/21/23  1053 08/10/23  0852 06/26/23  1035 05/01/23  0908 01/20/23  0847 10/10/22  1035 08/15/22  1149 01/21/22  1311 10/14/21  0946 08/30/21  1559 08/06/21  1305 03/23/21  1526 01/22/21  0933 10/30/20  0903   NA  --   --  141  --   --   --  140  --   --  138  --  138  --   --    POTASSIUM  --   --  4.5  --   --   --  4.1  --  4.4 4.5   < > 4.3  --   --    CHLORIDE  --   --  103  --   --   --  105  --   --  104  --  103  --   --    CO2  --   --  27  --   --   --  33*  --   --  27  --  31  --   --    ANIONGAP  --   --  11  --   --   --  2*  --   --  7  --  4  --   --    GLC  --   --  90  --   --  109* 146*   < >  --  86  --  182*  --   --    BUN  --   --  32.2*  --   --   --  44*  --   --  36*  --  27  --   --    CR 1.21* 1.52* 1.39* 1.27*   < > 1.05* 1.38*   < >  --  1.32*   < > 1.40* 1.17* 1.38*   GFRESTIMATED 41* 31* 35* 39*   < > 49* 35*   < >  --  35*   < > 32* 40* 33*   GFRESTBLACK  --   --   --   --   --   --   --   --   --   --   --  37* 46* 38*   ROEL  --   --  9.5  --   --   --  9.5  --   --  9.8  --  9.3  --   --    BILITOTAL 0.3 0.2  --  0.3   < > 0.3  --    < >  --  0.4   < >  --  0.2 0.4   ALBUMIN 4.0 4.1  --  3.6   < > 3.4  --    < >  --  3.6   < > 3.5 3.6 3.3*   PROTTOTAL 7.3 7.7  --  8.2   < > 7.7  --    < >  --  7.9   < >  --  7.7 7.6   ALKPHOS 82 94  --  89   < > 91  --    < >  --  56   < >  --  118 108   AST 26 28  --  24   < > 26  --    < >  --  26   < >  --  23 28   ALT 15 19  --  20   < > 22  --    < >  --  21   < >  --  25 28    < > = values in this interval not displayed.      Calcium/VitaminD  Recent Labs   Lab Test 06/26/23  1035 08/15/22  1149 08/30/21  1559   ROEL 9.5 9.5 9.8     ESR/CRP  Recent Labs   Lab Test 11/21/23  1053 08/10/23  0852 05/01/23  0908 01/20/23  0847 10/10/22  1035   SED 28 38* 48* 45* 33*   CRP  --   --  22.6* 9.0* 11.2*   CRPI 4.80 6.49*  --   --   --      Hepatitis B  Recent Labs   Lab Test 11/10/16  0909   HBCAB Nonreactive   HEPBANG Nonreactive     Hepatitis C  Recent Labs   Lab Test 11/10/16  0909   HCVAB Nonreactive   Assay performance characteristics have not been established for newborns,   infants, and children       HIV Screening  Recent Labs   Lab Test 11/10/16  0909   HIAGAB Nonreactive   HIV-1 p24 Ag & HIV-1/HIV-2 Ab Not Detected       Immunization History     Immunization History   Administered Date(s) Administered    COVID-19 12+ (2023-24) (MODERNA) 10/03/2023    COVID-19 Bivalent 12+ (Pfizer) 06/01/2023    COVID-19 MONOVALENT 12+ (Pfizer) 02/16/2021, 03/09/2021, 10/14/2021    COVID-19 Monovalent 12+ (Pfizer 2022) 08/15/2022    Influenza (H1N1) 10/20/2016    Influenza (High Dose) 3 valent vaccine 10/16/2014, 10/06/2015, 10/23/2016, 10/03/2017, 08/23/2018, 09/18/2019    Influenza (IIV3) PF 11/05/1999, 12/14/2000, 10/26/2004, 10/04/2005, 09/16/2009, 10/20/2010, 11/09/2011, 09/22/2012, 09/15/2013, 10/15/2014    Influenza Vaccine 65+ (FLUAD) 09/17/2021, 10/25/2022    Influenza Vaccine 65+ (Fluzone HD) 09/02/2020, 09/28/2022    Influenza, seasonal, injectable, PF 09/08/2010    Pneumo Conj 13-V (2010&after) 03/17/2015    Pneumococcal 23 valent 11/03/2000, 12/13/2010    TD,PF 7+ (Tenivac) 01/12/2004    TDAP Vaccine (Adacel) 05/14/2013    Td (Adult), Adsorbed 01/12/2004    Zoster recombinant adjuvanted (SHINGRIX) 06/21/2018, 08/23/2018    Zoster vaccine, live 12/15/2006          Chart documentation done in part with Dragon Voice recognition Software. Although reviewed after completion, some word and grammatical error may remain.    Jamie Johnson MD

## 2023-11-28 NOTE — NURSING NOTE
Blood pressure rechecked after visit    158/68  Twila Atkins CMA Rheumatology  11/28/2023                                 RAPID3 (0-30) Cumulative Score  15.0          RAPID3 Weighted Score (divide #4 by 3 and that is the weighted score)  5.0

## 2023-11-28 NOTE — PATIENT INSTRUCTIONS
RHEUMATOLOGY    [facility]  [address] [place] [address]  [place] [address]    Phone number: [phone]  Fax number: [phone]    If you need a medication refill, please contact us as you may need lab work and/or a follow up visit prior to your refill.      Thank you for choosing [facility]!    Twila Atkins CMA Rheumatology

## 2023-12-02 ENCOUNTER — HEALTH MAINTENANCE LETTER (OUTPATIENT)
Age: 88
End: 2023-12-02

## 2023-12-04 ENCOUNTER — TRANSFERRED RECORDS (OUTPATIENT)
Dept: HEALTH INFORMATION MANAGEMENT | Facility: CLINIC | Age: 88
End: 2023-12-04

## 2023-12-26 DIAGNOSIS — Z29.89 SBE (SUBACUTE BACTERIAL ENDOCARDITIS) PROPHYLAXIS CANDIDATE: ICD-10-CM

## 2023-12-26 RX ORDER — AMOXICILLIN 500 MG/1
CAPSULE ORAL
Qty: 4 CAPSULE | Refills: 1 | Status: SHIPPED | OUTPATIENT
Start: 2023-12-26 | End: 2023-12-28

## 2023-12-28 RX ORDER — AMOXICILLIN 500 MG/1
CAPSULE ORAL
Qty: 4 CAPSULE | Refills: 1 | Status: SHIPPED | OUTPATIENT
Start: 2023-12-28 | End: 2023-12-29

## 2023-12-28 NOTE — TELEPHONE ENCOUNTER
Patient came into clinic to let  know that she was just at [facility] and they have told her that they have not yet received the prescription for amoxicillin.  Prescription resent   staff will update patient    Deonte Khan RN  [facility]

## 2023-12-29 ENCOUNTER — TELEPHONE (OUTPATIENT)
Dept: RHEUMATOLOGY | Facility: CLINIC | Age: 88
End: 2023-12-29
Payer: MEDICARE

## 2023-12-29 DIAGNOSIS — Z29.89 SBE (SUBACUTE BACTERIAL ENDOCARDITIS) PROPHYLAXIS CANDIDATE: ICD-10-CM

## 2023-12-29 RX ORDER — AMOXICILLIN 500 MG/1
CAPSULE ORAL
Qty: 4 CAPSULE | Refills: 1 | Status: ON HOLD | OUTPATIENT
Start: 2023-12-29 | End: 2024-03-02

## 2023-12-29 NOTE — TELEPHONE ENCOUNTER
Spoke with patient's daughter. Informed her that amoxicillin (AMOXIL) 500 MG capsule was sent in yesterday to [facility].    Patient's daughter verbalized understanding.     KATHY ColónN RN  [facility]

## 2023-12-29 NOTE — TELEPHONE ENCOUNTER
This was last prescribed by Dr Joe. Fwding to PMD. Pt states the [facility] in [facility] is closed due to the holiday. [facility].    JOSE Dow RN 12/29/2023 1:54 PM

## 2023-12-29 NOTE — TELEPHONE ENCOUNTER
[facility] Call Center    Phone Message    May a detailed message be left on voicemail: yes     Reason for Call: Other: Roxanna need an update rx for antibiotics. She has a dental appointment on 1/2/24 and will need them before that if possible Please contact      Action Taken: Other: Rheum    Travel Screening: Not Applicable

## 2023-12-29 NOTE — TELEPHONE ENCOUNTER
Pt daughter (Anna, consent to communicate on file) calling to ask for script to be transferred to new pharmacy as current pharmacy is closed until 1/2/24 and pt needs this medication prior to 1/2/24 as she has a dental appointment that day.      [facility]:  [address] [place] [address] [place], [place] [address]    RN called [facility] and cannot do a pharmacy to pharmacy transfer. Pharmacist informed RN to call [facility] pharmacy to cancel script and send new script to [facility] as they are not answering calls in a timely manner.     RN attempted to call [facility] pharmacy to cancel script and have this transferred. RN was on hold for more than 10 mins and unable to speak with pharmacy staff.     RN discontinued current script at [facility] via [facility] and resent new script to available [facility] pharmacy.     RN called Pt's daughter to confirm resent of script to new pharmacy. Rn gave Anna pharmacy number to call to have this script filled: [phone]. No further questions from pt's daughter.     Mady Wise, RN on 12/29/2023 at 4:21 PM

## 2023-12-29 NOTE — TELEPHONE ENCOUNTER
Called Anna back and there was no answer. Could not leave voicemail due to the mailbox being full.     Suly ROMERO RN, [facility] 12/29/23 10:57 AM

## 2024-01-27 ENCOUNTER — HOSPITAL ENCOUNTER (INPATIENT)
Facility: CLINIC | Age: 89
LOS: 5 days | Discharge: NURSING, REHAB OR LONG-TERM CARE | DRG: 291 | End: 2024-02-01
Attending: EMERGENCY MEDICINE | Admitting: INTERNAL MEDICINE
Payer: MEDICARE

## 2024-01-27 ENCOUNTER — APPOINTMENT (OUTPATIENT)
Dept: GENERAL RADIOLOGY | Facility: CLINIC | Age: 89
DRG: 291 | End: 2024-01-27
Attending: EMERGENCY MEDICINE
Payer: MEDICARE

## 2024-01-27 ENCOUNTER — OFFICE VISIT (OUTPATIENT)
Dept: URGENT CARE | Facility: URGENT CARE | Age: 89
End: 2024-01-27
Payer: MEDICARE

## 2024-01-27 VITALS
RESPIRATION RATE: 24 BRPM | DIASTOLIC BLOOD PRESSURE: 82 MMHG | BODY MASS INDEX: 22.87 KG/M2 | WEIGHT: 117.4 LBS | TEMPERATURE: 97 F | HEART RATE: 53 BPM | OXYGEN SATURATION: 100 % | SYSTOLIC BLOOD PRESSURE: 117 MMHG

## 2024-01-27 DIAGNOSIS — E87.79 VOLUME OVERLOAD STATE OF HEART: ICD-10-CM

## 2024-01-27 DIAGNOSIS — R79.89 ELEVATED TROPONIN: ICD-10-CM

## 2024-01-27 DIAGNOSIS — I48.92 ATRIAL FLUTTER WITH RAPID VENTRICULAR RESPONSE (H): ICD-10-CM

## 2024-01-27 DIAGNOSIS — R06.02 SOB (SHORTNESS OF BREATH): Primary | ICD-10-CM

## 2024-01-27 DIAGNOSIS — R79.89 ELEVATED SERUM CREATININE: ICD-10-CM

## 2024-01-27 LAB
ALBUMIN SERPL BCG-MCNC: 4 G/DL (ref 3.5–5.2)
ALP SERPL-CCNC: 135 U/L (ref 40–150)
ALT SERPL W P-5'-P-CCNC: 32 U/L (ref 0–50)
ANION GAP SERPL CALCULATED.3IONS-SCNC: 12 MMOL/L (ref 7–15)
AST SERPL W P-5'-P-CCNC: 40 U/L (ref 0–45)
BASOPHILS # BLD AUTO: 0.1 10E3/UL (ref 0–0.2)
BASOPHILS NFR BLD AUTO: 1 %
BILIRUB SERPL-MCNC: 0.4 MG/DL
BUN SERPL-MCNC: 28.9 MG/DL (ref 8–23)
CALCIUM SERPL-MCNC: 10.1 MG/DL (ref 8.2–9.6)
CHLORIDE SERPL-SCNC: 100 MMOL/L (ref 98–107)
CREAT SERPL-MCNC: 1.49 MG/DL (ref 0.51–0.95)
DEPRECATED HCO3 PLAS-SCNC: 25 MMOL/L (ref 22–29)
EGFRCR SERPLBLD CKD-EPI 2021: 32 ML/MIN/1.73M2
EOSINOPHIL # BLD AUTO: 0.1 10E3/UL (ref 0–0.7)
EOSINOPHIL NFR BLD AUTO: 1 %
ERYTHROCYTE [DISTWIDTH] IN BLOOD BY AUTOMATED COUNT: 15.7 % (ref 10–15)
FLUAV RNA SPEC QL NAA+PROBE: NEGATIVE
FLUBV RNA RESP QL NAA+PROBE: NEGATIVE
GLUCOSE SERPL-MCNC: 121 MG/DL (ref 70–99)
HCT VFR BLD AUTO: 43.4 % (ref 35–47)
HGB BLD-MCNC: 13.5 G/DL (ref 11.7–15.7)
HOLD SPECIMEN: NORMAL
IMM GRANULOCYTES # BLD: 0 10E3/UL
IMM GRANULOCYTES NFR BLD: 0 %
LYMPHOCYTES # BLD AUTO: 1.6 10E3/UL (ref 0.8–5.3)
LYMPHOCYTES NFR BLD AUTO: 14 %
MAGNESIUM SERPL-MCNC: 2.3 MG/DL (ref 1.7–2.3)
MCH RBC QN AUTO: 29.7 PG (ref 26.5–33)
MCHC RBC AUTO-ENTMCNC: 31.1 G/DL (ref 31.5–36.5)
MCV RBC AUTO: 96 FL (ref 78–100)
MONOCYTES # BLD AUTO: 0.8 10E3/UL (ref 0–1.3)
MONOCYTES NFR BLD AUTO: 7 %
NEUTROPHILS # BLD AUTO: 9 10E3/UL (ref 1.6–8.3)
NEUTROPHILS NFR BLD AUTO: 77 %
NRBC # BLD AUTO: 0 10E3/UL
NRBC BLD AUTO-RTO: 0 /100
NT-PROBNP SERPL-MCNC: 5072 PG/ML (ref 0–1800)
PLATELET # BLD AUTO: 240 10E3/UL (ref 150–450)
POTASSIUM SERPL-SCNC: 4.9 MMOL/L (ref 3.4–5.3)
PROT SERPL-MCNC: 7.9 G/DL (ref 6.4–8.3)
RBC # BLD AUTO: 4.54 10E6/UL (ref 3.8–5.2)
RSV RNA SPEC NAA+PROBE: NEGATIVE
SARS-COV-2 RNA RESP QL NAA+PROBE: NEGATIVE
SODIUM SERPL-SCNC: 137 MMOL/L (ref 135–145)
TROPONIN T SERPL HS-MCNC: 43 NG/L
TROPONIN T SERPL HS-MCNC: 46 NG/L
WBC # BLD AUTO: 11.6 10E3/UL (ref 4–11)

## 2024-01-27 PROCEDURE — 120N000002 HC R&B MED SURG/OB UMMC

## 2024-01-27 PROCEDURE — 250N000011 HC RX IP 250 OP 636: Performed by: EMERGENCY MEDICINE

## 2024-01-27 PROCEDURE — 250N000013 HC RX MED GY IP 250 OP 250 PS 637: Performed by: INTERNAL MEDICINE

## 2024-01-27 PROCEDURE — 87637 SARSCOV2&INF A&B&RSV AMP PRB: CPT | Performed by: EMERGENCY MEDICINE

## 2024-01-27 PROCEDURE — 99285 EMERGENCY DEPT VISIT HI MDM: CPT | Mod: 25 | Performed by: EMERGENCY MEDICINE

## 2024-01-27 PROCEDURE — 93005 ELECTROCARDIOGRAM TRACING: CPT | Performed by: EMERGENCY MEDICINE

## 2024-01-27 PROCEDURE — 96374 THER/PROPH/DIAG INJ IV PUSH: CPT | Performed by: EMERGENCY MEDICINE

## 2024-01-27 PROCEDURE — 85025 COMPLETE CBC W/AUTO DIFF WBC: CPT | Performed by: EMERGENCY MEDICINE

## 2024-01-27 PROCEDURE — 36415 COLL VENOUS BLD VENIPUNCTURE: CPT | Performed by: EMERGENCY MEDICINE

## 2024-01-27 PROCEDURE — 99207 PR NO CHARGE LOS: CPT | Performed by: PHYSICIAN ASSISTANT

## 2024-01-27 PROCEDURE — 83880 ASSAY OF NATRIURETIC PEPTIDE: CPT | Performed by: EMERGENCY MEDICINE

## 2024-01-27 PROCEDURE — 83735 ASSAY OF MAGNESIUM: CPT | Performed by: INTERNAL MEDICINE

## 2024-01-27 PROCEDURE — 80053 COMPREHEN METABOLIC PANEL: CPT | Performed by: EMERGENCY MEDICINE

## 2024-01-27 PROCEDURE — 250N000009 HC RX 250: Performed by: EMERGENCY MEDICINE

## 2024-01-27 PROCEDURE — 84484 ASSAY OF TROPONIN QUANT: CPT | Performed by: EMERGENCY MEDICINE

## 2024-01-27 PROCEDURE — 99223 1ST HOSP IP/OBS HIGH 75: CPT | Mod: AI | Performed by: INTERNAL MEDICINE

## 2024-01-27 PROCEDURE — 71046 X-RAY EXAM CHEST 2 VIEWS: CPT

## 2024-01-27 PROCEDURE — 96375 TX/PRO/DX INJ NEW DRUG ADDON: CPT | Performed by: EMERGENCY MEDICINE

## 2024-01-27 PROCEDURE — 250N000011 HC RX IP 250 OP 636: Performed by: INTERNAL MEDICINE

## 2024-01-27 PROCEDURE — 93010 ELECTROCARDIOGRAM REPORT: CPT | Performed by: EMERGENCY MEDICINE

## 2024-01-27 RX ORDER — SULFASALAZINE 500 MG/1
500 TABLET ORAL 2 TIMES DAILY
Status: DISCONTINUED | OUTPATIENT
Start: 2024-01-27 | End: 2024-02-01 | Stop reason: HOSPADM

## 2024-01-27 RX ORDER — GABAPENTIN 100 MG/1
100 CAPSULE ORAL DAILY
Status: DISCONTINUED | OUTPATIENT
Start: 2024-01-28 | End: 2024-02-01 | Stop reason: HOSPADM

## 2024-01-27 RX ORDER — FUROSEMIDE 10 MG/ML
40 INJECTION INTRAMUSCULAR; INTRAVENOUS DAILY
Status: DISCONTINUED | OUTPATIENT
Start: 2024-01-28 | End: 2024-01-30

## 2024-01-27 RX ORDER — ONDANSETRON 2 MG/ML
4 INJECTION INTRAMUSCULAR; INTRAVENOUS EVERY 6 HOURS PRN
Status: DISCONTINUED | OUTPATIENT
Start: 2024-01-27 | End: 2024-02-01 | Stop reason: HOSPADM

## 2024-01-27 RX ORDER — GUAIFENESIN/DEXTROMETHORPHAN 100-10MG/5
10 SYRUP ORAL EVERY 4 HOURS PRN
Status: DISCONTINUED | OUTPATIENT
Start: 2024-01-27 | End: 2024-02-01 | Stop reason: HOSPADM

## 2024-01-27 RX ORDER — METOPROLOL TARTRATE 25 MG/1
25 TABLET, FILM COATED ORAL 2 TIMES DAILY
Status: DISCONTINUED | OUTPATIENT
Start: 2024-01-27 | End: 2024-01-28

## 2024-01-27 RX ORDER — ACETAMINOPHEN 650 MG/1
650 SUPPOSITORY RECTAL EVERY 4 HOURS PRN
Status: DISCONTINUED | OUTPATIENT
Start: 2024-01-27 | End: 2024-02-01 | Stop reason: HOSPADM

## 2024-01-27 RX ORDER — DILTIAZEM HYDROCHLORIDE 5 MG/ML
20 INJECTION INTRAVENOUS ONCE
Status: COMPLETED | OUTPATIENT
Start: 2024-01-27 | End: 2024-01-27

## 2024-01-27 RX ORDER — ONDANSETRON 4 MG/1
4 TABLET, ORALLY DISINTEGRATING ORAL EVERY 6 HOURS PRN
Status: DISCONTINUED | OUTPATIENT
Start: 2024-01-27 | End: 2024-02-01 | Stop reason: HOSPADM

## 2024-01-27 RX ORDER — ENOXAPARIN SODIUM 100 MG/ML
40 INJECTION SUBCUTANEOUS EVERY 24 HOURS
Status: DISCONTINUED | OUTPATIENT
Start: 2024-01-27 | End: 2024-01-28

## 2024-01-27 RX ORDER — ACETAMINOPHEN 325 MG/1
650 TABLET ORAL EVERY 4 HOURS PRN
Status: DISCONTINUED | OUTPATIENT
Start: 2024-01-27 | End: 2024-02-01 | Stop reason: HOSPADM

## 2024-01-27 RX ORDER — METOPROLOL TARTRATE 1 MG/ML
5 INJECTION, SOLUTION INTRAVENOUS ONCE
Status: COMPLETED | OUTPATIENT
Start: 2024-01-27 | End: 2024-01-27

## 2024-01-27 RX ORDER — CALCIUM CARBONATE 500 MG/1
1000 TABLET, CHEWABLE ORAL 4 TIMES DAILY PRN
Status: DISCONTINUED | OUTPATIENT
Start: 2024-01-27 | End: 2024-02-01 | Stop reason: HOSPADM

## 2024-01-27 RX ORDER — HYDROXYCHLOROQUINE SULFATE 200 MG/1
200 TABLET, FILM COATED ORAL DAILY
Status: DISCONTINUED | OUTPATIENT
Start: 2024-01-28 | End: 2024-02-01 | Stop reason: HOSPADM

## 2024-01-27 RX ORDER — AMOXICILLIN 250 MG
1 CAPSULE ORAL 2 TIMES DAILY PRN
Status: DISCONTINUED | OUTPATIENT
Start: 2024-01-27 | End: 2024-02-01 | Stop reason: HOSPADM

## 2024-01-27 RX ORDER — FUROSEMIDE 10 MG/ML
40 INJECTION INTRAMUSCULAR; INTRAVENOUS ONCE
Status: COMPLETED | OUTPATIENT
Start: 2024-01-27 | End: 2024-01-27

## 2024-01-27 RX ORDER — METOPROLOL TARTRATE 1 MG/ML
5 INJECTION, SOLUTION INTRAVENOUS EVERY 6 HOURS PRN
Status: DISCONTINUED | OUTPATIENT
Start: 2024-01-28 | End: 2024-01-28

## 2024-01-27 RX ORDER — LIDOCAINE 40 MG/G
CREAM TOPICAL
Status: DISCONTINUED | OUTPATIENT
Start: 2024-01-27 | End: 2024-02-01 | Stop reason: HOSPADM

## 2024-01-27 RX ORDER — AMOXICILLIN 250 MG
2 CAPSULE ORAL 2 TIMES DAILY PRN
Status: DISCONTINUED | OUTPATIENT
Start: 2024-01-27 | End: 2024-02-01 | Stop reason: HOSPADM

## 2024-01-27 RX ADMIN — SULFASALAZINE 500 MG: 500 TABLET ORAL at 22:15

## 2024-01-27 RX ADMIN — METOPROLOL TARTRATE 5 MG: 5 INJECTION INTRAVENOUS at 18:53

## 2024-01-27 RX ADMIN — ENOXAPARIN SODIUM 40 MG: 40 INJECTION SUBCUTANEOUS at 22:15

## 2024-01-27 RX ADMIN — DILTIAZEM HYDROCHLORIDE 20 MG: 5 INJECTION INTRAVENOUS at 22:15

## 2024-01-27 RX ADMIN — FUROSEMIDE 40 MG: 10 INJECTION, SOLUTION INTRAMUSCULAR; INTRAVENOUS at 18:19

## 2024-01-27 RX ADMIN — METOPROLOL TARTRATE 25 MG: 25 TABLET, FILM COATED ORAL at 22:15

## 2024-01-27 ASSESSMENT — ENCOUNTER SYMPTOMS
SORE THROAT: 0
FEVER: 0
CHILLS: 0
JOINT SWELLING: 0
BACK PAIN: 0
DIZZINESS: 0
WOUND: 0
PALPITATIONS: 0
VOMITING: 0
MUSCULOSKELETAL NEGATIVE: 1
ARTHRALGIAS: 0
SHORTNESS OF BREATH: 1
LIGHT-HEADEDNESS: 0
RHINORRHEA: 0
NECK STIFFNESS: 0
COUGH: 0
MYALGIAS: 0
NECK PAIN: 0
HEADACHES: 0
NAUSEA: 0
ALLERGIC/IMMUNOLOGIC NEGATIVE: 1
WEAKNESS: 0
DIARRHEA: 0
CARDIOVASCULAR NEGATIVE: 1
ENDOCRINE NEGATIVE: 1
EYES NEGATIVE: 1

## 2024-01-27 ASSESSMENT — ACTIVITIES OF DAILY LIVING (ADL)
ADLS_ACUITY_SCORE: 35

## 2024-01-27 NOTE — ED TRIAGE NOTES
Patient states to feel short of breath, more difficulty breathing. The breathing is getting worse, more exhausted from activities and dressing, and normally not like this. Started on Thursday. More fatigue.      Triage Assessment (Adult)       Row Name 01/27/24 1525          Triage Assessment    Airway WDL WDL        Respiratory WDL    Respiratory WDL X;rhythm/pattern     Rhythm/Pattern, Respiratory shortness of breath        Skin Circulation/Temperature WDL    Skin Circulation/Temperature WDL WDL        Cardiac WDL    Cardiac WDL WDL        Peripheral/Neurovascular WDL    Peripheral Neurovascular WDL WDL        Cognitive/Neuro/Behavioral WDL    Cognitive/Neuro/Behavioral WDL WDL

## 2024-01-27 NOTE — ED PROVIDER NOTES
ED Provider Note  [facility]      History     Chief Complaint   Patient presents with    Shortness of Breath     Patient states to feel short of breath, more difficulty breathing. The breathing is getting worse, more exhausted from activities and dressing, and normally not like this. Started on Thursday. More fatigue.        HPI    Roxanna Stark is a 89 year old female is a 89-year-old female with a history of hypertension, chronic kidney disease, polymyalgia rheumatica, peripheral artery disease, and, per chart review, aortic stenosis status post aortic valve replacement 04/25/16, this was a porcine valve.  Chart review shows that postoperatively she had an episode of atrial fibrillation but I do not see that she has had any arrhythmias since that time.  Patient's granddaughter brings her in today because since Thursday, 2 days ago she has become more short of breath and fatigued.  Patient granddaughter reports that she becomes particularly short of breath and exhausted from simple activities such as walking across the room or getting dressed.  Normally she is not like this.  She denies any chest pain or chest pressure.  She denies any palpitations.  She has had a little bit of a cough which is nonproductive which has been going on for the past few days.  Patient denies any fevers or chills, has had some nasal congestion which is a chronic problem for her though she thinks it might be a little bit worse than typical for her.  No sore throat.  She states she is eating and drinking.  No abdominal pain.  No nausea, vomiting, or diarrhea.  No leg pain or leg swelling.  No falls, no head injury, no headache.  Patient's granddaughter is concerned because she does live alone in her own home and seems to not be as functional as she normally is.  Patient is not on any anticoagulation.      This part of the medical record was transcribed by Sylvester Johnson, Medical Scribe, from a dictation  done by Vanesa Rios MD.     Past Medical History  Past Medical History:   Diagnosis Date    Actinic keratosis     Aortic stenosis 2014    Atrial flutter with rapid ventricular response (H) 1/27/2024    Basal cell cancer 7/2014    left eye medial canthus     Basal cell carcinoma 9/30/08    left cheek    CKD (chronic kidney disease) stage 3, GFR 30-59 ml/min (H) 7/1/2019    HTN (hypertension)     Melanoma in situ (H) 9/30/08    left arm    Polymyalgia rheumatica (H24) 11/99    Rheumatoid arthritis of multiple sites with negative rheumatoid factor (H)     Temporal arteritis (H) 11/99     Past Surgical History:   Procedure Laterality Date    CATARACT IOL, RT/LT  5/09    bilateral    COLONOSCOPY  2002    EXCISE LESION VULVA N/A 9/27/2019    Procedure: Wide Local Excision Of Vulva, Colposcopy;  Surgeon: Mono Ribeiro MD;  Location:  OR    REPLACE VALVE AORTIC N/A 4/25/2016    Procedure: REPLACE VALVE AORTIC;  Surgeon: Sudeep Tsai MD;  Location:  OR    [facility] SKIN TISSUE PROCEDURE UNLISTED  11/3/08    mmis skin cancer excision     amoxicillin (AMOXIL) 500 MG capsule  calcium-vitamin D 500-125 MG-UNIT TABS  furosemide (LASIX) 20 MG tablet  gabapentin (NEURONTIN) 100 MG capsule  hydroxychloroquine (PLAQUENIL) 200 MG tablet  ICAPS PO  losartan (COZAAR) 100 MG tablet  metoprolol tartrate (LOPRESSOR) 25 MG tablet  Omega-3 Fatty Acids (OMEGA-3 FISH OIL PO)  sulfaSALAzine (AZULFIDINE) 500 MG tablet  Misc. Devices (ROLLATOR ULTRA-LIGHT) MISC      Allergies   Allergen Reactions    Lisinopril Cough     Family History  Family History   Problem Relation Age of Onset    Breast Cancer Sister 45    Arthritis Sister     Thyroid Disease Sister     Arthritis Sister     Colon Cancer Sister         colon    Arthritis Mother     Hypertension Father     Prostate Cancer Father     Arthritis Father     Heart Disease Father     Lipids Father     Colon Cancer Father     Arthritis Sister     Asthma Daughter     Asthma  Daughter     Lung Cancer Daughter 58        lung    Pancreatic Cancer Other 81        pancreatic      Social History   Social History     Tobacco Use    Smoking status: Never     Passive exposure: Never    Smokeless tobacco: Never   Vaping Use    Vaping Use: Never used   Substance Use Topics    Alcohol use: Yes     Comment: rarely    Drug use: No         A medically appropriate review of systems was performed with pertinent positives and negatives noted in the HPI, and all other systems negative.    Physical Exam   BP: 113/82  Pulse: 91  Temp: 97.3  F (36.3  C)  Resp: 18  SpO2: 100 %  Physical Exam  Vitals and nursing note reviewed.   Constitutional:       General: She is not in acute distress.     Appearance: Normal appearance. She is not diaphoretic.      Comments: Elderly adult female, appears tired.    HENT:      Head: Atraumatic.      Mouth/Throat:      Mouth: Mucous membranes are moist.   Eyes:      General: No scleral icterus.     Conjunctiva/sclera: Conjunctivae normal.   Cardiovascular:      Rate and Rhythm: Tachycardia present. Rhythm irregular.      Pulses: Normal pulses.      Heart sounds: Normal heart sounds. No murmur heard.  Pulmonary:      Effort: No respiratory distress.      Comments: Diminished breath sounds at the bases bilaterally, scattered crackles at bases.  No wheezing, no rhonchi, no respiratory distress  Abdominal:      Tenderness: There is no guarding or rebound.      Comments: Obese, soft, nontender to palpation   Musculoskeletal:      Right lower leg: No edema.      Left lower leg: No edema.   Skin:     General: Skin is warm.      Findings: No rash.   Neurological:      Mental Status: She is alert.           ED Course, Procedures, & Data      Procedures            EKG Interpretation:      Interpreted by Vanesa Rios MD  Time reviewed:1545   Symptoms at time of EKG: SOB   Rhythm: Atrial flutter with variable block  Rate: 100-110  Axis: Left Axis Deviation  Ectopy:  None  Conduction: Right bundle branch block (complete)  ST Segments/ T Waves: No ST-T wave changes and No acute ischemic changes  Q Waves: None  Comparison to prior: Unchanged    Clinical Impression: atrial flutter with variable AV block            Results for orders placed or performed during the hospital encounter of 01/27/24   XR Chest 2 Views     Status: None    Narrative    EXAM: XR CHEST 2 VIEWS  LOCATION: [facility]  DATE: 1/27/2024    INDICATION: shortness of breath  COMPARISON: Chest radiograph 08/30/2021      Impression    IMPRESSION: Stable size of cardiac silhouette status post median sternotomy. Findings suggestive of heart failure, including new pulmonary vascular congestion, mild interstitial edema and moderate bilateral pleural effusions and associated compressive   atelectasis, left greater than right. No pneumothorax. No acute bony abnormality.   Symptomatic Influenza A/B, RSV, & SARS-CoV2 PCR (COVID-19) Nasopharyngeal     Status: Normal    Specimen: Nasopharyngeal; Swab   Result Value Ref Range    Influenza A PCR Negative Negative    Influenza B PCR Negative Negative    RSV PCR Negative Negative    SARS CoV2 PCR Negative Negative    Narrative    Testing was performed using the Xpert Xpress CoV2/Flu/RSV Assay on the Cepheid GeneXpert Instrument. This test should be ordered for the detection of SARS-CoV-2, influenza, and RSV viruses in individuals who meet clinical and/or epidemiological criteria. Test performance is unknown in asymptomatic patients. This test is for in vitro diagnostic use under the FDA EUA for laboratories certified under CLIA to perform high or moderate complexity testing. This test has not been FDA cleared or approved. A negative result does not rule out the presence of PCR inhibitors in the specimen or target RNA in concentration below the limit of detection for the assay. If only one viral target is positive but coinfection with  multiple targets is suspected, the sample should be re-tested with another FDA cleared, approved, or authorized test, if coinfection would change clinical management. This test was validated by the [facility]. These laboratories are certified under the Clinical Laboratory Improvement Amendments of 1988 (CLIA-88) as qualified to perform high complexity laboratory testing.   Alloy Draw     Status: None    Narrative    The following orders were created for panel order Alloy Draw.  Procedure                               Abnormality         Status                     ---------                               -----------         ------                     Extra Blue Top Tube[[id number]]                              Final result               Extra Red Top Tube[[id number]]                               Final result               Extra Green Top (Lithium...[[id number]]                      Final result               Extra Purple Top Tube[[id number]]                            Final result                 Please view results for these tests on the individual orders.   Extra Blue Top Tube     Status: None   Result Value Ref Range    Hold Specimen JIC    Extra Red Top Tube     Status: None   Result Value Ref Range    Hold Specimen JIC    Extra Green Top (Lithium Heparin) Tube     Status: None   Result Value Ref Range    Hold Specimen JIC    Extra Purple Top Tube     Status: None   Result Value Ref Range    Hold Specimen     Comprehensive metabolic panel     Status: Abnormal   Result Value Ref Range    Sodium 137 135 - 145 mmol/L    Potassium 4.9 3.4 - 5.3 mmol/L    Carbon Dioxide (CO2) 25 22 - 29 mmol/L    Anion Gap 12 7 - 15 mmol/L    Urea Nitrogen 28.9 (H) 8.0 - 23.0 mg/dL    Creatinine 1.49 (H) 0.51 - 0.95 mg/dL    GFR Estimate 32 (L) >60 mL/min/1.73m2    Calcium 10.1 (H) 8.2 - 9.6 mg/dL    Chloride 100 98 - 107 mmol/L    Glucose 121 (H) 70 - 99 mg/dL    Alkaline Phosphatase 135 40 - 150 U/L    AST 40 0 -  45 U/L    ALT 32 0 - 50 U/L    Protein Total 7.9 6.4 - 8.3 g/dL    Albumin 4.0 3.5 - 5.2 g/dL    Bilirubin Total 0.4 <=1.2 mg/dL   Nt probnp inpatient     Status: Abnormal   Result Value Ref Range    N terminal Pro BNP Inpatient 5,072 (H) 0 - 1,800 pg/mL   Troponin T, High Sensitivity     Status: Abnormal   Result Value Ref Range    Troponin T, High Sensitivity 46 (H) <=14 ng/L   CBC with platelets and differential     Status: Abnormal   Result Value Ref Range    WBC Count 11.6 (H) 4.0 - 11.0 10e3/uL    RBC Count 4.54 3.80 - 5.20 10e6/uL    Hemoglobin 13.5 11.7 - 15.7 g/dL    Hematocrit 43.4 35.0 - 47.0 %    MCV 96 78 - 100 fL    MCH 29.7 26.5 - 33.0 pg    MCHC 31.1 (L) 31.5 - 36.5 g/dL    RDW 15.7 (H) 10.0 - 15.0 %    Platelet Count 240 150 - 450 10e3/uL    % Neutrophils 77 %    % Lymphocytes 14 %    % Monocytes 7 %    % Eosinophils 1 %    % Basophils 1 %    % Immature Granulocytes 0 %    NRBCs per 100 WBC 0 <1 /100    Absolute Neutrophils 9.0 (H) 1.6 - 8.3 10e3/uL    Absolute Lymphocytes 1.6 0.8 - 5.3 10e3/uL    Absolute Monocytes 0.8 0.0 - 1.3 10e3/uL    Absolute Eosinophils 0.1 0.0 - 0.7 10e3/uL    Absolute Basophils 0.1 0.0 - 0.2 10e3/uL    Absolute Immature Granulocytes 0.0 <=0.4 10e3/uL    Absolute NRBCs 0.0 10e3/uL   Troponin T, High Sensitivity     Status: Abnormal   Result Value Ref Range    Troponin T, High Sensitivity 43 (H) <=14 ng/L   Magnesium     Status: Normal   Result Value Ref Range    Magnesium 2.3 1.7 - 2.3 mg/dL   EKG 12 lead     Status: None (Preliminary result)   Result Value Ref Range    Systolic Blood Pressure  mmHg    Diastolic Blood Pressure  mmHg    Ventricular Rate 109 BPM    Atrial Rate 249 BPM    NC Interval  ms    QRS Duration 128 ms     ms    QTc 514 ms    P Axis  degrees    R AXIS -44 degrees    T Axis 55 degrees    Interpretation ECG       Atrial flutter with variable A-V block  Left axis deviation  Right bundle branch block  Abnormal ECG     CBC with Platelets &  Differential     Status: Abnormal    Narrative    The following orders were created for panel order CBC with Platelets & Differential.  Procedure                               Abnormality         Status                     ---------                               -----------         ------                     CBC with platelets and d...[[id number]]  Abnormal            Final result                 Please view results for these tests on the individual orders.     Medications   diltiazem (CARDIZEM) injection 20 mg (has no administration in time range)   gabapentin (NEURONTIN) capsule 100 mg (has no administration in time range)   hydroxychloroquine (PLAQUENIL) tablet 200 mg (has no administration in time range)   metoprolol tartrate (LOPRESSOR) tablet 25 mg (has no administration in time range)   sulfaSALAzine (AZULFIDINE) tablet 500 mg (has no administration in time range)   lidocaine 1 % 0.1-1 mL (has no administration in time range)   lidocaine (LMX4) cream (has no administration in time range)   sodium chloride (PF) 0.9% PF flush 3 mL (has no administration in time range)   sodium chloride (PF) 0.9% PF flush 3 mL (has no administration in time range)   senna-docusate (SENOKOT-S/PERICOLACE) 8.6-50 MG per tablet 1 tablet (has no administration in time range)     Or   senna-docusate (SENOKOT-S/PERICOLACE) 8.6-50 MG per tablet 2 tablet (has no administration in time range)   calcium carbonate (TUMS) chewable tablet 1,000 mg (has no administration in time range)   enoxaparin ANTICOAGULANT (LOVENOX) injection 40 mg (has no administration in time range)   acetaminophen (TYLENOL) tablet 650 mg (has no administration in time range)     Or   acetaminophen (TYLENOL) Suppository 650 mg (has no administration in time range)   melatonin tablet 1 mg (has no administration in time range)   ondansetron (ZOFRAN ODT) ODT tab 4 mg (has no administration in time range)     Or   ondansetron (ZOFRAN) injection 4 mg (has no administration  in time range)   benzocaine-menthol (CHLORASEPTIC) 6-10 MG lozenge 1 lozenge (has no administration in time range)   guaiFENesin-dextromethorphan (ROBITUSSIN DM) 100-10 MG/5ML syrup 10 mL (has no administration in time range)   furosemide (LASIX) injection 40 mg (has no administration in time range)   metoprolol (LOPRESSOR) injection 5 mg (has no administration in time range)   furosemide (LASIX) injection 40 mg (40 mg Intravenous $Given 1/27/24 1819)   metoprolol (LOPRESSOR) injection 5 mg (5 mg Intravenous $Given 1/27/24 1853)     Labs Ordered and Resulted from Time of ED Arrival to Time of ED Departure   COMPREHENSIVE METABOLIC PANEL - Abnormal       Result Value    Sodium 137      Potassium 4.9      Carbon Dioxide (CO2) 25      Anion Gap 12      Urea Nitrogen 28.9 (*)     Creatinine 1.49 (*)     GFR Estimate 32 (*)     Calcium 10.1 (*)     Chloride 100      Glucose 121 (*)     Alkaline Phosphatase 135      AST 40      ALT 32      Protein Total 7.9      Albumin 4.0      Bilirubin Total 0.4     NT PROBNP INPATIENT - Abnormal    N terminal Pro BNP Inpatient 5,072 (*)    TROPONIN T, HIGH SENSITIVITY - Abnormal    Troponin T, High Sensitivity 46 (*)    CBC WITH PLATELETS AND DIFFERENTIAL - Abnormal    WBC Count 11.6 (*)     RBC Count 4.54      Hemoglobin 13.5      Hematocrit 43.4      MCV 96      MCH 29.7      MCHC 31.1 (*)     RDW 15.7 (*)     Platelet Count 240      % Neutrophils 77      % Lymphocytes 14      % Monocytes 7      % Eosinophils 1      % Basophils 1      % Immature Granulocytes 0      NRBCs per 100 WBC 0      Absolute Neutrophils 9.0 (*)     Absolute Lymphocytes 1.6      Absolute Monocytes 0.8      Absolute Eosinophils 0.1      Absolute Basophils 0.1      Absolute Immature Granulocytes 0.0      Absolute NRBCs 0.0     TROPONIN T, HIGH SENSITIVITY - Abnormal    Troponin T, High Sensitivity 43 (*)    INFLUENZA A/B, RSV, & SARS-COV2 PCR - Normal    Influenza A PCR Negative      Influenza B PCR Negative       RSV PCR Negative      SARS CoV2 PCR Negative     MAGNESIUM - Normal    Magnesium 2.3       XR Chest 2 Views   Final Result   IMPRESSION: Stable size of cardiac silhouette status post median sternotomy. Findings suggestive of heart failure, including new pulmonary vascular congestion, mild interstitial edema and moderate bilateral pleural effusions and associated compressive    atelectasis, left greater than right. No pneumothorax. No acute bony abnormality.      Echo Complete    (Results Pending)          Critical care was not performed.     Medical Decision Making  The patient's presentation was of high complexity (a chronic illness severe exacerbation, progression, or side effect of treatment).    The patient's evaluation involved:  review of external note(s) from 2 sources (see separate area of note for details)  review of 2 test result(s) ordered prior to this encounter (see separate area of note for details)  ordering and/or review of 3+ test(s) in this encounter (see separate area of note for details)  independent interpretation of testing performed by another health professional (see separate area of note for details)  discussion of management or test interpretation with another health professional (see separate area of note for details)    The patient's management necessitated high risk (a decision regarding hospitalization).    Assessment & Plan    Patient presents for the above complaints.  On my evaluation she is alert though appears quite fatigued, is not in any acute distress.    Differential diagnosis certainly could include a number of etiologies.  Primary cardiac etiology needs to be considered including ACS, intermittent arrhythmia, pericardial effusion, heart failure, also need to consider pulmonary etiologies such as PE, infection/pneumonia, pneumothorax, also need to consider metabolic abnormality including electrolyte derangement, severe anemia, amongst many others.  We did establish IV  access and we did draw blood for laboratory analysis.  CBC is remarkable for minimal leukocytosis with a white count of 11.6, normal hemoglobin and normal platelet, CMP demonstrates normal electrolytes, creatinine of 1.49, up slightly from 1.21 previously., BNP is elevated at 5072, troponin is elevated at 46.  Influenza A is negative, influenza B is negative, RSV is negative, SARS-CoV-2 PCR swab is negative.  Chest x-ray shows, per radiology, bilateral pleural effusions, left greater than right, at least moderate in size.  There is also new pulmonary vascular congestion and mild interstitial edema consistent with heart failure.  EKG shows atrial flutter with a variable block with a rate of 109.  I do not see that this has been a problem for her before.  Most recent echocardiogram is from May 2020 at which point EF was 60 to 65%.    Patient was given one dose of metoprolol 5 mg IV here in the emergency department for her atrial flutter, she is on metoprolol at baseline at home.  Heart rate continues to be between 105 and 125.  Blood pressures are normal. Lasix 60 mg IV ordered for diuresis. I did speak to the cardiology staff about admitting this patient.  Cardiology staff agrees that the patient requires admission.  There are no beds available on [place] at this time.  Cardiology recommends admission to the medicine service here with a formal cardiology consult in the morning.  Cardiology does recommend diuresis, rate control, anticoagulation, and echocardiogram.  Discussed with patient and granddaughter who are in agreement with plan.    I have reviewed the nursing notes. I have reviewed the findings, diagnosis, plan and need for follow up with the patient.    New Prescriptions    No medications on file       Final diagnoses:   Atrial flutter with rapid ventricular response (H)   Volume overload state of heart   Elevated troponin   Elevated serum creatinine       Vanesa Rios MD  [facility], located [facility]  EMERGENCY DEPARTMENT  1/27/2024     Vanesa Rios MD  01/27/24 3166

## 2024-01-27 NOTE — PROGRESS NOTES
Chief Complaint:     Chief Complaint   Patient presents with    Breathing Problem     X2 days;Sob with activity ; worse today    Fatigue       No results found for any visits on 01/27/24.    Medical Decision Making:    Vital signs reviewed by Jaydon Pickering PA-C  /82   Pulse 53   Temp 97  F (36.1  C) (Tympanic)   Resp 24   Wt 53.3 kg (117 lb 6.4 oz)   SpO2 100%   BMI 22.87 kg/m      Differential Diagnosis:  { Differential Choices:584693}        ASSESSMENT    No diagnosis found.    PLAN    Patient is in no acute distress.    Temp is 97 in clinic today, lung sounds were clear, and O2 sats at 100% on RA.    RST was negative.  We will call with PCR results only if positive.  COVID swab collected in clinic today.  Rest, Push fluids, vaporizer, elevation of head of bed.  Ibuprofen and or Tylenol for any fever or body aches.  Over the counter cough suppressant- PRN- as discussed.   If symptoms worsen, recheck immediately otherwise follow up with your PCP in 1 week if symptoms are not improving.  Worrisome symptoms discussed with instructions to go to the ED.  Patient verbalized understanding and agreed with this plan.    Labs:    No results found for any visits on 01/27/24.     Vital signs reviewed by Jaydon Pickering PA-C  /82   Pulse 53   Temp 97  F (36.1  C) (Tympanic)   Resp 24   Wt 53.3 kg (117 lb 6.4 oz)   SpO2 100%   BMI 22.87 kg/m      Current Meds      Current Outpatient Medications:     amoxicillin (AMOXIL) 500 MG capsule, TAKE 4 CAPSULES BY MOUTH 1 HOUR BEFORE DENTAL APPOINTMENT, Disp: 4 capsule, Rfl: 1    calcium-vitamin D 500-125 MG-UNIT TABS, , Disp: , Rfl:     furosemide (LASIX) 20 MG tablet, TAKE 1 TABLET BY MOUTH EVERY DAY IF GAIN OF 2 TO 3 POUNDS OVER 2 DAYS, Disp: 90 tablet, Rfl: 3    gabapentin (NEURONTIN) 100 MG capsule, Take 1 capsule (100 mg) by mouth daily, Disp: 90 capsule, Rfl: 3    hydroxychloroquine (PLAQUENIL) 200 MG tablet, Take 1 tablet (200 mg) by mouth daily,  Disp: 30 tablet, Rfl: 3    ICAPS PO, 2 tablets daily, Disp: , Rfl:     losartan (COZAAR) 100 MG tablet, Take 1 tablet (100 mg) by mouth daily, Disp: 90 tablet, Rfl: 3    metoprolol tartrate (LOPRESSOR) 25 MG tablet, Take 1 tablet (25 mg) by mouth 2 times daily, Disp: 180 tablet, Rfl: 3    Misc. Devices (ROLLATOR ULTRA-LIGHT) MISC, , Disp: , Rfl:     Omega-3 Fatty Acids (OMEGA-3 FISH OIL PO), Take 1 tablet twice daily., Disp: , Rfl:     sulfaSALAzine (AZULFIDINE) 500 MG tablet, Take 1 tablet (500 mg) by mouth 2 times daily, Disp: 60 tablet, Rfl: 3      Respiratory History    {RESPIRATORY HISTORY:412}      SUBJECTIVE    HPI: Roxanna Stark is an 89 year old female who presents with fatigue and shortness of breath with activity.  Symptoms began 2  days ago and has unchanged.  There is no wheezing and chest pain.  Patient is eating and drinking well.  No fever, nausea, vomiting, or diarrhea.    Patient denies any recent travel or exposure to known COVID positive tested individual.      ROS:     Review of Systems   Constitutional:  Negative for chills and fever.   HENT:  Negative for congestion, ear pain, rhinorrhea and sore throat.    Eyes: Negative.    Respiratory:  Positive for shortness of breath. Negative for cough.    Cardiovascular: Negative.  Negative for chest pain and palpitations.   Gastrointestinal:  Negative for diarrhea, nausea and vomiting.   Endocrine: Negative.    Genitourinary: Negative.    Musculoskeletal: Negative.  Negative for arthralgias, back pain, joint swelling, myalgias, neck pain and neck stiffness.   Skin: Negative.  Negative for rash and wound.   Allergic/Immunologic: Negative.  Negative for immunocompromised state.   Neurological:  Negative for dizziness, weakness, light-headedness and headaches.         Family History   Family History   Problem Relation Age of Onset    Breast Cancer Sister 45    Arthritis Sister     Thyroid Disease Sister     Arthritis Sister     Colon Cancer Sister          colon    Arthritis Mother     Hypertension Father     Prostate Cancer Father     Arthritis Father     Heart Disease Father     Lipids Father     Colon Cancer Father     Arthritis Sister     Asthma Daughter     Asthma Daughter     Lung Cancer Daughter 58        lung    Pancreatic Cancer Other 81        pancreatic         Problem history  Patient Active Problem List   Diagnosis    History of polymyalgia rheumatica    History of basal cell carcinoma    CARDIOVASCULAR SCREENING; LDL GOAL LESS THAN 130    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)    Hip pain    Left atrial enlargement    Status post coronary angiogram    Aortic valve replaced    Rheumatoid arthritis of multiple sites with negative rheumatoid factor (H)    CKD (chronic kidney disease) stage 3, GFR 30-59 ml/min (H)    THERESA III (vulvar intraepithelial neoplasia III)    Peripheral arterial disease (H24)    Decreased hearing of both ears    Hearing aid worn    Gait abnormality        Allergies  Allergies   Allergen Reactions    Lisinopril Cough        Social History  Social History     Socioeconomic History    Marital status:      Spouse name: Not on file    Number of children: Not on file    Years of education: Not on file    Highest education level: Not on file   Occupational History     Employer: RETIRED   Tobacco Use    Smoking status: Never     Passive exposure: Never    Smokeless tobacco: Never   Vaping Use    Vaping Use: Never used   Substance and Sexual Activity    Alcohol use: Yes     Comment: rarely    Drug use: No    Sexual activity: Not Currently     Partners: Male     Birth control/protection: None   Other Topics Concern    Parent/sibling w/ CABG, MI or angioplasty before 65F 55M? No   Social History Narrative    Not on file     Social Determinants of Health     Financial Resource Strain: Not on file   Food Insecurity: Not on file   Transportation Needs: Not on file   Physical Activity: Not on file   Stress: Not  on file   Social Connections: Not on file   Interpersonal Safety: Not on file   Housing Stability: Not on file        OBJECTIVE     Vital signs reviewed by Jaydon Pickering PA-C  /82   Pulse 53   Temp 97  F (36.1  C) (Tympanic)   Resp 24   Wt 53.3 kg (117 lb 6.4 oz)   SpO2 100%   BMI 22.87 kg/m       Physical Exam  Vitals and nursing note reviewed.   Constitutional:       General: She is not in acute distress.     Appearance: She is well-developed. She is not ill-appearing, toxic-appearing or diaphoretic.   HENT:      Head: Normocephalic and atraumatic.      Right Ear: Hearing, tympanic membrane, ear canal and external ear normal. Tympanic membrane is not perforated, erythematous, retracted or bulging.      Left Ear: Hearing, tympanic membrane, ear canal and external ear normal. Tympanic membrane is not perforated, erythematous, retracted or bulging.      Nose: Congestion present. No mucosal edema or rhinorrhea.      Right Sinus: No maxillary sinus tenderness or frontal sinus tenderness.      Left Sinus: No maxillary sinus tenderness or frontal sinus tenderness.      Mouth/Throat:      Pharynx: Posterior oropharyngeal erythema present. No pharyngeal swelling, oropharyngeal exudate or uvula swelling.      Tonsils: No tonsillar exudate or tonsillar abscesses. 0 on the right. 0 on the left.   Eyes:      General:         Right eye: No discharge.         Left eye: No discharge.      Pupils: Pupils are equal, round, and reactive to light.   Cardiovascular:      Rate and Rhythm: Normal rate and regular rhythm.      Heart sounds: Normal heart sounds. No murmur heard.     No friction rub. No gallop.   Pulmonary:      Effort: Pulmonary effort is normal. No respiratory distress.      Breath sounds: Normal breath sounds. No decreased breath sounds, wheezing, rhonchi or rales.   Chest:      Chest wall: No tenderness.   Abdominal:      General: Bowel sounds are normal. There is no distension.      Palpations: Abdomen  is soft. There is no mass.      Tenderness: There is no abdominal tenderness. There is no guarding.   Musculoskeletal:      Cervical back: Normal range of motion and neck supple.   Lymphadenopathy:      Head:      Right side of head: No submental, submandibular, tonsillar, preauricular or posterior auricular adenopathy.      Left side of head: No submental, submandibular, tonsillar, preauricular or posterior auricular adenopathy.      Cervical: No cervical adenopathy.      Right cervical: No superficial or posterior cervical adenopathy.     Left cervical: No superficial or posterior cervical adenopathy.   Skin:     General: Skin is warm and dry.      Findings: No rash.   Neurological:      Mental Status: She is alert and oriented to person, place, and time.      Cranial Nerves: No cranial nerve deficit.      Deep Tendon Reflexes: Reflexes are normal and symmetric.   Psychiatric:         Behavior: Behavior normal. Behavior is cooperative.         Thought Content: Thought content normal.         Judgment: Judgment normal.           Jaydon Pickering PA-C  1/27/2024, 2:17 PM

## 2024-01-27 NOTE — LETTER
Transition Communication Hand-off for Care Transitions to Next Level of Care Provider    Name: Roxanna Stark  : 1927  MRN #: [id number]  Primary Care Provider: Swapna Gaines     [facility] [address] [place], [place] [address] [id number]     Reason for Hospitalization:  Elevated serum creatinine [R79.89]  Elevated troponin [R79.89]  Atrial flutter with rapid ventricular response (H) [I48.92]  Volume overload state of heart [E87.79]  Admit Date/Time: 2024  4:32 PM  Discharge Date: 2024  Payor Source: Payor: MEDICARE / Plan: MEDICARE / Product Type: Medicare /            Concern for non-adherence with plan of care:   no      Any outstanding tests or procedures:        Referrals       Future Labs/Procedures    Physical Therapy Adult Consult     Comments:    Evaluate and treat as clinically indicated.    Reason:  Regain strength and steadiness on feet              Key Recommendations:      JOAN Arriola    AVS/Discharge Summary is the source of truth; this is a helpful guide for improved communication of patient story            
on unit

## 2024-01-28 ENCOUNTER — APPOINTMENT (OUTPATIENT)
Dept: CARDIOLOGY | Facility: CLINIC | Age: 89
DRG: 291 | End: 2024-01-28
Attending: INTERNAL MEDICINE
Payer: MEDICARE

## 2024-01-28 ENCOUNTER — APPOINTMENT (OUTPATIENT)
Dept: PHYSICAL THERAPY | Facility: CLINIC | Age: 89
DRG: 291 | End: 2024-01-28
Attending: INTERNAL MEDICINE
Payer: MEDICARE

## 2024-01-28 LAB
ANION GAP SERPL CALCULATED.3IONS-SCNC: 15 MMOL/L (ref 7–15)
ATRIAL RATE - MUSE: 249 BPM
BUN SERPL-MCNC: 33 MG/DL (ref 8–23)
CALCIUM SERPL-MCNC: 9.3 MG/DL (ref 8.2–9.6)
CHLORIDE SERPL-SCNC: 102 MMOL/L (ref 98–107)
CREAT SERPL-MCNC: 1.6 MG/DL (ref 0.51–0.95)
DEPRECATED HCO3 PLAS-SCNC: 22 MMOL/L (ref 22–29)
DIASTOLIC BLOOD PRESSURE - MUSE: NORMAL MMHG
EGFRCR SERPLBLD CKD-EPI 2021: 29 ML/MIN/1.73M2
ERYTHROCYTE [DISTWIDTH] IN BLOOD BY AUTOMATED COUNT: 15.8 % (ref 10–15)
GLUCOSE SERPL-MCNC: 73 MG/DL (ref 70–99)
HCT VFR BLD AUTO: 40.7 % (ref 35–47)
HGB BLD-MCNC: 12.5 G/DL (ref 11.7–15.7)
INTERPRETATION ECG - MUSE: NORMAL
LVEF ECHO: NORMAL
MCH RBC QN AUTO: 28.7 PG (ref 26.5–33)
MCHC RBC AUTO-ENTMCNC: 30.7 G/DL (ref 31.5–36.5)
MCV RBC AUTO: 94 FL (ref 78–100)
P AXIS - MUSE: NORMAL DEGREES
PLATELET # BLD AUTO: 222 10E3/UL (ref 150–450)
POTASSIUM SERPL-SCNC: 4.5 MMOL/L (ref 3.4–5.3)
PR INTERVAL - MUSE: NORMAL MS
QRS DURATION - MUSE: 128 MS
QT - MUSE: 382 MS
QTC - MUSE: 514 MS
R AXIS - MUSE: -44 DEGREES
RBC # BLD AUTO: 4.35 10E6/UL (ref 3.8–5.2)
SODIUM SERPL-SCNC: 139 MMOL/L (ref 135–145)
SYSTOLIC BLOOD PRESSURE - MUSE: NORMAL MMHG
T AXIS - MUSE: 55 DEGREES
VENTRICULAR RATE- MUSE: 109 BPM
WBC # BLD AUTO: 9.8 10E3/UL (ref 4–11)

## 2024-01-28 PROCEDURE — 99233 SBSQ HOSP IP/OBS HIGH 50: CPT | Performed by: INTERNAL MEDICINE

## 2024-01-28 PROCEDURE — 93005 ELECTROCARDIOGRAM TRACING: CPT

## 2024-01-28 PROCEDURE — 250N000013 HC RX MED GY IP 250 OP 250 PS 637: Performed by: INTERNAL MEDICINE

## 2024-01-28 PROCEDURE — 250N000009 HC RX 250: Performed by: INTERNAL MEDICINE

## 2024-01-28 PROCEDURE — 93306 TTE W/DOPPLER COMPLETE: CPT

## 2024-01-28 PROCEDURE — 97116 GAIT TRAINING THERAPY: CPT | Mod: GP

## 2024-01-28 PROCEDURE — 93010 ELECTROCARDIOGRAM REPORT: CPT | Performed by: INTERNAL MEDICINE

## 2024-01-28 PROCEDURE — 80048 BASIC METABOLIC PNL TOTAL CA: CPT | Performed by: INTERNAL MEDICINE

## 2024-01-28 PROCEDURE — 120N000002 HC R&B MED SURG/OB UMMC

## 2024-01-28 PROCEDURE — 250N000011 HC RX IP 250 OP 636: Performed by: INTERNAL MEDICINE

## 2024-01-28 PROCEDURE — 97161 PT EVAL LOW COMPLEX 20 MIN: CPT | Mod: GP

## 2024-01-28 PROCEDURE — 99222 1ST HOSP IP/OBS MODERATE 55: CPT | Mod: 25 | Performed by: STUDENT IN AN ORGANIZED HEALTH CARE EDUCATION/TRAINING PROGRAM

## 2024-01-28 PROCEDURE — 97530 THERAPEUTIC ACTIVITIES: CPT | Mod: GP

## 2024-01-28 PROCEDURE — 36415 COLL VENOUS BLD VENIPUNCTURE: CPT | Performed by: INTERNAL MEDICINE

## 2024-01-28 PROCEDURE — 93306 TTE W/DOPPLER COMPLETE: CPT | Mod: 26 | Performed by: INTERNAL MEDICINE

## 2024-01-28 PROCEDURE — 85027 COMPLETE CBC AUTOMATED: CPT | Performed by: INTERNAL MEDICINE

## 2024-01-28 RX ORDER — ENOXAPARIN SODIUM 100 MG/ML
30 INJECTION SUBCUTANEOUS EVERY 24 HOURS
Status: DISCONTINUED | OUTPATIENT
Start: 2024-01-28 | End: 2024-01-29

## 2024-01-28 RX ORDER — METOPROLOL TARTRATE 25 MG/1
50 TABLET, FILM COATED ORAL 2 TIMES DAILY
Status: DISCONTINUED | OUTPATIENT
Start: 2024-01-28 | End: 2024-01-29

## 2024-01-28 RX ORDER — POTASSIUM CHLORIDE 750 MG/1
20 TABLET, EXTENDED RELEASE ORAL
Status: CANCELLED | OUTPATIENT
Start: 2024-01-28

## 2024-01-28 RX ORDER — POTASSIUM CHLORIDE 750 MG/1
40 TABLET, EXTENDED RELEASE ORAL
Status: CANCELLED | OUTPATIENT
Start: 2024-01-28

## 2024-01-28 RX ORDER — MAGNESIUM SULFATE HEPTAHYDRATE 40 MG/ML
2 INJECTION, SOLUTION INTRAVENOUS
Status: CANCELLED | OUTPATIENT
Start: 2024-01-28

## 2024-01-28 RX ORDER — METOPROLOL TARTRATE 1 MG/ML
5 INJECTION, SOLUTION INTRAVENOUS EVERY 4 HOURS PRN
Status: DISCONTINUED | OUTPATIENT
Start: 2024-01-28 | End: 2024-02-01 | Stop reason: HOSPADM

## 2024-01-28 RX ORDER — METOPROLOL TARTRATE 25 MG/1
25 TABLET, FILM COATED ORAL ONCE
Status: COMPLETED | OUTPATIENT
Start: 2024-01-28 | End: 2024-01-28

## 2024-01-28 RX ORDER — ENOXAPARIN SODIUM 100 MG/ML
50 INJECTION SUBCUTANEOUS EVERY 24 HOURS
Status: DISCONTINUED | OUTPATIENT
Start: 2024-01-28 | End: 2024-01-28

## 2024-01-28 RX ADMIN — SULFASALAZINE 500 MG: 500 TABLET ORAL at 21:16

## 2024-01-28 RX ADMIN — HYDROXYCHLOROQUINE SULFATE 200 MG: 200 TABLET, FILM COATED ORAL at 08:40

## 2024-01-28 RX ADMIN — METOPROLOL TARTRATE 25 MG: 25 TABLET, FILM COATED ORAL at 16:04

## 2024-01-28 RX ADMIN — ENOXAPARIN SODIUM 30 MG: 30 INJECTION SUBCUTANEOUS at 20:12

## 2024-01-28 RX ADMIN — METOPROLOL TARTRATE 50 MG: 25 TABLET, FILM COATED ORAL at 20:11

## 2024-01-28 RX ADMIN — METOPROLOL TARTRATE 25 MG: 25 TABLET, FILM COATED ORAL at 09:10

## 2024-01-28 RX ADMIN — SULFASALAZINE 500 MG: 500 TABLET ORAL at 08:39

## 2024-01-28 RX ADMIN — FUROSEMIDE 40 MG: 10 INJECTION, SOLUTION INTRAMUSCULAR; INTRAVENOUS at 08:39

## 2024-01-28 RX ADMIN — GABAPENTIN 100 MG: 100 CAPSULE ORAL at 08:39

## 2024-01-28 RX ADMIN — METOPROLOL TARTRATE 5 MG: 5 INJECTION INTRAVENOUS at 13:29

## 2024-01-28 ASSESSMENT — ACTIVITIES OF DAILY LIVING (ADL)
ADLS_ACUITY_SCORE: 27
ADLS_ACUITY_SCORE: 35
ADLS_ACUITY_SCORE: 31
ADLS_ACUITY_SCORE: 27
ADLS_ACUITY_SCORE: 35
ADLS_ACUITY_SCORE: 31
ADLS_ACUITY_SCORE: 27
ADLS_ACUITY_SCORE: 35
ADLS_ACUITY_SCORE: 31
ADLS_ACUITY_SCORE: 37

## 2024-01-28 NOTE — ED NOTES
Received pt with SOB, she is in room, laying calmly in bed, granddaughter at bedside, pt is AOX3, NAD, place on tele, safety maintained, will cont to monitor.

## 2024-01-28 NOTE — PHARMACY-ADMISSION MEDICATION HISTORY
Pharmacy Intern Admission Medication History    Admission medication history is complete. The information provided in this note is only as accurate as the sources available at the time of the update.    Information Source(s): Patient, Family member, and dispense report  via in-person    Pertinent Information: Patient's granddaughter, Colette, was present and helps the patient with her medications. Colette had also brought an up to date medication list which was the same as the PTA med list.    Patient reports being confused about the furosemide directions and has been meaning to talk to her doctor about it. Because she is unsure, she has been taking it 1 tablet every day.    Changes made to PTA medication list:  Added: None  Deleted: None  Changed: added directions to calcium-vitamin D 500-125 mg-unit tablet    Medication Affordability: did not discuss  Allergies reviewed with patient and updates made in EHR: yes    Medication History Completed By: Godwin Rivas 1/27/2024 8:21 PM    PTA Med List   Medication Sig Last Dose    amoxicillin (AMOXIL) 500 MG capsule TAKE 4 CAPSULES BY MOUTH 1 HOUR BEFORE DENTAL APPOINTMENT Past Month at dental appt    calcium-vitamin D 500-125 MG-UNIT TABS Take 1 tablet by mouth daily 1/27/2024 at AM    furosemide (LASIX) 20 MG tablet TAKE 1 TABLET BY MOUTH EVERY DAY IF GAIN OF 2 TO 3 POUNDS OVER 2 DAYS (Patient taking differently: Take 20 mg by mouth daily TAKE 1 TABLET BY MOUTH EVERY DAY IF GAIN OF 2 TO 3 POUNDS OVER 2 DAYS) 1/27/2024 at AM    gabapentin (NEURONTIN) 100 MG capsule Take 1 capsule (100 mg) by mouth daily 1/27/2024 at AM    hydroxychloroquine (PLAQUENIL) 200 MG tablet Take 1 tablet (200 mg) by mouth daily 1/27/2024 at AM    ICAPS PO 2 tablets daily 1/27/2024 at AM    losartan (COZAAR) 100 MG tablet Take 1 tablet (100 mg) by mouth daily 1/27/2024 at AM    metoprolol tartrate (LOPRESSOR) 25 MG tablet Take 1 tablet (25 mg) by mouth 2 times daily 1/27/2024 at 1 of 2    Omega-3  Fatty Acids (OMEGA-3 FISH OIL PO) Take 1 tablet twice daily. 1/27/2024 at 1 of 2    sulfaSALAzine (AZULFIDINE) 500 MG tablet Take 1 tablet (500 mg) by mouth 2 times daily 1/27/2024 at 1 of 2

## 2024-01-28 NOTE — PLAN OF CARE
"Goal Outcome Evaluation:      Plan of Care Reviewed With: patient    Overall Patient Progress: improvingOverall Patient Progress: improving    Outcome Evaluation: Pt is a standby assist with a quad cane that the pt brought from home. Grand-daughter is at bedside and is helpful taking care of pt. Family mentions that pt can be forgetful so it would be a good idea to have bed alarm on, especially when family is not here. Cardiac consult was complete; pt is determined to have afib. Pt denies chest pain. Pt has possible LUCIANO tomorrow and is to be NPO at midnight. Continue POC.      Problem: Adult Inpatient Plan of Care  Goal: Plan of Care Review  Description: The Plan of Care Review/Shift note should be completed every shift.  The Outcome Evaluation is a brief statement about your assessment that the patient is improving, declining, or no change.  This information will be displayed automatically on your shift  note.  Outcome: Progressing  Flowsheets (Taken 1/28/2024 1574)  Outcome Evaluation:   Pt is a standby assist with a quad cane that the pt brought from home. Grand-daughter is at bedside and is helpful taking care of pt. Family mentions that pt can be forgetful so it would be a good idea to have bed alarm on, especially when family is not here. Cardiac consult was complete   pt is determined to have afib. Pt denies chest pain. Pt has possible LUCIANO tomorrow and is to be NPO at midnight. Continue POC.  Plan of Care Reviewed With: patient  Overall Patient Progress: improving  Goal: Patient-Specific Goal (Individualized)  Description: You can add care plan individualizations to a care plan. Examples of Individualization might be:  \"Parent requests to be called daily at 9am for status\", \"I have a hard time hearing out of my right ear\", or \"Do not touch me to wake me up as it startles  me\".  Outcome: Progressing  Goal: Absence of Hospital-Acquired Illness or Injury  Outcome: Progressing  Intervention: Identify and Manage " Fall Risk  Recent Flowsheet Documentation  Taken 1/28/2024 1000 by Dewey Lr, RN  Safety Promotion/Fall Prevention:   assistive device/personal items within reach   clutter free environment maintained   mobility aid in reach   nonskid shoes/slippers when out of bed   room near nurse's station  Goal: Optimal Comfort and Wellbeing  Outcome: Progressing  Goal: Readiness for Transition of Care  Outcome: Progressing  Intervention: Mutually Develop Transition Plan  Recent Flowsheet Documentation  Taken 1/28/2024 1000 by Dewey Lr, RN  Equipment Currently Used at Home: cane, quad     Problem: Heart Failure  Goal: Optimal Coping  Outcome: Progressing  Goal: Optimal Cardiac Output  Outcome: Progressing  Goal: Stable Heart Rate and Rhythm  Outcome: Progressing  Goal: Optimal Functional Ability  Outcome: Progressing  Goal: Fluid and Electrolyte Balance  Outcome: Progressing  Goal: Improved Oral Intake  Outcome: Progressing  Goal: Effective Oxygenation and Ventilation  Outcome: Progressing

## 2024-01-28 NOTE — PROGRESS NOTES
01/28/24 1300   Appointment Info   Signing Clinician's Name / Credentials (PT) SOCO Vaughn   Student Supervision On-site supervision provided;Direct supervision provided;Direct Patient Contact Provided;Therapy services provided with the co-signing licensed therapist guiding and directing the services, and providing the skilled judgement and assessment throughout the session   Living Environment   People in Home alone   Current Living Arrangements house   Home Accessibility stairs to enter home;stairs within home   Number of Stairs, Main Entrance 2   Stair Railings, Main Entrance railing on left side (ascending)   Number of Stairs, Within Home, Primary greater than 10 stairs   Stair Railings, Within Home, Primary railings on both sides of stairs   Transportation Anticipated family or friend will provide   Living Environment Comments Pt uses stair lift inside home.   Self-Care   Usual Activity Tolerance good   Current Activity Tolerance fair   Regular Exercise Yes   Activity/Exercise Type walking   Exercise Amount/Frequency 3-5 times/wk   Equipment Currently Used at Home cane, quad;walker, rolling   Fall history within last six months no   General Information   Onset of Illness/Injury or Date of Surgery 01/27/24   Referring Physician Pio Navaror MD   Patient/Family Therapy Goals Statement (PT) Pt states to be able to walk at home safely   Pertinent History of Current Problem (include personal factors and/or comorbidities that impact the POC) shortness of breath worse with exertion, chest tightness, mild cough, generalized weakness for almost 2 days.   Existing Precautions/Restrictions no known precautions/restrictions   Weight-Bearing Status - LUE full weight-bearing   Weight-Bearing Status - RUE full weight-bearing   Weight-Bearing Status - LLE full weight-bearing   Weight-Bearing Status - RLE full weight-bearing   Cognition   Affect/Mental Status (Cognition) WNL   Orientation Status (Cognition)  oriented x 4   Follows Commands (Cognition) WNL   Pain Assessment   Patient Currently in Pain No   Integumentary/Edema   Integumentary/Edema no deficits were identifed   Posture    Posture Forward head position;Protracted shoulders;Kyphosis   Range of Motion (ROM)   ROM Comment Not formally assessed; pt demonstrated functional ROM w/ transfers and gait   Strength (Manual Muscle Testing)   Strength Comments Not formally assessed; pt demonstrated decreased functional strength w/ transfers and gait   Bed Mobility   Comment, (Bed Mobility) Supine<>sit Min A   Transfers   Comment, (Transfers) Sit<>stand w/ FWW and Min A   Gait/Stairs (Locomotion)   Comment, (Gait/Stairs) gait w/ FWW and CGA.   Balance   Balance Comments seated balance Min-ModAx1, standing balance w/ FWW and CGA   Sensory Examination   Sensory Perception WNL   Clinical Impression   Criteria for Skilled Therapeutic Intervention Yes, treatment indicated   PT Diagnosis (PT) impaired functional mobility   Influenced by the following impairments decreased tolerance for functional activity, decreased endurance   Functional limitations due to impairments bed mobility, transfers, gait   Clinical Presentation (PT Evaluation Complexity) stable   Clinical Presentation Rationale per clinical judgement   Clinical Decision Making (Complexity) low complexity   Planned Therapy Interventions (PT) balance training;bed mobility training;home exercise program;neuromuscular re-education;stair training;strengthening;transfer training   Risk & Benefits of therapy have been explained evaluation/treatment results reviewed;risks/benefits reviewed;current/potential barriers reviewed;care plan/treatment goals reviewed   PT Total Evaluation Time   PT Emily, Low Complexity Minutes (82995) 5   Physical Therapy Goals   PT Frequency Daily   PT Predicted Duration/Target Date for Goal Attainment 02/04/24   PT Goals Bed Mobility;Transfers;Gait;Stairs   PT: Bed Mobility Independent;Supine  to/from sit;Rolling   PT: Transfers Modified independent;Sit to/from stand;Bed to/from chair;Assistive device   PT: Gait Modified independent;Rolling walker;100 feet   PT: Stairs Supervision/stand-by assist;2 stairs;Rail on left   Interventions   Interventions Quick Adds Therapeutic Activity;Gait Training   Therapeutic Activity   Therapeutic Activities: dynamic activities to improve functional performance Minutes (60716) 13   Symptoms Noted During/After Treatment Fatigue   Treatment Detail/Skilled Intervention Pt performed supine>sit w/ Min A lifting B LE off bed. Min-ModAx1 required sitting EOB d/t posteior lean. continous vc to promote anterior lean w/ little follow through. Pt performed sit<>stand from lowered bed w/ FWW and CGA. vc required for hand placement prior to STS to improve safety. Pt performed sit<>stand from toilet w/ FWW and Min A. CGA required during toilet hygiene and clothing management for balance. Pt reported fatigue after toileting. Pt performed sit>supine w/ SBA. Ended session w/ pt supine and needs in reach.   Gait Training   Gait Training Minutes (95504) 8   Symptoms Noted During/After Treatment (Gait Training) fatigue   Treatment Detail/Skilled Intervention Pt ambulated 15' x2 w/ FWW and CGA. Pt noted fatigue after ea ambulation bout, requiring seated rest break. Pt presented w/ slowed gait speed, L trendelenburg gait and decreased L stance time resulting in decreased R step length. vc x3 to increase R step length. Minimal follow through after given vc.   PT Discharge Planning   PT Plan PT: gait, LE strengthening, stairs once tolerated   PT Discharge Recommendation (DC Rec) Transitional Care Facility   PT Rationale for DC Rec Pt presents well below baseline level of function. Pt requires assistance for bed mobility, transfers, and gait. Unable to assess stairs at this time. Little functional activity proves to be highly taxing for the pt. Pt lives by herself and does not have the support at  home that is required at this time. Recommend DC to TCU for continued skilled services.   PT Brief overview of current status Bed mobility Min A, transfers w/ FWW and Min A, gait w/ FWW and CGA   Total Session Time   Timed Code Treatment Minutes 21   Total Session Time (sum of timed and untimed services) 26

## 2024-01-28 NOTE — PROGRESS NOTES
[facility]    Medicine Progress Note - Hospitalist Service, GOLD TEAM 19    Date of Admission:  1/27/2024    Assessment & Plan   Roxanna Stark is a 89 year old female admitted on 1/27/2024.  She has a h/o HTN, CKD, RA, PMR, ?  Temporal arteritis, PAD, severe AS s/p AVR w/ porcine valve 4/25/16 w/ postop complicated by an episode of atrial fibrillation. She presented to [facility] ED on 1/27/24 w/ a 2-day h/o CASTREJON, chest tightness, mild cough, generalized weakness and exercise intolerance.  W/u in the ED revealed Afib w/ RVR and decompensated CHF.     Afib w/ RVR  ---   Onset unclear  ---   Has a h/o an episode of postop afib in 2016  ---   No documented afib since  ---   Presented w/ a 2-day h/o CASTREJON, chest tightness, mild cough, generalized weakness and exercise intolerance  ---   Trivial troponin leak, 46 ---> 43  ---   No ST-T changes on EKG  ---   Admit CXR w/ new pulm edema and b/l mod pleural effusion c/f decompensated CHF  ---   EKG and tele revealed afib w/ RVR  ---   SARS CoV-2, influenza A/B and RSV PCR negative  ---   Pt w/o urinary symptoms  ---   Hx does not suggest PE thus w/u was not pursued  ---   TTE 1/28 revealed EF > 70%, no WMA and no HD significant valvular heart dz. Diastolic function indeterminate. S/p minimally invasive aortic valve replacement with 21 mm Epic porcine valve on 4/25/2016, the prosthetic aortic valve is well-seated.  ---   Appreciate cardiology consult rec  ---   Rate control w/ metoprolol tartrate 50 mg bid and metoprolol 5 mg iv prn HR > 120  ---   CHADS2 Vasc score is 5 (age, female gender, HTN and HFpEF), chemical anticoagulation recommended.  Continue enoxaparin 30 mg sq daily for now and DOAC at discharge  ---   NPO after midnight for possible LUCIANO w/ DCCV incase she remains in afib w/ RVR + hemodynamically unstable  ---   Will get EKG now to confirm afib w/ RVR    HFpEF w/ acute exacerbation  ---   Likely precipitated  by Afib w/ RVR  ---   TTE 1/28 revealed EF > 70%, no WMA and no HD significant valvular heart dz. Diastolic function indeterminate. S/p minimally invasive aortic valve replacement with 21 mm Epic porcine valve on 4/25/2016, the prosthetic aortic valve is well-seated.  ---   CXR w/ pulm edema and b/l mod pleural effusion  ---   No LE edema  ---   N-Termina pro-BNP elevated 5072  ---   On RA  ---   I/O net - 675 ml since admit  ---   Hold PTA Lasix 20 mg po qam and start Lasix 40 mg iv qam     Elevated troponin  ---   Due to demand ischemia in a setting of Afib w/ RVR and CHF exacerbation  ---   Troponin is flat, 46 ---> 43  ---   No ST-T changes on EKG  ---   TTE 1/28 revealed EF > 70%, no WMA and no HD significant valvular heart dz. Diastolic function indeterminate. S/p minimally invasive aortic valve replacement with 21 mm Epic porcine valve on 4/25/2016, the prosthetic aortic valve is well-seated.  ---   CP free during this eval    CAD  PAD  HTN  ---   Coronary angio 3/3/2016 non-obstructive cad  ---   TE 1/28 revealed EF > 70%, no WMA, no HD significant valvular heart dz  ---   Continue PTA metoprolol but increase dose to 50 mg bid  ---   Hold PTA Losartan  ---   Hold PTA Lasix 20 mg po qam and start Lasix 40 mg iv qam  ---   Continue fish oil     CKD  ---   Suspect stage III.    ---   Recent creatinine ranges around 1.2-1.5.    ---   Creatinine on admission 1.49 ---> IV Lasix ---> 1.60.   ---   Avoid nephrotoxins other IV Lasix  ---   Check BMP in am     RA, seronegative erosive.  PMR  ---   Follows w/ outpt rheumatology clinic  ---   Continue PTA sulfasalazine 500 mg bid  ---   Continue PTA hydroxychloroquine 200 mg daily (last eye exam by Dr. Rogers on 5/23/2023)  ---   Continue PTA gabapentin 100 mg daily     General weakness  ---   Likely due to afib w/ RVR +/- HFpEF exacerbation  ---   PT/OT consulted  ---   Fall precautions         Diet: Combination Diet Regular Diet Adult  DVT Prophylaxis: Enoxaparin  (Lovenox) SQ  Grace Catheter: Not present  Lines: None     Cardiac Monitoring: ACTIVE order. Indication: Acute decompensated heart failure (48 hours)  Code Status: No CPR- Do NOT Intubate dw patient and granddaughter at bedside.  DISPOSITION:   Anticipate 2-3 more days of hospitalization          Tomy Sanchez MD  Hospitalist Service, GOLD TEAM 19  [facility]  Securely message with [employer] (more info)  Text page via [employer] Paging/Directory   See signed in provider for up to date coverage information  ______________________________________________________________________    Interval History   Asleep  Grand-daughter at bedside  No new complaints    Physical Exam   Vital Signs: Temp: 97.8  F (36.6  C) Temp src: Oral BP: 126/77 Pulse: (!) 132   Resp: 20 SpO2: 92 % O2 Device: None (Room air)    Weight: 110 lbs 3.68 oz  General: Elderly, follows simple command, NAD.  HEENT:  NC/AT, neck supple  CVS:  Irregularly irregular, no m/r/g.  Lungs:  decrease BS at the base w/ faint bibasilar rhonchi   Abd:  Soft, + bs  Ext:  No c/c.  Lymph:  No edema.  Neuro:  Nonfocal.  Musculoskeletal: No calf tenderness to palpation.    Skin:  No rash.  Psychiatry:  Mood and affect appropriate.        Data     I have personally reviewed the following data over the past 24 hrs:    9.8  \   12.5   / 222     139 102 33.0 (H) /  73   4.5 22 1.60 (H) \     ALT: 32 AST: 40 AP: 135 TBILI: 0.4   ALB: 4.0 TOT PROTEIN: 7.9 LIPASE: N/A     Trop: 43 (H) BNP: 5,072 (H)       Imaging results reviewed over the past 24 hrs:   Recent Results (from the past 24 hour(s))   XR Chest 2 Views    Narrative    EXAM: XR CHEST 2 VIEWS  LOCATION: [facility]  DATE: 1/27/2024    INDICATION: shortness of breath  COMPARISON: Chest radiograph 08/30/2021      Impression    IMPRESSION: Stable size of cardiac silhouette status post median sternotomy. Findings suggestive of heart failure,  including new pulmonary vascular congestion, mild interstitial edema and moderate bilateral pleural effusions and associated compressive   atelectasis, left greater than right. No pneumothorax. No acute bony abnormality.   Echo Complete   Result Value    LVEF  70%    [facility]    [id number]  [id number]  [id number]  [id number]^MICAELA^MIGUEL     [facility],[place]  Echocardiography Laboratory  [address] [place] [address]     Name: ADONIS BIGGS  MRN: [id number]  : 1927  Study Date: 2024 12:11 PM  Age: 89 yrs  Gender: Female  Patient Location: [facility]  Reason For Study: Heart Failure  Ordering Physician: MIGUEL HINOJOSA  Performed By: Alka Hudson     BSA: 1.5 m2  Height: 60 in  Weight: 117 lb  ______________________________________________________________________________  Procedure  Complete Portable Echo Adult.  ______________________________________________________________________________  Interpretation Summary  Global and regional left ventricular function is hyperkinetic with an EF >70%.  Global right ventricular function is normal.  S/P Minimally invasive aortic valve replacement with 21 mm Epic porcine valve  on 2016  The prosthetic aortic valve is well-seated.  Doppler interrogation of the aortic valve is normal.  Moderate mitral annular calcification is present. On Doppler interrogation,  there is no significant stenosis or regurgitation.     This study was compared with the study from 2020 .  Patient is tachycardic today otherwise no significant change.     A left pleural effusion is present.  ______________________________________________________________________________  Left Ventricle  Global and regional left ventricular function is hyperkinetic with an EF >70%.  Left ventricular size is normal. Left ventricular wall thickness cannot  evaluate. Diastolic function not assessed due to tachycardia.     Right Ventricle  The right ventricle is normal  size. Global right ventricular function is  normal.     Atria  The right atria appears normal. Severe left atrial enlargement is present.     Mitral Valve  Moderate mitral annular calcification is present. On Doppler interrogation,  there is no significant stenosis or regurgitation. Mild mitral insufficiency  is present.     Aortic Valve  S/P Minimally invasive aortic valve replacement with 21 mm Epic porcine valve  on 4/25/2016. The prosthetic aortic valve is well-seated. Doppler  interrogation of the aortic valve is normal.     Tricuspid Valve  The tricuspid valve is normal.     Pulmonic Valve  On Doppler interrogation, there is no significant stenosis or regurgitation.     Vessels  The aorta root is normal. IVC diameter and respiratory changes fall into an  intermediate range suggesting an RA pressure of 8 mmHg.     Pericardium  No pericardial effusion is present.     Miscellaneous  A left pleural effusion is present.     Compared to Previous Study  This study was compared with the study from 5/2020 . Patient is tachycardic  today otherwise no significant change.  ______________________________________________________________________________  Report approved by: Javed KING 01/28/2024 02:12 PM     ______________________________________________________________________________

## 2024-01-28 NOTE — PROGRESS NOTES
Brief Cardiology Note    Briefly, Ms. Stark is a 89 y/o F with PMH of severe aortic stenosis s/p savr, htn who presented to the ED c/o SOB. Found to be in hemodynamically stable AFL with RVR, and imaging and lab findings suggesting acute decompensated heart failure. Admitted to Medicine for further management.     Writer discussed the case with the cross covering attending. Agreed with management plan of diuresis and rate control strategy (HR <110 in asymptomatic and <90 in symptomatic pt) as outlined in the admission note. Discussed about starting therapeutic anticoagulation given heightened risk of stroke ([id number] score 5 for age, sex, htn, chf).     The formal cardiology visit to follow in AM.    Thank you for allowing me to care for this patient, please don't hesitate to contact me with any questions regarding this plan.     Jarrell Jj MD, PhD  Cardiology Fellow

## 2024-01-28 NOTE — CONSULTS
CARDIOLOGY CONSULT NOTE  Date of Service: 01/28/24    Reason for consult:   A cardiology consult was requested from Pio Navarro MD to provide clinical guidance regarding heart failure and atrial flutter management. This was a virtual consult.     SUMMARY:  Roxanna Stark is a 89 year old female with a history of severe aortic stenosis status post surgical AVR with a 21 mm tissue valve in 2016, hypertension, chronic kidney disease, peripheral artery disease and rheumatoid arthritis who presents with 2-day history of progressively worsening dyspnea and activity intolerance, found to have atrial fibrillation with RVR and acute congestive heart failure.     IMPRESSION:  Atrial fibrillation with rapid ventricular response ([id number] 5; age, sex, HTN, HF)  Acute congestive heart failure   Severe aortic stenosis status post surgical AVR with a 21 mm tissue valve in 2016  Hypertension    Discussion:  Presents with afib RVR in the setting of acute heart failure. Unclear if afib precipitated heart failure or vice versa.   Had post-op afib RVR in 2016 after surgical AVR, no known recurrence of afib since then. Advanced age is the highest risk factor for afib in this patient. Agree with IV diuresis as already instituted by primary team for management of heart failure. With regards to afib RVR, rates remain elevated in the 130s. Will optimize HR control by increasing PO metoprolol dose. HR should also improve as we achieve euvolemia. Given her high [id number] score of 5, would recommend lifelong therapeutic anticoagulation for stroke prevention. Patient has no history of significant bleeding. Discussed with patient that LUCIANO/cardioversion can be pursued tomorrow if she remains in afib. She is agreeable to this.      RECOMMENDATIONS:  - Increase dose of metoprolol tartrate from 25 to 50 mg BID for better HR control   - Would avoid diltiazem for management of RVR as EF is n  - Continue therapeutic enoxaparin as  already ordered by primary team. Will likely be able to transition to DOAC assuming renal function doesn't continue to worsen  - Will follow up results of TTE, although accuracy of EF assessment may be impaired in the setting of afib RVR   - Continue IV furosemide as already ordered by primary team. Monitor symptoms, I/Os, daily weights and BMP closely and adjust diuretic dose as appropriate   - Please make NPO at midnight for possible LUCIANO/cardioversion tomorrow     Discussed with attending, Dr. Garnica    Thank you for consulting the cardiovascular services at the [facility]. Please do not hesitate to call us with any questions.     Sanaz Granados MD  Cardiology Fellow  [phone]    =====================================================  HPI:  Roxanna Stark is a 89 year old female with a history of severe aortic stenosis status post surgical AVR with a 21 mm tissue valve in 2016, hypertension, chronic kidney disease, peripheral artery disease and rheumatoid arthritis who was brought to the emergency department yesterday by her granddaughter, reporting 2-day history of worsening shortness of breath, fatigue and activity intolerance.  Patient gets exhausted while performing mundane activities like walking across the room or getting dressed.  At baseline she is able to perform ADLs without any limiting shortness of breath.  No chest pain, palpitations, lightheadedness, fever, chills or urinary symptoms.  She does endorse some mild URI symptoms (congestion, nonproductive cough) over the last 2 days.  No known sick contacts.  She lives independently in a single-family home.    In the ED, found to be in atrial flutter with variable AV conduction, rates in the 110s to 120s.  Hemodynamically stable.  Chest x-ray showed pulmonary vascular congestion and bilateral pleural effusions.  Respiratory viral panel negative.  High-sensitivity troponin 46--> 43. BNP 5000.  For rate control of atrial flutter,  received IV metoprolol 5 mg and was continued on PTA metoprolol titrate 25 mg twice daily.  She was also started on therapeutic enoxaparin for stroke prophylaxis.  For heart failure, received IV Lasix 40 mg yesterday evening with another dose scheduled for this morning.     On interview this morning, she confirms history as above, also reports orthopnea and PND. Denies lower extremity edema. No known recurrence of atrial fibrillation since 2016, when she had post-op afib RVR after surgical AVR.     PAST MEDICAL HISTORY:  Past Medical History:   Diagnosis Date    Actinic keratosis     Aortic stenosis 2014    Atrial flutter with rapid ventricular response (H) 1/27/2024    Basal cell cancer 7/2014    left eye medial canthus     Basal cell carcinoma 9/30/08    left cheek    CKD (chronic kidney disease) stage 3, GFR 30-59 ml/min (H) 7/1/2019    HTN (hypertension)     Melanoma in situ (H) 9/30/08    left arm    Polymyalgia rheumatica (H24) 11/99    Rheumatoid arthritis of multiple sites with negative rheumatoid factor (H)     Temporal arteritis (H) 11/99       CURRENT MEDICATIONS:  Current Outpatient Medications   Medication Sig Dispense Refill    amoxicillin (AMOXIL) 500 MG capsule TAKE 4 CAPSULES BY MOUTH 1 HOUR BEFORE DENTAL APPOINTMENT 4 capsule 1    calcium-vitamin D 500-125 MG-UNIT TABS Take 1 tablet by mouth daily      furosemide (LASIX) 20 MG tablet TAKE 1 TABLET BY MOUTH EVERY DAY IF GAIN OF 2 TO 3 POUNDS OVER 2 DAYS (Patient taking differently: Take 20 mg by mouth daily TAKE 1 TABLET BY MOUTH EVERY DAY IF GAIN OF 2 TO 3 POUNDS OVER 2 DAYS) 90 tablet 3    gabapentin (NEURONTIN) 100 MG capsule Take 1 capsule (100 mg) by mouth daily 90 capsule 3    hydroxychloroquine (PLAQUENIL) 200 MG tablet Take 1 tablet (200 mg) by mouth daily 30 tablet 3    ICAPS PO 2 tablets daily      losartan (COZAAR) 100 MG tablet Take 1 tablet (100 mg) by mouth daily 90 tablet 3    metoprolol tartrate (LOPRESSOR) 25 MG tablet Take 1 tablet  (25 mg) by mouth 2 times daily 180 tablet 3    Omega-3 Fatty Acids (OMEGA-3 FISH OIL PO) Take 1 tablet twice daily.      sulfaSALAzine (AZULFIDINE) 500 MG tablet Take 1 tablet (500 mg) by mouth 2 times daily 60 tablet 3    Misc. Devices (ROLLATOR ULTRA-LIGHT) MISC          ALLERGIES     Allergies   Allergen Reactions    Lisinopril Cough       FAMILY HISTORY:  Family History   Problem Relation Age of Onset    Breast Cancer Sister 45    Arthritis Sister     Thyroid Disease Sister     Arthritis Sister     Colon Cancer Sister         colon    Arthritis Mother     Hypertension Father     Prostate Cancer Father     Arthritis Father     Heart Disease Father     Lipids Father     Colon Cancer Father     Arthritis Sister     Asthma Daughter     Asthma Daughter     Lung Cancer Daughter 58        lung    Pancreatic Cancer Other 81        pancreatic        SOCIAL HISTORY:  Social History     Socioeconomic History    Marital status:    Occupational History     Employer: RETIRED   Tobacco Use    Smoking status: Never     Passive exposure: Never    Smokeless tobacco: Never   Vaping Use    Vaping Use: Never used   Substance and Sexual Activity    Alcohol use: Yes     Comment: rarely    Drug use: No    Sexual activity: Not Currently     Partners: Male     Birth control/protection: None   Other Topics Concern    Parent/sibling w/ CABG, MI or angioplasty before 65F 55M? No       Review of Systems:   10-point comprehensive review of system negative besides what's noted in HPI.     Physical Exam   Temp: 97.3  F (36.3  C) Temp src: Oral BP: 117/62 Pulse: 100   Resp: 21 SpO2: 92 %      Vital Signs with Ranges  Temp:  [97  F (36.1  C)-97.3  F (36.3  C)] 97.3  F (36.3  C)  Pulse:  [] 100  Resp:  [15-35] 21  BP: (111-117)/(62-86) 117/62  SpO2:  [81 %-100 %] 92 %  0 lbs 0 oz    GEN: No acute distress at rest   CV: Irregular narrow complex tachycardia on monitor   CHEST: Normal work of breathing  EXT: No LE edema on video  exam  NEURO: Awake, alert, answering questions appropriately    LABS:  Recent Labs   Lab 24  1640   WBC 11.6*   HGB 13.5   MCV 96         POTASSIUM 4.9   CHLORIDE 100   CO2 25   BUN 28.9*   CR 1.49*   ANIONGAP 12   ROEL 10.1*   *   ALBUMIN 4.0   PROTTOTAL 7.9   BILITOTAL 0.4   ALKPHOS 135   ALT 32   AST 40     EKG:  Afib RVR    Pertinent cardiac testin2020 TTE:   Interpretation Summary  Global and regional left ventricular function is normal with an EF of 60-65%.  Right ventricular function, chamber size, wall motion, and thickness are  normal.  Moderate mitral stenosis is present.  S/P Minimally invasive aortic valve replacement with 21 mm Epic porcine valve  on 2016. Mean aortic gradient 14mmHg.  Pulmonary artery systolic pressure is normal.  The inferior vena cava is normal.  No pericardial effusion is present.  No significant change.    2016 Coronary angiogram:  1. Both coronary arteries arise from their respective cusps.  2.Left dominant.  3. LM is without angiographic evidence of disease.   4. LAD is a type 3 vessel and gives rise to septal perforators a large D1. The pLAD has a 40 to 50% stenosis, mLAD has a 30% stenosis, dLAD has a mild luminal irregularities. Proximal D1 has a 90% stenosis and mid D1 60% prior to a bifurcation.   5. LCX gives rise to OM1 and OM2 vessels. The LCx, OM1, OM2 and LPDA has mild luminal irregularities.   6. RCA small non dominant vessel. The pRCA has a 20% stenosis, mRCA has a 40 to 50% stenosis, distal RCA has mild luminal irregularities.

## 2024-01-28 NOTE — PROGRESS NOTES
Brief Progress Note:     Discussed patients case w/ cardiology fellow overnight. Given new RVR and likely A Flutter will transition anticoagulation to therapeutic in case of need for rhythm control strategy in AM. Recommend further risk vs. Benefit discussion w/ patient regarding AC long term in AM.     Melba Rapp MD  Internal Medicine-Pediatrics  [facility]

## 2024-01-28 NOTE — H&P
[facility]    History and Physical - Hospitalist Service, GOLD TEAM        Date of Admission:  1/27/2024    Assessment & Plan      Roxanna Stark is a 89 year old female admitted on 1/27/2024.      Roxanna Stark is a 89 year old very pleasant female with a history of hypertension, chronic kidney disease, rheumatoid arthritis, polymyalgia rheumatica, ?  Temporal arteritis, peripheral artery disease, aortic stenosis status post aortic valve replacement 04/25/16, a porcine valve, postoperatively had an episode of atrial fibrillation presenting with shortness of breath worse with exertion, chest tightness, mild cough, generalized weakness for almost 2 days.    Cardiovascular:    # Dyspnea, worse with exertion  # Atrial flutter with rapid ventricular rate ?  Trigger unclear.  # Acute decompensated heart failure. ?etiology. May be 2.2 a. Flutter.   # Elevated BNP: 5072 on admission  # Elevated troponin-46->43.  Suspect likely demand related secondary to above.  ACS less likely.rule out.   # History of hypertension  # History of aortic stenosis status post aortic valve replacement 04/25/16, porcine valve.  Not on anticoagulation.  # History of paroxysmal atrial fibrillation: After aortic replacement.    COVID, influenza, RSV PCR negative.  Chest x-ray: Consistent with heart failure with new vascular congestion, mild interstitial edema and moderate bilateral pleural effusion.  Associated compressive atelectasis  Status post IV metoprolol 5 mg, IV Lasix 40 in ED.  Heart rate currently in 120s.  Blood pressure stable.  On room air: Oxygen low 90s.  Echocardiogram: 5/2020: EF 60 to 65%.    -Admit to medicine, cardiac telemetry  -Frequent vitals every 4h.  Strict I's and O's.  -Continue furosemide 40 mg IV daily, titrate as necessary.  -Continue PTA metoprolol 25 mg twice daily.  -IV metoprolol 5 mg every 6 hours as needed.  Goal heart rate less than 120.  Consider IV diltiazem  small bolus versus diltiazem drip if heart rate remains uncontrolled.  -Cardiology consultation  -Call cardiology urgently if unstable, may need cardioversion.  -Hold PTA losartan.  -Echocardiogram, TTE complete  -Potassium, magnesium high RN replacement protocol.   -Supplemental oxygen as needed to keep sats over 92  -Daily BMP.  Daily weight  -Encourage I-S.  Continuous pulse ox.  Cough medication as needed.      # CKD: Suspect stage III.  Recent creatinine ranges around 1.2-1.5.  Creatinine on admission 1.49.  BUN 28.9.  Monitor closely.  Avoid nephrotoxins.  I's and O's.    # Rheumatoid arthritis, seronegative erosive.  -Follows rheumatology outpatient.  - Continue PTA sulfasalazine 500 mg twice daily    - Continue PTA hydroxychloroquine 200 mg daily (last eye exam by Dr. Rogers on 5/23/2023)  -Continue PTA gabapentin 100 mg daily    # General weakness:  -PT consultation  -Fall precautions    Diet: Combination Diet Regular Diet Adult  DVT Prophylaxis: Enoxaparin (Lovenox) SQ  Graec Catheter: Not present  Lines: None     Cardiac Monitoring: ACTIVE order. Indication: Acute decompensated heart failure (48 hours)  Code Status: No CPR- Do NOT Intubate dw patient and granddaughter at bedside.    Clinically Significant Risk Factors Present on Admission                  # Hypertension: Noted on problem list   # Acute Respiratory Failure: Documented O2 saturation < 91%.  Continue supplemental oxygen as needed                Disposition Plan      Expected Discharge Date: 01/29/2024                  Pio Navarro MD  Hospitalist Service, [facility]  Securely message with [employer] (more info)  Text page via [facility] Paging/Directory   See signed in provider for up to date coverage information    ______________________________________________________________________    Chief Complaint   Shortness of breath  Chest tightness    History is obtained from the patient,  electronic health record, and emergency department physician.  Patient's grand daughter at bedside.    History of Present Illness     Roxanna Stark is a 89 year old very pleasant female with a history of hypertension, chronic kidney disease, rheumatoid arthritis, polymyalgia rheumatica, ?  Temporal arteritis, peripheral artery disease, aortic stenosis status post aortic valve replacement 04/25/16, this was a porcine valve.  Chart review shows that postoperatively she had an episode of atrial fibrillation but do not see that she has had any arrhythmias since that time.    Patient's granddaughter brings her in today because since Thursday, ~2 days ago she has become more short of breath and fatigued.  Worse with exertion/activity.  Mild intermittent dry cough.  Patient also endorses chest tightness specially with deep breathing.  Denies chest pain otherwise.  Did report PND for the last couple days.  No fever, chills.  No sore throat.  No palpitations or lightheadedness or dizziness.   She states she is eating and drinking well.  No abdominal pain.  No nausea, vomiting, or diarrhea.  No leg pain or leg swelling.  No falls, syncope/near syncope, no head injury, no headache.  Patient's granddaughter is concerned because she does live alone in her own home and seems to not be as functional as she normally is.  Normally uses walker.  Patient is not on any anticoagulation.     No other concern.  Very pleasant.      Past Medical History    Past Medical History:   Diagnosis Date    Actinic keratosis     Aortic stenosis 2014    Atrial flutter with rapid ventricular response (H) 1/27/2024    Basal cell cancer 7/2014    left eye medial canthus     Basal cell carcinoma 9/30/08    left cheek    CKD (chronic kidney disease) stage 3, GFR 30-59 ml/min (H) 7/1/2019    HTN (hypertension)     Melanoma in situ (H) 9/30/08    left arm    Polymyalgia rheumatica (H24) 11/99    Rheumatoid arthritis of multiple sites with negative  rheumatoid factor (H)     Temporal arteritis (H)        Past Surgical History   Past Surgical History:   Procedure Laterality Date    CATARACT IOL, RT/LT      bilateral    COLONOSCOPY      EXCISE LESION VULVA N/A 2019    Procedure: Wide Local Excision Of Vulva, Colposcopy;  Surgeon: Mono Ribeiro MD;  Location:  OR    REPLACE VALVE AORTIC N/A 2016    Procedure: REPLACE VALVE AORTIC;  Surgeon: Sudeep Tsai MD;  Location:  OR    [facility] SKIN TISSUE PROCEDURE UNLISTED  11/3/08    mmis skin cancer excision       Prior to Admission Medications   Prior to Admission Medications   Prescriptions Last Dose Informant Patient Reported? Taking?   ICAPS PO 2024 at AM Self Yes Yes   Si tablets daily   Misc. Devices (ROLLATOR ULTRA-LIGHT) MISC   Yes No   Omega-3 Fatty Acids (OMEGA-3 FISH OIL PO) 2024 at 1 of 2 Self Yes Yes   Sig: Take 1 tablet twice daily.   amoxicillin (AMOXIL) 500 MG capsule Past Month at dental appt  No Yes   Sig: TAKE 4 CAPSULES BY MOUTH 1 HOUR BEFORE DENTAL APPOINTMENT   calcium-vitamin D 500-125 MG-UNIT TABS 2024 at AM  Yes Yes   Sig: Take 1 tablet by mouth daily   furosemide (LASIX) 20 MG tablet 2024 at AM  No Yes   Sig: TAKE 1 TABLET BY MOUTH EVERY DAY IF GAIN OF 2 TO 3 POUNDS OVER 2 DAYS   Patient taking differently: Take 20 mg by mouth daily TAKE 1 TABLET BY MOUTH EVERY DAY IF GAIN OF 2 TO 3 POUNDS OVER 2 DAYS   gabapentin (NEURONTIN) 100 MG capsule 2024 at AM  No Yes   Sig: Take 1 capsule (100 mg) by mouth daily   hydroxychloroquine (PLAQUENIL) 200 MG tablet 2024 at AM  No Yes   Sig: Take 1 tablet (200 mg) by mouth daily   losartan (COZAAR) 100 MG tablet 2024 at AM  No Yes   Sig: Take 1 tablet (100 mg) by mouth daily   metoprolol tartrate (LOPRESSOR) 25 MG tablet 2024 at 1 of 2  No Yes   Sig: Take 1 tablet (25 mg) by mouth 2 times daily   sulfaSALAzine (AZULFIDINE) 500 MG tablet 2024 at 1 of 2  No Yes   Sig: Take 1  tablet (500 mg) by mouth 2 times daily      Facility-Administered Medications: None        Review of Systems    The 10 point Review of Systems is negative other than noted in the HPI or here.     Social History   I have reviewed this patient's social history and updated it with pertinent information if needed.  Social History     Tobacco Use    Smoking status: Never     Passive exposure: Never    Smokeless tobacco: Never   Vaping Use    Vaping Use: Never used   Substance Use Topics    Alcohol use: Yes     Comment: rarely    Drug use: No         Family History   I have reviewed this patient's family history and updated it with pertinent information if needed.  Family History   Problem Relation Age of Onset    Breast Cancer Sister 45    Arthritis Sister     Thyroid Disease Sister     Arthritis Sister     Colon Cancer Sister         colon    Arthritis Mother     Hypertension Father     Prostate Cancer Father     Arthritis Father     Heart Disease Father     Lipids Father     Colon Cancer Father     Arthritis Sister     Asthma Daughter     Asthma Daughter     Lung Cancer Daughter 58        lung    Pancreatic Cancer Other 81        pancreatic          Allergies   Allergies   Allergen Reactions    Lisinopril Cough        Physical Exam   Vital Signs: Temp: 97.3  F (36.3  C) Temp src: Oral BP: 117/62 Pulse: 100   Resp: 21 SpO2: 92 %      Weight: 0 lbs 0 oz    General Appearance: Awake, interactive, NAD  HEENT: AT/NC, Anicteric, Moist MM  Neck: Supple.  Engorged veins.  Respiratory: Normal work of breathing.  Slight tachypnea.  Diminished breath sounds at bases.  Mild bibasilar crackles.  No wheezing noted.  On room air.  Cardiovascular: S1 S2 Regular.  Tachycardia.  GI/Abd: Soft. NT. ND. No g/r.   Extremities: Distally wwp. No pedal edema.  Neuro: AO x 4, Grossly non focal.   Skin: No acute rash on exposed areas.   Psychiatry: Stable mood.     Medical Decision Making       76 MINUTES SPENT BY ME on the date of service doing  chart review, history, exam, documentation & further activities per the note.      Data     I have personally reviewed the following data over the past 24 hrs:    11.6 (H)  \   13.5   / 240     137 100 28.9 (H) /  121 (H)   4.9 25 1.49 (H) \     ALT: 32 AST: 40 AP: 135 TBILI: 0.4   ALB: 4.0 TOT PROTEIN: 7.9 LIPASE: N/A     Trop: 43 (H) BNP: 5,072 (H)       Imaging results reviewed over the past 24 hrs:   Recent Results (from the past 24 hour(s))   XR Chest 2 Views    Narrative    EXAM: XR CHEST 2 VIEWS  LOCATION: [facility]  DATE: 1/27/2024    INDICATION: shortness of breath  COMPARISON: Chest radiograph 08/30/2021      Impression    IMPRESSION: Stable size of cardiac silhouette status post median sternotomy. Findings suggestive of heart failure, including new pulmonary vascular congestion, mild interstitial edema and moderate bilateral pleural effusions and associated compressive   atelectasis, left greater than right. No pneumothorax. No acute bony abnormality.     Recent Labs   Lab 01/27/24  1640   WBC 11.6*   HGB 13.5   MCV 96         POTASSIUM 4.9   CHLORIDE 100   CO2 25   BUN 28.9*   CR 1.49*   ANIONGAP 12   ROEL 10.1*   *   ALBUMIN 4.0   PROTTOTAL 7.9   BILITOTAL 0.4   ALKPHOS 135   ALT 32   AST 40

## 2024-01-29 ENCOUNTER — APPOINTMENT (OUTPATIENT)
Dept: PHYSICAL THERAPY | Facility: CLINIC | Age: 89
DRG: 291 | End: 2024-01-29
Payer: MEDICARE

## 2024-01-29 LAB
ANION GAP SERPL CALCULATED.3IONS-SCNC: 14 MMOL/L (ref 7–15)
ATRIAL RATE - MUSE: 264 BPM
BUN SERPL-MCNC: 40.1 MG/DL (ref 8–23)
CALCIUM SERPL-MCNC: 9.1 MG/DL (ref 8.2–9.6)
CHLORIDE SERPL-SCNC: 103 MMOL/L (ref 98–107)
CREAT SERPL-MCNC: 1.63 MG/DL (ref 0.51–0.95)
DEPRECATED HCO3 PLAS-SCNC: 23 MMOL/L (ref 22–29)
DIASTOLIC BLOOD PRESSURE - MUSE: NORMAL MMHG
EGFRCR SERPLBLD CKD-EPI 2021: 29 ML/MIN/1.73M2
GLUCOSE SERPL-MCNC: 87 MG/DL (ref 70–99)
INTERPRETATION ECG - MUSE: NORMAL
MAGNESIUM SERPL-MCNC: 2.3 MG/DL (ref 1.7–2.3)
P AXIS - MUSE: NORMAL DEGREES
POTASSIUM SERPL-SCNC: 4 MMOL/L (ref 3.4–5.3)
PR INTERVAL - MUSE: NORMAL MS
QRS DURATION - MUSE: 126 MS
QT - MUSE: 374 MS
QTC - MUSE: 532 MS
R AXIS - MUSE: -55 DEGREES
SODIUM SERPL-SCNC: 140 MMOL/L (ref 135–145)
SYSTOLIC BLOOD PRESSURE - MUSE: NORMAL MMHG
T AXIS - MUSE: 29 DEGREES
TSH SERPL DL<=0.005 MIU/L-ACNC: 2.4 UIU/ML (ref 0.3–4.2)
VENTRICULAR RATE- MUSE: 122 BPM

## 2024-01-29 PROCEDURE — 93005 ELECTROCARDIOGRAM TRACING: CPT

## 2024-01-29 PROCEDURE — 36415 COLL VENOUS BLD VENIPUNCTURE: CPT | Performed by: INTERNAL MEDICINE

## 2024-01-29 PROCEDURE — 97530 THERAPEUTIC ACTIVITIES: CPT | Mod: GP

## 2024-01-29 PROCEDURE — 120N000003 HC R&B IMCU UMMC

## 2024-01-29 PROCEDURE — 250N000013 HC RX MED GY IP 250 OP 250 PS 637: Performed by: INTERNAL MEDICINE

## 2024-01-29 PROCEDURE — 80048 BASIC METABOLIC PNL TOTAL CA: CPT | Performed by: INTERNAL MEDICINE

## 2024-01-29 PROCEDURE — 93010 ELECTROCARDIOGRAM REPORT: CPT | Performed by: INTERNAL MEDICINE

## 2024-01-29 PROCEDURE — 99232 SBSQ HOSP IP/OBS MODERATE 35: CPT | Performed by: INTERNAL MEDICINE

## 2024-01-29 PROCEDURE — 84443 ASSAY THYROID STIM HORMONE: CPT | Performed by: INTERNAL MEDICINE

## 2024-01-29 PROCEDURE — 83735 ASSAY OF MAGNESIUM: CPT | Performed by: INTERNAL MEDICINE

## 2024-01-29 PROCEDURE — 250N000009 HC RX 250: Performed by: INTERNAL MEDICINE

## 2024-01-29 PROCEDURE — 250N000011 HC RX IP 250 OP 636: Performed by: INTERNAL MEDICINE

## 2024-01-29 PROCEDURE — 97110 THERAPEUTIC EXERCISES: CPT | Mod: GP

## 2024-01-29 RX ORDER — METOPROLOL TARTRATE 50 MG
50 TABLET ORAL EVERY 6 HOURS
Status: DISCONTINUED | OUTPATIENT
Start: 2024-01-29 | End: 2024-02-01

## 2024-01-29 RX ADMIN — METOPROLOL TARTRATE 50 MG: 25 TABLET, FILM COATED ORAL at 08:23

## 2024-01-29 RX ADMIN — FUROSEMIDE 40 MG: 10 INJECTION, SOLUTION INTRAMUSCULAR; INTRAVENOUS at 08:22

## 2024-01-29 RX ADMIN — METOPROLOL TARTRATE 5 MG: 5 INJECTION INTRAVENOUS at 09:33

## 2024-01-29 RX ADMIN — APIXABAN 2.5 MG: 2.5 TABLET, FILM COATED ORAL at 21:44

## 2024-01-29 RX ADMIN — SULFASALAZINE 500 MG: 500 TABLET ORAL at 08:23

## 2024-01-29 RX ADMIN — APIXABAN 2.5 MG: 2.5 TABLET, FILM COATED ORAL at 09:25

## 2024-01-29 RX ADMIN — METOPROLOL TARTRATE 50 MG: 50 TABLET, FILM COATED ORAL at 21:44

## 2024-01-29 RX ADMIN — METOPROLOL TARTRATE 5 MG: 5 INJECTION INTRAVENOUS at 15:02

## 2024-01-29 RX ADMIN — HYDROXYCHLOROQUINE SULFATE 200 MG: 200 TABLET, FILM COATED ORAL at 08:23

## 2024-01-29 RX ADMIN — GABAPENTIN 100 MG: 100 CAPSULE ORAL at 08:23

## 2024-01-29 RX ADMIN — SULFASALAZINE 500 MG: 500 TABLET ORAL at 21:44

## 2024-01-29 ASSESSMENT — ACTIVITIES OF DAILY LIVING (ADL)
ADLS_ACUITY_SCORE: 30
ADLS_ACUITY_SCORE: 31
ADLS_ACUITY_SCORE: 27
ADLS_ACUITY_SCORE: 31
ADLS_ACUITY_SCORE: 30
ADLS_ACUITY_SCORE: 31
ADLS_ACUITY_SCORE: 27
ADLS_ACUITY_SCORE: 35
ADLS_ACUITY_SCORE: 31

## 2024-01-29 NOTE — PROGRESS NOTES
[facility]    Medicine Progress Note - Hospitalist Service, GOLD TEAM 19    Date of Admission:  1/27/2024    Assessment & Plan   Roxanna Stark is a 89 year old female admitted on 1/27/2024.  She has a h/o HTN, CKD, RA, PMR, ?  Temporal arteritis, PAD, severe AS s/p AVR w/ porcine valve 4/25/16 w/ postop complicated by an episode of atrial fibrillation. She presented to [facility] ED on 1/27/24 w/ a 2-day h/o CASTREJON, chest tightness, mild cough, generalized weakness and exercise intolerance.  W/u in the ED revealed Afib w/ RVR and decompensated CHF.     Afib w/ RVR  ---   Onset unclear  ---   Has a h/o an episode of postop afib in 2016  ---   No documented afib since  ---   Presented on 1/27 w/ a 2-day h/o CASTREJON, chest tightness, mild cough, generalized weakness and exercise intolerance  ---   Trivial troponin leak, 46 ---> 43  ---   No ST-T changes on EKG  ---   Admit CXR w/ new pulm edema and b/l mod pleural effusion c/f decompensated CHF  ---   EKG and tele revealed afib w/ RVR  ---   SARS CoV-2, influenza A/B and RSV PCR negative  ---   Pt w/o urinary symptoms  ---   Hx does not suggest PE thus w/u was not pursued  ---   TTE 1/28 revealed EF > 70%, no WMA and no HD significant valvular heart dz. Diastolic function indeterminate. S/p minimally invasive aortic valve replacement with 21 mm Epic porcine valve on 4/25/2016, the prosthetic aortic valve is well-seated.  ---   Appreciate cardiology consult rec  ---   Rate control w/ metoprolol tartrate 50 mg q6 hr and metoprolol 5 mg iv prn HR > 120  ---   CHADS2 Vasc score is 5 (age, female gender, HTN and HFpEF), chemical anticoagulation recommended.  Started pt on Eliquis 2.5 mg po bid (adjusted by Pharmacy)   ---   Transfer to [facility] I service, Dr. Calderon per card fellow rec    HFpEF w/ acute exacerbation  ---   Likely precipitated by Afib w/ RVR  ---   TTE 1/28 revealed EF > 70%, no WMA and no HD  significant valvular heart dz. Diastolic function indeterminate. S/p minimally invasive aortic valve replacement with 21 mm Epic porcine valve on 4/25/2016, the prosthetic aortic valve is well-seated.  ---   CXR w/ pulm edema and b/l mod pleural effusion  ---   No LE edema  ---   N-[place] pro-BNP elevated 5072  ---   On RA  ---   I/O net - 675 ml since admit  ---   Hold PTA Lasix 20 mg po qam and Continue Lasix 40 mg iv qam     Elevated troponin  ---   Due to demand ischemia in a setting of Afib w/ RVR and CHF exacerbation  ---   Troponin is flat, 46 ---> 43  ---   No ST-T changes on EKG  ---   TTE 1/28 revealed EF > 70%, no WMA and no HD significant valvular heart dz. Diastolic function indeterminate. S/p minimally invasive aortic valve replacement with 21 mm Epic porcine valve on 4/25/2016, the prosthetic aortic valve is well-seated.  ---   CP free since arrival    CAD  PAD  HTN  ---   Coronary angio 3/3/2016 non-obstructive CAD  ---   TE 1/28 revealed EF > 70%, no WMA, no HD significant valvular heart dz  ---   Continue PTA metoprolol but increase dose to 50 mg bid  ---   Hold PTA Losartan  ---   Hold PTA Lasix 20 mg po qam and continue Lasix 40 mg iv qam  ---   Continue fish oil     CKD  ---   Suspect stage III.    ---   Recent creatinine ranges around 1.2-1.5.    ---   Creatinine on admission 1.49 ---> IV Lasix ---> 1.60 ---> 1.63.   ---   Avoid nephrotoxins other IV Lasix  ---   Check BMP in am     RA, seronegative erosive.  PMR  ---   Follows w/ outpt rheumatology clinic  ---   Continue PTA sulfasalazine 500 mg bid  ---   Continue PTA hydroxychloroquine 200 mg daily (last eye exam by Dr. Rogers on 5/23/2023)  ---   Continue PTA gabapentin 100 mg daily     General weakness  ---   Likely due to afib w/ RVR +/- HFpEF exacerbation  ---   PT/OT consulted  ---   Fall precautions         Diet: Combination Diet Regular Diet Adult  DVT Prophylaxis: Enoxaparin (Lovenox) SQ  Grace Catheter: Not present  Lines: None      Cardiac Monitoring: ACTIVE order. Indication: Acute decompensated heart failure (48 hours)  Code Status: No CPR- Do NOT Intubate dw patient and granddaughter at bedside.  DISPOSITION:   Transfer to [place] card 1 service per cardiology consult rec          Tomy Sanchez MD  Hospitalist Service, GOLD TEAM 19  [facility]  Securely message with [employer] (more info)  Text page via [employer] Paging/Directory   See signed in provider for up to date coverage information  ______________________________________________________________________    Interval History   No complaints  Uneventful night  Remained in Afib w/ RVR  Denied palpitation, CP or SOB    Physical Exam   Vital Signs: Temp: 97.7  F (36.5  C) Temp src: Oral BP: 103/76 Pulse: (!) 131   Resp: 19 SpO2: 94 % O2 Device: Nasal cannula Oxygen Delivery: 2 LPM  Weight: 110 lbs 3.68 oz  General: Elderly, follows simple command, NAD.  HEENT:  NC/AT, neck supple  CVS:  Tachycardic, irregularly irregular, no m/r/g.  Lungs:  decrease BS at the base w/ faint bibasilar rhonchi   Abd:  Soft, + bs  Ext:  No c/c.  Lymph:  No edema.  Neuro:  Nonfocal.  Musculoskeletal: No calf tenderness to palpation.    Skin:  No rash.  Psychiatry:  Mood and affect appropriate.        Data     I have personally reviewed the following data over the past 24 hrs:    N/A  \   N/A   / N/A     140 103 40.1 (H) /  87   4.0 23 1.63 (H) \     TSH: 2.40 T4: N/A A1C: N/A       Imaging results reviewed over the past 24 hrs:   Recent Results (from the past 24 hour(s))   Echo Complete   Result Value    LVEF  70%    Narrative    [id number]  [id number]  [id number]  [id number]^DHITAL^[facility]  Echocardiography Laboratory  [address] [place] [id number]     Name: ADONIS BIGGS  MRN: [id number]  : 1927  Study Date: 2024 12:11 PM  Age: 89 yrs  Gender: Female  Patient Location: [facility]  Reason For Study:  Heart Failure  Ordering Physician: MIGUEL HINOJOSA  Performed By: Alka Hudson     BSA: 1.5 m2  Height: 60 in  Weight: 117 lb  ______________________________________________________________________________  Procedure  Complete Portable Echo Adult.  ______________________________________________________________________________  Interpretation Summary  Global and regional left ventricular function is hyperkinetic with an EF >70%.  Global right ventricular function is normal.  S/P Minimally invasive aortic valve replacement with 21 mm Epic porcine valve  on 4/25/2016  The prosthetic aortic valve is well-seated.  Doppler interrogation of the aortic valve is normal.  Moderate mitral annular calcification is present. On Doppler interrogation,  there is no significant stenosis or regurgitation.     This study was compared with the study from 5/2020 .  Patient is tachycardic today otherwise no significant change.     A left pleural effusion is present.  ______________________________________________________________________________  Left Ventricle  Global and regional left ventricular function is hyperkinetic with an EF >70%.  Left ventricular size is normal. Left ventricular wall thickness cannot  evaluate. Diastolic function not assessed due to tachycardia.     Right Ventricle  The right ventricle is normal size. Global right ventricular function is  normal.     Atria  The right atria appears normal. Severe left atrial enlargement is present.     Mitral Valve  Moderate mitral annular calcification is present. On Doppler interrogation,  there is no significant stenosis or regurgitation. Mild mitral insufficiency  is present.     Aortic Valve  S/P Minimally invasive aortic valve replacement with 21 mm Epic porcine valve  on 4/25/2016. The prosthetic aortic valve is well-seated. Doppler  interrogation of the aortic valve is normal.     Tricuspid Valve  The tricuspid valve is normal.     Pulmonic Valve  On Doppler  interrogation, there is no significant stenosis or regurgitation.     Vessels  The aorta root is normal. IVC diameter and respiratory changes fall into an  intermediate range suggesting an RA pressure of 8 mmHg.     Pericardium  No pericardial effusion is present.     Miscellaneous  A left pleural effusion is present.     Compared to Previous Study  This study was compared with the study from 5/2020 . Patient is tachycardic  today otherwise no significant change.  ______________________________________________________________________________  Report approved by: Javed KING 01/28/2024 02:12 PM     ______________________________________________________________________________

## 2024-01-29 NOTE — PROGRESS NOTES
Brief Medicine Note:    Notified ongoing Afib with RVR. HR going up to 130s occasionally with softer BP. Received PO metoprolol 50 mg around 2000 as well as 25 mg earlier in the afternoon.     Seen at bedside - HR fluctuating between 70s to 130. Primarily seems to be hanging in the 110s. BP 90s/60s however MAPs 70s.     Discussed with RN - threshold for HR right now is <130s and MAPs > 65s.    RADHA BernardC  Internal Medicine THONG Hospitalist  [facility]

## 2024-01-29 NOTE — PLAN OF CARE
[id number]    Pt is Aox4 with intermittent confusion. Up SBA with walker. Strict I/Os. NPO after midnight for LUCIANO and cardioversion on [place] in the AM. Tele in place showing a fib to sinus tach throughout night. HR primarily in 110s-120s. Provider to be notified if HR sustained >130 or MAP <65. Denies dizziness or lightheadedness. On 2L O2 NC. R PIV SL. Continue POC.

## 2024-01-29 NOTE — PLAN OF CARE
Pt here with fatigue and shortness of breath, found to have elevated troponin, A fib with RVR, decompensated heart failure.     Goal Outcome Evaluation:      Plan of Care Reviewed With: patient    Overall Patient Progress: no change    Outcome Evaluation: Continues to be in A fib w RVR, fatigued      VS: Temp: 97.7  F (36.5  C) Temp src: Oral BP: (!) 129/90 Pulse: (!) 132   Resp: 20 SpO2: 95 % O2 Device: Nasal cannula Oxygen Delivery: 2 LPM    O2: 0-2 L NC, instructed to use oxygen verbally though pt O2 saturation is WNL Dyspnea with exertion.   Output: Strict I&O: Intake: 600  Out: 425 out along with 2 episodes of incontinence.   Last BM: Unknown   Activity: SBA x1 with walker or cane   Skin: Bruising L hip   Pain: Denies   Neuro: A&O x4, forgetful. Lethargic, sleeping between cares.   Dressing: NA   Diet: Regular   LDA: R PIV SL   Equipment: Walker, cane, tele   Plan: Continue monitoring. Pt to be NPO at midnight for likely transesophageal echocardiogram w discontinue cardioversion at [place] tomorrow.    Additional Info: On tele, A fib w RVR. Metoprolol scheduled and PRN. HR high and BP low toward end of shift- see provider notifcation note. Hold off on PRN metoprolol and continue to monitor as long as HR not sustained over 130 and MAP >65.

## 2024-01-29 NOTE — PLAN OF CARE
Goal Outcome Evaluation:      Plan of Care Reviewed With: patient, child          Outcome Evaluation: When medically stable desired plan is to DC home but open to TCU if needed

## 2024-01-29 NOTE — CONSULTS
Care Management Initial Consult    General Information  Assessment completed with: Patient, Children, Patient, Anna (daughter) Jyotsna (daughter)  Type of CM/SW Visit: Initial Assessment    Primary Care Provider verified and updated as needed: Yes   Readmission within the last 30 days: no previous admission in last 30 days      Reason for Consult: discharge planning  Advance Care Planning: Advance Care Planning Reviewed: verified with patient, no concerns identified          Communication Assessment  Patient's communication style: spoken language (English or Bilingual)    Hearing Difficulty or Deaf: yes   Wear Glasses or Blind: yes    Cognitive  Cognitive/Neuro/Behavioral: .WDL except, level of consciousness  Level of Consciousness: alert  Arousal Level: opens eyes spontaneously  Orientation: oriented x 4  Mood/Behavior: calm, cooperative  Best Language: 0 - No aphasia  Speech: clear, spontaneous    Living Environment:   People in home: alone     Current living Arrangements: house      Able to return to prior arrangements: other (see comments)  Living Arrangement Comments: May need TCU    Family/Social Support:  Care provided by: self  Provides care for: no one  Marital Status:   Children          Description of Support System: Supportive, Involved    Support Assessment: Adequate social supports, Adequate family and caregiver support    Current Resources:   Patient receiving home care services: No     Community Resources: None  Equipment currently used at home: cane, quad, walker, rolling  Supplies currently used at home: None    Employment/Financial:  Employment Status: retired        Financial Concerns:     Referral to Financial Worker: No       Does the patient's insurance plan have a 3 day qualifying hospital stay waiver?  Yes     Which insurance plan 3 day waiver is available? [employer]    Will the waiver be used for post-acute placement? Undetermined at this time    Lifestyle & Psychosocial  Needs:  Social Determinants of Health     Food Insecurity: Not on file   Depression: Not at risk (6/1/2023)    PHQ-2     PHQ-2 Score: 0   Housing Stability: Not on file   Tobacco Use: Low Risk  (1/27/2024)    Patient History     Smoking Tobacco Use: Never     Smokeless Tobacco Use: Never     Passive Exposure: Never   Financial Resource Strain: Not on file   Alcohol Use: Not on file   Transportation Needs: Not on file   Physical Activity: Not on file   Interpersonal Safety: Not on file   Stress: Not on file   Social Connections: Not on file       Functional Status:  Prior to admission patient needed assistance:   Dependent ADLs:: Independent, Ambulation-cane, Ambulation-walker (Usually does not use these assistive devices)  Dependent IADLs:: Independent, Cooking, Meal Preparation (sometimes family brings meals)       Mental Health Status:        No issues noted    Chemical Dependency Status:            No issues noted    Values/Beliefs:  Spiritual, Cultural Beliefs, Anabaptism Practices, Values that affect care: no  Description of Beliefs that Will Affect Care: [place]       Values/Beliefs Comment: [place]    Additional Information:  Per Admit chart Review:    Roxanna Stark is a 89 year old female is a 89-year-old female with a history of hypertension, chronic kidney disease, polymyalgia rheumatica, peripheral artery disease, and, per chart review, aortic stenosis status post aortic valve replacement 04/25/16, this was a porcine valve.  Chart review shows that postoperatively she had an episode of atrial fibrillation but I do not see that she has had any arrhythmias since that time.  Patient's granddaughter brings her in today because since Thursday, 2 days ago she has become more short of breath and fatigued.  Patient granddaughter reports that she becomes particularly short of breath and exhausted from simple activities such as walking across the room or getting dressed.  Normally she is not like this.  She  denies any chest pain or chest pressure.  She denies any palpitations.  She has had a little bit of a cough which is nonproductive which has been going on for the past few days.  Patient denies any fevers or chills, has had some nasal congestion which is a chronic problem for her though she thinks it might be a little bit worse than typical for her.  No sore throat.  She states she is eating and drinking.  No abdominal pain.  No nausea, vomiting, or diarrhea.  No leg pain or leg swelling.  No falls, no head injury, no headache.  Patient's granddaughter is concerned because she does live alone in her own home and seems to not be as functional as she normally is.  Patient is not on any anticoagulation.       Per Cardiology:  Roxanna Stark is a 89 year old female with a history of severe aortic stenosis status post surgical AVR with a 21 mm tissue valve in 2016, hypertension, chronic kidney disease, peripheral artery disease and rheumatoid arthritis who presents with 2-day history of progressively worsening dyspnea and activity intolerance, found to have atrial fibrillation with RVR and acute congestive heart failure.      Per PT:  Pt continues to present below baseline level of function. Little functional activity proves to be highly taxing for the pt. Pt lives by herself and does not have the support at home that is required at this time. Recommend DC to TCU for continued skilled services.       This RNCC met with patient at bedside, with daughter Jyotsna and her  and daughter Anna on conference call . Introduced role of RNCC and completed initial  evaluation and discussed discharge planning. Patient lives in a two story home with a chair lift and is independent with all of her IADL;s and ADL's with her daughters sometimes bring patient some meals. Patient is still driving and will take herself to physician appointments and some grocery shopping. Family is aware that we are waiting for a bed on the [facility]  on Cardiology floor. Patient and daughters are open to TCU if patient needs additional physical therapy and not a safe discharge when cleared medically.  Family would like a referral sent to [facility] by patients home and will review medicare list when DC is imminent.    Care Management will continue to follow for DC needs     Sherlyn Barrios BSN RN CCM  RN Care Coordinator 5 MS and 10 ICU  [address] [place] [address]  [email or web address]   Office:   [phone]   Pager: [phone]     Weekend [place] Pager:5 ortho,5 Med/Surg & WB ED  [phone]   Weekend 6MS, 8A, 10ICU- Pager: [phone]     For weekend RN care coordinator needs (home discharge with needs including home care, assisted living facility returns, durable medical equip, IV antibiotics  RN Weekend [place] Pager:5 ortho,5 Med/Surg & WB ED   [phone]  RN Weekend 6MS, 8A, 10ICU- Pager: [phone]

## 2024-01-29 NOTE — PROGRESS NOTES
Brief cardiology note:    Weekend consult notes reviewed. This patient continues to have ongoing oxygen requirement and bilateral pleural effusions and continues in atrial fibrillation with rates in the 120s-130s. Ins/outs have multiple unmeasured urine outputs so not clear how well diuresis is going with the lasix 40 mg IV daily.     Would recommend transferring the patient to the cardiology 1 service on the [facility] for ongoing diuersis and rhythm control strategy.     In the meantime, continue IV diuresis to target net negative 2-3 liters in an effort to improve her oxygenation. In order for her to be cardioverted she will need to be able to lay flat on her back for at least 30 minutes. She will need either LUCIANO or CT prior to cardioversion as well. Ok to continue metoprolol and increase to q6h to target rates <130. Continue apixaban for now, 2.5 mg BID (dose reduced for age and renal function and weight).     Discussed with Dr. Calderon. Please don't hesitate to contact with questions.     Federico Arora MD  Cardiology Fellow  Pager: [phone]

## 2024-01-30 ENCOUNTER — APPOINTMENT (OUTPATIENT)
Dept: MRI IMAGING | Facility: CLINIC | Age: 89
DRG: 291 | End: 2024-01-30
Attending: NURSE PRACTITIONER
Payer: MEDICARE

## 2024-01-30 ENCOUNTER — APPOINTMENT (OUTPATIENT)
Dept: PHYSICAL THERAPY | Facility: CLINIC | Age: 89
DRG: 291 | End: 2024-01-30
Payer: MEDICARE

## 2024-01-30 LAB
ANION GAP SERPL CALCULATED.3IONS-SCNC: 15 MMOL/L (ref 7–15)
ATRIAL RATE - MUSE: NORMAL BPM
BUN SERPL-MCNC: 41.4 MG/DL (ref 8–23)
CALCIUM SERPL-MCNC: 8.9 MG/DL (ref 8.2–9.6)
CHLORIDE SERPL-SCNC: 104 MMOL/L (ref 98–107)
CREAT SERPL-MCNC: 1.61 MG/DL (ref 0.51–0.95)
DEPRECATED HCO3 PLAS-SCNC: 21 MMOL/L (ref 22–29)
DIASTOLIC BLOOD PRESSURE - MUSE: NORMAL MMHG
EGFRCR SERPLBLD CKD-EPI 2021: 29 ML/MIN/1.73M2
GLUCOSE BLDC GLUCOMTR-MCNC: 136 MG/DL (ref 70–99)
GLUCOSE SERPL-MCNC: 63 MG/DL (ref 70–99)
INTERPRETATION ECG - MUSE: NORMAL
MAGNESIUM SERPL-MCNC: 2.2 MG/DL (ref 1.7–2.3)
P AXIS - MUSE: NORMAL DEGREES
PLATELET # BLD AUTO: 262 10E3/UL (ref 150–450)
POTASSIUM SERPL-SCNC: 4.2 MMOL/L (ref 3.4–5.3)
PR INTERVAL - MUSE: NORMAL MS
QRS DURATION - MUSE: 136 MS
QT - MUSE: 380 MS
QTC - MUSE: 567 MS
R AXIS - MUSE: -32 DEGREES
SODIUM SERPL-SCNC: 140 MMOL/L (ref 135–145)
SYSTOLIC BLOOD PRESSURE - MUSE: NORMAL MMHG
T AXIS - MUSE: 80 DEGREES
VENTRICULAR RATE- MUSE: 134 BPM

## 2024-01-30 PROCEDURE — G1010 CDSM STANSON: HCPCS | Performed by: INTERNAL MEDICINE

## 2024-01-30 PROCEDURE — A9585 GADOBUTROL INJECTION: HCPCS | Performed by: INTERNAL MEDICINE

## 2024-01-30 PROCEDURE — 250N000013 HC RX MED GY IP 250 OP 250 PS 637: Performed by: INTERNAL MEDICINE

## 2024-01-30 PROCEDURE — 258N000003 HC RX IP 258 OP 636

## 2024-01-30 PROCEDURE — 85049 AUTOMATED PLATELET COUNT: CPT | Performed by: INTERNAL MEDICINE

## 2024-01-30 PROCEDURE — 97530 THERAPEUTIC ACTIVITIES: CPT | Mod: GP

## 2024-01-30 PROCEDURE — 255N000002 HC RX 255 OP 636: Performed by: INTERNAL MEDICINE

## 2024-01-30 PROCEDURE — 80048 BASIC METABOLIC PNL TOTAL CA: CPT | Performed by: INTERNAL MEDICINE

## 2024-01-30 PROCEDURE — 36415 COLL VENOUS BLD VENIPUNCTURE: CPT | Performed by: INTERNAL MEDICINE

## 2024-01-30 PROCEDURE — 75561 CARDIAC MRI FOR MORPH W/DYE: CPT | Mod: MG

## 2024-01-30 PROCEDURE — 99232 SBSQ HOSP IP/OBS MODERATE 35: CPT | Mod: 25

## 2024-01-30 PROCEDURE — 83735 ASSAY OF MAGNESIUM: CPT | Performed by: INTERNAL MEDICINE

## 2024-01-30 PROCEDURE — 258N000003 HC RX IP 258 OP 636: Performed by: INTERNAL MEDICINE

## 2024-01-30 PROCEDURE — 250N000011 HC RX IP 250 OP 636

## 2024-01-30 PROCEDURE — 75561 CARDIAC MRI FOR MORPH W/DYE: CPT | Mod: 26 | Performed by: INTERNAL MEDICINE

## 2024-01-30 PROCEDURE — 97110 THERAPEUTIC EXERCISES: CPT | Mod: GP

## 2024-01-30 PROCEDURE — 120N000003 HC R&B IMCU UMMC

## 2024-01-30 RX ORDER — POTASSIUM CHLORIDE 750 MG/1
40 TABLET, EXTENDED RELEASE ORAL
Status: CANCELLED | OUTPATIENT
Start: 2024-01-30

## 2024-01-30 RX ORDER — DIAZEPAM 5 MG
5 TABLET ORAL
Status: CANCELLED | OUTPATIENT
Start: 2024-01-30

## 2024-01-30 RX ORDER — FUROSEMIDE 20 MG
20 TABLET ORAL DAILY
Status: DISCONTINUED | OUTPATIENT
Start: 2024-01-31 | End: 2024-02-01 | Stop reason: HOSPADM

## 2024-01-30 RX ORDER — MAGNESIUM SULFATE HEPTAHYDRATE 40 MG/ML
2 INJECTION, SOLUTION INTRAVENOUS
Status: CANCELLED | OUTPATIENT
Start: 2024-01-30

## 2024-01-30 RX ORDER — POTASSIUM CHLORIDE 750 MG/1
20 TABLET, EXTENDED RELEASE ORAL
Status: CANCELLED | OUTPATIENT
Start: 2024-01-30

## 2024-01-30 RX ORDER — GADOBUTROL 604.72 MG/ML
6 INJECTION INTRAVENOUS ONCE
Status: COMPLETED | OUTPATIENT
Start: 2024-01-30 | End: 2024-01-30

## 2024-01-30 RX ADMIN — METOPROLOL TARTRATE 50 MG: 50 TABLET, FILM COATED ORAL at 03:22

## 2024-01-30 RX ADMIN — APIXABAN 2.5 MG: 2.5 TABLET, FILM COATED ORAL at 09:04

## 2024-01-30 RX ADMIN — APIXABAN 2.5 MG: 2.5 TABLET, FILM COATED ORAL at 19:57

## 2024-01-30 RX ADMIN — METOPROLOL TARTRATE 50 MG: 50 TABLET, FILM COATED ORAL at 09:04

## 2024-01-30 RX ADMIN — HYDROXYCHLOROQUINE SULFATE 200 MG: 200 TABLET, FILM COATED ORAL at 09:04

## 2024-01-30 RX ADMIN — SODIUM CHLORIDE 1 MG/MIN: 9 INJECTION, SOLUTION INTRAVENOUS at 15:58

## 2024-01-30 RX ADMIN — GADOBUTROL 6 ML: 604.72 INJECTION INTRAVENOUS at 13:33

## 2024-01-30 RX ADMIN — SODIUM CHLORIDE 0.5 MG/MIN: 9 INJECTION, SOLUTION INTRAVENOUS at 22:53

## 2024-01-30 RX ADMIN — GABAPENTIN 100 MG: 100 CAPSULE ORAL at 09:04

## 2024-01-30 RX ADMIN — AMIODARONE HYDROCHLORIDE 150 MG: 1.5 INJECTION, SOLUTION INTRAVENOUS at 15:38

## 2024-01-30 RX ADMIN — SULFASALAZINE 500 MG: 500 TABLET ORAL at 19:57

## 2024-01-30 RX ADMIN — SODIUM CHLORIDE, POTASSIUM CHLORIDE, SODIUM LACTATE AND CALCIUM CHLORIDE 500 ML: 600; 310; 30; 20 INJECTION, SOLUTION INTRAVENOUS at 17:36

## 2024-01-30 RX ADMIN — SULFASALAZINE 500 MG: 500 TABLET ORAL at 10:42

## 2024-01-30 ASSESSMENT — ACTIVITIES OF DAILY LIVING (ADL)
ADLS_ACUITY_SCORE: 37
ADLS_ACUITY_SCORE: 35
ADLS_ACUITY_SCORE: 37
ADLS_ACUITY_SCORE: 35
ADLS_ACUITY_SCORE: 37
ADLS_ACUITY_SCORE: 35

## 2024-01-30 NOTE — PROGRESS NOTES
Admission date and time: 1/29 1940  --------------------------------------------------------------------------  Diagnosis: New onset afib, HF exacerbation  Admitted from: [facility]  Via: Stretcher  Accompanied by: Family (son, daughter)  Belongings: Hearing aids and glasses with pt  Admission profile: complete   Teaching: Call don't fall, use of console, meal times, visiting hours, orientation to unit, when to call for the RN (angina/sob/dizzyness, etc.), and the importance of safety.   Access: PIV   Telemetry:Placed on pt   Ht./Wt.: complete     Two nurse head-to-toe skin assessment performed by Bakari MUNROE, RN and Carmen BAUMANN RN.  Skin issues noted: LUE bruising, blanchable redness on coccyx.  See PCS for assessment and treatment of wounds and surgical sites.

## 2024-01-30 NOTE — PLAN OF CARE
Pain: Denies  Neuro: A&Ox4, forgetful. Bed alarm on for forgetfulness  CV: Afib 120s-130s. Denies chest pain/palpitations  Resp: VSS on RA  GI/: LBM 1/30 AM , voiding with beside commode overnight  Skin: L side arm bruise, blanchable redness on coccyx  Lines: PIV SL  Protocols: No replacements overnight    Activity/Transfers: Assist x1 to bedside commode  Diet: Cardiac diet    Plan:   -Continue metoprolol q6hr  -Diuresis  -Notify Cards 1 with changes in pt status

## 2024-01-30 NOTE — PROGRESS NOTES
[facility]   Cardiology   Progress Note     ASSESSMENT/PLAN:  Roxanna Stark is a 89 year old female admitted on 1/27/2024.  She has a h/o HTN, CKD, RA, PMR, ?  Temporal arteritis, PAD, severe AS s/p AVR w/ porcine valve 4/25/16 w/ postop complicated by an episode of atrial fibrillation. She presented to [facility] ED on 1/27/24 w/ a 2-day h/o CASTREJON, chest tightness, mild cough, generalized weakness and exercise intolerance.  Transferred from the [facility] for management of AF w/ RVR and decompensated HFpEF.     Interval History: No acute events overnight. Patient hemodynamically stable, albeit remains tachycardic in atrial flutter with rates 120-130's. At the time of interview she reports fluttering in her chest and fatigue. Plan today for MR to assess for JF, initiation of amiodarone and DCCV tomorrow. Prefer to avoid LUCIANO due to patients age and avoid dye load with a CT due to renal function. Patient and family OK with plan.      Changes Today:  - MR to assess for JF clot  - Amiodarone initiation thereafter  - DCCV tomorrow   - Discontinue PTA Losartan   - Stop IV Lasix; resume PTA PO Lasix 20mg tomorrow     # Atrial Fibrillation w/ RVR vs. Atrial Flutter  Unsure of onset. Has remote history of postoperative AF in 2016 but no documented AF since. Presented to  on 1/27 with 2 x day of CASTREJON, chest tightness, weakness, cough, and exercise intolerance. EKG showing AF w/ RVR. [id number] = 5 (++age, +gender, +htn, +HFpEF).   - Rate Control: Lopressor 50mg q 8 hours   - Anticoagulation: Continue 2.5mg Eliquis BID  - Telemetry  - MR today to assess for JF clot; would not prefer LUCIANO due to age or CT due to 80mL dye load and renal function  - If no clot, start amiodarone  - DCCV tomorrow     # Acute Heart Failure w/ Preserved EF (>70%)  # Coronary Artery Disease  # Hypertension  # Peripheral Artery Disease  Presented to  with 2 x days of CASTREJON, chest tightness, weakness, cough,  and exercise intolerance. No ischemic changes on EKG. Mild troponin leak 46-->43 likely 2/2 tachyarrhythmia. CXR with pulmonary edema and moderate pleural effusions. TTE 1/28 revealed EF > 70%, no WMA and no HD significant valvular heart dz. Diastolic function indeterminate. Decompensation likely provoked by AF/AFL.    - Discontinue PTA Losartan due to renal function and borderline low BP   - If BP elevated, start amlodipine   - Resume PTA Lasix 20mg daily tomorrow   - Stop IV Lasix 40mg daily     # Chronic Kidney Disease   Baseline creatinine ~1.2-1.5. Admission creatinine 1.49.   - Diuresis pr above  - Avoid nephrotoxic agents  - Daily BMP  - Creatinine 1.61 (1.63)    # Seronegative Erosive RA  # PMR  Follows w/ outpt rheumatology clinic.  -  Continue PTA sulfasalazine 500 mg bid  -  Continue PTA hydroxychloroquine 200 mg daily (last eye exam by Dr. Rogers on 5/23/2023)  -  Continue PTA gabapentin 100 mg daily    FEN: Regular / NPO at midnight   Code status: Full  Prophylaxis:  Eliquis 2.5mg BID  Isolation: None  Disposition: 0-1 days    Patient seen and discussed with Dr. Calderon, who agrees with above plan.    Leilani MAE, CNP  [facility] Cardiology Team    Physical Exam:  Temp:  [97.5  F (36.4  C)-97.7  F (36.5  C)] 97.5  F (36.4  C)  Pulse:  [128-133] 129  Resp:  [16-22] 16  BP: ()/(63-87) 111/85  SpO2:  [91 %-98 %] 93 %    I/O:   Intake/Output Summary (Last 24 hours) at 1/30/2024 0715  Last data filed at 1/30/2024 0600  Gross per 24 hour   Intake --   Output 700 ml   Net -700 ml       Wt:   Wt Readings from Last 5 Encounters:   01/30/24 49.8 kg (109 lb 11.2 oz)   01/27/24 53.3 kg (117 lb 6.4 oz)   11/28/23 52 kg (114 lb 9.6 oz)   11/04/23 52.4 kg (115 lb 9.6 oz)   08/15/23 54.8 kg (120 lb 12.8 oz)       General: NAD  HEENT:  PERRLA, EOMI.   Neck: JVD not elevated.   CV: Atrial flutter on tele . No murmur appreciated. No rubs or gallops. Peripheral radial pulse intact.  Resp: No increased work of  breathing or use of accessory muscles, breathing comfortably on room air.  Lung sounds clear throughout/bilaterally  Abdomen:  Normal active bowel sounds.  Abdomen is soft. No distension, non-tender to palpation.    Extremities: Warm. Capillary refill less than 3 sec. 3/4 radial pulses bilaterally.  3/4 pedal pulses bilaterally. No pre-tibial edema. No cyanosis or clubbing.  Skin:  Warm and dry. No erythema, rashes, ulceration or diaphoresis.  Neuro: Alert and oriented x3.      Medications:   apixaban ANTICOAGULANT  2.5 mg Oral BID    furosemide  40 mg Intravenous Daily    gabapentin  100 mg Oral Daily    hydroxychloroquine  200 mg Oral Daily    metoprolol tartrate  50 mg Oral Q6H    sodium chloride (PF)  3 mL Intracatheter Q8H    sulfaSALAzine  500 mg Oral BID         Labs:   CMP  Recent Labs   Lab 24  0824  1640    140 139  --  137   POTASSIUM 4.2 4.0 4.5  --  4.9   CHLORIDE 104 103 102  --  100   CO2 21* 23 22  --  25   ANIONGAP 15 14 15  --  12   GLC 63* 87 73  --  121*   BUN 41.4* 40.1* 33.0*  --  28.9*   CR 1.61* 1.63* 1.60*  --  1.49*   GFRESTIMATED 29* 29* 29*  --  32*   ROEL 8.9 9.1 9.3  --  10.1*   MAG 2.2 2.3  --  2.3  --    PROTTOTAL  --   --   --   --  7.9   ALBUMIN  --   --   --   --  4.0   BILITOTAL  --   --   --   --  0.4   ALKPHOS  --   --   --   --  135   AST  --   --   --   --  40   ALT  --   --   --   --  32     CBC  Recent Labs   Lab 24  0524  0924  1640   WBC  --  9.8 11.6*   RBC  --  4.35 4.54   HGB  --  12.5 13.5   HCT  --  40.7 43.4   MCV  --  94 96   MCH  --  28.7 29.7   MCHC  --  30.7* 31.1*   RDW  --  15.8* 15.7*    222 240     INRNo lab results found in last 7 days.  Arterial Blood GasNo lab results found in last 7 days.    Diagnostics:  24 EC/28/24 Echo:  Global and regional left ventricular function is hyperkinetic with an EF >70%.  Global right ventricular function is  normal.  S/P Minimally invasive aortic valve replacement with 21 mm Epic porcine valve  on 4/25/2016  The prosthetic aortic valve is well-seated.  Doppler interrogation of the aortic valve is normal.  Moderate mitral annular calcification is present. On Doppler interrogation,  there is no significant stenosis or regurgitation.     This study was compared with the study from 5/2020 .  Patient is tachycardic today otherwise no significant change.     A left pleural effusion is present.    No results found for this or any previous visit (from the past 24 hour(s)).    Medical Decision Making     35 MINUTES SPENT BY ME on the date of service doing chart review, history, exam, documentation & further activities per the note.        CARMELITA Waller CNP on 1/30/2024 at 10:38 AM    This is a shared progress note with CARMELITA Waller CNP.    ASSESSMENT/PLAN:  Roxanna Stark is a 89 year old female admitted on 1/27/2024.  She has a h/o HTN, CKD, RA, PMR, ?  Temporal arteritis, PAD, severe AS s/p AVR w/ porcine valve 4/25/16 w/ postop complicated by an episode of atrial fibrillation. She presented to [facility] ED on 1/27/24 w/ a 2-day h/o CASTREJON, chest tightness, mild cough, generalized weakness and exercise intolerance.  Transferred from the [facility] for management of AF w/ RVR and decompensated HFpEF.     Interval History: No acute events overnight. Patient hemodynamically stable, albeit remains tachycardic in atrial flutter with rates 120-130's. At the time of interview she reports fluttering in her chest and fatigue. Plan today for MR to assess for JF, initiation of amiodarone and DCCV tomorrow. Prefer to avoid LUCIANO due to patients age and avoid dye load with a CT due to renal function. Patient and family OK with plan.      Changes Today:  - MR to assess for JF clot  - Amiodarone initiation thereafter  - DCCV tomorrow   - Discontinue PTA Losartan   - Stop IV Lasix; resume PTA PO Lasix 20mg  tomorrow     # Atrial Fibrillation w/ RVR vs. Atrial Flutter  Unsure of onset. Has remote history of postoperative AF in 2016 but no documented AF since. Presented to  on 1/27 with 2 x day of CASTREJON, chest tightness, weakness, cough, and exercise intolerance. EKG showing AF w/ RVR. [id number] = 5 (++age, +gender, +htn, +HFpEF).   - Rate Control: Lopressor 50mg q 8 hours   - Anticoagulation: Continue 2.5mg Eliquis BID  - Telemetry  - MR today to assess for JF clot; would not prefer LUCIANO due to age or CT due to 80mL dye load and renal function  - If no clot, start amiodarone  - DCCV tomorrow     # Acute Heart Failure w/ Preserved EF (>70%)  # Coronary Artery Disease  # Hypertension  # Peripheral Artery Disease  Presented to  with 2 x days of CASTREJON, chest tightness, weakness, cough, and exercise intolerance. No ischemic changes on EKG. Mild troponin leak 46-->43 likely 2/2 tachyarrhythmia. CXR with pulmonary edema and moderate pleural effusions. TTE 1/28 revealed EF > 70%, no WMA and no HD significant valvular heart dz. Diastolic function indeterminate. Decompensation likely provoked by AF/AFL.    - Discontinue PTA Losartan due to renal function and borderline low BP   - If BP elevated, start amlodipine   - Resume PTA Lasix 20mg daily tomorrow   - Stop IV Lasix 40mg daily     # Chronic Kidney Disease   Baseline creatinine ~1.2-1.5. Admission creatinine 1.49.   - Diuresis pr above  - Avoid nephrotoxic agents  - Daily BMP  - Creatinine 1.61 (1.63)    # Seronegative Erosive RA  # PMR  Follows w/ outpt rheumatology clinic.  -  Continue PTA sulfasalazine 500 mg bid  -  Continue PTA hydroxychloroquine 200 mg daily (last eye exam by Dr. Rogers on 5/23/2023)  -  Continue PTA gabapentin 100 mg daily    FEN: Regular / NPO at midnight   Code status: Full  Prophylaxis:  Eliquis 2.5mg BID  Isolation: None  Disposition: 0-1 days    Patient seen and discussed with Dr. Calderon, who agrees with above plan.    Leilani MAE,  CNP  [facility] Cardiology Team    This is a shared progress note with patient and Leilani MAE CNP      Roxanna Stark is a 89 year old female admitted on 1/27/2024.  She has a h/o HTN, CKD, RA, PMR, ?  Temporal arteritis, PAD, severe AS s/p AVR w/ porcine valve 4/25/16 w/ postop complicated by an episode of atrial fibrillation. She presented to [facility] ED on 1/27/24 w/ a 2-day h/o CASTREJON, chest tightness, mild cough, generalized weakness and exercise intolerance.    Patient was transferred from the [facility] for management of AF w/ RVR and decompensated HFpEF.     Interval History: No acute events overnight. Patient hemodynamically stable, albeit remains tachycardic in atrial flutter with rates 120-130's. At the time of interview she reports fluttering in her chest and fatigue. Plan today for MR to assess for JF, initiation of amiodarone and DCCV tomorrow. Prefer to avoid LUCIANO due to patients age and avoid dye load with a CT due to renal function. Patient and family OK with plan.      Changes Today:  - MR to assess for JF clot  - Amiodarone initiation thereafter  - DCCV tomorrow   - Discontinue PTA Losartan   - Stop IV Lasix; resume PTA PO Lasix 20mg tomorrow     # Atrial Fibrillation w/ RVR vs. Atrial Flutter  Unsure of onset. Has remote history of postoperative AF in 2016 but no documented AF since. Presented to  on 1/27 with 2 x day of CASTREJON, chest tightness, weakness, cough, and exercise intolerance. EKG showing AF w/ RVR. [id number] = 5 (++age, +gender, +htn, +HFpEF).   - Rate Control: Lopressor 50mg q 8 hours   - Anticoagulation: Continue 2.5mg Eliquis BID  - Telemetry  - MR today to assess for JF clot; would not prefer LUCIANO due to age or CT due to 80mL dye load and renal function  - If no clot, start amiodarone  - DCCV tomorrow     # Acute Heart Failure w/ Preserved EF (>70%)  # Coronary Artery Disease  # Hypertension  # Peripheral Artery Disease    Presented to  with 2 x days of  CASTREJON, chest tightness, weakness, cough, and exercise intolerance. No ischemic changes on EKG. Mild troponin leak 46-->43 likely 2/2 tachyarrhythmia. CXR with pulmonary edema and moderate pleural effusions. TTE 1/28 revealed EF > 70%, no WMA and no HD significant valvular heart dz. Diastolic function indeterminate. Decompensation likely provoked by AF/AFL.    - Discontinue PTA Losartan due to renal function and borderline low BP   - If BP elevated, start amlodipine   - Resume PTA Lasix 20mg daily tomorrow   - Stop IV Lasix 40mg daily     # Chronic Kidney Disease   Baseline creatinine ~1.2-1.5. Admission creatinine 1.49.   - Diuresis pr above  - Avoid nephrotoxic agents  - Daily BMP  - Creatinine 1.61 (1.63)    # Seronegative Erosive RA  # PMR  Follows w/ outpt rheumatology clinic.  -  Continue PTA sulfasalazine 500 mg bid  -  Continue PTA hydroxychloroquine 200 mg daily (last eye exam by Dr. Rogers on 5/23/2023)  -  Continue PTA gabapentin 100 mg daily    FEN: Regular / NPO at midnight   Code status: Full  Prophylaxis:  Eliquis 2.5mg BID  Isolation: None  Disposition: 0-1 days    Physical Exam:  Temp:  [97.5  F (36.4  C)-97.7  F (36.5  C)] 97.5  F (36.4  C)  Pulse:  [128-133] 129  Resp:  [16-22] 16  BP: ()/(63-87) 111/85  SpO2:  [91 %-98 %] 93 %    I/O:   Intake/Output Summary (Last 24 hours) at 1/30/2024 0715  Last data filed at 1/30/2024 0600  Gross per 24 hour   Intake --   Output 700 ml   Net -700 ml       Wt:   Wt Readings from Last 5 Encounters:   01/30/24 49.8 kg (109 lb 11.2 oz)   01/27/24 53.3 kg (117 lb 6.4 oz)   11/28/23 52 kg (114 lb 9.6 oz)   11/04/23 52.4 kg (115 lb 9.6 oz)   08/15/23 54.8 kg (120 lb 12.8 oz)     I performed physical exam in presence of NP  General: NAD  HEENT:  PERRLA, EOMI.   Neck: JVD not elevated.   CV: Atrial flutter on tele . No murmur appreciated. No rubs or gallops. Peripheral radial pulse intact.  Resp: No increased work of breathing or use of accessory muscles, breathing  comfortably on room air.  Lung sounds clear throughout/bilaterally  Abdomen:  Normal active bowel sounds.  Abdomen is soft. No distension, non-tender to palpation.    Extremities: Warm. Capillary refill less than 3 sec. 3/4 radial pulses bilaterally.  3/4 pedal pulses bilaterally. No pre-tibial edema. No cyanosis or clubbing.  Skin:  Warm and dry. No erythema, rashes, ulceration or diaphoresis.  Neuro: Alert and oriented x3.            I discussed lab results and results of EKG and echocardiogram with patient and NP.    I saw and evaluated patient with NP. I examined patient with NP. I discussed the results with patient and NP. I discussed our plan with patient and NP.  I agree with NPs note and I edited the CV NP's note to make it a more comprehensive document.    I spend 35 min directly with patient and NP    Dave Calderon MD, PhD  Professor of Medicine  Division of Cardiology

## 2024-01-31 ENCOUNTER — APPOINTMENT (OUTPATIENT)
Dept: OCCUPATIONAL THERAPY | Facility: CLINIC | Age: 89
DRG: 291 | End: 2024-01-31
Payer: MEDICARE

## 2024-01-31 ENCOUNTER — APPOINTMENT (OUTPATIENT)
Dept: PHYSICAL THERAPY | Facility: CLINIC | Age: 89
DRG: 291 | End: 2024-01-31
Payer: MEDICARE

## 2024-01-31 LAB
ANION GAP SERPL CALCULATED.3IONS-SCNC: 15 MMOL/L (ref 7–15)
BUN SERPL-MCNC: 42.2 MG/DL (ref 8–23)
CALCIUM SERPL-MCNC: 8.5 MG/DL (ref 8.2–9.6)
CHLORIDE SERPL-SCNC: 105 MMOL/L (ref 98–107)
CREAT SERPL-MCNC: 1.65 MG/DL (ref 0.51–0.95)
DEPRECATED HCO3 PLAS-SCNC: 21 MMOL/L (ref 22–29)
EGFRCR SERPLBLD CKD-EPI 2021: 28 ML/MIN/1.73M2
GLUCOSE SERPL-MCNC: 101 MG/DL (ref 70–99)
GLUCOSE SERPL-MCNC: 99 MG/DL (ref 70–99)
MAGNESIUM SERPL-MCNC: 2.3 MG/DL (ref 1.7–2.3)
POTASSIUM SERPL-SCNC: 3.6 MMOL/L (ref 3.4–5.3)
POTASSIUM SERPL-SCNC: 3.7 MMOL/L (ref 3.4–5.3)
SODIUM SERPL-SCNC: 141 MMOL/L (ref 135–145)

## 2024-01-31 PROCEDURE — 250N000013 HC RX MED GY IP 250 OP 250 PS 637

## 2024-01-31 PROCEDURE — 83735 ASSAY OF MAGNESIUM: CPT | Performed by: INTERNAL MEDICINE

## 2024-01-31 PROCEDURE — 93005 ELECTROCARDIOGRAM TRACING: CPT

## 2024-01-31 PROCEDURE — 97530 THERAPEUTIC ACTIVITIES: CPT | Mod: GP

## 2024-01-31 PROCEDURE — 84132 ASSAY OF SERUM POTASSIUM: CPT | Performed by: INTERNAL MEDICINE

## 2024-01-31 PROCEDURE — 82947 ASSAY GLUCOSE BLOOD QUANT: CPT | Performed by: INTERNAL MEDICINE

## 2024-01-31 PROCEDURE — 250N000013 HC RX MED GY IP 250 OP 250 PS 637: Performed by: INTERNAL MEDICINE

## 2024-01-31 PROCEDURE — 80048 BASIC METABOLIC PNL TOTAL CA: CPT

## 2024-01-31 PROCEDURE — 36415 COLL VENOUS BLD VENIPUNCTURE: CPT | Performed by: INTERNAL MEDICINE

## 2024-01-31 PROCEDURE — 97535 SELF CARE MNGMENT TRAINING: CPT | Mod: GO

## 2024-01-31 PROCEDURE — 97165 OT EVAL LOW COMPLEX 30 MIN: CPT | Mod: GO

## 2024-01-31 PROCEDURE — 99232 SBSQ HOSP IP/OBS MODERATE 35: CPT | Mod: FS

## 2024-01-31 PROCEDURE — 97116 GAIT TRAINING THERAPY: CPT | Mod: GP

## 2024-01-31 PROCEDURE — 120N000003 HC R&B IMCU UMMC

## 2024-01-31 PROCEDURE — 97530 THERAPEUTIC ACTIVITIES: CPT | Mod: GO

## 2024-01-31 RX ORDER — POTASSIUM CHLORIDE 750 MG/1
10 TABLET, EXTENDED RELEASE ORAL ONCE
Status: COMPLETED | OUTPATIENT
Start: 2024-01-31 | End: 2024-01-31

## 2024-01-31 RX ORDER — AMIODARONE HYDROCHLORIDE 200 MG/1
400 TABLET ORAL DAILY
Status: DISCONTINUED | OUTPATIENT
Start: 2024-01-31 | End: 2024-02-01 | Stop reason: HOSPADM

## 2024-01-31 RX ADMIN — HYDROXYCHLOROQUINE SULFATE 200 MG: 200 TABLET, FILM COATED ORAL at 08:25

## 2024-01-31 RX ADMIN — SULFASALAZINE 500 MG: 500 TABLET ORAL at 08:25

## 2024-01-31 RX ADMIN — APIXABAN 2.5 MG: 2.5 TABLET, FILM COATED ORAL at 08:25

## 2024-01-31 RX ADMIN — SULFASALAZINE 500 MG: 500 TABLET ORAL at 19:58

## 2024-01-31 RX ADMIN — APIXABAN 2.5 MG: 2.5 TABLET, FILM COATED ORAL at 19:59

## 2024-01-31 RX ADMIN — GABAPENTIN 100 MG: 100 CAPSULE ORAL at 08:25

## 2024-01-31 RX ADMIN — FUROSEMIDE 20 MG: 20 TABLET ORAL at 08:25

## 2024-01-31 RX ADMIN — AMIODARONE HYDROCHLORIDE 400 MG: 200 TABLET ORAL at 08:25

## 2024-01-31 RX ADMIN — POTASSIUM CHLORIDE 10 MEQ: 750 TABLET, EXTENDED RELEASE ORAL at 08:25

## 2024-01-31 ASSESSMENT — ACTIVITIES OF DAILY LIVING (ADL)
PREVIOUS_RESPONSIBILITIES: MEAL PREP;HOUSEKEEPING;LAUNDRY;SHOPPING;MEDICATION MANAGEMENT;FINANCES;DRIVING
ADLS_ACUITY_SCORE: 37
ADLS_ACUITY_SCORE: 34
ADLS_ACUITY_SCORE: 37
ADLS_ACUITY_SCORE: 37
ADLS_ACUITY_SCORE: 34
ADLS_ACUITY_SCORE: 37

## 2024-01-31 NOTE — PROGRESS NOTES
"   01/31/24 0931   Appointment Info   Signing Clinician's Name / Credentials (OT) JONELLE Spears   Student Supervision Line of sight supervision provided   Living Environment   People in Home alone   Current Living Arrangements house   Home Accessibility stairs to enter home;stairs within home   Number of Stairs, Main Entrance 2   Stair Railings, Main Entrance railing on left side (ascending)   Number of Stairs, Within Home, Primary greater than 10 stairs   Stair Railings, Within Home, Primary railings on both sides of stairs   Transportation Anticipated family or friend will provide   Living Environment Comments Pt uses stair lift inside home as needed, typically uses stairs. Pt's bedroom and bathroom is upstairs. Has tub/shower, shower chair and grab bars   Self-Care   Usual Activity Tolerance good   Current Activity Tolerance fair   Regular Exercise Yes   Activity/Exercise Type walking   Exercise Amount/Frequency 3-5 times/wk   Equipment Currently Used at Home cane, quad;walker, rolling  (Typically uses cane in the community)   Fall history within last six months no   Activity/Exercise/Self-Care Comment Pt IND in ADLs at baseline.   Instrumental Activities of Daily Living (IADL)   Previous Responsibilities meal prep;housekeeping;laundry;shopping;medication management;finances;driving   IADL Comments Pt IND in IADLs at baseline.   General Information   Onset of Illness/Injury or Date of Surgery 01/27/24   Referring Physician Leilani Mejia, APRN CNP   Patient/Family Therapy Goal Statement (OT) Dc home w assist if needed   Additional Occupational Profile Info/Pertinent History of Current Problem Per EMR, \"89 year old very pleasant female with a history of hypertension, chronic kidney disease, rheumatoid arthritis, polymyalgia rheumatica, ?  Temporal arteritis, peripheral artery disease, aortic stenosis status post aortic valve replacement 04/25/16, a porcine valve, postoperatively had an episode of atrial " "fibrillation presenting with shortness of breath worse with exertion, chest tightness, mild cough, generalized weakness for almost 2 days.\"   Left Upper Extremity (Weight-bearing Status) full weight-bearing (FWB)   Right Upper Extremity (Weight-bearing Status) full weight-bearing (FWB)   Left Lower Extremity (Weight-bearing Status) full weight-bearing (FWB)   Right Lower Extremity (Weight-bearing Status) full weight-bearing (FWB)   General Observations and Info Activity: Up with assist   Cognitive Status Examination   Orientation Status orientation to person, place and time   Affect/Mental Status (Cognitive) WNL   Follows Commands WNL   Cognitive Status Comments Intact, pt and family noted no concerns.   Visual Perception   Visual Impairment/Limitations WFL;corrective lenses full-time   Sensory   Sensory Quick Adds sensation intact   Posture   Posture not impaired   Range of Motion Comprehensive   General Range of Motion no range of motion deficits identified   Strength Comprehensive (MMT)   Comment, General Manual Muscle Testing (MMT) Assessment general weakness   Muscle Tone Assessment   Muscle Tone Quick Adds No deficits were identified   Coordination   Upper Extremity Coordination No deficits were identified   Bed Mobility   Bed Mobility supine-sit   Supine-Sit [place] (Bed Mobility) supervision   Transfers   Transfers sit-stand transfer;toilet transfer   Sit-Stand Transfer   Sit-Stand [place] (Transfers) contact guard   Assistive Device (Sit-Stand Transfers) walker, standard   Toilet Transfer   Type (Toilet Transfer) stand-sit;sit-stand   [place] Level (Toilet Transfer) contact guard   Assistive Device (Toilet Transfer) walker, standard   Balance   Balance Assessment sitting dynamic balance   Sitting Balance: Dynamic independent   Position, Sitting Balance sitting edge of bed   Activities of Daily Living   BADL Assessment/Intervention lower body dressing;bathing;toileting;grooming   Bathing " Assessment/Intervention   [place] Level (Bathing) moderate assist (50% patient effort)  (Per clinical judgement)   Assistive Devices (Bathing) shower chair;grab bar, tub/shower   Lower Body Dressing Assessment/Training   Position (Lower Body Dressing) supported sitting   Comment, (Lower Body Dressing) Donning briefs reaching down, unknown if pt can doff   [place] Level (Lower Body Dressing) minimum assist (75% patient effort)   Grooming Assessment/Training   Position (Grooming) supported standing   [place] Level (Grooming) contact guard assist;set up   Comment, (Grooming) oral cares standing at sink w/ FWW   Toileting   Position (Toileting) supported sitting   Assistive Devices (Toileting) grab bar, toilet   Comment, (Toileting) Min A for changing pad/brief, SBA-CGA for other toileting tasks   [place] Level (Toileting) minimum assist (75% patient effort);contact guard assist;adjust/manage clothing;change pad/brief;perform perineal hygiene   Clinical Impression   Criteria for Skilled Therapeutic Interventions Met (OT) Yes, treatment indicated   OT Diagnosis Impaired activity tolerance and functional mobility   OT Problem List-Impairments impacting ADL problems related to;activity tolerance impaired;mobility;strength   Assessment of Occupational Performance 1-3 Performance Deficits   Identified Performance Deficits LB dressing, functional mobility   Planned Therapy Interventions (OT) ADL retraining;progressive activity/exercise;risk factor education;home program guidelines;strengthening   Clinical Decision Making Complexity (OT) problem focused assessment/low complexity   Risk & Benefits of therapy have been explained evaluation/treatment results reviewed;care plan/treatment goals reviewed;risks/benefits reviewed;current/potential barriers reviewed;participants voiced agreement with care plan;participants included;patient;daughter   Clinical Impression Comments Pt will benefit from continued OT  services to promote safety and IND in ADLs   OT Total Evaluation Time   OT Eval, Low Complexity Minutes (55605) 6   OT Goals   Therapy Frequency (OT) 6 times/week   OT Predicted Duration/Target Date for Goal Attainment 02/10/24   OT Goals Lower Body Dressing;Transfers;Hygiene/Grooming;Lower Body Bathing;Toilet Transfer/Toileting;Aerobic Activity   OT: Hygiene/Grooming modified independent   OT: Lower Body Dressing Modified independent   OT: Lower Body Bathing Modified independent   OT: Transfer Supervision/stand-by assist;with assistive device  (Tub transfer with shower chair, walker, and grab bars)   OT: Toilet Transfer/Toileting Modified independent   OT: Perform aerobic activity with stable cardiovascular response continuous activity;10 minutes;ambulation   OT Discharge Planning   OT Plan LB dressing, managing briefs during toileting, standing ADLs, functional mobility, monitor cog   OT Discharge Recommendation (DC Rec) home with assist;home with home care occupational therapy;Transitional Care Facility   OT Rationale for DC Rec Pt appears near functional baseline and has family capable of providing temporary 24/7 assistance with IADLs. Limited by activity tolerance and fatigue. As of today, recommend TCU to promote strengthening for ADLs however pending pt progress, may progress to home with assist and possible HHOT. Will continue to monitor and update recs as appropriate.   OT Brief overview of current status CGA w 4 ww   Total Session Time   Timed Code Treatment Minutes 40   Total Session Time (sum of timed and untimed services) 46

## 2024-01-31 NOTE — PLAN OF CARE
Shift: [id number]  D: Pt transferred from [facility] for management of Afib w/ RVR and decompensated HFpEF.     PMHx: HTN, CKD, RA, PMR, Temporal arteritis, PAD, severe AS s/p AVR w/ porcine valve 4/25/16 w/ postop complicated by an episode of atrial fibrillation.      I/A:  Neuro: A&O x4, sometimes forgetful. Ax1 w/ GB and walker. Bed alarm and chair alarm on for safety.   Cardiac: Tele in place, Afib, HR 's. Afebrile. Soft BP. Hold SBP < 100. Afternoon metoprolol dose held for BP 84/67. Amio bolus given, gtt started but BP < 90, on pause. Paged team, giving 500 mL bolus of LR, BP's stable. Per provider, restart amio gtt post LR bolus and closely monitor BP's.   Resp: VSS on RA. Lung sounds clear/diminished.   GI/: Minimal urine output. Voiding in bathroom, bedside commode for nighttime. No BM this shift. Low sodium diet. NPO at midnight for DCCV tomorrow.   Skin: No new deficits noted  LDA's: PIV in place infusing LR.   Pain: Denies       Plan: Continue to monitor and follow POC. DCCV tomorrow. Notify Cards 1 with any changes.

## 2024-01-31 NOTE — PLAN OF CARE
Pain: Denies  Neuro: A&Ox3, disoriented to time overnight. Forgetful. Bed and chair alarms on for impulsivity.  CV: Aflutter 120s-130s, converted to SR with 1st degree AVB and BBB 60s-70s at 0245 with Sys >100, MAPs 70s-80s. Denies chest pain/palpitations  Resp: VSS on RA  GI/: LBM 1/30 AM , voiding with beside commode overnight  Skin: L side arm bruise, blanchable redness on coccyx  Lines: PIV running amiodarone 0.5mg/min  Protocols: No replacements overnight    Activity/Transfers: Assist x1 with walker to bedside commode  Diet: NPO at 0000 for scheduled cardioversion    Plan:   -Continue POC, notify Cards 1 with changes in pt status    -----------------------------------------  0400  6C 0283 BB - Wondering if you'd like to get an EKG? She's on an amio drip. Looks like converted from afib RVR to sinus around 0245. Tolerating hemodynamically. - Bakari [phone]    Intervention: AM EKG ordered

## 2024-01-31 NOTE — DISCHARGE SUMMARY
[address] [address]  p: [phone]    Discharge Summary: Cardiology Service    Roxanna Stark MRN# [id number]   YOB: 1927 Age: 89 year old       Admission Date: 01/27/2024  Discharge Date: 02/01/2024    Discharge Diagnoses:  1. Atrial fibrillation with RVR vs Atrial Flutter  2. Acute heart failure with preserved EF (>70%)  3. Coronary Artery disease  4. HTN  5. Peripheral Artery Disease  6. Chronic Kidney Disease  7. Seronegative Erosive RA  8. PMR    Brief HPI:   Roxanna Stark is a 89 year old female admitted on 1/27/2024.  She has a h/o HTN, CKD, RA, PMR, ?  Temporal arteritis, PAD, severe AS s/p AVR w/ porcine valve 4/25/16 w/ postop complicated by an episode of atrial fibrillation. She presented to [facility] ED on 1/27/24 w/ a 2-day h/o CASTREJON, chest tightness, mild cough, generalized weakness and exercise intolerance. Transferred from the [facility] for management of AF w/ RVR and decompensated HFpEF.     Hospital Course by Diagnosis:  # Atrial Fibrillation w/ RVR vs. Atrial Flutter  Unsure of onset. Has remote history of postoperative AF in 2016 but no documented AF since. Presented to  on 1/27 with 2 x day of CASTREJON, chest tightness, weakness, cough, and exercise intolerance. EKG showing AF w/ RVR. [id number] = 5 (++age, +gender, +htn, +HFpEF).   - Rate Control: Amiodarone 400mg x 7 days followed by 200mg daily    - Anticoagulation: Continue 2.5mg Eliquis BID  - cMRI 1/30 negative for JF clot, amiodarone started 400 mg daily  - Patient spontaneously converted 1/30 overnight, no DCCV     # Acute Heart Failure w/ Preserved EF (>70%)  # Coronary Artery Disease  # Hypertension  # Peripheral Artery Disease  Presented to  with 2 x days of CASTREJON, chest tightness, weakness, cough, and exercise intolerance. No ischemic changes on EKG. Mild troponin leak 46-->43 likely 2/2 tachyarrhythmia. CXR with pulmonary edema and  moderate pleural effusions. TTE 1/28 revealed EF > 70%, no WMA and no HD significant valvular heart dz. Diastolic function indeterminate. Decompensation likely provoked by AF/AFL.    - Discontinue PTA Losartan due to renal function and borderline low BP   - Blood pressures well controlled, continue to follow in OP setting  - Resume PTA Lasix 20mg daily     # Chronic Kidney Disease   Baseline creatinine ~1.2-1.5. Admission creatinine 1.49.   - Diuresis pr above  - Avoid nephrotoxic agents  - Daily BMP  - Creatinine 1.65 (1.61)     # Seronegative Erosive RA  # PMR  Follows w/ outpt rheumatology clinic.  -  Continue PTA sulfasalazine 500 mg bid  -  Continue PTA hydroxychloroquine 200 mg daily (last eye exam by Dr. Rogers on 5/23/2023)  -  Continue PTA gabapentin 100 mg daily    Pertinent Procedures:  1. None    Consults:  RNCC/SW    Medication Changes:  Start Amiodarone 400mg x7 days; then 200mg daily   Start Eliquis 2.5mg BID   Stop Losartan 100mg daily    Discharge medications:   Current Discharge Medication List        START taking these medications    Details   amiodarone (PACERONE) 200 MG tablet Take 2 tablets (400 mg) by mouth daily for 6 days, THEN 1 tablet (200 mg) daily for 90 days.    Associated Diagnoses: Atrial flutter with rapid ventricular response (H)      apixaban ANTICOAGULANT (ELIQUIS) 2.5 MG tablet Take 1 tablet (2.5 mg) by mouth 2 times daily  Qty: 180 tablet, Refills: 3    Associated Diagnoses: Atrial flutter with rapid ventricular response (H)           CONTINUE these medications which have NOT CHANGED    Details   amoxicillin (AMOXIL) 500 MG capsule TAKE 4 CAPSULES BY MOUTH 1 HOUR BEFORE DENTAL APPOINTMENT  Qty: 4 capsule, Refills: 1    Associated Diagnoses: SBE (subacute bacterial endocarditis) prophylaxis candidate      calcium-vitamin D 500-125 MG-UNIT TABS Take 1 tablet by mouth daily      furosemide (LASIX) 20 MG tablet TAKE 1 TABLET BY MOUTH EVERY DAY IF GAIN OF 2 TO 3 POUNDS OVER 2  DAYS  Qty: 90 tablet, Refills: 3    Associated Diagnoses: 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)      gabapentin (NEURONTIN) 100 MG capsule Take 1 capsule (100 mg) by mouth daily  Qty: 90 capsule, Refills: 3    Associated Diagnoses: Neuropathy of both feet      hydroxychloroquine (PLAQUENIL) 200 MG tablet Take 1 tablet (200 mg) by mouth daily  Qty: 30 tablet, Refills: 3    Associated Diagnoses: Rheumatoid arthritis of multiple sites with negative rheumatoid factor (H)      ICAPS PO 2 tablets daily      metoprolol tartrate (LOPRESSOR) 25 MG tablet Take 1 tablet (25 mg) by mouth 2 times daily  Qty: 180 tablet, Refills: 3    Associated Diagnoses: 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)      Omega-3 Fatty Acids (OMEGA-3 FISH OIL PO) Take 1 tablet twice daily.      sulfaSALAzine (AZULFIDINE) 500 MG tablet Take 1 tablet (500 mg) by mouth 2 times daily  Qty: 60 tablet, Refills: 3    Associated Diagnoses: Rheumatoid arthritis of multiple sites with negative rheumatoid factor (H)      Misc. Devices (ROLLATOR ULTRA-LIGHT) MISC            STOP taking these medications       losartan (COZAAR) 100 MG tablet Comments:   Reason for Stopping:               Follow-up:  EP 1 month    Labs or imaging requiring follow-up after discharge:  [facility]    Code status:  DNR/DNI    Condition on discharge  Temp:  [97.5  F (36.4  C)-98  F (36.7  C)] 97.6  F (36.4  C)  Pulse:  [73-80] 76  Resp:  [17-24] 21  BP: ()/(45-81) 129/73  SpO2:  [89 %-98 %] 93 %    General: Alert, interactive, NAD  Eyes: sclera anicteric, EOMI  Cardiovascular: regular rate and rhythm, normal S1 and S2, no murmurs, gallops, or rubs  Resp: clear to auscultation bilaterally, no rales, wheezes, or rhonchi  Extremities: no edema, no cyanosis or clubbing  Skin: Warm and dry, no jaundice or rash  Neuro: moves all extremities equally  Psych: Alert & oriented x 3    Imaging with results:  24 EC/31/24 EKG:  NSR       1/28/24 Echo:  Global and regional left ventricular function is hyperkinetic with an EF >70%.  Global right ventricular function is normal.  S/P Minimally invasive aortic valve replacement with 21 mm Epic porcine valve  on 4/25/2016  The prosthetic aortic valve is well-seated.  Doppler interrogation of the aortic valve is normal.  Moderate mitral annular calcification is present. On Doppler interrogation,  there is no significant stenosis or regurgitation.     This study was compared with the study from 5/2020 .  Patient is tachycardic today otherwise no significant change.     A left pleural effusion is present.     No results found for this or any previous visit (from the past 24 hour(s)).    Patient Care Team:  Swapna Gaines MD as PCP - General (Family Medicine)  Brandan Landin MD as MD (OB/Gyn)  Mono Ribeiro MD as MD (Gynecologic Oncology)  Swapna Gaines MD as Assigned PCP  Aminta Garcia MD as Assigned Cancer Care Provider  Jamie oJhnson MD as Assigned Rheumatology Provider  Whitney Rogers MD as MD (Ophthalmology)  Abdon Bullard MD as MD (Ophthalmology)  Abdon Bullard MD as Assigned Surgical Provider    Caroline Pretty MS, PA-C  [facility]  Patient discussed with staff cardiologist, Dr. Calderon, who agrees with the above documentation and plan. Documentation represents joint decision making.     Time Spent on this Encounter   I, Caroline Pretty PA-C, personally saw the patient today and spent greater than 30 minutes discharging this patient.     This is a shared discharge note with Caroline Pretty PA-C  Discharge Diagnoses:  1. Atrial fibrillation with RVR vs Atrial Flutter  2. Acute heart failure with preserved EF (>70%)  3. Coronary Artery disease  4. HTN  5. Peripheral Artery Disease  6. Chronic Kidney Disease  7. Seronegative Erosive RA  8. PMR    Brief HPI:   Roxanna Stark is a 89 year old female admitted on 1/27/2024.  She has a h/o HTN, CKD, RA, PMR, ?   Temporal arteritis, PAD, severe AS s/p AVR w/ porcine valve 4/25/16 w/ postop complicated by an episode of atrial fibrillation. She presented to [facility] ED on 1/27/24 w/ a 2-day h/o CASTREJON, chest tightness, mild cough, generalized weakness and exercise intolerance. Transferred from the [facility] for management of AF w/ RVR and decompensated HFpEF.     Hospital Course by Diagnosis:  # Atrial Fibrillation w/ RVR vs. Atrial Flutter  Unsure of onset. Has remote history of postoperative AF in 2016 but no documented AF since. Presented to  on 1/27 with 2 x day of CASTREJON, chest tightness, weakness, cough, and exercise intolerance. EKG showing AF w/ RVR. [id number] = 5 (++age, +gender, +htn, +HFpEF).   - Rate Control: Amiodarone 400mg x 7 days followed by 200mg daily    - Anticoagulation: Continue 2.5mg Eliquis BID  - cMRI 1/30 negative for JF clot, amiodarone started 400 mg daily  - Patient spontaneously converted 1/30 overnight, no DCCV     # Acute Heart Failure w/ Preserved EF (>70%)  # Coronary Artery Disease  # Hypertension  # Peripheral Artery Disease  Presented to  with 2 x days of CASTREJON, chest tightness, weakness, cough, and exercise intolerance. No ischemic changes on EKG. Mild troponin leak 46-->43 likely 2/2 tachyarrhythmia. CXR with pulmonary edema and moderate pleural effusions. TTE 1/28 revealed EF > 70%, no WMA and no HD significant valvular heart dz. Diastolic function indeterminate. Decompensation likely provoked by AF/AFL.    - Discontinue PTA Losartan due to renal function and borderline low BP   - Blood pressures well controlled, continue to follow in OP setting  - Resume PTA Lasix 20mg daily     # Chronic Kidney Disease   Baseline creatinine ~1.2-1.5. Admission creatinine 1.49.   - Diuresis pr above  - Avoid nephrotoxic agents  - Daily BMP  - Creatinine 1.65 (1.61)     # Seronegative Erosive RA  # PMR  Follows w/ outpt rheumatology clinic.  -  Continue PTA sulfasalazine 500 mg bid  -  Continue  PTA hydroxychloroquine 200 mg daily (last eye exam by Dr. Rogesr on 5/23/2023)  -  Continue PTA gabapentin 100 mg daily    Pertinent Procedures:  1. None    Consults:  RNCC/SW    Medication Changes:  Start Amiodarone 400mg x7 days; then 200mg daily   Start Eliquis 2.5mg BID   Stop Losartan 100mg daily    Follow-up:  EP 1 month    Labs or imaging requiring follow-up after discharge:  [facility]    Code status:  DNR/DNI  During discharge I examind patient with NP and disucussed the findings  Condition on discharge  Temp:  [97.5  F (36.4  C)-98  F (36.7  C)] 97.6  F (36.4  C)  Pulse:  [73-80] 76  Resp:  [17-24] 21  BP: ()/(45-81) 129/73  SpO2:  [89 %-98 %] 93 %    General: Alert, interactive, NAD  Eyes: sclera anicteric, EOMI  Cardiovascular: regular rate and rhythm, normal S1 and S2, no murmurs, gallops, or rubs  Resp: clear to auscultation bilaterally, no rales, wheezes, or rhonchi  Extremities: no edema, no cyanosis or clubbing    I discussed with patient and NP the results of labs, EKG, Echocardiogram and medications.  I saw and evaluated patient with NP. I examined patient with NPI discussed the results with patient and NP. I discussed our plan with patient and NP.  I agree with NP and I edited the NP's note to make it a more comprehensive document.  I spent with patient and NP directly 35 min during discharge process.    Dave Calderon MD, PhD  Professor of Medicine  Division of Cardiology

## 2024-01-31 NOTE — PROGRESS NOTES
[facility]   Cardiology   Progress Note     ASSESSMENT/PLAN:  Roxanna Stark is a 89 year old female admitted on 1/27/2024.  She has a h/o HTN, CKD, RA, PMR, ?  Temporal arteritis, PAD, severe AS s/p AVR w/ porcine valve 4/25/16 w/ postop complicated by an episode of atrial fibrillation. She presented to [facility] ED on 1/27/24 w/ a 2-day h/o CASTREJON, chest tightness, mild cough, generalized weakness and exercise intolerance. [place] [facility] [place] w/ RVR and decompensated HFpEF.      Changes Today:  - Medically ready for discharge to TCU; appreciate SW assistance with placement     # Atrial Fibrillation w/ RVR vs. Atrial Flutter  Unsure of onset. Has remote history of postoperative AF in 2016 but no documented AF since. Presented to  on 1/27 with 2 x day of CASTREJON, chest tightness, weakness, cough, and exercise intolerance. EKG showing AF w/ RVR. [id number] = 5 (++age, +gender, +htn, +HFpEF).   - Rate Control: Amiodarone 400mg x 7 days followed by 200mg daily    - Anticoagulation: Continue 2.5mg Eliquis BID  - Telemetry  - MR showing no JF clot; pt spontaneously converted to NSR overnight therefore no need for DCCV     # Acute Heart Failure w/ Preserved EF (>70%)  # Coronary Artery Disease  # Hypertension  # Peripheral Artery Disease  Presented to  with 2 x days of CASTREJON, chest tightness, weakness, cough, and exercise intolerance. No ischemic changes on EKG. Mild troponin leak 46-->43 likely 2/2 tachyarrhythmia. CXR with pulmonary edema and moderate pleural effusions. TTE 1/28 revealed EF > 70%, no WMA and no HD significant valvular heart dz. Diastolic function indeterminate. Decompensation likely provoked by AF/AFL.    - Discontinued PTA Losartan due to renal function and borderline low BP; consider amlodipine if hypertensive  - Resume PTA Lasix 20mg daily    # Chronic Kidney Disease   Baseline creatinine ~1.2-1.5. Admission creatinine 1.49.   -  Diuresis pr above  - Avoid nephrotoxic agents  - Daily BMP  - Creatinine 1.65 (1.61)     # Seronegative Erosive RA  # PMR  Follows w/ outpt rheumatology clinic.  -  Continue PTA sulfasalazine 500 mg bid  -  Continue PTA hydroxychloroquine 200 mg daily (last eye exam by Dr. Rogers on 5/23/2023)  -  Continue PTA gabapentin 100 mg daily     FEN: Regular / NPO at midnight   Code status: Full  Prophylaxis:  Eliquis 2.5mg BID  Isolation: None  Disposition: Medically ready for discharge to TCU pending placement    Patient seen and discussed with Dr. Calderon, who agrees with above plan.    Caroline Pretty, MS, PA-C  [facility] Cardiology Team  Pager [phone]/Vocera    Interval History:  Patient spontaneously converted to NSR overnight; was started on Amiodarone 400mg daily x7 days. Patient reports no symptoms and reports feeling well.     Physical Exam:  Temp:  [97.4  F (36.3  C)-97.9  F (36.6  C)] 97.9  F (36.6  C)  Pulse:  [] 71  Resp:  [13-25] 22  BP: ()/(30-91) 114/69  SpO2:  [89 %-100 %] 96 %    I/O:   Intake/Output Summary (Last 24 hours) at 1/31/2024 1237  Last data filed at 1/31/2024 0825  Gross per 24 hour   Intake 815 ml   Output 625 ml   Net 190 ml       Wt:   Wt Readings from Last 5 Encounters:   01/30/24 49.8 kg (109 lb 11.2 oz)   01/27/24 53.3 kg (117 lb 6.4 oz)   11/28/23 52 kg (114 lb 9.6 oz)   11/04/23 52.4 kg (115 lb 9.6 oz)   08/15/23 54.8 kg (120 lb 12.8 oz)     General: NAD  HEENT:  PERRLA, EOMI.   CV: RRR. No murmur appreciated. No rubs or gallops. Peripheral radial pulse intact.  Resp: No increased work of breathing or use of accessory muscles, breathing comfortably on room air.  Lung sounds clear throughout/bilaterally   Extremities: Warm.  No cyanosis or clubbing.  Skin:  Warm and dry. No erythema, rashes, ulceration or diaphoresis.  Neuro: Alert and oriented x3.      Medications:   amiodarone  400 mg Oral Daily    apixaban ANTICOAGULANT  2.5 mg Oral BID    furosemide  20 mg Oral Daily    gabapentin  100  mg Oral Daily    hydroxychloroquine  200 mg Oral Daily    [Held by provider] metoprolol tartrate  50 mg Oral Q6H    sodium chloride (PF)  3 mL Intracatheter Q8H    sulfaSALAzine  500 mg Oral BID      - MEDICATION INSTRUCTIONS -         Labs:   CMP  Recent Labs   Lab 24  0536 24  23024  0529 24  0830 24  0937 24  1640   NA  --   --  140 140 139  --  137   POTASSIUM 3.6  --  4.2 4.0 4.5  --  4.9   CHLORIDE  --   --  104 103 102  --  100   CO2  --   --  21* 23 22  --  25   ANIONGAP  --   --  15 14 15  --  12   * 136* 63* 87 73  --  121*   BUN  --   --  41.4* 40.1* 33.0*  --  28.9*   CR  --   --  1.61* 1.63* 1.60*  --  1.49*   GFRESTIMATED  --   --  29* 29* 29*  --  32*   ROEL  --   --  8.9 9.1 9.3  --  10.1*   MAG 2.3  --  2.2 2.3  --  2.3  --    PROTTOTAL  --   --   --   --   --   --  7.9   ALBUMIN  --   --   --   --   --   --  4.0   BILITOTAL  --   --   --   --   --   --  0.4   ALKPHOS  --   --   --   --   --   --  135   AST  --   --   --   --   --   --  40   ALT  --   --   --   --   --   --  32     CBC  Recent Labs   Lab 24  0924  1640   WBC  --  9.8 11.6*   RBC  --  4.35 4.54   HGB  --  12.5 13.5   HCT  --  40.7 43.4   MCV  --  94 96   MCH  --  28.7 29.7   MCHC  --  30.7* 31.1*   RDW  --  15.8* 15.7*    222 240     INRNo lab results found in last 7 days.  Arterial Blood GasNo lab results found in last 7 days.    Diagnostics:  ECG:        Recent Results (from the past 24 hour(s))   MR Cardiac WO & W Contrast    Narrative                                                     [facility] hospital                                                     CMR Report      MRN:              [id number]                                  Name:        ADONIS BIGGS CLARITZA                                  :             1927-May-20                                  Scan Date:                                         Electronically signed by  Tremaine Sim 2024-Jan-30 14:15:42    SUMMARY   ==========================================================================================================    Clinical history: 89 year old female with HTN, advanced CKD, s/p AVR w/ porcine valve 4/25/16, admitted  with AF w/ RVR and decompensated HFpEF. MR to assess for JF clot, initiation of amiodarone and DCCV.  Comparison CMR: none    1. The LV is normal in cavity size with moderate concentric LVH. The global systolic function is normal.  The LVEF is 58%. There are no regional wall motion abnormalities.    2. The RV is normal in cavity size. The global systolic function is mild-moderately reduced. The RVEF is  37%.     3. Both atria are moderately dilated.    4. There is no significant valvular disease. There is a bioprosthetic aortic valve that appears normal but  was not interrogated fully.     5. Late gadolinium enhancement imaging shows no MI or infiltrative disease. There is mild patchy  enhancement in the basal septum, likely related to h/o AS.    6. There is no pericardial effusion or thickening. Moderate-large bilateral pleural effusions.     7. There is no intracardiac thrombus.    CONCLUSIONS: No JF thrombus. Normal LV function and moderately reduced RV function.    CORE EXAM   ==========================================================================================================    MEASUREMENTS   ----------------------------------------------------------------------------------------      VOLUMETRIC ANALYSIS       ----------------------------------------------  .-------------------------------------------------------.                   LV    Reference  RV    Reference   +-----+-----------+------+-----------+------+-----------+   EDV  ml         40.5             68.5                    ml/m^2     28.1             47.5               ESV  ml         16.9             43.0                    ml/m^2     11.7              29.8               CO   L/min      3.00             3.24                    L/min/m^2  2.08             2.25                    g/m^2                                          SV   ml         23.6             25.5                    ml/m^2     16.4             17.7               EF   %          58.3             37.2              '-----+-----------+------+-----------+------+-----------'            CARDIAC OUTPUT HR:  127 BPM      LV DIMENSIONS       ----------------------------------------------          WALL THICKNESS - ANTEROSEPTAL:  1.5 cm          WALL THICKNESS - INFEROLATERAL:  1.1 cm          LV RONI:  3.8 cm          LV ESD:  2.5 cm        LA DIMENSIONS (LV SYSTOLE)       ----------------------------------------------          DIAMETER:  3.7 cm        AORTIC ROOT DIMENSIONS       ----------------------------------------------          SINUS OF VALSALVA:  2.3 cm          AORTIC ROOT SIZE:  Normal      ANATOMY   ----------------------------------------------------------------------------------------      LEFT VENTRICLE       ----------------------------------------------          WALL THICKNESS:  MODERATE HYPERTROPHY          LVH PATTERN:  CONCENTRIC HYPERTROPHY          CAVITY SIZE:  Normal        RIGHT VENTRICLE       ----------------------------------------------          WALL THICKNESS:  Normal          CONTRACTILITY:  MODERATE GLOBALLY DECREASED          CAVITY SIZE:  Normal        LEFT ATRIUM       ----------------------------------------------          CAVITY SIZE:  MODERATELY ENLARGED        RIGHT ATRIUM       ----------------------------------------------          CAVITY SIZE:  MODERATELY ENLARGED        PERICARDIUM       ----------------------------------------------          Normal          EFFUSION:  None        PLEURAL EFFUSION       ----------------------------------------------   MODERATE RIGHT, MODERATE LEFT       VALVES    ----------------------------------------------------------------------------------------      AORTIC VALVE       ----------------------------------------------          AORTIC VALVE LEAFLETS:  BIOPROSTHETIC          PROSTHETIC AORTIC VALVE:  Well Seated        MITRAL VALVE       ----------------------------------------------          MITRAL VALVE LEAFLETS:  Normal Leaflets   MITRAL MORPHOLOGY:  ANNULAR CALCIFICATION           MITRAL REGURGITATION:  MILD        TRICUSPID VALVE       ----------------------------------------------          TRICUSPID VALVE LEAFLETS:  Normal Leaflets        PULMONIC VALVE       ----------------------------------------------          PULMONIC VALVE LEAFLETS:  Normal Leaflets      17 SEGMENT   ----------------------------------------------------------------------------------------  .-------------------------------------------------------------------------------------------.   Segments            Wall Motion    Hyperenhancement  Stress Perfusion  Interpretation   +--------------------+---------------+------------------+------------------+----------------+   Base Anterior       Normal/Hyper   None                                Normal            Base Anteroseptal   Normal/Hyper   1-25%                               Non-CAD Scar      Base Inferoseptal   Normal/Hyper   1-25%                               Non-CAD Scar      Base Inferior       Normal/Hyper   None                                Normal            Base Inferolateral  Normal/Hyper   None                                Normal            Base Anterolateral  Normal/Hyper   None                                Normal            Mid Anterior        Normal/Hyper   None                                Normal            Mid Anteroseptal    Normal/Hyper   None                                Normal            Mid Inferoseptal    Normal/Hyper   None                                Normal             Mid Inferior        Normal/Hyper   None                                Normal            Mid Inferolateral   Normal/Hyper   None                                Normal            Mid Anterolateral   Normal/Hyper   None                                Normal            Apical Anterior     Normal/Hyper   None                                Normal            Apical Septal       Normal/Hyper   None                                Normal            Apical Inferior     Normal/Hyper   None                                Normal            Apical Lateral      Normal/Hyper   None                                Normal            [place]                Normal/Hyper   None                                Normal           +--------------------+---------------+------------------+------------------+----------------+   RV Segments         Wall Motion    Hyperenhancement                    Interpretation   +--------------------+---------------+------------------+------------------+----------------+   RV Basal Anterior   Mild/Mod Hypo  None                                Normal            RV Basal Inferior   Mild/Mod Hypo  None                                Normal            RV Mid              Mild/Mod Hypo  None                                Normal            RV Apical           Mild/Mod Hypo  None                                Normal           '--------------------+---------------+------------------+------------------+----------------'        FINDINGS       ----------------------------------------------          LV SCAR SIZE (17 SEGMENT):  2 %      SCAN INFO   ==========================================================================================================    GENERAL   ----------------------------------------------------------------------------------------      CONTRAST AGENT       ----------------------------------------------           TYPE:  Gadavist          GD CONCENTRATION:  0.5 M          VOLUME ADMINISTERED:  6 ml          DOSAGE:  0.06 mmol/kg        VITALS       ----------------------------------------------          HEIGHT:  60.0 in          HEIGHT:  152.4 cm          WEIGHT:  109.0 lbs          WEIGHT:  49.4 kgs          BSA:  1.44 m^2        SETUP       ----------------------------------------------          REASON(S) FOR SCAN:  Thrombus / cardiac CRYSTAL           REFERRING PHYSICIAN:  ARNAUD WHITFIELD          ATTENDING PHYSICIAN:  EWA SALGUERO   ----------------------------------------------------------------------------------------      CPT Codes      ICD10 Codes      Report generated by [employer], a product of [employer]       Medical Decision Making       45 MINUTES SPENT BY ME on the date of service doing chart review, history, exam, documentation & further activities per the note.        This is a shared progress note with Caroline Pretty PA-C      ASSESSMENT/PLAN:  Roxanna Stark is a 89 year old female admitted on 1/27/2024.  She has a h/o HTN, CKD, RA, PMR, ?  Temporal arteritis, PAD, severe AS s/p AVR w/ porcine valve 4/25/16 w/ postop complicated by an episode of atrial fibrillation. She presented to [facility] ED on 1/27/24 w/ a 2-day h/o CASTREJON, chest tightness, mild cough, generalized weakness and exercise intolerance.   Transferred from the [facility] for management of AF w/ RVR and decompensated HFpEF.      Changes Today:  - Medically ready for discharge to TCU; appreciate SW assistance with placement     # Atrial Fibrillation w/ RVR vs. Atrial Flutter  Unsure of onset. Has remote history of postoperative AF in 2016 but no documented AF since. Presented to  on 1/27 with 2 x day of CASTREJON, chest tightness, weakness, cough, and exercise intolerance. EKG showing AF w/ RVR. [id number] = 5 (++age, +gender, +htn, +HFpEF).   - Rate Control: Amiodarone 400mg x 7 days followed by 200mg daily    -  Anticoagulation: Continue 2.5mg Eliquis BID  - Telemetry  - MR showing no JF clot; pt spontaneously converted to NSR overnight therefore no need for DCCV     # Acute Heart Failure w/ Preserved EF (>70%)  # Coronary Artery Disease  # Hypertension  # Peripheral Artery Disease    Presented to  with 2 x days of CASTREJON, chest tightness, weakness, cough, and exercise intolerance. No ischemic changes on EKG.   Mild troponin leak 46-->43 likely 2/2 tachyarrhythmia.   CXR with pulmonary edema and moderate pleural effusions.   TTE 1/28 revealed EF > 70%, no WMA and no HD significant valvular heart dz. Diastolic function indeterminate.   Decompensation likely provoked by AF/AFL.    - Discontinued PTA Losartan due to renal function and borderline low BP; consider amlodipine if hypertensive  - Resume PTA Lasix 20mg daily    # Chronic Kidney Disease   Baseline creatinine ~1.2-1.5. Admission creatinine 1.49.   - Diuresis pr above  - Avoid nephrotoxic agents  - Daily BMP  - Creatinine 1.65 (1.61)     # Seronegative Erosive RA  # PMR  Follows w/ outpt rheumatology clinic.  -  Continue PTA sulfasalazine 500 mg bid  -  Continue PTA hydroxychloroquine 200 mg daily (last eye exam by Dr. Rogers on 5/23/2023)  -  Continue PTA gabapentin 100 mg daily     FEN: Regular / NPO at midnight   Code status: Full  Prophylaxis:  Eliquis 2.5mg BID  Isolation: None  Disposition: Medically ready for discharge to TCU pending placement    I examined patient with PA-C    Physical Exam:  Temp:  [97.4  F (36.3  C)-97.9  F (36.6  C)] 97.9  F (36.6  C)  Pulse:  [] 71  Resp:  [13-25] 22  BP: ()/(30-91) 114/69  SpO2:  [89 %-100 %] 96 %    I/O:   Intake/Output Summary (Last 24 hours) at 1/31/2024 1237  Last data filed at 1/31/2024 0825  Gross per 24 hour   Intake 815 ml   Output 625 ml   Net 190 ml       Wt:   Wt Readings from Last 5 Encounters:   01/30/24 49.8 kg (109 lb 11.2 oz)   01/27/24 53.3 kg (117 lb 6.4 oz)   11/28/23 52 kg (114 lb 9.6 oz)    11/04/23 52.4 kg (115 lb 9.6 oz)   08/15/23 54.8 kg (120 lb 12.8 oz)     I examined patient with LÁZARO and discussed the results with patient and PA-C.    General: NAD  HEENT:  PERRLA, EOMI.   CV: RRR. No murmur appreciated. No rubs or gallops. Peripheral radial pulse intact.  Resp: No increased work of breathing or use of accessory muscles, breathing comfortably on room air.  Lung sounds clear throughout/bilaterally   Extremities: Warm.  No cyanosis or clubbing.      I discussed with Caroline Pretty MS, LÁZARO and patient the medications, lab results, EKG and echocardiogram results.    I saw and evaluated patient with NP. I examined patient with NPI discussed the results with patient and NP. I discussed our plan with patient and NP.  I agree with NP and I edited the NP's note to make it a more comprehensive document.    45 min were spent with patient and LÁZARO Calderon MD, PhD  Professor of Medicine  Division of Cardiology

## 2024-01-31 NOTE — PROGRESS NOTES
CHW completed [id number].    Lucinda Benz   6A/B/C Community Health Worker   Phone: [phone]

## 2024-01-31 NOTE — PLAN OF CARE
Neuro: A&Ox3, disoriented to date and occasionally forgetful.   Cardiac: SR 1st degree block. VSS.   Respiratory: Sating >92% on RA.  GI/: Adequate urine output. No BM. incontinent/continent  Diet/appetite: Tolerating Regular diet. Eating fair.  Activity:  Assist of 1 w/GB+walker, upto chair and in halls.  Pain: At acceptable level on current regimen.   Skin: No new deficits noted.  LDA's:R PIV-SL.     Plan: Pt remained in SR this shift, Cardioversion cancelled and amiodarone gtt switched to PO Amiodarone. K+ replaced mag WDL, rechecks tomorrow. Encouraged activity and PO intake. PT/OT consult. Possible Discharge to TCU tomorrow 2/1. Updated care plan.

## 2024-01-31 NOTE — PROGRESS NOTES
Care Management Follow Up    Length of Stay (days): 4    Expected Discharge Date: 2/1/2024     Concerns to be Addressed: discharge planning    Patient plan of care discussed at interdisciplinary rounds: Yes    Anticipated Discharge Disposition: Transitional Care Placement      Anticipated Discharge Services: None  Anticipated Discharge DME: None    Patient/family educated on Medicare website which has current facility and service quality ratings:  Yes. Writer provided patient/patient's daughter with a printed list of Transitional Care Facilities to review. Social work informed them of right to choose facility among available options.   Education Provided on the Discharge Plan: Yes  Patient/Family in Agreement with the Plan:  Yes    Referrals Placed by CM/SW:  Referrals to transitional care units   [facility] ([facility])  [address] [place]  [address]  Main Facility Phone Number: [phone]  RN to [phone]   Fax number for orders [phone]  >> 1/31 Social work sent referral sent via EPIC Destination tab.  Per return call Mercedes with admissions  at 2:15 pm, they can accept the patient tomorrow (Thursday, 2/1) for a private room that has a $25 dollar per day private room fee.     Below are the other referrals sent     [facility] [address] [place]  [address]  P: [phone]  Admissions: [phone]  >> 1/31 Social work sent referral sent via EPIC Destination tab.     [facility]  P: [phone]  P: [phone] - Admissions   F: [phone]  >> 1/31 Social work sent referral sent via EPIC Destination tab. Social work received voicemail at 12:43 pm.  Question regarding labs that need to be done, and are wondering if those labs will be needed after discharge to a TCU. SW called and left voicemail with admissions at 2:42 pm to inform them patient has been accepted to another facility.      Private pay costs discussed: Not  applicable    Additional Information: Paged provider to ask about when patient would be medically ready to discharge. Per response from provider, the patient is medically ready to discharge.     Social work met with patient, and patient's Daughter Anna at bedside this morning to review recommendation for Transitional Care. Patient and daughter are in agreement with TCU recommendation. LROI provided printed list of TCU facilities. Preferences listed below. LORI sent referrals to preferred facilities.     Below is the information for the Accepting Transitional Care Unit.     Social work met with patient at bedside at 2:20 pm to inform her of the acceptance at [facility] and of the $25 per day private room fee. Patient called her melia Castillo  while social work was in the room. Both patient and daughter are in agreement with discharge to [facility]. Daughter plans to transport the ptient at 11 tomorrow.     LORI called and left voicemail with admissions at [facility] 2:32 pm to inform them the patient will accept the bed.     Social work updated cardiology provider 2:38 pm to inform them that the patient has been accepted to [facility] TCU for placement tomorrow.     Social work received call back from Mercedes with [facility]. Mercedes provided the RN to RN report phone number RN to [phone] and fax number for orders Fax [phone]    PRAVIN Whelan  Covering 6C   6C Social Work Phone Number: [phone]  [facility]   Social Work & Care Management Department

## 2024-02-01 VITALS
DIASTOLIC BLOOD PRESSURE: 73 MMHG | RESPIRATION RATE: 21 BRPM | TEMPERATURE: 97.6 F | BODY MASS INDEX: 21.82 KG/M2 | WEIGHT: 112 LBS | SYSTOLIC BLOOD PRESSURE: 129 MMHG | HEART RATE: 76 BPM | OXYGEN SATURATION: 93 %

## 2024-02-01 LAB
ANION GAP SERPL CALCULATED.3IONS-SCNC: 9 MMOL/L (ref 7–15)
ATRIAL RATE - MUSE: 72 BPM
BUN SERPL-MCNC: 35.9 MG/DL (ref 8–23)
CALCIUM SERPL-MCNC: 8.5 MG/DL (ref 8.2–9.6)
CHLORIDE SERPL-SCNC: 107 MMOL/L (ref 98–107)
CREAT SERPL-MCNC: 1.64 MG/DL (ref 0.51–0.95)
DEPRECATED HCO3 PLAS-SCNC: 25 MMOL/L (ref 22–29)
DIASTOLIC BLOOD PRESSURE - MUSE: NORMAL MMHG
EGFRCR SERPLBLD CKD-EPI 2021: 28 ML/MIN/1.73M2
GLUCOSE SERPL-MCNC: 96 MG/DL (ref 70–99)
INTERPRETATION ECG - MUSE: NORMAL
MAGNESIUM SERPL-MCNC: 2.4 MG/DL (ref 1.7–2.3)
P AXIS - MUSE: 59 DEGREES
POTASSIUM SERPL-SCNC: 3.7 MMOL/L (ref 3.4–5.3)
PR INTERVAL - MUSE: 182 MS
QRS DURATION - MUSE: 138 MS
QT - MUSE: 478 MS
QTC - MUSE: 523 MS
R AXIS - MUSE: -32 DEGREES
SODIUM SERPL-SCNC: 141 MMOL/L (ref 135–145)
SYSTOLIC BLOOD PRESSURE - MUSE: NORMAL MMHG
T AXIS - MUSE: 50 DEGREES
VENTRICULAR RATE- MUSE: 72 BPM

## 2024-02-01 PROCEDURE — 80048 BASIC METABOLIC PNL TOTAL CA: CPT

## 2024-02-01 PROCEDURE — 250N000013 HC RX MED GY IP 250 OP 250 PS 637

## 2024-02-01 PROCEDURE — 36415 COLL VENOUS BLD VENIPUNCTURE: CPT

## 2024-02-01 PROCEDURE — 250N000013 HC RX MED GY IP 250 OP 250 PS 637: Performed by: INTERNAL MEDICINE

## 2024-02-01 PROCEDURE — 99239 HOSP IP/OBS DSCHRG MGMT >30: CPT | Mod: FS

## 2024-02-01 PROCEDURE — 83735 ASSAY OF MAGNESIUM: CPT | Performed by: INTERNAL MEDICINE

## 2024-02-01 RX ORDER — POTASSIUM CHLORIDE 750 MG/1
10 TABLET, EXTENDED RELEASE ORAL ONCE
Status: COMPLETED | OUTPATIENT
Start: 2024-02-01 | End: 2024-02-01

## 2024-02-01 RX ORDER — AMIODARONE HYDROCHLORIDE 200 MG/1
TABLET ORAL
Status: ON HOLD | DISCHARGE
Start: 2024-02-01 | End: 2024-03-15

## 2024-02-01 RX ORDER — AMIODARONE HYDROCHLORIDE 200 MG/1
TABLET ORAL
Qty: 102 TABLET | Refills: 0 | Status: SHIPPED | OUTPATIENT
Start: 2024-02-01 | End: 2024-02-01

## 2024-02-01 RX ADMIN — SULFASALAZINE 500 MG: 500 TABLET ORAL at 07:39

## 2024-02-01 RX ADMIN — HYDROXYCHLOROQUINE SULFATE 200 MG: 200 TABLET, FILM COATED ORAL at 07:39

## 2024-02-01 RX ADMIN — APIXABAN 2.5 MG: 2.5 TABLET, FILM COATED ORAL at 07:39

## 2024-02-01 RX ADMIN — GABAPENTIN 100 MG: 100 CAPSULE ORAL at 07:39

## 2024-02-01 RX ADMIN — POTASSIUM CHLORIDE 10 MEQ: 750 TABLET, EXTENDED RELEASE ORAL at 08:54

## 2024-02-01 RX ADMIN — AMIODARONE HYDROCHLORIDE 400 MG: 200 TABLET ORAL at 07:39

## 2024-02-01 RX ADMIN — FUROSEMIDE 20 MG: 20 TABLET ORAL at 07:39

## 2024-02-01 ASSESSMENT — ACTIVITIES OF DAILY LIVING (ADL)
ADLS_ACUITY_SCORE: 34

## 2024-02-01 NOTE — PROGRESS NOTES
Care Management Discharge Note    Discharge Date: 02/01/2024       Discharge Disposition: TCU    [facility] ([facility])  [address], [place] [address]  Phone: [phone]   Fax (for discharge orders): [phone]   Nurse to nurse report: [phone]  - 2/1:  printed and faxed signed discharge orders ar 9:19am.    Discharge Services: TCU therapy services    Discharge DME: n/a    Discharge Transportation: family or friend will provide    Per SW note 1/31, daughter Anna to transfer pt in personal vehicle at around 11am today 2/1.     Private pay costs discussed: private room/amenity fees    Per SW note 1/31, pt and daughter Anna (Phone: [phone]) were informed of daily private room fee of $25 a day which they were both agreeable to.     Does the patient's insurance plan have a 3 day qualifying hospital stay waiver?  Yes     Which insurance plan 3 day waiver is available? [employer]    Will the waiver be used for post-acute placement? Yes    PAS Confirmation Code: [id number]  Patient/family educated on Medicare website which has current facility and service quality ratings:  yes    Education Provided on the Discharge Plan: Yes  Persons Notified of Discharge Plans: pt, daughter Anna, bedside RN, charge RN, Cards 1 provider, TCU admissions  Patient/Family in Agreement with the Plan:  yes    Handoff Referral Completed: Yes    ___________________    JOAN Cerda, SHAVONNESW  6C , covering beds 6401 to 6519  [facility]   Phone: [phone]  Pager: [phone]

## 2024-02-01 NOTE — PLAN OF CARE
AO to all but time; sinus 70's, VSS, 97% on room air, assist of 1 with gait belt and walker. Calls before getting up.    DISCHARGE   Discharged to: tcu  Via: automobile  Accompanied by: Family  Discharge Instructions: diet, activity, medications, follow up appointments, when to call the MD, and what to watchout for (i.e. s/s of infection, increasing SOB, palpitations, chest pain,)  Prescriptions: forwarded to tcu  Follow Up Appointments: arranged; information given  Belongings: All sent with pt  IV: out  Telemetry: off  Pt exhibits understanding of above discharge instructions; all questions answered.  Discharge Paperwork: faxed     Called report to TCU.

## 2024-02-01 NOTE — PLAN OF CARE
Physical Therapy Discharge Summary    Reason for therapy discharge:    Discharged to transitional care facility.    Progress towards therapy goal(s). See goals on Care Plan in [facility] electronic health record for goal details.  Goals not met.  Barriers to achieving goals:   discharge from facility.    Therapy recommendation(s):    Continued therapy is recommended.  Rationale/Recommendations:  increase strength, activity tolerance and functional independence.

## 2024-02-01 NOTE — PROGRESS NOTES
D/she was up in the chair and only wanted tomato soup for supper, now is back in bed. Her daughter is to provide transportation to the TCU tomorrow. She continues in SR. She continues to be oriented but forgetful  A/improved  I/late tonight I explained to her that I would put bed alarm on, to prevent her from getting up and falling in case you get more forgetful overnight, she agreed to this.  P/expect transfer tomorrow, monitor for changes, keep her and team updated

## 2024-02-01 NOTE — PLAN OF CARE
Neuro: A&O x3, forgetful. Assist x 1.    Cardiac: BBB, HR 70-80s. VSS  Respiratory: RA, sats >89%.   GI/: LBM 1/30/2024.    Pain: Denies.   LDA's: R PIV, SL.     Plan: Estimated discharge to TCU @ 1200. Notify care team of any changes.     For vital signs and complete assessments, please see documentation flowsheets.      Sahra Orr, RN   Cardiology

## 2024-02-02 ENCOUNTER — LAB REQUISITION (OUTPATIENT)
Dept: LAB | Facility: CLINIC | Age: 89
End: 2024-02-02
Payer: MEDICARE

## 2024-02-02 DIAGNOSIS — I10 ESSENTIAL (PRIMARY) HYPERTENSION: ICD-10-CM

## 2024-02-05 LAB
ANION GAP SERPL CALCULATED.3IONS-SCNC: 11 MMOL/L (ref 7–15)
BUN SERPL-MCNC: 30.5 MG/DL (ref 8–23)
CALCIUM SERPL-MCNC: 8.9 MG/DL (ref 8.2–9.6)
CHLORIDE SERPL-SCNC: 107 MMOL/L (ref 98–107)
CREAT SERPL-MCNC: 1.39 MG/DL (ref 0.51–0.95)
DEPRECATED HCO3 PLAS-SCNC: 24 MMOL/L (ref 22–29)
EGFRCR SERPLBLD CKD-EPI 2021: 35 ML/MIN/1.73M2
GLUCOSE SERPL-MCNC: 79 MG/DL (ref 70–99)
POTASSIUM SERPL-SCNC: 4.2 MMOL/L (ref 3.4–5.3)
SODIUM SERPL-SCNC: 142 MMOL/L (ref 135–145)

## 2024-02-05 PROCEDURE — P9604 ONE-WAY ALLOW PRORATED TRIP: HCPCS | Performed by: FAMILY MEDICINE

## 2024-02-05 PROCEDURE — 36415 COLL VENOUS BLD VENIPUNCTURE: CPT | Performed by: FAMILY MEDICINE

## 2024-02-05 PROCEDURE — 80048 BASIC METABOLIC PNL TOTAL CA: CPT | Performed by: FAMILY MEDICINE

## 2024-02-06 ENCOUNTER — TELEPHONE (OUTPATIENT)
Dept: CARDIOLOGY | Facility: CLINIC | Age: 89
End: 2024-02-06
Payer: MEDICARE

## 2024-02-06 NOTE — TELEPHONE ENCOUNTER
This encounter is being sent to inform the clinic that this patient has a referral from Caroline Pretty PA-C  for the diagnoses of atrial flutter and has requested that this patient be seen within 2-4 weeks and/or with EP provider.  Based on the availability of our provider(s), we are unable to accommodate this request.    Were all sites offered this patient?  Yes    Does scheduling algorithm request to schedule next available?  Patient has been scheduled for the first available opening with SAVANNAH Bui on 3/11/24.  We have informed the patient that the clinic will review their referral and reach out if a sooner appointment is medically necessary.        Patient scheduled next available on 3/11/24 with SAVANNAH Bui. Please review and reach out to patient for sooner.

## 2024-02-06 NOTE — TELEPHONE ENCOUNTER
Noted-3/11/24 NEW.  Ok    Scheduled primary care 2/22    Was seen by gerontology at nursing home-very through visit.  Pt is in NSR    Andressa Bishop RN

## 2024-02-08 ENCOUNTER — LAB REQUISITION (OUTPATIENT)
Dept: LAB | Facility: CLINIC | Age: 89
End: 2024-02-08
Payer: MEDICARE

## 2024-02-08 DIAGNOSIS — D64.9 ANEMIA, UNSPECIFIED: ICD-10-CM

## 2024-02-08 DIAGNOSIS — I10 ESSENTIAL (PRIMARY) HYPERTENSION: ICD-10-CM

## 2024-02-08 DIAGNOSIS — E55.9 VITAMIN D DEFICIENCY, UNSPECIFIED: ICD-10-CM

## 2024-02-09 LAB
ANION GAP SERPL CALCULATED.3IONS-SCNC: 10 MMOL/L (ref 7–15)
BUN SERPL-MCNC: 27.7 MG/DL (ref 8–23)
CALCIUM SERPL-MCNC: 9.5 MG/DL (ref 8.2–9.6)
CHLORIDE SERPL-SCNC: 106 MMOL/L (ref 98–107)
CREAT SERPL-MCNC: 1.37 MG/DL (ref 0.51–0.95)
DEPRECATED HCO3 PLAS-SCNC: 24 MMOL/L (ref 22–29)
EGFRCR SERPLBLD CKD-EPI 2021: 35 ML/MIN/1.73M2
ERYTHROCYTE [DISTWIDTH] IN BLOOD BY AUTOMATED COUNT: 15.5 % (ref 10–15)
GLUCOSE SERPL-MCNC: 71 MG/DL (ref 70–99)
HCT VFR BLD AUTO: 37.8 % (ref 35–47)
HGB BLD-MCNC: 11.9 G/DL (ref 11.7–15.7)
MCH RBC QN AUTO: 29.4 PG (ref 26.5–33)
MCHC RBC AUTO-ENTMCNC: 31.5 G/DL (ref 31.5–36.5)
MCV RBC AUTO: 93 FL (ref 78–100)
PLATELET # BLD AUTO: 216 10E3/UL (ref 150–450)
POTASSIUM SERPL-SCNC: 3.9 MMOL/L (ref 3.4–5.3)
RBC # BLD AUTO: 4.05 10E6/UL (ref 3.8–5.2)
SODIUM SERPL-SCNC: 140 MMOL/L (ref 135–145)
VIT D+METAB SERPL-MCNC: 26 NG/ML (ref 20–50)
WBC # BLD AUTO: 9.1 10E3/UL (ref 4–11)

## 2024-02-09 PROCEDURE — 85014 HEMATOCRIT: CPT | Performed by: FAMILY MEDICINE

## 2024-02-09 PROCEDURE — 82306 VITAMIN D 25 HYDROXY: CPT | Performed by: FAMILY MEDICINE

## 2024-02-09 PROCEDURE — 80048 BASIC METABOLIC PNL TOTAL CA: CPT | Performed by: FAMILY MEDICINE

## 2024-02-09 PROCEDURE — 36415 COLL VENOUS BLD VENIPUNCTURE: CPT | Performed by: FAMILY MEDICINE

## 2024-02-09 PROCEDURE — P9604 ONE-WAY ALLOW PRORATED TRIP: HCPCS | Performed by: FAMILY MEDICINE

## 2024-02-13 ENCOUNTER — LAB REQUISITION (OUTPATIENT)
Dept: LAB | Facility: CLINIC | Age: 89
End: 2024-02-13
Payer: MEDICARE

## 2024-02-13 DIAGNOSIS — D64.9 ANEMIA, UNSPECIFIED: ICD-10-CM

## 2024-02-13 DIAGNOSIS — I10 ESSENTIAL (PRIMARY) HYPERTENSION: ICD-10-CM

## 2024-02-13 DIAGNOSIS — E03.9 HYPOTHYROIDISM, UNSPECIFIED: ICD-10-CM

## 2024-02-14 LAB
ANION GAP SERPL CALCULATED.3IONS-SCNC: 13 MMOL/L (ref 7–15)
BUN SERPL-MCNC: 27.6 MG/DL (ref 8–23)
CALCIUM SERPL-MCNC: 9.3 MG/DL (ref 8.2–9.6)
CHLORIDE SERPL-SCNC: 102 MMOL/L (ref 98–107)
CREAT SERPL-MCNC: 1.45 MG/DL (ref 0.51–0.95)
DEPRECATED HCO3 PLAS-SCNC: 26 MMOL/L (ref 22–29)
EGFRCR SERPLBLD CKD-EPI 2021: 33 ML/MIN/1.73M2
ERYTHROCYTE [DISTWIDTH] IN BLOOD BY AUTOMATED COUNT: 15.5 % (ref 10–15)
GLUCOSE SERPL-MCNC: 78 MG/DL (ref 70–99)
HCT VFR BLD AUTO: 41.9 % (ref 35–47)
HGB BLD-MCNC: 13.2 G/DL (ref 11.7–15.7)
MCH RBC QN AUTO: 29.5 PG (ref 26.5–33)
MCHC RBC AUTO-ENTMCNC: 31.5 G/DL (ref 31.5–36.5)
MCV RBC AUTO: 94 FL (ref 78–100)
PLATELET # BLD AUTO: 203 10E3/UL (ref 150–450)
POTASSIUM SERPL-SCNC: 3.5 MMOL/L (ref 3.4–5.3)
RBC # BLD AUTO: 4.48 10E6/UL (ref 3.8–5.2)
SODIUM SERPL-SCNC: 141 MMOL/L (ref 135–145)
T4 FREE SERPL-MCNC: 1.53 NG/DL (ref 0.9–1.7)
TSH SERPL DL<=0.005 MIU/L-ACNC: 4.33 UIU/ML (ref 0.3–4.2)
WBC # BLD AUTO: 10.1 10E3/UL (ref 4–11)

## 2024-02-14 PROCEDURE — 84439 ASSAY OF FREE THYROXINE: CPT | Performed by: FAMILY MEDICINE

## 2024-02-14 PROCEDURE — 36415 COLL VENOUS BLD VENIPUNCTURE: CPT | Performed by: FAMILY MEDICINE

## 2024-02-14 PROCEDURE — 85027 COMPLETE CBC AUTOMATED: CPT | Performed by: FAMILY MEDICINE

## 2024-02-14 PROCEDURE — 80048 BASIC METABOLIC PNL TOTAL CA: CPT | Performed by: FAMILY MEDICINE

## 2024-02-14 PROCEDURE — 84443 ASSAY THYROID STIM HORMONE: CPT | Performed by: FAMILY MEDICINE

## 2024-02-14 PROCEDURE — P9604 ONE-WAY ALLOW PRORATED TRIP: HCPCS | Performed by: FAMILY MEDICINE

## 2024-02-15 ENCOUNTER — LAB REQUISITION (OUTPATIENT)
Dept: LAB | Facility: CLINIC | Age: 89
End: 2024-02-15
Payer: MEDICARE

## 2024-02-15 DIAGNOSIS — D64.9 ANEMIA, UNSPECIFIED: ICD-10-CM

## 2024-02-15 DIAGNOSIS — I10 ESSENTIAL (PRIMARY) HYPERTENSION: ICD-10-CM

## 2024-02-15 DIAGNOSIS — E03.9 HYPOTHYROIDISM, UNSPECIFIED: ICD-10-CM

## 2024-02-16 ENCOUNTER — TELEPHONE (OUTPATIENT)
Dept: RHEUMATOLOGY | Facility: CLINIC | Age: 89
End: 2024-02-16
Payer: MEDICARE

## 2024-02-16 ENCOUNTER — LAB REQUISITION (OUTPATIENT)
Dept: LAB | Facility: CLINIC | Age: 89
End: 2024-02-16
Payer: MEDICARE

## 2024-02-16 DIAGNOSIS — D72.829 ELEVATED WHITE BLOOD CELL COUNT, UNSPECIFIED: ICD-10-CM

## 2024-02-16 LAB
ANION GAP SERPL CALCULATED.3IONS-SCNC: 15 MMOL/L (ref 7–15)
BUN SERPL-MCNC: 29.4 MG/DL (ref 8–23)
CALCIUM SERPL-MCNC: 9.3 MG/DL (ref 8.2–9.6)
CHLORIDE SERPL-SCNC: 101 MMOL/L (ref 98–107)
CREAT SERPL-MCNC: 1.29 MG/DL (ref 0.51–0.95)
DEPRECATED HCO3 PLAS-SCNC: 23 MMOL/L (ref 22–29)
EGFRCR SERPLBLD CKD-EPI 2021: 38 ML/MIN/1.73M2
ERYTHROCYTE [DISTWIDTH] IN BLOOD BY AUTOMATED COUNT: 15.4 % (ref 10–15)
GLUCOSE SERPL-MCNC: 82 MG/DL (ref 70–99)
HCT VFR BLD AUTO: 43.9 % (ref 35–47)
HGB BLD-MCNC: 13.7 G/DL (ref 11.7–15.7)
MCH RBC QN AUTO: 29 PG (ref 26.5–33)
MCHC RBC AUTO-ENTMCNC: 31.2 G/DL (ref 31.5–36.5)
MCV RBC AUTO: 93 FL (ref 78–100)
PLATELET # BLD AUTO: 232 10E3/UL (ref 150–450)
POTASSIUM SERPL-SCNC: 3.7 MMOL/L (ref 3.4–5.3)
RBC # BLD AUTO: 4.72 10E6/UL (ref 3.8–5.2)
SODIUM SERPL-SCNC: 139 MMOL/L (ref 135–145)
T4 FREE SERPL-MCNC: 1.55 NG/DL (ref 0.9–1.7)
TSH SERPL DL<=0.005 MIU/L-ACNC: 4.02 UIU/ML (ref 0.3–4.2)
WBC # BLD AUTO: 13.3 10E3/UL (ref 4–11)

## 2024-02-16 PROCEDURE — 36415 COLL VENOUS BLD VENIPUNCTURE: CPT | Performed by: FAMILY MEDICINE

## 2024-02-16 PROCEDURE — 84443 ASSAY THYROID STIM HORMONE: CPT | Performed by: FAMILY MEDICINE

## 2024-02-16 PROCEDURE — 84439 ASSAY OF FREE THYROXINE: CPT | Performed by: FAMILY MEDICINE

## 2024-02-16 PROCEDURE — 80048 BASIC METABOLIC PNL TOTAL CA: CPT | Performed by: FAMILY MEDICINE

## 2024-02-16 PROCEDURE — 85027 COMPLETE CBC AUTOMATED: CPT | Performed by: FAMILY MEDICINE

## 2024-02-16 PROCEDURE — P9604 ONE-WAY ALLOW PRORATED TRIP: HCPCS | Performed by: FAMILY MEDICINE

## 2024-02-16 NOTE — TELEPHONE ENCOUNTER
Health Call Center    Phone Message    May a detailed message be left on voicemail: yes     Reason for Call: Order(s): Other:   Reason for requested: Routine lab orders  Date needed: ASAP  Provider name: Alex    Pt is currently scheduled to have lab tests done 2/22/24 in preparation for her appt with Dr. Johnson on 2/27/24.   Call received from Noman with the [facility] where pt resides, they are wondering if pt's lab orders could be faxed to their facility so she could have them done there instead?   If ok, please fax lab orders to [phone] and cancel lab appt on 2/22/24. For any questions, please contact the [facility] at [phone].     Action Taken: Other: Rheum    Travel Screening: Not Applicable

## 2024-02-17 ENCOUNTER — LAB REQUISITION (OUTPATIENT)
Dept: LAB | Facility: CLINIC | Age: 89
End: 2024-02-17

## 2024-02-17 LAB
ALBUMIN UR-MCNC: 20 MG/DL
APPEARANCE UR: CLEAR
BILIRUB UR QL STRIP: NEGATIVE
COLOR UR AUTO: YELLOW
GLUCOSE UR STRIP-MCNC: NEGATIVE MG/DL
HGB UR QL STRIP: NEGATIVE
HYALINE CASTS: 2 /LPF
KETONES UR STRIP-MCNC: NEGATIVE MG/DL
LEUKOCYTE ESTERASE UR QL STRIP: ABNORMAL
MUCOUS THREADS #/AREA URNS LPF: PRESENT /LPF
NITRATE UR QL: NEGATIVE
PH UR STRIP: 5.5 [PH] (ref 5–7)
RBC URINE: 1 /HPF
SP GR UR STRIP: 1.02 (ref 1–1.03)
SQUAMOUS EPITHELIAL: <1 /HPF
UROBILINOGEN UR STRIP-MCNC: NORMAL MG/DL
WBC URINE: 7 /HPF

## 2024-02-17 PROCEDURE — 81001 URINALYSIS AUTO W/SCOPE: CPT | Performed by: FAMILY MEDICINE

## 2024-02-17 PROCEDURE — 87086 URINE CULTURE/COLONY COUNT: CPT | Performed by: FAMILY MEDICINE

## 2024-02-19 LAB
BACTERIA UR CULT: NORMAL
ERYTHROCYTE [DISTWIDTH] IN BLOOD BY AUTOMATED COUNT: 15.5 % (ref 10–15)
HCT VFR BLD AUTO: 38.6 % (ref 35–47)
HGB BLD-MCNC: 12.1 G/DL (ref 11.7–15.7)
MCH RBC QN AUTO: 29.2 PG (ref 26.5–33)
MCHC RBC AUTO-ENTMCNC: 31.3 G/DL (ref 31.5–36.5)
MCV RBC AUTO: 93 FL (ref 78–100)
PLATELET # BLD AUTO: 210 10E3/UL (ref 150–450)
RBC # BLD AUTO: 4.14 10E6/UL (ref 3.8–5.2)
WBC # BLD AUTO: 9.7 10E3/UL (ref 4–11)

## 2024-02-19 PROCEDURE — 36415 COLL VENOUS BLD VENIPUNCTURE: CPT | Performed by: FAMILY MEDICINE

## 2024-02-19 PROCEDURE — 85027 COMPLETE CBC AUTOMATED: CPT | Performed by: FAMILY MEDICINE

## 2024-02-19 PROCEDURE — P9604 ONE-WAY ALLOW PRORATED TRIP: HCPCS | Performed by: FAMILY MEDICINE

## 2024-02-19 NOTE — TELEPHONE ENCOUNTER
Called and notified facility that labs are not needed.     Suly ROMERO RN, [facility] 02/19/24 10:05 AM

## 2024-02-21 ENCOUNTER — TELEPHONE (OUTPATIENT)
Dept: RHEUMATOLOGY | Facility: CLINIC | Age: 89
End: 2024-02-21
Payer: MEDICARE

## 2024-02-21 NOTE — TELEPHONE ENCOUNTER
Called Anna and rescheduled her patient for 3/5.    Suly ROMERO RN, [facility] 02/21/24 1:01 PM

## 2024-02-21 NOTE — TELEPHONE ENCOUNTER
[facility] Call Center    Phone Message    May a detailed message be left on voicemail: yes     Reason for Call: Other: Pt's daughter wanting to know if apt for 02/27/2024 could be push out later as pt is currently in tcu. Please follow up with daughter.       Action Taken: Other: rheum    Travel Screening: Not Applicable

## 2024-02-23 ENCOUNTER — LAB REQUISITION (OUTPATIENT)
Dept: LAB | Facility: CLINIC | Age: 89
End: 2024-02-23
Payer: MEDICARE

## 2024-02-23 DIAGNOSIS — I13.0 HYPERTENSIVE HEART AND CHRONIC KIDNEY DISEASE WITH HEART FAILURE AND STAGE 1 THROUGH STAGE 4 CHRONIC KIDNEY DISEASE, OR UNSPECIFIED CHRONIC KIDNEY DISEASE (H): ICD-10-CM

## 2024-02-23 DIAGNOSIS — D64.9 ANEMIA, UNSPECIFIED: ICD-10-CM

## 2024-02-24 ENCOUNTER — HOSPITAL ENCOUNTER (INPATIENT)
Facility: CLINIC | Age: 89
LOS: 6 days | Discharge: NURSING, REHAB OR LONG-TERM CARE | DRG: 552 | End: 2024-03-02
Attending: EMERGENCY MEDICINE | Admitting: SURGERY
Payer: MEDICARE

## 2024-02-24 ENCOUNTER — LAB REQUISITION (OUTPATIENT)
Dept: LAB | Facility: CLINIC | Age: 89
End: 2024-02-24
Payer: MEDICARE

## 2024-02-24 DIAGNOSIS — I13.0 HYPERTENSIVE HEART AND CHRONIC KIDNEY DISEASE WITH HEART FAILURE AND STAGE 1 THROUGH STAGE 4 CHRONIC KIDNEY DISEASE, OR UNSPECIFIED CHRONIC KIDNEY DISEASE (H): ICD-10-CM

## 2024-02-24 DIAGNOSIS — S22.081A T12 BURST FRACTURE (H): ICD-10-CM
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
## 2024-02-24 DIAGNOSIS — D64.9 ANEMIA, UNSPECIFIED: ICD-10-CM

## 2024-02-24 DIAGNOSIS — K59.01 SLOW TRANSIT CONSTIPATION: Primary | ICD-10-CM

## 2024-02-24 LAB
ABO/RH(D): NORMAL
ALBUMIN SERPL BCG-MCNC: 3.6 G/DL (ref 3.5–5.2)
ALP SERPL-CCNC: 110 U/L (ref 40–150)
ALT SERPL W P-5'-P-CCNC: 12 U/L (ref 0–50)
ANION GAP SERPL CALCULATED.3IONS-SCNC: 10 MMOL/L (ref 7–15)
ANTIBODY SCREEN: NEGATIVE
AST SERPL W P-5'-P-CCNC: 22 U/L (ref 0–45)
ATRIAL RATE - MUSE: 74 BPM
BASOPHILS # BLD AUTO: 0.1 10E3/UL (ref 0–0.2)
BASOPHILS NFR BLD AUTO: 1 %
BILIRUB SERPL-MCNC: 0.2 MG/DL
BUN SERPL-MCNC: 29.6 MG/DL (ref 8–23)
CALCIUM SERPL-MCNC: 9.3 MG/DL (ref 8.2–9.6)
CHLORIDE SERPL-SCNC: 100 MMOL/L (ref 98–107)
CREAT SERPL-MCNC: 1.33 MG/DL (ref 0.51–0.95)
DEPRECATED HCO3 PLAS-SCNC: 28 MMOL/L (ref 22–29)
DIASTOLIC BLOOD PRESSURE - MUSE: NORMAL MMHG
EGFRCR SERPLBLD CKD-EPI 2021: 36 ML/MIN/1.73M2
EOSINOPHIL # BLD AUTO: 0.1 10E3/UL (ref 0–0.7)
EOSINOPHIL NFR BLD AUTO: 1 %
ERYTHROCYTE [DISTWIDTH] IN BLOOD BY AUTOMATED COUNT: 15.6 % (ref 10–15)
GLUCOSE SERPL-MCNC: 163 MG/DL (ref 70–99)
HCT VFR BLD AUTO: 37.8 % (ref 35–47)
HGB BLD-MCNC: 11.9 G/DL (ref 11.7–15.7)
IMM GRANULOCYTES # BLD: 0.1 10E3/UL
IMM GRANULOCYTES NFR BLD: 1 %
INR PPP: 1.54 (ref 0.85–1.15)
INTERPRETATION ECG - MUSE: NORMAL
LIPASE SERPL-CCNC: 63 U/L (ref 13–60)
LYMPHOCYTES # BLD AUTO: 1.5 10E3/UL (ref 0.8–5.3)
LYMPHOCYTES NFR BLD AUTO: 13 %
MCH RBC QN AUTO: 28.6 PG (ref 26.5–33)
MCHC RBC AUTO-ENTMCNC: 31.5 G/DL (ref 31.5–36.5)
MCV RBC AUTO: 91 FL (ref 78–100)
MONOCYTES # BLD AUTO: 1 10E3/UL (ref 0–1.3)
MONOCYTES NFR BLD AUTO: 9 %
NEUTROPHILS # BLD AUTO: 8.7 10E3/UL (ref 1.6–8.3)
NEUTROPHILS NFR BLD AUTO: 75 %
NRBC # BLD AUTO: 0 10E3/UL
NRBC BLD AUTO-RTO: 0 /100
P AXIS - MUSE: 55 DEGREES
PLATELET # BLD AUTO: 263 10E3/UL (ref 150–450)
POTASSIUM SERPL-SCNC: 4.3 MMOL/L (ref 3.4–5.3)
PR INTERVAL - MUSE: 192 MS
PROT SERPL-MCNC: 7.1 G/DL (ref 6.4–8.3)
QRS DURATION - MUSE: 142 MS
QT - MUSE: 458 MS
QTC - MUSE: 508 MS
R AXIS - MUSE: -38 DEGREES
RBC # BLD AUTO: 4.16 10E6/UL (ref 3.8–5.2)
SODIUM SERPL-SCNC: 138 MMOL/L (ref 135–145)
SPECIMEN EXPIRATION DATE: NORMAL
SYSTOLIC BLOOD PRESSURE - MUSE: NORMAL MMHG
T AXIS - MUSE: 35 DEGREES
VENTRICULAR RATE- MUSE: 74 BPM
WBC # BLD AUTO: 11.3 10E3/UL (ref 4–11)

## 2024-02-24 PROCEDURE — 85025 COMPLETE CBC W/AUTO DIFF WBC: CPT | Performed by: EMERGENCY MEDICINE

## 2024-02-24 PROCEDURE — 99291 CRITICAL CARE FIRST HOUR: CPT | Mod: 25 | Performed by: EMERGENCY MEDICINE

## 2024-02-24 PROCEDURE — 93005 ELECTROCARDIOGRAM TRACING: CPT | Performed by: EMERGENCY MEDICINE

## 2024-02-24 PROCEDURE — 93010 ELECTROCARDIOGRAM REPORT: CPT | Performed by: EMERGENCY MEDICINE

## 2024-02-24 PROCEDURE — 96361 HYDRATE IV INFUSION ADD-ON: CPT | Performed by: EMERGENCY MEDICINE

## 2024-02-24 PROCEDURE — 83690 ASSAY OF LIPASE: CPT | Performed by: EMERGENCY MEDICINE

## 2024-02-24 PROCEDURE — 80053 COMPREHEN METABOLIC PANEL: CPT | Performed by: EMERGENCY MEDICINE

## 2024-02-24 PROCEDURE — 99285 EMERGENCY DEPT VISIT HI MDM: CPT | Mod: 25 | Performed by: EMERGENCY MEDICINE

## 2024-02-24 PROCEDURE — 96374 THER/PROPH/DIAG INJ IV PUSH: CPT | Mod: 59 | Performed by: EMERGENCY MEDICINE

## 2024-02-24 PROCEDURE — 86900 BLOOD TYPING SEROLOGIC ABO: CPT | Performed by: EMERGENCY MEDICINE

## 2024-02-24 PROCEDURE — 36415 COLL VENOUS BLD VENIPUNCTURE: CPT | Performed by: EMERGENCY MEDICINE

## 2024-02-24 PROCEDURE — 85610 PROTHROMBIN TIME: CPT | Performed by: EMERGENCY MEDICINE

## 2024-02-24 PROCEDURE — 258N000003 HC RX IP 258 OP 636: Performed by: EMERGENCY MEDICINE

## 2024-02-24 RX ORDER — SODIUM CHLORIDE 9 MG/ML
INJECTION, SOLUTION INTRAVENOUS CONTINUOUS
Status: DISCONTINUED | OUTPATIENT
Start: 2024-02-24 | End: 2024-02-25

## 2024-02-24 RX ADMIN — SODIUM CHLORIDE: 9 INJECTION, SOLUTION INTRAVENOUS at 22:37

## 2024-02-24 ASSESSMENT — COLUMBIA-SUICIDE SEVERITY RATING SCALE - C-SSRS
1. IN THE PAST MONTH, HAVE YOU WISHED YOU WERE DEAD OR WISHED YOU COULD GO TO SLEEP AND NOT WAKE UP?: NO
2. HAVE YOU ACTUALLY HAD ANY THOUGHTS OF KILLING YOURSELF IN THE PAST MONTH?: NO
6. HAVE YOU EVER DONE ANYTHING, STARTED TO DO ANYTHING, OR PREPARED TO DO ANYTHING TO END YOUR LIFE?: NO

## 2024-02-24 ASSESSMENT — ACTIVITIES OF DAILY LIVING (ADL)
ADLS_ACUITY_SCORE: 38
ADLS_ACUITY_SCORE: 38

## 2024-02-25 ENCOUNTER — APPOINTMENT (OUTPATIENT)
Dept: CT IMAGING | Facility: CLINIC | Age: 89
DRG: 552 | End: 2024-02-25
Attending: EMERGENCY MEDICINE
Payer: MEDICARE

## 2024-02-25 ENCOUNTER — APPOINTMENT (OUTPATIENT)
Dept: GENERAL RADIOLOGY | Facility: CLINIC | Age: 89
DRG: 552 | End: 2024-02-25
Attending: PHYSICIAN ASSISTANT
Payer: MEDICARE

## 2024-02-25 PROBLEM — S22.081A T12 BURST FRACTURE (H): Status: ACTIVE | Noted: 2024-02-25
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
## 2024-02-25 LAB
ALBUMIN UR-MCNC: 50 MG/DL
ANION GAP SERPL CALCULATED.3IONS-SCNC: 10 MMOL/L (ref 7–15)
APPEARANCE UR: ABNORMAL
BILIRUB UR QL STRIP: NEGATIVE
BUN SERPL-MCNC: 23.8 MG/DL (ref 8–23)
CALCIUM SERPL-MCNC: 8.6 MG/DL (ref 8.2–9.6)
CHLORIDE SERPL-SCNC: 103 MMOL/L (ref 98–107)
CK SERPL-CCNC: 51 U/L (ref 26–192)
COLOR UR AUTO: YELLOW
CREAT SERPL-MCNC: 1.16 MG/DL (ref 0.51–0.95)
DEPRECATED HCO3 PLAS-SCNC: 25 MMOL/L (ref 22–29)
EGFRCR SERPLBLD CKD-EPI 2021: 43 ML/MIN/1.73M2
GLUCOSE BLDC GLUCOMTR-MCNC: 113 MG/DL (ref 70–99)
GLUCOSE SERPL-MCNC: 125 MG/DL (ref 70–99)
GLUCOSE UR STRIP-MCNC: NEGATIVE MG/DL
HGB UR QL STRIP: NEGATIVE
HYALINE CASTS: 4 /LPF
KETONES UR STRIP-MCNC: NEGATIVE MG/DL
LEUKOCYTE ESTERASE UR QL STRIP: ABNORMAL
MAGNESIUM SERPL-MCNC: 2 MG/DL (ref 1.7–2.3)
MUCOUS THREADS #/AREA URNS LPF: PRESENT /LPF
NITRATE UR QL: NEGATIVE
PH UR STRIP: 6 [PH] (ref 5–7)
POTASSIUM SERPL-SCNC: 4.1 MMOL/L (ref 3.4–5.3)
RBC URINE: 0 /HPF
SODIUM SERPL-SCNC: 138 MMOL/L (ref 135–145)
SP GR UR STRIP: 1.02 (ref 1–1.03)
SQUAMOUS EPITHELIAL: 5 /HPF
TRANSITIONAL EPI: <1 /HPF
UROBILINOGEN UR STRIP-MCNC: NORMAL MG/DL
WBC URINE: 66 /HPF

## 2024-02-25 PROCEDURE — 250N000013 HC RX MED GY IP 250 OP 250 PS 637: Performed by: STUDENT IN AN ORGANIZED HEALTH CARE EDUCATION/TRAINING PROGRAM

## 2024-02-25 PROCEDURE — 72125 CT NECK SPINE W/O DYE: CPT | Mod: ME

## 2024-02-25 PROCEDURE — 82565 ASSAY OF CREATININE: CPT | Performed by: STUDENT IN AN ORGANIZED HEALTH CARE EDUCATION/TRAINING PROGRAM

## 2024-02-25 PROCEDURE — 99222 1ST HOSP IP/OBS MODERATE 55: CPT | Mod: GC | Performed by: NEUROLOGICAL SURGERY

## 2024-02-25 PROCEDURE — 250N000011 HC RX IP 250 OP 636: Performed by: STUDENT IN AN ORGANIZED HEALTH CARE EDUCATION/TRAINING PROGRAM

## 2024-02-25 PROCEDURE — G1010 CDSM STANSON: HCPCS | Performed by: RADIOLOGY

## 2024-02-25 PROCEDURE — 74176 CT ABD & PELVIS W/O CONTRAST: CPT | Mod: 26 | Performed by: RADIOLOGY

## 2024-02-25 PROCEDURE — 258N000003 HC RX IP 258 OP 636: Performed by: STUDENT IN AN ORGANIZED HEALTH CARE EDUCATION/TRAINING PROGRAM

## 2024-02-25 PROCEDURE — 99232 SBSQ HOSP IP/OBS MODERATE 35: CPT | Performed by: PHYSICIAN ASSISTANT

## 2024-02-25 PROCEDURE — 71250 CT THORAX DX C-: CPT | Mod: 26 | Performed by: RADIOLOGY

## 2024-02-25 PROCEDURE — 72080 X-RAY EXAM THORACOLMB 2/> VW: CPT

## 2024-02-25 PROCEDURE — 72128 CT CHEST SPINE W/O DYE: CPT | Mod: MG

## 2024-02-25 PROCEDURE — 36415 COLL VENOUS BLD VENIPUNCTURE: CPT | Performed by: STUDENT IN AN ORGANIZED HEALTH CARE EDUCATION/TRAINING PROGRAM

## 2024-02-25 PROCEDURE — 71250 CT THORAX DX C-: CPT | Mod: MG

## 2024-02-25 PROCEDURE — 72080 X-RAY EXAM THORACOLMB 2/> VW: CPT | Mod: 26 | Performed by: RADIOLOGY

## 2024-02-25 PROCEDURE — 72131 CT LUMBAR SPINE W/O DYE: CPT | Mod: 26 | Performed by: RADIOLOGY

## 2024-02-25 PROCEDURE — 72131 CT LUMBAR SPINE W/O DYE: CPT | Mod: ME

## 2024-02-25 PROCEDURE — 120N000002 HC R&B MED SURG/OB UMMC

## 2024-02-25 PROCEDURE — 250N000011 HC RX IP 250 OP 636: Performed by: EMERGENCY MEDICINE

## 2024-02-25 PROCEDURE — 83735 ASSAY OF MAGNESIUM: CPT | Performed by: SURGERY

## 2024-02-25 PROCEDURE — 81001 URINALYSIS AUTO W/SCOPE: CPT | Performed by: STUDENT IN AN ORGANIZED HEALTH CARE EDUCATION/TRAINING PROGRAM

## 2024-02-25 PROCEDURE — 250N000013 HC RX MED GY IP 250 OP 250 PS 637: Performed by: PHYSICIAN ASSISTANT

## 2024-02-25 PROCEDURE — 82550 ASSAY OF CK (CPK): CPT | Performed by: PHYSICIAN ASSISTANT

## 2024-02-25 PROCEDURE — 70450 CT HEAD/BRAIN W/O DYE: CPT | Mod: MG

## 2024-02-25 PROCEDURE — 72125 CT NECK SPINE W/O DYE: CPT | Mod: 26 | Performed by: RADIOLOGY

## 2024-02-25 PROCEDURE — 87086 URINE CULTURE/COLONY COUNT: CPT | Performed by: STUDENT IN AN ORGANIZED HEALTH CARE EDUCATION/TRAINING PROGRAM

## 2024-02-25 PROCEDURE — 72128 CT CHEST SPINE W/O DYE: CPT | Mod: 26 | Performed by: RADIOLOGY

## 2024-02-25 PROCEDURE — L0456 TLSO FLEX TRNK SJ-SS PRE CST: HCPCS

## 2024-02-25 PROCEDURE — 250N000011 HC RX IP 250 OP 636: Performed by: PHYSICIAN ASSISTANT

## 2024-02-25 PROCEDURE — 70450 CT HEAD/BRAIN W/O DYE: CPT | Mod: 26 | Performed by: RADIOLOGY

## 2024-02-25 RX ORDER — GABAPENTIN 100 MG/1
100 CAPSULE ORAL DAILY
Status: DISCONTINUED | OUTPATIENT
Start: 2024-02-25 | End: 2024-03-02 | Stop reason: HOSPADM

## 2024-02-25 RX ORDER — METHOCARBAMOL 500 MG/1
500 TABLET, FILM COATED ORAL 4 TIMES DAILY
Status: DISCONTINUED | OUTPATIENT
Start: 2024-02-25 | End: 2024-03-02 | Stop reason: HOSPADM

## 2024-02-25 RX ORDER — SULFASALAZINE 500 MG/1
500 TABLET ORAL 2 TIMES DAILY
Status: DISCONTINUED | OUTPATIENT
Start: 2024-02-25 | End: 2024-02-29

## 2024-02-25 RX ORDER — HYDROXYCHLOROQUINE SULFATE 200 MG/1
200 TABLET, FILM COATED ORAL DAILY
Status: DISCONTINUED | OUTPATIENT
Start: 2024-02-25 | End: 2024-03-02 | Stop reason: HOSPADM

## 2024-02-25 RX ORDER — POLYETHYLENE GLYCOL 3350 17 G/17G
17 POWDER, FOR SOLUTION ORAL 2 TIMES DAILY
Status: DISCONTINUED | OUTPATIENT
Start: 2024-02-25 | End: 2024-02-28

## 2024-02-25 RX ORDER — ACETAMINOPHEN 650 MG/1
650 SUPPOSITORY RECTAL EVERY 4 HOURS PRN
Status: DISCONTINUED | OUTPATIENT
Start: 2024-02-25 | End: 2024-03-02 | Stop reason: HOSPADM

## 2024-02-25 RX ORDER — AMIODARONE HYDROCHLORIDE 200 MG/1
200 TABLET ORAL DAILY
Status: DISCONTINUED | OUTPATIENT
Start: 2024-02-25 | End: 2024-03-02 | Stop reason: HOSPADM

## 2024-02-25 RX ORDER — SENNOSIDES A AND B 8.6 MG/1
TABLET, FILM COATED ORAL
Status: ON HOLD | COMMUNITY
Start: 2024-02-19 | End: 2024-03-02

## 2024-02-25 RX ORDER — POLYETHYLENE GLYCOL 3350 17 G/17G
17 POWDER, FOR SOLUTION ORAL DAILY
Status: DISCONTINUED | OUTPATIENT
Start: 2024-02-25 | End: 2024-02-26

## 2024-02-25 RX ORDER — ONDANSETRON 4 MG/1
4 TABLET, ORALLY DISINTEGRATING ORAL EVERY 6 HOURS PRN
Status: DISCONTINUED | OUTPATIENT
Start: 2024-02-25 | End: 2024-03-02 | Stop reason: HOSPADM

## 2024-02-25 RX ORDER — ACETAMINOPHEN 325 MG/1
975 TABLET ORAL 3 TIMES DAILY
Status: DISCONTINUED | OUTPATIENT
Start: 2024-02-25 | End: 2024-03-02 | Stop reason: HOSPADM

## 2024-02-25 RX ORDER — METOPROLOL TARTRATE 25 MG/1
25 TABLET, FILM COATED ORAL 2 TIMES DAILY
Status: DISCONTINUED | OUTPATIENT
Start: 2024-02-25 | End: 2024-03-02 | Stop reason: HOSPADM

## 2024-02-25 RX ORDER — AMOXICILLIN 250 MG
1-2 CAPSULE ORAL 2 TIMES DAILY
Status: DISCONTINUED | OUTPATIENT
Start: 2024-02-25 | End: 2024-02-25

## 2024-02-25 RX ORDER — AMOXICILLIN 250 MG
2 CAPSULE ORAL 2 TIMES DAILY
Status: DISCONTINUED | OUTPATIENT
Start: 2024-02-25 | End: 2024-02-28

## 2024-02-25 RX ORDER — METHOCARBAMOL 500 MG/1
TABLET, FILM COATED ORAL
Status: ON HOLD | COMMUNITY
Start: 2024-02-19 | End: 2024-03-02

## 2024-02-25 RX ORDER — ONDANSETRON 2 MG/ML
4 INJECTION INTRAMUSCULAR; INTRAVENOUS EVERY 6 HOURS PRN
Status: DISCONTINUED | OUTPATIENT
Start: 2024-02-25 | End: 2024-03-02 | Stop reason: HOSPADM

## 2024-02-25 RX ORDER — POLYETHYLENE GLYCOL 3350 17 G/17G
POWDER, FOR SOLUTION ORAL
Status: ON HOLD | COMMUNITY
Start: 2024-02-19 | End: 2024-03-15

## 2024-02-25 RX ORDER — HYDROMORPHONE HCL IN WATER/PF 6 MG/30 ML
0.2 PATIENT CONTROLLED ANALGESIA SYRINGE INTRAVENOUS
Status: DISCONTINUED | OUTPATIENT
Start: 2024-02-25 | End: 2024-02-27

## 2024-02-25 RX ORDER — ACETAMINOPHEN 325 MG/1
650 TABLET ORAL EVERY 4 HOURS PRN
Status: DISCONTINUED | OUTPATIENT
Start: 2024-02-25 | End: 2024-02-25

## 2024-02-25 RX ORDER — HYDROMORPHONE HCL IN WATER/PF 6 MG/30 ML
0.2 PATIENT CONTROLLED ANALGESIA SYRINGE INTRAVENOUS
Status: COMPLETED | OUTPATIENT
Start: 2024-02-25 | End: 2024-02-25

## 2024-02-25 RX ORDER — ACETAMINOPHEN 325 MG/1
650 TABLET ORAL DAILY
Status: ON HOLD | COMMUNITY
End: 2024-03-02

## 2024-02-25 RX ORDER — CEFTRIAXONE 1 G/1
1 INJECTION, POWDER, FOR SOLUTION INTRAMUSCULAR; INTRAVENOUS EVERY 24 HOURS
Qty: 30 ML | Refills: 0 | Status: COMPLETED | OUTPATIENT
Start: 2024-02-25 | End: 2024-02-27

## 2024-02-25 RX ORDER — DEXTROSE MONOHYDRATE, SODIUM CHLORIDE, AND POTASSIUM CHLORIDE 50; 1.49; 4.5 G/1000ML; G/1000ML; G/1000ML
INJECTION, SOLUTION INTRAVENOUS CONTINUOUS
Status: DISCONTINUED | OUTPATIENT
Start: 2024-02-25 | End: 2024-02-25

## 2024-02-25 RX ORDER — LIDOCAINE 4 G/G
1-3 PATCH TOPICAL
Status: DISCONTINUED | OUTPATIENT
Start: 2024-02-25 | End: 2024-03-02 | Stop reason: HOSPADM

## 2024-02-25 RX ORDER — OXYCODONE HYDROCHLORIDE 5 MG/1
5 TABLET ORAL EVERY 4 HOURS PRN
Status: DISCONTINUED | OUTPATIENT
Start: 2024-02-25 | End: 2024-02-28

## 2024-02-25 RX ORDER — LANOLIN ALCOHOL/MO/W.PET/CERES
3 CREAM (GRAM) TOPICAL AT BEDTIME
Status: DISCONTINUED | OUTPATIENT
Start: 2024-02-25 | End: 2024-03-02 | Stop reason: HOSPADM

## 2024-02-25 RX ORDER — HYDROMORPHONE HCL IN WATER/PF 6 MG/30 ML
0.1 PATIENT CONTROLLED ANALGESIA SYRINGE INTRAVENOUS
Status: DISCONTINUED | OUTPATIENT
Start: 2024-02-25 | End: 2024-02-27

## 2024-02-25 RX ORDER — FUROSEMIDE 20 MG
20 TABLET ORAL DAILY
Status: DISCONTINUED | OUTPATIENT
Start: 2024-02-25 | End: 2024-02-28

## 2024-02-25 RX ADMIN — SENNOSIDES AND DOCUSATE SODIUM 1 TABLET: 8.6; 5 TABLET ORAL at 08:05

## 2024-02-25 RX ADMIN — METHOCARBAMOL 500 MG: 500 TABLET ORAL at 10:05

## 2024-02-25 RX ADMIN — Medication 3 MG: at 20:49

## 2024-02-25 RX ADMIN — LIDOCAINE 1 PATCH: 4 PATCH TOPICAL at 20:49

## 2024-02-25 RX ADMIN — DICLOFENAC SODIUM 4 G: 10 GEL TOPICAL at 20:52

## 2024-02-25 RX ADMIN — POTASSIUM CHLORIDE, DEXTROSE MONOHYDRATE AND SODIUM CHLORIDE: 150; 5; 450 INJECTION, SOLUTION INTRAVENOUS at 04:37

## 2024-02-25 RX ADMIN — GABAPENTIN 100 MG: 100 CAPSULE ORAL at 08:05

## 2024-02-25 RX ADMIN — METOPROLOL TARTRATE 25 MG: 25 TABLET, FILM COATED ORAL at 08:05

## 2024-02-25 RX ADMIN — HYDROMORPHONE HYDROCHLORIDE 0.2 MG: 0.2 INJECTION, SOLUTION INTRAMUSCULAR; INTRAVENOUS; SUBCUTANEOUS at 01:36

## 2024-02-25 RX ADMIN — HYDROMORPHONE HYDROCHLORIDE 0.2 MG: 0.2 INJECTION, SOLUTION INTRAMUSCULAR; INTRAVENOUS; SUBCUTANEOUS at 04:32

## 2024-02-25 RX ADMIN — METOPROLOL TARTRATE 25 MG: 25 TABLET, FILM COATED ORAL at 20:49

## 2024-02-25 RX ADMIN — OXYCODONE HYDROCHLORIDE 2.5 MG: 5 TABLET ORAL at 12:13

## 2024-02-25 RX ADMIN — ACETAMINOPHEN 975 MG: 325 TABLET, FILM COATED ORAL at 10:05

## 2024-02-25 RX ADMIN — METHOCARBAMOL 500 MG: 500 TABLET ORAL at 16:43

## 2024-02-25 RX ADMIN — CEFTRIAXONE SODIUM 1 G: 1 INJECTION, POWDER, FOR SOLUTION INTRAMUSCULAR; INTRAVENOUS at 10:05

## 2024-02-25 RX ADMIN — SENNOSIDES AND DOCUSATE SODIUM 2 TABLET: 8.6; 5 TABLET ORAL at 20:49

## 2024-02-25 RX ADMIN — SULFASALAZINE 500 MG: 500 TABLET ORAL at 21:11

## 2024-02-25 RX ADMIN — AMIODARONE HYDROCHLORIDE 200 MG: 200 TABLET ORAL at 08:05

## 2024-02-25 RX ADMIN — ACETAMINOPHEN 975 MG: 325 TABLET, FILM COATED ORAL at 20:48

## 2024-02-25 RX ADMIN — HYDROXYCHLOROQUINE SULFATE 200 MG: 200 TABLET, FILM COATED ORAL at 10:05

## 2024-02-25 RX ADMIN — HYDROMORPHONE HYDROCHLORIDE 0.2 MG: 0.2 INJECTION, SOLUTION INTRAMUSCULAR; INTRAVENOUS; SUBCUTANEOUS at 08:06

## 2024-02-25 RX ADMIN — DICLOFENAC SODIUM 4 G: 10 GEL TOPICAL at 16:47

## 2024-02-25 RX ADMIN — FUROSEMIDE 20 MG: 20 TABLET ORAL at 16:43

## 2024-02-25 RX ADMIN — METHOCARBAMOL 500 MG: 500 TABLET ORAL at 20:49

## 2024-02-25 RX ADMIN — SULFASALAZINE 500 MG: 500 TABLET ORAL at 10:05

## 2024-02-25 RX ADMIN — ACETAMINOPHEN 975 MG: 325 TABLET, FILM COATED ORAL at 16:43

## 2024-02-25 ASSESSMENT — ACTIVITIES OF DAILY LIVING (ADL)
ADLS_ACUITY_SCORE: 38
ADLS_ACUITY_SCORE: 43
ADLS_ACUITY_SCORE: 32
ADLS_ACUITY_SCORE: 38
ADLS_ACUITY_SCORE: 32
ADLS_ACUITY_SCORE: 38
ADLS_ACUITY_SCORE: 41
ADLS_ACUITY_SCORE: 43
ADLS_ACUITY_SCORE: 38
ADLS_ACUITY_SCORE: 41
ADLS_ACUITY_SCORE: 32
ADLS_ACUITY_SCORE: 38
ADLS_ACUITY_SCORE: 38
ADLS_ACUITY_SCORE: 32
ADLS_ACUITY_SCORE: 38
ADLS_ACUITY_SCORE: 43
ADLS_ACUITY_SCORE: 38
ADLS_ACUITY_SCORE: 32

## 2024-02-25 NOTE — ED PROVIDER NOTES
[facility] EMERGENCY DEPARTMENT ([facility])    2/24/24       ED PROVIDER NOTE    History     Chief Complaint   Patient presents with    Fall     The history is provided by medical records and the EMS personnel.     Roxanna Stark is a 89 year old female with PMH notable for HTN, CKD, RA, PAD, severe aortic stenosis s/p AVR with porcine valve (04/2016), Atrial fibrillation on Eliquis, HFpEF who presents to the Emergency Department via EMS from nursing home after am unwitnessed fall. Per EMS, she was found on the floor at 1935 by her bed with head under table of night stand. Patient reported head and back pain and was given 25 mcg of Fentanyl per EMS. She also reported abdominal pain around pelvic area. No pelvic binder was applied per EMS. Patient's O2 Sat was at 93% after Fentanyl by which she was given 2L O2 via NC.  Patient does not recall the fall, cannot provide details.  She states the only area currently is left lower abdomen.  No other symptoms noted.      Per chart review, patient has a fall yesterday (02/23). Nursing home staff was checking her neuro status every 15 minutes. She was A&O, weakness at baseline.     Past Medical History  Past Medical History:   Diagnosis Date    Actinic keratosis     Aortic stenosis 2014    Atrial flutter with rapid ventricular response (H) 01/27/2024    Basal cell cancer 07/2014    left eye medial canthus     Basal cell carcinoma 09/30/2008    left cheek    CKD (chronic kidney disease) stage 3, GFR 30-59 ml/min (H) 07/01/2019    HTN (hypertension)     Melanoma in situ (H) 09/30/2008    left arm    Polymyalgia rheumatica (H24) 11/1999    Rheumatoid arthritis of multiple sites with negative rheumatoid factor (H)     Status post coronary angiogram 03/03/2016    Temporal arteritis (H) 11/1999     Past Surgical History:   Procedure Laterality Date    CATARACT IOL, RT/LT  5/09    bilateral    COLONOSCOPY  2002    EXCISE LESION VULVA N/A 9/27/2019    Procedure: Wide Local  Excision Of Vulva, Colposcopy;  Surgeon: Mono Ribeiro MD;  Location: [facility] OR    REPLACE VALVE AORTIC N/A 4/25/2016    Procedure: REPLACE VALVE AORTIC;  Surgeon: Sudeep Tsai MD;  Location:  OR    [facility] SKIN TISSUE PROCEDURE UNLISTED  11/3/08    mmis skin cancer excision     amiodarone (PACERONE) 200 MG tablet  amoxicillin (AMOXIL) 500 MG capsule  apixaban ANTICOAGULANT (ELIQUIS) 2.5 MG tablet  calcium-vitamin D 500-125 MG-UNIT TABS  furosemide (LASIX) 20 MG tablet  gabapentin (NEURONTIN) 100 MG capsule  hydroxychloroquine (PLAQUENIL) 200 MG tablet  ICAPS PO  metoprolol tartrate (LOPRESSOR) 25 MG tablet  Misc. Devices (ROLLATOR ULTRA-LIGHT) MISC  Omega-3 Fatty Acids (OMEGA-3 FISH OIL PO)  sulfaSALAzine (AZULFIDINE) 500 MG tablet      Allergies   Allergen Reactions    Lisinopril Cough     Family History  Family History   Problem Relation Age of Onset    Breast Cancer Sister 45    Arthritis Sister     Thyroid Disease Sister     Arthritis Sister     Colon Cancer Sister         colon    Arthritis Mother     Hypertension Father     Prostate Cancer Father     Arthritis Father     Heart Disease Father     Lipids Father     Colon Cancer Father     Arthritis Sister     Asthma Daughter     Asthma Daughter     Lung Cancer Daughter 58        lung    Pancreatic Cancer Other 81        pancreatic      Social History   Social History     Tobacco Use    Smoking status: Never     Passive exposure: Never    Smokeless tobacco: Never   Vaping Use    Vaping Use: Never used   Substance Use Topics    Alcohol use: Yes     Comment: rarely    Drug use: No         A complete review of systems was performed with pertinent positives and negatives noted in the HPI, and all other systems negative.    Physical Exam   BP: (!) 178/86  Pulse: 77  Temp: 97.7  F (36.5  C)  Resp: 18  SpO2: 96 %  Physical Exam  Constitutional:       General: She is not in acute distress.     Appearance: She is well-developed. She is not diaphoretic.   HENT:       Head: Normocephalic and atraumatic.      Mouth/Throat:      Pharynx: No oropharyngeal exudate.   Eyes:      General: No scleral icterus.        Right eye: No discharge.         Left eye: No discharge.      Pupils: Pupils are equal, round, and reactive to light.   Cardiovascular:      Rate and Rhythm: Normal rate and regular rhythm.      Heart sounds: Normal heart sounds. No murmur heard.     No friction rub. No gallop.      Comments: Mechanical valve sound  Pulmonary:      Effort: Pulmonary effort is normal. No respiratory distress.      Breath sounds: Normal breath sounds. No wheezing.   Chest:      Chest wall: No tenderness.   Abdominal:      General: Abdomen is protuberant. Bowel sounds are normal. There is no distension.      Palpations: Abdomen is soft.      Tenderness: There is abdominal tenderness in the left lower quadrant.   Musculoskeletal:         General: No tenderness or deformity. Normal range of motion.      Cervical back: Normal range of motion and neck supple.   Skin:     General: Skin is warm and dry.      Coloration: Skin is not pale.      Findings: No erythema or rash.   Neurological:      Mental Status: She is alert and oriented to person, place, and time.      Cranial Nerves: No cranial nerve deficit.           ED Course, Procedures, & Data      Procedures            EKG Interpretation:      Interpreted by Aguila Espinal DO  Time reviewed: 2206  Symptoms at time of EKG: None   Rhythm: normal sinus   Rate: 74  Axis: Left Axis Deviation  Ectopy: none  Conduction: right bundle branch block (complete)  ST Segments/ T Waves: No ST-T wave changes  Q Waves: none  Comparison to prior: Unchanged from 1/31/2024    Clinical Impression: no acute changes                       No results found for any visits on 02/24/24.  Medications - No data to display  Labs Ordered and Resulted from Time of ED Arrival to Time of ED Departure - No data to display  No orders to display          Critical Care time was 30  minutes for this patient excluding procedures.     Assessment & Plan    This 89-year-old female presents with EMS after a fall.  Patient does not recall the fall and cannot provide details.  There is concern that she did strike her head and patient is not taking apixaban for valve replacement.  He notes left lower quadrant pain.  On exam she has tenderness in this area.  No other acute abnormalities on exam.  ECG is unchanged from previous.  Lab work shows WBC count of 11.3.  INR is 1.54.  Creatinine is 1.33 which is baseline for patient.  Head CT shows acute abnormalities.  CT of CT and L-spine shows T12 burst fracture.  CT of chest abdomen pelvis shows no acute changes.  Patient was given IV fluids.  Patient had been given fentanyl by EMS.  I discussed all results with patient and family.  Patient was given hydromorphone for pain.  She notes mid back pain with movement but does not have pain when standing still.  She is neuro intact in her lower extremities.  I discussed case with Neurosurgery as well as Trauma.  We will admit to the Trauma service..    I have reviewed the nursing notes. I have reviewed the findings, diagnosis, plan and need for follow up with the patient.    New Prescriptions    No medications on file       Final diagnoses:   None       Aguila Espinal DO  [facility] EMERGENCY DEPARTMENT  2/24/2024        Aguila Espinal,   02/25/24 0236

## 2024-02-25 NOTE — ED NOTES
Bed: [facility]  Expected date:   Expected time:   Means of arrival:   Comments:  A644  89F   Fall  Back pain   On thinners  Yellow

## 2024-02-25 NOTE — PROGRESS NOTES
This writer took over pt's care since mid shift. Pt has been alert and oriented X4, soft spoken. C/o back pain rated pain at 7/10. With the combination of Tylenol, Oxycodone, and Robaxin, pt said pain decreased to tolerable level of 2/10. Diet order started, pt had one bowl of soup for lunch. Pt has not been OOB since BR trip this am. Commode ready in room. Family members in room, very involved in cares.

## 2024-02-25 NOTE — MEDICATION SCRIBE - ADMISSION MEDICATION HISTORY
Medication Scribe Admission Medication History    Admission medication history is complete. The information provided in this note is only as accurate as the sources available at the time of the update.    Information Source(s): Facility ([facility] medication list/MAR via N/A    Pertinent Information: Used patient's MAR from facility to update PTA list.    Changes made to PTA medication list:  Added: Acetaminophen 325 mg and PO (1000 mg), Menthol, Topical Analgesic, 4 % GEL, methocarbamol (ROBAXIN) 500 MG, polyethylene glycol 17 gm, sennosides 8.6 mg.  Deleted: None  Changed: None    Allergies reviewed with patient and updates made in EHR: yes    Medication History Completed By: Anayeli Robledo 2/25/2024 10:01 AM    Prior to Admission medications    Medication Sig Last Dose Taking? Auth Provider Long Term End Date   acetaminophen (TYLENOL) 325 MG tablet Take 650 mg by mouth daily 2/24/2024 at 1710 Yes Reported, Patient     ACETAMINOPHEN PO Take 1,000 mg by mouth 3 times daily as needed for pain 2/20/2024 at PM Yes Reported, Patient     amiodarone (PACERONE) 200 MG tablet Take 2 tablets (400 mg) by mouth daily for 6 days, THEN 1 tablet (200 mg) daily for 90 days. 2/24/2024 at AM Yes Caroline Pretty PA-C Yes 5/7/24   amoxicillin (AMOXIL) 500 MG capsule TAKE 4 CAPSULES BY MOUTH 1 HOUR BEFORE DENTAL APPOINTMENT Unknown at Unknown Yes Kristy Joe MD No    apixaban ANTICOAGULANT (ELIQUIS) 2.5 MG tablet Take 1 tablet (2.5 mg) by mouth 2 times daily 2/24/2024 at 2000 Yes Caroline Pretty PA-C     calcium-vitamin D 500-125 MG-UNIT TABS Take 1 tablet by mouth daily 2/24/2024 at AM Yes Reported, Patient     furosemide (LASIX) 20 MG tablet TAKE 1 TABLET BY MOUTH EVERY DAY IF GAIN OF 2 TO 3 POUNDS OVER 2 DAYS  Patient taking differently: Take 20 mg by mouth daily TAKE 1 TABLET BY MOUTH EVERY DAY IF GAIN OF 2 TO 3 POUNDS OVER 2 DAYS 2/23/2024 at AM Yes Swapna Gaines MD Yes    gabapentin (NEURONTIN) 100 MG capsule  Take 1 capsule (100 mg) by mouth daily 2/24/2024 at AM Yes Swapna Gaines MD Yes    hydroxychloroquine (PLAQUENIL) 200 MG tablet Take 1 tablet (200 mg) by mouth daily 2/24/2024 at AM Yes Jamie Johnson MD     ICAPS PO 2 tablets daily 2/24/2024 at AM Yes Reported, Patient     Menthol, Topical Analgesic, 4 % GEL Apply topically to affected area(s) three times daily. 2/24/2024 at HS Yes Reported, Patient     methocarbamol (ROBAXIN) 500 MG tablet Take 0.5 Tablets (250 mg) by mouth. BID  until 2/21 then change to PRN 2/23/2024 at 1711 Yes Reported, Patient     metoprolol tartrate (LOPRESSOR) 25 MG tablet Take 1 tablet (25 mg) by mouth 2 times daily 2/24/2024 at PM Yes Swapna Gaines MD Yes    Omega-3 Fatty Acids (OMEGA-3 FISH OIL PO) Take 1 tablet twice daily. 2/24/2024 at PM Yes Reported, Patient     polyethylene glycol (MIRALAX) 17 GM/Dose powder Mix 1 scoop (17 g) in liquid then take by mouth once daily. 2/24/2024 at AM Yes Reported, Patient     senna (SENOKOT) 8.6 MG tablet Take 1 Tablet (8.6 mg) by mouth once daily. 2/24/2024 at AM Yes Reported, Patient     sulfaSALAzine (AZULFIDINE) 500 MG tablet Take 1 tablet (500 mg) by mouth 2 times daily 2/24/2024 at 2000 Yes Jamie Johnson MD     Misc. Devices (ROLLATOR ULTRA-LIGHT) MISC    Reported, Patient

## 2024-02-25 NOTE — ED TRIAGE NOTES
CORDELL from [facility]  Pt was found on the ground at 1945 by her bed  Back pain, pelvic pain, hip pain  On thinners  IV placed 25 mcg fentanyl given  Alert and orientated to baseline

## 2024-02-25 NOTE — CONSULTS
[facility]       NEUROSURGERY CONSULTATION NOTE    This consultation was requested by Dr. Espinal from the ED service.    Reason for Consultation: T12 fracture in the setting of recent fall    Dictated note:    HPI:    Ms. Roxanna Stark is a 89-year-old female, right-handed with past medical history significant for hypertension, CKD, RA, PAD, arctic stenosis status post AVR-2016, atrial fibrillation-on Eliquis, HRpEF who presented to [facility] ED from her nursing home via EMS after she was found down around 7 PM on 2/24/2024.  Patient does not remember the fall event but remembers laying on the floor.  She reports a mild headache and back pain and on and off pain in the left lower extremity mainly in the groin.  Reportedly the last dose of Eliquis was in the morning of 2/24/2024.  She denies any weakness or numbness in the lower extremities or any bladder or bowel incontinence episodes.  She denies any sensory level or any numbness in the groin or belly.  Imaging was performed in the ED.  Neurosurgery was consulted for evaluation and management of T12 fracture.    PAST MEDICAL HISTORY:   Past Medical History:   Diagnosis Date    Actinic keratosis     Aortic stenosis 2014    Atrial flutter with rapid ventricular response (H) 01/27/2024    Basal cell cancer 07/2014    left eye medial canthus     Basal cell carcinoma 09/30/2008    left cheek    CKD (chronic kidney disease) stage 3, GFR 30-59 ml/min (H) 07/01/2019    HTN (hypertension)     Melanoma in situ (H) 09/30/2008    left arm    Polymyalgia rheumatica (H24) 11/1999    Rheumatoid arthritis of multiple sites with negative rheumatoid factor (H)     Status post coronary angiogram 03/03/2016    Temporal arteritis (H) 11/1999       PAST SURGICAL HISTORY:   Past Surgical History:   Procedure Laterality Date    CATARACT IOL, RT/LT  5/09    bilateral    COLONOSCOPY  2002    EXCISE LESION VULVA N/A  9/27/2019    Procedure: Wide Local Excision Of Vulva, Colposcopy;  Surgeon: Mono Ribeiro MD;  Location: [facility] OR    REPLACE VALVE AORTIC N/A 4/25/2016    Procedure: REPLACE VALVE AORTIC;  Surgeon: Sudeep Tsai MD;  Location:  OR    [facility] SKIN TISSUE PROCEDURE UNLISTED  11/3/08    mmis skin cancer excision       FAMILY HISTORY:   Family History   Problem Relation Age of Onset    Breast Cancer Sister 45    Arthritis Sister     Thyroid Disease Sister     Arthritis Sister     Colon Cancer Sister         colon    Arthritis Mother     Hypertension Father     Prostate Cancer Father     Arthritis Father     Heart Disease Father     Lipids Father     Colon Cancer Father     Arthritis Sister     Asthma Daughter     Asthma Daughter     Lung Cancer Daughter 58        lung    Pancreatic Cancer Other 81        pancreatic        SOCIAL HISTORY:   Social History     Tobacco Use    Smoking status: Never     Passive exposure: Never    Smokeless tobacco: Never   Substance Use Topics    Alcohol use: Yes     Comment: rarely       MEDICATIONS:  Current Outpatient Medications   Medication Sig Dispense Refill    amiodarone (PACERONE) 200 MG tablet Take 2 tablets (400 mg) by mouth daily for 6 days, THEN 1 tablet (200 mg) daily for 90 days.      amoxicillin (AMOXIL) 500 MG capsule TAKE 4 CAPSULES BY MOUTH 1 HOUR BEFORE DENTAL APPOINTMENT 4 capsule 1    apixaban ANTICOAGULANT (ELIQUIS) 2.5 MG tablet Take 1 tablet (2.5 mg) by mouth 2 times daily 180 tablet 3    calcium-vitamin D 500-125 MG-UNIT TABS Take 1 tablet by mouth daily      furosemide (LASIX) 20 MG tablet TAKE 1 TABLET BY MOUTH EVERY DAY IF GAIN OF 2 TO 3 POUNDS OVER 2 DAYS (Patient taking differently: Take 20 mg by mouth daily TAKE 1 TABLET BY MOUTH EVERY DAY IF GAIN OF 2 TO 3 POUNDS OVER 2 DAYS) 90 tablet 3    gabapentin (NEURONTIN) 100 MG capsule Take 1 capsule (100 mg) by mouth daily 90 capsule 3    hydroxychloroquine (PLAQUENIL) 200 MG tablet Take 1 tablet (200 mg) by  mouth daily 30 tablet 3    ICAPS PO 2 tablets daily      metoprolol tartrate (LOPRESSOR) 25 MG tablet Take 1 tablet (25 mg) by mouth 2 times daily 180 tablet 3    Misc. Devices (ROLLATOR ULTRA-LIGHT) MISC       Omega-3 Fatty Acids (OMEGA-3 FISH OIL PO) Take 1 tablet twice daily.      sulfaSALAzine (AZULFIDINE) 500 MG tablet Take 1 tablet (500 mg) by mouth 2 times daily 60 tablet 3       Allergies:  Allergies   Allergen Reactions    Lisinopril Cough       ROS: 10 point ROS of systems including Constitutional, Eyes, Respiratory, Cardiovascular, Gastroenterology, Genitourinary, Integumentary, Muscularskeletal, Psychiatric were all negative except for pertinent positives noted in my HPI.    Physical exam:   Blood pressure (!) 163/69, pulse 80, temperature 97  F (36.1  C), temperature source Oral, resp. rate 18, SpO2 92%, not currently breastfeeding.  General: awake and alert, appears deconditioned  HEENT: No neck pain  PULM: breathing comfortably on room air  NEUROLOGIC:  -- Awake; Alert; oriented x 3  -- Follows commands briskly  -- +repetition, calculation, and naming  -- Speech fluent, spontaneous. No aphasia or dysarthria.  -- no gaze preference. No apparent hemineglect.  Cranial Nerves:  -- visual fields full to confrontation, PERRL 3-2mm bilat and brisk, extraocular movements intact  -- face symmetrical, tongue midline  -- sensory V1-V3 intact bilaterally  -- palate elevates symmetrically, uvula midline  -- hearing grossly intact bilat  -- Trapezii 5/5 strength bilat symmetric  -- Cerebellar: Finger nose finger without dysmetria, intact rapid alternating motions bilaterally    Motor:  Normal bulk / tone; no tremor, rigidity, or bradykinesia.  No muscle wasting or fasciculations  No Pronator Drift     Delt Bi Tri Hand Flexion/  Extension Iliopsoas Quadriceps Hamstrings Tibialis Anterior Gastroc    C5 C6 C7 C8/T1 L2 L3 L4-S1 L4 S1   R 5 5 5 5 4+* 4+* 4+* 4+* 4+*   L 5 5 5 5 4* 4* 4* 4* 4*   *: Appears  age-appropriate with some pain limited strength in the left lower extremity    Sensory:  intact to crude touch LT x 4 extremities     Reflexes: 1+ throughout, no clonus or Christensen's     Gait: Deferred    IMAGING:  CT thoracic spine imaging was reviewed and there appears to be a 3 column fracture at T12.    LABS:   Last Comprehensive Metabolic Panel:  Lab Results   Component Value Date     02/25/2024    POTASSIUM 4.1 02/25/2024    CHLORIDE 103 02/25/2024    CO2 25 02/25/2024    ANIONGAP 10 02/25/2024     (H) 02/25/2024    BUN 23.8 (H) 02/25/2024    CR 1.16 (H) 02/25/2024    GFRESTIMATED 43 (L) 02/25/2024    ROEL 8.6 02/25/2024     Lab Results   Component Value Date    WBC 11.3 02/24/2024    WBC 9.4 03/23/2021     Lab Results   Component Value Date    RBC 4.16 02/24/2024    RBC 3.55 03/23/2021     Lab Results   Component Value Date    HGB 11.9 02/24/2024    HGB 11.0 03/23/2021     Lab Results   Component Value Date    HCT 37.8 02/24/2024    HCT 35.4 03/23/2021     Lab Results   Component Value Date    MCV 91 02/24/2024     03/23/2021     Lab Results   Component Value Date    MCH 28.6 02/24/2024    MCH 31.0 03/23/2021     Lab Results   Component Value Date    MCHC 31.5 02/24/2024    MCHC 31.1 03/23/2021     Lab Results   Component Value Date    RDW 15.6 02/24/2024    RDW 17.7 03/23/2021     Lab Results   Component Value Date     02/24/2024     03/23/2021     INR   Date Value Ref Range Status   02/24/2024 1.54 (H) 0.85 - 1.15 Final   04/25/2016 1.41 (H) 0.86 - 1.14 Final      PTT   Date Value Ref Range Status   04/25/2016 35 22 - 37 sec Final     ASSESSMENT AND RECOMMENDATIONS:    89-year-old female, right-handed with complex past medical history-on Eliquis, deconditioned who presented to the nursing home after a fall.  Imaging shows 3 column Fracture at T12.  Neurologically at baseline in terms of strength.  Not myelopathic.    Given patient's complex past medical history, age and  overall deconditioning, it is unlikely that the patient will be able to tolerate any surgical intervention.  TLSO brace for maintaining stability of the spine.  Recommend T-spine precautions until then.  Can consider getting palliative care on board.  Please reach out to neurosurgery with any questions or concerns.    Edilson Jensen  Neurosurgery Resident, PGY-3    The patient was discussed with Dr. Abebe, neurosurgery chief resident.

## 2024-02-25 NOTE — H&P
[facility]    History and Physical note: Trauma Service     Date of Admission:  2/24/2024    Time of Admission/Consult Request (page/call): 02:30 am  Time of my evaluation: 02:40 am  Consulting services:  Neurosurgery - Emergent consult (within 30 mins): Called by ED    Assessment & Plan   Trauma mechanism: unwitnessed fall  Time/date of injury: 2/24/24, unknown time but found down at 19:35    Known Injuries:  T12 burst fracture  Small paravertebral hematoma     Other diagnoses:  Bilateral pleural effusions  Constipation     Procedure: N/A    Plan:  - Admit to trauma  - Consulted neurosurgery, final recs pending  - T spine precautions  - Hold Eliquis  - Pain control  - NPO for now  - PT/OT    Code status: DNR confirmed with patient.     Trauma staff on call was paged.    Suzette Dukes MD  Trauma Moonlighter      Chief Complaint   Fall    History is obtained from the patient and patient's daughter    History of Present Illness   Roxanna Stark is a 89 year old female with HTN, CKD, RA, PAD, aortic stenosis s/p AVR (04/2016), Atrial fibrillation (On Eliquis), HRpEF who presented to the [facility] ED from her nursing home via EMS after she was found down around 19:35 on 2/24/24. Patient does not remember falling but remembers laying on the floor. She reports a mild headache, back pain, and abdominal pain. She feels constipated and bloated, last BM was on 2/22. She is on a bowel regimen with miralax and senna. No chest pain, shortness of breath, nausea/vomiting, numbness or tingling in extremities, or neck pain. She thinks her last dose of Eliquis was on the morning of 2/24/24.      Past Medical History    I have reviewed this patient's medical history and updated it with pertinent information if needed.   Past Medical History:   Diagnosis Date    Actinic keratosis     Aortic stenosis 2014    Atrial flutter with rapid ventricular response (H) 01/27/2024    Basal cell cancer  2014    left eye medial canthus     Basal cell carcinoma 2008    left cheek    CKD (chronic kidney disease) stage 3, GFR 30-59 ml/min (H) 2019    HTN (hypertension)     Melanoma in situ (H) 2008    left arm    Polymyalgia rheumatica (H24) 1999    Rheumatoid arthritis of multiple sites with negative rheumatoid factor (H)     Status post coronary angiogram 2016    Temporal arteritis (H) 1999       Past Surgical History   I have reviewed this patient's surgical history and updated it with pertinent information if needed.  Past Surgical History:   Procedure Laterality Date    CATARACT IOL, RT/LT      bilateral    COLONOSCOPY      EXCISE LESION VULVA N/A 2019    Procedure: Wide Local Excision Of Vulva, Colposcopy;  Surgeon: Mono Ribeiro MD;  Location: [facility] OR    REPLACE VALVE AORTIC N/A 2016    Procedure: REPLACE VALVE AORTIC;  Surgeon: Sudeep Tsai MD;  Location:  OR    [facility] SKIN TISSUE PROCEDURE UNLISTED  11/3/08    mmis skin cancer excision     Prior to Admission Medications   Prior to Admission Medications   Prescriptions Last Dose Informant Patient Reported? Taking?   ICAPS PO  Self Yes No   Si tablets daily   Misc. Devices (ROLLATOR ULTRA-LIGHT) MISC   Yes No   Omega-3 Fatty Acids (OMEGA-3 FISH OIL PO)  Self Yes No   Sig: Take 1 tablet twice daily.   amiodarone (PACERONE) 200 MG tablet   No No   Sig: Take 2 tablets (400 mg) by mouth daily for 6 days, THEN 1 tablet (200 mg) daily for 90 days.   amoxicillin (AMOXIL) 500 MG capsule   No No   Sig: TAKE 4 CAPSULES BY MOUTH 1 HOUR BEFORE DENTAL APPOINTMENT   apixaban ANTICOAGULANT (ELIQUIS) 2.5 MG tablet   No No   Sig: Take 1 tablet (2.5 mg) by mouth 2 times daily   calcium-vitamin D 500-125 MG-UNIT TABS   Yes No   Sig: Take 1 tablet by mouth daily   furosemide (LASIX) 20 MG tablet   No No   Sig: TAKE 1 TABLET BY MOUTH EVERY DAY IF GAIN OF 2 TO 3 POUNDS OVER 2 DAYS   Patient taking differently: Take 20  mg by mouth daily TAKE 1 TABLET BY MOUTH EVERY DAY IF GAIN OF 2 TO 3 POUNDS OVER 2 DAYS   gabapentin (NEURONTIN) 100 MG capsule   No No   Sig: Take 1 capsule (100 mg) by mouth daily   hydroxychloroquine (PLAQUENIL) 200 MG tablet   No No   Sig: Take 1 tablet (200 mg) by mouth daily   metoprolol tartrate (LOPRESSOR) 25 MG tablet   No No   Sig: Take 1 tablet (25 mg) by mouth 2 times daily   sulfaSALAzine (AZULFIDINE) 500 MG tablet   No No   Sig: Take 1 tablet (500 mg) by mouth 2 times daily      Facility-Administered Medications: None     Allergies   Allergies   Allergen Reactions    Lisinopril Cough       Social History   Social History     Socioeconomic History    Marital status:      Spouse name: Not on file    Number of children: Not on file    Years of education: Not on file    Highest education level: Not on file   Occupational History     Employer: RETIRED   Tobacco Use    Smoking status: Never     Passive exposure: Never    Smokeless tobacco: Never   Vaping Use    Vaping Use: Never used   Substance and Sexual Activity    Alcohol use: Yes     Comment: rarely    Drug use: No    Sexual activity: Not Currently     Partners: Male     Birth control/protection: None   Other Topics Concern    Parent/sibling w/ CABG, MI or angioplasty before 65F 55M? No   Social History Narrative    Not on file     Social Determinants of Health     Financial Resource Strain: Not on file   Food Insecurity: Not on file   Transportation Needs: Not on file   Physical Activity: Not on file   Stress: Not on file   Social Connections: Not on file   Interpersonal Safety: Not on file   Housing Stability: Not on file     No smoking  No alcohol use  No other drug use  Lives in a nursing home  ETOH: This patient was asked if in the last 3-6 months there has been a time when she had 4 or more drinks in a single day/outing.. Patient answer to the screening question was in the negative. No intervention needed.    Family History   I have  reviewed this patient's family history and updated it with pertinent information if needed.   Family History   Problem Relation Age of Onset    Breast Cancer Sister 45    Arthritis Sister     Thyroid Disease Sister     Arthritis Sister     Colon Cancer Sister         colon    Arthritis Mother     Hypertension Father     Prostate Cancer Father     Arthritis Father     Heart Disease Father     Lipids Father     Colon Cancer Father     Arthritis Sister     Asthma Daughter     Asthma Daughter     Lung Cancer Daughter 58        lung    Pancreatic Cancer Other 81        pancreatic        Review of Systems   CONSTITUTIONAL: No fever, chills, sweats, fatigue   EYES: no visual blurring, no double vision or visual loss  ENT: no decrease in hearing, no tinnitus, no vertigo, no hoarseness  RESPIRATORY: no shortness of breath, no cough, no sputum   CARDIOVASCULAR: no palpitations, no chest pain, no exertional chest pain or pressure  GASTROINTESTINAL: no nausea or vomiting, or abd pain; +constipation  GENITOURINARY: no dysuria, no frequency or hesitancy, no hematuria  MUSCULOSKELETAL: no weakness, no redness, no swelling, no joint pain  SKIN: no rashes, ecchymoses, abrasions or lacerations  NEUROLOGIC: no numbness or tingling of hands, no numbness or tingling  of feet, no syncope, no tremors or weakness  PSYCHIATRIC: no sleep disturbances, no anxiety or depression    Physical Exam   Temp: 97.7  F (36.5  C) Temp src: Oral BP: (!) 178/86 Pulse: 77   Resp: 18 SpO2: 96 % O2 Device: None (Room air)    Vital Signs with Ranges  Temp:  [97.7  F (36.5  C)] 97.7  F (36.5  C)  Pulse:  [77] 77  Resp:  [18] 18  BP: (178)/(86) 178/86  SpO2:  [96 %] 96 % 0 lbs 0 oz    Primary Survey:  Airway: patient talking  Breathing: symmetric respiratory effort bilaterally  Circulation: central pulses present and peripheral pulses present  Disability: Pupils - left 3 mm and brisk, right 3 mm and brisk     Bruce Coma Scale - Total 15/15  Eye Response (E):  4  4= spontaneous,  3= to verbal/voice, 2=  to pain, 1= No response   Verbal Response (V): 5   5= Orientated, converses,  4= Confused, converses, 3= Inappropriate words,  2= Incomprehensible sounds,  1=No response   Motor Response (M): 6   6= Obeys commands, 5= Localizes to pain, 4= Withdrawal to pain, 3=Fexion to pain, 2= Extension to pain, 1= No response    Secondary Survey:  General: alert, oriented to person, place, but got confused with the year (said 2022)  Head: atraumatic, normocephalic, trachea midline  Eyes: PERRLA, pupils 3-->2 mm, EOMI, corneas and conjunctivae clear  Ears: pearly grey bilateral TMs and non-inflamed external ear canals  Nose: nares patent, no drainage, nasal septum non-tender  Mouth/Throat: no exudates or erythema,  no dental tenderness or malocclusions, no tongue lacerations, tongue is dry  Neck: No midline posterior tenderness, full AROM without pain or tenderness   Chest/Pulmonary: normal respiratory rate and rhythm, bilateral clear breath sounds, no wheezes, rales or rhonchi, no chest wall tenderness or deformities  Cardiovascular: regular rate and rhythm, no obvious murmurs  Abdomen: soft, non-tender, no guarding, no rebound tenderness and no tenderness to palpation  : pelvis stable to lateral compression  Back/Spine: tender mid lower back  Musculoskel/Extremities: normal extremities, full AROM of major joints without tenderness, edema, erythema, ecchymosis, or abrasions. Pedal pulses palpable, no edema.  Hand: no gross deformities of hands or fingers. Full AROM of hand and fingers in flexion and extension.  strength equal and symmetric.   Skin: no rashes, laceration, ecchymosis, skin warm and dry.   Neuro: PERRLA, alert, oriented x 3. CN II-XII grossly intact. No focal deficits. Strength 5/5 x 4 extremities.  Sensation intact.  Psychiatric: affect/mood normal, cooperative, normal judgement/insight and memory intact    Data   Results for orders placed or performed during the  hospital encounter of 02/24/24 (from the past 24 hour(s))   CBC with platelets differential    Narrative    The following orders were created for panel order CBC with platelets differential.  Procedure                               Abnormality         Status                     ---------                               -----------         ------                     CBC with platelets and d...[[id number]]  Abnormal            Final result                 Please view results for these tests on the individual orders.   INR   Result Value Ref Range    INR 1.54 (H) 0.85 - 1.15   Comprehensive metabolic panel   Result Value Ref Range    Sodium 138 135 - 145 mmol/L    Potassium 4.3 3.4 - 5.3 mmol/L    Carbon Dioxide (CO2) 28 22 - 29 mmol/L    Anion Gap 10 7 - 15 mmol/L    Urea Nitrogen 29.6 (H) 8.0 - 23.0 mg/dL    Creatinine 1.33 (H) 0.51 - 0.95 mg/dL    GFR Estimate 36 (L) >60 mL/min/1.73m2    Calcium 9.3 8.2 - 9.6 mg/dL    Chloride 100 98 - 107 mmol/L    Glucose 163 (H) 70 - 99 mg/dL    Alkaline Phosphatase 110 40 - 150 U/L    AST 22 0 - 45 U/L    ALT 12 0 - 50 U/L    Protein Total 7.1 6.4 - 8.3 g/dL    Albumin 3.6 3.5 - 5.2 g/dL    Bilirubin Total 0.2 <=1.2 mg/dL   CBC with platelets and differential   Result Value Ref Range    WBC Count 11.3 (H) 4.0 - 11.0 10e3/uL    RBC Count 4.16 3.80 - 5.20 10e6/uL    Hemoglobin 11.9 11.7 - 15.7 g/dL    Hematocrit 37.8 35.0 - 47.0 %    MCV 91 78 - 100 fL    MCH 28.6 26.5 - 33.0 pg    MCHC 31.5 31.5 - 36.5 g/dL    RDW 15.6 (H) 10.0 - 15.0 %    Platelet Count 263 150 - 450 10e3/uL    % Neutrophils 75 %    % Lymphocytes 13 %    % Monocytes 9 %    % Eosinophils 1 %    % Basophils 1 %    % Immature Granulocytes 1 %    NRBCs per 100 WBC 0 <1 /100    Absolute Neutrophils 8.7 (H) 1.6 - 8.3 10e3/uL    Absolute Lymphocytes 1.5 0.8 - 5.3 10e3/uL    Absolute Monocytes 1.0 0.0 - 1.3 10e3/uL    Absolute Eosinophils 0.1 0.0 - 0.7 10e3/uL    Absolute Basophils 0.1 0.0 - 0.2 10e3/uL    Absolute  Immature Granulocytes 0.1 <=0.4 10e3/uL    Absolute NRBCs 0.0 10e3/uL   Lipase   Result Value Ref Range    Lipase 63 (H) 13 - 60 U/L   ABO/Rh type and screen *Canceled*    Narrative    The following orders were created for panel order ABO/Rh type and screen.  Procedure                               Abnormality         Status                     ---------                               -----------         ------                     Adult Type and Screen[[id number]]                                                         Please view results for these tests on the individual orders.   EKG 12-lead, tracing only   Result Value Ref Range    Systolic Blood Pressure  mmHg    Diastolic Blood Pressure  mmHg    Ventricular Rate 74 BPM    Atrial Rate 74 BPM    IN Interval 192 ms    QRS Duration 142 ms     ms    QTc 508 ms    P Axis 55 degrees    R AXIS -38 degrees    T Axis 35 degrees    Interpretation ECG       Sinus rhythm  Possible Left atrial enlargement  Left axis deviation  Right bundle branch block  Abnormal ECG  Unconfirmed report - interpretation of this ECG is computer generated - see medical record for final interpretation  Confirmed by - EMERGENCY ROOM, PHYSICIAN ([id number]),  DEON HENRY ([id number]) on 2/24/2024 11:14:16 PM     ABO/Rh type and screen *Canceled*    Narrative    The following orders were created for panel order ABO/Rh type and screen.  Procedure                               Abnormality         Status                     ---------                               -----------         ------                       Please view results for these tests on the individual orders.   ABO/Rh type and screen    Narrative    The following orders were created for panel order ABO/Rh type and screen.  Procedure                               Abnormality         Status                     ---------                               -----------         ------                     Adult Type and Screen[[id number]]                             Final result                 Please view results for these tests on the individual orders.   Adult Type and Screen   Result Value Ref Range    ABO/RH(D) O POS     Antibody Screen Negative Negative    SPECIMEN EXPIRATION DATE [id number]    CT Head w/o Contrast    Narrative    EXAM: CT HEAD W/O CONTRAST, CT CERVICAL SPINE W/O CONTRAST  LOCATION: [facility]  DATE: 2/25/2024    INDICATION: Head and neck injury  COMPARISON: None.  TECHNIQUE:   1) Routine CT Head without IV contrast. Multiplanar reformats. Dose reduction techniques were used.  2) Routine CT Cervical Spine without IV contrast. Multiplanar reformats. Dose reduction techniques were used.    FINDINGS:   HEAD CT:   INTRACRANIAL CONTENTS: No intracranial hemorrhage, extraaxial collection, or mass effect.  No CT evidence of acute infarct. Moderate presumed chronic small vessel ischemic changes. Moderate generalized volume loss. No hydrocephalus.     VISUALIZED ORBITS/SINUSES/MASTOIDS: No intraorbital abnormality. Opacification anterior aspect of right sphenoid sinus. No middle ear or mastoid effusion.    BONES/SOFT TISSUES: No acute abnormality.    CERVICAL SPINE CT:   VERTEBRA: Normal vertebral body heights. Straightening of cervical lordosis. Minimal anterior subluxation C7 on T1 and minimal posterior subluxation C3 on C4. No fracture or posttraumatic subluxation.     CANAL/FORAMINA: Mild to moderate degenerative changes with mild canal narrowing at C3-C4, C5-C6. Multilevel prominent neural foraminal narrowing.    PARASPINAL: 2 cm nodule left thyroid gland; ultrasound recommended, if not done previously. Mild bilateral pleural effusions.      Impression    IMPRESSION:  HEAD CT:  1.  No acute intracranial process.    CERVICAL SPINE CT:  1.  No CT evidence for acute fracture or post traumatic subluxation.   CT Cervical Spine w/o Contrast    Narrative    EXAM: CT HEAD W/O CONTRAST, CT CERVICAL  SPINE W/O CONTRAST  LOCATION: [facility]  DATE: 2/25/2024    INDICATION: Head and neck injury  COMPARISON: None.  TECHNIQUE:   1) Routine CT Head without IV contrast. Multiplanar reformats. Dose reduction techniques were used.  2) Routine CT Cervical Spine without IV contrast. Multiplanar reformats. Dose reduction techniques were used.    FINDINGS:   HEAD CT:   INTRACRANIAL CONTENTS: No intracranial hemorrhage, extraaxial collection, or mass effect.  No CT evidence of acute infarct. Moderate presumed chronic small vessel ischemic changes. Moderate generalized volume loss. No hydrocephalus.     VISUALIZED ORBITS/SINUSES/MASTOIDS: No intraorbital abnormality. Opacification anterior aspect of right sphenoid sinus. No middle ear or mastoid effusion.    BONES/SOFT TISSUES: No acute abnormality.    CERVICAL SPINE CT:   VERTEBRA: Normal vertebral body heights. Straightening of cervical lordosis. Minimal anterior subluxation C7 on T1 and minimal posterior subluxation C3 on C4. No fracture or posttraumatic subluxation.     CANAL/FORAMINA: Mild to moderate degenerative changes with mild canal narrowing at C3-C4, C5-C6. Multilevel prominent neural foraminal narrowing.    PARASPINAL: 2 cm nodule left thyroid gland; ultrasound recommended, if not done previously. Mild bilateral pleural effusions.      Impression    IMPRESSION:  HEAD CT:  1.  No acute intracranial process.    CERVICAL SPINE CT:  1.  No CT evidence for acute fracture or post traumatic subluxation.   CT Chest Abdomen Pelvis w/o Contrast    Narrative    EXAM: CT CHEST ABDOMEN PELVIS W/O CONTRAST  LOCATION: [facility]  DATE: 2/25/2024    INDICATION: Fall, back and L abdominal pain. Low GFR.  COMPARISON: Two-view chest radiograph 01/27/2024, CT chest 03/18/2016  TECHNIQUE: CT scan of the chest, abdomen, and pelvis was performed without IV contrast. Multiplanar reformats were  obtained. Dose reduction techniques were used.   CONTRAST: None.    FINDINGS:     LUNGS AND PLEURA: Calcified granulomas of the left lower lobe. Small to moderate pleural effusions right greater than left with compressive atelectasis. There is also partial collapse of the right middle lobe and lingula.    MEDIASTINUM/AXILLAE: Heterogeneous nodular thyroid. Calcified mediastinal and hilar lymph nodes compatible with prior granulomatous disease. Aortic valve replacement. Severe mitral annular calcifications. No significant pericardial effusion. Upper limits   normal heart size.    CORONARY ARTERY CALCIFICATION: Severe.    HEPATOBILIARY: Calcified hepatic granulomas. Mild layering sludge or dense bile within the gallbladder, which is otherwise unremarkable.    PANCREAS: Somewhat atrophic, otherwise unremarkable.    SPLEEN: Multiple calcified splenic granulomas.    ADRENAL GLANDS: Normal.    KIDNEYS/BLADDER: Atrophic left kidney with small renal calyceal calculi, nonobstructing. Somewhat hypertrophied right kidney without hydronephrosis. Unremarkable urinary bladder.    BOWEL: Colonic diverticulosis. Large colonic stool suggestive of constipation. Normal appendix. No bowel obstruction.    LYMPH NODES: Normal.    VASCULATURE: Diffuse atherosclerotic calcifications of the aortoiliac vessels without evidence of aneurysmal dilatation.    PELVIC ORGANS: Periuterine calcifications.     MUSCULOSKELETAL: Prior median sternotomy. Degenerative changes of the shoulders and hips. Degenerative changes of the pubic symphysis. Multilevel degenerative changes of the cervicothoracic and lumbosacral spine. Acute or subacute-appearing burst-type   compression fracture of T12, new from CXR from 1/27/24. Mild bony retropulsion of superior endplate.      Impression    IMPRESSION:    1.  Acute-appearing burst-type compression fracture of T12, new from CXR from 1/27/24. Mild bony retropulsion of superior endplate resulting in mild canal  narrowing. Small associated anterior paravertebral hematoma. Please see separately dictated CT   spine reconstruction reports for additional details.  2.  No other evidence of acute traumatic abnormality of the chest, abdomen, or pelvis.  3.  Small to moderate pleural effusions right greater than left with compressive atelectasis. There is also partial collapse of the right middle lobe and lingula.  4.  Evidence of prior granulomatous disease.  5.  Severe coronary artery atherosclerotic calcifications.  6.  Heterogeneous nodular thyroid. Correlate with thyroid ultrasound which may be performed on a nonemergent outpatient basis.  7.  Large colonic stool suggestive of constipation.  8.  No other acute process within the chest, abdomen, or pelvis.   CT Lumbar Spine w/o Contrast    Narrative    EXAM: CT THORACIC SPINE W/O CONTRAST, CT LUMBAR SPINE W/O CONTRAST  LOCATION: [facility]  DATE: 2/25/2024    INDICATION: Fall, back pain.  COMPARISON: None.  TECHNIQUE:  1) Routine CT Thoracic Spine without IV contrast. Multiplanar reformats. Dose reduction techniques were used.   2) Routine CT Lumbar Spine without IV contrast. Multiplanar reformats. Dose reduction techniques were used.     FINDINGS:    THORACIC SPINE CT:  VERTEBRA: Normal alignment. T12 burst fracture with involvement of the superior and inferior endplates resulting in 40% vertebral body height loss and retropulsion of the posterior superior cortex by 5 mm. No other fractures.      CANAL/FORAMINA: Mild to moderate spondylosis. Mild spinal canal stenosis associated with retropulsion at T12. Moderate right foraminal stenosis at T11-T12. Otherwise, no significant spinal canal or foraminal stenosis.    PARASPINAL: See dedicated chest CT regarding intrathoracic findings.    LUMBAR SPINE CT:  VERTEBRA: Accentuation of lumbar lordosis with mild anterior spondylolisthesis at L4-L5 and L5-S1 and posterior spondylolisthesis at  L1-L2. Mild rightward curvature centered at L2-L3. Otherwise normal alignment. Normal vertebral body heights. No fracture   or posttraumatic subluxation.     CANAL/FORAMINA: Severe left and moderate right foraminal stenoses at L1-L2. Moderate left foraminal stenosis at L2-L3. Moderate foraminal stenosis at L3-L4. Mild to moderate foraminal stenosis at L4-L5. Severe right and moderate left foraminal stenosis   at L5-S1.    PARASPINAL: See dedicated abdomen and pelvis CT regarding intra-abdominal findings.      Impression    IMPRESSION:  THORACIC SPINE CT:  1.  T12 burst fracture with involvement of the superior and inferior endplate resulting in 40% height loss with retropulsion causing mild spinal canal stenosis. No other fractures.    LUMBAR SPINE CT:  1.  No fracture or posttraumatic subluxation.  2.  Spondylosis with foraminal stenoses as detailed.     CT Thoracic Spine w/o Contrast    Narrative    EXAM: CT THORACIC SPINE W/O CONTRAST, CT LUMBAR SPINE W/O CONTRAST  LOCATION: [facility]  DATE: 2/25/2024    INDICATION: Fall, back pain.  COMPARISON: None.  TECHNIQUE:  1) Routine CT Thoracic Spine without IV contrast. Multiplanar reformats. Dose reduction techniques were used.   2) Routine CT Lumbar Spine without IV contrast. Multiplanar reformats. Dose reduction techniques were used.     FINDINGS:    THORACIC SPINE CT:  VERTEBRA: Normal alignment. T12 burst fracture with involvement of the superior and inferior endplates resulting in 40% vertebral body height loss and retropulsion of the posterior superior cortex by 5 mm. No other fractures.      CANAL/FORAMINA: Mild to moderate spondylosis. Mild spinal canal stenosis associated with retropulsion at T12. Moderate right foraminal stenosis at T11-T12. Otherwise, no significant spinal canal or foraminal stenosis.    PARASPINAL: See dedicated chest CT regarding intrathoracic findings.    LUMBAR SPINE CT:  VERTEBRA:  Accentuation of lumbar lordosis with mild anterior spondylolisthesis at L4-L5 and L5-S1 and posterior spondylolisthesis at L1-L2. Mild rightward curvature centered at L2-L3. Otherwise normal alignment. Normal vertebral body heights. No fracture   or posttraumatic subluxation.     CANAL/FORAMINA: Severe left and moderate right foraminal stenoses at L1-L2. Moderate left foraminal stenosis at L2-L3. Moderate foraminal stenosis at L3-L4. Mild to moderate foraminal stenosis at L4-L5. Severe right and moderate left foraminal stenosis   at L5-S1.    PARASPINAL: See dedicated abdomen and pelvis CT regarding intra-abdominal findings.      Impression    IMPRESSION:  THORACIC SPINE CT:  1.  T12 burst fracture with involvement of the superior and inferior endplate resulting in 40% height loss with retropulsion causing mild spinal canal stenosis. No other fractures.    LUMBAR SPINE CT:  1.  No fracture or posttraumatic subluxation.  2.  Spondylosis with foraminal stenoses as detailed.

## 2024-02-26 ENCOUNTER — APPOINTMENT (OUTPATIENT)
Dept: PHYSICAL THERAPY | Facility: CLINIC | Age: 89
DRG: 552 | End: 2024-02-26
Attending: PHYSICIAN ASSISTANT
Payer: MEDICARE

## 2024-02-26 ENCOUNTER — APPOINTMENT (OUTPATIENT)
Dept: OCCUPATIONAL THERAPY | Facility: CLINIC | Age: 89
DRG: 552 | End: 2024-02-26
Attending: NURSE PRACTITIONER
Payer: MEDICARE

## 2024-02-26 LAB
ANION GAP SERPL CALCULATED.3IONS-SCNC: 14 MMOL/L (ref 7–15)
BACTERIA UR CULT: NORMAL
BUN SERPL-MCNC: 23.7 MG/DL (ref 8–23)
CALCIUM SERPL-MCNC: 8.8 MG/DL (ref 8.2–9.6)
CHLORIDE SERPL-SCNC: 104 MMOL/L (ref 98–107)
CREAT SERPL-MCNC: 1.29 MG/DL (ref 0.51–0.95)
DEPRECATED HCO3 PLAS-SCNC: 21 MMOL/L (ref 22–29)
EGFRCR SERPLBLD CKD-EPI 2021: 38 ML/MIN/1.73M2
ERYTHROCYTE [DISTWIDTH] IN BLOOD BY AUTOMATED COUNT: 15.7 % (ref 10–15)
GLUCOSE SERPL-MCNC: 87 MG/DL (ref 70–99)
HCT VFR BLD AUTO: 39 % (ref 35–47)
HGB BLD-MCNC: 12.4 G/DL (ref 11.7–15.7)
LACTATE SERPL-SCNC: 0.9 MMOL/L (ref 0.7–2)
MAGNESIUM SERPL-MCNC: 1.9 MG/DL (ref 1.7–2.3)
MCH RBC QN AUTO: 29.4 PG (ref 26.5–33)
MCHC RBC AUTO-ENTMCNC: 31.8 G/DL (ref 31.5–36.5)
MCV RBC AUTO: 92 FL (ref 78–100)
PHOSPHATE SERPL-MCNC: 3.3 MG/DL (ref 2.5–4.5)
PLATELET # BLD AUTO: 284 10E3/UL (ref 150–450)
POTASSIUM SERPL-SCNC: 4.1 MMOL/L (ref 3.4–5.3)
RBC # BLD AUTO: 4.22 10E6/UL (ref 3.8–5.2)
SODIUM SERPL-SCNC: 139 MMOL/L (ref 135–145)
WBC # BLD AUTO: 12.2 10E3/UL (ref 4–11)

## 2024-02-26 PROCEDURE — 250N000013 HC RX MED GY IP 250 OP 250 PS 637: Performed by: NURSE PRACTITIONER

## 2024-02-26 PROCEDURE — 83605 ASSAY OF LACTIC ACID: CPT

## 2024-02-26 PROCEDURE — 999N000127 HC STATISTIC PERIPHERAL IV START W US GUIDANCE

## 2024-02-26 PROCEDURE — 250N000011 HC RX IP 250 OP 636

## 2024-02-26 PROCEDURE — 97535 SELF CARE MNGMENT TRAINING: CPT | Mod: GO | Performed by: OCCUPATIONAL THERAPIST

## 2024-02-26 PROCEDURE — 97166 OT EVAL MOD COMPLEX 45 MIN: CPT | Mod: GO | Performed by: OCCUPATIONAL THERAPIST

## 2024-02-26 PROCEDURE — 250N000013 HC RX MED GY IP 250 OP 250 PS 637: Performed by: STUDENT IN AN ORGANIZED HEALTH CARE EDUCATION/TRAINING PROGRAM

## 2024-02-26 PROCEDURE — 97116 GAIT TRAINING THERAPY: CPT | Mod: GP | Performed by: PHYSICAL THERAPIST

## 2024-02-26 PROCEDURE — 97161 PT EVAL LOW COMPLEX 20 MIN: CPT | Mod: GP | Performed by: PHYSICAL THERAPIST

## 2024-02-26 PROCEDURE — 250N000011 HC RX IP 250 OP 636: Performed by: STUDENT IN AN ORGANIZED HEALTH CARE EDUCATION/TRAINING PROGRAM

## 2024-02-26 PROCEDURE — 250N000011 HC RX IP 250 OP 636: Performed by: PHYSICIAN ASSISTANT

## 2024-02-26 PROCEDURE — 99231 SBSQ HOSP IP/OBS SF/LOW 25: CPT | Performed by: NURSE PRACTITIONER

## 2024-02-26 PROCEDURE — 85014 HEMATOCRIT: CPT | Performed by: STUDENT IN AN ORGANIZED HEALTH CARE EDUCATION/TRAINING PROGRAM

## 2024-02-26 PROCEDURE — 84100 ASSAY OF PHOSPHORUS: CPT | Performed by: PHYSICIAN ASSISTANT

## 2024-02-26 PROCEDURE — 99223 1ST HOSP IP/OBS HIGH 75: CPT | Mod: GC | Performed by: INTERNAL MEDICINE

## 2024-02-26 PROCEDURE — 97530 THERAPEUTIC ACTIVITIES: CPT | Mod: GP | Performed by: PHYSICAL THERAPIST

## 2024-02-26 PROCEDURE — 83735 ASSAY OF MAGNESIUM: CPT | Performed by: PHYSICIAN ASSISTANT

## 2024-02-26 PROCEDURE — 99232 SBSQ HOSP IP/OBS MODERATE 35: CPT | Performed by: NURSE PRACTITIONER

## 2024-02-26 PROCEDURE — 250N000013 HC RX MED GY IP 250 OP 250 PS 637: Performed by: PHYSICIAN ASSISTANT

## 2024-02-26 PROCEDURE — 36415 COLL VENOUS BLD VENIPUNCTURE: CPT

## 2024-02-26 PROCEDURE — 36415 COLL VENOUS BLD VENIPUNCTURE: CPT | Performed by: PHYSICIAN ASSISTANT

## 2024-02-26 PROCEDURE — 97530 THERAPEUTIC ACTIVITIES: CPT | Mod: GO | Performed by: OCCUPATIONAL THERAPIST

## 2024-02-26 PROCEDURE — 80048 BASIC METABOLIC PNL TOTAL CA: CPT | Performed by: PHYSICIAN ASSISTANT

## 2024-02-26 PROCEDURE — 120N000002 HC R&B MED SURG/OB UMMC

## 2024-02-26 RX ORDER — HYDRALAZINE HYDROCHLORIDE 20 MG/ML
10 INJECTION INTRAMUSCULAR; INTRAVENOUS EVERY 6 HOURS PRN
Status: DISCONTINUED | OUTPATIENT
Start: 2024-02-26 | End: 2024-02-26

## 2024-02-26 RX ORDER — BISACODYL 10 MG
10 SUPPOSITORY, RECTAL RECTAL DAILY
Status: DISCONTINUED | OUTPATIENT
Start: 2024-02-27 | End: 2024-02-28

## 2024-02-26 RX ORDER — HYDRALAZINE HYDROCHLORIDE 20 MG/ML
10 INJECTION INTRAMUSCULAR; INTRAVENOUS EVERY 6 HOURS PRN
Status: DISCONTINUED | OUTPATIENT
Start: 2024-02-26 | End: 2024-03-02 | Stop reason: HOSPADM

## 2024-02-26 RX ORDER — BISACODYL 10 MG
10 SUPPOSITORY, RECTAL RECTAL ONCE
Status: COMPLETED | OUTPATIENT
Start: 2024-02-26 | End: 2024-02-26

## 2024-02-26 RX ADMIN — DICLOFENAC SODIUM 4 G: 10 GEL TOPICAL at 11:44

## 2024-02-26 RX ADMIN — APIXABAN 2.5 MG: 2.5 TABLET, FILM COATED ORAL at 20:10

## 2024-02-26 RX ADMIN — DICLOFENAC SODIUM 4 G: 10 GEL TOPICAL at 08:00

## 2024-02-26 RX ADMIN — AMIODARONE HYDROCHLORIDE 200 MG: 200 TABLET ORAL at 08:00

## 2024-02-26 RX ADMIN — BISACODYL 10 MG: 10 SUPPOSITORY RECTAL at 10:06

## 2024-02-26 RX ADMIN — HYDRALAZINE HYDROCHLORIDE 10 MG: 20 INJECTION INTRAMUSCULAR; INTRAVENOUS at 05:40

## 2024-02-26 RX ADMIN — GABAPENTIN 100 MG: 100 CAPSULE ORAL at 07:59

## 2024-02-26 RX ADMIN — SULFASALAZINE 500 MG: 500 TABLET ORAL at 10:04

## 2024-02-26 RX ADMIN — Medication 1 TABLET: at 07:58

## 2024-02-26 RX ADMIN — DICLOFENAC SODIUM 4 G: 10 GEL TOPICAL at 21:46

## 2024-02-26 RX ADMIN — DICLOFENAC SODIUM 4 G: 10 GEL TOPICAL at 17:39

## 2024-02-26 RX ADMIN — OXYCODONE HYDROCHLORIDE 2.5 MG: 5 TABLET ORAL at 02:30

## 2024-02-26 RX ADMIN — Medication 3 MG: at 20:11

## 2024-02-26 RX ADMIN — METHOCARBAMOL 500 MG: 500 TABLET ORAL at 16:40

## 2024-02-26 RX ADMIN — SENNOSIDES AND DOCUSATE SODIUM 2 TABLET: 8.6; 5 TABLET ORAL at 07:59

## 2024-02-26 RX ADMIN — METOPROLOL TARTRATE 25 MG: 25 TABLET, FILM COATED ORAL at 07:59

## 2024-02-26 RX ADMIN — ACETAMINOPHEN 975 MG: 325 TABLET, FILM COATED ORAL at 07:59

## 2024-02-26 RX ADMIN — ACETAMINOPHEN 975 MG: 325 TABLET, FILM COATED ORAL at 20:10

## 2024-02-26 RX ADMIN — METHOCARBAMOL 500 MG: 500 TABLET ORAL at 07:59

## 2024-02-26 RX ADMIN — FUROSEMIDE 20 MG: 20 TABLET ORAL at 07:58

## 2024-02-26 RX ADMIN — METOPROLOL TARTRATE 25 MG: 25 TABLET, FILM COATED ORAL at 20:11

## 2024-02-26 RX ADMIN — METHOCARBAMOL 500 MG: 500 TABLET ORAL at 11:45

## 2024-02-26 RX ADMIN — HYDROXYCHLOROQUINE SULFATE 200 MG: 200 TABLET, FILM COATED ORAL at 07:58

## 2024-02-26 RX ADMIN — SULFASALAZINE 500 MG: 500 TABLET ORAL at 21:46

## 2024-02-26 RX ADMIN — SENNOSIDES AND DOCUSATE SODIUM 2 TABLET: 8.6; 5 TABLET ORAL at 20:10

## 2024-02-26 RX ADMIN — POLYETHYLENE GLYCOL 3350 17 G: 17 POWDER, FOR SOLUTION ORAL at 08:00

## 2024-02-26 RX ADMIN — METHOCARBAMOL 500 MG: 500 TABLET ORAL at 20:10

## 2024-02-26 RX ADMIN — OXYCODONE HYDROCHLORIDE 5 MG: 5 TABLET ORAL at 08:00

## 2024-02-26 RX ADMIN — MAGNESIUM HYDROXIDE 30 ML: 400 SUSPENSION ORAL at 16:40

## 2024-02-26 RX ADMIN — CEFTRIAXONE SODIUM 1 G: 1 INJECTION, POWDER, FOR SOLUTION INTRAMUSCULAR; INTRAVENOUS at 10:06

## 2024-02-26 RX ADMIN — ACETAMINOPHEN 975 MG: 325 TABLET, FILM COATED ORAL at 13:49

## 2024-02-26 RX ADMIN — HYDROMORPHONE HYDROCHLORIDE 0.2 MG: 0.2 INJECTION, SOLUTION INTRAMUSCULAR; INTRAVENOUS; SUBCUTANEOUS at 06:00

## 2024-02-26 RX ADMIN — POLYETHYLENE GLYCOL 3350 17 G: 17 POWDER, FOR SOLUTION ORAL at 20:11

## 2024-02-26 ASSESSMENT — ACTIVITIES OF DAILY LIVING (ADL)
ADLS_ACUITY_SCORE: 32
ADLS_ACUITY_SCORE: 32
ADLS_ACUITY_SCORE: 36
ADLS_ACUITY_SCORE: 32
ADLS_ACUITY_SCORE: 36
ADLS_ACUITY_SCORE: 32
ADLS_ACUITY_SCORE: 36
PREVIOUS_RESPONSIBILITIES: MEAL PREP;HOUSEKEEPING;LAUNDRY;SHOPPING;MEDICATION MANAGEMENT;DRIVING
ADLS_ACUITY_SCORE: 32
ADLS_ACUITY_SCORE: 36
ADLS_ACUITY_SCORE: 32

## 2024-02-26 NOTE — CONSULTS
"Palliative Care Consultation Note  [facility]      Patient: Roxanna Stark  Date of Admission:  2/24/2024    Requesting Clinician / Team: Bigg Martino PA-C / Trauma team  Reason for consult: Goals of care     Recommendations & Counseling     GOALS OF CARE:   Unable to assess goals today, will reassess tomorrow (2/27). We met with daughter Jyotsna but she wanted Anna be participate in any discussions about goals or dispo so will try to connect with Anna tomorrow.     ADVANCE CARE PLANNING:  Patient has an advance directive dated 9/25/2019.  Primary Health Care Agent Anna Stark, jenny.  Alternate(s) Jyotsna Garcia, daughter.   States in HCD \"do not resuscitate if there is little hope of recovery that would allow me to live a normal life\"  There is no POLST form on file, recommend to complete prior to DC.  Code status: No CPR- Do NOT Intubate    MEDICAL MANAGEMENT:   We are not actively managing symptoms at this time.    PSYCHOSOCIAL/SPIRITUAL SUPPORT:  Family - daughters; Jyotsna and Anna are primary contacts    Palliative Care will check in again tomorrow (2/27). Thank you for the consult and allowing us to aid in the care of Roxanna Stark.    These recommendations have been discussed with Dr. Ortez.    Maria D Sevilla, Medical Student  [employer]    I was present with the student who participated in the service and in the documentation of the note. I have verified the history and personally performed the physical exam and medical decision-making. I agree with the assessment and plan of care as documented in the note.         Amelia Ortez MD / Palliative Medicine   Securely message with the Vocera Web Console (learn more here)   Text page via [facility] Paging/Directory         Assessment      Roxanna Stark is a 89 year old female with a past medical history of HTN, CKD, RA, PAD, aortic stenosis s/p AVR (04/2016), Atrial fibrillation (On " "Eliquis), and HFpEF who presented to the [facility] ED from her nursing home via EMS after she was found down 02/24/24. ED work up notable of T12 burst fracture. Deemed not to be a surgical candidate per Neurosurgery. Admitted to trauma service for pain management. Fitted for TLSO brace. Palliative was consulted for goals of care discussions.      Today, the patient was seen for:  - goals of care  - family support  - decision support for patient and family    Palliative Care Summary:   Met with daughter, Jyotsna and Roxanna at bedside. Discussed recommendations of trauma and neurosurgery team.     Roxanna reports pain is well controlled. She has more pain in the morning, but it improves by the afternoon with medications. She is moving around well. Does feel a \"hard rock\" in her abdomen. Daughter reports she thinks this is constipation. Had a small bowel movement today, the first in a few days.      I introduced our role as an extra layer of support and how we help patients and families dealing with serious, potentially life-limiting illnesses. I explained the composition of the palliative care team.  Palliative care helps patients and families navigate their care while focusing on the whole person; providing emotional, social and spiritual support  Palliative care often assists with symptom management, information sharing about what to expect from the illness, available treatment options and what effect those options may have on the disease course, and provide effective communication and caring support.    Prognosis, Goals, & Planning:    Functional Status just prior to this current hospitalization:  Was residing at home independently prior to hospitalization for Afib on 1/27/24; then resided at [facility]; daughter, Anna, was working on eventual transfer to [facility] prior to this hospitalization    Prognosis, Goals, and/or Advance Care Planning:  Unable to complete conversation today, will " readdress this tomorrow (2/27)    Code Status was addressed today: No  DNR per patient upon admission    Patient's decision making preferences: unable to assess, will reevaluate tomorrow        Patient has decision-making capacity today for complex decisions: No, is not consistently oriented          Coping, Meaning, & Spirituality:   Mood, coping, and/or meaning in the context of serious illness were addressed today: No    Social:   Living situation: prior to hospitalization resided at [facility]; daughter, Anna, was working on eventual transfer to [facility] prior to this hospitalization  Important relationships/caregivers:6 daughters, daughters Anna and Jyotsna are primary contacts    Medications:  I have reviewed this patient's medication profile and medications from this hospitalization.     Notable medications from a Palliative perspective:  - Tylenol 975 mg TID  - gabapentin 100 mg daily  - Robaxin 500 mg QID  - Lidocaine patch daily  - Voltaren gel QID  - Dilaudid 0.2 mg q2h PRN  - oxycodone 5 mg q4h PRN    ROS:  Comprehensive ROS is reviewed and is negative except as here & per HPI:     Physical Exam   Vital Signs with Ranges  Temp:  [97.3  F (36.3  C)-98.3  F (36.8  C)] 98  F (36.7  C)  Pulse:  [57-93] 75  Resp:  [14-18] 16  BP: (128-203)/(56-96) 128/70  SpO2:  [86 %-100 %] 100 %  112 lbs 3.43 oz    PHYSICAL EXAM:    Gen: elderly female in NAD  HEENT: normocephalic, no scleral icterus, no perioral lesions, oral mucosa moist  Pulm: normal work of breathing  Skin: no rash or jaundice on limited exam  Neuro: oriented to self, disoriented to location and situation  Psych: mood-affect congruent  MSK: ambulating with wheeled walker while wearing TLSO brace from bathroom to chair and then was able to sit up in chair without difficulty or pain.     Data reviewed:  Results for orders placed or performed during the hospital encounter of 02/24/24 (from the past 24 hour(s))   Glucose by  meter   Result Value Ref Range    GLUCOSE BY METER POCT 113 (H) 70 - 99 mg/dL   CBC with platelets   Result Value Ref Range    WBC Count 12.2 (H) 4.0 - 11.0 10e3/uL    RBC Count 4.22 3.80 - 5.20 10e6/uL    Hemoglobin 12.4 11.7 - 15.7 g/dL    Hematocrit 39.0 35.0 - 47.0 %    MCV 92 78 - 100 fL    MCH 29.4 26.5 - 33.0 pg    MCHC 31.8 31.5 - 36.5 g/dL    RDW 15.7 (H) 10.0 - 15.0 %    Platelet Count 284 150 - 450 10e3/uL   Basic metabolic panel   Result Value Ref Range    Sodium 139 135 - 145 mmol/L    Potassium 4.1 3.4 - 5.3 mmol/L    Chloride 104 98 - 107 mmol/L    Carbon Dioxide (CO2) 21 (L) 22 - 29 mmol/L    Anion Gap 14 7 - 15 mmol/L    Urea Nitrogen 23.7 (H) 8.0 - 23.0 mg/dL    Creatinine 1.29 (H) 0.51 - 0.95 mg/dL    GFR Estimate 38 (L) >60 mL/min/1.73m2    Calcium 8.8 8.2 - 9.6 mg/dL    Glucose 87 70 - 99 mg/dL   Magnesium   Result Value Ref Range    Magnesium 1.9 1.7 - 2.3 mg/dL   Phosphorus   Result Value Ref Range    Phosphorus 3.3 2.5 - 4.5 mg/dL   Lactic Acid STAT   Result Value Ref Range    Lactic Acid 0.9 0.7 - 2.0 mmol/L     CT Thoracic spine 2/24/24 (per Radiology):  T12 burst fracture with involvement of the superior and inferior endplate resulting in 40% height loss with retropulsion causing mild spinal canal stenosis. No other fractures.       Medical Decision Making       MANAGEMENT DISCUSSED with the following over the past 24 hours: Trauma THONG   NOTE(S)/MEDICAL RECORDS REVIEWED over the past 24 hours: trauma, neurosurgery, nursing  Tests personally interpreted in the past 24 hours:  - CT spine  showing T12 compression fracture  Tests REVIEWED in the past 24 hours:  - BMP  - CBC  SUPPLEMENTAL HISTORY, in addition to the patient's history, over the past 24 hours obtained from:   - daughter

## 2024-02-26 NOTE — PROVIDER NOTIFICATION
Roxanna Stark, Rm [phone]    Pt has been HTN this shift, most recent BP is 186/81, HR of 79. BP taken a few times but remains greater  than 180.  Pt has no PRN for high BP greater than 180. On RA    Nicole JENSEN RN   [phone]

## 2024-02-26 NOTE — PROGRESS NOTES
02/26/24 1523   Appointment Info   Signing Clinician's Name / Credentials (OT) Marielle Browndomingo OTR/L   Rehab Comments (OT) brace for comfort   Living Environment   People in Home alone   Current Living Arrangements house   Home Accessibility stairs to enter home;stairs within home   Number of Stairs, Main Entrance 2   Stair Railings, Main Entrance railing on left side (ascending)   Number of Stairs, Within Home, Primary greater than 10 stairs   Stair Railings, Within Home, Primary railings on both sides of stairs   Transportation Anticipated family or friend will provide   Living Environment Comments Pt here from tcu setting. Pt uses stair lift inside home as needed, typically uses stairs. Pt's bedroom and bathroom is upstairs. Has tub/shower, shower chair and grab bars. Dtrs live locally and supportive. Pt in the middle of moving to [facility] from TCU when she fell.   Self-Care   Usual Activity Tolerance moderate   Equipment Currently Used at Home grab bar, tub/shower;shower chair;cane, quad;walker, rolling   Number of times patient has fallen within last six months 3  (3 at tcu)   Activity/Exercise/Self-Care Comment Indep with ADLs at baseline. Pt was Kirk with walker on each floor at home. In tcu pt  was Ax1 for hallway with w/ch follow.   Instrumental Activities of Daily Living (IADL)   Previous Responsibilities meal prep;housekeeping;laundry;shopping;medication management;driving   IADL Comments Pt IND in IADLs at baseline, except dtr assists with finances   General Information   Onset of Illness/Injury or Date of Surgery 02/26/24   Referring Physician Emily King APRN CNP   Patient/Family Therapy Goal Statement (OT) open to tcu, was about to move to an [facility]   Additional Occupational Profile Info/Pertinent History of Current Problem per chart: 89 year old female with HTN, CKD, RA, PAD, aortic stenosis s/p AVR (04/2016), Atrial fibrillation (On Eliquis), HFpEF who presented to the [facility] ED from her nursing home  via EMS after she was found down 02/24/24. ED work up notable of T12 burst fracture.   Existing Precautions/Restrictions fall;spinal   Left Upper Extremity (Weight-bearing Status) partial weight-bearing (PWB)  (10#)   Right Upper Extremity (Weight-bearing Status) partial weight-bearing (PWB)  (10#)   Left Lower Extremity (Weight-bearing Status) weight-bearing as tolerated (WBAT)   Right Lower Extremity (Weight-bearing Status) weight-bearing as tolerated (WBAT)   General Observations and Info activity: tlso when out of bed   Cognitive Status Examination   Affect/Mental Status (Cognitive) WFL   Follows Commands follows one-step commands;75-90% accuracy   Safety Deficit minimal deficit;safety precautions follow-through/compliance   Memory Deficit minimal deficit   Cognitive Status Comments dtr states pt could not remember how to get home from the store recently   Visual Perception   Visual Impairment/Limitations WFL;corrective lenses full-time   Posture   Posture forward head position;protracted shoulders   Range of Motion Comprehensive   Comment, General Range of Motion BUE WFL; decreased hip ROM   Strength Comprehensive (MMT)   Comment, General Manual Muscle Testing (MMT) Assessment generalized weakness   Coordination   Upper Extremity Coordination No deficits were identified   Coordination Comments arthritic changes in B hands   Transfers   Transfers sit-stand transfer;toilet transfer;shower transfer   Sit-Stand Transfer   Sit-Stand Coffee (Transfers) minimum assist (75% patient effort);2 person assist   Assistive Device (Sit-Stand Transfers) walker, standard   Shower Transfer   [place]offee Level (Shower Transfer) moderate assist (50% patient effort);2 person assist   Shower Transfer Comments per clinical judgement   Toilet Transfer   Type (Toilet Transfer) sit-stand;stand-sit   [place] Level (Toilet Transfer) minimum assist (75% patient effort);2 person assist   Assistive Device (Toilet Transfer) benjy  bars/safety frame;walker, standard   Balance   Balance Comments good supported sitting; unsteady when fatigued   Activities of Daily Living   BADL Assessment/Intervention lower body dressing;grooming;toileting;bathing   Bathing Assessment/Intervention   [place] Level (Bathing) moderate assist (50% patient effort)   Comment, (Bathing) per clinical judgement   Lower Body Dressing Assessment/Training   [place] Level (Lower Body Dressing) moderate assist (50% patient effort)   Grooming Assessment/Training   [place] Level (Grooming) contact guard assist;verbal cues   Toileting   [place] Level (Toileting) moderate assist (50% patient effort)   Clinical Impression   Criteria for Skilled Therapeutic Interventions Met (OT) Yes, treatment indicated   OT Diagnosis impaired ADLs   OT Problem List-Impairments impacting ADL problems related to;activity tolerance impaired;balance;cognition;range of motion (ROM);strength  (conservative spinal precautions)   Assessment of Occupational Performance 3-5 Performance Deficits   Identified Performance Deficits derssing, toilet transfer, toileting, shower transfer   Planned Therapy Interventions (OT) ADL retraining;strengthening;progressive activity/exercise;transfer training   Clinical Decision Making Complexity (OT) detailed assessment/moderate complexity   Risk & Benefits of therapy have been explained evaluation/treatment results reviewed;patient;daughter   OT Total Evaluation Time   OT Eval, Moderate Complexity Minutes (07165) 6   OT Goals   Therapy Frequency (OT) 5 times/week   OT Predicted Duration/Target Date for Goal Attainment 03/11/24   OT Goals Lower Body Dressing;Hygiene/Grooming;Toilet Transfer/Toileting;Cognition;OT Goal 1   OT: Hygiene/Grooming modified independent;while standing   OT: Lower Body Dressing Modified independent;using adaptive equipment;within precautions   OT: Lower Body Bathing Modified independent;using adaptive equipment;with  precautions   OT: Transfer   (SBA shower transfer using DME prn)   OT: Toilet Transfer/Toileting Modified independent;toilet transfer;cleaning and garment management;using adaptive equipment;within precautions   OT: Cognitive Patient/caregiver will verbalize understanding of cognitive assessment results/recommendations as needed for safe discharge planning   Self-Care/Home Management   Self-Care/Home Mgmt/ADL, Compensatory, Meal Prep Minutes (78648) 14   Symptoms Noted During/After Treatment (Meal Preparation/Planning Training) fatigue   Treatment Detail/Skilled Intervention Co-treat due to pt complexity and no tech available. Pt ed in using figure-4 pattern for LE dressing, pt unable to achieve, Mod A to doff socks, MaxA to don.  Pt cued for hand placement and upright posture with toilet transfer, pt not following cues well, Min-ModAx2.  MaxA to doff incontinent pull-ups and thread clean ones.  Pt cued for olimpia-cares, TH assist for thoroughness.  Pt stood at sink for hand hygienes, SBA.   Therapeutic Activities   Therapeutic Activity Minutes (67079) 15   Symptoms noted during/after treatment fatigue   Treatment Detail/Skilled Intervention Ed pt in conservative spinal precautions, pt endorsing but needing cues to follow t/o session.  Min-ModAx2 STS from armchair. MinAx1-2 ambulation using 2WW in room and in hallway x120, pt cued for upright posture, for wider OBED, for hand placement on walker handles. Pt with antalgic-appearing gait as she fatigued, favoring R side with flexed hip.   OT Discharge Planning   OT Plan LE dressing with AE, toilet transfer, toileting, tlso management, cognitive screen, g/h standing at sink   OT Discharge Recommendation (DC Rec) Transitional Care Facility   OT Rationale for DC Rec Pt below baseline, limited by weakness and new spinal fracture, new brace to manage. She would benefit from continued therapy to maximize safety and independence with ADLs.   OT Brief overview of current status  Min-ModA1-2   Total Session Time   Timed Code Treatment Minutes 29   Total Session Time (sum of timed and untimed services) 35

## 2024-02-26 NOTE — PROGRESS NOTES
Vitals: HTN. Team page, PRN hydralazine added for SBP > 160  Neuros: Alert, disoriented to place and situation. Denies N/T. 4/4 strengths throughout with generalized weakness.   IV: PIV SL  Labs/Electrolytes: no new lab results  Resp/trach: on RA, denies SOB  Diet: Regular, takes pills one at a time  Bowel status: LBM 2/24, BS+  : Voiding spontaneously to BR  Skin: intact  Pain: back/flank and stomach pain managed with scheduled tylenol, PRN oxy x1, dilaudid x1.  Lidocaine patch placed on L side of lower back. Voltaren  gel applied  Activity: A1 with GB/Walker. Needs help with repositioning  Plan: Pain control.

## 2024-02-26 NOTE — PROGRESS NOTES
"[facility]  Trauma Service Progress Note    Date of Service (when I saw the patient): 02/26/2024  Assessment & Plan   Trauma Mechanism: Unwitnessed fall at care facility   Known Injuries:  T12 burst fracture with 40% height loss    History of present illness  Roxanna Stark is a 89 year old female with HTN, CKD, RA, PAD, aortic stenosis s/p AVR (04/2016), Atrial fibrillation (On Eliquis), HFpEF who presented to the [facility] ED from her nursing home via EMS after she was found down 02/24/24. ED work up notable of T12 burst fracture. She was admitted to the trauma service for further management. Neurosurgery is on board.    Neuro/Pain/Psych:  # unwitnessed fall on DOAC  - Head and C-spine CT without acute findings     # Acute on chronic pain   - continue PTA: gabapentin 100mg  - Scheduled: Tylenol, Robaxin 500mg 4x/day, Voltaren, Lidoderm  -  Prn: dilaudid, oxycodone for severe pain     # Acute hospital associated delirium  - Patient awake all night in ED hallway bed near main door, advanced age, acute fractures with pain, new location without windows, can increase risk of delirium.   - Scheduled: Melatonin 3mg for sleep hygiene   - Allow patient to sleep through the night if vital signs are stable   - Maintain circadian rhythm.  Lights on during the day.  Off at night, minimize cares at night.  OOB during the day.  - Pain management above     Pulmonary:  # Bilateral Pleural Effusions, R > L noted on CT, subacute vs chronic  - Chest CT: Small to moderate pleural effusions right greater than left with compressive atelectasis. There is also partial collapse of the right middle lobe and lingula.   Cardiac MRI form 01/30/24 mentioned \" Moderate-large bilateral pleural effusions\"   - Denies shortness of breath, on room air  - Supplemental oxygen to keep saturation above 92 %.     Cardiovascular:    # Hypertension   # PAD  # Severe CAD  # Aortic Stenosis s/p AVR w/ porcine valve " 4/25/16  # pAfib on Eliquis   # HFpEF on most recent ECHO 01/2024  # Elongated Qtc 458/508  She was hypertensive last night upto , improved after PRN dose of hydralazine. Question possible pain component. Reported moderate back pain with repositioning this morning.  - Will treat pain and continue PTA cardiac regimen  - Continue PTA: amiodarone, metoprolol, Lasix daily,  - PRN hydralazine and labetalol for SBP >160  - Avoid QTc prolonging medications     GI/Nutrition:    # Constipation  Large stool burden noted on CT CAP, takes bowel meds at home and per chart review last BM 02/22  Bisacodyl supp daily, if no BM today will proceed to enema  - Regular Diet      Renal/ Fluids/Electrolytes:  # CKD baseline 1.2-1.6  - Resume PTA Lasix  - electrolyte replacement protocol in place.      Endocrine:  # Rheumatoid arthritis  - continue PTA: hydroxychloroquine, sulfasalazine      # Thyroid nodules   - Chest CT: Heterogeneous nodular thyroid. Correlate with thyroid ultrasound which may be performed on a nonemergent outpatient basis.   - Pt to follow-up with PCP after discharge      Infectious disease:   # Empirically treating UTI  Mild leukocytosis, afebrile with a normal lactic  - UA: Leukocyte Esterase: Large, WBC: 66  - Started Ceftriaxone 1g x 3 days     Hematology:    Hbg 12.4, plt 284  - Threshold for transfusion if hgb <7.0 or signs/symptoms of hypoperfusion.        # Coagulopathy, long term use of anticoagulant  Resume Apixaban given no ICH, at baseline neuro status   Further discussions with Cardiology outpatient regarding risk benefit of DOAC for Afib given falls and increased risk of ICH     Musculoskeletal:  # Weakness and deconditioning of acute on chronic illness   # T12 burst fracture, > 40%  - Neurosurgery consulted: No surgical intervention planned, TLSO brace recommended followed by upright XR's of the T spine completed 02/25/2024  - Physical and occupational therapy consults.  - Outpatient follow up  plan to be determined, usually in 6 weeks with XR's of the T spine     Skin:  - dilgent cares to prevent skin breakdown and wound formation.    Consults: SW/ CC for discharge planning   Lines/ tubes/ drains:  - PIV   General Cares:                 PPI/H2 blocker:  NA                DVT prophylaxis: pcd                Bowel Regimen/Date of last stool:02/22                Pulmonary toilet: deep cough, OOB     Code status:  DNR/DNI                  Discharge goals:   Adequate pain management:yes  VSS x24 hours: hypertensive last night required IV PRN antihypertensives  Hemoglobin stable x 48 hours: NA  Ambulating safely and/or therapy evals complete: yes  Drains/lines removed or plan in place to manage: yes  Teaching done: daughter (Anna) updated at bedside  Other:  Expected D/C date: 1-2 days pending improved BP and pain    Interval History   Hypertensive overnight, improved with PRN hydralazine, back pain today, grimacing with turns,  improved with pain meds. Feels bloated/ constipated.  ROS x 8 negative with exception of those things listed in interval hx    Physical Exam   Temp: 97.6  F (36.4  C) Temp src: Oral BP: (!) 146/58 Pulse: 78   Resp: 16 SpO2: 95 % O2 Device: None (Room air)    There were no vitals filed for this visit.  Vital Signs with Ranges  Temp:  [97.3  F (36.3  C)-98.3  F (36.8  C)] 97.6  F (36.4  C)  Pulse:  [57-93] 78  Resp:  [14-18] 16  BP: (135-203)/(56-96) 146/58  SpO2:  [86 %-100 %] 95 %  I/O last 3 completed shifts:  In: -   Out: 625 [Urine:625]    [place] Coma Scale - Total 14/15  Eye Response (E): 4   4= spontaneous, 3= to verbal/voice, 2= to pain, 1= No response   Verbal Response (V): 4   5= Orientated, converses, 4= Confused, converses, 3= Inappropriate words, 2= Incomprehensible sounds, 1=No response   Motor Response (M): 6   6= Obeys commands, 5= Localizes to pain, 4= Withdrawal to pain, 3=Fexion to pain, 2= Extension to pain, 1= No response   Constitutional: Awake, alert, cooperative,  no apparent distress.  Eyes: Lids and lashes normal, pupils equal, round and reactive to light, extra ocular muscles intact, sclera clear, conjunctiva normal.  ENT: Normocephalic, atraumatic  Respiratory: No increased work of breathing, good air exchange, clear to auscultation bilaterally, no crackles or wheezing.  Cardiovascular:  regular rate and rhythm, normal S1 and S2  GI: Rounded, not distended  Genitourinary:  not seen  Skin:  Normal skin color, no redness, warmth, or swelling, no ecchymosis, no abrasions, and no jaundice.  Musculoskeletal: There is no redness, warmth, or swelling of the joints.  Pedal pulse palpated.  Neurologic: Awake, alert, oriented to person.  No focal deficits.  Neuropsychiatric: Calm, normal eye contact, alert, affect appropriate to situation, at subsequent visit    CARMELITA Gonzalez CNP  To contact the trauma service use job code pager [phone],   Numeric texts or alpha text through [facility]

## 2024-02-26 NOTE — PROGRESS NOTES
02/26/24 1516   Appointment Info   Signing Clinician's Name / Credentials (PT) Beatris Rob, PT, DPT   Living Environment   People in Home alone   Current Living Arrangements house   Home Accessibility stairs to enter home;stairs within home   Number of Stairs, Main Entrance 2   Stair Railings, Main Entrance railing on left side (ascending)   Number of Stairs, Within Home, Primary greater than 10 stairs  (N/A due to lift)   Stair Railings, Within Home, Primary railings on both sides of stairs   Transportation Anticipated family or friend will provide   Living Environment Comments Lives alone at baseline. The plan is to discharge to [facility] after TCU stay per pt's daughter. Notes pt's belongings have been partially moved to [facility].   Self-Care   Usual Activity Tolerance good  (to moderate)   Current Activity Tolerance fair   Equipment Currently Used at Home walker, rolling;grab bar, tub/shower  (cane)   Fall history within last six months yes   Number of times patient has fallen within last six months 3  (at least 3, per daughter had 3 at TCU)   Activity/Exercise/Self-Care Comment Pt at baseline (when last at home) pt able to use walkers as needed on different floors in home (walker on each floor), cane also as needed. Was driving short distance of few blocks at most but forgetful of where to return which was a concern per pt's daughter.   General Information   Onset of Illness/Injury or Date of Surgery 02/24/24   Referring Physician Mitra Martino PA-C   Pertinent History of Current Problem (include personal factors and/or comorbidities that impact the POC) Roxanna Stark is a 89-year-old female, right-handed with complex past medical history-on Eliquis, deconditioned who presented to the nursing home after a fall.  Imaging shows 3 column Fracture at T12.   Existing Precautions/Restrictions fall;spinal  (TLSO when out of bed; for comfort per MD orders)   Cognition   Follows Commands (Cognition) repetition of  directions required;verbal cues/prompting required;increased processing time needed   Safety Deficit (Cognition) safety precautions awareness;safety precautions follow-through/compliance  (follow-through on cues)   Posture    Posture Comments reduced upright posture   Range of Motion (ROM)   ROM Comment reduced hip ER for figure 4 dressing technique   Strength (Manual Muscle Testing)   Strength Comments impaired functionally; see below   Bed Mobility   Comment, (Bed Mobility) NT   Transfers   Comment, (Transfers) THIERRY-MODA sit<>stand x 1-2   Gait/Stairs (Locomotion)   Comment, (Gait/Stairs) THIERRY of 1-2 for gait with FWW; unsteady w/ increased R knee flexion, NBOS, reduced neutral hip position; reduced R step length, foot clearance   Balance   Balance Comments impaired, requires Ax1-2 for safety with FWW for standing static and dynamic balance tasks   Clinical Impression   Criteria for Skilled Therapeutic Intervention Yes, treatment indicated   PT Diagnosis (PT) impaired mobility   Influenced by the following impairments fatigue, pain, reduced activity tolerance, balance, strength, ROM   Functional limitations due to impairments below baseline bed mobility, transfers, gait   Clinical Presentation (PT Evaluation Complexity) stable   Clinical Presentation Rationale clinical judgement, [facility]   Clinical Decision Making (Complexity) low complexity   Planned Therapy Interventions (PT) balance training;bed mobility training;gait training;home exercise program;neuromuscular re-education;patient/family education;postural re-education;ROM (range of motion);stair training;strengthening;stretching;transfer training;risk factor education;home program guidelines;progressive activity/exercise   Risk & Benefits of therapy have been explained evaluation/treatment results reviewed;care plan/treatment goals reviewed;risks/benefits reviewed;current/potential barriers reviewed;participants voiced agreement with care plan;participants  included;patient   PT Total Evaluation Time   PT Emily Low Complexity Minutes (26623) 7   Physical Therapy Goals   PT Frequency Daily   PT Predicted Duration/Target Date for Goal Attainment 03/04/24   PT: Bed Mobility Independent;Supine to/from sit;Rolling;Bridging;Within precautions   PT: Transfers Independent;Bed to/from chair;Sit to/from stand;Assistive device;Within precautions   PT: Gait Independent;Modified independent;Within precautions;150 feet   PT: Stairs   (N/A)   Total Session Time   Total Session Time (sum of timed and untimed services) 7

## 2024-02-26 NOTE — PLAN OF CARE
Goal Outcome Evaluation:  BP (!) 147/79 (BP Location: Left arm)   Pulse 79   Temp 97.7  F (36.5  C) (Oral)   Resp 15   Wt 50.9 kg (112 lb 3.4 oz)   SpO2 95%   BMI 21.86 kg/m      Patient confused. Alert to self, disoriented to time, location and situation with options provided. Diclofenac gel applied to back d/t pain, scheduled tylenol and PRN oxy x1 also given. VSS on RA. Tolerating diet. Up with assist x1, walker, gait belt and back brace. Strengths 4/5 throughout. Repositioned in bed PRN and pillows utilized for support. Voiding without difficulties. No BM this shift. Scheduled bowel meds and one time bisacodyl suppository given. R PIV SL. Call light within reach and bed alarm on. Family at bedside for majority of shift. Continue with plan of care.

## 2024-02-26 NOTE — PROGRESS NOTES
"DRISS Mcknight is a 89 yof that presents today at the [facility] in the Emergency room. The patient suffered from a T12 burst fracture with no known DOI.  .  A- I had measured the patients waist to be at 32\" of circumference. I have fit the patient with the [employer] 456 TLSO by adjusting the belt of the brace to a small. The TLSO was fit onto the patient and the patient felt satisfaction and comfort. Patient was instructed on wear and care of the brace. Fit and function appear adequate at this time.    P-Patient will wear the orthosis whenever she is sitting or standing. The orthosis is not needed as much while lying down. The patient will contact us with any question or concerns. Follow up as needed.    Electronically signed by: Trevor Quiros, Board Eligible   "

## 2024-02-26 NOTE — PROGRESS NOTES
[facility], [place]   Neurosurgery Progress Note:    Date of service: 2/26/2024    Assessment: Roxanna Stark is a 89-year-old female, right-handed with complex past medical history-on Eliquis, deconditioned who presented to the nursing home after a fall.  Imaging shows 3 column Fracture at T12.     Clinically Significant Risk Factors Present on Admission                    Plan:  -TLSO brace when out of bed   -PT/OT assessments  - Please obtain repeat x-rays after evaluated by PT       CARMELITA Dos Santos, MATT  2/26/2024  Department of Neurosurgery  Pager: [phone]    I have spent a total of 25 minutes total time counseling, coordination, and chart review, over 50% of the time was spent in direct patient care.       Interval History:  Notes pain in the lower thoracic spine.  Had x-rays performed yesterday, orthotics refit brace today          Objective:   Temp:  [97.3  F (36.3  C)-98.3  F (36.8  C)] 97.7  F (36.5  C)  Pulse:  [57-93] 79  Resp:  [14-18] 15  BP: (135-203)/(56-96) 147/79  SpO2:  [86 %-100 %] 95 %  I/O last 3 completed shifts:  In: -   Out: 625 [Urine:625]    Gen: Appears comfortable, NAD  Neurologic:  - Alert & Oriented to person, place, time, and situation  - Follows commands briskly  - Speech fluent, spontaneous. No aphasia or dysarthria.  - No gaze preference. No apparent hemineglect.  - PERRL, EOMI  - Strong eye closure, jaw clench, and cheek puff  - Face symmetric with sensation intact to light touch  - Palate elevates symmetrically, uvula midline, tongue protrudes midline  - Trapezii and sternocleidomastoid muscles 5/5 bilaterally  - No pronator drift     Del Tr Bi WE WF Gr   R 5 5 5 5 5 5   L 5 5 5 5 5 5    HF KE KF DF PF EHL   R 5 5 5 5 5 5   L 5 5 5 5 5 5     Reflexes 2+ throughout    Sensation intact and symmetric to light touch throughout    LABS  Recent Labs   Lab Test 02/26/24  0645 02/24/24  2159 02/19/24  0608   WBC 12.2* 11.3* 9.7   HGB 12.4  11.9 12.1   MCV 92 91 93    263 210       Recent Labs   Lab Test 02/26/24  0645 02/25/24  1824 02/25/24  0729 02/24/24  2159     --  138 138   POTASSIUM 4.1  --  4.1 4.3   CHLORIDE 104  --  103 100   CO2 21*  --  25 28   BUN 23.7*  --  23.8* 29.6*   CR 1.29*  --  1.16* 1.33*   ANIONGAP 14  --  10 10   ROEL 8.8  --  8.6 9.3   GLC 87 113* 125* 163*       IMAGING    No results found for this or any previous visit (from the past 24 hour(s)).

## 2024-02-26 NOTE — PROVIDER NOTIFICATION
Trauma MD notified of patient /82 with no PRNs. Can we add PRN labetalol and hydralazine?     MD added PRN hydralazine.

## 2024-02-26 NOTE — PROGRESS NOTES
Arrived from: ED   Belongings/meds: Remain with pt, hearing aids, clothing  2 RN Skin Assessment Completed by: Efrain JERRY    Non-intact findings documented (yes/no/NA): Intact    Pt alert, disoriented to place and situation. C/o mild back pain 3/10 tylenol and Voltaren gel scheduled at 2000. Opiate naive-try and avoid. R PIV SL. Strengths 4/5 generalized weakness t/o. Denied n/t. BA on at all times - pt has fallen 3 times recently. PCDs ordered. Per report pt is continent-commode ordered. LBM 2/24. Pt not OOB yet but per report Ax1 GB walker-rolling walker ordered.

## 2024-02-27 ENCOUNTER — LAB REQUISITION (OUTPATIENT)
Dept: LAB | Facility: CLINIC | Age: 89
End: 2024-02-27
Payer: MEDICARE

## 2024-02-27 ENCOUNTER — APPOINTMENT (OUTPATIENT)
Dept: GENERAL RADIOLOGY | Facility: CLINIC | Age: 89
DRG: 552 | End: 2024-02-27
Attending: NURSE PRACTITIONER
Payer: MEDICARE

## 2024-02-27 ENCOUNTER — APPOINTMENT (OUTPATIENT)
Dept: PHYSICAL THERAPY | Facility: CLINIC | Age: 89
DRG: 552 | End: 2024-02-27
Payer: MEDICARE

## 2024-02-27 DIAGNOSIS — R94.6 ABNORMAL RESULTS OF THYROID FUNCTION STUDIES: ICD-10-CM

## 2024-02-27 LAB
ANION GAP SERPL CALCULATED.3IONS-SCNC: 9 MMOL/L (ref 7–15)
BUN SERPL-MCNC: 32.8 MG/DL (ref 8–23)
CALCIUM SERPL-MCNC: 8.9 MG/DL (ref 8.2–9.6)
CHLORIDE SERPL-SCNC: 101 MMOL/L (ref 98–107)
CREAT SERPL-MCNC: 1.61 MG/DL (ref 0.51–0.95)
DEPRECATED HCO3 PLAS-SCNC: 25 MMOL/L (ref 22–29)
EGFRCR SERPLBLD CKD-EPI 2021: 29 ML/MIN/1.73M2
ERYTHROCYTE [DISTWIDTH] IN BLOOD BY AUTOMATED COUNT: 15.8 % (ref 10–15)
GLUCOSE SERPL-MCNC: 97 MG/DL (ref 70–99)
HCT VFR BLD AUTO: 35.7 % (ref 35–47)
HGB BLD-MCNC: 11.4 G/DL (ref 11.7–15.7)
MAGNESIUM SERPL-MCNC: 2.3 MG/DL (ref 1.7–2.3)
MCH RBC QN AUTO: 28.6 PG (ref 26.5–33)
MCHC RBC AUTO-ENTMCNC: 31.9 G/DL (ref 31.5–36.5)
MCV RBC AUTO: 90 FL (ref 78–100)
PHOSPHATE SERPL-MCNC: 3.6 MG/DL (ref 2.5–4.5)
PLATELET # BLD AUTO: 277 10E3/UL (ref 150–450)
POTASSIUM SERPL-SCNC: 4.1 MMOL/L (ref 3.4–5.3)
PROCALCITONIN SERPL IA-MCNC: 0.44 NG/ML
RBC # BLD AUTO: 3.99 10E6/UL (ref 3.8–5.2)
SODIUM SERPL-SCNC: 135 MMOL/L (ref 135–145)
WBC # BLD AUTO: 15.8 10E3/UL (ref 4–11)

## 2024-02-27 PROCEDURE — 120N000002 HC R&B MED SURG/OB UMMC

## 2024-02-27 PROCEDURE — 80048 BASIC METABOLIC PNL TOTAL CA: CPT | Performed by: PHYSICIAN ASSISTANT

## 2024-02-27 PROCEDURE — 99418 PROLNG IP/OBS E/M EA 15 MIN: CPT | Mod: GC | Performed by: INTERNAL MEDICINE

## 2024-02-27 PROCEDURE — 250N000013 HC RX MED GY IP 250 OP 250 PS 637: Performed by: PHYSICIAN ASSISTANT

## 2024-02-27 PROCEDURE — 99233 SBSQ HOSP IP/OBS HIGH 50: CPT | Mod: GC | Performed by: INTERNAL MEDICINE

## 2024-02-27 PROCEDURE — 83735 ASSAY OF MAGNESIUM: CPT | Performed by: PHYSICIAN ASSISTANT

## 2024-02-27 PROCEDURE — 97116 GAIT TRAINING THERAPY: CPT | Mod: GP | Performed by: PHYSICAL THERAPIST

## 2024-02-27 PROCEDURE — 250N000013 HC RX MED GY IP 250 OP 250 PS 637: Performed by: NURSE PRACTITIONER

## 2024-02-27 PROCEDURE — 74018 RADEX ABDOMEN 1 VIEW: CPT | Mod: 26 | Performed by: RADIOLOGY

## 2024-02-27 PROCEDURE — 99232 SBSQ HOSP IP/OBS MODERATE 35: CPT | Performed by: NURSE PRACTITIONER

## 2024-02-27 PROCEDURE — 74018 RADEX ABDOMEN 1 VIEW: CPT

## 2024-02-27 PROCEDURE — 84100 ASSAY OF PHOSPHORUS: CPT | Performed by: PHYSICIAN ASSISTANT

## 2024-02-27 PROCEDURE — 250N000013 HC RX MED GY IP 250 OP 250 PS 637: Performed by: STUDENT IN AN ORGANIZED HEALTH CARE EDUCATION/TRAINING PROGRAM

## 2024-02-27 PROCEDURE — 84145 PROCALCITONIN (PCT): CPT | Performed by: NURSE PRACTITIONER

## 2024-02-27 PROCEDURE — 72080 X-RAY EXAM THORACOLMB 2/> VW: CPT

## 2024-02-27 PROCEDURE — 85014 HEMATOCRIT: CPT | Performed by: NURSE PRACTITIONER

## 2024-02-27 PROCEDURE — 36415 COLL VENOUS BLD VENIPUNCTURE: CPT | Performed by: PHYSICIAN ASSISTANT

## 2024-02-27 PROCEDURE — 250N000011 HC RX IP 250 OP 636: Performed by: PHYSICIAN ASSISTANT

## 2024-02-27 PROCEDURE — 72080 X-RAY EXAM THORACOLMB 2/> VW: CPT | Mod: 26 | Performed by: STUDENT IN AN ORGANIZED HEALTH CARE EDUCATION/TRAINING PROGRAM

## 2024-02-27 PROCEDURE — 97530 THERAPEUTIC ACTIVITIES: CPT | Mod: GP | Performed by: PHYSICAL THERAPIST

## 2024-02-27 RX ADMIN — DICLOFENAC SODIUM 4 G: 10 GEL TOPICAL at 13:41

## 2024-02-27 RX ADMIN — METHOCARBAMOL 500 MG: 500 TABLET ORAL at 08:55

## 2024-02-27 RX ADMIN — DICLOFENAC SODIUM 4 G: 10 GEL TOPICAL at 20:14

## 2024-02-27 RX ADMIN — METOPROLOL TARTRATE 25 MG: 25 TABLET, FILM COATED ORAL at 08:55

## 2024-02-27 RX ADMIN — HYDROXYCHLOROQUINE SULFATE 200 MG: 200 TABLET, FILM COATED ORAL at 08:55

## 2024-02-27 RX ADMIN — CEFTRIAXONE SODIUM 1 G: 1 INJECTION, POWDER, FOR SOLUTION INTRAMUSCULAR; INTRAVENOUS at 10:58

## 2024-02-27 RX ADMIN — POLYETHYLENE GLYCOL 3350 17 G: 17 POWDER, FOR SOLUTION ORAL at 08:56

## 2024-02-27 RX ADMIN — METHOCARBAMOL 500 MG: 500 TABLET ORAL at 13:40

## 2024-02-27 RX ADMIN — METOPROLOL TARTRATE 25 MG: 25 TABLET, FILM COATED ORAL at 20:15

## 2024-02-27 RX ADMIN — ACETAMINOPHEN 975 MG: 325 TABLET, FILM COATED ORAL at 13:40

## 2024-02-27 RX ADMIN — GABAPENTIN 100 MG: 100 CAPSULE ORAL at 08:56

## 2024-02-27 RX ADMIN — ACETAMINOPHEN 975 MG: 325 TABLET, FILM COATED ORAL at 20:14

## 2024-02-27 RX ADMIN — SIMETHICONE 226 ML: 125 CAPSULE, LIQUID FILLED ORAL at 13:45

## 2024-02-27 RX ADMIN — Medication 1 TABLET: at 08:56

## 2024-02-27 RX ADMIN — DICLOFENAC SODIUM 4 G: 10 GEL TOPICAL at 15:40

## 2024-02-27 RX ADMIN — APIXABAN 2.5 MG: 2.5 TABLET, FILM COATED ORAL at 08:56

## 2024-02-27 RX ADMIN — SENNOSIDES AND DOCUSATE SODIUM 2 TABLET: 8.6; 5 TABLET ORAL at 20:13

## 2024-02-27 RX ADMIN — SENNOSIDES AND DOCUSATE SODIUM 2 TABLET: 8.6; 5 TABLET ORAL at 08:55

## 2024-02-27 RX ADMIN — Medication 3 MG: at 20:14

## 2024-02-27 RX ADMIN — APIXABAN 2.5 MG: 2.5 TABLET, FILM COATED ORAL at 20:14

## 2024-02-27 RX ADMIN — POLYETHYLENE GLYCOL 3350 17 G: 17 POWDER, FOR SOLUTION ORAL at 20:14

## 2024-02-27 RX ADMIN — AMIODARONE HYDROCHLORIDE 200 MG: 200 TABLET ORAL at 08:56

## 2024-02-27 RX ADMIN — OXYCODONE HYDROCHLORIDE 2.5 MG: 5 TABLET ORAL at 06:45

## 2024-02-27 RX ADMIN — SULFASALAZINE 500 MG: 500 TABLET ORAL at 20:15

## 2024-02-27 RX ADMIN — ACETAMINOPHEN 975 MG: 325 TABLET, FILM COATED ORAL at 08:55

## 2024-02-27 RX ADMIN — METHOCARBAMOL 500 MG: 500 TABLET ORAL at 15:40

## 2024-02-27 RX ADMIN — SULFASALAZINE 500 MG: 500 TABLET ORAL at 10:34

## 2024-02-27 RX ADMIN — DICLOFENAC SODIUM 4 G: 10 GEL TOPICAL at 08:54

## 2024-02-27 RX ADMIN — METHOCARBAMOL 500 MG: 500 TABLET ORAL at 20:14

## 2024-02-27 ASSESSMENT — ACTIVITIES OF DAILY LIVING (ADL)
ADLS_ACUITY_SCORE: 36
ADLS_ACUITY_SCORE: 38
ADLS_ACUITY_SCORE: 36
ADLS_ACUITY_SCORE: 36
ADLS_ACUITY_SCORE: 38
ADLS_ACUITY_SCORE: 36
ADLS_ACUITY_SCORE: 38
ADLS_ACUITY_SCORE: 36

## 2024-02-27 ASSESSMENT — VISUAL ACUITY
OU: BASELINE;GLASSES
OU: BASELINE;GLASSES

## 2024-02-27 NOTE — PLAN OF CARE
Status: Presented to the [facility] ED from nursing home via EMS after she was found down 24. ED wok up notable T12 burst fracture. PMHx of HTN, CKD, PAD, aortic stenosis sp/ AVR and Atrial fibrillation ( on Eliquis).   Vitals: VSS on RA. Continue ox in place.   Neuros: Disoriented X3 with choices. Knew name and . Denies N/T. Strengthen 4/5 throughout. Generalized weakness.   IV: PIV SL.   Labs/Electrolytes: WNL.Redraw am .   Resp/trach: Denies SOB. Sating high 90s .   Diet: Regular and good intake.   Bowel status: Had several small/smear BMS this shift. Scheduled bowel meds taken. BS+.   : Voiding w/out difficulty to BR.   Skin: Bruising t/o.   Pain: Denies. Scheduled tyenol, Dicolfenac cream applied. Refused scheduled lidocaine patch.   Activity: Up w/ A1/GB and walker. TLSO brace when OOB. Up on most of the shift. Need call lights within reach and BA on at all times. Hx of fall X3 recently.   Social: Pt's family visited at bedside and supportive with cares.   Plan: Continue monitor and follow POC.

## 2024-02-27 NOTE — PROGRESS NOTES
PALLIATIVE CARE PROGRESS NOTE  [facility]     Patient Name: Roxanna Stark  Date of Admission: 2/24/2024   Today the patient was seen for: goals of care, family support, patient and family decision making     Recommendations & Counseling       GOALS OF CARE:   Safe discharge to facility who can provide the level of care she needs  DNR/DNI and ok for rehospitalization for now. Family plans to see how things go and over time may consider shift to hospice care if/when condition declines further   Goals discussion summary:  The Palliative team met with Roxanna's daughter, Anna and her  Adi today for a goals of care discussion. Anna shared that Roxanna has always enjoyed walking and her Vood[employer] community. She helped found [employer] in [place]. These things are important to her and she would want a quality of life in which she can participate in these activities. Roxanna has had to make some accommodations such as not walking as far, though still very much enjoys these things. Prior to this hospitalization, Roxanna was staying at a TCU following a hospitalization on 1/27 for Afib. While at the TCU, she fell 3 times, the last leading to the fracture of her spine at T12 and this hospitalization. Prior to all of this, Roxanna was living independently. Anna expressed concerns that her mother is not quite as mentally sharp as she used to be and is requiring more assistance. We discussed the progressive nature of dementia and the need for more cares. Anna has been in contact with [facility] in [place] where her mother was planning to live after TCU discharge. [facility] has assisted living, memory care, and hospice options. Anna believes it would be best for Roxanna to go to [facility] either right after hospitalization or after a short TCU stay as it will be a consistent place with Roxanna's belongings and Roxanna seemed to enjoy visiting there.  "Additionally, [facility] has a shuttle to [facility], a place important to Roxanna. We also discussed the what ifs such as frequent hospitalizations, declining memory, and physical limitations to ambulation that may occur due to Roxanna's age and medical conditions and increased risk of infections.  Anna recognized these concerns and believes her mother would like to limit hospitalizations in part because staying at multiple facilities has contributed to/exacerbated her confusion. Further discussed the role of hospice and what that could like look going forward. We reviewed hospice criteria (expected prognosis of 6 months or less) and philosophy of care including plan to no re-hospitalize for restorative cares. I do not think she currently meets hospice criteria (ambulatory, eating well, no end organ failure currently) but we talked about how with her recent falls and worsening memory put her at increased risk of further setbacks and medical decline (falls, infections like pneumonia or UTI) which could then make her a candidate for hospice. Anna felt that she is not ready to forgo rehospitalization at this time but can imagine making that decision in the future.       ADVANCE CARE PLANNING:  Patient has an advance directive dated 9/25/2019.  Primary Health Care Agent Anna Stark, daughter.  Alternate(s) Jyotsna Radha, daughter.   States in HCD \"do not resuscitate if there is little hope of recovery that would allow me to live a normal life\"  POLST completed today, 2/27/24, with daughter, Anna. Roxanna previously shared with Anna that she would not like to be resuscitated or have a breathing tube though never completed a POLST. Copies provided to family and will be scanned into chart.   Code status: No CPR- Do NOT Intubate    MEDICAL MANAGEMENT:   We are not actively managing symptoms at this time.       PSYCHOSOCIAL/SPIRITUAL:  Family - daughters; Jyotsna and Anna are primary contacts  Anglican - helped to found " [facility] in [place]    Palliative Care will sign off.   Please re-consult us if further needs arise  Thank you for the consult and allowing us to aid in the care of Roxanna Stark.    These recommendations have been discussed with Dr. Ortez.    Maria D Sevilla, Medical Student  [employer]    I was present with the student who participated in the service and in the documentation of the note. I have verified the history and personally performed the physical exam and medical decision-making. I agree with the assessment and plan of care as documented in the note.     Total time spent was 70 minutes was spent on the date of the encounter regarding goals of care and family support.    Amelia Ortez MD / Palliative Medicine   Securely message with the Vocera Web Console (learn more here)   Text page via [facility] Paging/Directory              Palliative Summary/HPI          Roxanna Stark is a 89 year old female with a past medical history of HTN, CKD, RA, PAD, aortic stenosis s/p AVR (04/2016), Atrial fibrillation (On Eliquis), and HFpEF who presented to the [facility] ED from her nursing home via EMS after she was found down 02/24/24. ED work up notable of T12 burst fracture. Deemed not to be a surgical candidate per Neurosurgery. Admitted to trauma service for pain management. Fitted for TLSO brace. Palliative was consulted due to frailty and for goals of care discussions.       Today, the patient was seen for:  - goals of care  - family support  - decision support for patient and family         Interval History:     Multidisciplinary collaboration:  Continues to receive scheduled Tylenol TID. Also received total of 7.5 mg oxycodone in past 24 hours. Frequently disoriented to place and situation. Small Bms noted. Chart reviewed.     Patient/family narrative  Upon initial visit with Roxanna, she says she is doing well. Has some increased pain when moving around or transitioning to the chair.  Was able to sleep well. Describes her stomach as feeling hard.     Had detailed discussion about goals of care for Roxanna with daughter Anna and her  Adi.          Medications:     I have reviewed this patient's medication profile and medications from this hospitalization.     Notable medications:  - Tylenol 975 mg TID  - gabapentin 100 mg daily  - Robaxin 500 mg QID  - Lidocaine patch daily  - Voltaren gel QID  - oxycodone 2.5-5 mg q4h PRN          Physical Exam:   Temp:  [97.6  F (36.4  C)-98.2  F (36.8  C)] 97.6  F (36.4  C)  Pulse:  [100-116] 100  Resp:  [12-19] 19  BP: ()/(72-80) 110/80  SpO2:  [96 %-100 %] 99 %  199 lbs 15.32 oz    Gen: elderly female sitting up in chair with brace on. appears comfortable, no apparent distress  HEENT: normocephalic, no scleral icterus, no perioral lesions, oral mucosa moist  Pulm: normal work of breathing  Abd: appears distended  Skin: no rash or jaundice on limited exam  Neuro: oriented to self, disoriented to location and situation  Psych: mood-affect congruent          Data Reviewed:     CMP  Recent Labs   Lab 02/27/24  0737 02/26/24  0645 02/25/24  1824 02/25/24  0729 02/24/24  2159    139  --  138 138   POTASSIUM 4.1 4.1  --  4.1 4.3   CHLORIDE 101 104  --  103 100   CO2 25 21*  --  25 28   ANIONGAP 9 14  --  10 10   GLC 97 87 113* 125* 163*   BUN 32.8* 23.7*  --  23.8* 29.6*   CR 1.61* 1.29*  --  1.16* 1.33*   GFRESTIMATED 29* 38*  --  43* 36*   ROEL 8.9 8.8  --  8.6 9.3   MAG 2.3 1.9  --  2.0  --    PHOS 3.6 3.3  --   --   --    PROTTOTAL  --   --   --   --  7.1   ALBUMIN  --   --   --   --  3.6   BILITOTAL  --   --   --   --  0.2   ALKPHOS  --   --   --   --  110   AST  --   --   --   --  22   ALT  --   --   --   --  12     CBC  Recent Labs   Lab 02/27/24  0737 02/26/24  0645 02/24/24  5281   WBC 15.8* 12.2* 11.3*   RBC 3.99 4.22 4.16   HGB 11.4* 12.4 11.9   HCT 35.7 39.0 37.8   MCV 90 92 91   MCH 28.6 29.4 28.6   MCHC 31.9 31.8 31.5   RDW 15.8*  15.7* 15.6*    284 263     INR  Recent Labs   Lab 02/24/24  2159   INR 1.54*     CT Thoracic spine 2/24/24 (per Radiology):  T12 burst fracture with involvement of the superior and inferior endplate resulting in 40% height loss with retropulsion causing mild spinal canal stenosis. No other fractures.     Abdominal xray 2/24/24 (per Radiology):  Still a large amount of retained stool in the rectosigmoid colon however more proximal fecal retention appears improved from CT. Borderline gas-filled small bowel loops however overall pattern does not appear obstructed.  2.  T12 fracture partially visualized.       Medical Decision Making       MANAGEMENT DISCUSSED with the following over the past 24 hours: trauma THONG   NOTE(S)/MEDICAL RECORDS REVIEWED over the past 24 hours: trauma, nursing  Tests REVIEWED in the past 24 hours:  - BMP  - CBC  SUPPLEMENTAL HISTORY, in addition to the patient's history, over the past 24 hours obtained from:   - daughter Anna and Anna's

## 2024-02-27 NOTE — PLAN OF CARE
Vitals: VSS on RA  Neuros: Alert and oriented to self only. 4/5 throughout. Generalized weakness.   IV: PIV SL'd  Labs/Electrolytes: recheck in am.  Resp/trach: WNL on RA  Diet: Regular diet  Bowel status: BS+x4, last BM 2/27  : Voiding  Skin: bruising throughout  Pain: denied  Activity: Up with assist of 1, GB, walker and TLSO.  Plan: PT/OT. Continue to monitor and follow POC.

## 2024-02-27 NOTE — PROGRESS NOTES
CHW sent POLST form to honoring [employer] and placed form in pt's chart. See SW note for more information.       Lucinda Benz   6A/B/C Community Health Worker   Phone: [phone]

## 2024-02-27 NOTE — PLAN OF CARE
Status: Pt had an unwitnessed fall at her care facility and now has a T12 burst fracture.   Vitals: VSS on RA ex intermittent soft BP.   Neuros: A&O x 3 this shift, waxes and wanes. Oriented to self, hospital, and situation with choices. Denies N/T. Strengths 4/5.   IV: PIV SL.   Resp/trach: LS clear. Denies SOB.   Diet: Regular, fair intake.   Bowel status: BS+. Multiple BMs this shift. BM meds, including enema, given this shift to help with stool burden. Intermittent incontinence.   : Voids spontaneously with intermittent incontinence.  Skin: Bruising throughout. Blanchable redness to coccyx, barrier cream applied.  Pain: C/o mild pain in back and abdomen, but declined intervention. Scheduled Tylenol given.   Activity: Assist of one, GB, walker. TLSO when OOB. Up in chair for meals.   Social: Family at bedside this shift, helpful and supportive of pt.   Plan: Possible discharge tomorrow pending bowel plan.  Updates this shift: X-rays completed. Worked with therapy.       Goal Outcome Evaluation:    Plan of Care Reviewed With: patient, family  Overall Patient Progress: no change  Outcome Evaluation: Stool burden, BM meds and enema given.

## 2024-02-27 NOTE — PROGRESS NOTES
"[facility]  Trauma Service Progress Note    Date of Service (when I saw the patient): 02/27/2024    Trauma Mechanism: Unwitnessed fall at care facility   Known Injuries:  T12 burst fracture with 40% height loss     History of present illness  Roxanna Stark is a 89 year old female with HTN, CKD, RA, PAD, aortic stenosis s/p AVR (04/2016), Atrial fibrillation (On Eliquis), HFpEF who presented to the [facility] ED from her nursing home via EMS after she was found down 02/24/24. ED work up notable of T12 burst fracture. She was admitted to the trauma service for further management. Conservative management with a TLSO brace per Neurosurgery.    Assessment & Plan     Neuro/Pain/Psych:  # unwitnessed fall on DOAC  - Head and C-spine CT without acute findings     # Acute on chronic pain   - continue PTA: gabapentin 100mg  - Scheduled: Tylenol, Robaxin 500mg 4x/day, Voltaren, Lidoderm  -  Prn: Oxycodone for severe pain     # Acute hospital associated delirium, resolved  - Patient awake all night in ED hallway bed near main door, advanced age, acute fractures with pain, new location without windows, can increase risk of delirium.   - Scheduled: Melatonin 3mg for sleep hygiene   - Allow patient to sleep through the night if vital signs are stable   - Maintain circadian rhythm.  Lights on during the day.  Off at night, minimize cares at night.  OOB during the day.  - Pain management above     Pulmonary:  # Bilateral Pleural Effusions, R > L noted on CT, subacute vs chronic  - Chest CT: Small to moderate pleural effusions right greater than left with compressive atelectasis. There is also partial collapse of the right middle lobe and lingula.   Cardiac MRI form 01/30/24 mentioned \" Moderate-large bilateral pleural effusions\"   - Denies shortness of breath, on room air  - Supplemental oxygen to keep saturation above 92 %.     Cardiovascular:    # Hypertension   # PAD  # Severe CAD  # " Aortic Stenosis s/p AVR w/ porcine valve 4/25/16  # pAfib   # HFpEF on most recent ECHO 01/2024  # Elongated Qtc 458/508  Improved BP's no PRN required overnight, continue current regimen  - Continue PTA: amiodarone, metoprolol, Lasix daily (hold today's dose)  - PRN hydralazine and labetalol for SBP >160  - Avoid QTc prolonging medications     GI/Nutrition:    # Constipation, possible ileus  More distended on exam, not certain if passing flatus, denies nausea, had small stool smears suspect may be leaking around, common with lower T spine injuries.  No good BM after supp yesterday  Obtain Abdominal XR  Pink lady enema  Minimize opoids as able  Regular diet, encourage hydration  Ambulate     Renal/ Fluids/Electrolytes:  # CKD baseline 1.2-1.6  - Hold Lasix   - electrolyte replacement protocol in place.      Endocrine:  # Rheumatoid arthritis  - continue PTA: hydroxychloroquine, sulfasalazine      # Thyroid nodules   - Chest CT: Heterogeneous nodular thyroid. Correlate with thyroid ultrasound which may be performed on a nonemergent outpatient basis.   - Pt to follow-up with PCP after discharge      Infectious disease:   # Empirically treating UTI  Rising white count without fever, and normal procal   To completed 3rd dose of ceftriaxone for subtle UTI today  Suspect possible ileus  See GI     Hematology:    Hbg 12.4, plt 284  - Threshold for transfusion if hgb <7.0 or signs/symptoms of hypoperfusion.        # Coagulopathy, long term use of anticoagulant  Continue PTA Apixaban  Further discussions with Cardiology outpatient regarding risk benefit of DOAC for Afib given falls and increased risk of ICH     Musculoskeletal:  # Weakness and deconditioning of acute on chronic illness   # T12 burst fracture, > 40% height loss with mild retropulsion  - Neurosurgery consulted: No surgical intervention planned,   - TLSO brace recommended followed by upright XR's of the T spine today after working with PT  - Follow up  outpatient with XR's of the Thoracic spine in 6 weeks  - Physical and occupational therapy consults.     Skin:  - dilgent cares to prevent skin breakdown and wound formation.    Consults: SW/ CC for discharge planning, Palliative Care  Lines/ tubes/ drains:  - PIV   General Cares:                 PPI/H2 blocker:  NA                DVT prophylaxis: pcd                Bowel Regimen/Date of last stool:02/22                Pulmonary toilet: deep cough, OOB     Code status:  DNR/DNI               Discharge goals:   Adequate pain management:yes  VSS x24 hours: yes  Hemoglobin stable x 48 hours: NA  Ambulating safely and/or therapy evals complete: yes  Drains/lines removed or plan in place to manage: yes  Teaching done: yes  Other:  Expected D/C date: pending bowel plan, possibly tomorrow    Interval History   No acute events last night, slept well. Has small BM smears, abdomen is distended today, denies nausea, not sure if passing flatus  ROS x 8 negative with exception of those things listed in interval hx    Physical Exam   Temp: 98.1  F (36.7  C) Temp src: Oral BP: 127/64 Pulse: 74   Resp: 16 SpO2: 94 % O2 Device: None (Room air)    Vitals:    02/26/24 1101   Weight: 50.9 kg (112 lb 3.4 oz)     Vital Signs with Ranges  Temp:  [97.6  F (36.4  C)-98.1  F (36.7  C)] 98.1  F (36.7  C)  Pulse:  [67-84] 74  Resp:  [16] 16  BP: (104-141)/(63-70) 127/64  SpO2:  [94 %-100 %] 94 %  I/O last 3 completed shifts:  In: 150 [P.O.:150]  Out: -     Bruce Coma Scale - Total 14/15  Eye Response (E): 4   4= spontaneous, 3= to verbal/voice, 2= to pain, 1= No response   Verbal Response (V): 4   5= Orientated, converses, 4= Confused, converses, 3= Inappropriate words, 2= Incomprehensible sounds, 1=No response   Motor Response (M): 6   6= Obeys commands, 5= Localizes to pain, 4= Withdrawal to pain, 3=Fexion to pain, 2= Extension to pain, 1= No response   Constitutional: Awake, alert, cooperative, no apparent distress, pleasantly  confused  Eyes: PERRL  ENT: Normocephalic, atraumatic  Respiratory: No increased work of breathing, good air exchange, clear to auscultation bilaterally, no crackles or wheezing.  Cardiovascular:  regular rate and rhythm, normal S1 and S2, +murmur  GI: distended, not taut, mild tenderness to palpation  Genitourinary:  not seen  Skin:  Normal skin color, no redness, warmth, or swelling, no ecchymosis, no abrasions, and no jaundice.  Musculoskeletal: There is no redness, warmth, or swelling of the joints.  Pedal pulse palpated.  Neurologic: Awake, alert, oriented to self, No focal deficits.  Neuropsychiatric: Calm, normal eye contact, alert, pleasant    CARMELITA Gonzalez CNP  To contact the trauma service use job code pager [phone],   Numeric texts or alpha text through [facility]

## 2024-02-27 NOTE — PROGRESS NOTES
[facility], [place]   Neurosurgery Progress Note:    Date of service: 2/26/2024    Assessment: Roxanna Stark is a 89-year-old female, right-handed with complex past medical history-on Eliquis, deconditioned who presented to the nursing home after a fall.  Imaging shows 3 column Fracture at T12.     Clinically Significant Risk Factors                  # Hypertension: Noted on problem list                    Plan:  -TLSO brace when out of bed   -PT/OT assessments  -UXR completed on 2/27/2024  -Plan to follow-up with Neurosurgery in 6 weeks with repeat UXR.   -Neurosurgery to follow peripherally now.  Please call with questions or concerns.      CARMELITA Boston, CNP  Neurosurgery  Pager [phone]      Interval History:  Patient report back pain improved this AM. Reports tolerating TLSO brace. Plan to wear brace when OOB as long as brace does not hinder respiratory status.         Objective:   Temp:  [97.6  F (36.4  C)-98.1  F (36.7  C)] 98.1  F (36.7  C)  Pulse:  [67-84] 74  Resp:  [16] 16  BP: (104-141)/(63-70) 127/64  SpO2:  [94 %-100 %] 94 %  I/O last 3 completed shifts:  In: 150 [P.O.:150]  Out: -     Gen: Appears comfortable, NAD  Neurologic:  - Alert & Oriented to person, place, time, and situation  - Follows commands briskly  - Speech fluent, spontaneous. No aphasia or dysarthria.  - No gaze preference. No apparent hemineglect.  - PERRL, EOMI  - Strong eye closure, jaw clench, and cheek puff  - Face symmetric with sensation intact to light touch  - Palate elevates symmetrically, uvula midline, tongue protrudes midline  - Trapezii and sternocleidomastoid muscles 5/5 bilaterally  - No pronator drift     Del Tr Bi WE WF Gr   R 5 5 5 5 5 5   L 5 5 5 5 5 5    HF KE KF DF PF EHL   R 5 5 5 5 5 5   L 5 5 5 5 5 5     Reflexes 2+ throughout    Sensation intact and symmetric to light touch throughout    LABS  Recent Labs   Lab Test 02/27/24  0737 02/26/24  0645 02/24/24  2159   WBC 15.8* 12.2*  11.3*   HGB 11.4* 12.4 11.9   MCV 90 92 91    284 263       Recent Labs   Lab Test 02/27/24  0737 02/26/24  0645 02/25/24  1824 02/25/24  0729    139  --  138   POTASSIUM 4.1 4.1  --  4.1   CHLORIDE 101 104  --  103   CO2 25 21*  --  25   BUN 32.8* 23.7*  --  23.8*   CR 1.61* 1.29*  --  1.16*   ANIONGAP 9 14  --  10   ROEL 8.9 8.8  --  8.6   GLC 97 87 113* 125*       IMAGING    No results found for this or any previous visit (from the past 24 hour(s)).

## 2024-02-27 NOTE — CONSULTS
"SPIRITUAL HEALTH SERVICES - Consult Note   [facility] [address]    Referral Source/Reason for Visit: Routine Consult     Summary and Recommendations -   - I visited with Roxanna who was initially alone and then joined by her daughter Anna and another visitor.  - Anna has found palliative care beneficial.  - Roxanna claimed she is doing well emotionally, but also sometimes feels she gets left out of conversations that are about her.     Plan: I will follow up with Roxanna and her family later this week.     Tram Grant  Chaplain Resident  Pager [phone]    [facility] available 24/7 for emergent requests/referrals, either by paging the on-call  or by entering an ASAP/STAT consult in [facility], which will also page the on-call .      Assessment     Saw pt Roxanna JERRY Lincoln per Routine Consult    Patient/Family Understanding of Illness and Goals of Care - Roxanna shared that she had a fall and hurt her back.  She says they didn't figure out her back was hurt for awhile.  Roxanna says that since then her back has been feeling better.     Distress and Loss - Roxanna shared that sometimes she feels her children don't let her know what's going on with her health.  She feels like everyone communicates with her children and not her. Roxanna says overall she's been feeling good, but sometimes she gets a little bored.    Strengths, Coping, and Resources - Roxanna expressed that her children are a source of support for her.  Anna expressed that talking to palliative was a positive and helpful experience.     Meaning, Beliefs, and Spirituality - Roxanna said she's not necessarily \"spiritual\" but she has gone to her [employer] for many years.  According to her daughter she was a founding member 77 years ago.    "

## 2024-02-27 NOTE — PLAN OF CARE
Status: Presented to the [facility] ED from nursing home via EMS after she was found down 24. ED wok up notable T12 burst fracture. PMHx of HTN, CKD, PAD, aortic stenosis sp/ AVR and Atrial fibrillation ( on Eliquis).   Vitals: VSS on RA. Continue ox in place.   Neuros: Disoriented X3 with choices. Knew name and . Denies N/T. Strengthen 4/5 throughout. Generalized weakness.   IV: PIV SL.   Labs/Electrolytes: WNL.Redraw am .   Resp/trach: Denies SOB. Sating high 90s .   Diet: Regular and good intake.   Bowel status: Had several small/smear BMS this shift. Scheduled bowel meds taken. BS+.   : Voiding w/out difficulty to BR.   Skin: Bruising t/o.   Pain: Denies. Scheduled tyenol, Dicolfenac cream applied. Refused scheduled lidocaine patch.   Activity: Up w/ A1/GB and walker. Up on most of the shift. Need call lights within reach and BA on at all times. Hx of fall X3 recently.   Social: Pt's family visited at bedside and supportive with cares.   Plan: Continue monitor and follow POC.

## 2024-02-28 ENCOUNTER — APPOINTMENT (OUTPATIENT)
Dept: ULTRASOUND IMAGING | Facility: CLINIC | Age: 89
DRG: 552 | End: 2024-02-28
Attending: NURSE PRACTITIONER
Payer: MEDICARE

## 2024-02-28 ENCOUNTER — APPOINTMENT (OUTPATIENT)
Dept: PHYSICAL THERAPY | Facility: CLINIC | Age: 89
DRG: 552 | End: 2024-02-28
Payer: MEDICARE

## 2024-02-28 ENCOUNTER — APPOINTMENT (OUTPATIENT)
Dept: CARDIOLOGY | Facility: CLINIC | Age: 89
DRG: 552 | End: 2024-02-28
Attending: NURSE PRACTITIONER
Payer: MEDICARE

## 2024-02-28 ENCOUNTER — DOCUMENTATION ONLY (OUTPATIENT)
Dept: OTHER | Facility: CLINIC | Age: 89
End: 2024-02-28
Payer: MEDICARE

## 2024-02-28 LAB
ALBUMIN MFR UR ELPH: 32.6 MG/DL
ALBUMIN UR-MCNC: 10 MG/DL
ANION GAP SERPL CALCULATED.3IONS-SCNC: 10 MMOL/L (ref 7–15)
ANION GAP SERPL CALCULATED.3IONS-SCNC: 12 MMOL/L (ref 7–15)
APPEARANCE UR: CLEAR
BILIRUB UR QL STRIP: NEGATIVE
BUN SERPL-MCNC: 40 MG/DL (ref 8–23)
BUN SERPL-MCNC: 40.5 MG/DL (ref 8–23)
CALCIUM SERPL-MCNC: 8.4 MG/DL (ref 8.2–9.6)
CALCIUM SERPL-MCNC: 8.4 MG/DL (ref 8.2–9.6)
CHLORIDE SERPL-SCNC: 100 MMOL/L (ref 98–107)
CHLORIDE SERPL-SCNC: 101 MMOL/L (ref 98–107)
COLOR UR AUTO: YELLOW
CREAT SERPL-MCNC: 1.94 MG/DL (ref 0.51–0.95)
CREAT SERPL-MCNC: 2.05 MG/DL (ref 0.51–0.95)
CREAT UR-MCNC: 87.8 MG/DL
CREAT UR-MCNC: 87.8 MG/DL
DEPRECATED HCO3 PLAS-SCNC: 25 MMOL/L (ref 22–29)
DEPRECATED HCO3 PLAS-SCNC: 25 MMOL/L (ref 22–29)
EGFRCR SERPLBLD CKD-EPI 2021: 22 ML/MIN/1.73M2
EGFRCR SERPLBLD CKD-EPI 2021: 23 ML/MIN/1.73M2
ERYTHROCYTE [DISTWIDTH] IN BLOOD BY AUTOMATED COUNT: 15.8 % (ref 10–15)
FRACT EXCRET NA UR+SERPL-RTO: 0.9 %
GLUCOSE SERPL-MCNC: 132 MG/DL (ref 70–99)
GLUCOSE SERPL-MCNC: 81 MG/DL (ref 70–99)
GLUCOSE UR STRIP-MCNC: NEGATIVE MG/DL
HCT VFR BLD AUTO: 34.5 % (ref 35–47)
HGB BLD-MCNC: 10.9 G/DL (ref 11.7–15.7)
HGB UR QL STRIP: NEGATIVE
HYALINE CASTS: 6 /LPF
KETONES UR STRIP-MCNC: NEGATIVE MG/DL
LEUKOCYTE ESTERASE UR QL STRIP: NEGATIVE
LVEF ECHO: NORMAL
MAGNESIUM SERPL-MCNC: 2.5 MG/DL (ref 1.7–2.3)
MCH RBC QN AUTO: 28.7 PG (ref 26.5–33)
MCHC RBC AUTO-ENTMCNC: 31.6 G/DL (ref 31.5–36.5)
MCV RBC AUTO: 91 FL (ref 78–100)
NITRATE UR QL: NEGATIVE
PH UR STRIP: 5 [PH] (ref 5–7)
PHOSPHATE SERPL-MCNC: 4 MG/DL (ref 2.5–4.5)
PLATELET # BLD AUTO: 249 10E3/UL (ref 150–450)
POTASSIUM SERPL-SCNC: 3.6 MMOL/L (ref 3.4–5.3)
POTASSIUM SERPL-SCNC: 3.7 MMOL/L (ref 3.4–5.3)
PROT/CREAT 24H UR: 0.37 MG/MG CR (ref 0–0.2)
RADIOLOGIST FLAGS: NORMAL
RBC # BLD AUTO: 3.8 10E6/UL (ref 3.8–5.2)
RBC URINE: 1 /HPF
SODIUM SERPL-SCNC: 136 MMOL/L (ref 135–145)
SODIUM SERPL-SCNC: 137 MMOL/L (ref 135–145)
SODIUM UR-SCNC: 53 MMOL/L
SP GR UR STRIP: 1.02 (ref 1–1.03)
SQUAMOUS EPITHELIAL: <1 /HPF
TRANSITIONAL EPI: <1 /HPF
TSH SERPL DL<=0.005 MIU/L-ACNC: 1.58 UIU/ML (ref 0.3–4.2)
UROBILINOGEN UR STRIP-MCNC: NORMAL MG/DL
WBC # BLD AUTO: 16.9 10E3/UL (ref 4–11)
WBC URINE: 1 /HPF

## 2024-02-28 PROCEDURE — 84100 ASSAY OF PHOSPHORUS: CPT | Performed by: PHYSICIAN ASSISTANT

## 2024-02-28 PROCEDURE — 80048 BASIC METABOLIC PNL TOTAL CA: CPT | Performed by: NURSE PRACTITIONER

## 2024-02-28 PROCEDURE — 76770 US EXAM ABDO BACK WALL COMP: CPT | Mod: 26 | Performed by: STUDENT IN AN ORGANIZED HEALTH CARE EDUCATION/TRAINING PROGRAM

## 2024-02-28 PROCEDURE — 97530 THERAPEUTIC ACTIVITIES: CPT | Mod: GP | Performed by: PHYSICAL THERAPIST

## 2024-02-28 PROCEDURE — 36415 COLL VENOUS BLD VENIPUNCTURE: CPT | Performed by: PHYSICIAN ASSISTANT

## 2024-02-28 PROCEDURE — 250N000013 HC RX MED GY IP 250 OP 250 PS 637: Performed by: PHYSICIAN ASSISTANT

## 2024-02-28 PROCEDURE — 93306 TTE W/DOPPLER COMPLETE: CPT | Mod: 26 | Performed by: INTERNAL MEDICINE

## 2024-02-28 PROCEDURE — 120N000002 HC R&B MED SURG/OB UMMC

## 2024-02-28 PROCEDURE — 99222 1ST HOSP IP/OBS MODERATE 55: CPT | Performed by: INTERNAL MEDICINE

## 2024-02-28 PROCEDURE — 97116 GAIT TRAINING THERAPY: CPT | Mod: GP | Performed by: PHYSICAL THERAPIST

## 2024-02-28 PROCEDURE — 250N000013 HC RX MED GY IP 250 OP 250 PS 637: Performed by: STUDENT IN AN ORGANIZED HEALTH CARE EDUCATION/TRAINING PROGRAM

## 2024-02-28 PROCEDURE — 83735 ASSAY OF MAGNESIUM: CPT | Performed by: PHYSICIAN ASSISTANT

## 2024-02-28 PROCEDURE — 36415 COLL VENOUS BLD VENIPUNCTURE: CPT | Performed by: NURSE PRACTITIONER

## 2024-02-28 PROCEDURE — 258N000003 HC RX IP 258 OP 636: Performed by: NURSE PRACTITIONER

## 2024-02-28 PROCEDURE — 80048 BASIC METABOLIC PNL TOTAL CA: CPT | Performed by: PHYSICIAN ASSISTANT

## 2024-02-28 PROCEDURE — 82570 ASSAY OF URINE CREATININE: CPT | Performed by: NURSE PRACTITIONER

## 2024-02-28 PROCEDURE — 250N000013 HC RX MED GY IP 250 OP 250 PS 637: Performed by: NURSE PRACTITIONER

## 2024-02-28 PROCEDURE — 97110 THERAPEUTIC EXERCISES: CPT | Mod: GP | Performed by: PHYSICAL THERAPIST

## 2024-02-28 PROCEDURE — 81001 URINALYSIS AUTO W/SCOPE: CPT | Performed by: NURSE PRACTITIONER

## 2024-02-28 PROCEDURE — 76770 US EXAM ABDO BACK WALL COMP: CPT

## 2024-02-28 PROCEDURE — 84443 ASSAY THYROID STIM HORMONE: CPT | Performed by: NURSE PRACTITIONER

## 2024-02-28 PROCEDURE — 93306 TTE W/DOPPLER COMPLETE: CPT

## 2024-02-28 PROCEDURE — 85027 COMPLETE CBC AUTOMATED: CPT | Performed by: NURSE PRACTITIONER

## 2024-02-28 PROCEDURE — 84156 ASSAY OF PROTEIN URINE: CPT | Performed by: NURSE PRACTITIONER

## 2024-02-28 PROCEDURE — 99232 SBSQ HOSP IP/OBS MODERATE 35: CPT | Performed by: NURSE PRACTITIONER

## 2024-02-28 RX ORDER — POLYETHYLENE GLYCOL 3350 17 G/17G
17 POWDER, FOR SOLUTION ORAL DAILY
Status: DISCONTINUED | OUTPATIENT
Start: 2024-02-29 | End: 2024-03-02 | Stop reason: HOSPADM

## 2024-02-28 RX ORDER — AMOXICILLIN 250 MG
2 CAPSULE ORAL
Status: DISCONTINUED | OUTPATIENT
Start: 2024-02-28 | End: 2024-03-02

## 2024-02-28 RX ADMIN — ACETAMINOPHEN 975 MG: 325 TABLET, FILM COATED ORAL at 09:36

## 2024-02-28 RX ADMIN — ACETAMINOPHEN 975 MG: 325 TABLET, FILM COATED ORAL at 14:15

## 2024-02-28 RX ADMIN — LIDOCAINE 1 PATCH: 4 PATCH TOPICAL at 20:06

## 2024-02-28 RX ADMIN — SULFASALAZINE 500 MG: 500 TABLET ORAL at 09:37

## 2024-02-28 RX ADMIN — AMIODARONE HYDROCHLORIDE 200 MG: 200 TABLET ORAL at 09:37

## 2024-02-28 RX ADMIN — APIXABAN 2.5 MG: 2.5 TABLET, FILM COATED ORAL at 09:37

## 2024-02-28 RX ADMIN — APIXABAN 2.5 MG: 2.5 TABLET, FILM COATED ORAL at 20:06

## 2024-02-28 RX ADMIN — HYDROXYCHLOROQUINE SULFATE 200 MG: 200 TABLET, FILM COATED ORAL at 09:37

## 2024-02-28 RX ADMIN — GABAPENTIN 100 MG: 100 CAPSULE ORAL at 09:37

## 2024-02-28 RX ADMIN — METHOCARBAMOL 500 MG: 500 TABLET ORAL at 09:37

## 2024-02-28 RX ADMIN — Medication 3 MG: at 20:06

## 2024-02-28 RX ADMIN — METOPROLOL TARTRATE 25 MG: 25 TABLET, FILM COATED ORAL at 09:37

## 2024-02-28 RX ADMIN — METOPROLOL TARTRATE 25 MG: 25 TABLET, FILM COATED ORAL at 20:06

## 2024-02-28 RX ADMIN — ACETAMINOPHEN 975 MG: 325 TABLET, FILM COATED ORAL at 20:06

## 2024-02-28 RX ADMIN — METHOCARBAMOL 500 MG: 500 TABLET ORAL at 14:15

## 2024-02-28 RX ADMIN — METHOCARBAMOL 500 MG: 500 TABLET ORAL at 20:06

## 2024-02-28 RX ADMIN — Medication 1 TABLET: at 09:37

## 2024-02-28 RX ADMIN — SODIUM CHLORIDE 500 ML: 9 INJECTION, SOLUTION INTRAVENOUS at 09:52

## 2024-02-28 ASSESSMENT — ACTIVITIES OF DAILY LIVING (ADL)
ADLS_ACUITY_SCORE: 38
ADLS_ACUITY_SCORE: 42
ADLS_ACUITY_SCORE: 38
ADLS_ACUITY_SCORE: 42
ADLS_ACUITY_SCORE: 42
ADLS_ACUITY_SCORE: 38
ADLS_ACUITY_SCORE: 42
ADLS_ACUITY_SCORE: 38
ADLS_ACUITY_SCORE: 42
ADLS_ACUITY_SCORE: 42
ADLS_ACUITY_SCORE: 38
ADLS_ACUITY_SCORE: 42

## 2024-02-28 ASSESSMENT — VISUAL ACUITY: OU: GLASSES

## 2024-02-28 NOTE — PROGRESS NOTES
"[facility]  Trauma Service Progress Note    Date of Service (when I saw the patient): 02/28/2024  Trauma Mechanism: Unwitnessed fall at care facility   Known Injuries:  T12 burst fracture with 40% height loss     History of present illness  Roxanna Stark is a 89 year old female with HTN, CKD, RA, PAD, aortic stenosis s/p AVR (04/2016), Atrial fibrillation (On Eliquis), HFpEF who presented to the [facility] ED from her nursing home via EMS after she was found down 02/24/24. ED work up notable of T12 burst fracture. She was admitted to the trauma service for further management. Conservative management with a TLSO brace per Neurosurgery.    Assessment & Plan     Neuro/Pain/Psych:  # unwitnessed fall on DOAC  - Head and C-spine CT without acute findings     # Acute on chronic pain   - continue PTA: gabapentin 100mg  - Scheduled: Tylenol, Robaxin 500mg 4x/day for 7 days then stop, Lidoderm patches  -  Prn: Oxycodone plan to discontinue today if pain remains controlled      # Acute hospital associated delirium, resolved  - Patient awake all night in ED hallway bed near main door, advanced age, acute fractures with pain, new location without windows, can increase risk of delirium.   - Scheduled: Melatonin 3mg for sleep hygiene   - Allow patient to sleep through the night if vital signs are stable   - Maintain circadian rhythm.  Lights on during the day.  Off at night, minimize cares at night.  OOB during the day.  - Pain management above     Pulmonary:  # Bilateral Pleural Effusions, R > L noted on CT, subacute vs chronic  - Chest CT: Small to moderate pleural effusions right greater than left with compressive atelectasis. There is also partial collapse of the right middle lobe and lingula.   Cardiac MRI form 01/30/24 mentioned \" Moderate-large bilateral pleural effusions\"   - Denies shortness of breath, on room air  - Supplemental oxygen to keep saturation above 92 %.   "   Cardiovascular:    # Hypertension   # PAD  # Severe CAD  # Aortic Stenosis s/p AVR w/ porcine valve 4/25/16  # pAfib   # HFpEF on most recent ECHO 01/2024  # Elongated Qtc 458/508  - Continue PTA: amiodarone, metoprolol, Lasix (hold)  - PRN hydralazine and labetalol for SBP >160  - Avoid QTc prolonging medications     GI/Nutrition:    # Constipation, possible ileus, resolved  Large BM yesterday after enema  Senna to PRN     Renal/ Fluids/Electrolytes:  # NICOLAS on CKD   Creatinine slowing rising upto 2.05 today up from her baseline of 1.2-1.6, BUN 40  Making urine though hard to quantify due to incontinence. On exam does not appear to be volume overloaded, on room air, BP's OK, not tachy. Weight trend (11/23 (52kg), 02/01(50.8), 02/26 (50.8) most recent  Electrolytes are so far WNR.  - Continue to hold lasix for now  - Discontinue topical Diclofenac gel may enhance the nephrotoxic effect of 5-Aminosalicylic Acid Derivatives( Sulfasalazine)  - Had multiple loose stools yesterday due to aggressive bowel regimen 2/2 large colonic stool burden  - 500ml NS bolus now  - Obtain TTE and renal ultrasound  - Bedside bladder scan to ensure bladder emptying  - Obtain repeat urinalysis and urine chemistries  - Obtain weights daily  - BMP daily  - Nephrology consult if pending workup and ongoing improving  - electrolyte replacement protocol in place.      Endocrine:  # Rheumatoid arthritis  - continue PTA: hydroxychloroquine, sulfasalazine      # Thyroid nodules   - Chest CT: Heterogeneous nodular thyroid. Correlate with thyroid ultrasound which may be performed on a nonemergent outpatient basis.   - TSH/T4  - Pt to follow-up with PCP after discharge      Infectious disease:   # Possible asymptomatic UTI, completed 3 day course of ceftriaxone 02/25-02/27    Hematology:    # Mild and slowly rising leukocytosis w/o clear source of infection  # Anemia of acute on chronic illness  Afebrile, stable on room air,   Hbg 11.4g.dL -> 10.9,  "plt 249  - Threshold for transfusion if hgb <7.0 or signs/symptoms of hypoperfusion.        # Coagulopathy, long term use of anticoagulant  Continue PTA Apixaban  Further discussions with Cardiology outpatient regarding risk benefit of DOAC for Afib given falls and increased risk of ICH     Musculoskeletal:  # Weakness and deconditioning of acute on chronic illness   # T12 burst fracture, > 40% height loss with mild retropulsion  - Neurosurgery consulted: No acute surgical intervention planned,   - Post mobility XR's of the T spine completed  - Follow up outpatient with XR's of the Thoracic spine in 6 weeks  - Physical and occupational therapy consults.     Skin:  - dilgent cares to prevent skin breakdown and wound formation.    Consults: SW/ CC for discharge planning, Palliative Care  Lines/ tubes/ drains:  - PIV   General Cares:                 PPI/H2 blocker:  NA                DVT prophylaxis: PTA Apixaban                Bowel Regimen/Date of last stool:02/28                Pulmonary toilet: deep cough, OOB     Code status:  DNR/DNI               Discharge goals:   Adequate pain management:yes  VSS x24 hours: yes  Hemoglobin stable x 48 hours: NA  Ambulating safely and/or therapy evals complete: yes  Drains/lines removed or plan in place to manage: yes  Teaching done: yes  Other: Hold discharge for now until further evaluation of NICOLAS    Interval History   \" My belly has gone down\". Large loose BM overnight. Denies abdominal pain or nausea. Denies chest pain or shortness of breath. Walked in the hallway yesterday with therapy.  ROS x 8 negative with exception of those things listed in interval hx    Physical Exam   Temp: 98.4  F (36.9  C) Temp src: Axillary BP: 110/56 Pulse: 74   Resp: 14 SpO2: 98 % O2 Device: None (Room air)    Vitals:    02/26/24 1101   Weight: 50.9 kg (112 lb 3.4 oz)     Vital Signs with Ranges  Temp:  [97.6  F (36.4  C)-98.4  F (36.9  C)] 98.4  F (36.9  C)  Pulse:  [61-74] 74  Resp:  [14-16] " 14  BP: ()/(49-63) 110/56  SpO2:  [93 %-99 %] 98 %  I/O last 3 completed shifts:  In: 1620 [P.O.:1620]  Out: -     Bruce Coma Scale - Total 14/15  Eye Response (E): 4   4= spontaneous, 3= to verbal/voice, 2= to pain, 1= No response   Verbal Response (V): 5   5= Orientated, converses, 4= Confused, converses, 3= Inappropriate words, 2= Incomprehensible sounds, 1=No response   Motor Response (M): 6   6= Obeys commands, 5= Localizes to pain, 4= Withdrawal to pain, 3=Fexion to pain, 2= Extension to pain, 1= No response   Constitutional: Awake, alert, cooperative, no apparent distress, pleasantly confused  Eyes: PERRL  ENT: Normocephalic, atraumatic  Respiratory: No increased work of breathing, good air exchange, clear to auscultation bilaterally, no crackles or wheezing.  Cardiovascular:  regular rate and rhythm, normal S1 and S2, +murmur  GI: less distended and softer, non tender  Genitourinary:  not seen  Skin:  Warm and dry  Musculoskeletal: There is no redness, warmth, or swelling of the joints.  Pedal pulse palpated.  Neurologic: Awake, alert, oriented to self and place, No focal deficits.  Neuropsychiatric: Calm, normal eye contact, alert, pleasant    CARMELITA Gonzalez CNP  To contact the trauma service use job code pager [phone],   Numeric texts or alpha text through [facility]

## 2024-02-28 NOTE — PLAN OF CARE
Vitals: VSS on RA  Neuros: Not assessed d/t DND  IV: PIV SL'd  Labs/Electrolytes: recheck in am.  Resp/trach: WNL on RA  Diet: Regular diet  Bowel status: BS+x4, large loose BM this shift.   : Voiding  Skin: bruising throughout  Pain: denied  Activity: Up with assist of 1, GB, walker and TLSO.  Plan: PT/OT. Continue to monitor and follow POC.

## 2024-02-28 NOTE — PLAN OF CARE
Goal Outcome Evaluation:      Plan of Care Reviewed With: patient, child    Overall Patient Progress: improvingOverall Patient Progress: improving    Outcome Evaluation: Patient and family are contemplating d/c plan, TCU vs going directly to assisted living    GHISLAINE Montez  Social Work, 6A  Phone:  [phone]  Pager:  [phone]  2/28/2024

## 2024-02-28 NOTE — CONSULTS
Care Management Initial Consult    General Information  Assessment completed with:  (Patient, daughter (Anna) and son in law), Patient, daughter (Anna) and son in law  Type of CM/SW Visit: Initial Assessment    Primary Care Provider verified and updated as needed: Yes (Dr. Swapna Gaines at [facility])   Readmission within the last 30 days: current reason for admission unrelated to previous admission      Reason for Consult: discharge planning  Advance Care Planning:    Pt has advance care planning documents available in EPIC       Communication Assessment  Patient's communication style: spoken language (English or Bilingual)    Hearing Difficulty or Deaf: yes   Wear Glasses or Blind: yes    Cognitive  Cognitive/Neuro/Behavioral: WDL  Level of Consciousness: alert  Arousal Level: opens eyes spontaneously  Orientation: oriented x 4 (knew Feb 2024)  Mood/Behavior: calm, cooperative  Best Language: 0 - No aphasia  Speech: clear, spontaneous    Living Environment:   People in home: alone     Current living Arrangements: house      Able to return to prior arrangements:  (Patient's family has secured and assisted living apt for pt)       Family/Social Support:  Care provided by: self  Provides care for: no one  Marital Status:   Children          Description of Support System: Supportive, Involved    Support Assessment: Adequate family and caregiver support    Current Resources:   Patient receiving home care services: No     Community Resources:  (Patient has a handicapped parking certificate and she has an emergency response pendant that she wears)  Equipment currently used at home:  (pt owns a walker, cane, bath bench with attached grab bar, grab bars in the bathroom, a hh shower nozzle and a reacher)  Supplies currently used at home:      Employment/Financial:  Employment Status: retired     Employment/ Comments: Patient did not serve in the   Financial Concerns:     Referral to  "Financial Worker: No       Does the patient's insurance plan have a 3 day qualifying hospital stay waiver?  No    Lifestyle & Psychosocial Needs:  Social Determinants of Health     Food Insecurity: Not on file   Depression: Not at risk (6/1/2023)    PHQ-2     PHQ-2 Score: 0   Housing Stability: Not on file   Tobacco Use: Low Risk  (1/27/2024)    Patient History     Smoking Tobacco Use: Never     Smokeless Tobacco Use: Never     Passive Exposure: Never   Financial Resource Strain: Not on file   Alcohol Use: Not on file   Transportation Needs: Not on file   Physical Activity: Not on file   Interpersonal Safety: Not on file   Stress: Not on file   Social Connections: Not on file       Functional Status:  Prior to admission patient needed assistance:   Dependent ADLs::  (Patient was indep with adl's and mobility when last at home (patient was in a TCU prior to current hospitalization))  Dependent IADLs::  (Patient was indep with IADL\"s when last at home (patient was in a TCU prior to current hospitalization))       Mental Health Status:  Mental Health Status: No Current Concerns       Chemical Dependency Status:  Chemical Dependency Status: No Current Concerns             Values/Beliefs:  Spiritual, Cultural Beliefs, Buddhism Practices, Values that affect care: yes          Values/Beliefs Comment: [place]    Additional Information:  LORI received NP order to see pt for discharge planning and initial assessment.      LORI met with pt, pt's daughter (Anna) and son in law.  Prior to 1/27/2024, pt was living alone in a single family home with 2 steps to enter (and a handrail).  Pt's bed and bath were on the 2nd floor of the home and pt had a stair lift.    Laundry was on the main floor of the home.  Prior to 1/27/2024, pt was indep with all mobility using a cane.  Pt has had 3 falls in the past year with the most recent fall resulting in a fracture.  Prior to 1/27/2024, pt was indep with adl's and IADL's.  Pt was driving, " taking medications indep and managing her own finances.  Pt's daughter (Anna) is now assisting pt with finances.  Pt owns a walker, cane, bath bench with attached grab bar handle, grab bars, a hh shower nozzle and a grabber/reacher.  Pt was not receiving skilled home care service and pt did not have a .  Pt wore and emergency response pendant at home and pt has a disability parking certificate.  Pt was still driving.  Pt's primary care physician is Dr. Swapna Gaines who offices at the [facility].  Pt has been hospitalized at [facility] in the past 30 days (on 2024) for reasons that are not related to current hospitalization.  Pt did not serve in the .  Pt states that her Catholic danny and attending Orthodox has been important to her.  Pt is retired.  Pt's primary occupation was a .  Pt's income includes Social Security and a pension.  Pt does not have a history of MI/CD or current concerns related to MI/CD.  Pt is .  Pt's daughter (Josefina) is .  Pt states that her living children are supportive and include:  - Anna, resides in [facility], resides in [facility], resides in [facility], resides in [facility], resides in [place].      Pt has a Health Care Directive available in EPIC.  Pt had 2 POLST documents  (on 2024 and on 2024) completed  however, neither were signed by the physician on both pages (as required). Thus, Emily King NP completed a POLST document with pt today and the document is being sent to [facility] for scanning into Epic by the  Community Health Worker.      Disposition planning was discussed.    Pt and family tell SW that they have secured an assisted living apt for pt at [facility].    Pt will have a 1 bedroom  1 bath (walk in shower) apt.  There are emergency pull cords in the bedroom and bathroom.  The building will provide an emergency response necklace  "with a tracker.  The building will provide 3 meals per day.  The building will administer, order and retrieve pt's medications.  The current arrangement is for the assisted living to provide pt with a shower/bath 1-2 times per week and provide light housekeeping and laundry (linens/towels) 1x per week.  Pt's family reports that services can be increased if needed.  The assisted living will also  and dispose of pt's trash.  Family states that pt prefers to launder her own clothing but the assisted living will complete this task if pt agree's.      Prior to current hospitalization, pt had been at [facility] for a TCU stay 2/1/2024 - 2/25/2024 at which time pt was re- hospitalized at [facility].  LORI spoke with Kayden in Admissions at  [facility] and  pt does not have a bed hold.  OT and Physical Therapy are now  recommending a TCU stay.  Pt and family are contemplating whether to accept the TCU stay or have pt discharge directly to the assisted living with increased services and with orders for home OT and Phy Therapy.  LORI spoke with pt and family about the pro's and con's of each including that pt was receive less than 50% of the the therapy she would receive if she discharged to assisted living rather than to a TCU stay.   LORI also arranged for Physical Therapy (Elena) to provide input.  Elena continues to recommend TCU after discussion.  LORI provided pt/family with a \"Medicare Care Compare\" document.  They plan to discuss TCU vs direct placement at assistive living and will let this SW know their decision tomorrow.     LORI will continue to follow for discharge planning.    GHISLAINE Montez  Social Work, 6A  Phone:  [phone]  Pager:  [phone]  2/28/2024       MAITE Peterson     "

## 2024-02-28 NOTE — PLAN OF CARE
Status: Presented to the [facility] ED from nursing home via EMS after she was found down 02/24/24. ED wok up notable T12 burst fracture. PMHx of HTN, CKD, PAD, aortic stenosis sp/ AVR and Atrial fibrillation ( on Eliquis).   Vitals: VSS on RA. Continue ox in place.   Neuros: AOX4 with choices at 160. Denies N/T. Strengthen 4/5 throughout. Generalized weakness.   IV: PIV SL.   Labs/Electrolytes: WNL.Redraw am .   Resp/trach: Denies SOB. Sating high 90s .   Diet: Regular and good intake.   Bowel status: Had several small/smear BMS this shift. Stool incontinent in brief. Scheduled bowel meds taken. BS+. Need reminder to go BR.   : Voiding w/out difficulty to BR.   Skin: Bruising t/o.   Pain: Denies. Scheduled tyenol, Dicolfenac cream applied. Refused scheduled lidocaine patch.   Activity: Up w/ A1/GB and walker. TLSO brace when OOB. Up on chair 3 hrs. Need call lights within reach and BA on at all times. Hx of fall X3 recently.   Social: NO visitor this shift.   Plan: Continue monitor and follow POC.CC following placement.

## 2024-02-28 NOTE — PLAN OF CARE
"Status: Pt had an unwitnessed fall at her care facility and now has a T12 burst fracture.   Vitals: VSS on RA ex intermittent soft BP.   Neuros: A&O x 4 this shift, waxes and wanes. Denies N/T. Strengths 4/5, generalized weakness.   IV: PIV SL.  Labs/Electrolytes: No electrolyte replacement needed, redraw in AM. See \"results\" for renal labs.  Resp/trach: LS clear. Denies SOB.   Diet: Regular, fair intake.   Bowel status: BS+. Multiple loose BMs this shift. BM meds held. Intermittent incontinence.   : Voids spontaneously with intermittent incontinence and mild retention.   Skin: Bruising throughout. Blanchable redness to coccyx, barrier cream applied.  Pain: C/o mild pain in back and abdomen, but declined intervention. Scheduled Tylenol given.   Activity: Assist of one, GB, walker. TLSO when OOB. Up in chair for meals. History of recent falls, within arm's reach at all times.   Social: Family at bedside this shift, helpful and supportive of pt.   Plan: SW helping with discharge plan. Urine sample still needed.  Updates this shift: Renal US and echo completed. 500 mL NS bolus given. Pt worked with PT and walked in the halls. Nephrology consulted.    Goal Outcome Evaluation:    Plan of Care Reviewed With: patient, child  Overall Patient Progress: no change  Outcome Evaluation: SW consulted to help with discharge planning.      "

## 2024-02-28 NOTE — CONSULTS
Nephrology Initial Consult  February 28, 2024      Roxanna Stark MRN:[id number] YOB: 1927  Date of Admission:2/24/2024  Primary care provider: Swapna Gaines  Requesting physician: Codi Guzman*    ASSESSMENT AND RECOMMENDATIONS:   #NICOLAS on CKD  Baseline Cr 1.2-1.6. On admission, Cr 1.33, has gradually increased to 2.05 on 2/28. No electrolyte abnormalities and patient is making urine. Patient has had decent PO intake, 1.6 L on 2/28. She has had multiple loose stools on 2/28, no N/V. Her blood pressure has been 100s/60s but this appears to be where she runs outpatient. Echo on 2/28 demonstrated normal IVC. She was also treated with ceftriaxone 2/25-2/27 for presumed UTI. No contrast exposure. As far as postrenal etiology, she had a renal US on 2/28 demonstrating L kidney atrophy but no evidence of hydronephrosis on either side. Etiology of NICOLAS is likely multifactorial, could be component of prerenal given recent loose stools, although normal IVC and good intake. She could also have component of intrarenal given ceftriaxone treatment; postrenal seems less likely.   - Agree with repeat UA. Would also add urine Na   - Agree with trial of 500 ml LR bolus  - Okay to give voltaren gel.   - Agree with holding PTA sulfasalazine for RA and lasix at this time  -Daily weights  -I/Os   -BMP daily      Recommendations were communicated to primary team via this note    Seen and discussed with Dr. Cotton.     Guera Desir, MS4         REASON FOR CONSULT: NICOLAS    HISTORY OF PRESENT ILLNESS:  Admitting provider and nursing notes reviewed    Roxanna Stark is a 89 year old female with HTN, CKD, RA, PAD, aortic stenosis s/p AVR (04/2016), Atrial fibrillation (On Eliquis), HFpEF who presented to the [facility] ED from her nursing home via EMS after she was found down 02/24/24. Found to have T12 burst fracture. Conservative management with a TLSO brace per Neurosurgery.     Baseline Cr 1.2-1.6. UA on admission  notable for protein 50, positive leukocyte esterase, and WBC 66. Patient was given ceftriaxone for a course of three days for presumed UTI. Urine culture with 50,000-100,000 mixed georgina. CK on admission 51. She had no contrast exposure.     Per Roxanna, she denies any chest pain or shortness of breath. She has had constipation and received an enema yesterday. She endorses multiple loose bowel movement today. She denies any burning or pain with urination.     PAST MEDICAL HISTORY:  Reviewed with patient on 02/28/2024     Past Medical History:   Diagnosis Date    Actinic keratosis     Aortic stenosis 2014    Atrial flutter with rapid ventricular response (H) 01/27/2024    Basal cell cancer 07/2014    left eye medial canthus     Basal cell carcinoma 09/30/2008    left cheek    CKD (chronic kidney disease) stage 3, GFR 30-59 ml/min (H) 07/01/2019    HTN (hypertension)     Melanoma in situ (H) 09/30/2008    left arm    Polymyalgia rheumatica (H24) 11/1999    Rheumatoid arthritis of multiple sites with negative rheumatoid factor (H)     Status post coronary angiogram 03/03/2016    Temporal arteritis (H) 11/1999       Past Surgical History:   Procedure Laterality Date    CATARACT IOL, RT/LT  5/09    bilateral    COLONOSCOPY  2002    EXCISE LESION VULVA N/A 9/27/2019    Procedure: Wide Local Excision Of Vulva, Colposcopy;  Surgeon: Mono Ribeiro MD;  Location:  OR    REPLACE VALVE AORTIC N/A 4/25/2016    Procedure: REPLACE VALVE AORTIC;  Surgeon: Sudeep Tsai MD;  Location:  OR    [facility] SKIN TISSUE PROCEDURE UNLISTED  11/3/08    mmis skin cancer excision        MEDICATIONS:  PTA Meds  Prior to Admission medications    Medication Sig Last Dose Taking? Auth Provider Long Term End Date   acetaminophen (TYLENOL) 325 MG tablet Take 650 mg by mouth daily 2/24/2024 at 1710 Yes Reported, Patient     ACETAMINOPHEN PO Take 1,000 mg by mouth 3 times daily as needed for pain 2/20/2024 at PM Yes Reported, Patient      amiodarone (PACERONE) 200 MG tablet Take 2 tablets (400 mg) by mouth daily for 6 days, THEN 1 tablet (200 mg) daily for 90 days. 2/24/2024 at AM Yes Caroline Pretty PA-C Yes 5/7/24   amoxicillin (AMOXIL) 500 MG capsule TAKE 4 CAPSULES BY MOUTH 1 HOUR BEFORE DENTAL APPOINTMENT Unknown at Unknown Yes Kristy Joe MD No    apixaban ANTICOAGULANT (ELIQUIS) 2.5 MG tablet Take 1 tablet (2.5 mg) by mouth 2 times daily 2/24/2024 at 2000 Yes Caroline Pretty PA-C     calcium-vitamin D 500-125 MG-UNIT TABS Take 1 tablet by mouth daily 2/24/2024 at AM Yes Reported, Patient     furosemide (LASIX) 20 MG tablet TAKE 1 TABLET BY MOUTH EVERY DAY IF GAIN OF 2 TO 3 POUNDS OVER 2 DAYS  Patient taking differently: Take 20 mg by mouth daily TAKE 1 TABLET BY MOUTH EVERY DAY IF GAIN OF 2 TO 3 POUNDS OVER 2 DAYS 2/23/2024 at AM Yes Swapna Gaines MD Yes    gabapentin (NEURONTIN) 100 MG capsule Take 1 capsule (100 mg) by mouth daily 2/24/2024 at AM Yes Swapna Gaines MD Yes    hydroxychloroquine (PLAQUENIL) 200 MG tablet Take 1 tablet (200 mg) by mouth daily 2/24/2024 at AM Yes Jamie Johnson MD     ICAPS PO 2 tablets daily 2/24/2024 at AM Yes Reported, Patient     Menthol, Topical Analgesic, 4 % GEL Apply topically to affected area(s) three times daily. 2/24/2024 at HS Yes Reported, Patient     methocarbamol (ROBAXIN) 500 MG tablet Take 0.5 Tablets (250 mg) by mouth. BID  until 2/21 then change to PRN 2/23/2024 at 1711 Yes Reported, Patient     metoprolol tartrate (LOPRESSOR) 25 MG tablet Take 1 tablet (25 mg) by mouth 2 times daily 2/24/2024 at PM Yes Swapna Gaines MD Yes    Omega-3 Fatty Acids (OMEGA-3 FISH OIL PO) Take 1 tablet twice daily. 2/24/2024 at PM Yes Reported, Patient     polyethylene glycol (MIRALAX) 17 GM/Dose powder Mix 1 scoop (17 g) in liquid then take by mouth once daily. 2/24/2024 at AM Yes Reported, Patient     senna (SENOKOT) 8.6 MG tablet Take 1 Tablet (8.6 mg) by mouth once daily. 2/24/2024 at AM Yes  Reported, Patient     sulfaSALAzine (AZULFIDINE) 500 MG tablet Take 1 tablet (500 mg) by mouth 2 times daily 2/24/2024 at 2000 Yes Jamie Johnson MD     Misc. Devices (ROLLATOR ULTRA-LIGHT) MISC    Reported, Patient        Current Meds   acetaminophen  975 mg Oral TID    amiodarone  200 mg Oral Daily    apixaban ANTICOAGULANT  2.5 mg Oral BID    calcium carbonate-vitamin D  1 tablet Oral Daily    gabapentin  100 mg Oral Daily    hydroxychloroquine  200 mg Oral Daily    lidocaine  1-3 patch Transdermal Q24h    melatonin  3 mg Oral At Bedtime    methocarbamol  500 mg Oral 4x Daily    metoprolol tartrate  25 mg Oral BID    [START ON 2/29/2024] polyethylene glycol  17 g Oral Daily    [Held by provider] sulfaSALAzine  500 mg Oral BID     Infusion Meds      ALLERGIES:    Allergies   Allergen Reactions    Lisinopril Cough       REVIEW OF SYSTEMS:  A comprehensive of systems was negative except as noted above.    SOCIAL HISTORY:   Social History     Socioeconomic History    Marital status:      Spouse name: Not on file    Number of children: Not on file    Years of education: Not on file    Highest education level: Not on file   Occupational History     Employer: RETIRED   Tobacco Use    Smoking status: Never     Passive exposure: Never    Smokeless tobacco: Never   Vaping Use    Vaping Use: Never used   Substance and Sexual Activity    Alcohol use: Yes     Comment: rarely    Drug use: No    Sexual activity: Not Currently     Partners: Male     Birth control/protection: None   Other Topics Concern    Parent/sibling w/ CABG, MI or angioplasty before 65F 55M? No   Social History Narrative    Not on file     Social Determinants of Health     Financial Resource Strain: Not on file   Food Insecurity: Not on file   Transportation Needs: Not on file   Physical Activity: Not on file   Stress: Not on file   Social Connections: Not on file   Interpersonal Safety: Not on file   Housing Stability: Not on file     Daughter and  son-in-law accompanies Roxanna Stark in hospital room    FAMILY MEDICAL HISTORY:   Family History   Problem Relation Age of Onset    Breast Cancer Sister 45    Arthritis Sister     Thyroid Disease Sister     Arthritis Sister     Colon Cancer Sister         colon    Arthritis Mother     Hypertension Father     Prostate Cancer Father     Arthritis Father     Heart Disease Father     Lipids Father     Colon Cancer Father     Arthritis Sister     Asthma Daughter     Asthma Daughter     Lung Cancer Daughter 58        lung    Pancreatic Cancer Other 81        pancreatic          PHYSICAL EXAM:   Temp  Av.8  F (36.6  C)  Min: 97  F (36.1  C)  Max: 98.4  F (36.9  C)      Pulse  Av.4  Min: 57  Max: 93 Resp  Av.1  Min: 14  Max: 18  SpO2  Av %  Min: 86 %  Max: 100 %       /56 (BP Location: Right arm)   Pulse 74   Temp 98.4  F (36.9  C) (Axillary)   Resp 14   Wt 50.9 kg (112 lb 3.4 oz)   SpO2 98%   BMI 21.86 kg/m     Date 24 0700 - 24 0659   Shift 4644-7032 4223-9417 2530-4595 24 Hour Total   INTAKE   P.O. 1030   1030   I.V. 500   500   Shift Total(mL/kg) 1530(30.06)   1530(30.06)   OUTPUT   Shift Total(mL/kg)       Weight (kg) 50.9 50.9 50.9 50.9      Admit Weight: 50.9 kg (112 lb 3.4 oz)     GENERAL APPEARANCE: no distress,  awake  EYES: no scleral icterus, pupils equal  Endo: no goiter, no moon facies  Lymphatics: no cervical or supraclavicular LAD  Pulmonary: lungs clear to auscultation with equal breath sounds bilaterally, no clubbing  CV: regular rhythm, normal rate, no rub   - JVD no   - Edema no  GI: soft, nontender, normal bowel sounds  MS: no evidence of inflammation in joints, no muscle tenderness  : no cho  SKIN: no rash, warm, dry, no cyanosis  NEURO: face symmetric, no asterixis     LABS:   CMP  Recent Labs   Lab 24  0626 24  0737 24  0645 24  1824 24  0729 24  2159    135 139  --  138 138   POTASSIUM 3.6 4.1 4.1  --  4.1  4.3   CHLORIDE 101 101 104  --  103 100   CO2 25 25 21*  --  25 28   ANIONGAP 10 9 14  --  10 10   GLC 81 97 87 113* 125* 163*   BUN 40.5* 32.8* 23.7*  --  23.8* 29.6*   CR 2.05* 1.61* 1.29*  --  1.16* 1.33*   GFRESTIMATED 22* 29* 38*  --  43* 36*   ROEL 8.4 8.9 8.8  --  8.6 9.3   MAG 2.5* 2.3 1.9  --  2.0  --    PHOS 4.0 3.6 3.3  --   --   --    PROTTOTAL  --   --   --   --   --  7.1   ALBUMIN  --   --   --   --   --  3.6   BILITOTAL  --   --   --   --   --  0.2   ALKPHOS  --   --   --   --   --  110   AST  --   --   --   --   --  22   ALT  --   --   --   --   --  12     CBC  Recent Labs   Lab 02/28/24  0626 02/27/24  0737 02/26/24  0645 02/24/24  2159   HGB 10.9* 11.4* 12.4 11.9   WBC 16.9* 15.8* 12.2* 11.3*   RBC 3.80 3.99 4.22 4.16   HCT 34.5* 35.7 39.0 37.8   MCV 91 90 92 91   MCH 28.7 28.6 29.4 28.6   MCHC 31.6 31.9 31.8 31.5   RDW 15.8* 15.8* 15.7* 15.6*    277 284 263     INR  Recent Labs   Lab 02/24/24  2159   INR 1.54*     ABGNo lab results found in last 7 days.   URINE STUDIES  Recent Labs   Lab Test 02/25/24  0605 02/17/24  1400 08/30/21  1444 04/04/16  1153   COLOR Yellow Yellow Yellow Yellow   APPEARANCE Slightly Cloudy* Clear Clear Slightly Cloudy   URINEGLC Negative Negative Negative Negative   URINEBILI Negative Negative Negative Negative   URINEKETONE Negative Negative Trace* Negative   SG 1.024 1.017 1.010 1.010   UBLD Negative Negative Negative Negative   URINEPH 6.0 5.5 6.0 6.5   PROTEIN 50* 20* Negative Negative   UROBILINOGEN  --   --  0.2 0.2   NITRITE Negative Negative Negative Negative   LEUKEST Large* Trace* Small* Trace*   RBCU 0 1 0-2 O - 2   WBCU 66* 7* 5-10* 5-10*     No lab results found.  PTH  No lab results found.  IRON STUDIES  Recent Labs   Lab Test 08/30/21  1559   IRON 51      IRONSAT 18   ZAIRA 200       IMAGING:  All imaging studies reviewed by me.           Physician Attestation   I, Belle Cotton MD, was present with the medical/THONG student who  participated in the service and in the documentation of the note.  I have verified the history and personally performed the physical exam and medical decision making.  I agree with the assessment and plan of care as documented in the note.      Key findings: NICOLAS in setting of diarrhea, NSAID, antibiotics.  Holding sulfasalazine, gentle fluid bolus and monitor            Belle Brendan Cotton MD  Date of Service (when I saw the patient): 2/28/24

## 2024-02-28 NOTE — PROGRESS NOTES
CHW received email from [employer] that a signature was needed on the second page of pt's POLST form. CHW notified SW. CHW sent completed POLST form to [employer].       Lucinda Benz   6A/B/C Community Health Worker   Phone: [phone]

## 2024-02-29 ENCOUNTER — APPOINTMENT (OUTPATIENT)
Dept: PHYSICAL THERAPY | Facility: CLINIC | Age: 89
DRG: 552 | End: 2024-02-29
Payer: MEDICARE

## 2024-02-29 ENCOUNTER — APPOINTMENT (OUTPATIENT)
Dept: OCCUPATIONAL THERAPY | Facility: CLINIC | Age: 89
DRG: 552 | End: 2024-02-29
Payer: MEDICARE

## 2024-02-29 ENCOUNTER — MEDICAL CORRESPONDENCE (OUTPATIENT)
Dept: HEALTH INFORMATION MANAGEMENT | Facility: CLINIC | Age: 89
End: 2024-02-29

## 2024-02-29 LAB
ANION GAP SERPL CALCULATED.3IONS-SCNC: 10 MMOL/L (ref 7–15)
BUN SERPL-MCNC: 39 MG/DL (ref 8–23)
CALCIUM SERPL-MCNC: 8 MG/DL (ref 8.2–9.6)
CHLORIDE SERPL-SCNC: 105 MMOL/L (ref 98–107)
CREAT SERPL-MCNC: 1.79 MG/DL (ref 0.51–0.95)
DEPRECATED HCO3 PLAS-SCNC: 22 MMOL/L (ref 22–29)
EGFRCR SERPLBLD CKD-EPI 2021: 26 ML/MIN/1.73M2
ERYTHROCYTE [DISTWIDTH] IN BLOOD BY AUTOMATED COUNT: 15.9 % (ref 10–15)
GLUCOSE SERPL-MCNC: 71 MG/DL (ref 70–99)
HCT VFR BLD AUTO: 32 % (ref 35–47)
HGB BLD-MCNC: 10.5 G/DL (ref 11.7–15.7)
MAGNESIUM SERPL-MCNC: 2.6 MG/DL (ref 1.7–2.3)
MCH RBC QN AUTO: 30.2 PG (ref 26.5–33)
MCHC RBC AUTO-ENTMCNC: 32.8 G/DL (ref 31.5–36.5)
MCV RBC AUTO: 92 FL (ref 78–100)
PHOSPHATE SERPL-MCNC: 3.8 MG/DL (ref 2.5–4.5)
PLATELET # BLD AUTO: 247 10E3/UL (ref 150–450)
POTASSIUM SERPL-SCNC: 3.5 MMOL/L (ref 3.4–5.3)
RBC # BLD AUTO: 3.48 10E6/UL (ref 3.8–5.2)
SODIUM SERPL-SCNC: 137 MMOL/L (ref 135–145)
WBC # BLD AUTO: 11.7 10E3/UL (ref 4–11)

## 2024-02-29 PROCEDURE — 84100 ASSAY OF PHOSPHORUS: CPT | Performed by: PHYSICIAN ASSISTANT

## 2024-02-29 PROCEDURE — 97116 GAIT TRAINING THERAPY: CPT | Mod: GP | Performed by: PHYSICAL THERAPIST

## 2024-02-29 PROCEDURE — 250N000013 HC RX MED GY IP 250 OP 250 PS 637: Performed by: STUDENT IN AN ORGANIZED HEALTH CARE EDUCATION/TRAINING PROGRAM

## 2024-02-29 PROCEDURE — 97530 THERAPEUTIC ACTIVITIES: CPT | Mod: GP | Performed by: PHYSICAL THERAPIST

## 2024-02-29 PROCEDURE — 250N000013 HC RX MED GY IP 250 OP 250 PS 637: Performed by: NURSE PRACTITIONER

## 2024-02-29 PROCEDURE — 99233 SBSQ HOSP IP/OBS HIGH 50: CPT | Mod: GC | Performed by: INTERNAL MEDICINE

## 2024-02-29 PROCEDURE — 85027 COMPLETE CBC AUTOMATED: CPT | Performed by: NURSE PRACTITIONER

## 2024-02-29 PROCEDURE — 36415 COLL VENOUS BLD VENIPUNCTURE: CPT | Performed by: PHYSICIAN ASSISTANT

## 2024-02-29 PROCEDURE — 97530 THERAPEUTIC ACTIVITIES: CPT | Mod: GO

## 2024-02-29 PROCEDURE — 97535 SELF CARE MNGMENT TRAINING: CPT | Mod: GO

## 2024-02-29 PROCEDURE — 83735 ASSAY OF MAGNESIUM: CPT | Performed by: PHYSICIAN ASSISTANT

## 2024-02-29 PROCEDURE — 120N000002 HC R&B MED SURG/OB UMMC

## 2024-02-29 PROCEDURE — 250N000013 HC RX MED GY IP 250 OP 250 PS 637: Performed by: PHYSICIAN ASSISTANT

## 2024-02-29 PROCEDURE — 99232 SBSQ HOSP IP/OBS MODERATE 35: CPT | Performed by: PHYSICIAN ASSISTANT

## 2024-02-29 PROCEDURE — 80048 BASIC METABOLIC PNL TOTAL CA: CPT | Performed by: PHYSICIAN ASSISTANT

## 2024-02-29 PROCEDURE — 97110 THERAPEUTIC EXERCISES: CPT | Mod: GO

## 2024-02-29 RX ADMIN — Medication 3 MG: at 20:35

## 2024-02-29 RX ADMIN — ACETAMINOPHEN 975 MG: 325 TABLET, FILM COATED ORAL at 20:35

## 2024-02-29 RX ADMIN — APIXABAN 2.5 MG: 2.5 TABLET, FILM COATED ORAL at 20:35

## 2024-02-29 RX ADMIN — METOPROLOL TARTRATE 25 MG: 25 TABLET, FILM COATED ORAL at 19:41

## 2024-02-29 RX ADMIN — HYDROXYCHLOROQUINE SULFATE 200 MG: 200 TABLET, FILM COATED ORAL at 07:58

## 2024-02-29 RX ADMIN — LIDOCAINE 1 PATCH: 4 PATCH TOPICAL at 20:42

## 2024-02-29 RX ADMIN — POLYETHYLENE GLYCOL 3350 17 G: 17 POWDER, FOR SOLUTION ORAL at 07:58

## 2024-02-29 RX ADMIN — METHOCARBAMOL 500 MG: 500 TABLET ORAL at 15:54

## 2024-02-29 RX ADMIN — APIXABAN 2.5 MG: 2.5 TABLET, FILM COATED ORAL at 07:58

## 2024-02-29 RX ADMIN — AMIODARONE HYDROCHLORIDE 200 MG: 200 TABLET ORAL at 07:58

## 2024-02-29 RX ADMIN — ACETAMINOPHEN 975 MG: 325 TABLET, FILM COATED ORAL at 14:13

## 2024-02-29 RX ADMIN — ACETAMINOPHEN 975 MG: 325 TABLET, FILM COATED ORAL at 07:58

## 2024-02-29 RX ADMIN — METHOCARBAMOL 500 MG: 500 TABLET ORAL at 20:35

## 2024-02-29 RX ADMIN — METHOCARBAMOL 500 MG: 500 TABLET ORAL at 12:46

## 2024-02-29 RX ADMIN — METOPROLOL TARTRATE 25 MG: 25 TABLET, FILM COATED ORAL at 07:58

## 2024-02-29 RX ADMIN — GABAPENTIN 100 MG: 100 CAPSULE ORAL at 07:58

## 2024-02-29 RX ADMIN — METHOCARBAMOL 500 MG: 500 TABLET ORAL at 07:58

## 2024-02-29 RX ADMIN — Medication 1 TABLET: at 07:58

## 2024-02-29 ASSESSMENT — ACTIVITIES OF DAILY LIVING (ADL)
ADLS_ACUITY_SCORE: 42
ADLS_ACUITY_SCORE: 42
ADLS_ACUITY_SCORE: 39
ADLS_ACUITY_SCORE: 41
ADLS_ACUITY_SCORE: 39
ADLS_ACUITY_SCORE: 42
ADLS_ACUITY_SCORE: 41
ADLS_ACUITY_SCORE: 42
ADLS_ACUITY_SCORE: 42
ADLS_ACUITY_SCORE: 39
ADLS_ACUITY_SCORE: 39
ADLS_ACUITY_SCORE: 42
ADLS_ACUITY_SCORE: 42
ADLS_ACUITY_SCORE: 39
ADLS_ACUITY_SCORE: 42
ADLS_ACUITY_SCORE: 41
ADLS_ACUITY_SCORE: 42
ADLS_ACUITY_SCORE: 41
ADLS_ACUITY_SCORE: 41
ADLS_ACUITY_SCORE: 39

## 2024-02-29 ASSESSMENT — VISUAL ACUITY: OU: GLASSES

## 2024-02-29 NOTE — PLAN OF CARE
Goal Outcome Evaluation:    Status: Admitted 2/24 after having an unwitnessed fall, found to have T12 burst fracture   Vitals: VSS on RA  Neuros: Disoriented to place and situation, waxes and wanes. Previous neuros include: 4/5 throughout, denies N/T   IV: PIV SL  Labs/Electrolytes: no new lab results  Resp/trach: LSC on RA  Diet: Regular  Bowel status: LBM 2/28/2023. Had a smear this shift  : voiding with incontinence   Skin:  Bruising throughout. Yeast build up noted between toes, on charge list for antifungal cream   Pain: Slept between care, no problem identified  Activity: A1 with walker/GB. Not OOB this shift. TLSO when OOB  Plan: TCU vs going back to assisted Living, SW following. Continue to monitor and follow POC

## 2024-02-29 NOTE — PROGRESS NOTES
Care Management Follow Up     Length of Stay (days): 4     Expected Discharge Date: 03/01/2024     Concerns to be Addressed:  Disposition planning  Patient plan of care discussed at interdisciplinary rounds: Yes     Anticipated Discharge Disposition: TCU Placement     Anticipated Discharge Services:  TCU placement  Anticipated Discharge DME:  Not applicable at this time     Patient/family educated on Medicare website which has current facility and service quality ratings:  Yes  Education Provided on the Discharge Plan:  Yes  Patient/Family in Agreement with the Plan:  Yes     Referrals Placed by CM/SW:  None  Private pay costs discussed: Not applicable at this time     Additional Information:  Pt's daughter (Anna) has learned  that [facility] will not allow Anna to stay with pt in the apt 24/7.  Anna had talked with the Assistant Director of Nursing at [facility] this am who had left her with the impression that she could stay with pt.  The 6A RN CC spoke with the Director of Nursing at [facility] who reportedly indicated that Anna could not stay with pt 24/7.  For this reason, pt's daughter now wants to pursue TCU.    LORI met with pt and daughter to obtain facility preferences.  Pt and daughter provided the following facility preferences:  [facility] [employer]  - [facility], LORI faxed assessment materials via [employer]  - LORI Lea faxed assessment materials via [employer]  - [facility], LORI phoned Admissions (Chelita) and referred pt  - LORI Kwok faxed assessment materials via [employer].    LORI spoke with Mitra Martino, Trauma P.A. who states that readiness for discharge is anticipated on 3/1/2024 or 3/2/2024, and is dependent upon pt's labs (specifically creatinine).    SW will continue to follow for discharge planning.     GHISLAINE Montez  Social Work, 6A  Phone:  [phone]  Pager:  [phone]  2/29/2024

## 2024-02-29 NOTE — PLAN OF CARE
Status: Admitted 2/24 after having an unwitnessed fall, found to have T12 burst fracture  Vitals: VSS on RA   Neuros: D/o to time and situation, waxes and wanes. 4/5 throughout, denies N/T  Labs/Electrolytes: UA sent  Resp/trach: Clear lungs  Diet: Regular, fair PO  Bowel status: One loose BM this evening, multiple on day shift  : Voiding, intermittent incontinence. Straight cathed @ 1700 for UA   Skin: Bruising throughout. Yeast build up noted between toes, on charge list for antifungal cream  Pain: Denies pain, winces with movement. Taking scheduled tylenol, robaxin. Lido patch on back   Activity: Up with 1, walker. TLSO when OOB   Social: No visitors this shift  Plan: SW helping with discharge plan. Urine sample still needed.  Updates this shift: Nephrology saw pt for worsening kidney labs, see note. SW following, family to decide TCU vs direct placement at AL. Continue POC

## 2024-02-29 NOTE — PROGRESS NOTES
Care Management Follow Up    Length of Stay (days): 4    Expected Discharge Date: 03/01/2024     Concerns to be Addressed:  Disposition planning  Patient plan of care discussed at interdisciplinary rounds: Yes    Anticipated Discharge Disposition: Placement in Assisted Living ([facility]), pt/family have secured an apt that is ready for immediate occupancy     Anticipated Discharge Services:  Placement in Assisted Living ([facility]), pt/family have secured an apt that is ready for immediate occupancy  Anticipated Discharge DME:  Not applicable at this time    Patient/family educated on Medicare website which has current facility and service quality ratings:  Yes  Education Provided on the Discharge Plan:  Yes  Patient/Family in Agreement with the Plan:  Yes    Referrals Placed by CM/SW:  None  Private pay costs discussed: Not applicable at this time    Additional Information:  LORI phoned pt's daughter (Anna) to follow up on yesterday's discussion regarding TCU placement vs placement in Assisted Living at [facility].  Anna states that she went to [facility] this am and inquired as to whether or not she could stay with pt 24/7 for however long is needed so that pt could discharge to the assisted living.  Anna states that she was told that she could stay with pt.  Anna states that she would like to receive training from OT and Physical Therapy (LORI updated OT, Amelia) prior to pt's discharge from [facility]).  Anna states that [facility] has told her that pt will also receive home OT and Physical Therapy.  Anna states that she will be at the hospital today around 12 noon.  LORI informed OT of this and the 6A RN CC.  Per this LORI's review of [facility] website, [facility] is next door to the [facility] and it appears that the physicians at [facility] will see pt's in their homes/apt's.  It also appears that [facility] is using [facility] for skilled home  care services.   Anna states that she will discuss this plan with pt upon arrival today to make sure pt is in agreement with the plan.    The 6A RN CC will coordinate discharge to assisted living.  SW is available to arrange for TCU if the plan changes.    SW updated pt's floor nurse (Elke).      GHISLAINE Montez  Social Work, 6A  Phone:  [phone]  Pager:  [phone]  2/29/2024       MAITE Peterson

## 2024-02-29 NOTE — PROGRESS NOTES
Nephrology Progress Note  02/29/2024         Assessment & Recommendations:   Roxanna Stark is a 89 year old female with HTN, CKD, RA, PAD, aortic stenosis s/p AVR (04/2016), Atrial fibrillation (On Eliquis), HFpEF who presented to the [facility] ED from her nursing home via EMS after she was found down 02/24/24. Found to have T12 burst fracture. Conservative management with a TLSO brace per Neurosurgery.        #NICOLAS on CKD  Baseline Cr 1.2-1.6. On admission, Cr 1.33, has gradually increased to 2.05 on 2/28. No electrolyte abnormalities and patient is making urine. Patient has had decent PO intake, some loose stools. Her blood pressure has been 100s/60s and she appears to have higher blood pressures at home in the 150-170 systolic, up to 180-190s on admission. Echo on 2/28 demonstrated normal IVC. She was also treated with ceftriaxone 2/25-2/27 for presumed UTI. No contrast exposure. As far as postrenal etiology, she had a renal US on 2/28 demonstrating L kidney atrophy but no evidence of hydronephrosis on either side. Etiology of NICOLAS is likely multifactorial, could be component of prerenal given recent loose stools as well as lower blood pressures since admission compared to blood pressures at home. She could also have component of intrarenal given ceftriaxone treatment; postrenal seems less likely. She had a good response to fluid bolus on 2/28 as well as slightly higher blood pressures (133/64). Cr downtrending, 1.79 today.   - Continue to monitor   - No need for IVF today so long as PO intake adequate  - Okay to give voltaren gel.   - Agree with holding PTA sulfasalazine for RA and lasix at this time. Will need to have a plan for resuming these prior to discharge to avoid RA flair as well as HFpEF exacerbation.   - Daily weights  - I/Os   - BMP daily    Recommendations were communicated to primary team via this note.     Seen and discussed with Dr. Yury Desir, MS4       Resident/Fellow Attestation    I, Feng Arauz MD, was present with the medical/THONG student who participated in the service and in the documentation of the note.  I have verified the history and personally performed the physical exam and medical decision making.  I agree with the assessment and plan of care as documented in the note.      Feng Arauz MD  PGY5  Date of Service (when I saw the patient): 02/29/24      Interval History :   Nursing and provider notes from last 24 hours reviewed.  In the last 24 hours Roxanna Stark had a 500 ml LR bolus. She reports that she has been eating and drinking well. She states that she feels good. She denies any chest pain or SOB. She endorses some looser stools, but otherwise she is doing well. No pain with urination.     Review of Systems:   I reviewed the following systems:  GI: Good appetite. Denies nausea or vomiting or diarrhea.   Constitutional:  No fever or chills  Neuro: No headache or vision changes   CV: No dyspnea or edema.  No chest pain.    Physical Exam:   I/O last 3 completed shifts:  In: 1770 [P.O.:1270; I.V.:500]  Out: -    /64 (BP Location: Right arm)   Pulse 67   Temp 98.4  F (36.9  C) (Oral)   Resp 14   Wt 46.2 kg (101 lb 14.4 oz)   SpO2 96%   BMI 19.85 kg/m       GENERAL APPEARANCE: Sitting up in chair, appears comfortable  EYES:  no scleral icterus  PULM: lungs clear to auscultation bilaterally  CV: regular rhythm, normal rate, no LE edema  GI: soft, nontender       Labs:   All labs reviewed by me  Electrolytes/Renal -   Recent Labs   Lab Test 02/29/24  0623 02/28/24  1400 02/28/24  0626 02/27/24  0737    137 136 135   POTASSIUM 3.5 3.7 3.6 4.1   CHLORIDE 105 100 101 101   CO2 22 25 25 25   BUN 39.0* 40.0* 40.5* 32.8*   CR 1.79* 1.94* 2.05* 1.61*   GLC 71 132* 81 97   ROEL 8.0* 8.4 8.4 8.9   MAG 2.6*  --  2.5* 2.3   PHOS 3.8  --  4.0 3.6       CBC -   Recent Labs   Lab Test 02/29/24  0623 02/28/24  0626 02/27/24  0737   WBC 11.7* 16.9* 15.8*   HGB 10.5* 10.9*  11.4*    249 277       LFTs -   Recent Labs   Lab Test 02/24/24  2159 01/27/24  1640 11/21/23  1053   ALKPHOS 110 135 82   BILITOTAL 0.2 0.4 0.3   ALT 12 32 15   AST 22 40 26   PROTTOTAL 7.1 7.9 7.3   ALBUMIN 3.6 4.0 4.0       Iron Panel -   Recent Labs   Lab Test 08/30/21  1559   IRON 51   IRONSAT 18   ZAIRA 200         Imaging:  All imaging studies reviewed by me.     Current Medications:   acetaminophen  975 mg Oral TID    amiodarone  200 mg Oral Daily    apixaban ANTICOAGULANT  2.5 mg Oral BID    calcium carbonate-vitamin D  1 tablet Oral Daily    gabapentin  100 mg Oral Daily    hydroxychloroquine  200 mg Oral Daily    lidocaine  1-3 patch Transdermal Q24h    melatonin  3 mg Oral At Bedtime    methocarbamol  500 mg Oral 4x Daily    metoprolol tartrate  25 mg Oral BID    polyethylene glycol  17 g Oral Daily       I was present with the medical student fellow during the history and exam.  I discussed the case with the medical student and fellow and agree with the findings as documented in the assessment and plan.    Belle Cotton MD   of Medicine  Department of Nephrology  [facility]

## 2024-02-29 NOTE — PLAN OF CARE
"Status: Pt had an unwitnessed fall at her care facility and now has a T12 burst fracture.   Vitals: VSS on RA ex intermittent soft BP.   Neuros: A&O x 4 this shift, waxes and wanes. Denies N/T. Strengths 4/5, generalized weakness.   IV: PIV SL.  Labs/Electrolytes: No electrolyte replacement needed, redraw in AM. See \"results\" for renal labs.  Resp/trach: LS clear. Denies SOB.   Diet: Regular, fair intake.   Bowel status: BS+. Last BM today, loose. Miralax still given per team. Intermittent incontinence.   : Voids spontaneously with intermittent incontinence and mild retention.   Skin: Bruising throughout. Blanchable redness to coccyx, barrier cream applied.  Pain: C/o mild pain in back and abdomen, but declined intervention. Scheduled Tylenol given.   Activity: Assist of one, GB, walker. TLSO when OOB. Up in chair for meals. History of recent falls, within arm's reach at all times.   Social: Family at bedside this shift, helpful and supportive of pt.   Plan: SW and Care coordinator helping with discharge plan. Urine sample still needed.  Updates this shift: Care coordinator consulted to help with possible discharge to assisted living.       Goal Outcome Evaluation:    Plan of Care Reviewed With: patient, child  Overall Patient Progress: improving  Outcome Evaluation: Pt worked with PT, care coordinator helping with DC planning.      "

## 2024-02-29 NOTE — PROGRESS NOTES
"[facility]  Trauma Service Progress Note    Date of Service: 02/29/2024      Assessment & Plan   Date of Service (when I saw the patient): 02/28/2024  Trauma Mechanism: Unwitnessed fall at care facility   Known Injuries:  T12 burst fracture with 40% height loss     History of present illness  Roxanna Stark is a 89 year old female with HTN, CKD, RA, PAD, aortic stenosis s/p AVR (04/2016), Atrial fibrillation (On Eliquis), HFpEF who presented to the [facility] ED from her nursing home via EMS after she was found down 02/24/24. ED work up notable of T12 burst fracture. She was admitted to the trauma service for further management. Conservative management with a TLSO brace per Neurosurgery.     Neuro/Pain/Psych:  # unwitnessed fall on DOAC  - Head and C-spine CT without acute findings     # Acute on chronic pain   - continue PTA: gabapentin 100mg  - Scheduled: Tylenol, Robaxin 500mg 4x/day for 7 days then stop, Lidoderm patches  -  Prn: Oxycodone discontinued 2/28     # Acute hospital associated delirium, resolved  - Patient awake all night in ED hallway bed near main door, advanced age, acute fractures with pain, new location without windows, can increase risk of delirium.   - Scheduled: Melatonin 3mg for sleep hygiene   - Allow patient to sleep through the night if vital signs are stable   - Maintain circadian rhythm.  Lights on during the day.  Off at night, minimize cares at night.  OOB during the day.  - Pain management above     Pulmonary:  # Bilateral Pleural Effusions, R > L noted on CT, subacute vs chronic  - Chest CT: Small to moderate pleural effusions right greater than left with compressive atelectasis. There is also partial collapse of the right middle lobe and lingula.   Cardiac MRI form 01/30/24 mentioned \" Moderate-large bilateral pleural effusions\"   - Denies shortness of breath, on room air  - Supplemental oxygen to keep saturation above 92 %.   "   Cardiovascular:    # Hypertension   # PAD  # Severe CAD  # Aortic Stenosis s/p AVR w/ porcine valve 4/25/16  # pAfib   # HFpEF on most recent ECHO 01/2024  # Elongated Qtc 458/508  - Continue PTA: amiodarone, metoprolol, Lasix (hold)  - PRN hydralazine and labetalol for SBP >160  - Avoid QTc prolonging medications     GI/Nutrition:    # Constipation, possible ileus, resolved  - continue PTA: Miralax daily,   - Prn: Senna-docusate        Renal/ Fluids/Electrolytes:  # NICOLAS on CKD   Creatinine slowing rising upto 2.05 today up from her baseline of 1.2-1.6, BUN 40  - Creatinine: 1.26?1.61?2.05?1.94?1.79      Making urine though hard to quantify due to incontinence. On exam does not appear to be volume overloaded, on room air, BP's OK, not tachy. Weight trend (11/23 (52kg), 02/01(50.8), 02/26 (50.8) most recent  Electrolytes are so far WNR.  - Continue to hold lasix for now, will monitor weights   - Discontinue topical Diclofenac gel may enhance the nephrotoxic effect of 5-Aminosalicylic Acid Derivatives( Sulfasalazine)  - Had multiple loose stools yesterday due to aggressive bowel regimen 2/2 large colonic stool burden    - Obtain TTE  Global and regional LVF is hyperkinetic, EF 65-70%.  RVF, chamber size, wall motion, and thickness are normal.  S/P Minimally invasive aortic valve replacement with 21 mm Epic porcine valve on 4/25/2016. Mean aortic gradient 10mmHg. No change.  Pulmonary artery systolic pressure is normal.  IVC diameter and respiratory changes fall into an intermediate range suggesting an RA pressure of 8 mmHg.  No pericardial effusion is present.  A bilateral pleural effusion is present.    - Renal ultrasound   1. Left kidney is not well seen and appears atrophic; grossly no hydronephrosis. Focal lesion along the left kidney measuring up to 1.9  cm, indeterminate, possibly cyst, recommend attention on follow-up.  2. No right-sided hydronephrosis.    - Bedside bladder scan to ensure bladder emptying  -  Nephrology consulted  - electrolyte replacement protocol in place.      Endocrine:  # Rheumatoid arthritis  - continue PTA: hydroxychloroquine,   - Holding sulfasalazine d/t possible nephrotoxicity      # Thyroid nodules   - Chest CT: Heterogeneous nodular thyroid. Correlate with thyroid ultrasound which may be performed on a nonemergent outpatient basis.   - TSH/T4  - Pt to follow-up with PCP after discharge      Infectious disease:   # Possible asymptomatic UTI, completed 3 day course of ceftriaxone -  # Leukocytosis, improving  - WBC: 16.9 ? 11.7     Hematology:    # Mild and slowly rising leukocytosis w/o clear source of infection  # Anemia of acute on chronic illness  Afebrile, stable on room air,   - Hb.4? 10.9?10.5, stable   - Threshold for transfusion if hgb <7.0 or signs/symptoms of hypoperfusion.        # Coagulopathy, long term use of anticoagulant  - Continue PTA Apixaban  - Further discussions with Cardiology outpatient regarding risk benefit of DOAC for Afib given falls and increased risk of ICH     Musculoskeletal:  # Weakness and deconditioning of acute on chronic illness   # T12 burst fracture, > 40% height loss with mild retropulsion  - Neurosurgery consulted: No acute surgical intervention planned,   - Post mobility XR's of the T spine completed  - Follow up outpatient with XR's of the Thoracic spine in 6 weeks  - Physical and occupational therapy consults.     Skin:  - dilgent cares to prevent skin breakdown and wound formation.      Consults: SW/ CC for discharge planning, Palliative Care    Lines/ tubes/ drains:  - PIV   General Cares:                 PPI/H2 blocker:  NA                DVT prophylaxis: PTA Apixaban                Bowel Regimen/Date of last stool:                 Pulmonary toilet: deep cough, OOB     Code status:  DNR/DNI                 Discharge goals:   Adequate pain management:yes  VSS x24 hours: yes  Hemoglobin stable x 48 hours: NA  Ambulating safely  and/or therapy evals complete: yes  Drains/lines removed or plan in place to manage: yes  Teaching done: yes  Other: Hold discharge for now until further evaluation of NICOLAS, improving but currently holding lasix, do not want pt to return d/t decompensated Heart Failure since she was admitted for that in January    Expected D/C date: ~ 2 days    Mitra Martino PA-C  To contact the trauma service use job code pager [phone],   Numeric texts or alpha text through [email or web address]     Interval History   Per pt pain is well managed.  ROS x 8 negative with exception of those things listed in interval hx    Physical Exam   Temp: 98.4  F (36.9  C) Temp src: Oral BP: 133/64 Pulse: 67   Resp: 14 SpO2: 96 % O2 Device: None (Room air)    Vitals:    02/26/24 1101   Weight: 50.9 kg (112 lb 3.4 oz)     Vital Signs with Ranges  Temp:  [97.3  F (36.3  C)-98.4  F (36.9  C)] 98.4  F (36.9  C)  Pulse:  [57-74] 67  Resp:  [14-18] 14  BP: (110-137)/(56-64) 133/64  SpO2:  [96 %-98 %] 96 %  I/O last 3 completed shifts:  In: 1770 [P.O.:1270; I.V.:500]  Out: -     Bruce Coma Scale - Total 15/15  Eye Response (E): 4   4= spontaneous, 3= to verbal/voice, 2= to pain, 1= No response   Verbal Response (V): 5   5= Orientated, converses, 4= Confused, converses, 3= Inappropriate words, 2= Incomprehensible sounds, 1=No response   Motor Response (M): 6   6= Obeys commands, 5= Localizes to pain, 4= Withdrawal to pain, 3=Fexion to pain, 2= Extension to pain, 1= No response     Constitutional: Awake, alert, cooperative, no apparent distress.  Eyes: Lids and lashes normal, PERRL, EOMI, sclera clear, conjunctiva normal.  HENT: Normocephalic, atraumatic  Respiratory: No increased work of breathing, good air exchange, clear to auscultation bilaterally, no crackles or wheezing.  Cardiovascular:  regular rate and rhythm,   GI: Normal bowel sounds, abdomen soft, non-distended, mild tenderness to palpation throughout, no guarding  Genitourinary:  Voids spont  Skin:  Warm &  dry  Musculoskeletal: There is no redness, warmth, or swelling of the joints.  Pedal pulse palpated. No lower extremity edema   Neurologic: Awake, alert, oriented.Strength and sensory is intact. No focal deficits.  Neuropsychiatric: Calm, normal eye contact, alert, affect appropriate to situation, oriented, thought process normal.

## 2024-02-29 NOTE — PROGRESS NOTES
Care Management Follow Up    Length of Stay (days): 4    Expected Discharge Date: 03/01/2024     Concerns to be Addressed: discharge planning     Patient plan of care discussed at interdisciplinary rounds: Yes    Anticipated Discharge Disposition: retirement vs TCU  Anticipated Discharge Services:  retirement vs TCU  Anticipated Discharge DME:  TBD    Patient/family educated on Medicare website which has current facility and service quality ratings:  Yes by SW for TCU preferences   Education Provided on the Discharge Plan:  Yes  Patient/Family in Agreement with the Plan:  Yes, still trying to decide between discharging to retirement or TCU    Referrals Placed by CM/SW:  NA  Private pay costs discussed: Not applicable    Additional Information:  Informed by unit SW that patient's daughter prefers to admit patient directly to [facility] from the hospital.    Call placed to [facility] in [place]. Spoke with assistant Director of Nursing, Maria D. Informed Maria D provider stated this morning in rounds patient may be medically ready for discharge in 1-2 days. Maria D expressed hesitancy with discharge plan and timeline. Maria D states her biggest concern is the patient falling. She also wanted to verify daughter will stay with her 24/7 to prevent this (daughter Anna had told Maria D this earlier). Maria D states if patient admits directly from hospital the earliest they could admit her would be Tuesday. They may need to come out to do an in person assessment prior to admission on either Friday or Monday, she was going to check with her Director of Nursing on this. At time of discharge they would like nurse report called to ph: [phone] and orders faxed to fax: [phone]. All current medications should be sent as new scripts to [facility] on [address]. Maria D states they typically use [facility], [facility] or [facility] for home health care therapies (in order by preference).     Met with  patient and patient's daughter Anna to discuss discharge planning. Anna still seems to be undecided about patient going to TCU vs [facility]. She wants her mom to have her own space and settle in to her new home. She also acknowledges patient had falls during her last TCU stay. Ultimately she wants what is best for Roxanna. Anna plans to work with therapy this afternoon. She wants to determine if she will be able to provide the patient with some therapy in between home care visits at the care home. She confirmed she plans to stay with Roxanna 24/7 for potentially a month. She agrees with home care referrals being sent if returning to care home and does not have a preference on home care agency - referral sent to [facility] hub at this time with care home preferences identified. Anna also inquired about different TCUs that might be better equipped for high fall risk patients. She mentioned Roxanna's previous TCU did not have bed/chair alarms. She asked writer to pass this question on to unit SW - will relay. Anna agreed to work with therapy and follow up with RNCC this afternoon to further discuss desired discharge plan.     Kathleen Glynn in [place] - possible care home placement  [address] [place] [address]  Phone: [phone]  Nurse Line: [phone]  Fax: [phone]  Pharm: [facility] on [address]    143 Addendum:  Spoke with Elena, Director of Nursing at [facility]. Elena states if patient's daughter would like to stay with patient 24/7 she would need to apply for double occupancy. This would also include a background check, application fees and monthly payments. Elena states she will discuss this with Anna directly. Followed up with Anna after receiving this information, she states she would like to pursue TCU placement at this time. SW updated. Home care referral cancelled and Elena at [facility] updated.     Hillary Hairston, RN, BSN  6A RN Care Coordinator  Ph: [phone]   Pager: [phone]

## 2024-03-01 ENCOUNTER — APPOINTMENT (OUTPATIENT)
Dept: OCCUPATIONAL THERAPY | Facility: CLINIC | Age: 89
DRG: 552 | End: 2024-03-01
Payer: MEDICARE

## 2024-03-01 LAB
ANION GAP SERPL CALCULATED.3IONS-SCNC: 10 MMOL/L (ref 7–15)
BUN SERPL-MCNC: 38.9 MG/DL (ref 8–23)
CALCIUM SERPL-MCNC: 7.9 MG/DL (ref 8.2–9.6)
CHLORIDE SERPL-SCNC: 105 MMOL/L (ref 98–107)
CREAT SERPL-MCNC: 1.66 MG/DL (ref 0.51–0.95)
DEPRECATED HCO3 PLAS-SCNC: 22 MMOL/L (ref 22–29)
EGFRCR SERPLBLD CKD-EPI 2021: 28 ML/MIN/1.73M2
ERYTHROCYTE [DISTWIDTH] IN BLOOD BY AUTOMATED COUNT: 15.7 % (ref 10–15)
GLUCOSE SERPL-MCNC: 78 MG/DL (ref 70–99)
HCT VFR BLD AUTO: 33 % (ref 35–47)
HGB BLD-MCNC: 10.6 G/DL (ref 11.7–15.7)
MAGNESIUM SERPL-MCNC: 2.7 MG/DL (ref 1.7–2.3)
MCH RBC QN AUTO: 29.9 PG (ref 26.5–33)
MCHC RBC AUTO-ENTMCNC: 32.1 G/DL (ref 31.5–36.5)
MCV RBC AUTO: 93 FL (ref 78–100)
NT-PROBNP SERPL-MCNC: 3784 PG/ML (ref 0–1800)
PHOSPHATE SERPL-MCNC: 3 MG/DL (ref 2.5–4.5)
PLATELET # BLD AUTO: 253 10E3/UL (ref 150–450)
POTASSIUM SERPL-SCNC: 3.5 MMOL/L (ref 3.4–5.3)
RBC # BLD AUTO: 3.55 10E6/UL (ref 3.8–5.2)
SODIUM SERPL-SCNC: 137 MMOL/L (ref 135–145)
WBC # BLD AUTO: 9.2 10E3/UL (ref 4–11)

## 2024-03-01 PROCEDURE — 36415 COLL VENOUS BLD VENIPUNCTURE: CPT | Performed by: NURSE PRACTITIONER

## 2024-03-01 PROCEDURE — 83735 ASSAY OF MAGNESIUM: CPT | Performed by: PHYSICIAN ASSISTANT

## 2024-03-01 PROCEDURE — 250N000013 HC RX MED GY IP 250 OP 250 PS 637: Performed by: NURSE PRACTITIONER

## 2024-03-01 PROCEDURE — 80048 BASIC METABOLIC PNL TOTAL CA: CPT | Performed by: NURSE PRACTITIONER

## 2024-03-01 PROCEDURE — 250N000013 HC RX MED GY IP 250 OP 250 PS 637: Performed by: STUDENT IN AN ORGANIZED HEALTH CARE EDUCATION/TRAINING PROGRAM

## 2024-03-01 PROCEDURE — 250N000013 HC RX MED GY IP 250 OP 250 PS 637: Performed by: PHYSICIAN ASSISTANT

## 2024-03-01 PROCEDURE — 84100 ASSAY OF PHOSPHORUS: CPT | Performed by: PHYSICIAN ASSISTANT

## 2024-03-01 PROCEDURE — 120N000002 HC R&B MED SURG/OB UMMC

## 2024-03-01 PROCEDURE — 97530 THERAPEUTIC ACTIVITIES: CPT | Mod: GO

## 2024-03-01 PROCEDURE — 83880 ASSAY OF NATRIURETIC PEPTIDE: CPT | Performed by: PHYSICIAN ASSISTANT

## 2024-03-01 PROCEDURE — 97535 SELF CARE MNGMENT TRAINING: CPT | Mod: GO

## 2024-03-01 PROCEDURE — 99232 SBSQ HOSP IP/OBS MODERATE 35: CPT | Performed by: PHYSICIAN ASSISTANT

## 2024-03-01 PROCEDURE — 99233 SBSQ HOSP IP/OBS HIGH 50: CPT | Performed by: INTERNAL MEDICINE

## 2024-03-01 PROCEDURE — 85027 COMPLETE CBC AUTOMATED: CPT | Performed by: NURSE PRACTITIONER

## 2024-03-01 RX ORDER — FUROSEMIDE 20 MG
20 TABLET ORAL DAILY
Status: DISCONTINUED | OUTPATIENT
Start: 2024-03-02 | End: 2024-03-02 | Stop reason: HOSPADM

## 2024-03-01 RX ADMIN — APIXABAN 2.5 MG: 2.5 TABLET, FILM COATED ORAL at 08:02

## 2024-03-01 RX ADMIN — ACETAMINOPHEN 975 MG: 325 TABLET, FILM COATED ORAL at 14:02

## 2024-03-01 RX ADMIN — METHOCARBAMOL 500 MG: 500 TABLET ORAL at 12:02

## 2024-03-01 RX ADMIN — METHOCARBAMOL 500 MG: 500 TABLET ORAL at 08:02

## 2024-03-01 RX ADMIN — METOPROLOL TARTRATE 25 MG: 25 TABLET, FILM COATED ORAL at 08:02

## 2024-03-01 RX ADMIN — ACETAMINOPHEN 975 MG: 325 TABLET, FILM COATED ORAL at 08:02

## 2024-03-01 RX ADMIN — AMIODARONE HYDROCHLORIDE 200 MG: 200 TABLET ORAL at 08:02

## 2024-03-01 RX ADMIN — METHOCARBAMOL 500 MG: 500 TABLET ORAL at 16:01

## 2024-03-01 RX ADMIN — Medication 1 TABLET: at 08:02

## 2024-03-01 RX ADMIN — POLYETHYLENE GLYCOL 3350 17 G: 17 POWDER, FOR SOLUTION ORAL at 08:03

## 2024-03-01 RX ADMIN — Medication 3 MG: at 19:54

## 2024-03-01 RX ADMIN — ACETAMINOPHEN 975 MG: 325 TABLET, FILM COATED ORAL at 19:54

## 2024-03-01 RX ADMIN — HYDROXYCHLOROQUINE SULFATE 200 MG: 200 TABLET, FILM COATED ORAL at 08:02

## 2024-03-01 RX ADMIN — METHOCARBAMOL 500 MG: 500 TABLET ORAL at 19:54

## 2024-03-01 RX ADMIN — METOPROLOL TARTRATE 25 MG: 25 TABLET, FILM COATED ORAL at 19:54

## 2024-03-01 RX ADMIN — APIXABAN 2.5 MG: 2.5 TABLET, FILM COATED ORAL at 19:54

## 2024-03-01 RX ADMIN — LIDOCAINE 1 PATCH: 4 PATCH TOPICAL at 19:57

## 2024-03-01 RX ADMIN — GABAPENTIN 100 MG: 100 CAPSULE ORAL at 08:02

## 2024-03-01 ASSESSMENT — ACTIVITIES OF DAILY LIVING (ADL)
ADLS_ACUITY_SCORE: 43
ADLS_ACUITY_SCORE: 41
ADLS_ACUITY_SCORE: 43
ADLS_ACUITY_SCORE: 43
ADLS_ACUITY_SCORE: 41
ADLS_ACUITY_SCORE: 41
ADLS_ACUITY_SCORE: 43
ADLS_ACUITY_SCORE: 41
ADLS_ACUITY_SCORE: 43
ADLS_ACUITY_SCORE: 41
ADLS_ACUITY_SCORE: 41
ADLS_ACUITY_SCORE: 43
ADLS_ACUITY_SCORE: 41

## 2024-03-01 NOTE — PROGRESS NOTES
Care Management Follow Up     Length of Stay (days): 5     Expected Discharge Date: 03/01/2024     Concerns to be Addressed:  Disposition planning  Patient plan of care discussed at interdisciplinary rounds: Yes     Anticipated Discharge Disposition: TCU Placement     Anticipated Discharge Services:  TCU placement  Anticipated Discharge DME:  Not applicable at this time     Patient/family educated on Medicare website which has current facility and service quality ratings:  Yes  Education Provided on the Discharge Plan:  Yes  Patient/Family in Agreement with the Plan:  Yes     Referrals Placed by CM/SW:  None  Private pay costs discussed: Not applicable at this time     Additional Information:  SW is following pt for discharge planning.  TCU placement is recommended and per Mitra Martino, pt is medically ready for discharge.   Ideally, pt's daughter would like pt in a facility that has chair alarms.     The following TCU's/SNF's are not opportunity's for pt  - [facility] Lerner spoke with Amparo in Admissions who states that they can accept pt for admit however, they have a daily private pay private room fee of $37.00 per day which family declines to pay  [facility] that [facility]  - [facility],  Per Lulú in Admissions, they are full  - [facility],  per Deondrae in Admissions, they are full     SW updated pt, daughter (Anna) and g. Daughter (Colette) in regards to the above .    Pt and family indicate that they would also be open to [facility], [facility], [facility] and to [facility].        LORI received a call from Admissions (Elizabeth) at [facility] who states that pt looks appropriate for admit and they anticipate openings this weekend. SW updated pt and family and they would like to accept placement at [facility].      LORI completed a PAS referral, reference number  [id number].     GHISLAINE Montez  Social Work, 6A  Phone:  [phone]  Pager:  [phone]  3/1/2024

## 2024-03-01 NOTE — PROGRESS NOTES
Nephrology Progress Note  03/01/2024         Assessment & Recommendations:   Roxanna Stark is a 89 year old female with HTN, CKD, RA, PAD, aortic stenosis s/p AVR (04/2016), Atrial fibrillation (On Eliquis), HFpEF who presented to the [facility] ED from her nursing home via EMS after she was found down 02/24/24. Found to have T12 burst fracture. Conservative management with a TLSO brace per Neurosurgery.        #NICOLAS on CKD  Baseline Cr 1.2-1.6. On admission, Cr 1.33, has gradually increased to 2.05 on 2/28. No electrolyte abnormalities and patient is making urine. Patient has had decent PO intake, some loose stools. Her blood pressure has been 100s/60s and she appears to have higher blood pressures at home in the 150-170 systolic, up to 180-190s on admission. Echo on 2/28 demonstrated normal IVC. She was also treated with ceftriaxone 2/25-2/27 for presumed UTI. No contrast exposure. As far as postrenal etiology, she had a renal US on 2/28 demonstrating L kidney atrophy but no evidence of hydronephrosis on either side. Etiology of NICOLAS is likely multifactorial, could be component of prerenal given recent loose stools as well as lower blood pressures since admission compared to blood pressures at home, as well as PTA lasix and NSAID. She could also have component of intrarenal given ceftriaxone treatment; postrenal seems less likely. She had a good response to fluid bolus on 2/28 as well as slightly higher blood pressures (133/64). Cr downtrending,  2.05 -> 1.94 -> 1.79 -> 1.66.   -We will sign off   - Okay to give voltaren gel.   - Agree with holding PTA sulfasalazine for RA and lasix at this time. Can determine need at TCU vs discharge follow up.       Recommendations were communicated to primary team via this note.     Seen and discussed with Dr. Yury Desir, MS4           Interval History :   Nursing and provider notes from last 24 hours reviewed.  In the last 24 hours Roxanna Stark reports that she has  been eating and drinking well. She does endorse some pain in her back at the site of the fracture. No N/V, SOB, or chest pain.     Physical Exam:   No intake/output data recorded.   /66 (BP Location: Right arm)   Pulse 65   Temp 98.1  F (36.7  C) (Oral)   Resp 16   Wt 46.2 kg (101 lb 14.4 oz)   SpO2 98%   BMI 19.85 kg/m       GENERAL APPEARANCE: Lying in bed, appears comfortable   EYES:  no scleral icterus  PULM: lungs clear to auscultation bilaterally, on RA   CV: regular rhythm, normal rate, no LE edema  GI: soft, nontender       Labs:   All labs reviewed by me  Electrolytes/Renal -   Recent Labs   Lab Test 03/01/24  0603 02/29/24  0623 02/28/24  1400 02/28/24  0626    137 137 136   POTASSIUM 3.5 3.5 3.7 3.6   CHLORIDE 105 105 100 101   CO2 22 22 25 25   BUN 38.9* 39.0* 40.0* 40.5*   CR 1.66* 1.79* 1.94* 2.05*   GLC 78 71 132* 81   ROEL 7.9* 8.0* 8.4 8.4   MAG 2.7* 2.6*  --  2.5*   PHOS 3.0 3.8  --  4.0       CBC -   Recent Labs   Lab Test 03/01/24  0603 02/29/24  0623 02/28/24  0626   WBC 9.2 11.7* 16.9*   HGB 10.6* 10.5* 10.9*    247 249       LFTs -   Recent Labs   Lab Test 02/24/24  2159 01/27/24  1640 11/21/23  1053   ALKPHOS 110 135 82   BILITOTAL 0.2 0.4 0.3   ALT 12 32 15   AST 22 40 26   PROTTOTAL 7.1 7.9 7.3   ALBUMIN 3.6 4.0 4.0       Iron Panel -   Recent Labs   Lab Test 08/30/21  1559   IRON 51   IRONSAT 18   ZAIRA 200         Imaging:  All imaging studies reviewed by me.     Current Medications:   acetaminophen  975 mg Oral TID    amiodarone  200 mg Oral Daily    apixaban ANTICOAGULANT  2.5 mg Oral BID    calcium carbonate-vitamin D  1 tablet Oral Daily    gabapentin  100 mg Oral Daily    hydroxychloroquine  200 mg Oral Daily    lidocaine  1-3 patch Transdermal Q24h    melatonin  3 mg Oral At Bedtime    methocarbamol  500 mg Oral 4x Daily    metoprolol tartrate  25 mg Oral BID    polyethylene glycol  17 g Oral Daily           Physician Attestation   I, Belle Cotton,  MD, was present with the medical/THONG student who participated in the service and in the documentation of the note.  I have verified the history and personally performed the physical exam and medical decision making.  I agree with the assessment and plan of care as documented in the note.      Key findings: NICOLAS in setting of fall with fracture, poor intake.  Her renal function is better, and blood pressure is improved/ no longer hypotensive.  Would monitor for need for resuming furosemide, if BP higher or weight gain, can monitor carefully off for nw.            Belle Cotton MD  Date of Service (when I saw the patient): 03/01/24

## 2024-03-01 NOTE — PLAN OF CARE
Status: Admitted 2/24 after having an unwitnessed fall, found to have T12 burst fracture  Vitals: VSS on RA   Neuros: A&Ox4 w/ choices. 4/5 throughout, denies N/T  Labs/Electrolytes: Kidney labs improving  Resp/trach: Clear lungs, denies SOB  Diet: Regular, good PO  Bowel status: Multiple loose incontinent BM today   : Voiding, intermittent incontinence   Skin: Bruising throughout. Rectal bleeding noted likely d/t loose stools and wiping. Started to use soft clothes and damian spray   Pain: Reporting mild pain in L shoulder/back pain, taking scheduled tylenol/robaxin, lido patch placed   Activity: Up with 1, walker. TLSO when OOB. Walked halls x2 this shift  Social: Daughter at bedside this afternoon, helpful/supportive  Plan/updates: SW following for TCU placement, medically ready 3/1 or 3/2 pending labs (specifically creatinine). Continue POC

## 2024-03-01 NOTE — PROGRESS NOTES
Patient has been up in chair and tolerated it. Alert and oriented,  pain is being managed with Robaxin and some tylenol.  Good urine output, appetite is fair, vitals stable. Family were in the room with patient and they left to visit the possible TCU that the client might be going to. Creatinine today was 1.66, slowly decreasing. Up with one assist with GB, TLSO when out of bed Continue to monitor.

## 2024-03-01 NOTE — PLAN OF CARE
Status: Admitted 2/24 after having an unwitnessed fall, found to have T12 burst fracture  Vitals: VSS on RA, bradycardia in 50's while resting  Neuros: A&Ox4 w/ choices. [place] (hearing aids at bedside).4/5 throughout, denies N/T  Labs/Electrolytes: Kidney labs improving  Resp/trach: Continuous pulse ox in mid to high 90's  Diet: Regular  Bowel status: Fecal incontinence. Had a BM this shift   : Voiding, intermittent incontinence   Skin: Bruising throughout. Previously reported rectal bleeding not observed. Blanchable erythema to bottom, damian spray in use  Pain: Denied   Activity: Up with 1, walker. TLSO when OOB. Turn repo q2-4hrs  Plan/updates: SW following for TCU placement, medically ready 3/1 or 3/2 pending labs (specifically creatinine). Continue POC

## 2024-03-01 NOTE — PROGRESS NOTES
"[facility]  Trauma Service Progress Note    Date of Service: 03/01/2024      Assessment & Plan   Date of Service (when I saw the patient): 02/28/2024  Trauma Mechanism: Unwitnessed fall at care facility   Known Injuries:  T12 burst fracture with 40% height loss     History of present illness  Roxanna Stark is a 89 year old female with HTN, CKD, RA, PAD, aortic stenosis s/p AVR (04/2016), Atrial fibrillation (On Eliquis), HFpEF who presented to the [facility] ED from her nursing home via EMS after she was found down 02/24/24. ED work up notable of T12 burst fracture. She was admitted to the trauma service for further management. Conservative management with a TLSO brace per Neurosurgery.     Neuro/Pain/Psych:  # unwitnessed fall on DOAC  - Head and C-spine CT without acute findings     # Acute on chronic pain   - continue PTA: gabapentin 100mg  - Scheduled: Tylenol, Robaxin 500mg 4x/day for 7 days then stop, Lidoderm patches  -  Prn: Oxycodone discontinued 2/28     # Acute hospital associated delirium, resolved  - Patient awake all night in ED hallway bed near main door, advanced age, acute fractures with pain, new location without windows, can increase risk of delirium.   - Scheduled: Melatonin 3mg for sleep hygiene   - Allow patient to sleep through the night if vital signs are stable   - Maintain circadian rhythm.  Lights on during the day.  Off at night, minimize cares at night.  OOB during the day.  - Pain management above     Pulmonary:  # Bilateral Pleural Effusions, R > L noted on CT, subacute vs chronic  - Chest CT: Small to moderate pleural effusions right greater than left with compressive atelectasis. There is also partial collapse of the right middle lobe and lingula.   Cardiac MRI form 01/30/24 mentioned \" Moderate-large bilateral pleural effusions\"   - Denies shortness of breath, on room air  - Supplemental oxygen to keep saturation above 92 %.   "   Cardiovascular:    # Hypertension   # PAD  # Severe CAD  # Aortic Stenosis s/p AVR w/ porcine valve 4/25/16  # pAfib   # HFpEF on most recent ECHO 01/2024  # Elongated Qtc 458/508  - Continue PTA: amiodarone, metoprolol, Lasix (hold)  - PRN hydralazine and labetalol for SBP >160  - Avoid QTc prolonging medications  - BNP: 3,784     GI/Nutrition:    # Constipation, possible ileus, resolved  - continue PTA: Miralax daily,   - Prn: Senna-docusate      Renal/ Fluids/Electrolytes:  # NICOLAS on CKD   Creatinine baseline of 1.2-1.6, BUN 40  - Creatinine: 1.26?1.61?2.05?1.94?1.79?1.66  - Now going back to baseline  - Electrolytes continue to be wnl  - Restarting lasix tomorrow   - Discontinue nephrotoxic effect of 5-Aminosalicylic Acid Derivatives( Sulfasalazine)  - Obtain TTE  Global and regional LVF is hyperkinetic, EF 65-70%.  RVF, chamber size, wall motion, and thickness are normal.  S/P Minimally invasive aortic valve replacement with 21 mm Epic porcine valve on 4/25/2016. Mean aortic gradient 10mmHg. No change.  Pulmonary artery systolic pressure is normal.  IVC diameter and respiratory changes fall into an intermediate range suggesting an RA pressure of 8 mmHg.  No pericardial effusion is present.  A bilateral pleural effusion is present.  - Renal ultrasound   1. Left kidney is not well seen and appears atrophic; grossly no hydronephrosis. Focal lesion along the left kidney measuring up to 1.9  cm, indeterminate, possibly cyst, recommend attention on follow-up.  2. No right-sided hydronephrosis.  - Nephrology consulted  - electrolyte replacement protocol in place.      Endocrine:  # Rheumatoid arthritis  - continue PTA: hydroxychloroquine,   - Holding sulfasalazine d/t possible nephrotoxicity      # Thyroid nodules   - Chest CT: Heterogeneous nodular thyroid. Correlate with thyroid ultrasound which may be performed on a nonemergent outpatient basis.   - TSH/T4  - Pt to follow-up with PCP after discharge       Infectious disease:   # Possible asymptomatic UTI, completed 3 day course of ceftriaxone -  # Leukocytosis, resolved   - WBC: 16.9 ? 11.7?9.2     Hematology:    # Mild and slowly rising leukocytosis w/o clear source of infection  # Anemia of acute on chronic illness  Afebrile, stable on room air,   - Hb.4? 10.9?10.5, stable   - Threshold for transfusion if hgb <7.0 or signs/symptoms of hypoperfusion.        # Coagulopathy, long term use of anticoagulant  - Continue PTA Apixaban  - Further discussions with Cardiology outpatient regarding risk benefit of DOAC for Afib given falls and increased risk of ICH     Musculoskeletal:  # Weakness and deconditioning of acute on chronic illness   # T12 burst fracture, > 40% height loss with mild retropulsion  - Neurosurgery consulted: No acute surgical intervention planned,   - Post mobility XR's of the T spine completed  - Follow up outpatient with XR's of the Thoracic spine in 6 weeks  - Physical and occupational therapy consults.     Skin:  - dilgent cares to prevent skin breakdown and wound formation.      Consults: SW/ CC for discharge planning, Palliative Care    Lines/ tubes/ drains:  - PIV   General Cares:                 PPI/H2 blocker:  NA                DVT prophylaxis: PTA Apixaban                Bowel Regimen/Date of last stool: 3/1/24                Pulmonary toilet: deep cough, OOB     Code status:  DNR/DNI                 Discharge goals:   Adequate pain management:yes  VSS x24 hours: yes  Hemoglobin stable x 48 hours: NA  Ambulating safely and/or therapy evals complete: yes  Drains/lines removed or plan in place to manage: yes  Teaching done: yes  Other:     Expected D/C date: Medically ready for discharge to TCU     Mitra Martino PA-C  To contact the trauma service use job code pager [phone],   Numeric texts or alpha text through [facility]     Interval History   Per pt pain is well managed on Tylenol, Lidoderm, and Robaxin,   ROS x 8 negative with  exception of those things listed in interval hx    Physical Exam   Temp: 98.1  F (36.7  C) Temp src: Oral BP: (P) 138/71 Pulse: (P) 66   Resp: (P) 16 SpO2: 98 % O2 Device: None (Room air)    Vitals:    02/26/24 1101 02/29/24 1000   Weight: 50.9 kg (112 lb 3.4 oz) 46.2 kg (101 lb 14.4 oz)     Vital Signs with Ranges  Temp:  [97.9  F (36.6  C)-98.1  F (36.7  C)] 98.1  F (36.7  C)  Pulse:  [58-66] (P) 66  Resp:  [16] (P) 16  BP: (132-137)/(65-66) (P) 138/71  SpO2:  [98 %-100 %] 98 %  No intake/output data recorded.    [place] Coma Scale - Total 15/15  Eye Response (E): 4   4= spontaneous, 3= to verbal/voice, 2= to pain, 1= No response   Verbal Response (V): 5   5= Orientated, converses, 4= Confused, converses, 3= Inappropriate words, 2= Incomprehensible sounds, 1=No response   Motor Response (M): 6   6= Obeys commands, 5= Localizes to pain, 4= Withdrawal to pain, 3=Fexion to pain, 2= Extension to pain, 1= No response     Constitutional: Awake, alert, cooperative, no apparent distress. Sitting with TLSO on  Eyes: Lids and lashes normal, PERRL, EOMI, sclera clear, conjunctiva normal.  HENT: Normocephalic, atraumatic  Respiratory: No increased work of breathing, good air exchange, clear to auscultation bilaterally,   Cardiovascular:  regular rate and rhythm,   GI: Abdomen soft, non-distended, mild tenderness to palpation throughout,  Genitourinary:  Voids spont  Skin:  Warm & dry  Musculoskeletal: There is no redness, warmth, or swelling of the joints.. No lower extremity edema   Neurologic: Awake, alert, oriented.Strength and sensory is intact. No focal deficits.  Neuropsychiatric: Calm, normal eye contact, alert, affect appropriate to situation, oriented, thought process normal.

## 2024-03-01 NOTE — PROGRESS NOTES
Care Management Follow Up     Length of Stay (days): 5     Expected Discharge Date: 03/01/2024     Concerns to be Addressed:  Disposition planning  Patient plan of care discussed at interdisciplinary rounds: Yes     Anticipated Discharge Disposition: TCU Placement     Anticipated Discharge Services:  TCU placement  Anticipated Discharge DME:  Not applicable at this time     Patient/family educated on Medicare website which has current facility and service quality ratings:  Yes  Education Provided on the Discharge Plan:  Yes  Patient/Family in Agreement with the Plan:  Yes     Referrals Placed by CM/SW:  None  Private pay costs discussed: Not applicable at this time     Additional Information:  SW is following pt for discharge planning.  TCU placement is recommended.   This SW had planned to fax referrals to the following TCU's on 2/29/2024 but realized this am that the faxes were not sent.  Via Epic, LORI faxed referrals today to:   - [facility] fax[facility] assessment materials via [employer]  - [facility] assessment materials via [facility]  - [facility], [facility]I faxed assessment materials via [employer]  - [facility] via [employer].    LORI phoned Admissions at [facility] to check bed availability.  SW will await a return call.     LORI spoke with Mitra Martino, Trauma P.A. who confirms that pt is medically ready for discharge today.     SW will continue to follow for discharge planning.    GHISLAINE Montez  Social Work, 6A  Phone:  [phone]  Pager:  [phone]  3/1/2024

## 2024-03-02 ENCOUNTER — HOSPITAL ENCOUNTER (INPATIENT)
Facility: SKILLED NURSING FACILITY | Age: 89
LOS: 17 days | Discharge: HOME HEALTH | DRG: 560 | End: 2024-03-19
Attending: INTERNAL MEDICINE | Admitting: INTERNAL MEDICINE
Payer: MEDICARE

## 2024-03-02 VITALS
RESPIRATION RATE: 15 BRPM | BODY MASS INDEX: 19.85 KG/M2 | SYSTOLIC BLOOD PRESSURE: 165 MMHG | TEMPERATURE: 97.8 F | OXYGEN SATURATION: 97 % | WEIGHT: 101.9 LBS | HEART RATE: 60 BPM | DIASTOLIC BLOOD PRESSURE: 63 MMHG

## 2024-03-02 DIAGNOSIS — G57.93 NEUROPATHY OF BOTH FEET: ICD-10-CM
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
## 2024-03-02 DIAGNOSIS — M06.09 RHEUMATOID ARTHRITIS OF MULTIPLE SITES WITH NEGATIVE RHEUMATOID FACTOR (H): ICD-10-CM

## 2024-03-02 DIAGNOSIS — I48.92 ATRIAL FLUTTER WITH RAPID VENTRICULAR RESPONSE (H): ICD-10-CM

## 2024-03-02 DIAGNOSIS — S22.081A T12 BURST FRACTURE (H): ICD-10-CM

## 2024-03-02 DIAGNOSIS — S22.081A BURST FRACTURE OF T12 VERTEBRA (H): Primary | ICD-10-CM

## 2024-03-02 LAB
ANION GAP SERPL CALCULATED.3IONS-SCNC: 10 MMOL/L (ref 7–15)
BUN SERPL-MCNC: 31.2 MG/DL (ref 8–23)
CALCIUM SERPL-MCNC: 8.3 MG/DL (ref 8.2–9.6)
CHLORIDE SERPL-SCNC: 110 MMOL/L (ref 98–107)
CREAT SERPL-MCNC: 1.22 MG/DL (ref 0.51–0.95)
DEPRECATED HCO3 PLAS-SCNC: 20 MMOL/L (ref 22–29)
EGFRCR SERPLBLD CKD-EPI 2021: 40 ML/MIN/1.73M2
ERYTHROCYTE [DISTWIDTH] IN BLOOD BY AUTOMATED COUNT: 15.7 % (ref 10–15)
GLUCOSE SERPL-MCNC: 77 MG/DL (ref 70–99)
HCT VFR BLD AUTO: 33.6 % (ref 35–47)
HGB BLD-MCNC: 10.8 G/DL (ref 11.7–15.7)
MAGNESIUM SERPL-MCNC: 2.6 MG/DL (ref 1.7–2.3)
MCH RBC QN AUTO: 29.8 PG (ref 26.5–33)
MCHC RBC AUTO-ENTMCNC: 32.1 G/DL (ref 31.5–36.5)
MCV RBC AUTO: 93 FL (ref 78–100)
PHOSPHATE SERPL-MCNC: 2.6 MG/DL (ref 2.5–4.5)
PLATELET # BLD AUTO: 273 10E3/UL (ref 150–450)
POTASSIUM SERPL-SCNC: 3.9 MMOL/L (ref 3.4–5.3)
RBC # BLD AUTO: 3.62 10E6/UL (ref 3.8–5.2)
SODIUM SERPL-SCNC: 140 MMOL/L (ref 135–145)
WBC # BLD AUTO: 8.9 10E3/UL (ref 4–11)

## 2024-03-02 PROCEDURE — 250N000013 HC RX MED GY IP 250 OP 250 PS 637: Performed by: INTERNAL MEDICINE

## 2024-03-02 PROCEDURE — 120N000009 HC R&B SNF

## 2024-03-02 PROCEDURE — 36415 COLL VENOUS BLD VENIPUNCTURE: CPT | Performed by: NURSE PRACTITIONER

## 2024-03-02 PROCEDURE — 80048 BASIC METABOLIC PNL TOTAL CA: CPT | Performed by: NURSE PRACTITIONER

## 2024-03-02 PROCEDURE — 83735 ASSAY OF MAGNESIUM: CPT | Performed by: PHYSICIAN ASSISTANT

## 2024-03-02 PROCEDURE — 250N000013 HC RX MED GY IP 250 OP 250 PS 637: Performed by: PHYSICIAN ASSISTANT

## 2024-03-02 PROCEDURE — 84100 ASSAY OF PHOSPHORUS: CPT | Performed by: PHYSICIAN ASSISTANT

## 2024-03-02 PROCEDURE — 99232 SBSQ HOSP IP/OBS MODERATE 35: CPT | Performed by: PHYSICIAN ASSISTANT

## 2024-03-02 PROCEDURE — 250N000013 HC RX MED GY IP 250 OP 250 PS 637: Performed by: NURSE PRACTITIONER

## 2024-03-02 PROCEDURE — 250N000013 HC RX MED GY IP 250 OP 250 PS 637: Performed by: STUDENT IN AN ORGANIZED HEALTH CARE EDUCATION/TRAINING PROGRAM

## 2024-03-02 PROCEDURE — 85027 COMPLETE CBC AUTOMATED: CPT | Performed by: NURSE PRACTITIONER

## 2024-03-02 RX ORDER — GABAPENTIN 100 MG/1
100 CAPSULE ORAL DAILY
Status: DISCONTINUED | OUTPATIENT
Start: 2024-03-03 | End: 2024-03-19 | Stop reason: HOSPADM

## 2024-03-02 RX ORDER — AMIODARONE HYDROCHLORIDE 200 MG/1
200 TABLET ORAL DAILY
Qty: 65 TABLET | Refills: 0 | Status: DISCONTINUED | OUTPATIENT
Start: 2024-03-03 | End: 2024-03-19 | Stop reason: HOSPADM

## 2024-03-02 RX ORDER — BISACODYL 10 MG
10 SUPPOSITORY, RECTAL RECTAL DAILY PRN
Status: DISCONTINUED | OUTPATIENT
Start: 2024-03-02 | End: 2024-03-19 | Stop reason: HOSPADM

## 2024-03-02 RX ORDER — VIT A/VIT C/VIT E/ZINC/COPPER 4296-226
2 CAPSULE ORAL DAILY
Status: DISCONTINUED | OUTPATIENT
Start: 2024-03-03 | End: 2024-03-02

## 2024-03-02 RX ORDER — ACETAMINOPHEN 325 MG/1
975 TABLET ORAL 3 TIMES DAILY
Status: DISCONTINUED | OUTPATIENT
Start: 2024-03-02 | End: 2024-03-02

## 2024-03-02 RX ORDER — HYDROXYCHLOROQUINE SULFATE 200 MG/1
200 TABLET, FILM COATED ORAL DAILY
Status: DISCONTINUED | OUTPATIENT
Start: 2024-03-03 | End: 2024-03-19 | Stop reason: HOSPADM

## 2024-03-02 RX ORDER — LIDOCAINE 40 MG/G
CREAM TOPICAL
Status: DISCONTINUED | OUTPATIENT
Start: 2024-03-02 | End: 2024-03-19 | Stop reason: HOSPADM

## 2024-03-02 RX ORDER — ONDANSETRON 4 MG/1
4 TABLET, ORALLY DISINTEGRATING ORAL EVERY 6 HOURS PRN
Status: DISCONTINUED | OUTPATIENT
Start: 2024-03-02 | End: 2024-03-19 | Stop reason: HOSPADM

## 2024-03-02 RX ORDER — CALCIUM CARBONATE 500 MG/1
1000 TABLET, CHEWABLE ORAL 4 TIMES DAILY PRN
Status: DISCONTINUED | OUTPATIENT
Start: 2024-03-02 | End: 2024-03-19 | Stop reason: HOSPADM

## 2024-03-02 RX ORDER — AMOXICILLIN 250 MG
2 CAPSULE ORAL DAILY
Qty: 10 TABLET | Refills: 0 | Status: ON HOLD | DISCHARGE
Start: 2024-03-02 | End: 2024-03-15

## 2024-03-02 RX ORDER — METOPROLOL TARTRATE 25 MG/1
25 TABLET, FILM COATED ORAL 2 TIMES DAILY
Status: DISCONTINUED | OUTPATIENT
Start: 2024-03-02 | End: 2024-03-19 | Stop reason: HOSPADM

## 2024-03-02 RX ORDER — CALCITONIN SALMON 200 [IU]/.09ML
1 SPRAY, METERED NASAL DAILY
Qty: 1.4 ML | Refills: 0 | Status: COMPLETED | OUTPATIENT
Start: 2024-03-03 | End: 2024-03-16

## 2024-03-02 RX ORDER — VITAMIN B COMPLEX
50 TABLET ORAL DAILY
Status: DISCONTINUED | OUTPATIENT
Start: 2024-03-02 | End: 2024-03-02

## 2024-03-02 RX ORDER — AMOXICILLIN 250 MG
2 CAPSULE ORAL 2 TIMES DAILY
Status: DISCONTINUED | OUTPATIENT
Start: 2024-03-02 | End: 2024-03-02 | Stop reason: HOSPADM

## 2024-03-02 RX ORDER — LIDOCAINE 4 G/G
1 PATCH TOPICAL EVERY 24 HOURS
Status: DISCONTINUED | OUTPATIENT
Start: 2024-03-02 | End: 2024-03-19 | Stop reason: HOSPADM

## 2024-03-02 RX ORDER — FUROSEMIDE 20 MG
20 TABLET ORAL DAILY
Status: DISCONTINUED | OUTPATIENT
Start: 2024-03-03 | End: 2024-03-19 | Stop reason: HOSPADM

## 2024-03-02 RX ORDER — ACETAMINOPHEN 325 MG/1
975 TABLET ORAL 3 TIMES DAILY
Qty: 42 TABLET | Refills: 0 | Status: ON HOLD | DISCHARGE
Start: 2024-03-02 | End: 2024-03-15

## 2024-03-02 RX ORDER — AMOXICILLIN 250 MG
2 CAPSULE ORAL 2 TIMES DAILY PRN
Status: DISCONTINUED | OUTPATIENT
Start: 2024-03-02 | End: 2024-03-19 | Stop reason: HOSPADM

## 2024-03-02 RX ORDER — AMOXICILLIN 250 MG
1 CAPSULE ORAL 2 TIMES DAILY PRN
Status: DISCONTINUED | OUTPATIENT
Start: 2024-03-02 | End: 2024-03-19 | Stop reason: HOSPADM

## 2024-03-02 RX ORDER — ACETAMINOPHEN 325 MG/1
975 TABLET ORAL 3 TIMES DAILY
Status: DISCONTINUED | OUTPATIENT
Start: 2024-03-02 | End: 2024-03-19 | Stop reason: HOSPADM

## 2024-03-02 RX ORDER — POLYETHYLENE GLYCOL 3350 17 G/17G
17 POWDER, FOR SOLUTION ORAL DAILY
Status: DISCONTINUED | OUTPATIENT
Start: 2024-03-03 | End: 2024-03-19 | Stop reason: HOSPADM

## 2024-03-02 RX ORDER — LIDOCAINE 4 G/G
1-3 PATCH TOPICAL EVERY 24 HOURS
Qty: 15 PATCH | Refills: 0 | Status: ON HOLD | DISCHARGE
Start: 2024-03-02 | End: 2024-03-15

## 2024-03-02 RX ORDER — CALCIUM CARBONATE 500 MG/1
500 TABLET, CHEWABLE ORAL DAILY
Status: DISCONTINUED | OUTPATIENT
Start: 2024-03-03 | End: 2024-03-02

## 2024-03-02 RX ADMIN — ACETAMINOPHEN 975 MG: 325 TABLET, FILM COATED ORAL at 13:35

## 2024-03-02 RX ADMIN — FUROSEMIDE 20 MG: 20 TABLET ORAL at 08:14

## 2024-03-02 RX ADMIN — DOCUSATE SODIUM 50 MG AND SENNOSIDES 8.6 MG 2 TABLET: 8.6; 5 TABLET, FILM COATED ORAL at 08:13

## 2024-03-02 RX ADMIN — ACETAMINOPHEN 975 MG: 325 TABLET, FILM COATED ORAL at 19:48

## 2024-03-02 RX ADMIN — METOPROLOL TARTRATE 25 MG: 25 TABLET, FILM COATED ORAL at 21:10

## 2024-03-02 RX ADMIN — Medication 1 TABLET: at 08:13

## 2024-03-02 RX ADMIN — HYDROXYCHLOROQUINE SULFATE 200 MG: 200 TABLET, FILM COATED ORAL at 08:13

## 2024-03-02 RX ADMIN — METOPROLOL TARTRATE 25 MG: 25 TABLET, FILM COATED ORAL at 08:14

## 2024-03-02 RX ADMIN — METHOCARBAMOL 500 MG: 500 TABLET ORAL at 08:14

## 2024-03-02 RX ADMIN — POLYETHYLENE GLYCOL 3350 17 G: 17 POWDER, FOR SOLUTION ORAL at 08:13

## 2024-03-02 RX ADMIN — METHOCARBAMOL 500 MG: 500 TABLET ORAL at 12:19

## 2024-03-02 RX ADMIN — GABAPENTIN 100 MG: 100 CAPSULE ORAL at 08:13

## 2024-03-02 RX ADMIN — AMIODARONE HYDROCHLORIDE 200 MG: 200 TABLET ORAL at 08:14

## 2024-03-02 RX ADMIN — APIXABAN 2.5 MG: 2.5 TABLET, FILM COATED ORAL at 08:14

## 2024-03-02 RX ADMIN — APIXABAN 2.5 MG: 2.5 TABLET, FILM COATED ORAL at 21:10

## 2024-03-02 RX ADMIN — ACETAMINOPHEN 975 MG: 325 TABLET, FILM COATED ORAL at 08:13

## 2024-03-02 RX ADMIN — LIDOCAINE 1 PATCH: 4 PATCH TOPICAL at 19:49

## 2024-03-02 ASSESSMENT — ACTIVITIES OF DAILY LIVING (ADL)
ADLS_ACUITY_SCORE: 34
ADLS_ACUITY_SCORE: 41
ADLS_ACUITY_SCORE: 34
ADLS_ACUITY_SCORE: 41
ADLS_ACUITY_SCORE: 34
ADLS_ACUITY_SCORE: 41
ADLS_ACUITY_SCORE: 38
ADLS_ACUITY_SCORE: 41
ADLS_ACUITY_SCORE: 38
ADLS_ACUITY_SCORE: 41
ADLS_ACUITY_SCORE: 34

## 2024-03-02 NOTE — PROGRESS NOTES
"[facility]  Trauma Service Progress Note    Date of Service: 03/02/2024      Assessment & Plan   Date of Service (when I saw the patient): 02/28/2024  Trauma Mechanism: Unwitnessed fall at care facility   Known Injuries:  T12 burst fracture with 40% height loss     History of present illness  Roxanna Stark is a 89 year old female with HTN, CKD, RA, PAD, aortic stenosis s/p AVR (04/2016), Atrial fibrillation (On Eliquis), HFpEF who presented to the [facility] ED from her nursing home via EMS after she was found down 02/24/24. ED work up notable of T12 burst fracture. She was admitted to the trauma service for further management. Conservative management with a TLSO brace per Neurosurgery.     Neuro/Pain/Psych:  # unwitnessed fall on DOAC  - Head and C-spine CT without acute findings     # Acute on chronic pain   - continue PTA: gabapentin 100mg  - Scheduled: Tylenol, Lidoderm patches  -  Robaxin 500mg 4x/day for 7 days then stop,   - Prn Oxycodone discontinued 2/28     # Acute hospital associated delirium, resolved  - Patient awake all night in ED hallway bed near main door, advanced age, acute fractures with pain, new location without windows, can increase risk of delirium.   - Scheduled: Melatonin 3mg for sleep hygiene   - Allow patient to sleep through the night if vital signs are stable   - Maintain circadian rhythm.  Lights on during the day.  Off at night, minimize cares at night.  OOB during the day.  - Pain management above     Pulmonary:  # Bilateral Pleural Effusions, R > L noted on CT, subacute vs chronic  - Chest CT: Small to moderate pleural effusions right greater than left with compressive atelectasis. There is also partial collapse of the right middle lobe and lingula.   Cardiac MRI form 01/30/24 mentioned \" Moderate-large bilateral pleural effusions\"   - Denies shortness of breath, on room air  - Supplemental oxygen to keep saturation above 92 %.   "   Cardiovascular:    # Hypertension   # PAD  # Severe CAD  # Aortic Stenosis s/p AVR w/ porcine valve 4/25/16  # pAfib   # HFpEF on most recent ECHO 01/2024  # Elongated Qtc 458/508  - Continue PTA: amiodarone, metoprolol, Lasix (hold)  - PRN hydralazine and labetalol for SBP >160  - Avoid QTc prolonging medications  - BNP: 3,784 on 3/1     GI/Nutrition:    # Constipation, possible ileus, resolved  - continue PTA: Miralax daily,   - Prn: Senna-docusate      Renal/ Fluids/Electrolytes:  # NICOLAS on CKD   Creatinine baseline of 1.2-1.6, BUN 40  - Creatinine: 1.26?1.61?2.05?1.94?1.79?1.66?1.22  - Now going back to baseline  - Electrolytes continue to be wnl  - Restarting lasix today   - Discontinue nephrotoxic effect of 5-Aminosalicylic Acid Derivatives( Sulfasalazine)  - Obtain TTE  Global and regional LVF is hyperkinetic, EF 65-70%.  RVF, chamber size, wall motion, and thickness are normal.  S/P Minimally invasive aortic valve replacement with 21 mm Epic porcine valve on 4/25/2016. Mean aortic gradient 10mmHg. No change.  Pulmonary artery systolic pressure is normal.  IVC diameter and respiratory changes fall into an intermediate range suggesting an RA pressure of 8 mmHg.  No pericardial effusion is present.  A bilateral pleural effusion is present.  - Renal ultrasound   1. Left kidney is not well seen and appears atrophic; grossly no hydronephrosis. Focal lesion along the left kidney measuring up to 1.9  cm, indeterminate, possibly cyst, recommend attention on follow-up.  2. No right-sided hydronephrosis.  - Nephrology consulted  - electrolyte replacement protocol in place.      Endocrine:  # Rheumatoid arthritis  - continue PTA: hydroxychloroquine,   - Holding sulfasalazine d/t possible nephrotoxicity      # Thyroid nodules   - Chest CT: Heterogeneous nodular thyroid. Correlate with thyroid ultrasound which may be performed on a nonemergent outpatient basis.   - TSH/T4  - Pt to follow-up with PCP after discharge     "  Infectious disease:   # Possible asymptomatic UTI, completed 3 day course of ceftriaxone 02/25-02/27  # Leukocytosis, resolved   - WBC: 8.9     Hematology:    # Mild and slowly rising leukocytosis w/o clear source of infection  # Anemia of acute on chronic illness  Afebrile, stable on room air,   - Hbg:10.8, stable   - Threshold for transfusion if hgb <7.0 or signs/symptoms of hypoperfusion.        # Coagulopathy, long term use of anticoagulant  - Continue PTA Apixaban  - Further discussions with Cardiology outpatient regarding risk benefit of DOAC for Afib given falls and increased risk of ICH     Musculoskeletal:  # Weakness and deconditioning of acute on chronic illness   # T12 burst fracture, > 40% height loss with mild retropulsion  - Neurosurgery consulted: No acute surgical intervention planned,   - Post mobility XR's of the T spine completed  - Follow up outpatient with XR's of the Thoracic spine in 6 weeks  - Physical and occupational therapy consults.     Skin:  - dilgent cares to prevent skin breakdown and wound formation.      Consults: SW/ CC for discharge planning, Palliative Care    Lines/ tubes/ drains:  - PIV   General Cares:                 PPI/H2 blocker:  NA                DVT prophylaxis: PTA Apixaban                Bowel Regimen/Date of last stool: 3/1/24                Pulmonary toilet: deep cough, OOB     Code status:  DNR/DNI                 Discharge goals:   Adequate pain management:yes  VSS x24 hours: yes  Hemoglobin stable x 48 hours: NA  Ambulating safely and/or therapy evals complete: yes  Drains/lines removed or plan in place to manage: yes  Teaching done: yes  Other:     Expected D/C date: Medically ready for discharge to TCU     Mitra Martino PA-C  To contact the trauma service use job code pager [phone],   Numeric texts or alpha text through [facility]     Interval History   Patient feel like her abdomen is 'full\", with her chronic constipation I scheduled her Senna-docusate.   ROS x " 8 negative with exception of those things listed in interval hx    Physical Exam   Temp: 97.8  F (36.6  C) Temp src: Oral BP: (!) 165/63 Pulse: 60   Resp: 15 SpO2: 97 % O2 Device: None (Room air)    Vitals:    02/26/24 1101 02/29/24 1000   Weight: 50.9 kg (112 lb 3.4 oz) 46.2 kg (101 lb 14.4 oz)     Vital Signs with Ranges  Temp:  [97.8  F (36.6  C)-97.9  F (36.6  C)] 97.8  F (36.6  C)  Pulse:  [54-60] 60  Resp:  [14-15] 15  BP: (118-165)/(51-63) 165/63  SpO2:  [96 %-97 %] 97 %  I/O last 3 completed shifts:  In: 120 [P.O.:120]  Out: -     Harbinger Coma Scale - Total 15/15  Eye Response (E): 4   4= spontaneous, 3= to verbal/voice, 2= to pain, 1= No response   Verbal Response (V): 5   5= Orientated, converses, 4= Confused, converses, 3= Inappropriate words, 2= Incomprehensible sounds, 1=No response   Motor Response (M): 6   6= Obeys commands, 5= Localizes to pain, 4= Withdrawal to pain, 3=Fexion to pain, 2= Extension to pain, 1= No response     Constitutional: Awake, alert, cooperative, no apparent distress. Sitting with TLSO on  Eyes: Lids and lashes normal, PERRL, EOMI, sclera clear, conjunctiva normal.  HENT: Normocephalic, atraumatic  Respiratory: No increased work of breathing, good air exchange, clear to auscultation bilaterally,   Cardiovascular:  regular rate and rhythm,   GI: Abdomen soft, non-distended, mild tenderness to palpation throughout,  Genitourinary:  Voids spont  Skin:  Warm & dry  Musculoskeletal: There is no redness, warmth, or swelling of the joints.. No lower extremity edema   Neurologic: Awake, alert, oriented.Strength and sensory is intact. No focal deficits.  Neuropsychiatric: Calm, normal eye contact, alert, affect appropriate to situation, oriented, thought process normal.

## 2024-03-02 NOTE — DISCHARGE SUMMARY
[facility]    Discharge Summary  Trauma Service     Date of Admission:  2/24/2024  Date of Discharge:  3/2/2024  Attending Physician: Dr. Trevor Cole  Discharging Provider: Mitra CORTEZ   Date of Service (when I saw the patient): 03/02/24    Primary Provider: Swapna Gaines  Primary Care clinic: [address] [place] [address]  [place] [id number]  Phone: [phone]  Fax number: [phone]     Discharge Diagnoses   Principal Problem:    T12 burst fracture (H)  Active Problems:    Fall, initial encounter      Hospital Course   Traumatic Injury  The patient sustained the above injury as a result of an unwitnessed fall.  The mechanism of injury and factors contributing to the accident were discussed with the patient.  Strategies on how to prevent future accidents were reviewed.  The patient underwent tertiary examination to evaluate for additional injuries.  The systematic review did not find any other injuries.      Compression Fracture:  # T12 burst fracture, > 40% height loss with mild retropulsion  Roxanna Stark was evaluated by Neurosurgery and managed non-operatively for her fractures. She was placed on spinal precautions and monitored with serial neurological examinations which remained stable. Orthotics was consulted for a thoraco-lumbar orthotic brace (TLSO).  Patient had post bracing and mobilization films performed and reviewed by Neurosurgery. She will need to wear the TLSO brace for comfort whenever she is out of bed.     Neurosurgery recommends follow up in Neurosurgery clinic in 6 weeks with repeat xrays. She was evaluated by physical and occupational therapies during his/her hospitalization.  Please see summary of most current therapy notes below.     # Acute on chronic pain   - continue PTA: gabapentin 100mg  - Scheduled: Tylenol, Lidoderm patches  - Prn Oxycodone discontinued 2/28, discontinued robaxin       Other major problems and  complications:  Additional issues that were addressed during this hospitalization include:    # Acute hospital associated delirium, resolved  On initial admission patient was awake all night in ED hallway bed near main door, advanced age, acute fractures with pain, new location without windows, can increase risk of delirium. She would just laugh to questions, no impulsiveness, or agitation. She had a room on the Neuroscience floor the next night which resolved the delirium after a normal night sleep. Allow patient to sleep through the night if vital signs are stable. Maintain circadian rhythm.  Lights on during the day.  Off at night, minimize cares at night.  OOB during the day.  - Scheduled: Melatonin 3mg for sleep hygiene   - Pain management above     # Acute Kidney Injury on CKD  Creatinine baseline of 1.2-1.6, BUN 40  - Creatinine: 1.26?1.61?2.05?1.94?1.79?1.66?1.22  - Electrolytes continue to be wnl  Nephrology evaluated patient and recommended to continue daily weights.   Completed:   - TTE  Global and regional LVF is hyperkinetic, EF 65-70%.  RVF, chamber size, wall motion, and thickness are normal.  S/P Minimally invasive aortic valve replacement with 21 mm Epic porcine valve on 4/25/2016. Mean aortic gradient 10mmHg. No change.  Pulmonary artery systolic pressure is normal.  IVC diameter and respiratory changes fall into an intermediate range suggesting an RA pressure of 8 mmHg.  No pericardial effusion is present.  A bilateral pleural effusion is present.  - Renal ultrasound   1. Left kidney is not well seen and appears atrophic; grossly no hydronephrosis. Focal lesion along the left kidney measuring up to 1.9  cm, indeterminate, possibly cyst, recommend attention on follow-up.  2. No right-sided hydronephrosis.  - Discontinued nephrotoxic effect of Sulfasalazine  Recommend follow-up with Primary Care Provider or with TCU provider to repeat BMP next week and Rheumatologist to discuss stopping  "sulfasalazine  for her rheumatoid arthritis       # Empiric treatment for possible Urinary Tract Infection  Roxanna Stark was treated empirically with 3 days of ceftriaxone for a UTI since her UA showed: Large Leukocyte Esterase, 66 WBC Urine. Since Roxanna has had increased falls and confusion she was put on a short course of antibiotics while waiting for urine culture. A urine culture was done that showed mixture of urogenital georgina.       # Thyroid nodules, incidental finding    - Chest CT: Heterogeneous nodular thyroid. Correlate with thyroid ultrasound which may be performed on a nonemergent outpatient basis.   - TSH wnl  - Pt to follow-up with PCP after discharge for further work-up if needed         Other Comobidities:  were not active issues during this hospitalization. Her home medications were resumed during their hospitalization and no changes have been made.    # Bilateral Pleural Effusions, R > L noted on CT, subacute vs chronic  - Chest CT: Small to moderate pleural effusions right greater than left with compressive atelectasis. There is also partial collapse of the right middle lobe and lingula.   Cardiac MRI form 01/30/24 mentioned \" Moderate-large bilateral pleural effusions\"   Denies shortness of breath, remained on room air with pulse oxygenation remaining above 96%.        # Hypertension   # PAD  # Severe CAD  # Aortic Stenosis s/p AVR w/ porcine valve 4/25/16  # pAfib   # HFpEF on most recent ECHO 01/2024  # Elongated Qtc 458/508  Recent Admission: 1/27/24-2/1/24 for Afib with RVR vs Aflutter, Acute Heart Failure   - Continue PTA: amiodarone, metoprolol,   - restarted lasix today now that Creatinine is closer to normal     # Chronic Constipation  - continue PTA: Miralax daily, Senna-docusate     Therapy Recommendations:   Current status of physical therapies on discharge: TCU. dependence with functional mobility, falls risk.supine <> sit min assist. ambulated with FWW and min assist.     Current " status of occupational therapies on discharge: TCU, Pt below baseline, limited by weakness and new spinal fracture, new brace to manage and spinal precautions for conservative management. She would benefit from continued therapy to maximize safety and independence with ADLs. Family reports increased confusion for last few weeks. Family education on spinal precautions/ADL         Code Status   DNR / DNI      Discharge Disposition   Discharged to short-term care facility  Condition at discharge: Stable  Discharge VS: Blood pressure (!) 165/63, pulse 60, temperature 97.8  F (36.6  C), temperature source Oral, resp. rate 15, weight 46.2 kg (101 lb 14.4 oz), SpO2 97%, not currently breastfeeding.    Consultations This Hospital Stay   ORTHOSIS BRACE IP CONSULT  PALLIATIVE CARE ADULT IP CONSULT  PHYSICAL THERAPY ADULT IP CONSULT  OCCUPATIONAL THERAPY ADULT IP CONSULT  NURSING TO CONSULT FOR VASCULAR ACCESS CARE IP CONSULT  SPIRITUAL HEALTH SERVICES IP CONSULT  NEPHROLOGY GENERAL ADULT IP CONSULT  CARE MANAGEMENT / SOCIAL WORK IP CONSULT  PHYSICAL THERAPY ADULT IP CONSULT  OCCUPATIONAL THERAPY ADULT IP CONSULT    Discharge Orders      XR Thoracic Spine 2 Views     General info for SNF    Length of Stay Estimate: Short Term Care: Estimated # of Days <30  Condition at Discharge: Stable  Level of care:skilled   Rehabilitation Potential: Good  Admission H&P remains valid and up-to-date: Yes  Recent Chemotherapy: N/A  Use Nursing Home Standing Orders: Yes     Mantoux instructions    Give two-step Mantoux (PPD) Per Facility Policy Yes     Follow Up and recommended labs and tests    Follow up with your primary care provider for continued medical care and hospital follow up in 5-10 days.      Follow-up in 6 weeks with repeat spine xrays   Neurosurgery Clinic   [facility]  Floor 3   [address] [place] [id number]  Appointments: [phone]        You have been involved in a recent trauma incident  "resulting in an injury.  Studies show us that people affected by trauma have higher levels of post-traumatic stress disorder (PTSD) and/or depressive symptoms during the year following an injury.     Please consider the following.  Have you:  Had migraines about the event(s) or thought about the event(s) when you didn't want to?  Tried hard not to think about the event(s) or went out of your way to avoid situations that reminded you of the event(s)?  Been constantly on guard, watchful, or easily startled?  Felt numb or detached from people, activities, or your surroundings?   Felt guilty or unable to stop blaming yourself or others for the event(s) or any problems the event (s) may have caused?    If you answered \"yes\" to 3 or more of these questions, or if you simply want to discuss any of your feelings further, we recommend that you talk with your Primary Care Provider or a mental health professional.     Reason for your hospital stay    Fall, T12 compression fracture     Activity - Up with nursing assistance     Additional Discharge Instructions    TLSO brace for comfort when out of bed     Physical Therapy Adult Consult    Evaluate and treat as clinically indicated.    Reason:  Multiple falls, T12 compression fracture     Occupational Therapy Adult Consult    Evaluate and treat as clinically indicated.    Reason:   Multiple falls, T12 compression fracture     Fall precautions     Diet    Follow this diet upon discharge: Orders Placed This Encounter      Regular Diet Adult     Discharge Medications   Current Discharge Medication List        START taking these medications    Details   Lidocaine (LIDOCARE) 4 % Patch Place 1-3 patches onto the skin every 24 hours To prevent lidocaine toxicity, patient should be patch free for 12 hrs daily.  Qty: 15 patch, Refills: 0    Associated Diagnoses: Fall, initial encounter; T12 burst fracture (H)      senna-docusate (SENOKOT-S/PERICOLACE) 8.6-50 MG tablet Take 2 tablets by " mouth daily  Qty: 10 tablet, Refills: 0    Associated Diagnoses: Slow transit constipation           CONTINUE these medications which have CHANGED    Details   acetaminophen (TYLENOL) 325 MG tablet Take 3 tablets (975 mg) by mouth 3 times daily  Qty: 42 tablet, Refills: 0    Associated Diagnoses: Fall, initial encounter; T12 burst fracture (H)           CONTINUE these medications which have NOT CHANGED    Details   amiodarone (PACERONE) 200 MG tablet Take 2 tablets (400 mg) by mouth daily for 6 days, THEN 1 tablet (200 mg) daily for 90 days.    Associated Diagnoses: Atrial flutter with rapid ventricular response (H)      apixaban ANTICOAGULANT (ELIQUIS) 2.5 MG tablet Take 1 tablet (2.5 mg) by mouth 2 times daily  Qty: 180 tablet, Refills: 3    Associated Diagnoses: Atrial flutter with rapid ventricular response (H)      calcium-vitamin D 500-125 MG-UNIT TABS Take 1 tablet by mouth daily      furosemide (LASIX) 20 MG tablet TAKE 1 TABLET BY MOUTH EVERY DAY IF GAIN OF 2 TO 3 POUNDS OVER 2 DAYS  Qty: 90 tablet, Refills: 3    Associated Diagnoses: 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)      gabapentin (NEURONTIN) 100 MG capsule Take 1 capsule (100 mg) by mouth daily  Qty: 90 capsule, Refills: 3    Associated Diagnoses: Neuropathy of both feet      hydroxychloroquine (PLAQUENIL) 200 MG tablet Take 1 tablet (200 mg) by mouth daily  Qty: 30 tablet, Refills: 3    Associated Diagnoses: Rheumatoid arthritis of multiple sites with negative rheumatoid factor (H)      ICAPS PO 2 tablets daily      Menthol, Topical Analgesic, 4 % GEL Apply topically to affected area(s) three times daily.      metoprolol tartrate (LOPRESSOR) 25 MG tablet Take 1 tablet (25 mg) by mouth 2 times daily  Qty: 180 tablet, Refills: 3    Associated Diagnoses: 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)      Omega-3 Fatty Acids (OMEGA-3 FISH OIL PO) Take 1 tablet twice daily.      polyethylene glycol  (MIRALAX) 17 GM/Dose powder Mix 1 scoop (17 g) in liquid then take by mouth once daily.      Misc. Devices (ROLLATOR ULTRA-LIGHT) MISC            STOP taking these medications       amoxicillin (AMOXIL) 500 MG capsule Comments:   Reason for Stopping:         methocarbamol (ROBAXIN) 500 MG tablet Comments:   Reason for Stopping:         senna (SENOKOT) 8.6 MG tablet Comments:   Reason for Stopping:         sulfaSALAzine (AZULFIDINE) 500 MG tablet Comments:   Reason for Stopping:             Allergies   Allergies   Allergen Reactions    Lisinopril Cough     Data   Most Recent 3 CBC's:  Recent Labs   Lab Test 03/02/24  0715 03/01/24  0603 02/29/24  0623   WBC 8.9 9.2 11.7*   HGB 10.8* 10.6* 10.5*   MCV 93 93 92    253 247      Most Recent 3 BMP's:  Recent Labs   Lab Test 03/02/24  0715 03/01/24  0603 02/29/24  0623    137 137   POTASSIUM 3.9 3.5 3.5   CHLORIDE 110* 105 105   CO2 20* 22 22   BUN 31.2* 38.9* 39.0*   CR 1.22* 1.66* 1.79*   ANIONGAP 10 10 10   ROEL 8.3 7.9* 8.0*   GLC 77 78 71     Most Recent 2 LFT's:  Recent Labs   Lab Test 02/24/24  2159 01/27/24  1640   AST 22 40   ALT 12 32   ALKPHOS 110 135   BILITOTAL 0.2 0.4     Most Recent INR's and Anticoagulation Dosing History:  Anticoagulation Dose History          Latest Ref Rng & Units 4/4/2016 4/25/2016 2/24/2024   Recent Dosing and Labs   INR 0.85 - 1.15 0.90  1.41  1.63  1.54      Most Recent 3 Troponin's:No lab results found.  Most Recent 6 Bacteria Isolates From Any Culture (See EPIC Reports for Culture Details):  Recent Labs   Lab Test 07/24/20  0915   CULT Heavy growth  Staphylococcus aureus  *  Moderate growth  Strain 2  Staphylococcus aureus  *  Heavy growth  Trueperella (Arcanobacterium) bernardiae  Identification obtained by MALDI-TOF mass spectrometry research use only database. Test   characteristics determined and verified by the [facility]   ([facility]) [place], [place].  Susceptibility testing not  routinely done  *     Most Recent TSH, T4 and A1c Labs:  Recent Labs   Lab Test 02/28/24  0626 02/16/24  0738   TSH 1.58 4.02   T4  --  1.55     Results for orders placed or performed during the hospital encounter of 02/24/24   CT Head w/o Contrast    Narrative    EXAM: CT HEAD W/O CONTRAST, CT CERVICAL SPINE W/O CONTRAST  LOCATION: [facility]  DATE: 2/25/2024    INDICATION: Head and neck injury  COMPARISON: None.  TECHNIQUE:   1) Routine CT Head without IV contrast. Multiplanar reformats. Dose reduction techniques were used.  2) Routine CT Cervical Spine without IV contrast. Multiplanar reformats. Dose reduction techniques were used.    FINDINGS:   HEAD CT:   INTRACRANIAL CONTENTS: No intracranial hemorrhage, extraaxial collection, or mass effect.  No CT evidence of acute infarct. Moderate presumed chronic small vessel ischemic changes. Moderate generalized volume loss. No hydrocephalus.     VISUALIZED ORBITS/SINUSES/MASTOIDS: No intraorbital abnormality. Opacification anterior aspect of right sphenoid sinus. No middle ear or mastoid effusion.    BONES/SOFT TISSUES: No acute abnormality.    CERVICAL SPINE CT:   VERTEBRA: Normal vertebral body heights. Straightening of cervical lordosis. Minimal anterior subluxation C7 on T1 and minimal posterior subluxation C3 on C4. No fracture or posttraumatic subluxation.     CANAL/FORAMINA: Mild to moderate degenerative changes with mild canal narrowing at C3-C4, C5-C6. Multilevel prominent neural foraminal narrowing.    PARASPINAL: 2 cm nodule left thyroid gland; ultrasound recommended, if not done previously. Mild bilateral pleural effusions.      Impression    IMPRESSION:  HEAD CT:  1.  No acute intracranial process.    CERVICAL SPINE CT:  1.  No CT evidence for acute fracture or post traumatic subluxation.   CT Cervical Spine w/o Contrast    Narrative    EXAM: CT HEAD W/O CONTRAST, CT CERVICAL SPINE W/O CONTRAST  LOCATION:   [facility]  DATE: 2/25/2024    INDICATION: Head and neck injury  COMPARISON: None.  TECHNIQUE:   1) Routine CT Head without IV contrast. Multiplanar reformats. Dose reduction techniques were used.  2) Routine CT Cervical Spine without IV contrast. Multiplanar reformats. Dose reduction techniques were used.    FINDINGS:   HEAD CT:   INTRACRANIAL CONTENTS: No intracranial hemorrhage, extraaxial collection, or mass effect.  No CT evidence of acute infarct. Moderate presumed chronic small vessel ischemic changes. Moderate generalized volume loss. No hydrocephalus.     VISUALIZED ORBITS/SINUSES/MASTOIDS: No intraorbital abnormality. Opacification anterior aspect of right sphenoid sinus. No middle ear or mastoid effusion.    BONES/SOFT TISSUES: No acute abnormality.    CERVICAL SPINE CT:   VERTEBRA: Normal vertebral body heights. Straightening of cervical lordosis. Minimal anterior subluxation C7 on T1 and minimal posterior subluxation C3 on C4. No fracture or posttraumatic subluxation.     CANAL/FORAMINA: Mild to moderate degenerative changes with mild canal narrowing at C3-C4, C5-C6. Multilevel prominent neural foraminal narrowing.    PARASPINAL: 2 cm nodule left thyroid gland; ultrasound recommended, if not done previously. Mild bilateral pleural effusions.      Impression    IMPRESSION:  HEAD CT:  1.  No acute intracranial process.    CERVICAL SPINE CT:  1.  No CT evidence for acute fracture or post traumatic subluxation.   CT Thoracic Spine w/o Contrast    Narrative    EXAM: CT THORACIC SPINE W/O CONTRAST, CT LUMBAR SPINE W/O CONTRAST  LOCATION: [facility]  DATE: 2/25/2024    INDICATION: Fall, back pain.  COMPARISON: None.  TECHNIQUE:  1) Routine CT Thoracic Spine without IV contrast. Multiplanar reformats. Dose reduction techniques were used.   2) Routine CT Lumbar Spine without IV contrast. Multiplanar reformats. Dose  reduction techniques were used.     FINDINGS:    THORACIC SPINE CT:  VERTEBRA: Normal alignment. T12 burst fracture with involvement of the superior and inferior endplates resulting in 40% vertebral body height loss and retropulsion of the posterior superior cortex by 5 mm. No other fractures.      CANAL/FORAMINA: Mild to moderate spondylosis. Mild spinal canal stenosis associated with retropulsion at T12. Moderate right foraminal stenosis at T11-T12. Otherwise, no significant spinal canal or foraminal stenosis.    PARASPINAL: See dedicated chest CT regarding intrathoracic findings.    LUMBAR SPINE CT:  VERTEBRA: Accentuation of lumbar lordosis with mild anterior spondylolisthesis at L4-L5 and L5-S1 and posterior spondylolisthesis at L1-L2. Mild rightward curvature centered at L2-L3. Otherwise normal alignment. Normal vertebral body heights. No fracture   or posttraumatic subluxation.     CANAL/FORAMINA: Severe left and moderate right foraminal stenoses at L1-L2. Moderate left foraminal stenosis at L2-L3. Moderate foraminal stenosis at L3-L4. Mild to moderate foraminal stenosis at L4-L5. Severe right and moderate left foraminal stenosis   at L5-S1.    PARASPINAL: See dedicated abdomen and pelvis CT regarding intra-abdominal findings.      Impression    IMPRESSION:  THORACIC SPINE CT:  1.  T12 burst fracture with involvement of the superior and inferior endplate resulting in 40% height loss with retropulsion causing mild spinal canal stenosis. No other fractures.    LUMBAR SPINE CT:  1.  No fracture or posttraumatic subluxation.  2.  Spondylosis with foraminal stenoses as detailed.     CT Lumbar Spine w/o Contrast    Narrative    EXAM: CT THORACIC SPINE W/O CONTRAST, CT LUMBAR SPINE W/O CONTRAST  LOCATION: [facility]  DATE: 2/25/2024    INDICATION: Fall, back pain.  COMPARISON: None.  TECHNIQUE:  1) Routine CT Thoracic Spine without IV contrast. Multiplanar reformats.  Dose reduction techniques were used.   2) Routine CT Lumbar Spine without IV contrast. Multiplanar reformats. Dose reduction techniques were used.     FINDINGS:    THORACIC SPINE CT:  VERTEBRA: Normal alignment. T12 burst fracture with involvement of the superior and inferior endplates resulting in 40% vertebral body height loss and retropulsion of the posterior superior cortex by 5 mm. No other fractures.      CANAL/FORAMINA: Mild to moderate spondylosis. Mild spinal canal stenosis associated with retropulsion at T12. Moderate right foraminal stenosis at T11-T12. Otherwise, no significant spinal canal or foraminal stenosis.    PARASPINAL: See dedicated chest CT regarding intrathoracic findings.    LUMBAR SPINE CT:  VERTEBRA: Accentuation of lumbar lordosis with mild anterior spondylolisthesis at L4-L5 and L5-S1 and posterior spondylolisthesis at L1-L2. Mild rightward curvature centered at L2-L3. Otherwise normal alignment. Normal vertebral body heights. No fracture   or posttraumatic subluxation.     CANAL/FORAMINA: Severe left and moderate right foraminal stenoses at L1-L2. Moderate left foraminal stenosis at L2-L3. Moderate foraminal stenosis at L3-L4. Mild to moderate foraminal stenosis at L4-L5. Severe right and moderate left foraminal stenosis   at L5-S1.    PARASPINAL: See dedicated abdomen and pelvis CT regarding intra-abdominal findings.      Impression    IMPRESSION:  THORACIC SPINE CT:  1.  T12 burst fracture with involvement of the superior and inferior endplate resulting in 40% height loss with retropulsion causing mild spinal canal stenosis. No other fractures.    LUMBAR SPINE CT:  1.  No fracture or posttraumatic subluxation.  2.  Spondylosis with foraminal stenoses as detailed.     CT Chest Abdomen Pelvis w/o Contrast    Narrative    EXAM: CT CHEST ABDOMEN PELVIS W/O CONTRAST  LOCATION: [facility]  DATE: 2/25/2024    INDICATION: Fall, back and L abdominal  pain. Low GFR.  COMPARISON: Two-view chest radiograph 01/27/2024, CT chest 03/18/2016  TECHNIQUE: CT scan of the chest, abdomen, and pelvis was performed without IV contrast. Multiplanar reformats were obtained. Dose reduction techniques were used.   CONTRAST: None.    FINDINGS:     LUNGS AND PLEURA: Calcified granulomas of the left lower lobe. Small to moderate pleural effusions right greater than left with compressive atelectasis. There is also partial collapse of the right middle lobe and lingula.    MEDIASTINUM/AXILLAE: Heterogeneous nodular thyroid. Calcified mediastinal and hilar lymph nodes compatible with prior granulomatous disease. Aortic valve replacement. Severe mitral annular calcifications. No significant pericardial effusion. Upper limits   normal heart size.    CORONARY ARTERY CALCIFICATION: Severe.    HEPATOBILIARY: Calcified hepatic granulomas. Mild layering sludge or dense bile within the gallbladder, which is otherwise unremarkable.    PANCREAS: Somewhat atrophic, otherwise unremarkable.    SPLEEN: Multiple calcified splenic granulomas.    ADRENAL GLANDS: Normal.    KIDNEYS/BLADDER: Atrophic left kidney with small renal calyceal calculi, nonobstructing. Somewhat hypertrophied right kidney without hydronephrosis. Unremarkable urinary bladder.    BOWEL: Colonic diverticulosis. Large colonic stool suggestive of constipation. Normal appendix. No bowel obstruction.    LYMPH NODES: Normal.    VASCULATURE: Diffuse atherosclerotic calcifications of the aortoiliac vessels without evidence of aneurysmal dilatation.    PELVIC ORGANS: Periuterine calcifications.     MUSCULOSKELETAL: Prior median sternotomy. Degenerative changes of the shoulders and hips. Degenerative changes of the pubic symphysis. Multilevel degenerative changes of the cervicothoracic and lumbosacral spine. Acute or subacute-appearing burst-type   compression fracture of T12, new from CXR from 1/27/24. Mild bony retropulsion of superior  endplate.      Impression    IMPRESSION:    1.  Acute-appearing burst-type compression fracture of T12, new from CXR from 1/27/24. Mild bony retropulsion of superior endplate resulting in mild canal narrowing. Small associated anterior paravertebral hematoma. Please see separately dictated CT   spine reconstruction reports for additional details.  2.  No other evidence of acute traumatic abnormality of the chest, abdomen, or pelvis.  3.  Small to moderate pleural effusions right greater than left with compressive atelectasis. There is also partial collapse of the right middle lobe and lingula.  4.  Evidence of prior granulomatous disease.  5.  Severe coronary artery atherosclerotic calcifications.  6.  Heterogeneous nodular thyroid. Correlate with thyroid ultrasound which may be performed on a nonemergent outpatient basis.  7.  Large colonic stool suggestive of constipation.  8.  No other acute process within the chest, abdomen, or pelvis.   XR Thoracic Lumbar Standing 2 Views    Narrative    EXAM: XR THORACIC LUMBAR STANDING 2 VIEWS  LOCATION: [facility]  DATE: 02/25/2024    INDICATION: T12 burst fracture with TLSO on.  COMPARISON: CT thoracic spine 02/25/2024.      Impression    IMPRESSION: Moderate burst compression fracture deformity of the T12 vertebral body with approximately 40% loss of vertebral body height again noted. No other fractures identified. Alignment of the visualized portions of the spine is normal.      XR Thoracic Lumbar Standing 2 Views    Narrative    XR THORACIC LUMBAR STANDING 2 VIEWS  2/27/2024 9:43 AM      HISTORY: Known T12 burst fracture    COMPARISON: 2/25/2024    FINDINGS:   AP and lateral views of the thoracolumbar spine. Postoperative changes  of aortic valve replacement. Similar basilar probable pulmonary  opacities left greater than right pleural effusions. Similar  appearance of the T12 vertebral body burst fracture with  similar  amount of retropulsion when accounting for patient positioning. Mild  retrolisthesis of L1 on L2. Similar multilevel degenerative change. No  new osseous abnormality.      Impression    IMPRESSION:   Similar appearance of the known T12 vertebral body burst fracture.    JOANNA ELE MD         SYSTEM ID:  [id number]   XR Abdomen 1 View    Narrative    Exam: XR ABDOMEN 1 VIEW, 2/27/2024 9:42 AM    Indication: abdominal pain, distension, known T12 vertebrae fracture    Comparison: CT thoracic and lumbar spine 2/25/2024. CT chest abdomen  and pelvis 2/25/2024.    Findings:   Supine abdominal radiograph. Overlying radiopaque structure secondary  to external brace for spinal fracture. Scattered loops of bowel. There  is a borderline small bowel loop in the right lower quadrant measuring  2.8 cm however overall pattern does not appear obstructed. Large  volume of stool in the distal colon noted with fecal retention in the  ascending and transverse region likely improved from prior CT. Limited  assessment for free air on supine imaging. Slight curvature of the  spine with degenerative changes. Partially visualized fracture of T12.  Vascular calcifications in the pelvis.      Impression    Impression:   1.  There is still a large amount of retained stool in the  rectosigmoid colon however more proximal fecal retention appears  improved from CT. Borderline gas-filled small bowel loops however  overall pattern does not appear obstructed.  2.  T12 fracture partially visualized. Please see recent spine  imaging.    RAFAEL ELAM MD         SYSTEM ID:  [id number]   US Renal Complete Non-Vascular     Value    Radiologist flags Renal cyst    Narrative    EXAMINATION: Ultrasound renal complete nonvascular 2/28/2024 9:16 AM     COMPARISON: None.    HISTORY: Renal failure    FINDINGS:    Right kidney: Measures 11 cm in length. No focal mass. No  hydronephrosis.    Left kidney: Not well-visualized; atrophic and measures   about 4.6 cm  cm in length. Focal predominantly anechoic lesion with no internal  flow on color Doppler measuring 1.9 x 1.3 x 1.5 cm. No hydronephrosis.      Bladder: Partially distended and within normal limits.        Impression    IMPRESSION:       1. Left kidney is not well seen and appears atrophic; grossly no  hydronephrosis. Focal lesion along the left kidney measuring up to 1.9  cm, indeterminate, possibly cyst, recommend attention on follow-up.  2. No right-sided hydronephrosis.        [  [Consider Follow Up: Renal cyst]    This report will be copied to the [facility] to ensure a  provider acknowledges the finding. [facility] is available Monday  through Friday 8am-3:30 pm.       EMILY VALERO MD         SYSTEM ID:  [id number]   Echo Complete     Value    LVEF  65-70%    Narrative    [id number]  [id number]  [id number]  [id number]^NIKKY^CHEYANNE^TOM     [facility]  [address] [place] [id number]     Name: ADONIS BIGGS  MRN: [id number]  : 1927  Study Date: 2024 10:53 AM  Age: 89 yrs  Gender: Female  Patient Location: [facility]  Reason For Study: Heart Failure  Ordering Physician: CHEYANNE SHER  Performed By: Elisa Chin RDCS     BSA: 1.5 m2  Height: 60 in  Weight: 112 lb  BP: 110/56 mmHg  ______________________________________________________________________________  Procedure  Echocardiogram with two-dimensional, color and spectral Doppler performed.  ______________________________________________________________________________  Interpretation Summary  Global and regional left ventricular function is hyperkinetic with an EF of  65-70%.  Right ventricular function, chamber size, wall motion, and thickness are  normal.  S/P Minimally invasive aortic valve replacement with 21 mm Epic porcine valve  on 2016. Mean aortic gradient 10mmHg. No change.  Pulmonary artery systolic pressure is normal.  IVC  diameter and respiratory changes fall into an intermediate range  suggesting an RA pressure of 8 mmHg.  No pericardial effusion is present.  A bilateral pleural effusion is present.  ______________________________________________________________________________  Left Ventricle  Global and regional left ventricular function is hyperkinetic with an EF of  65-70%. Left ventricular wall thickness is normal. Left ventricular size is  normal. Diastolic function not assessed due to significant mitral annular  calcification. No regional wall motion abnormalities are seen.     Right Ventricle  Right ventricular function, chamber size, wall motion, and thickness are  normal.     Atria  The right atria appears normal. Severe left atrial enlargement is present.     Mitral Valve  Severe mitral annular calcification is present. Mild mitral insufficiency is  present. The peak mitral valve gradient is 13.8 mmHg.     Aortic Valve  S/P Minimally invasive aortic valve replacement with 21 mm Epic porcine valve  on 4/25/2016. Mean aortic gradient 10mmHg. No change. AV AT 99ms.     Tricuspid Valve  The tricuspid valve is normal. Mild tricuspid insufficiency is present. The  right ventricular systolic pressure is approximated at 27.9 mmHg plus the  right atrial pressure. Pulmonary artery systolic pressure is normal.     Pulmonic Valve  The pulmonic valve is normal. Trace to mild pulmonic insufficiency is present.     Vessels  The thoracic aorta cannot be assessed. The pulmonary artery cannot be  assessed. IVC diameter and respiratory changes fall into an intermediate range  suggesting an RA pressure of 8 mmHg.     Pericardium  No pericardial effusion is present.     Miscellaneous  A bilateral pleural effusion is present.  ______________________________________________________________________________  MMode/2D Measurements & Calculations  LVOT diam: 1.8 cm  LVOT area: 2.5 cm2  LA Volume (BP): 71.6 ml  LA Volume Index (BP): 49.0 ml/m2  RV  Base: 3.8 cm     Doppler Measurements & Calculations  MV E max juan c: 181.0 cm/sec  MV A max juan c: 124.0 cm/sec  MV E/A: 1.5  MV max P.8 mmHg  MV mean P.0 mmHg  MV V2 VTI: 61.2 cm  MVA(VTI): 1.2 cm2  MV dec time: 0.26 sec     Ao V2 max: 209.0 cm/sec  Ao max P.5 mmHg  Ao V2 mean: 150.0 cm/sec  Ao mean PG: 10.0 mmHg  Ao V2 VTI: 48.4 cm  JAVIER(I,D): 1.5 cm2  JAVIER(V,D): 1.4 cm2  Ao acc time: 0.10 sec  LV V1 max P.6 mmHg  LV V1 max: 118.0 cm/sec  LV V1 VTI: 28.3 cm  SV(LVOT): 72.0 ml  SI(LVOT): 49.4 ml/m2  PA acc time: 0.18 sec  PI end-d juan c: 110.0 cm/sec  TR max juan c: 264.0 cm/sec  TR max P.9 mmHg  AV Juan C Ratio (DI): 0.56  JAVIER Index (cm2/m2): 1.0  E/E' av.7  Lateral E/e': 41.6  Medial E/e': 37.8     ______________________________________________________________________________  Report approved by: Alin Costello 2024 11:36 AM             Time Spent on this Encounter   I, Mitra Martino PA-C, personally saw the patient today and spent less than or equal to 30 minutes discharging this patient.    We appreciate the opportunity to care for your patient while in the hospital.  Should you have any questions about their injuries or this discharge summary our contact information is below.    Trauma Service   [place] [address] [place] [address]  [phone]

## 2024-03-02 NOTE — PROGRESS NOTES
Care Management Follow Up    Length of Stay (days): 6    Expected Discharge Date: 03/01/2024     Concerns to be Addressed: discharge planning     Patient plan of care discussed at interdisciplinary rounds: Yes    Anticipated Discharge Disposition:  (Patient and family are deciding TCU vs immediate placement in the Assistive Living)     Anticipated Discharge Services:  (Patient and family are deciding TCU vs immediate placement in the Assistive Living)  Anticipated Discharge DME:  N/A    Patient/family educated on Medicare website which has current facility and service quality ratings:  Yes  Education Provided on the Discharge Plan:  Yes  Patient/Family in Agreement with the Plan:  Yes    Referrals Placed by CM/SW:      Pending:  [facility]  [address] [place] [address]  [address]  P: [phone]  F: [phone]   3/2: [facility]    Declined:  [address] [address]  Ph: [phone]  F: [phone]   3/2: They can accept pt for admit however, they have a daily private pay private room fee of $37.00 per day which family declines to pay.    [facility]  [address] [place] [address]   [place]  [address]  P: [phone]  Admissions: [phone]  F: [phone]   3/2: Full capacity    [facility]  [address] [place] [address]  [address]  P: [phone]  Admissions [place]: [phone]  F: [phone]   3/2: Full capacity    [facility])  [address] [place] [address]  Admissions: [phone]  Main: [phone]  F: [phone]   3/2: Full capacity    Private pay costs discussed: Not applicable    Additional Information:  Per weekday unit SW request, LORI sent a teams message to  TCU liaison to see if there is any bed availability for pt to discharge today or tomorrow to  TCU.  TCU liaison Joycelyn let LORI know that she is working on admits for the  weekend still, but she will update SW when she has an update. Care management will follow for updates.    Addendum 10:35 pm:  TCU liaison let SW know that pt has been accepted to  TCU for discharge. SW will follow up with discharge plan.     JOAN Fields, LGSW  6B   Ph: [phone]  Pager: [phone]   Units 6A and 6B Weekend Pager: [phone]     Social Work and Care Management Department   SEARCHABLE in [facility] - search SOCIAL WORK       [place] (0800 - 1630) Saturday and Sunday     Units: 4A Vocera, 4C Vocera, & 4E Vocera    Pager: [phone]     Units: 5A 3254-3767 Vocera, 5A 3583-8598 Vocera & 5C Vocera Pager: [phone]     Units: 6A Vocera & 6B Vocera  Pager: [phone]     Units: 6C Vocera Pager: [phone]     Units: 7A Vocera & 7B Vocera  Pager: [phone]     Units: 7C Vocera Pager: [phone]     Unit: [place] ED Vocera & [place] Obs Vocera Pager: [phone]      [facility] (5731-0708) Saturday and Sunday      Units: 5 Ortho Vocera, 5 Med Surg Vocera & WB ED Vocera Pager:[phone]     Units: 6 Med Surg Vocera, 8 Med Surg Vocera, & 10 ICU Vocera Pager: [phone]        After hours Vocera [facility] and After Hours Vocera [place]     Saturday & Sunday (1630 - 0000)    Mon-Fri (1069-1225)      Recognized Holidays  (2627-8301)    Units: ALL  Pager: [phone]

## 2024-03-02 NOTE — PROGRESS NOTES
Discharge time/date: 3/2 1500  Walked or Wheelchair: wheelchair   PIV removed: yes  Reviewed AVS with patient: n/a  Medication due times added to AVS in EPIC: n/a  Verbalized understanding of discharge with teachback: n/a  Medications retrieved from pharmacy: n/a  Supplies sent home: n/a  Belongings from security with patient: n/a

## 2024-03-02 NOTE — PROGRESS NOTES
Care Management Discharge Note    Discharge Date: 03/04/2024       Discharge Disposition:  Transitional Care Unit  Discharge Services:      [place] [id number] [place] [address]  [address]  P: [phone]  F: [phone]     Discharge DME:  NA    Discharge Transportation: [facility] wheelchair transport set up between 2:10 - 2:55 pm.     Private pay costs discussed: Not applicable    Does the patient's insurance plan have a 3 day qualifying hospital stay waiver?  Yes     Which insurance plan 3 day waiver is available? [employer]CO REACH    Will the waiver be used for post-acute placement? Undetermined at this time    PAS Confirmation Code: [id number]   Patient/family educated on Medicare website which has current facility and service quality ratings: Yes     Education Provided on the Discharge Plan:  Yes  Persons Notified of Discharge Plans: Yes  Patient/Family in Agreement with the Plan:  Yes    Handoff Referral Completed: Yes    Additional Information:  LORI received an update that pt is accepted to  TCU for discharge. LORI set up a [facility] wheelchair ride 2:10 - 2:55 pm. LORI updated charge nurse and bedside nurse. LORI met pt, her dtr Anna and son in law at bedside. Introduced self and role. LORI printed off address and transportation pickup time to pt/pt's family and updated them about discharge plan. They were all agreeable. LORI notified pt's provider Mitra Martino and she completed discharge orders. PAS: [id number]. SW completed IHO. Care management will follow for updates.     JOAN Fielsd, LGSW  6B   Ph: [phone]  Pager: [phone]   Units 6A and 6B Weekend Pager: [phone]     Social Work and Care Management Department   SEARCHABLE in [employer] - search SOCIAL WORK       [place] (0800  1630) Saturday and Sunday     Units: 4A Vocera, 4C Vocera, & 4E Vocera    Pager: [phone]     Units: 5A 4456-2449 Vocera, 5A 3573-7664 Vocera & 5C Vocera Pager: [phone]     Units: 6A Vocera &  6B Vocera  Pager: [phone]     Units: 6C Vocera Pager: [phone]     Units: 7A Vocera & 7B Vocera  Pager: [phone]     Units: 7C Vocera Pager: [phone]     Unit: [place] ED Vocera & [facility] Obs Vocera Pager: [phone]      [facility] (0047-9050) Saturday and Sunday      Units: 5 Ortho Vocera, 5 Med Surg Vocera & WB ED Vocera Pager:[phone]     Units: 6 Med Surg Vocera, 8 Med Surg Vocera, & 10 ICU Vocera Pager: [phone]        After hours Vocera [facility] and After Hours Vocera [place]     Saturday & Sunday (1630 - 0000)    Mon-Fri (9951-5631)     [facility] Recognized Holidays  (2334-3092)    Units: ALL  Pager: [phone]

## 2024-03-02 NOTE — PLAN OF CARE
A&Ox3, d/o to time this shift. ROSALBA, 4/5 throughout. TLSO on when oob, up with 1 GB + walker. VSS on RA. PIV x1 SL. Voiding spont. LBM 3/2. Regular diet. Pills whole with water. Plan for TCU, medically ready for discharge per trauma.

## 2024-03-03 ENCOUNTER — APPOINTMENT (OUTPATIENT)
Dept: PHYSICAL THERAPY | Facility: SKILLED NURSING FACILITY | Age: 89
DRG: 560 | End: 2024-03-03
Attending: INTERNAL MEDICINE
Payer: MEDICARE

## 2024-03-03 ENCOUNTER — APPOINTMENT (OUTPATIENT)
Dept: OCCUPATIONAL THERAPY | Facility: SKILLED NURSING FACILITY | Age: 89
DRG: 560 | End: 2024-03-03
Attending: INTERNAL MEDICINE
Payer: MEDICARE

## 2024-03-03 LAB
ANION GAP SERPL CALCULATED.3IONS-SCNC: 10 MMOL/L (ref 7–15)
BUN SERPL-MCNC: 26.9 MG/DL (ref 8–23)
CALCIUM SERPL-MCNC: 8.5 MG/DL (ref 8.2–9.6)
CHLORIDE SERPL-SCNC: 108 MMOL/L (ref 98–107)
CREAT SERPL-MCNC: 1.15 MG/DL (ref 0.51–0.95)
DEPRECATED HCO3 PLAS-SCNC: 23 MMOL/L (ref 22–29)
EGFRCR SERPLBLD CKD-EPI 2021: 43 ML/MIN/1.73M2
GLUCOSE SERPL-MCNC: 104 MG/DL (ref 70–99)
HOLD SPECIMEN: NORMAL
POTASSIUM SERPL-SCNC: 3.8 MMOL/L (ref 3.4–5.3)
SODIUM SERPL-SCNC: 141 MMOL/L (ref 135–145)

## 2024-03-03 PROCEDURE — 97165 OT EVAL LOW COMPLEX 30 MIN: CPT | Mod: GO

## 2024-03-03 PROCEDURE — 97535 SELF CARE MNGMENT TRAINING: CPT | Mod: GO

## 2024-03-03 PROCEDURE — 99306 1ST NF CARE HIGH MDM 50: CPT | Performed by: INTERNAL MEDICINE

## 2024-03-03 PROCEDURE — 120N000009 HC R&B SNF

## 2024-03-03 PROCEDURE — 36415 COLL VENOUS BLD VENIPUNCTURE: CPT | Performed by: INTERNAL MEDICINE

## 2024-03-03 PROCEDURE — 97161 PT EVAL LOW COMPLEX 20 MIN: CPT | Mod: GP

## 2024-03-03 PROCEDURE — 99418 PROLNG IP/OBS E/M EA 15 MIN: CPT | Performed by: INTERNAL MEDICINE

## 2024-03-03 PROCEDURE — 97116 GAIT TRAINING THERAPY: CPT | Mod: GP

## 2024-03-03 PROCEDURE — 250N000013 HC RX MED GY IP 250 OP 250 PS 637: Performed by: INTERNAL MEDICINE

## 2024-03-03 PROCEDURE — 97166 OT EVAL MOD COMPLEX 45 MIN: CPT | Mod: GO

## 2024-03-03 PROCEDURE — 80048 BASIC METABOLIC PNL TOTAL CA: CPT | Performed by: INTERNAL MEDICINE

## 2024-03-03 RX ORDER — HYDRALAZINE HYDROCHLORIDE 10 MG/1
10 TABLET, FILM COATED ORAL 3 TIMES DAILY PRN
Status: DISCONTINUED | OUTPATIENT
Start: 2024-03-03 | End: 2024-03-05

## 2024-03-03 RX ADMIN — AMIODARONE HYDROCHLORIDE 200 MG: 200 TABLET ORAL at 07:54

## 2024-03-03 RX ADMIN — ACETAMINOPHEN 975 MG: 325 TABLET, FILM COATED ORAL at 20:07

## 2024-03-03 RX ADMIN — LIDOCAINE 1 PATCH: 4 PATCH TOPICAL at 20:07

## 2024-03-03 RX ADMIN — HYDROXYCHLOROQUINE SULFATE 200 MG: 200 TABLET ORAL at 07:54

## 2024-03-03 RX ADMIN — GABAPENTIN 100 MG: 100 CAPSULE ORAL at 07:54

## 2024-03-03 RX ADMIN — FUROSEMIDE 20 MG: 20 TABLET ORAL at 07:54

## 2024-03-03 RX ADMIN — APIXABAN 2.5 MG: 2.5 TABLET, FILM COATED ORAL at 08:21

## 2024-03-03 RX ADMIN — APIXABAN 2.5 MG: 2.5 TABLET, FILM COATED ORAL at 20:07

## 2024-03-03 RX ADMIN — METOPROLOL TARTRATE 25 MG: 25 TABLET, FILM COATED ORAL at 20:07

## 2024-03-03 RX ADMIN — ACETAMINOPHEN 975 MG: 325 TABLET, FILM COATED ORAL at 06:33

## 2024-03-03 RX ADMIN — POLYETHYLENE GLYCOL 3350 17 G: 17 POWDER, FOR SOLUTION ORAL at 07:54

## 2024-03-03 RX ADMIN — Medication 1 TABLET: at 07:54

## 2024-03-03 RX ADMIN — METOPROLOL TARTRATE 25 MG: 25 TABLET, FILM COATED ORAL at 08:21

## 2024-03-03 RX ADMIN — CALCITONIN SALMON 1 SPRAY: 200 SPRAY, METERED NASAL at 08:21

## 2024-03-03 ASSESSMENT — ACTIVITIES OF DAILY LIVING (ADL)
ADLS_ACUITY_SCORE: 39
ADLS_ACUITY_SCORE: 42
ADLS_ACUITY_SCORE: 39
ADLS_ACUITY_SCORE: 41
ADLS_ACUITY_SCORE: 39
ADLS_ACUITY_SCORE: 42
ADLS_ACUITY_SCORE: 39
ADLS_ACUITY_SCORE: 39

## 2024-03-03 NOTE — PLAN OF CARE
Goal Outcome Evaluation:  Pt.alert / forgetful. Bi.hearing aids. Sleeping well but did set off bed alarm getting up OOB indep.to BR. CGA with walker/GB, per CNA, stayed with pt.during toileting for safety. Continent of bladder and indep.w/pericares. Has TLSO brace for comfort when OOB. Spinal precautions.Writer offered Tylenol but pt.refused. Reminded on call light use for any needs. Placed chair alarm equip.in room for when pt.gets up for the day.    0640 - Pt.set off bed alarm again, was sitting up EOB, needing to go to BR. Has not used call light during shift. Lidoderm patch in place lower back - off in AM. Administered scheduled Tylenol 975mg po @ 0630 for back pain - took pills with H2O and had no difficulty swallowing. Needed assist lifting legs into bed slowly - pt.attempted but pain and weakness a barrier this time. Reorientation provided as pt.asking where she was and why was she here.        Patient's most recent vital signs are:     Vital signs:  BP: 158/56  Temp: 97.7  HR: 71  RR: 17  SpO2: 97 %     Patient does not have new respiratory symptoms.  Patient does not have new sore throat.  Patient does not have a fever greater than 99.5.

## 2024-03-03 NOTE — PLAN OF CARE
Goal Outcome Evaluation:    Patient is alert and confused / oriented 2/4.  Unsure if patient will remember to use call light, but has not tried to transfer without assistance this shift.   Patient transfers with GB and TLSO brace when OOB.  Initial assessments by PT / OT completed today.   Patient having hypertension in AM that persisted despite AM BP meds. Provider told and PRN hydralazine ordered for systolic BP over 170. /73 at 1400 but medication not available. Messaged pharmacy. They said medication in process.   Vital signs this shift B/P: 173/73 T: 98.1, P: 74, R: 16   Continue with the plan of care.

## 2024-03-03 NOTE — H&P
[facility] Transitional Care    History and Physical - Hospitalist Service       Date of Admission:  3/2/2024    Assessment & Plan   Compression Fracture:  # T12 burst fracture, > 40% height loss with mild retropulsion  Roxanna Stark was evaluated by Neurosurgery and managed non-operatively for her fractures. She was placed on spinal precautions and monitored with serial neurological examinations which remained stable. Orthotics was consulted for a thoraco-lumbar orthotic brace (TLSO).  Patient had post bracing and mobilization films performed and reviewed by Neurosurgery. She will need to wear the TLSO brace for comfort whenever she is out of bed.    Doing well in TCU, pain well-controlled and ambulating with a walker and therapy with her TLSO.  -follow up in Neurosurgery clinic in 6 weeks with repeat xrays.   -Continue TLSO when out of bed  -PT, OT  -Continue scheduled Tylenol, Lidoderm  -Start Miacalcin nasal spray for 2 weeks  -Start vitamin D3 2000 units daily  -Discussed outpatient follow-up with PCP to include DEXA scan if indicated     # Acute on chronic pain   Compensated.  See above.  - continue PTA: gabapentin 100mg  - Scheduled: Tylenol, Lidoderm patches     # Hypertension   # PAD  # Severe CAD by imaging  # Aortic Stenosis s/p AVR w/ porcine valve 4/25/16  # pAfib   # HFpEF on most recent ECHO 01/2024  # Elongated Qtc 458/508  Recent Admission 1/27/24-2/1/24 for Afib with RVR vs Aflutter, decompensated CHF.  Echo 2/28/2024: EF 65-70%.  Porcine aortic valve.  Severe LAE.  Normal RV size and function.  Mild MR, TR.  Normal PASP.  Had bilateral effusions on imaging.  Was followed by cardiology in hospital, started amiodarone and Eliquis.  Stopped losartan due to CKD, borderline blood pressures.  Lasix held in hospital due to NICOLAS, resumed on 3/2.  BP now running somewhat high, euvolemic on exam.  Lungs CTA and oxygenating well on room air.  Denies any dyspnea at rest or ambulating with therapy.  BMP  stable, creatinine 1.15 on 3/3.  - Continue PTA: amiodarone, metoprolol, Eliquis  -Start amlodipine 5 mg daily with hold parameters  - resumed lasix 20 mg every day 3/2  -Daily weights  -BMP M/Thursday     # Acute hospital associated delirium, resolved  Noted mild delirium initially in hospital due to sleep deprivation, resolved with restored sleep cycles.  Head CT showed no acute findings with moderate volume loss and chronic small vessel ischemic changes, no hydrocephalus.  Fully oriented, appropriate on exam.  No behavioral concerns.  -Judicious use of pain medications, CNS active agents     # Acute Kidney Injury on CKD stage 3b  Creatinine baseline of 1.2-1.6 noted NICOLAS during recent hospitalization, peak creatinine 2.05.  On 2/28/2024.  Seen by nephrology.  Renal ultrasound 2/28 showed atrophic left kidney, no hydronephrosis.  FENa 0.9.  UA unremarkable except for 10 mg deciliter protein 2/28.  PTA sulfasalazine was discontinued.  NICOLAS subsequently resolved.  No dysuria, emptying well.  BMP stable, creatinine 1.15 on 3/2.  -Avoid NSAIDs, nephrotoxins  -BMP q. M/TH     # Empiric treatment for possible Urinary Tract Infection  Received 3-day ceftriaxone for positive UA, urine culture grew normal urogenital georgina.  Repeat UA bland.  Asymptomatic.    # Seronegative RA  PTA Plaquenil and sulfasalazine.  Sulfasalazine discontinued in hospital due to NICOLAS/CKD.  NICOLAS resolved.  RA has been well-controlled, no deformity or active synovitis on exam.  -Continue Plaquenil    # normocytic anemia  Baseline hemoglobin normal.  No bleeding clinically or by history.  Suspect component of ACD with RA, CKD.  Hemoglobin stable at 10.8 on 3/2.     # Thyroid nodules, incidental finding    - Chest CT: Heterogeneous nodular thyroid. Correlate with thyroid ultrasound which may be performed on a nonemergent outpatient basis.   - TSH wnl  - Pt to follow-up with PCP after discharge for further work-up if needed      #Disposition  Had been  living alone in a 3 story [facility].  6 adult children, very involved.  Uses a four-wheel walker recently, prior to that was using a cane.  Patient and family now looking into assisted living, will not be returning to [facility].            Diet: Combination Diet Regular Diet Adult; 2 gm NA Diet    DVT Prophylaxis: DOAC  Grace Catheter: Not present  Lines: None     Cardiac Monitoring: None  Code Status: No CPR- Do NOT Intubate      Clinically Significant Risk Factors                  # Hypertension: Noted on problem list                   Disposition Plan     Expected Discharge Date: 03/08/2024                  Wilfredo Monge MD  Hospitalist Service  [facility]itional [facility]  Securely message with [email or web address] (more info)  Text page via [facility] Paging/Directory     ______________________________________________________________________    Chief Complaint   T12 Burst fx        History of Present Illness   Accompanied by her daughter Anna and her .  Some left lower back pain, wearing TLSO.  Pain control adequate.  Ambulating with a rolling walker and therapy.  Eating well.  Denies any nausea or vomiting.  No constipation.  No dysuria or urgency.  Denies any cough, chest pain or dyspnea.  No lightheadedness when ambulating.  No fevers or chills.      Past Medical History    Past Medical History:   Diagnosis Date    Actinic keratosis     Aortic stenosis 2014    Atrial flutter with rapid ventricular response (H) 01/27/2024    Basal cell cancer 07/2014    left eye medial canthus     Basal cell carcinoma 09/30/2008    left cheek    CKD (chronic kidney disease) stage 3, GFR 30-59 ml/min (H) 07/01/2019    HTN (hypertension)     Melanoma in situ (H) 09/30/2008    left arm    Polymyalgia rheumatica (H24) 11/1999    Rheumatoid arthritis of multiple sites with negative rheumatoid factor (H)     Status post coronary angiogram 03/03/2016    Temporal arteritis (H) 11/1999       Past Surgical History   Past Surgical  History:   Procedure Laterality Date    CATARACT IOL, RT/LT      bilateral    COLONOSCOPY      EXCISE LESION VULVA N/A 2019    Procedure: Wide Local Excision Of Vulva, Colposcopy;  Surgeon: Mono Ribeiro MD;  Location:  OR    REPLACE VALVE AORTIC N/A 2016    Procedure: REPLACE VALVE AORTIC;  Surgeon: Sudeep Tsai MD;  Location:  OR    [facility] SKIN TISSUE PROCEDURE UNLISTED  11/3/08    mmis skin cancer excision       Prior to Admission Medications   Prior to Admission Medications   Prescriptions Last Dose Informant Patient Reported? Taking?   ICAPS PO  Self Yes No   Si tablets daily   Lidocaine (LIDOCARE) 4 % Patch   No No   Sig: Place 1-3 patches onto the skin every 24 hours To prevent lidocaine toxicity, patient should be patch free for 12 hrs daily.   Menthol, Topical Analgesic, 4 % GEL   Yes No   Sig: Apply topically to affected area(s) three times daily.   Misc. Devices (ROLLATOR ULTRA-LIGHT) MISC   Yes No   Omega-3 Fatty Acids (OMEGA-3 FISH OIL PO)  Self Yes No   Sig: Take 1 tablet twice daily.   acetaminophen (TYLENOL) 325 MG tablet   No No   Sig: Take 3 tablets (975 mg) by mouth 3 times daily   amiodarone (PACERONE) 200 MG tablet   No No   Sig: Take 2 tablets (400 mg) by mouth daily for 6 days, THEN 1 tablet (200 mg) daily for 90 days.   apixaban ANTICOAGULANT (ELIQUIS) 2.5 MG tablet   No No   Sig: Take 1 tablet (2.5 mg) by mouth 2 times daily   calcium-vitamin D 500-125 MG-UNIT TABS   Yes No   Sig: Take 1 tablet by mouth daily   furosemide (LASIX) 20 MG tablet   No No   Sig: TAKE 1 TABLET BY MOUTH EVERY DAY IF GAIN OF 2 TO 3 POUNDS OVER 2 DAYS   Patient taking differently: Take 20 mg by mouth daily TAKE 1 TABLET BY MOUTH EVERY DAY IF GAIN OF 2 TO 3 POUNDS OVER 2 DAYS   gabapentin (NEURONTIN) 100 MG capsule   No No   Sig: Take 1 capsule (100 mg) by mouth daily   hydroxychloroquine (PLAQUENIL) 200 MG tablet   No No   Sig: Take 1 tablet (200 mg) by mouth daily   metoprolol  tartrate (LOPRESSOR) 25 MG tablet   No No   Sig: Take 1 tablet (25 mg) by mouth 2 times daily   polyethylene glycol (MIRALAX) 17 GM/Dose powder   Yes No   Sig: Mix 1 scoop (17 g) in liquid then take by mouth once daily.   senna-docusate (SENOKOT-S/PERICOLACE) 8.6-50 MG tablet   No No   Sig: Take 2 tablets by mouth daily      Facility-Administered Medications: None            Physical Exam   Vital Signs: Temp: 98.5  F (36.9  C) Temp src: Oral BP: (!) 169/70 Pulse: 68   Resp: 18        Weight: 102 lbs 1.17 oz  General: Alert and orient x 4.  Very pleasant.  Speech, affect normal.  HEENT: Atraumatic.  OP moist.  Neck supple, nontender.  Chest: CTA bilaterally  CV: RRR.  1/6 HSM LLSB.  Abdomen: NABS.  Soft, nontender, nondistended.  Extremities: No edema.  MS: TLSO on.  OA deformities of the DIPs, no active synovitis or RA changes present in the hands or wrists.  Bilateral upper extremity range of motion intact.  Neuro: CN II through XII grossly intact.  Strength 5/5 symmetrically.  Normal finger-nose bilaterally.          80 MINUTES SPENT BY ME on the date of service doing chart review, history, exam, documentation & further activities per the note.      Data   Most Recent 3 CBC's:  Recent Labs   Lab Test 03/02/24  0715 03/01/24  0603 02/29/24  0623   WBC 8.9 9.2 11.7*   HGB 10.8* 10.6* 10.5*   MCV 93 93 92    253 247     Most Recent 3 BMP's:  Recent Labs   Lab Test 03/03/24  0648 03/02/24  0715 03/01/24  0603    140 137   POTASSIUM 3.8 3.9 3.5   CHLORIDE 108* 110* 105   CO2 23 20* 22   BUN 26.9* 31.2* 38.9*   CR 1.15* 1.22* 1.66*   ANIONGAP 10 10 10   ROEL 8.5 8.3 7.9*   * 77 78     Most Recent 2 LFT's:  Recent Labs   Lab Test 02/24/24  2159 01/27/24  1640   AST 22 40   ALT 12 32   ALKPHOS 110 135   BILITOTAL 0.2 0.4     Most Recent 3 BNP's:  Recent Labs   Lab Test 03/01/24  0603 01/27/24  1640   NTBNPI 3,784* 5,072*     Most Recent TSH and T4:  Recent Labs   Lab Test 02/28/24  0626 02/16/24  0738    TSH 1.58 4.02   T4  --  1.55     Most Recent Hemoglobin A1c:No lab results found.  Most Recent Urinalysis:  Recent Labs   Lab Test 02/28/24  1705 02/17/24  1400 08/30/21  1444   COLOR Yellow   < > Yellow   APPEARANCE Clear   < > Clear   URINEGLC Negative   < > Negative   URINEBILI Negative   < > Negative   URINEKETONE Negative   < > Trace*   SG 1.023   < > 1.010   UBLD Negative   < > Negative   URINEPH 5.0   < > 6.0   PROTEIN 10*   < > Negative   UROBILINOGEN  --   --  0.2   NITRITE Negative   < > Negative   LEUKEST Negative   < > Small*   RBCU 1   < > 0-2   WBCU 1   < > 5-10*    < > = values in this interval not displayed.     Most Recent Anemia Panel:  Recent Labs   Lab Test 03/02/24  0715 01/21/22  1311 08/30/21  1559   WBC 8.9   < > 8.6   HGB 10.8*   < > 12.0   HCT 33.6*   < > 37.2   MCV 93   < > 99      < > 451*   IRON  --   --  51   IRONSAT  --   --  18   FEB  --   --  281   ZAIRA  --   --  200    < > = values in this interval not displayed.

## 2024-03-03 NOTE — PROGRESS NOTES
"   03/03/24 1010   Appointment Info   Signing Clinician's Name / Credentials (PT) Sammie Kelsey, PT, DPT   Quick Adds   Quick Adds Certification   Living Environment   People in Home alone   Current Living Arrangements assisted living   Home Accessibility no concerns   Living Environment Comments Patient reporting living in a 2 level home; however, per chart review, patient admitted to the hospital from nursing home following a fall. Unsure if patient is a reliable historian   Self-Care   Usual Activity Tolerance moderate   Current Activity Tolerance fair   Equipment Currently Used at Home cane, straight;walker, rolling  (Patient denies having FWW, states she only uses a cane; however, per chart review, family reported that when she was at her house she used both a walker and a cane. Unsure if patient is a reliable historian.)   Fall history within last six months yes   Number of times patient has fallen within last six months 3  (Per chart review, patient initally denied having a fall history, then later stated \"It was so long ago I don't remember\")   Activity/Exercise/Self-Care Comment Per chart review, previous IND with mobility with AD, unable to determine if she uses a FWW or SEC more frequently. Unable to determine level of assist provided at nursing home as patient reported living in a 2 story home. Patient reports driving IND.   General Information   Onset of Illness/Injury or Date of Surgery 02/24/24   Referring Physician Saleem Ward MD   Patient/Family Therapy Goals Statement (PT) None stated   Pertinent History of Current Problem (include personal factors and/or comorbidities that impact the POC) Per chart review patient presented status post unwitnessed fall on 2/24 found to have a T12 burst fracture.   Existing Precautions/Restrictions fall;brace worn when out of bed;spinal  (TLSO when OOB)   Cognition   Affect/Mental Status (Cognition) WFL   Orientation Status (Cognition) disoriented " to;situation   Follows Commands (Cognition) WFL   Range of Motion (ROM)   Range of Motion ROM is WFL   Strength (Manual Muscle Testing)   Strength (Manual Muscle Testing) Deficits observed during functional mobility   Bed Mobility   Bed Mobility supine-sit   Supine-Sit [place] (Bed Mobility) supervision   Assistive Device (Bed Mobility) bed rails   Transfers   Transfers sit-stand transfer   Sit-Stand Transfer   Sit-Stand [place] (Transfers) minimum assist (75% patient effort);1 person assist   Assistive Device (Sit-Stand Transfers) walker, front-wheeled   Gait/Stairs (Locomotion)   [place] Level (Gait) supervision   Assistive Device (Gait) walker, front-wheeled   Balance   Balance Comments Patient benefits from BUE support on FWW for safe mobility   Clinical Impression   Criteria for Skilled Therapeutic Intervention Yes, treatment indicated   PT Diagnosis (PT) Impaired functional mobility   Influenced by the following impairments Weakness, impaired balance   Functional limitations due to impairments Transfers, gait   Clinical Presentation (PT Evaluation Complexity) stable   Clinical Impression Comments Patient is a 89 year old admitted to the hospital status post unwittnessed fall and subsequent T12 burst fracture. Presents to physical therapy with weakness, impaired balance, and increased risk of falls. Would benefit from skilled physical therapy services in order improve balance, strength, and decrease risk of falls.   PT Total Evaluation Time   PT Eval, Low Complexity Minutes (62326) 20   Therapy Certification   Start of care date 03/03/24   Certification date from 03/03/24   Certification date to 04/01/24   Medical Diagnosis burst fracture of T12 vertebra   Physical Therapy Goals   PT Frequency 5x/week   PT Predicted Duration/Target Date for Goal Attainment 03/13/24   PT Goals Bed Mobility;Transfers;Gait;PT Goal 1   PT: Bed Mobility Independent;Supine to/from sit;Within precautions   PT: Transfers  Modified independent;Sit to/from stand;Bed to/from chair;Assistive device;Within precautions   PT: Gait Modified independent;Assistive device;150 feet;Within precautions   PT: Goal 1 Patient will perform a car transfer with IND.   Interventions   Interventions Quick Adds Gait Training;Therapeutic Procedure   Therapeutic Procedure/Exercise   Ther. Procedure: strength, endurance, ROM, flexibillity Minutes (76865) 9   Symptoms Noted During/After Treatment fatigue   Treatment Detail/Skilled Intervention Patient performed seated BLE exercises x 10 with verbal cueing and visual demonstration required for proper technique. Patient seated in bedside chair with call light within reach and chair alarm armed at end of session.   Gait Training   Gait Training Minutes (86393) 10   Symptoms Noted During/After Treatment (Gait Training) fatigue   Treatment Detail/Skilled Intervention Patient ambulated an additional 80' with FWW and SBA x 1. Trialed gait with SEC as per patient report she uses an SEC at home. Patient demonstrated 2 major LOB to R and L, max A x 1 required to prevent a fall. Discussed with patient benefit of using FWW instead of cane for increased safety, patient agreeable.   PT Discharge Planning   PT Plan White, gait with FWW vs SEC, LE ex   PT Discharge Recommendation (DC Rec) home with home care physical therapy  (to assisted)   PT Rationale for DC Rec Patient currently requires assist of 1 for safe transfers and gait. Per chart review her family has secured an assisted. Anticipate patient will be safe to discharge to [facility] with use of AD and home PT in order to return to prior level of function.   PT Brief overview of current status Assist of 1 for safe transfers and gait.   PT Equipment Needed at Discharge walker, rolling   Total Session Time   Timed Code Treatment Minutes 19   Total Session Time (sum of timed and untimed services) 39   Post Acute Settings Only   What unit is patient on? TCU   PT - Transitional Care Unit  "Time   Individual Time (minutes) - PT 39   Group Time (minutes) - PT 0   Concurrent Time (minutes) - PT 0   Co-Treatment Time (minutes) - PT 0   TCU Total Session Time (minutes) - PT 39   TCU Daily Total Session Time   PT TCU Daily Total Session Time 39   Rehab TCU Daily Total Session Time 39   Bed Mobility: Turning side to side/Roll Left and Right   Patient Performance Supervision or verbal cues   Staff Performance No setup or physical help (No setup or physical help from staff)   Describe Performance Verbal cueing for spinal precautions   Bed Mobility: Lying to sitting on the side of bed   Patient Performance Supervision or verbal cues   Staff Performance No set up or physical help (No set up or physical help from staff)   Describe Performance Verbal cueing for spinal precautions, use of bed rails   Transfers: Sit to Stand   Patient Performance Partial/moderate assist \"includes weight bearing assist of trunk or limbs\"   Staff Performance One person assist (one person physical assist)   Equipment Used Rolling walker   Describe Performance Min A x 1 for sit to stand, multple attempts required from standard height   Transfers: Chair/Bed transfers   Patient Performance Supervision or verbal cues   Staff Performance Set up help only   Equipment Used Rolling walker   Walk 10 Feet - Ability to walk once standing   Patient Performance Supervision or verbal cues   Mobility device used rolling walker   Describe Performance SBA with FWW   Walk 10 Feet on uneven surfaces - Ability to walk on various surfaces.   Patient Performance Supervision or verbal cues   Mobility device used rolling walker   Describe Performance SBA with FWW   Walk 50 Feet with Two Turns - Ability to walk at least 50 feet.   Patient Performance Supervision or verbal cues   Mobility device used rolling walker   Describe Performance Verbal cueing for walker safety   Wheel 50 Feet - Ability to move wheelchair/scooter   Reason Not Done Activity not " applicable   Wheel 150 Feet - Ability to move wheelchair/scooter   Reason Not Done Activity not applicable   1 Step (curb) - Ability to go up/down 1 step/curb.   Patient Performance Steadying Assist   Describe Performance Verbal cues for safety, CGA and FWW   4 Steps - Ability to go up/down steps.   Patient Performance Supervision or verbal cues   Describe Performance SBA with bilateral rails   12 Steps - Ability to go up/down steps.   Patient Performance Supervision or verbal cues   Describe Performance SBA with bilateral rails   Car Transfer - Ability to transfer in/out of car or van.   Patient Performance Steadying Assist   Describe Performance Verbal cueing for safety and technique, CGA and FWW   Picking up Object - Ability to bend/stoop while standing.   Patient Performance Independent   Describe Performance Use of grabber in order to maintain spinal precautions, FWW as well     Sammie Kelsey, PT, DPT

## 2024-03-03 NOTE — PROGRESS NOTES
Social Work: Initial Assessment with Discharge Plan    Patient Name: Roxanna Stark  : 1927  Age: 89 year old  MRN: [id number]  Completed assessment with: Chart review and interview with pt, pt dtr Anna and ANNIE. In the middle of the assessment, about 4-5 other family members entered. Assessment cut short. IRF questions completed. Primary SW notified to follow up.   Admitted to TCU: 2024    Presenting Information   Date of SW assessment: March 3, 2024  Health Care Directive: Copy in Chart  Primary Health Care Agent: Patient/self   Secondary Health Care Agent: Alexus Castillo and alexus Goyal (contact information listed on face sheet).   Living Situation: Lives in a two-story home in [place], [place] alone. 2 LINO and STI. Pt's bed and bath were on the 2nd floor of the home and pt had a stair lift. Laundry was on the main floor of the home. Family has secured/coordinating transition to [facility] from TCU ([facility] in [place]). More information about pt assisted copied from IP SW note below.   Previous Functional Status:  Prior to 24, pt was independent with all IADLs & ADL's . Driving, managing her own medications, and managing her own finances (dtr is now managing finances). Dtrs would bring meals over sometimes. Doing her own grocery shopping PTA. Retired. [place], wears bilateral hearing aides. Patient has a handicapped parking certificate and she has an emergency response pendant that she wears. Pt has had 3 falls in the past year with the most recent fall resulting in a fracture.   DME available: walker, cane, bath bench with attached grab bar, grab bars in the bathroom, a hh shower nozzle and a reacher   Patient and family understanding of hospitalization: Appropriate. Some forgetfulness reported.   Cultural/Language/Spiritual Considerations: 89 y/o female, english-speaking, , and [employer]-d[employer].   Abuse concerns: None reported or indicated.   BIMS: Pt scored 6 on BIMS  PHQ-9: Pt scored 12 on  "PHQ-9  PAS: confirmation number- [id number]   Has there been a level II screen?  No  Were there any recommendations in the screen? N/A  If yes, will the recommendations we incorporated into the Plan of Care?  N/A   03/03/24 1200   Section A: Transportation   Has lack of transportation kept you from medical appointments, meetings, work, or from getting things needed for daily living? C. No   Cognitive Pattern Assessment (From [facility])   Cognitive Pattern Assessment Used BIMS   Sec  Health Literacy   Health Literacy: How often do you need to have someone help you when you read instructions, pamphlets, or other written material from your doctor or pharmacy? 3. Often   Brief Interview for Mental Status (BIMS) (From [facility])   Should the BIMS be conducted? Yes   Repetition of Three Words (First Attempt) 3   Temporial Orientation: Year Nonsensical  (Kept saying \"333\" confused and distracted by family in the room)   Temporal Orientation: Month Accurate within 5 days   Temporal Orientation: Day Correct   Recall: \"Sock\" No, could not recall   Recall: \"Blue\" No, could not recall   Recall: \"Bed\" No, could not recall   BIMS Summary Score 6   CAM (Confusion Assessment Method)   (1) Acute Onset/Fluctuating Course yes   (2) Inattention no   (3) Disorganized Thinking no   (4) Altered Level of Consciousness no   Delirium present? no   PHQ-9: Brief Depression Severity Measure   Little Interest or Pleasure in Doing Things 1-->several days   Feeling Down, Depressed or Hopeless 0-->not at all   Trouble Falling or Staying Asleep, or Sleeping Too Much 3-->nearly every day   Feeling Tired or Having Little Energy 2-->more than half the days   Poor Appetite or Overeating 0-->not at all   Feeling Bad about Yourself - or that You are a Failure or Have Let Yourself or Your Family Down 1-->several days   Trouble Concentrating on Things, Such as Reading the Newspaper or Watching Television 2-->more than half the days   Moving or Speaking " So Slowly that Other People Could Have Noticed? Or the Opposite - Being So Fidgety 3-->nearly every day   Thoughts that You Would be Better Off Dead or of Hurting Yourself in Some Way 0-->not at all   PHQ-9: Brief Depression Severity Measure Score 12   Section D: Mood   . Social Isolation - How often do you feel lonely or isolated from those around you? 2. Sometimes     Physical Health  Reason for admission: Roxanna Stark is a 89 year old female with HTN, CKD, RA, PAD, aortic stenosis s/p AVR (2016), Atrial fibrillation (On Eliquis), HFpEF who presented to the [facility] ED from her nursing home via EMS after she was found down 24. ED work up notable of T12 burst fracture. She was admitted to the trauma service for further management. Conservative management with a TLSO brace per Neurosurgery.    Provider Information   Primary Care Physician:Swapna Gaines   [address] [place] [address]   PH: [phone]   : None reported.     Mental Health:   Diagnosis: No concerns reported.   Current Support/Services: None reported.   Previous Services: None reported.   Services Needed/Recommended: None indicated at this time. Supportive services available if needed.     Substance Use:  Diagnosis: No concerns reported   Current Support/Services: None reported   Previous Services: None reported   Services Needed/Recommended: None indicated at this time.     Support System  Marital Status: .   Family support: Pt's daughter (Josefina) is .  Pt states that her living children are supportive and include:  - Anna, resides in [facility], resides in [facility], resides in [facility], resides in [facility], resides in [place].    Other support available: In-laws and grandchildren.   Gaps in support system: None reported.     Personalized Care and Trauma  What other information would help us give you more personalized care or how can we help you with any past trauma?  Unable to ask, need to follow-up.     MyChart:  Do you have access to [facility] to view my Baseline Care Plan? Unable to ask, need to follow-up.   If not, then SW will print out and provide Baseline Care Plan.     Community Resources  Current in home services: None reported.   Previous services: Was at [facility] prior to re-hospitalization and admission to  TCU.     Financial/Employment/Education  Employment Status: Retired--Pt's primary occupation was a .   Income Source: Pension & SSI  Education: Not discussed   Financial Concerns:  None reported   Insurance: Medicare and [facility]     Discharge Plan   Patient and family discharge goal: skilled nursing placement with HC anticipated, pending progress.   Provided Education on discharge plan: NO--Primary team to discuss further   Patient agreeable to discharge plan:  NO--Primary team to discuss further. Per discussion, pt IS agreeable to the plan in place for skilled nursing placement.   A list of Medicare Certified Facilities was provided to the patient and/or family to encourage patient choice. Based on location and rating, patient would like referrals made to: N/A  General information regarding anticipated insurance coverage and possible out of pocket cost was discussed. Patient and patient's family are aware patient may incur the cost of transportation to the facility, pending insurance payment: NO  Barriers to discharge: Higher level of support in place at time of discharge. Pt's management of TLSO, cognition, and high risk of falls are of concern.     Discharge Recommendations   Disposition: skilled nursing placement. Name, address, and contact information below.   Transportation Needs: Friends/family and private pay transport. No insurance transportation benefits.   Name of Transportation Company and Phone: N/A     Additional comments   Per IP SW note (Makayla Banks) RE: skilled nursing--    Disposition planning was discussed. Pt and family tell SW that they have  secured an assisted living apt for pt at [facility]. Pt will have a 1 bedroom 1 bath (walk in shower) apt. There are emergency pull cords in the bedroom and bathroom. The building will provide an emergency response necklace with a tracker. The building will provide 3 meals per day. The building will administer, order and retrieve pt's medications. The current arrangement is for the assisted living to provide pt with a shower/bath 1-2 times per week and provide light housekeeping and laundry (linens/towels) 1x per week. Pt's family reports that services can be increased if needed. The assisted living will also  and dispose of pt's trash. Family states that pt prefers to launder her own clothing but the assisted living will complete this task if pt agree's.     From additional IP SW notes, pt dtr can stay with pt initially to provide additional A if needed. half-way works closely with [facility], if HC is needed at discharge. Pt PCP clinic is next door to the half-way and [facility] PCP will continue to follow.     half-way Placement:   [address] [address]  Phone: [phone]  Nurse Line: [phone]  Fax: [phone]  Pharm: [facility] on [address]  [facility]    PH: [phone] & Fax: [phone]  Email: [email or web address]

## 2024-03-03 NOTE — PROGRESS NOTES
"   24 1252   Appointment Info   Signing Clinician's Name / Credentials (OT) Kera Payne OTR/L CBIS   Rehab Comments (OT) OT: eval completed, treatment initiated   Quick Adds   Quick Adds   (initial certif 3/3/24)   Living Environment   People in Home other (see comments)  (pt reports livng w/ her Brother in Law Lawerence \"Adiel\")   Current Living Arrangements assisted living   Home Accessibility no concerns   Transportation Anticipated family or friend will provide   Living Environment Comments OT: pt did not appear to be reliable , th contacted jeana Castillo w/ pt's permission, Anna provided the following soc hx: pt lives in [facility] w/ 3 levels, 1 LINO but main level contains LR, kitchen, 1/2 bath w/ std ht toilet and no grabbars, basement has 13 steps but pt does not need to go to basement level, bedrooms and full bathroom are on upper level w/ 13 steps to reache but daughter reports installing stair lift previously. on upper level pt sleeps in standard bed, std ht toilet w/ no grabbars , tub/shower combo w/ an attachable bar to side of tub but no mounted grabbars and no bench, pt was doing her own cleaning/laundry mostly w/ some assist from family, daughter anna was making meals and freezing them for pt to heat up but could also do simple meals like mac and cheese indep PTA, pt was driving and doing own grocery shopping. pt was using SEC but family was encouraging her to use her 2WW but pt was refusing, daughter reports family was looking into moving pt to assisted living in [facility] but daughter concerned they may not be able to provide appropriate level of care w/her moms recent back fx and cog decline. daughter notes her mom has been talking about people in present tense that have  in the past. daughter states \"she can not go back to living in her townhouse\" . daughter has been helping pt w/ finances for a few years.   Self-Care   Usual Activity Tolerance moderate   Current " "Activity Tolerance fair   Regular Exercise Yes  (walking, not regualr basis)   Fall history within last six months yes   Number of times patient has fallen within last six months 3  (per chart review, pt reported she has not had any falls in past 6mo however pt poor historian)   General Information   Onset of Illness/Injury or Date of Surgery 02/24/24   Referring Physician Wilfredo Monge MD   Patient/Family Therapy Goal Statement (OT) get stronger   Additional Occupational Profile Info/Pertinent History of Current Problem per chart review:The patient sustained the above injury as a result of an unwitnessed fall.  The mechanism of injury and factors contributing to the accident were discussed with the patient.The patient sustained the above injury as a result of an unwitnessed fall.  The mechanism of injury and factors contributing to the accident were discussed with the patient.\"   Existing Precautions/Restrictions spinal;fall  (TLSO for comfort when OOB)   Left Upper Extremity (Weight-bearing Status) partial weight-bearing (PWB)  (10# lift restrictions, spinal precaut)   Right Upper Extremity (Weight-bearing Status) partial weight-bearing (PWB)  (10# lift restrictions, spinal precaut)   Left Lower Extremity (Weight-bearing Status) full weight-bearing (FWB)   Right Lower Extremity (Weight-bearing Status) full weight-bearing (FWB)   General Observations and Info \"to wear the TLSO brace for comfort whenever she is out of bed. \"   Cognitive Status Examination   Orientation Status person  (oriented to month but not year, oriented to only some aspects of situation and place)   Follows Commands follows one-step commands   Memory Deficit moderate deficit   Cognitive Status Comments OT: impaired memory, impaired orientation   Visual Perception   Visual Acuity glasses   Pain Assessment   Patient Currently in Pain   (back pain intermittently)   Posture   Posture forward head position;protracted shoulders   Range of " "Motion Comprehensive   Comment, General Range of Motion OT: nohelia UE AROM WFL, R dom   Strength Comprehensive (MMT)   Comment, General Manual Muscle Testing (MMT) Assessment generalized weakness, deconditioned, did not MMT due to spinal precautions   Bed Mobility   Comment (Bed Mobility) see gg codes   Transfers   Transfer Comments see gg codes   Balance   Balance Comments impaired dynamic standing bal   Activities of Daily Living   BADL Assessment/Intervention   (see gg codes)   Clinical Impression   Criteria for Skilled Therapeutic Interventions Met (OT) Yes, treatment indicated   OT Diagnosis decreased indep w/ adls/mob/iadls   OT Problem List-Impairments impacting ADL problems related to;activity tolerance impaired;balance;cognition;strength;pain  (spinal precautions, TLSO \"for comfort when OOB\")   Assessment of Occupational Performance 3-5 Performance Deficits   Identified Performance Deficits drg, bathing, toileting, bed mob, transfers,homemaking   Planned Therapy Interventions (OT) ADL retraining;IADL retraining;balance training;bed mobility training;cognition;strengthening;transfer training;progressive activity/exercise  (assess for AE needs,rcommend ext tub bench if returning to home w/ supports)   Clinical Decision Making Complexity (OT) detailed assessment/moderate complexity   Risk & Benefits of therapy have been explained evaluation/treatment results reviewed;care plan/treatment goals reviewed;risks/benefits reviewed;current/potential barriers reviewed;participants voiced agreement with care plan;participants included;patient;daughter  (daughter Anna)   Clinical Impression Comments OT: pt was living indep and driving prior to admit but family had seen recent decline and was looking into assist living setting in [place] prior to admit, pt presents w/ T12 burst fx, spinal precautions, intermittent back back, Aspen back brace \"must be worn for comfort when OOB \" per brandon gardner, generalized " weakness, and cognitive impairments including memory deficits and impaired orientation resulting in decreased indep w/ adls/mob /iadls, rcommend cont OT to address deficits to max safety and indep and assess for appropriate d/c recommend including setting, supports and AE needs. rcommend family trainng PRN , contingent on d/c placement.   OT Total Evaluation Time   OT Eval, Moderate Complexity Minutes (30048) 41   Therapy Certification   Start of Care Date 03/03/24   Certification date from 03/03/24   Certification date to 04/01/24   OT Goals   Therapy Frequency (OT) 5 times/week   OT Goals Hygiene/Grooming;Upper Body Dressing;Lower Body Dressing;Lower Body Bathing;Upper Body Bathing;Transfers;Bed Mobility;Toilet Transfer/Toileting;Meal Preparation;Cognition   OT: Hygiene/Grooming modified independent   OT: Upper Body Dressing Modified independent  (shirt modified indep, víctor back brace w/ sba/setup)   OT: Lower Body Dressing Supervision/stand-by assist;within precautions;using adaptive equipment   OT: Upper Body Bathing Supervision/stand-by assist   OT: Lower Body Bathing Minimal assist;with precautions;using adaptive equipment   OT: Bed Mobility Modified independent;within precautions   OT: Transfer Modified independent;within precautions;with assistive device   OT: Toilet Transfer/Toileting Modified independent;using adaptive equipment;within precautions   OT: Meal Preparation Supervision/stand-by assist;with simple meal preparation;ambulatory level;using adaptive equipment;within precautions   OT: Cognitive Patient/caregiver will verbalize understanding of cognitive assessment results/recommendations as needed for safe discharge planning   Self-Care/Home Management   Self-Care/Home Mgmt/ADL, Compensatory, Meal Prep Minutes (99823) 43   Treatment Detail/Skilled Intervention OT: pt educ on spinal precautionsm, approach to bed mob, use of reacher for LB  drg and approach to LB drg w/ spinal precautions, educ pt on  options for AE for toileting and showering needs. see gg codes for details.   OT Discharge Planning   OT Plan OT: precautions:spinal, TLSO for comfort when OOB. falls, RX:   OT Discharge Recommendation (DC Rec) home with assist  ([facility] OT, OT contacted daughter Anna on eval and daughter stated she does not think her mom can return to home and considering [facility] in [place])   OT Rationale for DC Rec antic pt will need Ext tub bench, may need home OT and may need HH Aide,   OT Brief overview of current status see clincial impressions   Total Session Time   Timed Code Treatment Minutes 43   Total Session Time (sum of timed and untimed services) 53   Post Acute Settings Only   What unit is patient on? TCU   OT- Transitional Care Unit Time   Individual Time (minutes) - OT 53   TCU Total Session Time (minutes) - OT 53   TCU Daily Total Session Time   OT TCU Daily Total Session Time 53   Rehab TCU Daily Total Session Time 91   Bed Mobility: Turning side to side/Roll Left and Right   Describe Performance OT: sba, verb cues for following spinal precautions   Bed Mobility: Sit to lying   Describe Performance OT: sba, verb cues for following spinal precautions   Bed Mobility: Lying to sitting on the side of bed   Describe Performance OT: sba, verb cues for following spinal precautions, bed rail   Transfers: Sit to Stand   Describe Performance OT: sba w/ bed rail or fww from high surface, min A from lower surface   Transfers: Chair/Bed transfers   Describe Performance OT: sba to cga w/ fww   Picking up Object - Ability to bend/stoop while standing.   Reason Not Done Safety concerns   Upper Body Dressing - Ability to dress/undress above the waist, including fasteners   Describe Performance OT: setup w/ pulllover shirt, max A w/ aspen back brace   Lower Body Dressing - Ability to dress/undress below the waist, includes fasteners   Describe Performance OT: min A to follow spinal precautions, educ on reacher   Lower Body  Dressing (Putting On/Taking-Off Footwear)   Describe Performance OT: max A, recommend sock aide   Toileting Hygiene - Ability to maintain perineal hygiene and adjust clothes   Describe Performance OT: sba/setup, cues for safer approach   Toilet transfer - Ability to get on and off a toilet or commode   Describe Performance OT: sba/setup, cues for safer approach, std ht toilet and grabbar and fww   Personal Hygiene - Ability to maintain personal hygiene (combing hair, shaving, apply makeup wash/dry face/hands.   Describe Performance OT: setup   Oral Hygiene - Ability to use suitable items to clean teeth.   Describe Performance OT: setup

## 2024-03-03 NOTE — PLAN OF CARE
Patient is a 89 year old female  admitted to room 403 via wheelchair.  Patient is alert and oriented X 4. See Epic for VS and assessment.  Patient is able to transfer A1 using walker. Patient was settled into their room, shown call light, tv, mealtimes etc. Oriented to unit. Will continue monitoring pain level and VS. Notifying MD with any concerns.  Follow MD orders for cares and medications.    Flu Vaccine:   - Yes, up to date (patient had vaccine this flu season)      COVID Vaccine:   - Yes, up to date (had vaccine this season)       Pneumococcal Vaccine:   - Yes, up to date       Ethnicity:   Race: White  Dentures: No  Hearing Aid: Yes  Smoker:  No  Glasses: Yes  Falls 0-1 mo: 0 2-6 mo: 3  Prior device use: Walker   Advanced Care Directive Referral to Social Work? Pt's daughter will bring-in the paper work for Advanced Directive

## 2024-03-04 ENCOUNTER — APPOINTMENT (OUTPATIENT)
Dept: OCCUPATIONAL THERAPY | Facility: SKILLED NURSING FACILITY | Age: 89
DRG: 560 | End: 2024-03-04
Attending: INTERNAL MEDICINE
Payer: MEDICARE

## 2024-03-04 ENCOUNTER — APPOINTMENT (OUTPATIENT)
Dept: PHYSICAL THERAPY | Facility: SKILLED NURSING FACILITY | Age: 89
DRG: 560 | End: 2024-03-04
Attending: INTERNAL MEDICINE
Payer: MEDICARE

## 2024-03-04 ENCOUNTER — PATIENT OUTREACH (OUTPATIENT)
Dept: CARE COORDINATION | Facility: CLINIC | Age: 89
End: 2024-03-04
Payer: MEDICARE

## 2024-03-04 LAB
ANION GAP SERPL CALCULATED.3IONS-SCNC: 18 MMOL/L (ref 7–15)
BUN SERPL-MCNC: 21.6 MG/DL (ref 8–23)
CALCIUM SERPL-MCNC: 9 MG/DL (ref 8.2–9.6)
CHLORIDE SERPL-SCNC: 107 MMOL/L (ref 98–107)
CREAT SERPL-MCNC: 1.03 MG/DL (ref 0.51–0.95)
DEPRECATED HCO3 PLAS-SCNC: 17 MMOL/L (ref 22–29)
EGFRCR SERPLBLD CKD-EPI 2021: 50 ML/MIN/1.73M2
GLUCOSE SERPL-MCNC: 90 MG/DL (ref 70–99)
POTASSIUM SERPL-SCNC: 4 MMOL/L (ref 3.4–5.3)
SODIUM SERPL-SCNC: 142 MMOL/L (ref 135–145)

## 2024-03-04 PROCEDURE — 97750 PHYSICAL PERFORMANCE TEST: CPT | Mod: GP

## 2024-03-04 PROCEDURE — 97530 THERAPEUTIC ACTIVITIES: CPT | Mod: GP

## 2024-03-04 PROCEDURE — 36415 COLL VENOUS BLD VENIPUNCTURE: CPT | Performed by: INTERNAL MEDICINE

## 2024-03-04 PROCEDURE — 120N000009 HC R&B SNF

## 2024-03-04 PROCEDURE — 97535 SELF CARE MNGMENT TRAINING: CPT | Mod: GO

## 2024-03-04 PROCEDURE — 250N000013 HC RX MED GY IP 250 OP 250 PS 637: Performed by: INTERNAL MEDICINE

## 2024-03-04 PROCEDURE — 80048 BASIC METABOLIC PNL TOTAL CA: CPT | Performed by: INTERNAL MEDICINE

## 2024-03-04 RX ORDER — AMLODIPINE BESYLATE 5 MG/1
5 TABLET ORAL DAILY
Status: DISCONTINUED | OUTPATIENT
Start: 2024-03-04 | End: 2024-03-19 | Stop reason: HOSPADM

## 2024-03-04 RX ADMIN — FUROSEMIDE 20 MG: 20 TABLET ORAL at 08:31

## 2024-03-04 RX ADMIN — HYDRALAZINE HYDROCHLORIDE 10 MG: 10 TABLET ORAL at 08:31

## 2024-03-04 RX ADMIN — APIXABAN 2.5 MG: 2.5 TABLET, FILM COATED ORAL at 08:30

## 2024-03-04 RX ADMIN — ACETAMINOPHEN 975 MG: 325 TABLET, FILM COATED ORAL at 14:16

## 2024-03-04 RX ADMIN — METOPROLOL TARTRATE 25 MG: 25 TABLET, FILM COATED ORAL at 08:30

## 2024-03-04 RX ADMIN — AMLODIPINE BESYLATE 5 MG: 5 TABLET ORAL at 12:08

## 2024-03-04 RX ADMIN — GABAPENTIN 100 MG: 100 CAPSULE ORAL at 08:30

## 2024-03-04 RX ADMIN — APIXABAN 2.5 MG: 2.5 TABLET, FILM COATED ORAL at 20:56

## 2024-03-04 RX ADMIN — AMIODARONE HYDROCHLORIDE 200 MG: 200 TABLET ORAL at 08:31

## 2024-03-04 RX ADMIN — ACETAMINOPHEN 975 MG: 325 TABLET, FILM COATED ORAL at 20:56

## 2024-03-04 RX ADMIN — ACETAMINOPHEN 975 MG: 325 TABLET, FILM COATED ORAL at 08:30

## 2024-03-04 RX ADMIN — HYDROXYCHLOROQUINE SULFATE 200 MG: 200 TABLET ORAL at 08:31

## 2024-03-04 RX ADMIN — METOPROLOL TARTRATE 25 MG: 25 TABLET, FILM COATED ORAL at 20:57

## 2024-03-04 RX ADMIN — POLYETHYLENE GLYCOL 3350 17 G: 17 POWDER, FOR SOLUTION ORAL at 08:32

## 2024-03-04 RX ADMIN — CALCITONIN SALMON 1 SPRAY: 200 SPRAY, METERED NASAL at 08:31

## 2024-03-04 RX ADMIN — Medication 1 TABLET: at 08:30

## 2024-03-04 ASSESSMENT — ACTIVITIES OF DAILY LIVING (ADL)
ADLS_ACUITY_SCORE: 39
ADLS_ACUITY_SCORE: 39
ADLS_ACUITY_SCORE: 41
ADLS_ACUITY_SCORE: 41
ADLS_ACUITY_SCORE: 39
ADLS_ACUITY_SCORE: 39
ADLS_ACUITY_SCORE: 41
ADLS_ACUITY_SCORE: 41
ADLS_ACUITY_SCORE: 42
ADLS_ACUITY_SCORE: 39
ADLS_ACUITY_SCORE: 41
ADLS_ACUITY_SCORE: 39
ADLS_ACUITY_SCORE: 39
ADLS_ACUITY_SCORE: 41
ADLS_ACUITY_SCORE: 39
ADLS_ACUITY_SCORE: 41

## 2024-03-04 NOTE — PROGRESS NOTES
"   24 [id number]   Appointment Info   Signing Clinician's Name / Credentials (OT) Kera Payne OTR/L CBIS   Rehab Comments (OT) OT: eval completed, treatment initiated   Quick Adds   Quick Adds   (initial certif 3/3/24)   Living Environment   People in Home other (see comments)  (pt reports livng w/ her Brother in Law Lawerence \"Adiel\")   Current Living Arrangements assisted living   Home Accessibility no concerns   Transportation Anticipated family or friend will provide   Living Environment Comments OT: pt did not appear to be reliable , th contacted jeana Castillo w/ pt's permission, Anna provided the following soc hx: pt lives in [facility] w/ 3 levels, 1 LINO but main level contains LR, kitchen, 1/2 bath w/ std ht toilet and no grabbars, basement has 13 steps but pt does not need to go to basement level, bedrooms and full bathroom are on upper level w/ 13 steps to reache but daughter reports installing stair lift previously. on upper level pt sleeps in standard bed, std ht toilet w/ no grabbars , tub/shower combo w/ an attachable bar to side of tub but no mounted grabbars and no bench, pt was doing her own cleaning/laundry mostly w/ some assist from family, daughter anna was making meals and freezing them for pt to heat up but could also do simple meals like mac and cheese indep PTA, pt was driving and doing own grocery shopping. pt was using SEC but family was encouraging her to use her 2WW but pt was refusing, daughter reports family was looking into moving pt to assisted living in [facility] but daughter concerned they may not be able to provide appropriate level of care w/her moms recent back fx and cog decline. daughter notes her mom has been talking about people in present tense that have  in the past. daughter states \"she can not go back to living in her townhouse\" . daughter has been helping pt w/ finances for a few years.   Self-Care   Usual Activity Tolerance moderate   Current " "Activity Tolerance fair   Regular Exercise Yes  (walking, not regualr basis)   Fall history within last six months yes   Number of times patient has fallen within last six months 3  (per chart review, pt reported she has not had any falls in past 6mo however pt poor historian)   General Information   Onset of Illness/Injury or Date of Surgery 02/24/24   Referring Physician Wilfredo Monge MD   Patient/Family Therapy Goal Statement (OT) get stronger   Additional Occupational Profile Info/Pertinent History of Current Problem per chart review:The patient sustained the above injury as a result of an unwitnessed fall.  The mechanism of injury and factors contributing to the accident were discussed with the patient.The patient sustained the above injury as a result of an unwitnessed fall.  The mechanism of injury and factors contributing to the accident were discussed with the patient.\"   Existing Precautions/Restrictions spinal;fall  (TLSO for comfort when OOB)   Left Upper Extremity (Weight-bearing Status) partial weight-bearing (PWB)  (10# lift restrictions, spinal precaut)   Right Upper Extremity (Weight-bearing Status) partial weight-bearing (PWB)  (10# lift restrictions, spinal precaut)   Left Lower Extremity (Weight-bearing Status) full weight-bearing (FWB)   Right Lower Extremity (Weight-bearing Status) full weight-bearing (FWB)   General Observations and Info \"to wear the TLSO brace for comfort whenever she is out of bed. \"   Cognitive Status Examination   Orientation Status person  (oriented to month but not year, oriented to only some aspects of situation and place)   Follows Commands follows one-step commands   Memory Deficit moderate deficit   Cognitive Status Comments OT: impaired memory, impaired orientation   Visual Perception   Visual Acuity glasses   Pain Assessment   Patient Currently in Pain   (back pain intermittently)   Posture   Posture forward head position;protracted shoulders   Range of " "Motion Comprehensive   Comment, General Range of Motion OT: nohelia UE AROM WFL, R dom   Strength Comprehensive (MMT)   Comment, General Manual Muscle Testing (MMT) Assessment generalized weakness, deconditioned, did not MMT due to spinal precautions   Bed Mobility   Comment (Bed Mobility) see gg codes   Transfers   Transfer Comments see gg codes   Balance   Balance Comments impaired dynamic standing bal   Activities of Daily Living   BADL Assessment/Intervention   (see gg codes)   Clinical Impression   Criteria for Skilled Therapeutic Interventions Met (OT) Yes, treatment indicated   OT Diagnosis decreased indep w/ adls/mob/iadls   OT Problem List-Impairments impacting ADL problems related to;activity tolerance impaired;balance;cognition;strength;pain  (spinal precautions, TLSO \"for comfort when OOB\")   Assessment of Occupational Performance 3-5 Performance Deficits   Identified Performance Deficits drg, bathing, toileting, bed mob, transfers,homemaking   Planned Therapy Interventions (OT) ADL retraining;IADL retraining;balance training;bed mobility training;cognition;strengthening;transfer training;progressive activity/exercise  (assess for AE needs,rcommend ext tub bench if returning to home w/ supports)   Clinical Decision Making Complexity (OT) detailed assessment/moderate complexity   Risk & Benefits of therapy have been explained evaluation/treatment results reviewed;care plan/treatment goals reviewed;risks/benefits reviewed;current/potential barriers reviewed;participants voiced agreement with care plan;participants included;patient;daughter  (daughter Anna)   Clinical Impression Comments OT: pt was living indep and driving prior to admit but family had seen recent decline and was looking into assist living setting in [place] prior to admit, pt presents w/ T12 burst fx, spinal precautions, intermittent back back, Aspen back brace \"must be worn for comfort when OOB \" per brandon gardner, generalized " weakness, and cognitive impairments including memory deficits and impaired orientation resulting in decreased indep w/ adls/mob /iadls, rcommend cont OT to address deficits to max safety and indep and assess for appropriate d/c recommend including setting, supports and AE needs. rcommend family trainng PRN , contingent on d/c placement.   OT Total Evaluation Time   OT Eval, Moderate Complexity Minutes (89832) 97   Therapy Certification   Start of Care Date 03/03/24   Certification date from 03/03/24   Certification date to 04/01/24   OT Goals   Therapy Frequency (OT) 5 times/week   OT Predicted Duration/Target Date for Goal Attainment 03/13/24   OT Goals Hygiene/Grooming;Upper Body Dressing;Lower Body Dressing;Lower Body Bathing;Upper Body Bathing;Transfers;Bed Mobility;Toilet Transfer/Toileting;Meal Preparation;Cognition   OT: Hygiene/Grooming modified independent   OT: Upper Body Dressing Modified independent  (shirt modified indep, víctor back brace w/ sba/setup)   OT: Lower Body Dressing Supervision/stand-by assist;within precautions;using adaptive equipment   OT: Upper Body Bathing Supervision/stand-by assist   OT: Lower Body Bathing Minimal assist;with precautions;using adaptive equipment   OT: Bed Mobility Modified independent;within precautions   OT: Transfer Modified independent;within precautions;with assistive device   OT: Toilet Transfer/Toileting Modified independent;using adaptive equipment;within precautions   OT: Meal Preparation Supervision/stand-by assist;with simple meal preparation;ambulatory level;using adaptive equipment;within precautions   OT: Cognitive Patient/caregiver will verbalize understanding of cognitive assessment results/recommendations as needed for safe discharge planning   Self-Care/Home Management   Self-Care/Home Mgmt/ADL, Compensatory, Meal Prep Minutes (31543) 43   Treatment Detail/Skilled Intervention OT: pt educ on spinal precautionsm, approach to bed mob, use of reacher for  LB  drg and approach to LB drg w/ spinal precautions, educ pt on options for AE for toileting and showering needs. see gg codes for details.   OT Discharge Planning   OT Plan OT: precautions:spinal, TLSO for comfort when OOB. falls, RX:   OT Discharge Recommendation (DC Rec) home with assist  ([facility], OT contacted daughter Anna on eval and daughter stated she does not think her mom can return to home and considering [facility] in [place])   OT Rationale for DC Rec antic pt will need Ext tub bench, may need home OT and may need HH Aide,   OT Brief overview of current status see clincial impressions   Total Session Time   Timed Code Treatment Minutes 43   Total Session Time (sum of timed and untimed services) 53   Post Acute Settings Only   What unit is patient on? TCU   OT- Transitional Care Unit Time   Individual Time (minutes) - OT 53   TCU Total Session Time (minutes) - OT 53   TCU Daily Total Session Time   OT TCU Daily Total Session Time 53   Rehab TCU Daily Total Session Time 91   Bed Mobility: Turning side to side/Roll Left and Right   Describe Performance OT: sba, verb cues for following spinal precautions   Bed Mobility: Sit to lying   Describe Performance OT: sba, verb cues for following spinal precautions   Bed Mobility: Lying to sitting on the side of bed   Describe Performance OT: sba, verb cues for following spinal precautions, bed rail   Transfers: Sit to Stand   Describe Performance OT: sba w/ bed rail or fww from high surface, min A from lower surface   Transfers: Chair/Bed transfers   Describe Performance OT: sba to cga w/ fww   Picking up Object - Ability to bend/stoop while standing.   Reason Not Done Safety concerns   Upper Body Dressing - Ability to dress/undress above the waist, including fasteners   Describe Performance OT: setup w/ pulllover shirt, max A w/ aspen back brace   Lower Body Dressing - Ability to dress/undress below the waist, includes fasteners   Describe Performance OT:  min A to follow spinal precautions, educ on reacher   Lower Body Dressing (Putting On/Taking-Off Footwear)   Describe Performance OT: max A, recommend sock aide   Toileting Hygiene - Ability to maintain perineal hygiene and adjust clothes   Describe Performance OT: sba/setup, cues for safer approach   Toilet transfer - Ability to get on and off a toilet or commode   Describe Performance OT: sba/setup, cues for safer approach, std ht toilet and grabbar and fww   Personal Hygiene - Ability to maintain personal hygiene (combing hair, shaving, apply makeup wash/dry face/hands.   Describe Performance OT: setup   Oral Hygiene - Ability to use suitable items to clean teeth.   Describe Performance OT: setup

## 2024-03-04 NOTE — PLAN OF CARE
Spoke to Roxanna's family, Anna, and Anna agreed about rescheduling appts for 3/5 and 3/6/2024. Family will reschedule when pt discharges.    Both appts cancelled.

## 2024-03-04 NOTE — PHARMACY-ADMISSION MEDICATION HISTORY
Please see Admission Medication History completed on 1/27/24 under previous encounter at [facility] for information regarding prior to admission medications.    Olayinka Arzola  PGY1 Pharmacy Resident

## 2024-03-04 NOTE — PROGRESS NOTES
SW received a call from [employer].  They said since she is Medicare first.  We don't need an auth from them.  SW said yes, she is Medicare first.  They will close the auth. She received a call from a Kiesha??     GHISLAINE Rahman   [facility], Transitional Care Unit   Social Work   [facility] [address] [place] [address]  ) [phone]

## 2024-03-04 NOTE — PROGRESS NOTES
CLINICAL NUTRITION SERVICES - ASSESSMENT NOTE     Nutrition Prescription    RECOMMENDATIONS FOR MDs/PROVIDERS TO ORDER:  Patient could benefit from a MVI w/ minerals.  Encouragement of meal intakes would be appreciated.    Malnutrition Status:    Moderate malnutrition in the context of chronic illness/injury.     Recommendations already ordered by Registered Dietitian (RD):  Nutrition Education: Encouraged patient to continue consuming meals BID with snacks and supplements to support weight maintenance, maintain lean body mass, and heal from fracture. Discussed her normal intake PTA is likely not enough to promote healing.   Initiate - Medical food supplement therapy: Ensure Enlive strawberry x1 daily  Initiate - Modify composition of meals/snacks: Strawberry greek yogurt at 10 am    Future/Additional Recommendations:  Monitor labs, weight trends, and PO intakes.      REASON FOR ASSESSMENT  Roxanna Stark is a/an 89 year old female assessed by the dietitian for Admission Nutrition Risk Screen for positive with unintentional weight loss and no poor appetite noted.    NUTRITION/MEDICAL HISTORY  - Per H&P (2/25), Hx of HTN, CKD, RA, PAD, aortic stenosis s/p AVR (04/2016) with known injuries of T12 burst fracture and small paravertebral hemotoma.    - Met with patient and 2 of her daughters at bedside. Family brought in food on visit and was in good spirits. Family stated that she usually eats like a bird and will eat periodically throughout the day.     CURRENT NUTRITION ORDERS  Diet: 2 g Sodium  Intake/Tolerance: 75% of meals per flowsheet. On 2-day average, pt is ordering 251 kcal and 6 g protein daily per [employer]. Pt is likely meeting 20% minimum energy and 12% protein needs.    Patient stated that she is getting very large portions for herself and is not used to eating this much. She will eat at most 75% of her meals with being full after her meal. Stated that she is petite and likely does not need as much food  "as a normal individual to meet her needs. However, it still is important to consume meals TID and eat foods high in protein such as eggs, deli, chicken, etc. Patient was agreeable.     Labs reviewed:   BUN: 26.9 (H)  Cr: 1.15 (H)  Mg++ (3/2): 2.6 (H)  GFR estimate: 43 (L)  Glucose:  in past 2 days    Medications reviewed:   Calcitonin nasal spray  Oscal daily  Lasix 20 mg daily  Miralax daily  Ceftriaxone D/C'd 2/27    IV Fluids over last 7 days? Yes Sodium Chloride 0.9% bolus 500 mL on 2/28    ANTHROPOMETRICS  Ht Readings from Last 1 Encounters:   06/26/23 1.526 m (5' 0.08\")     Most Recent Weight: 49.8 kg (109 lb 12.6 oz)  IBW: 45.5 kg (109% IBW)  BMI: 21.39 kg/m^2 Normal BMI  Weight History: Patient has lost 3 lbs (2%) over the last 1 month, overall lost 11 lbs (9.1%) over the last 6 months. Patient reports UBW of 115 lbs.  Wt Readings from Last 20 Encounters:   03/04/24 49.8 kg (109 lb 12.6 oz)   02/29/24 46.2 kg (101 lb 14.4 oz)   02/01/24 50.8 kg (112 lb)   01/27/24 53.3 kg (117 lb 6.4 oz)   11/28/23 52 kg (114 lb 9.6 oz)   11/04/23 52.4 kg (115 lb 9.6 oz)   08/15/23 54.8 kg (120 lb 12.8 oz)   06/26/23 55.3 kg (122 lb)   06/01/23 54 kg (119 lb)   05/04/23 55.2 kg (121 lb 11.2 oz)   05/01/23 56.7 kg (125 lb)   10/14/22 55.9 kg (123 lb 3.2 oz)   09/28/22 56.9 kg (125 lb 6.4 oz)   08/15/22 56.8 kg (125 lb 3.2 oz)   07/08/22 53.1 kg (117 lb)   04/05/22 53.3 kg (117 lb 8 oz)   01/31/22 56.6 kg (124 lb 12.8 oz)   10/14/21 54.5 kg (120 lb 3.2 oz)   09/23/21 54.9 kg (121 lb)   08/30/21 48.1 kg (106 lb)     Dosing Weight: 50 kg - current wt     ASSESSED NUTRITION NEEDS  Estimated Energy Needs: 5846-0426 kcals/day (25 - 30 kcals/kg)  Justification: Maintenance  Estimated Protein Needs: 50-60 grams protein/day (1 - 1.2 grams of pro/kg)  Justification: Increased needs with fracture  Estimated Fluid Needs: 5712-5824 mL/day (1 mL/kcal)   Justification: Maintenance    PHYSICAL FINDINGS  See malnutrition section " below.  No abnormal nutrition-related physical findings observed.   Dentition: Patient with own teeth, no pain/difficulty chewing/swallowing    MALNUTRITION  % Intake: Decreased intake does not meet criteria  % Weight Loss: > 10% in 6 months (severe)  Subcutaneous Fat Loss: Facial region: mild  Muscle Loss: Temporal: mild and Thoracic region (clavicle, acromium bone, deltoid, trapezius, pectoral): mild; age related sarcopenia may be in affect    Fluid Accumulation/Edema: None noted  Malnutrition Diagnosis: Moderate malnutrition in the context of chronic illness/injury.    NUTRITION DIAGNOSIS  Unintended weight loss related to decreased intake as evidenced by pt reported 75% intakes and 9.1% of weight loss in 6 months.    INTERVENTIONS  Implementation  Nutrition Education: Encouraged patient to continue consuming meals BID with snacks and supplements to support weight maintenance, maintain lean body mass, and heal from fracture. Discussed her normal intake PTA is likely not enough to promote healing.   Initiate - Medical food supplement therapy: Ensure Enlive strawberry x1 daily  Initiate - Modify composition of meals/snacks: Strawberry greek yogurt at 10 am    Goals  Patient to consume % of nutritionally adequate meal trays TID, or the equivalent with supplements/snacks.     Monitoring/Evaluation  Progress toward goals will be monitored and evaluated per protocol.    Theodora Freeman  Dietetic Intern

## 2024-03-04 NOTE — PLAN OF CARE
A/O x2, brace on when out of bed. Bed alarm/chair alarm on when pt is in or out of bed, do not leave unattended on toilet. R diet, whole with apple sauce or thin liquid. Mix continence, no acute changes, continent with POC. BP continue to be elevated, 162/69 @ 0625.Provider notify.

## 2024-03-04 NOTE — LETTER
Hand-off  for Care Coordination  What is Care Coordination?  [facility] Care Coordination Services are available to people in complex situations,   for example medical, social or financial. The Care Coordinator, a SW or an RN, works with the   patient and their doctor to determine health goals, obtain resources, achieve outcomes,   and develop plans to coordinate care across settings.      Patient Name:   Roxanna Stark  Patient :     [id number]  Patient PCP:     Swapna Gaines MD    Patient Primary Clinic:   [address] [place] [id number]  D/C Facility: _____________________________________________   TCU Contact Info for questions: ___________________________  D/C Date:  ______________________________________________  Follow-up Apt with PCP after TCU D/C:   ____________________  Other Follow-up Apt s:      _________________________________________  Additional information (concerns, and Home Care, ect ):   __________________________________________________________________  __________________________________________________________________  Care Coordinator to Contact at [place]  Fax to:  Attn: Sina Pierce RN CC  Phone: [phone]

## 2024-03-04 NOTE — PHARMACY-MEDICATION REGIMEN REVIEW
Pharmacy Medication Regimen Review  Roxanna Stark is a 89 year old female who is currently in the Transitional Care Unit.    Assessment: All medications have an appropriate indications, durations and no unnecessary use was found    Plan:   Continue current medication regimen.    Attending provider will be sent this note for review.  If there are any emergent issues noted above, pharmacist will contact provider directly by phone.      Pharmacy will periodically review the resident's medication regimen for any PRN medications not administered in > 72 hours and discontinue them. The pharmacist will discuss gradual dose reductions of psychopharmacologic medications with interdisciplinary team on a regular basis.    Please contact pharmacy if the above does not answer specific medication questions/concerns.    Background:  A pharmacist has reviewed all medications and pertinent medical history today.  Medications were reviewed for appropriate use and any irregularities found are listed with recommendations.    Mandi Funes, CandelariaD, BCPS    Current Facility-Administered Medications:     acetaminophen (TYLENOL) tablet 975 mg, 975 mg, Oral, TID, Wilfredo Monge MD, 975 mg at 03/04/24 0830    amiodarone (PACERONE) tablet 200 mg, 200 mg, Oral, Daily, Wilfredo Monge MD, 200 mg at 03/04/24 0831    apixaban ANTICOAGULANT (ELIQUIS) tablet 2.5 mg, 2.5 mg, Oral, BID, Wilfredo Monge MD, 2.5 mg at 03/04/24 0830    bisacodyl (DULCOLAX) suppository 10 mg, 10 mg, Rectal, Daily PRN, Wilfredo Monge MD    calcitonin (salmon) (MIACALCIN) nasal spray 1 spray, 1 spray, Alternating Nostrils, Daily, Wilfredo Monge MD, 1 spray at 03/04/24 0831    calcium carbonate (TUMS) chewable tablet 1,000 mg, 1,000 mg, Oral, 4x Daily PRN, Wilfredo Monge MD    calcium carbonate-vitamin D (OSCAL) 500-5 MG-MCG per tablet 1 tablet, 1 tablet, Oral, Daily, Wilfredo Monge MD, 1 tablet at  03/04/24 0830    furosemide (LASIX) tablet 20 mg, 20 mg, Oral, Daily, Wilfredo Monge MD, 20 mg at 03/04/24 0831    gabapentin (NEURONTIN) capsule 100 mg, 100 mg, Oral, Daily, Wilfredo Monge MD, 100 mg at 03/04/24 0830    hydrALAZINE (APRESOLINE) tablet 10 mg, 10 mg, Oral, TID PRN, Wilfredo Monge MD, 10 mg at 03/04/24 0831    hydroxychloroquine (PLAQUENIL) tablet 200 mg, 200 mg, Oral, Daily, Wilfredo Monge MD, 200 mg at 03/04/24 0831    Lidocaine (LIDOCARE) 4 % Patch 1 patch, 1 patch, Transdermal, Q24H, Wilfredo Monge MD, 1 patch at 03/03/24 2007    lidocaine (LMX4) cream, , Topical, Q1H PRN, Wilfredo Monge MD    lidocaine 1 % 0.1-1 mL, 0.1-1 mL, Other, Q1H PRN, Wilfredo Monge MD    metoprolol tartrate (LOPRESSOR) tablet 25 mg, 25 mg, Oral, BID, Wilfredo Monge MD, 25 mg at 03/04/24 0830    ondansetron (ZOFRAN ODT) ODT tab 4 mg, 4 mg, Oral, Q6H PRN, Wilfredo Monge MD    Patient is already receiving anticoagulation with heparin, enoxaparin (LOVENOX), warfarin (COUMADIN)  or other anticoagulant medication, , Does not apply, Continuous PRN, Wilfredo Monge MD    polyethylene glycol (MIRALAX) Packet 17 g, 17 g, Oral, Daily, Wilfredo Monge MD, 17 g at 03/04/24 0832    senna-docusate (SENOKOT-S/PERICOLACE) 8.6-50 MG per tablet 1 tablet, 1 tablet, Oral, BID PRN **OR** senna-docusate (SENOKOT-S/PERICOLACE) 8.6-50 MG per tablet 2 tablet, 2 tablet, Oral, BID PRN, Wilfredo Monge MD    sodium chloride (PF) 0.9% PF flush 3 mL, 3 mL, Intracatheter, Q8H, Wilfredo Monge MD    sodium chloride (PF) 0.9% PF flush 3 mL, 3 mL, Intracatheter, q1 min prn, Wilfredo Monge MD

## 2024-03-04 NOTE — PLAN OF CARE
Goal Outcome Evaluation:      Plan of Care Reviewed With: patient    Overall Patient Progress: improving    Pt is A/O with intermittent confusion/forgetfulness. Denies SOB, CP, N/V. BP elevated, PRN hydralazine given, new orders for amlodipine received. On 2g Na diet with good appetite, family helped order food ahead. Pt was continent of B/B, had a BM this shift. Wears TLSO back brace when out of bed, assist x1 with W/GB for transfers. Bed/chair alarm on at all time for safety. Sat up in chair; family visiting and interacting with patient.No acute issues this shift, continue with POC.      Patient's most recent vital signs are:     Vital signs:  BP: 151/53  Temp: 97.3  HR: 61  RR: 16  SpO2: 96 %     Patient does not have new respiratory symptoms.  Patient does not have new sore throat.  Patient does not have a fever greater than 99.5.

## 2024-03-04 NOTE — PROGRESS NOTES
03/04/24 1600   Signing Clinician's Name / Credentials   Signing clinician's name / credentials Bjorn Robledo DPT   White Balance Scale (MARY AHUMADA, DANIEL S, ABDOULAYE ASHLEY, JEREMY SANTACRUZ: MEASURING BALANCE IN THE ELDERLY: VALIDATION OF AN INSTRUMENT. CAN. J. PUB. HEALTH, JULY/AUGUST SUPPLEMENT 2:S7-11, 1992.)   Sit To Stand 3   Standing Unsupported 3   Sitting Unsupported 3   Stand to Sit 3   Transfers 3   Standing with Eyes Closed 3   Standing Unsupported, Feet Together 1   Reach Forward With Outstretched Arm 2   Retrieve Object From Floor 0   Turning to Look Behind 0   Turn 360 Degrees 1   Placing Alternate Foot on Stool (4-6 inches) 0   Unsupported Tandem Stand (Demonstrate to Subject) 1   One Leg Stand 1   Total Score (A score of 45 or less has been correlated with an increased risk of falls)   Total Score (out of 56) 24     White Balance Scale (BBS) Cutoff Scores: A score of < 45 /56 indicates an increased risk for falls.     The BBS is a measure of static and dynamic standing balance that has been validated in community dwelling elderly individuals and individuals who have Parkinson's Disease, MS, and those who are s/p CVA and TBI. The test is administered without an assistive device. Scores from the White are used to determine the probability of falling based on the patient's previous history of falls and their test performance.     Minimal Detectable Change = 6.5   & Minimal Detectable Change (Parkinson's Disease) = 5 according to Louie & Rockyey 2008    Assessment (rationale for performing, application to patient s function & care plan): Performing White Balance Scale to assess pt's current balance function and risk for falls. Pt currently at increased risk for falls evidenced by scoring above. Will plan to continue to work on pt's balance to improve safety and decrease risk for falls. Time spent to edu pt on scoring and falls risk.  (Minutes billed as physical performance test): 20

## 2024-03-04 NOTE — PROGRESS NOTES
Clinic Care Coordination Contact  Care Coordination Transition Communication    Clinical Data: Patient was hospitalized at [facility] from 2/24/24 to 3/2/24 with diagnosis of Discharge Diagnoses  Principal Problem:    T12 burst fracture (H)  Active Problems:    Fall, initial encounter.     Assessment: Patient has transitioned to TCU/ARU for short term rehabilitation:    Facility Name: [address]  [address]  P: [phone]  F: [phone]   Transition Communication:  Notified facility of Primary Care- Care Coordination support via Epic fax.    Plan: Care Coordinator will await notification from facility staff informing of patient's discharge plans/needs. Care Coordinator will review chart and outreach to facility staff every 4 weeks and as needed.     Sina Pierce MSN, RN, PHN, [facility]   Primary Care Clinical RN Care Coordinator  [facility]  3/4/2024   8:25 AM  [email or web address]  Office: [phone]

## 2024-03-05 ENCOUNTER — APPOINTMENT (OUTPATIENT)
Dept: OCCUPATIONAL THERAPY | Facility: SKILLED NURSING FACILITY | Age: 89
DRG: 560 | End: 2024-03-05
Attending: INTERNAL MEDICINE
Payer: MEDICARE

## 2024-03-05 ENCOUNTER — APPOINTMENT (OUTPATIENT)
Dept: PHYSICAL THERAPY | Facility: SKILLED NURSING FACILITY | Age: 89
DRG: 560 | End: 2024-03-05
Attending: INTERNAL MEDICINE
Payer: MEDICARE

## 2024-03-05 LAB
ANION GAP SERPL CALCULATED.3IONS-SCNC: 8 MMOL/L (ref 7–15)
BUN SERPL-MCNC: 24.5 MG/DL (ref 8–23)
CALCIUM SERPL-MCNC: 8.6 MG/DL (ref 8.2–9.6)
CHLORIDE SERPL-SCNC: 109 MMOL/L (ref 98–107)
CREAT SERPL-MCNC: 1.16 MG/DL (ref 0.51–0.95)
DEPRECATED HCO3 PLAS-SCNC: 25 MMOL/L (ref 22–29)
EGFRCR SERPLBLD CKD-EPI 2021: 43 ML/MIN/1.73M2
GLUCOSE SERPL-MCNC: 105 MG/DL (ref 70–99)
POTASSIUM SERPL-SCNC: 4.1 MMOL/L (ref 3.4–5.3)
SODIUM SERPL-SCNC: 142 MMOL/L (ref 135–145)

## 2024-03-05 PROCEDURE — 36415 COLL VENOUS BLD VENIPUNCTURE: CPT | Performed by: INTERNAL MEDICINE

## 2024-03-05 PROCEDURE — 120N000009 HC R&B SNF

## 2024-03-05 PROCEDURE — 250N000013 HC RX MED GY IP 250 OP 250 PS 637: Performed by: INTERNAL MEDICINE

## 2024-03-05 PROCEDURE — 97530 THERAPEUTIC ACTIVITIES: CPT | Mod: GP | Performed by: REHABILITATION PRACTITIONER

## 2024-03-05 PROCEDURE — 80048 BASIC METABOLIC PNL TOTAL CA: CPT | Performed by: INTERNAL MEDICINE

## 2024-03-05 PROCEDURE — 97535 SELF CARE MNGMENT TRAINING: CPT | Mod: GO

## 2024-03-05 RX ORDER — HYDRALAZINE HYDROCHLORIDE 25 MG/1
25 TABLET, FILM COATED ORAL 3 TIMES DAILY PRN
Status: DISCONTINUED | OUTPATIENT
Start: 2024-03-05 | End: 2024-03-19 | Stop reason: HOSPADM

## 2024-03-05 RX ADMIN — Medication 1 TABLET: at 08:52

## 2024-03-05 RX ADMIN — HYDROXYCHLOROQUINE SULFATE 200 MG: 200 TABLET ORAL at 08:53

## 2024-03-05 RX ADMIN — CALCITONIN SALMON 1 SPRAY: 200 SPRAY, METERED NASAL at 08:54

## 2024-03-05 RX ADMIN — AMIODARONE HYDROCHLORIDE 200 MG: 200 TABLET ORAL at 08:53

## 2024-03-05 RX ADMIN — METOPROLOL TARTRATE 25 MG: 25 TABLET, FILM COATED ORAL at 08:53

## 2024-03-05 RX ADMIN — POLYETHYLENE GLYCOL 3350 17 G: 17 POWDER, FOR SOLUTION ORAL at 08:54

## 2024-03-05 RX ADMIN — APIXABAN 2.5 MG: 2.5 TABLET, FILM COATED ORAL at 21:26

## 2024-03-05 RX ADMIN — AMLODIPINE BESYLATE 5 MG: 5 TABLET ORAL at 08:53

## 2024-03-05 RX ADMIN — ACETAMINOPHEN 975 MG: 325 TABLET, FILM COATED ORAL at 14:13

## 2024-03-05 RX ADMIN — ACETAMINOPHEN 975 MG: 325 TABLET, FILM COATED ORAL at 08:54

## 2024-03-05 RX ADMIN — METOPROLOL TARTRATE 25 MG: 25 TABLET, FILM COATED ORAL at 21:26

## 2024-03-05 RX ADMIN — FUROSEMIDE 20 MG: 20 TABLET ORAL at 08:52

## 2024-03-05 RX ADMIN — GABAPENTIN 100 MG: 100 CAPSULE ORAL at 08:53

## 2024-03-05 RX ADMIN — ACETAMINOPHEN 975 MG: 325 TABLET, FILM COATED ORAL at 21:26

## 2024-03-05 RX ADMIN — APIXABAN 2.5 MG: 2.5 TABLET, FILM COATED ORAL at 08:52

## 2024-03-05 ASSESSMENT — ACTIVITIES OF DAILY LIVING (ADL)
ADLS_ACUITY_SCORE: 45
ADLS_ACUITY_SCORE: 43
ADLS_ACUITY_SCORE: 39
ADLS_ACUITY_SCORE: 43
ADLS_ACUITY_SCORE: 45
ADLS_ACUITY_SCORE: 39
ADLS_ACUITY_SCORE: 39
ADLS_ACUITY_SCORE: 45
ADLS_ACUITY_SCORE: 45
ADLS_ACUITY_SCORE: 39
ADLS_ACUITY_SCORE: 39
ADLS_ACUITY_SCORE: 45
ADLS_ACUITY_SCORE: 45
ADLS_ACUITY_SCORE: 39
ADLS_ACUITY_SCORE: 45
ADLS_ACUITY_SCORE: 45
ADLS_ACUITY_SCORE: 39
ADLS_ACUITY_SCORE: 45
ADLS_ACUITY_SCORE: 45
ADLS_ACUITY_SCORE: 39
ADLS_ACUITY_SCORE: 45
ADLS_ACUITY_SCORE: 45
ADLS_ACUITY_SCORE: 39

## 2024-03-05 NOTE — PROGRESS NOTES
03/05/24 1000   Name of Certified Therapeutic Rec Specialist   Name of Certified Therapeutic Rec Specialist VINNIE Milligan   Appointment Type   Type of Therapeutic Rec Session Therapeutic Rec Assessment   General Information   Patient Profile Review See Profile for full history and prior level of function   Daily Contact with Relatives or Friends Phone call;Visit   Community Involvement   Community Involvement Retired   Spiritual Practice Baptism   Outings Dinner   Hobbies/Interests   Word Puzzles Crossword   Music   Music Preferences Classical;Country;Jazz;Relaxation   Listens to Recorded Music Yes   Brought Own Equipment No   Media   Newspapers Daily   TV / Movies TV   Reading Books;Has own reading materials   Reading Preferences Mystery;Fiction   Sports / Physical Activities   Sports/Exercise Walks   Impression   Open to Socializing with Others Independent   Patient, family and / or staff in agreement with Plan of Care Yes   Treatment Plan   Interested in Unit [place]? No   Type of Intervention Independent with activity   Equipment and Supplies While on Unit None needed   Assessment Assessment completed, pt was oriented to role of rec therapy and provided with leisure resource list. Pt declined offers of leisure materials, however pt has books and book of crosswords to use. Will monitor pt and provide materials as needed.

## 2024-03-05 NOTE — PLAN OF CARE
Goal Outcome Evaluation:  VS: Client declined chest pain,SOB.   O2: Client on RA   Output: Client voids independently with sufficient output.   Last BM: 3/3/24   Activity: Assist 1 with walker and GB.   Skin: Client uses back brace OOB.   Pain: Client declined any pain.   CMS:  Neuro: Client alert and oriented X4 .  No numbness or tingling reported.    Dressing: Skin intact   Diet: Regular diet 2 g Na   LDA: No lines or drains.   Equipment: Bed & chair alarm.   Plan: Con't POC.    Additional Info:            Patient's most recent vital signs are:     Vital signs:  BP: 142/37  Temp: 97.3  HR: 69  RR: 16  SpO2: 96 %     Patient does not have new respiratory symptoms.  Patient does not have new sore throat.  Patient does not have a fever greater than 99.5.

## 2024-03-05 NOTE — PLAN OF CARE
Goal Outcome Evaluation:      Plan of Care Reviewed With: patient    Overall Patient Progress: improvingOverall Patient Progress: improving    Participating in therapies. Wears TLSO brace when up. Fair appetite. BP-167/53 this morning and after taking BP meds- BP-128/57. Bed/chair alarm. Intermittent confusion. Can be impulsive. Progressing towards discharge. Tentative discharge date-3/14/24.        Patient's most recent vital signs are:     Vital signs:  BP: 128/57  Temp: 98.1  HR: 74  RR: 16        Patient does not have new respiratory symptoms.  Patient does not have new sore throat.  Patient does not have a fever greater than 99.5.

## 2024-03-05 NOTE — PLAN OF CARE
Goal Outcome Evaluation:       Pt A&O x3, forgetful/ intermittently confused. VSS. No complaints of SOB, CP, N/V or discomfort. Family came to visit late this afternoon. Good appetite, 2gm sodium diet. Takes pills whole with thin liquids. Transfers assist x1 with GB/ walker. Continent of B&B. No acute issues this evening, resting comfortably in bed with alarm on for safety. Continue POC.

## 2024-03-05 NOTE — PHARMACY-TCU REVIEW OF H&P
I have reviewed this patient's TCU admission History & Physical for medication related changes/recommendations identified by the admitting provider.  I am confirming that there are no recommendations requiring changes to medication orders are indicated at this time based on the provider recommendations in the H&P.    Migdalia Preston, PharmD

## 2024-03-06 ENCOUNTER — APPOINTMENT (OUTPATIENT)
Dept: OCCUPATIONAL THERAPY | Facility: SKILLED NURSING FACILITY | Age: 89
DRG: 560 | End: 2024-03-06
Attending: INTERNAL MEDICINE
Payer: MEDICARE

## 2024-03-06 ENCOUNTER — APPOINTMENT (OUTPATIENT)
Dept: PHYSICAL THERAPY | Facility: SKILLED NURSING FACILITY | Age: 89
DRG: 560 | End: 2024-03-06
Attending: INTERNAL MEDICINE
Payer: MEDICARE

## 2024-03-06 PROCEDURE — 97116 GAIT TRAINING THERAPY: CPT | Mod: GP | Performed by: PHYSICAL THERAPIST

## 2024-03-06 PROCEDURE — 250N000009 HC RX 250: Performed by: INTERNAL MEDICINE

## 2024-03-06 PROCEDURE — 97530 THERAPEUTIC ACTIVITIES: CPT | Mod: GP | Performed by: PHYSICAL THERAPIST

## 2024-03-06 PROCEDURE — 250N000013 HC RX MED GY IP 250 OP 250 PS 637: Performed by: INTERNAL MEDICINE

## 2024-03-06 PROCEDURE — 97110 THERAPEUTIC EXERCISES: CPT | Mod: GP | Performed by: PHYSICAL THERAPIST

## 2024-03-06 PROCEDURE — 97535 SELF CARE MNGMENT TRAINING: CPT | Mod: GO

## 2024-03-06 PROCEDURE — 120N000009 HC R&B SNF

## 2024-03-06 RX ADMIN — ACETAMINOPHEN 975 MG: 325 TABLET, FILM COATED ORAL at 08:26

## 2024-03-06 RX ADMIN — APIXABAN 2.5 MG: 2.5 TABLET, FILM COATED ORAL at 20:09

## 2024-03-06 RX ADMIN — HYDROXYCHLOROQUINE SULFATE 200 MG: 200 TABLET ORAL at 08:27

## 2024-03-06 RX ADMIN — LIDOCAINE 1 PATCH: 4 PATCH TOPICAL at 20:09

## 2024-03-06 RX ADMIN — ACETAMINOPHEN 975 MG: 325 TABLET, FILM COATED ORAL at 20:09

## 2024-03-06 RX ADMIN — GABAPENTIN 100 MG: 100 CAPSULE ORAL at 08:27

## 2024-03-06 RX ADMIN — METOPROLOL TARTRATE 25 MG: 25 TABLET, FILM COATED ORAL at 20:09

## 2024-03-06 RX ADMIN — AMLODIPINE BESYLATE 5 MG: 5 TABLET ORAL at 08:27

## 2024-03-06 RX ADMIN — AMIODARONE HYDROCHLORIDE 200 MG: 200 TABLET ORAL at 08:27

## 2024-03-06 RX ADMIN — APIXABAN 2.5 MG: 2.5 TABLET, FILM COATED ORAL at 08:26

## 2024-03-06 RX ADMIN — ACETAMINOPHEN 975 MG: 325 TABLET, FILM COATED ORAL at 13:42

## 2024-03-06 RX ADMIN — FUROSEMIDE 20 MG: 20 TABLET ORAL at 08:26

## 2024-03-06 RX ADMIN — Medication 1 TABLET: at 08:26

## 2024-03-06 RX ADMIN — CALCITONIN SALMON 1 SPRAY: 200 SPRAY, METERED NASAL at 08:26

## 2024-03-06 RX ADMIN — TUBERCULIN PURIFIED PROTEIN DERIVATIVE 5 UNITS: 5 INJECTION, SOLUTION INTRADERMAL at 20:14

## 2024-03-06 RX ADMIN — METOPROLOL TARTRATE 25 MG: 25 TABLET, FILM COATED ORAL at 08:27

## 2024-03-06 RX ADMIN — POLYETHYLENE GLYCOL 3350 17 G: 17 POWDER, FOR SOLUTION ORAL at 08:26

## 2024-03-06 ASSESSMENT — ACTIVITIES OF DAILY LIVING (ADL)
ADLS_ACUITY_SCORE: 43
ADLS_ACUITY_SCORE: 43
ADLS_ACUITY_SCORE: 45
ADLS_ACUITY_SCORE: 43
ADLS_ACUITY_SCORE: 45
ADLS_ACUITY_SCORE: 45
ADLS_ACUITY_SCORE: 43
ADLS_ACUITY_SCORE: 45
ADLS_ACUITY_SCORE: 43
ADLS_ACUITY_SCORE: 43
ADLS_ACUITY_SCORE: 45
ADLS_ACUITY_SCORE: 43
ADLS_ACUITY_SCORE: 45
ADLS_ACUITY_SCORE: 43
ADLS_ACUITY_SCORE: 43
ADLS_ACUITY_SCORE: 45
ADLS_ACUITY_SCORE: 43
ADLS_ACUITY_SCORE: 43

## 2024-03-06 NOTE — PLAN OF CARE
Goal Outcome Evaluation:       VS: Client denies chest pain,SOB   O2: 97% on  RA   Output: Client voids independently,adequate output.   Last BM: 03/04/24   Activity: 1 assist with W GB   Skin: Torso brace   Pain: Declined.   CMS:  Neuro: Alert and oriented w intermittent confusion  Denies numbness and tingling   Dressing: PIV CDI   Diet: Combination regular diet ,2g Na   LDA: PIV    Equipment: Walker, IV pole ,call light.   Plan: Con't POC.    Additional Info: No pain meds administered,client on chair and bed alarm. Discharge 3/14/24.            Patient's most recent vital signs are:     Vital signs:  BP: 143/59  Temp: 97.5  HR: 64  RR: 16  SpO2: 97 %     Patient does not have new respiratory symptoms.  Patient does not have new sore throat.  Patient does not have a fever greater than 99.5.

## 2024-03-06 NOTE — PROGRESS NOTES
03/06/24 1402   Appointment Info   Signing Clinician's Name / Credentials (PT) opal Heredia, PT, DPT   Therapeutic Procedure/Exercise   Ther. Procedure: strength, endurance, ROM, flexibillity Minutes (11860) 15   Symptoms Noted During/After Treatment fatigue   Treatment Detail/Skilled Intervention PT: Pt performing standing ex for LE strengthening including hip abd , more difficult when standing on LLE (performing R) due to L hip weakness, may have slight leg length discreapncy, too.  5 reps x 4 sets B LEs, cga, cues fo rposture, and hip ext (cues for isolating hip extensors, not stressing low back )  L x 10, heel/toe raises x 15 , UE support, then needing seated rest break.   Therapeutic Activity   Therapeutic Activities: dynamic activities to improve functional performance Minutes (27507) 10   Treatment Detail/Skilled Intervention see toileting, transfers,   Gait Training   Gait Training Minutes (83643) 20   Symptoms Noted During/After Treatment (Gait Training) fatigue   Treatment Detail/Skilled Intervention PT: Focus on increasing gait distance, strength, posture with gait, fww form room to gym, cga, needing A to bring fww closer x 2.  Pt performed sidestpping in bars for strengthening B hip abductors, slow pace, cues for technique, close sba, needs UE support x 2 laps x 2 sets with seated rest between, and forward/retro gait x 2 laps cga, slow but able.  Pt amb to ym and back, A at times to steer fww, Home.   PT Discharge Planning   PT Plan Progress gait with FWW, standing balance and strengthening (L hip trendelenburg), work on donning/doffing TLSO brace   Total Session Time   Timed Code Treatment Minutes 45   Total Session Time (sum of timed and untimed services) 45   PT - Transitional Care Unit Time   Individual Time (minutes) - PT 45   Group Time (minutes) - PT 0   Concurrent Time (minutes) - PT 0   Co-Treatment Time (minutes) - PT 0   TCU Total Session Time (minutes) - PT 45   TCU Daily Total Session  Time   PT TCU Daily Total Session Time 45   Rehab TCU Daily Total Session Time 70   Toileting Hygiene - Ability to maintain perineal hygiene and adjust clothes   Describe Performance PT; A for wiping after BM., changing dpends and wiping LE with BM on it.   Toilet transfer - Ability to get on and off a toilet or commode   Equipment Used Rolling walker   Describe Performance cga.  Needing to use BR x 2 during session.

## 2024-03-06 NOTE — PLAN OF CARE
Goal Outcome Evaluation:      Plan of Care Reviewed With: patient    Overall Patient Progress: no change    Pt is A/O with intermittent confusion/forgetfulness. Denies SOB, CP, N/V. On 2g Na diet with good appetite.  Continent of B/B. TLSO back brace when out of bed, assist x1 with W/GB for transfers. Bed/chair alarm on at all time for safety. Family at bedside interacting with patient.No acute issues this shift, continue with POC.      Patient's most recent vital signs are:     Vital signs:  BP: 143/59  Temp: 97.5  HR: 64  RR: 16  SpO2: 97 %     Patient does not have new respiratory symptoms.  Patient does not have new sore throat.  Patient does not have a fever greater than 99.5.

## 2024-03-06 NOTE — PROGRESS NOTES
MDS Pain Assessment    The following is the pain interview as conducted by the TCU RN caring for the patient on March 6, 2024. This assessment is required by the [facility] for all patients in [place] SNF (Skilled Nursing Facilities).     . Pain Presence  Have you had pain or hurting at any time in the last 5 days?   0. No    . Pain Frequency  How much of the time have you experienced pain or hurting over the last 5 days?   1. Rarely or not at all    . Pain Effect on Sleep  Over the past 5 days, how much of the time has pain made it hard for you to sleep at night?   1. Rarely or not at all    . Pain Interference with Therapy Activities  Over the past 5 days, how often have you limited your participation in rehabilitation therapy sessions due to pain?   1. Rarely or not at all    . Pain Interference with Day-to-Day Activities  Over the past 5 days, have you limited your day-to-day activities (excluding rehabilitation therapy sessions) because of pain?  1. Rarely or not at all      . Pain intensity   Numeric Rating Scale (00-10): Please rate your worst pain over the last 5 days on a zero to ten scale, with zero being no pain and ten as the worst pain you can imagine. 00

## 2024-03-06 NOTE — CARE PLAN
Care Plan created. Bedside RN to assess on progression and update.     Problem: Fall Injury Risk  Goal: Absence of Fall and Fall-Related Injury  Description: Provide a safe, barrier-free environment that encourages independent activity.    Keep care area uncluttered and well-lighted.    Determine need for increased observation or monitoring.    Avoid use of devices that minimize mobility, such as restraints or indwelling urinary catheter.         Problem: Skin Injury Risk Increased  Goal: Skin Health and Integrity  Description: Perform a full pressure injury risk assessment, as indicated by screening, upon admission to care unit.    Reassess skin (full inspection and injury risk, including skin temperature, consistency and color) frequently (e.g., scheduled interval, with change in condition) to provide optimal early detection and prevention.    Maintain adequate tissue perfusion (e.g., encourage fluid balance; avoid crossing legs, constrictive clothing or devices) to promote tissue oxygenation.    Maintain head of bed at lowest degree of elevation tolerated, considering medical condition and other restrictions. Use positioning supports to prevent sliding and friction. Consider low friction textiles.    Avoid positioning onto an area that remains reddened or on bony prominences.         Problem: BADL (Basic Activities of Daily Living) Impairment  Goal: Optimal Safe BADL Performance  Description: Assess BADL (basic activity of daily living) abilities; encourage participation at maximally safe independent level.    Provide assistance and supervision needed to maintain safety; involve caregiver in BADL (basic activity of daily living) training.    Ensure effective use of equipment or devices, such as a long-handled reacher, shower seat or orthosis.    Ensure proper body mechanics and positioning for optimal task performance.    Provide set-up of items if patient is unable to retrieve; store personal care items in  accessible location.    Schedule BADL (basic activity of daily living) activities when pain and fatigue are at a minimum; pace activity to conserve energy.           Problem: Orthopaedic Fracture  Goal: Fracture Stability  Description: Identify and address modifiable risk factors which may inhibit bone healing (e.g., comorbidity, medication, poor nutrition).    Maintain stabilization, device position and integrity to promote reduction and alignment of fracture (e.g., continuous line of pull, counter traction, weights hanging free).    Minimize unnecessary movement and disruption of alignment; provide firm bed support.    Maintain weightbearing and activity restrictions until bone healing is established.    Promote optimal oxygenation, fluid balance and nutrition to support bone healing.    Prepare for possible surgical intervention (e.g., pin placement, arthroplasty).    Promote osteoporosis management for patients older than 50 years of age.       Problem: Comorbidity Management  Goal: Blood Pressure in Desired Range  Description: Evaluate adherence to home antihypertensive regimen (e.g., exercise and activity, diet modification, medication).    Provide scheduled antihypertensive medication; consider administration time and effects (e.g., avoid giving diuretic prior to bedtime).    Monitor response to antihypertensive medication therapy (e.g., blood pressure, electrolyte levels, medication effects).    Minimize risk of orthostatic hypotension; encourage caution with position changes, particularly if elderly.         Problem: Chronic Kidney Disease  Goal: Optimal Functional Ability  Description: Promote participation in regular daily and physical activity to minimize decline associated with disease process and inactivity; maintain an accessible environment to facilitate safe activity.    Evaluate functional mobility ability and safety; retrain in bed mobility, gait or transfers using assistive device, based on  individualized need.    Encourage self-care performance to promote maximum independence in activities of daily living; provide cueing, encouragement, retraining, adaptive equipment and additional time if needed.    Evaluate and address body systems and performance deficits, such as strength, range of motion, balance and activity tolerance impairment.    Assess cognition and address impairments with interventions, such as orientation cues in the environment and memory aids or compensatory techniques

## 2024-03-06 NOTE — PLAN OF CARE
Goal Outcome Evaluation:      Plan of Care Reviewed With: patient    Overall Patient Progress: improvingOverall Patient Progress: improving    Intermittent confusion. Bed/chair alarms are used due to confusion and impulsiveness. Reminded to use call light. Had large BM this morning. Ambulates with walker and SBA. Wears TLSO brace when up. Progressing towards discharge.        Patient's most recent vital signs are:     Vital signs:  BP: 144/51  Temp: 97.9  HR: 69  RR: 14  SpO2: 95 %     Patient does not have new respiratory symptoms.  Patient does not have new sore throat.  Patient does not have a fever greater than 99.5.

## 2024-03-07 ENCOUNTER — APPOINTMENT (OUTPATIENT)
Dept: SPEECH THERAPY | Facility: SKILLED NURSING FACILITY | Age: 89
DRG: 560 | End: 2024-03-07
Attending: INTERNAL MEDICINE
Payer: MEDICARE

## 2024-03-07 ENCOUNTER — APPOINTMENT (OUTPATIENT)
Dept: OCCUPATIONAL THERAPY | Facility: SKILLED NURSING FACILITY | Age: 89
DRG: 560 | End: 2024-03-07
Attending: INTERNAL MEDICINE
Payer: MEDICARE

## 2024-03-07 ENCOUNTER — APPOINTMENT (OUTPATIENT)
Dept: PHYSICAL THERAPY | Facility: SKILLED NURSING FACILITY | Age: 89
DRG: 560 | End: 2024-03-07
Attending: INTERNAL MEDICINE
Payer: MEDICARE

## 2024-03-07 LAB
ANION GAP SERPL CALCULATED.3IONS-SCNC: 9 MMOL/L (ref 7–15)
BUN SERPL-MCNC: 28.5 MG/DL (ref 8–23)
CALCIUM SERPL-MCNC: 9 MG/DL (ref 8.2–9.6)
CHLORIDE SERPL-SCNC: 108 MMOL/L (ref 98–107)
CREAT SERPL-MCNC: 1.27 MG/DL (ref 0.51–0.95)
DEPRECATED HCO3 PLAS-SCNC: 24 MMOL/L (ref 22–29)
EGFRCR SERPLBLD CKD-EPI 2021: 39 ML/MIN/1.73M2
GLUCOSE SERPL-MCNC: 83 MG/DL (ref 70–99)
HOLD SPECIMEN: NORMAL
POTASSIUM SERPL-SCNC: 4.2 MMOL/L (ref 3.4–5.3)
SODIUM SERPL-SCNC: 141 MMOL/L (ref 135–145)

## 2024-03-07 PROCEDURE — 80048 BASIC METABOLIC PNL TOTAL CA: CPT | Performed by: INTERNAL MEDICINE

## 2024-03-07 PROCEDURE — 36415 COLL VENOUS BLD VENIPUNCTURE: CPT | Performed by: INTERNAL MEDICINE

## 2024-03-07 PROCEDURE — 250N000013 HC RX MED GY IP 250 OP 250 PS 637: Performed by: INTERNAL MEDICINE

## 2024-03-07 PROCEDURE — 96125 COGNITIVE TEST BY HC PRO: CPT | Mod: GN

## 2024-03-07 PROCEDURE — 97110 THERAPEUTIC EXERCISES: CPT | Mod: GP

## 2024-03-07 PROCEDURE — 120N000009 HC R&B SNF

## 2024-03-07 PROCEDURE — 97535 SELF CARE MNGMENT TRAINING: CPT | Mod: GO

## 2024-03-07 RX ADMIN — HYDRALAZINE HYDROCHLORIDE 25 MG: 25 TABLET, FILM COATED ORAL at 08:32

## 2024-03-07 RX ADMIN — METOPROLOL TARTRATE 25 MG: 25 TABLET, FILM COATED ORAL at 08:32

## 2024-03-07 RX ADMIN — APIXABAN 2.5 MG: 2.5 TABLET, FILM COATED ORAL at 21:31

## 2024-03-07 RX ADMIN — APIXABAN 2.5 MG: 2.5 TABLET, FILM COATED ORAL at 08:31

## 2024-03-07 RX ADMIN — CALCITONIN SALMON 1 SPRAY: 200 SPRAY, METERED NASAL at 08:35

## 2024-03-07 RX ADMIN — METOPROLOL TARTRATE 25 MG: 25 TABLET, FILM COATED ORAL at 21:31

## 2024-03-07 RX ADMIN — ACETAMINOPHEN 975 MG: 325 TABLET, FILM COATED ORAL at 21:31

## 2024-03-07 RX ADMIN — GABAPENTIN 100 MG: 100 CAPSULE ORAL at 08:31

## 2024-03-07 RX ADMIN — HYDROXYCHLOROQUINE SULFATE 200 MG: 200 TABLET ORAL at 08:31

## 2024-03-07 RX ADMIN — FUROSEMIDE 20 MG: 20 TABLET ORAL at 08:32

## 2024-03-07 RX ADMIN — AMLODIPINE BESYLATE 5 MG: 5 TABLET ORAL at 08:31

## 2024-03-07 RX ADMIN — AMIODARONE HYDROCHLORIDE 200 MG: 200 TABLET ORAL at 08:31

## 2024-03-07 RX ADMIN — Medication 1 TABLET: at 08:31

## 2024-03-07 RX ADMIN — ACETAMINOPHEN 975 MG: 325 TABLET, FILM COATED ORAL at 14:45

## 2024-03-07 RX ADMIN — ACETAMINOPHEN 975 MG: 325 TABLET, FILM COATED ORAL at 08:31

## 2024-03-07 ASSESSMENT — ACTIVITIES OF DAILY LIVING (ADL)
ADLS_ACUITY_SCORE: 43

## 2024-03-07 NOTE — PROGRESS NOTES
"   03/07/24 3867   Appointment Info   Signing Clinician's Name / Credentials (PT) Sasha Hoover DPT   Therapeutic Procedure/Exercise   Ther. Procedure: strength, endurance, ROM, flexibillity Minutes (25157) 40   Treatment Detail/Skilled Intervention Focus on improved activity tolerance and LE strengthening. Amb room<>gym ~150' w/ FWW, CGA>SBA, cueing for navigation. STS from 18\" chair 2x5, cueing for greater LE force production. Improved static standing balance following STS w/ repetition. For hip abd strengthening: single leg marching w/ pt holding 4# DB in contralateral UE [id number]. W/ L stance pt demonstrates significant trendelenberg and R trunk collapse>transitioned to perform w/o wt with improved pelvis alignment. Time for rest breaks between exercises.   Therapeutic Activity   Therapeutic Activities: dynamic activities to improve functional performance Minutes (32000) 5   Treatment Detail/Skilled Intervention MaxA for donning TLSO for comfort, w/ cueing pt able to perform w/o assist to doff. Pt attempting to perform in standing, improved ind and safety when performed in sitting.   PT Discharge Planning   PT Plan Quasi static standing balance. Proximal LE strengthening (L trendelenberg). Practice donning TLSO.   PT Discharge Recommendation (DC Rec)   ([facility] w/ [facility])   PT Rationale for DC Rec Patient currently requires assist of 1 for safe transfers and gait. Per chart review her family has secured an ZOHAIB. Anticipate patient will be safe to discharge to [facility] with use of AD and home PT in order to return to prior level of function.   PT Brief overview of current status SBA for transfers and gait up to 180' with FWW. TLSO for comfort w/ amb/transfers.   PT Equipment Needed at Discharge walker, rolling  (FWW)   Total Session Time   Timed Code Treatment Minutes 45   Total Session Time (sum of timed and untimed services) 45   Post Acute Settings Only   What unit is patient on? TCU   PT - Transitional Care Unit Time "   Individual Time (minutes) - PT 45   Group Time (minutes) - PT 0   Concurrent Time (minutes) - PT 0   Co-Treatment Time (minutes) - PT 0   TCU Total Session Time (minutes) - PT 45   TCU Daily Total Session Time   PT TCU Daily Total Session Time 45   Rehab TCU Daily Total Session Time 65

## 2024-03-07 NOTE — PROGRESS NOTES
"   03/07/24 4457   Appointment Info   Signing Clinician's Name / Credentials (SLP) Ina Galdamez MS CCC-SLP   Quick Adds Certification   General Information   Referring Physician Pio Navarro MD   Pertinent History of Current Problem per H&P:\"Roxanna Stark was evaluated by Neurosurgery and managed non-operatively for her fractures. She was placed on spinal precautions and monitored with serial neurological examinations which remained stable. Orthotics was consulted for a thoraco-lumbar orthotic brace (TLSO).  Patient had post bracing and mobilization films performed and reviewed by Neurosurgery. She will need to wear the TLSO brace for comfort whenever she is out of bed.    Doing well in TCU, pain well-controlled and ambulating with a walker and therapy with her TLSO.  -follow up in Neurosurgery clinic in 6 weeks with repeat xrays. \" SLP consulted to assess/tx cognition to aid dc planning   General Observations Pt alertand agreeable to assessment. Not seen by SLP per chart review. SLP consulted to assess/tx cognition d/t increased concerns by team. Pt endorses increased difficulty with cognition since onset of hosptialization.   Pain Assessment   Patient Currently in Pain No   Type of Evaluation   Type of Evaluation Speech, Language, Cognition   Motor Speech   Speech Intelligibility (Motor Speech) WNL   Auditory Comprehension   Follows Commands (Auditory Comprehension) multi-step commands   Paragraph Comprehension (Auditory Comprehension) impaired;25-49% accuracy   Yes/No Questions (Auditory Comprehension) complex questions   Multi-Step, Follows Commands (Auditory Comprehension) intact   Complex Questions (Auditory Comprehension) impaired   Verbal Expression   Narrative Speech (Verbal Expression) WNL   Word Finding Skills (Verbal Expression) generative naming;describing similarities;describing differences   Describing Similarities, Word Finding (Verbal Expression) intact   Generative Naming, Word Finding " (Verbal Expression) impaired   Describing Differences, Word Finding (Verbal Expression) intact   Reading Comprehension   Oral Reading Ability (Reading Comprehension) sentence level;paragraph level   Comprehension Level (Reading Comprehension) complex/lengthy information;sentence level tasks   Paragraph Level, Oral Reading Ability (Reading Comprehension) severe impairment   Sentence Level, Oral Reading Ability (Reading Comprehension) moderate impairment   Complex/Lengthy Information, Comprehension Level (Reading Comprehension) moderate impairment;severe impairment   Sentence Level, Comprehension Level (Reading Comprehension) moderate impairment   Written Language   Written Expression (Written Language) sentence level;phrase level;word level   Functional Tasks (Written Language) name/signature;note/telephone message   Sentence Level, Written Expression (Written Language) intact   Phrase Level, Written Expression (Written Language) intact   Word Level, Written Expression (Written Language) intact   Name/Signature, Functional Tasks (Written Language) intact   Note/Telephone Message, Functional Tasks (Written Language) minimal impairment  (d/t impaired immediate recall)   Cognition   Cognitive Function attention deficit;memory deficit;executive function deficit   Cognitive Status mod-severe cognitive impairment   Orientation Status (Cognition) oriented x 3   Affect/Mental Status (Cognition) WNL   Executive Function Deficit (Cognition) moderate deficit;severe deficit;insight/awareness of deficits;problem-solving;reasoning;planning/decision-making;organization;sequencing   Attention Deficit (Cognition) severe deficit;divided attention;alternating attention   Memory Deficit (Cognition) severe deficit;short-term memory;immediate recall;procedural memory;working memory   General Therapy Interventions   Planned Therapy Interventions Cognitive Treatment   Cognitive treatment Internal memory strategy training;External memory  "strategy training;Progressive attention training   Clinical Impression   Criteria for Skilled Therapeutic Interventions Met (SLP Eval) Yes, treatment indicated   SLP Diagnosis mod-severe cognitive impairment   Activity Limitations Related to Problem List (SLP) IADLs   Risks & Benefits of therapy have been explained evaluation/treatment results reviewed;care plan/treatment goals reviewed;risks/benefits reviewed;participants voiced agreement with care plan;participants included;patient   Clinical Impression Comments SLP: Pt seen on TCU for cognitive assessment following increased concerns from team re: cognition. SCCAN administred, see below for details. Overall, pt presents with moderate-severe cognitive impairments with deficits re: attention, problem solving, memory, orientation, speech comprehension, reading comprehension. Pt scores subjectively interpreted d/t advanced age surpassing norms of all standardized assessments. Baseline history obtained through pt and dtr reports. Pt was driving very locally, IND with medications, and cooking at baseline. Dtr reported just recently she \"took over\" finances and assists with some transportation and appts. Pt and dtr both report below baseline level of function. Pt will benefit from skilled SLP to tx areas of impairment and introduce additional strategies to aid functional carryover of new learning. SLP called dtr following assessment to provide education re: results, recommendations, and plan for therapy. Recommend total IADL assist from family and facility. Dtr in agreement. Anticipate short course SLP   SLP Total Evaluation Time   Additional SLP Eval Charges Cognitive Performance Testing   Cognitive  Performance Testing Minutes, per hour - includes time for administering test, interp results & prep report (63322) 30   Therapy Certification   Start of Care Date 03/07/24   Certification date from 03/07/24   Certification date to 04/06/24   SLP Goals   Therapy Frequency " (SLP Emily) 3 times/week   SLP Predicted Duration/Target Date for Goal Attainment 03/11/24   SLP Goals SLP Goal 1   SLP: Goal 1 Pt will recall novel and functional information of basic complexity with min cues 90% accuracy   SLP Discharge Planning   SLP Plan SLP: educate results SCCAN. intro memory strategies, memory notebook, provide calendar - orientation, carryover of physical precautions, likely see few times and sign off. dtr/facility providing all IADL support at dc   Total Session Time   Timed Code Treatment Minutes 60   Total Session Time (sum of timed and untimed services) 60   SLP - Transitional Care Unit Time   Individual Time (minutes) - SLP 60   Group Time (minutes) - SLP 0   Concurrent Time (minutes) - SLP 0   Co-Treatment Time (minutes) - SLP 0   TCU Total Session Time (minutes) - SLP 60   TCU Daily Total Session Time   SLP TCU Daily Total Session Time 60   Rehab TCU Daily Total Session Time 80       Scales of Cognitive and Communicative Ability for Neurorehabilitation (SCCAN):    Total raw score:  60    Degree of severity: mod      SCCAN Scale Performance    Percentage Score%  Oral Expression (OE):     84 %    Orientation (OR):     75 %    Memory (ME):      31 %    Speech Comprehension (SP):   76 %    Reading Comprehension (RD):   50 %    Writing (WR):      85 %    Attention (AT):      37 %    Problem Solving (PS):     65 %        Interpretation of scores:  See above    Time scoring: 10      Total time: 70

## 2024-03-07 NOTE — PLAN OF CARE
Goal Outcome Evaluation:      Plan of Care Reviewed With: patient    Overall Patient Progress: improvingOverall Patient Progress: improving    BP this morning-173/61-prn hydralazine given and recheck BP-128/64. Up walking in therapy with walker, T-belt, and SBA. Wears TLSO brace when up. Intermittent confusion. Pleasant. Progressing towards discharge.         Patient's most recent vital signs are:     Vital signs:  BP: 128/64  Temp: 97.4  HR: 69  RR: 16  SpO2: 97 %     Patient does not have new respiratory symptoms.  Patient does not have new sore throat.  Patient does not have a fever greater than 99.5.

## 2024-03-07 NOTE — PLAN OF CARE
Goal Outcome Evaluation:    Patient is alert with intermittent confusion,low sodium diet, transfer assist of 1 with walker and gait belt, denied chest pain, SOB, and N/V. Patient was seen sleeping during safety rounds, no issue noted at this time, call light in reach, bed in low position, will continue with the cares as planned.          Patient's most recent vital signs are:     Vital signs:  BP: 144/52  Temp: 98.2  HR: 66  RR: 16  SpO2: 99 %     Patient does not have new respiratory symptoms.  Patient does not have new sore throat.  Patient does not have a fever greater than 99.5.

## 2024-03-07 NOTE — PLAN OF CARE
Goal Outcome Evaluation:    Patient is alert and oriented x4 with intermittent confusion. Able to make needs known. Pt denied SOB, N/V and chest pain. Pt received scheduled Tylenol and Lidocaine patch for lower back pain of 7 out of 10. Pt is stand-by assist with walker. Wears TLSO brace when up. Bed and chair alarm are used due to impulse and confusion. Reminded patient to use call light when in need. Call light within reach.      Patient's most recent vital signs are:     Vital signs:  BP: 144/52  Temp: 98.2  HR: 66  RR: 16  SpO2: 99 %     Patient does not have new respiratory symptoms.  Patient does not have new sore throat.  Patient does not have a fever greater than 99.5.

## 2024-03-08 ENCOUNTER — APPOINTMENT (OUTPATIENT)
Dept: PHYSICAL THERAPY | Facility: SKILLED NURSING FACILITY | Age: 89
DRG: 560 | End: 2024-03-08
Attending: INTERNAL MEDICINE
Payer: MEDICARE

## 2024-03-08 ENCOUNTER — APPOINTMENT (OUTPATIENT)
Dept: OCCUPATIONAL THERAPY | Facility: SKILLED NURSING FACILITY | Age: 89
DRG: 560 | End: 2024-03-08
Attending: INTERNAL MEDICINE
Payer: MEDICARE

## 2024-03-08 PROCEDURE — 022N000001 HC SNF RUG CODE OPNP

## 2024-03-08 PROCEDURE — 97110 THERAPEUTIC EXERCISES: CPT | Mod: GO

## 2024-03-08 PROCEDURE — 97112 NEUROMUSCULAR REEDUCATION: CPT | Mod: GP

## 2024-03-08 PROCEDURE — 120N000009 HC R&B SNF

## 2024-03-08 PROCEDURE — 97129 THER IVNTJ 1ST 15 MIN: CPT | Mod: GN

## 2024-03-08 PROCEDURE — 250N000013 HC RX MED GY IP 250 OP 250 PS 637: Performed by: INTERNAL MEDICINE

## 2024-03-08 PROCEDURE — 97535 SELF CARE MNGMENT TRAINING: CPT | Mod: GO

## 2024-03-08 RX ADMIN — AMLODIPINE BESYLATE 5 MG: 5 TABLET ORAL at 08:50

## 2024-03-08 RX ADMIN — APIXABAN 2.5 MG: 2.5 TABLET, FILM COATED ORAL at 08:50

## 2024-03-08 RX ADMIN — Medication 1 TABLET: at 08:50

## 2024-03-08 RX ADMIN — FUROSEMIDE 20 MG: 20 TABLET ORAL at 08:50

## 2024-03-08 RX ADMIN — GABAPENTIN 100 MG: 100 CAPSULE ORAL at 08:50

## 2024-03-08 RX ADMIN — AMIODARONE HYDROCHLORIDE 200 MG: 200 TABLET ORAL at 08:53

## 2024-03-08 RX ADMIN — ACETAMINOPHEN 975 MG: 325 TABLET, FILM COATED ORAL at 20:20

## 2024-03-08 RX ADMIN — HYDROXYCHLOROQUINE SULFATE 200 MG: 200 TABLET ORAL at 08:50

## 2024-03-08 RX ADMIN — METOPROLOL TARTRATE 25 MG: 25 TABLET, FILM COATED ORAL at 08:50

## 2024-03-08 RX ADMIN — ACETAMINOPHEN 975 MG: 325 TABLET, FILM COATED ORAL at 14:10

## 2024-03-08 RX ADMIN — ACETAMINOPHEN 975 MG: 325 TABLET, FILM COATED ORAL at 08:50

## 2024-03-08 RX ADMIN — METOPROLOL TARTRATE 25 MG: 25 TABLET, FILM COATED ORAL at 20:20

## 2024-03-08 RX ADMIN — APIXABAN 2.5 MG: 2.5 TABLET, FILM COATED ORAL at 20:20

## 2024-03-08 RX ADMIN — CALCITONIN SALMON 1 SPRAY: 200 SPRAY, METERED NASAL at 08:56

## 2024-03-08 RX ADMIN — POLYETHYLENE GLYCOL 3350 17 G: 17 POWDER, FOR SOLUTION ORAL at 08:50

## 2024-03-08 ASSESSMENT — ACTIVITIES OF DAILY LIVING (ADL)
ADLS_ACUITY_SCORE: 43
ADLS_ACUITY_SCORE: 40
ADLS_ACUITY_SCORE: 43
ADLS_ACUITY_SCORE: 40
ADLS_ACUITY_SCORE: 43
ADLS_ACUITY_SCORE: 40
ADLS_ACUITY_SCORE: 43
ADLS_ACUITY_SCORE: 40
ADLS_ACUITY_SCORE: 40
ADLS_ACUITY_SCORE: 43
ADLS_ACUITY_SCORE: 43
ADLS_ACUITY_SCORE: 40
ADLS_ACUITY_SCORE: 43
ADLS_ACUITY_SCORE: 40
ADLS_ACUITY_SCORE: 43
ADLS_ACUITY_SCORE: 43

## 2024-03-08 NOTE — PLAN OF CARE
Goal Outcome Evaluation:      Plan of Care Reviewed With: patient    Overall Patient Progress: improvingOverall Patient Progress: improving    FOCUS/GOAL    Bowel management, Bladder management, Medication management, Medical management, Mobility, Skin integrity, and Safety management    ASSESSMENT, INTERVENTIONS AND CONTINUING PLAN FOR GOAL:    Pt is A&OX2, disoriented to time & situation; calm, & cooperative with care. With intermittent confusion. Pt is [place] & wears hearing aids during the daytime. Denied CP, SOB, & n/v. Pt transfers SBA with walker & GB; wears TLSO brace when OOB. Continent for both B&B; uses the bathroom. Takes meds whole with thin liquid. Pt spent most of the shift on a chair. On 2 grams sodium diet. Pt ate dinner with good appetite & no other acute issue. Able to make needs known & call light within reach. Will continue with plan of care.    Patient's most recent vital signs are:     Vital signs:  BP: 95/56  Temp: 96.2  HR: 65  RR: 20  SpO2: 93 %     Patient does not have new respiratory symptoms.  Patient does not have new sore throat.  Patient does not have a fever greater than 99.5.

## 2024-03-08 NOTE — CARE PLAN
RN: Denies pain.  Forgetful.  Impulsive at times setting alarm off. Chair and bed alarm at all times. Unable to assess buttock and olimpia skin area this shift, no problems reported.  Brace on OOB. Appetite good. Continent bowel and bladder. No prn hydralazine needed this shift. Frequent rounding.     Patient's most recent vital signs are:     Vital signs:  BP: 150/61  Temp: 97.9  HR: 64  RR: 16  SpO2: 96 %     Patient does not have new respiratory symptoms.  Patient does not have new sore throat.  Patient does not have a fever greater than 99.5.

## 2024-03-08 NOTE — PLAN OF CARE
Goal Outcome Evaluation:    Patient is alert and oriented, intermittent confusion, continent for bowel and bladder , regular diet, assist of 1 with walker and gait belt, takes pills whole with water. Patient is calm and cooperative, woke up to use the bathroom this night, went back to sleep. No issue noted this shift , call light in reach, bed in low position, bed alarm on, will continue with the cares as planned.        Patient's most recent vital signs are:     Vital signs:  BP: 152/72  Temp: 97.3  HR: 78  RR: 16  SpO2: 100 %     Patient does not have new respiratory symptoms.  Patient does not have new sore throat.  Patient does not have a fever greater than 99.5.      .

## 2024-03-08 NOTE — PROGRESS NOTES
3/7  LORI received an email from Ellyn from [facility]. -[phone]  fax [phone]    3/8  LORI was told therapies are done on 3/11.   TCU Notice of Medicare Non-Coverage Note:     Met with patient/family to Introduce self and role.     Discussed Notice of Medicare Non-Coverage and last day of coverage as required for all patients with Medicare coverage during Skilled Nursing Facility (SNF) stays.Informed patient/family that Medicare covers stay until midnight of day before discharge. TCU does not bill for discharge date.    Last coverage day is 3/11/23. Discharge date expected 3/12/24.    Opportunity provided for questions and education provided regarding potential financial impact to patient.     Patient/family verbalize(s) understanding of discussion.   Jenny Castillo was there and said [facility] needs to come out and assess pt. They need to get her stuff there before discharge. ANNIE said what if we need a few more days.  LORI said you can appeal/or pay for a few more days.      LORI called Ellyn but she was out of office.  LORI called the main number [phone].  They called Ellyn at home.  Ellyn is working this weekend.  She will get back to me on Monday.  LORI said we are discharging her on Tuesday.  Didn't matter.  They won't get back to me until Monday.   LORI faxed a referral over to [phone].      LORI called jenny Castillo.  Updated her on the above info. LORI said to physically go to [place] and see if they will talk to her in person. LORI asked if she could go home with daughter and ANNIE as they are at pt's house.  She said no.  Could she go to another daughters house?  She said no. LORI then explained they could appeal decision. LORI explained when and how.      LORI took [facility] and appeal info back into pt room.  Pt and granddaughter were there.  Granddaughter said only one bed now in pt's house. All stuff is at [place]. LORI updated her on what LORI said to Anna.     LORI put in treatment team sticky note to call  [place] if family/pt state they are appealing.     SW sent HH referral to [facility].    GHISLAINE Rahman   [facility], Transitional Care Unit   Social Work   [facility] [place] [place] [address], [address]th [place] [address] () [phone]

## 2024-03-09 PROCEDURE — 250N000013 HC RX MED GY IP 250 OP 250 PS 637: Performed by: INTERNAL MEDICINE

## 2024-03-09 PROCEDURE — 120N000009 HC R&B SNF

## 2024-03-09 RX ADMIN — FUROSEMIDE 20 MG: 20 TABLET ORAL at 08:26

## 2024-03-09 RX ADMIN — ACETAMINOPHEN 975 MG: 325 TABLET, FILM COATED ORAL at 08:25

## 2024-03-09 RX ADMIN — APIXABAN 2.5 MG: 2.5 TABLET, FILM COATED ORAL at 20:08

## 2024-03-09 RX ADMIN — AMIODARONE HYDROCHLORIDE 200 MG: 200 TABLET ORAL at 08:26

## 2024-03-09 RX ADMIN — POLYETHYLENE GLYCOL 3350 17 G: 17 POWDER, FOR SOLUTION ORAL at 08:27

## 2024-03-09 RX ADMIN — ACETAMINOPHEN 975 MG: 325 TABLET, FILM COATED ORAL at 13:36

## 2024-03-09 RX ADMIN — METOPROLOL TARTRATE 25 MG: 25 TABLET, FILM COATED ORAL at 20:08

## 2024-03-09 RX ADMIN — CALCITONIN SALMON 1 SPRAY: 200 SPRAY, METERED NASAL at 08:27

## 2024-03-09 RX ADMIN — APIXABAN 2.5 MG: 2.5 TABLET, FILM COATED ORAL at 08:26

## 2024-03-09 RX ADMIN — Medication 1 TABLET: at 08:26

## 2024-03-09 RX ADMIN — METOPROLOL TARTRATE 25 MG: 25 TABLET, FILM COATED ORAL at 08:26

## 2024-03-09 RX ADMIN — ACETAMINOPHEN 975 MG: 325 TABLET, FILM COATED ORAL at 20:08

## 2024-03-09 RX ADMIN — AMLODIPINE BESYLATE 5 MG: 5 TABLET ORAL at 08:26

## 2024-03-09 RX ADMIN — HYDROXYCHLOROQUINE SULFATE 200 MG: 200 TABLET ORAL at 08:26

## 2024-03-09 RX ADMIN — GABAPENTIN 100 MG: 100 CAPSULE ORAL at 08:26

## 2024-03-09 ASSESSMENT — ACTIVITIES OF DAILY LIVING (ADL)
ADLS_ACUITY_SCORE: 42
ADLS_ACUITY_SCORE: 40
ADLS_ACUITY_SCORE: 42
ADLS_ACUITY_SCORE: 42

## 2024-03-09 NOTE — PLAN OF CARE
"Pt is alert and oriented to self, forgetful, follows commands. Denied pain or discomfort. Denied chest pain, SOB, N/V. A/1 with walker and gait belt. Continent of B&B.On 2 gm sodium diet. Ate with good appetite.Pt family present at bedside. Pt daughter stated that \"she appealed for staying one  extra day in TCU\". Writer notified charge nurse. Pt wares TLSO brace when out of bed. Pt ambulated in unit with walker and gait belt with  assistance. No acute issues this shift. Bed alarm and chair alarm on for safety. Call light with in reach. Continue with POC.    Patient's most recent vital signs are:     Vital signs:  BP: 162/69[P 63[  Temp: 98  HR: 63  RR: 16  SpO2: 94 %     Patient does not have new respiratory symptoms.  Patient does not have new sore throat.  Patient does not have a fever greater than 99.5.         "

## 2024-03-09 NOTE — PLAN OF CARE
Goal Outcome Evaluation:      Plan of Care Reviewed With: patient    Overall Patient Progress: no change  A/O , forgetful, follows commands. Wears TLSO brace when OOB, bed and chair alarms on for safety. No acute issues this shift. Comfortable at this time, will continue POC.         Patient's most recent vital signs are:     Vital signs:  BP: 128/50  Temp: 97.9  HR: 63  RR: 16  SpO2: 96 %     Patient does not have new respiratory symptoms.  Patient does not have new sore throat.  Patient does not have a fever greater than 99.5.

## 2024-03-09 NOTE — PLAN OF CARE
Goal Outcome Evaluation:      Plan of Care Reviewed With: patient    Overall Patient Progress: no changeOverall Patient Progress: no change    FOCUS/GOAL     Bowel management, Bladder management, Medication management, Medical management, Mobility, Skin integrity, and Safety management     ASSESSMENT, INTERVENTIONS AND CONTINUING PLAN FOR GOAL:     Pt is A&OX2, disoriented to time & situation; calm, & cooperative with care. With intermittent confusion. Pt is [place] & wears hearing aids during the daytime. Denied CP, SOB, & n/v. Pt transfers SBA with walker & GB; wears TLSO brace when OOB. Pt was incontinent for BM; LBM this shift. Was continent for bladder & uses the bathroom. Takes meds whole with thin liquid. On 2 grams sodium diet. Pt slept well for most of the shift & no other acute issue. Able to make needs known & call light within reach. Will continue with plan of care.

## 2024-03-10 PROCEDURE — 99310 SBSQ NF CARE HIGH MDM 45: CPT | Performed by: INTERNAL MEDICINE

## 2024-03-10 PROCEDURE — 120N000009 HC R&B SNF

## 2024-03-10 PROCEDURE — 250N000013 HC RX MED GY IP 250 OP 250 PS 637: Performed by: INTERNAL MEDICINE

## 2024-03-10 RX ADMIN — METOPROLOL TARTRATE 25 MG: 25 TABLET, FILM COATED ORAL at 09:03

## 2024-03-10 RX ADMIN — AMIODARONE HYDROCHLORIDE 200 MG: 200 TABLET ORAL at 09:03

## 2024-03-10 RX ADMIN — APIXABAN 2.5 MG: 2.5 TABLET, FILM COATED ORAL at 20:08

## 2024-03-10 RX ADMIN — AMLODIPINE BESYLATE 5 MG: 5 TABLET ORAL at 09:02

## 2024-03-10 RX ADMIN — GABAPENTIN 100 MG: 100 CAPSULE ORAL at 09:03

## 2024-03-10 RX ADMIN — CALCITONIN SALMON 1 SPRAY: 200 SPRAY, METERED NASAL at 09:03

## 2024-03-10 RX ADMIN — Medication 1 TABLET: at 09:03

## 2024-03-10 RX ADMIN — ACETAMINOPHEN 975 MG: 325 TABLET, FILM COATED ORAL at 20:08

## 2024-03-10 RX ADMIN — ACETAMINOPHEN 975 MG: 325 TABLET, FILM COATED ORAL at 09:02

## 2024-03-10 RX ADMIN — ACETAMINOPHEN 975 MG: 325 TABLET, FILM COATED ORAL at 14:25

## 2024-03-10 RX ADMIN — HYDROXYCHLOROQUINE SULFATE 200 MG: 200 TABLET ORAL at 09:02

## 2024-03-10 RX ADMIN — FUROSEMIDE 20 MG: 20 TABLET ORAL at 09:02

## 2024-03-10 RX ADMIN — METOPROLOL TARTRATE 25 MG: 25 TABLET, FILM COATED ORAL at 20:08

## 2024-03-10 RX ADMIN — POLYETHYLENE GLYCOL 3350 17 G: 17 POWDER, FOR SOLUTION ORAL at 09:02

## 2024-03-10 RX ADMIN — APIXABAN 2.5 MG: 2.5 TABLET, FILM COATED ORAL at 09:03

## 2024-03-10 ASSESSMENT — ACTIVITIES OF DAILY LIVING (ADL)
ADLS_ACUITY_SCORE: 42

## 2024-03-10 NOTE — PROGRESS NOTES
[facility] Transitional Care    Medicine Progress Note - Hospitalist Service    Date of Admission:  3/2/2024    Assessment & Plan     Roxanna Stark is a 89 year old female with HTN, CKD, RA, PAD, aortic stenosis s/p AVR (04/2016), Atrial fibrillation (On Eliquis), HFpEF who presented to the [facility] ED from her nursing home via EMS after she was found down 02/24/24. ED work up notable of T12 burst fracture. She was admitted to the trauma service. Conservative management with a TLSO brace per Neurosurgery.  Transferred to TCU 3/2/2024 for her rehab  needs.         Today's changes: 3/10/2024  Doing well.  Pain controlled. No new concern reported. No new changes made.   CTM        #Compression Fracture:  # T12 burst fracture, > 40% height loss with mild retropulsion  Roxanna Stark was evaluated by Neurosurgery and managed non-operatively for her fractures. She was placed on spinal precautions and monitored with serial neurological examinations which remained stable. Orthotics was consulted for a thoraco-lumbar orthotic brace (TLSO).  Patient had post bracing and mobilization films performed and reviewed by Neurosurgery. She will need to wear the TLSO brace for comfort whenever she is out of bed.   Doing well in TCU, pain well-controlled and ambulating with a walker and therapy with her TLSO.  -follow up in Neurosurgery clinic in 6 weeks with repeat xrays.   -Continue TLSO when out of bed  -PT, OT  ,therapy as able.   -Continue scheduled Tylenol, Lidoderm  -Start Miacalcin nasal spray for 2 weeks  -Start vitamin D3 2000 units daily  -Discussed outpatient follow-up with PCP to include DEXA scan if indicated     # Acute on chronic pain   Compensated.  See above.  - continue PTA: gabapentin 100mg  - Scheduled: Tylenol, Lidoderm patches     # Hypertension   # PAD  # Severe CAD by imaging  # Aortic Stenosis s/p AVR w/ porcine valve 4/25/16  # pAfib   # HFpEF on most recent ECHO 01/2024  # Elongated Qtc 458/508  Recent  Admission 1/27/24-2/1/24 for Afib with RVR vs Aflutter, decompensated CHF.  Echo 2/28/2024: EF 65-70%.  Porcine aortic valve.  Severe LAE.  Normal RV size and function.  Mild MR, TR.  Normal PASP.  Had bilateral effusions on imaging.  Was followed by cardiology in hospital, started amiodarone and Eliquis.  Stopped losartan due to CKD, borderline blood pressures. Lasix held in hospital due to NICOLAS, resumed on 3/2.  BMP stable, creatinine 1.27 3/7 .  Intermittent high bp, better now.   - Continue PTA: amiodarone, metoprolol, Eliquis  -Start amlodipine 5 mg daily with hold parameters  - resumed lasix 20 mg every day 3/2  -Daily weights  -BMP Q monday     # Acute hospital associated delirium, resolved  Noted mild delirium initially in hospital due to sleep deprivation, resolved with restored sleep cycles.  Head CT showed no acute findings with moderate volume loss and chronic small vessel ischemic changes, no hydrocephalus.  Fully oriented, appropriate on exam.  No behavioral concerns.  -Judicious use of pain medications, CNS active agents     # Acute Kidney Injury on CKD stage 3b  Creatinine baseline of 1.2-1.6 noted NICOLAS during recent hospitalization, peak creatinine 2.05.  On 2/28/2024.  Seen by nephrology.  Renal ultrasound 2/28 showed atrophic left kidney, no hydronephrosis.  FENa 0.9.  UA unremarkable except for 10 mg deciliter protein 2/28.  PTA sulfasalazine was discontinued.  NICOLAS subsequently resolved.  No dysuria, emptying well.  BMP stable, creatinine 1.15 on 3/2.  -Avoid NSAIDs, nephrotoxins  -BMP q mon     # Empiric treatment for possible Urinary Tract Infection  Received 3-day ceftriaxone for positive UA, urine culture grew normal urogenital georgina.  Repeat UA bland.  Asymptomatic.     # Seronegative RA  PTA Plaquenil and sulfasalazine.  Sulfasalazine discontinued in hospital due to NICOLAS/CKD.  NICOLAS resolved.  RA has been well-controlled, no deformity or active synovitis on exam.  -Continue Plaquenil     #  normocytic anemia  Baseline hemoglobin normal.  No bleeding clinically or by history.  Suspect component of ACD with RA, CKD.  Hemoglobin stable at 10.8 on 3/2.     # Thyroid nodules, incidental finding    - Chest CT: Heterogeneous nodular thyroid. Correlate with thyroid ultrasound which may be performed on a nonemergent outpatient basis.   - TSH wnl  - Pt to follow-up with PCP after discharge for further work-up if needed      #Disposition  Had been living alone in a 3 story [facility].  6 adult children, very involved.  Uses a four-wheel walker recently, prior to that was using a cane.  Patient and family now looking into assisted living, will not be returning to [facility].           Diet: Orders Placed This Encounter      Combination Diet Regular Diet Adult; 2 gm NA Diet     DVT Prophylaxis: DOAC  Grace Catheter: Not present  Lines: None     Cardiac Monitoring: None  Code Status: No CPR- Do NOT Intubate      Clinically Significant Risk Factors                  # Hypertension: Noted on problem list         # Moderate Malnutrition: based on nutrition assessment           Disposition Plan     Expected Discharge Date: 03/12/2024                    Pio Navarro MD  Hospitalist Service  [facility]  Securely message with ASYM [employer] (more info)  Text page via [employer] Paging/Directory   ______________________________________________________________________    Interval History   Interval events reviewed.   States doing well  Denies fever or chills.   No cough or cp or sob.   No LH or dizziness.   No NV or pain abdomen.   No dysuria or bowel complaint.   No new sensory or motor complaint.   Very pleasant.   Pain controlled.     No other new or acute medical concern     Physical Exam   Vital Signs: Temp: 97.5  F (36.4  C) Temp src: Oral BP: (!) 142/61 Pulse: 63   Resp: 16 SpO2: 94 % O2 Device: None (Room air)    Weight: 109 lbs 4.8 oz    General Appearance: Awake, interactive, NAD  HEENT: AT/NC, Anicteric,  Moist MM  Respiratory: Normal work of breathing.  RA.   Cardiovascular: S1 S2 Regular.  GI/Abd: Soft. NT. ND. No g/r.  Extremities: Distally wwp. No pedal edema.  Neuro: AO x 4, Grossly non focal.   Skin: No acute rash on exposed areas.   Psychiatry: Stable mood.     Medical Decision Making       50 MINUTES SPENT BY ME on the date of service doing chart review, history, exam, documentation & further activities per the note.      Data         Imaging results reviewed over the past 24 hrs:   No results found for this or any previous visit (from the past 24 hour(s)).  Recent Labs   Lab 03/07/24  0527 03/05/24  0640 03/04/24  0839    142 142   POTASSIUM 4.2 4.1 4.0   CHLORIDE 108* 109* 107   CO2 24 25 17*   BUN 28.5* 24.5* 21.6   CR 1.27* 1.16* 1.03*   ANIONGAP 9 8 18*   ROEL 9.0 8.6 9.0   GLC 83 105* 90

## 2024-03-10 NOTE — PLAN OF CARE
Goal Outcome Evaluation:      Plan of Care Reviewed With: patient    Overall Patient Progress: no change    VSS, denies SOB, back pain managed with ice packs, wears TLSO brace when OOB. Bed and Chair alarm on for safety, call light within reach. No new changes, no acute issues this shift. Sleeping, comfortable at this time, will continue POC.     Patient's most recent vital signs are:     Vital signs:  BP: 132/54  Temp: 97.6  HR: 62  RR: 16  SpO2: 98 %     Patient does not have new respiratory symptoms.  Patient does not have new sore throat.  Patient does not have a fever greater than 99.5.

## 2024-03-10 NOTE — PLAN OF CARE
Goal Outcome Evaluation:      Plan of Care Reviewed With: patient    Overall Patient Progress: no changeOverall Patient Progress: no change    FOCUS/GOAL     Bowel management, Bladder management, Medication management, Medical management, Mobility, Skin integrity, and Safety management     ASSESSMENT, INTERVENTIONS AND CONTINUING PLAN FOR GOAL:     Pt is A&OX2, disoriented to time & situation; calm, & cooperative with care. With intermittent confusion. Pt is [place] & wears hearing aids during the daytime. Denied CP, SOB, & n/v. Pt transfers SBA with walker & GB; wears TLSO brace when OOB. Pt was continent for both B&B; uses the bathroom. Takes meds whole with thin liquid. On 2 grams sodium diet. Pt slept well for most of the shift & no other acute issue. Able to make needs known & call light within reach. Will continue with POC.

## 2024-03-10 NOTE — PLAN OF CARE
Goal Outcome Evaluation:    Pt. Is A/O x 4, able to make needs known. Call light within reach. Denies pain, SOB,CP, N/V, N/T. No acute changes on this shift. Continue POC.

## 2024-03-11 ENCOUNTER — OFFICE VISIT (OUTPATIENT)
Dept: CARDIOLOGY | Facility: CLINIC | Age: 89
End: 2024-03-11
Payer: MEDICARE

## 2024-03-11 VITALS
SYSTOLIC BLOOD PRESSURE: 136 MMHG | DIASTOLIC BLOOD PRESSURE: 65 MMHG | WEIGHT: 114 LBS | BODY MASS INDEX: 22.38 KG/M2 | HEIGHT: 60 IN | HEART RATE: 55 BPM | OXYGEN SATURATION: 97 %

## 2024-03-11 DIAGNOSIS — Z95.2 S/P AVR (AORTIC VALVE REPLACEMENT): ICD-10-CM

## 2024-03-11 DIAGNOSIS — I48.92 ATRIAL FLUTTER WITH RAPID VENTRICULAR RESPONSE (H): ICD-10-CM

## 2024-03-11 DIAGNOSIS — Z79.899 ON AMIODARONE THERAPY: ICD-10-CM

## 2024-03-11 DIAGNOSIS — I48.0 PAROXYSMAL ATRIAL FIBRILLATION (H): Primary | ICD-10-CM

## 2024-03-11 DIAGNOSIS — E87.79 VOLUME OVERLOAD STATE OF HEART: ICD-10-CM

## 2024-03-11 LAB
ALBUMIN SERPL BCG-MCNC: 2.8 G/DL (ref 3.5–5.2)
ALP SERPL-CCNC: 171 U/L (ref 40–150)
ALT SERPL W P-5'-P-CCNC: 12 U/L (ref 0–50)
ANION GAP SERPL CALCULATED.3IONS-SCNC: 8 MMOL/L (ref 7–15)
AST SERPL W P-5'-P-CCNC: 19 U/L (ref 0–45)
BILIRUB DIRECT SERPL-MCNC: <0.2 MG/DL (ref 0–0.3)
BILIRUB SERPL-MCNC: <0.2 MG/DL
BUN SERPL-MCNC: 22.5 MG/DL (ref 8–23)
CALCIUM SERPL-MCNC: 8.5 MG/DL (ref 8.2–9.6)
CHLORIDE SERPL-SCNC: 108 MMOL/L (ref 98–107)
CREAT SERPL-MCNC: 1.26 MG/DL (ref 0.51–0.95)
DEPRECATED HCO3 PLAS-SCNC: 26 MMOL/L (ref 22–29)
EGFRCR SERPLBLD CKD-EPI 2021: 39 ML/MIN/1.73M2
ERYTHROCYTE [DISTWIDTH] IN BLOOD BY AUTOMATED COUNT: 16 % (ref 10–15)
GLUCOSE SERPL-MCNC: 75 MG/DL (ref 70–99)
HCT VFR BLD AUTO: 33.1 % (ref 35–47)
HGB BLD-MCNC: 10.5 G/DL (ref 11.7–15.7)
MCH RBC QN AUTO: 28.7 PG (ref 26.5–33)
MCHC RBC AUTO-ENTMCNC: 31.7 G/DL (ref 31.5–36.5)
MCV RBC AUTO: 90 FL (ref 78–100)
PLATELET # BLD AUTO: 272 10E3/UL (ref 150–450)
POTASSIUM SERPL-SCNC: 4.5 MMOL/L (ref 3.4–5.3)
PROT SERPL-MCNC: 6.2 G/DL (ref 6.4–8.3)
RBC # BLD AUTO: 3.66 10E6/UL (ref 3.8–5.2)
SODIUM SERPL-SCNC: 142 MMOL/L (ref 135–145)
WBC # BLD AUTO: 10.1 10E3/UL (ref 4–11)

## 2024-03-11 PROCEDURE — 80048 BASIC METABOLIC PNL TOTAL CA: CPT | Performed by: INTERNAL MEDICINE

## 2024-03-11 PROCEDURE — 120N000009 HC R&B SNF

## 2024-03-11 PROCEDURE — 97535 SELF CARE MNGMENT TRAINING: CPT | Mod: GO | Performed by: OCCUPATIONAL THERAPIST

## 2024-03-11 PROCEDURE — 250N000013 HC RX MED GY IP 250 OP 250 PS 637: Performed by: INTERNAL MEDICINE

## 2024-03-11 PROCEDURE — 99214 OFFICE O/P EST MOD 30 MIN: CPT | Performed by: INTERNAL MEDICINE

## 2024-03-11 PROCEDURE — 97129 THER IVNTJ 1ST 15 MIN: CPT | Mod: GN

## 2024-03-11 PROCEDURE — 82248 BILIRUBIN DIRECT: CPT | Performed by: INTERNAL MEDICINE

## 2024-03-11 PROCEDURE — 85027 COMPLETE CBC AUTOMATED: CPT | Performed by: INTERNAL MEDICINE

## 2024-03-11 PROCEDURE — 36415 COLL VENOUS BLD VENIPUNCTURE: CPT | Performed by: INTERNAL MEDICINE

## 2024-03-11 RX ADMIN — METOPROLOL TARTRATE 25 MG: 25 TABLET, FILM COATED ORAL at 20:11

## 2024-03-11 RX ADMIN — CALCITONIN SALMON 1 SPRAY: 200 SPRAY, METERED NASAL at 08:01

## 2024-03-11 RX ADMIN — AMIODARONE HYDROCHLORIDE 200 MG: 200 TABLET ORAL at 08:01

## 2024-03-11 RX ADMIN — ACETAMINOPHEN 975 MG: 325 TABLET, FILM COATED ORAL at 20:09

## 2024-03-11 RX ADMIN — ACETAMINOPHEN 975 MG: 325 TABLET, FILM COATED ORAL at 08:01

## 2024-03-11 RX ADMIN — APIXABAN 2.5 MG: 2.5 TABLET, FILM COATED ORAL at 20:09

## 2024-03-11 RX ADMIN — APIXABAN 2.5 MG: 2.5 TABLET, FILM COATED ORAL at 08:00

## 2024-03-11 RX ADMIN — FUROSEMIDE 20 MG: 20 TABLET ORAL at 08:00

## 2024-03-11 RX ADMIN — AMLODIPINE BESYLATE 5 MG: 5 TABLET ORAL at 08:00

## 2024-03-11 RX ADMIN — HYDROXYCHLOROQUINE SULFATE 200 MG: 200 TABLET ORAL at 08:00

## 2024-03-11 RX ADMIN — LIDOCAINE 1 PATCH: 4 PATCH TOPICAL at 20:09

## 2024-03-11 RX ADMIN — POLYETHYLENE GLYCOL 3350 17 G: 17 POWDER, FOR SOLUTION ORAL at 08:00

## 2024-03-11 RX ADMIN — GABAPENTIN 100 MG: 100 CAPSULE ORAL at 08:01

## 2024-03-11 RX ADMIN — METOPROLOL TARTRATE 25 MG: 25 TABLET, FILM COATED ORAL at 08:01

## 2024-03-11 RX ADMIN — Medication 1 TABLET: at 08:01

## 2024-03-11 ASSESSMENT — ACTIVITIES OF DAILY LIVING (ADL)
ADLS_ACUITY_SCORE: 42

## 2024-03-11 NOTE — PROGRESS NOTES
Physical Therapy Discharge Summary    Reason for therapy discharge:    Discharged to home with home therapy.    Progress towards therapy goal(s). See goals on Care Plan in [facility] electronic health record for goal details.  Goals met    Therapy recommendation(s):    Continued therapy is recommended.  Rationale/Recommendations:  Pt to discharge to [facility]. Will benefit from continued skilled therapy in order to optimize functional deficits and aide in safe restoration of function and reduce risk of falling..

## 2024-03-11 NOTE — PROGRESS NOTES
Speech Language Therapy Discharge Summary    Reason for therapy discharge:    No further expectations of functional progress.    Progress towards therapy goal(s). See goals on Care Plan in [facility] electronic health record for goal details.  Goals met    Therapy recommendation(s):    No further therapy is recommended. SLP consulted to assess cognition to aid in discharge planning and recommendations. Per standardized assessment, pt presenting with moderate cognitive impairments re: attention, problem solving, memory, orientation, speech comprehension, reading comprehension. Pt scores subjectively interpreted d/t advanced age surpassing norms of all standardized assessments. Pt educated re: strategies to aid memory and how to implement strategies to aid IND within environment at [facility]. Recommend total IADL support from family/facility. Pt and family in agreement and planning to provide recommended support

## 2024-03-11 NOTE — PATIENT INSTRUCTIONS
Thank you for coming to the [facility] at [place]; please note the following instructions:    1. Follow-up in 5-6 months in clinic with labs, pulmonary function test and eye exam prior.    2. Please reschedule your appointment with Dr. Johnson at [phone].        If you have any questions regarding your visit, please contact your care team:     CARDIOLOGY  TELEPHONE NUMBER   Andressa MCGRATH, Registered Nurse  Parisa LUU, Registered Nurse  Syeda HAMILTON, Registered Nurse  Toshia HINKLE, Registered Medical Assistant  Latricia ROMERO, Certified Medical Assistant  Toshia JENSEN, Clinic Assistant [phone] (select option 1)    *After hours: [phone]    Fax Number [phone]   For Scheduling Appts:     [phone] (select option 1)    *After hours: [phone]   For the Device Clinic (Pacemakers and ICD's)  Rosa BARRON, Registered Nurse   During business hours: [phone]    *After business hours:  [phone] (select option 4)      Normal test result notifications will be released via [employer] or mailed within 7 business days.  All other test results, will be communicated via telephone once reviewed by your cardiologist.    If you need a medication refill, please contact your pharmacy.  Please allow 3 business days for your refill to be completed.    As always, thank you for trusting us with your health care needs!

## 2024-03-11 NOTE — LETTER
3/11/2024      RE: Roxanna Stark  [address]  [place] [address]       Dear Colleague,    Thank you for the opportunity to participate in the care of your patient, Roxanna Stark, at the [facility]. Please see a copy of my visit note below.    HPI: Purpose of visit: I was asked to consult on atrial fibrillation    Patient is a 89-year-old woman with a recent diagnosis of atrial fibrillation in January 2024, heart failure with preserved ejection fraction, coronary artery disease, hypertension, peripheral artery disease, aortic stenosis status post atrial valve replacement with porcine valve in 2016.    In January 2024, patient was hospitalized with an acute episode of rapid atrial fibrillation.  She also experienced a few falls while undergoing rehabilitation and sustained a compression fracture.  She is currently in the transitional care facility.  Amiodarone 200 mg once daily and Eliquis 2.5 mg twice daily was started on February 1, 2024.    When patient first presented with atrial fibrillation, patient had symptoms of shortness of breath.  In recent weeks, patient has been doing well from an atrial fibrillation standpoint.  She did not report any symptoms of prolonged palpitations, fluttering sensation in the chest, exertional dyspnea, exertional angina, frequent lightheadedness, presyncope or syncope.      PAST MEDICAL HISTORY:  Past Medical History:   Diagnosis Date    Actinic keratosis     Aortic stenosis 2014    Atrial flutter with rapid ventricular response (H) 01/27/2024    Basal cell cancer 07/2014    left eye medial canthus     Basal cell carcinoma 09/30/2008    left cheek    CKD (chronic kidney disease) stage 3, GFR 30-59 ml/min (H) 07/01/2019    HTN (hypertension)     Melanoma in situ (H) 09/30/2008    left arm    Polymyalgia rheumatica (H24) 11/1999    Rheumatoid arthritis of multiple sites with  negative rheumatoid factor (H)     Status post coronary angiogram 03/03/2016    Temporal arteritis (H) 11/1999       CURRENT MEDICATIONS:  No current outpatient medications on file.       PAST SURGICAL HISTORY:  Past Surgical History:   Procedure Laterality Date    CATARACT IOL, RT/LT  5/09    bilateral    COLONOSCOPY  2002    EXCISE LESION VULVA N/A 9/27/2019    Procedure: Wide Local Excision Of Vulva, Colposcopy;  Surgeon: Mono Ribeiro MD;  Location: [facility] OR    REPLACE VALVE AORTIC N/A 4/25/2016    Procedure: REPLACE VALVE AORTIC;  Surgeon: Sudeep Tsai MD;  Location:  OR    [facility] SKIN TISSUE PROCEDURE UNLISTED  11/3/08    mmis skin cancer excision       ALLERGIES:     Allergies   Allergen Reactions    Lisinopril Cough       FAMILY HISTORY:  - Premature coronary artery disease  - Atrial fibrillation  - Sudden cardiac death     SOCIAL HISTORY:  Social History     Tobacco Use    Smoking status: Never     Passive exposure: Never    Smokeless tobacco: Never   Vaping Use    Vaping Use: Never used   Substance Use Topics    Alcohol use: Yes     Comment: rarely    Drug use: No       ROS:   Constitutional: No fever, chills, or sweats. Weight stable.   ENT: No visual disturbance, ear ache, epistaxis, sore throat.   Cardiovascular: As per HPI.   Respiratory: No cough, hemoptysis.    GI: No nausea, vomiting, hematemesis, melena, or hematochezia.   : No hematuria.   Integument: Negative.   Psychiatric: Negative.   Hematologic:  Easy bruising, no easy bleeding.  Neuro: Negative.   Endocrinology: No significant heat or cold intolerance   Musculoskeletal: No myalgia.    Exam:  /65   Pulse 55   Ht 1.524 m (5')   Wt 51.7 kg (114 lb)   SpO2 97%   BMI 22.26 kg/m    GENERAL APPEARANCE: healthy, alert and no distress  HEENT: no icterus, no xanthelasmas, normal pupil size and reaction, normal palate, mucosa moist, no central cyanosis  NECK: no adenopathy, no asymmetry, masses, or scars, thyroid normal to  palpation and no bruits, JVP not elevated  RESPIRATORY: lungs clear to auscultation - no rales, rhonchi or wheezes, no use of accessory muscles, no retractions, respirations are unlabored, normal respiratory rate  CARDIOVASCULAR: regular rhythm, normal S1 with physiologic split S2, no S3 or S4 and no murmur, click or rub, precordium quiet with normal PMI.  ABDOMEN: soft, non tender, without hepatosplenomegaly, no masses palpable, bowel sounds normal, aorta not enlarged by palpation, no abdominal bruits  EXTREMITIES: peripheral pulses normal, no edema, no bruits  NEURO: alert and oriented to person/place/time, normal speech, gait and affect  VASC: Radial, femoral, dorsalis pedis and posterior tibialis pulses are normal in volumes and symmetric bilaterally. No bruits are heard.  SKIN: no ecchymoses, no rashes    Labs:  CBC RESULTS:   Lab Results   Component Value Date    WBC 10.1 03/11/2024    WBC 9.4 03/23/2021    RBC 3.66 (L) 03/11/2024    RBC 3.55 (L) 03/23/2021    HGB 10.5 (L) 03/11/2024    HGB 11.0 (L) 03/23/2021    HCT 33.1 (L) 03/11/2024    HCT 35.4 03/23/2021    MCV 90 03/11/2024     03/23/2021    MCH 28.7 03/11/2024    MCH 31.0 03/23/2021    MCHC 31.7 03/11/2024    MCHC 31.1 (L) 03/23/2021    RDW 16.0 (H) 03/11/2024    RDW 17.7 (H) 03/23/2021     03/11/2024     03/23/2021       BMP RESULTS:  Lab Results   Component Value Date     03/11/2024     03/23/2021    POTASSIUM 4.5 03/11/2024    POTASSIUM 4.1 08/15/2022    POTASSIUM 4.3 03/23/2021    CHLORIDE 108 (H) 03/11/2024    CHLORIDE 105 08/15/2022    CHLORIDE 103 03/23/2021    CO2 26 03/11/2024    CO2 33 (H) 08/15/2022    CO2 31 03/23/2021    ANIONGAP 8 03/11/2024    ANIONGAP 2 (L) 08/15/2022    ANIONGAP 4 03/23/2021    GLC 75 03/11/2024     (H) 02/25/2024     (H) 10/10/2022     (H) 03/23/2021    BUN 22.5 03/11/2024    BUN 44 (H) 08/15/2022    BUN 27 03/23/2021    CR 1.26 (H) 03/11/2024    CR 1.40 (H)  03/23/2021    GFRESTIMATED 39 (L) 03/11/2024    GFRESTIMATED 32 (L) 03/23/2021    GFRESTBLACK 37 (L) 03/23/2021    ROEL 8.5 03/11/2024    ROEL 9.3 03/23/2021        INR RESULTS:  Lab Results   Component Value Date    INR 1.54 (H) 02/24/2024    INR 1.41 (H) 04/25/2016    INR 1.63 (H) 04/25/2016    INR 0.90 04/04/2016       Procedures:      Assessment and Plan:     Paroxysmal atrial fibrillation-well-controlled by amiodarone  Status post aortic valve replacement      I discussed extensively with patient and her daughter the management strategy for atrial fibrillation.    A transthoracic echocardiogram on February 28, 2024 showed ejection fraction of 65 to 70% with severe left atrial enlargement.  The mean aortic gradient was 10 mmHg.      We will add hepatic panel to her basic metabolic panel today.  We will continue amiodarone 200 mg once daily and Eliquis and see patient in about 5 to 6 months.  Prior to that visit, patient will repeat a comprehensive metabolic panel, TSH.  Patient will also undergo a pulmonary function test as well as see an ophthalmologist.    All questions and concerns were addressed and patient and her daughter were happy with the plan.      CC  Patient Care Team:  Swapna Gaines MD as PCP - General (Family Medicine)

## 2024-03-11 NOTE — NURSING NOTE
Chief Complaint   Patient presents with    New Patient       Initial BP (!) 144/78   Pulse 56   Ht 1.524 m (5')   Wt 51.7 kg (114 lb)   SpO2 97%   BMI 22.26 kg/m   Estimated body mass index is 22.26 kg/m  as calculated from the following:    Height as of this encounter: 1.524 m (5').    Weight as of this encounter: 51.7 kg (114 lb)..  BP completed using cuff size: small teresita Sommer CMA (AAMA)

## 2024-03-11 NOTE — DISCHARGE INSTRUCTIONS
Primary Care Provider:  You are scheduled to see Dr. Gaines on 3/27/2024 at 2:20pm.    Address [address] [place] [address]  [place] [address]    Phone   [phone]    [facility].   - [phone]  Fax- [phone]  -Nurse, physical therapy, and occupational therapy.   You will get a call after you have discharged from Transitional care unit to schedule the first home care visit. The home health nurse should come out within the first 24-48 hours. If you don't get a call from them within the first 48 hours, please call to follow up (number above).

## 2024-03-11 NOTE — PLAN OF CARE
Goal Outcome Evaluation:      Plan of Care Reviewed With: patient    Overall Patient Progress: no changeOverall Patient Progress: no change    FOCUS/GOAL     Bowel management, Bladder management, Medication management, Medical management, Mobility, Skin integrity, and Safety management     ASSESSMENT, INTERVENTIONS AND CONTINUING PLAN FOR GOAL:     Pt is A&OX2, disoriented to time & situation; calm, & cooperative with care. With intermittent confusion. Pt is [place] & wears hearing aids during the daytime. Denied CP, SOB, & n/v. Pt transfers SBA with walker & GB; wears TLSO brace when OOB. Pt was continent for both B&B; uses the bathroom. Takes meds whole with thin liquid. On 2 grams sodium diet. Pt slept well for most of the shift & no other acute issue. Able to make needs known & call light within reach. Will continue with plan of care.

## 2024-03-11 NOTE — PROGRESS NOTES
CLINICAL NUTRITION SERVICES - REASSESSMENT NOTE     Nutrition Prescription    RECOMMENDATIONS FOR MDs/PROVIDERS TO ORDER:  Please remind pt to order meals    Malnutrition Status:    Moderate malnutrition in the context of chronic illness    Recommendations already ordered by Registered Dietitian (RD):  Encouraged intakes  Continue current supplements/snacks    Future/Additional Recommendations:  Monitor labs, intakes, and weight trends.  Monitor snacks/supplement preference     EVALUATION OF THE PROGRESS TOWARD GOALS   Diet: 2 g Sodium  Intake/[place]: 75 - 100% of documented meals.   Snacks/supplements: Greek yogurt at 10 am, Ensure Enlive daily    Pt ordering (on average) 850 kcal and 30 g protein per day per [employer]. Snacks/supplements added 305 kcal and 19 g protein daily. With documented intakes, pt is likely meeting ~60% minimum energy and protein needs.     NEW FINDINGS/REVIEW OF SYSTEMS    Nutrition/GI: RD met with pt at bedside. Patient report she is eating the same as she usually does. Does not believe she has had any outside foods. Offered nutrition supplements, pt politely declined at this time. Pt states she forgot to order breakfast but then had a cup of yogurt. Discussed food options available on the floor, encouraged pt to request those as needed. Patient agreeable and appreciative. Pt denies GI symptoms and any questions or concerns at this time.     Weights: Pt with limited weight history prior to admission, with 6.5 lb (6%) weight loss over 2 weeks, with 4.5 lb (4%) weight loss in just over 1  month. Pt weight stable with fluctuations during admission to TCU, possible fluid related?   Wt Readings from Last Encounters:   03/11/24 47.9 kg (105 lb 11.2 oz)   03/10/24 48 kg (105 lb 12.8 oz    03/07/24 49.6 kg (109 lb 4.8 oz)   03/06/24 48.2 kg (106 lb 3.2 oz)   03/04/24 49.8 kg (109 lb 12.6 oz)    03/02/24 46.3 kg (102 lb 1.2 oz)   02/29/24 46.2 kg (101 lb 14.4 oz)   02/26/24 50.9 kg (112 lb 3.4  oz)    02/01/24 50.8 kg (112 lb)   01/30/24 49.8 kg (109 lb 11.2 oz)    01/28/24 50 kg (110 lb 3.7 oz)   01/27/24 53.3 kg (117 lb 6.4 oz)   11/28/23 52 kg (114 lb 9.6 oz)   11/04/23 52.4 kg (115 lb 9.6 oz)   08/15/23 54.8 kg (120 lb 12.8 oz)   06/26/23 55.3 kg (122 lb)   06/01/23 54 kg (119 lb)   05/04/23 55.2 kg (121 lb 11.2 oz)   05/01/23 56.7 kg (125 lb)   10/14/22 55.9 kg (123 lb 3.2 oz)   09/28/22 56.9 kg (125 lb 6.4 oz)     Labs reviewed   03/04/24 08:39 03/05/24 06:40 03/07/24 05:27 03/11/24 05:44   Sodium 142 142 141 142   Potassium 4.0 4.1 4.2 4.5   Urea Nitrogen 21.6 24.5 (H) 28.5 (H) 22.5   Creatinine 1.03 (H) 1.16 (H) 1.27 (H) 1.26 (H)   Glucose 90 105 (H) 83 75      Medications reviewed: oscal, lasix, miralax  IV Fluids over last 7 days: No    MALNUTRITION  % Intake: < 75% for > 7 days (moderate)  % Weight Loss: Difficult to assess, does not currently meet criteria  Subcutaneous Fat Loss: Facial/Jaw Region mild  Muscle Loss: Global mild-moderate  Fluid Accumulation/Edema: None noted  Malnutrition Diagnosis: Moderate malnutrition in the context of chronic illness    Previous Goals   Patient to consume % of nutritionally adequate meal trays TID, or the equivalent with supplements/snacks.  Evaluation: Not met    Previous Nutrition Diagnosis  Unintended weight loss related to decreased intake as evidenced by pt reported 75% intakes and 9.1% of weight loss in 6 months.  Evaluation: No change    CURRENT NUTRITION DIAGNOSIS  Inadequate oral intake related to poor appetite as evidenced by patient report of small appetite and documented intakes      INTERVENTIONS  Implementation  Nutrition education for nutrition relationship to health/disease   Medical food supplement therapy - continue as ordered    Goals  Patient to consume % of nutritionally adequate meal trays TID, or the equivalent with supplements/snacks.    Monitoring/Evaluation  Progress toward goals will be monitored and evaluated per  protocol.    Fina Simons MS, RDN, LD  TCU/OB/Ortho Clinical Dietitian  Phone: [phone] or [place] Weekend/Holiday Pager: [phone]

## 2024-03-11 NOTE — PLAN OF CARE
Occupational Therapy Discharge Summary    Reason for therapy discharge:    Adequate for discharge to next setting w/ supports    Progress towards therapy goal(s). See goals on Care Plan in [facility] electronic health record for goal details.  Goals partially met.  Barriers to achieving goals:   cog impairments, spinal precautions. .    Therapy recommendation(s):    Continued therapy is recommended.  Rationale/Recommendations:  pt continues to function below baseline, pt's cog impairments interfere w/ remembering and implementing spinal precautions w/ adls and mobility, Cog deficits interfere w/ safety resulting in pt needing increased services in a more supportive setting. Pt discharging to [place] w/ staff and family support , rcommend OT to max safety and indep w /adls and mobiity .

## 2024-03-11 NOTE — PROGRESS NOTES
HPI: Purpose of visit: I was asked to consult on atrial fibrillation    Patient is a 89-year-old woman with a recent diagnosis of atrial fibrillation in January 2024, heart failure with preserved ejection fraction, coronary artery disease, hypertension, peripheral artery disease, aortic stenosis status post atrial valve replacement with porcine valve in 2016.    In January 2024, patient was hospitalized with an acute episode of rapid atrial fibrillation.  She also experienced a few falls while undergoing rehabilitation and sustained a compression fracture.  She is currently in the transitional care facility.  Amiodarone 200 mg once daily and Eliquis 2.5 mg twice daily was started on February 1, 2024.    When patient first presented with atrial fibrillation, patient had symptoms of shortness of breath.  In recent weeks, patient has been doing well from an atrial fibrillation standpoint.  She did not report any symptoms of prolonged palpitations, fluttering sensation in the chest, exertional dyspnea, exertional angina, frequent lightheadedness, presyncope or syncope.      PAST MEDICAL HISTORY:  Past Medical History:   Diagnosis Date    Actinic keratosis     Aortic stenosis 2014    Atrial flutter with rapid ventricular response (H) 01/27/2024    Basal cell cancer 07/2014    left eye medial canthus     Basal cell carcinoma 09/30/2008    left cheek    CKD (chronic kidney disease) stage 3, GFR 30-59 ml/min (H) 07/01/2019    HTN (hypertension)     Melanoma in situ (H) 09/30/2008    left arm    Polymyalgia rheumatica (H24) 11/1999    Rheumatoid arthritis of multiple sites with negative rheumatoid factor (H)     Status post coronary angiogram 03/03/2016    Temporal arteritis (H) 11/1999       CURRENT MEDICATIONS:  No current outpatient medications on file.       PAST SURGICAL HISTORY:  Past Surgical History:   Procedure Laterality Date    CATARACT IOL, RT/LT  5/09    bilateral    COLONOSCOPY  2002    EXCISE LESION VULVA N/A  9/27/2019    Procedure: Wide Local Excision Of Vulva, Colposcopy;  Surgeon: Mono Ribeiro MD;  Location: [facility] OR    REPLACE VALVE AORTIC N/A 4/25/2016    Procedure: REPLACE VALVE AORTIC;  Surgeon: Sudeep Tsai MD;  Location:  OR    [facility] SKIN TISSUE PROCEDURE UNLISTED  11/3/08    mmis skin cancer excision       ALLERGIES:     Allergies   Allergen Reactions    Lisinopril Cough       FAMILY HISTORY:  - Premature coronary artery disease  - Atrial fibrillation  - Sudden cardiac death     SOCIAL HISTORY:  Social History     Tobacco Use    Smoking status: Never     Passive exposure: Never    Smokeless tobacco: Never   Vaping Use    Vaping Use: Never used   Substance Use Topics    Alcohol use: Yes     Comment: rarely    Drug use: No       ROS:   Constitutional: No fever, chills, or sweats. Weight stable.   ENT: No visual disturbance, ear ache, epistaxis, sore throat.   Cardiovascular: As per HPI.   Respiratory: No cough, hemoptysis.    GI: No nausea, vomiting, hematemesis, melena, or hematochezia.   : No hematuria.   Integument: Negative.   Psychiatric: Negative.   Hematologic:  Easy bruising, no easy bleeding.  Neuro: Negative.   Endocrinology: No significant heat or cold intolerance   Musculoskeletal: No myalgia.    Exam:  /65   Pulse 55   Ht 1.524 m (5')   Wt 51.7 kg (114 lb)   SpO2 97%   BMI 22.26 kg/m    GENERAL APPEARANCE: healthy, alert and no distress  HEENT: no icterus, no xanthelasmas, normal pupil size and reaction, normal palate, mucosa moist, no central cyanosis  NECK: no adenopathy, no asymmetry, masses, or scars, thyroid normal to palpation and no bruits, JVP not elevated  RESPIRATORY: lungs clear to auscultation - no rales, rhonchi or wheezes, no use of accessory muscles, no retractions, respirations are unlabored, normal respiratory rate  CARDIOVASCULAR: regular rhythm, normal S1 with physiologic split S2, no S3 or S4 and no murmur, click or rub, precordium quiet with normal  PMI.  ABDOMEN: soft, non tender, without hepatosplenomegaly, no masses palpable, bowel sounds normal, aorta not enlarged by palpation, no abdominal bruits  EXTREMITIES: peripheral pulses normal, no edema, no bruits  NEURO: alert and oriented to person/place/time, normal speech, gait and affect  VASC: Radial, femoral, dorsalis pedis and posterior tibialis pulses are normal in volumes and symmetric bilaterally. No bruits are heard.  SKIN: no ecchymoses, no rashes    Labs:  CBC RESULTS:   Lab Results   Component Value Date    WBC 10.1 03/11/2024    WBC 9.4 03/23/2021    RBC 3.66 (L) 03/11/2024    RBC 3.55 (L) 03/23/2021    HGB 10.5 (L) 03/11/2024    HGB 11.0 (L) 03/23/2021    HCT 33.1 (L) 03/11/2024    HCT 35.4 03/23/2021    MCV 90 03/11/2024     03/23/2021    MCH 28.7 03/11/2024    MCH 31.0 03/23/2021    MCHC 31.7 03/11/2024    MCHC 31.1 (L) 03/23/2021    RDW 16.0 (H) 03/11/2024    RDW 17.7 (H) 03/23/2021     03/11/2024     03/23/2021       BMP RESULTS:  Lab Results   Component Value Date     03/11/2024     03/23/2021    POTASSIUM 4.5 03/11/2024    POTASSIUM 4.1 08/15/2022    POTASSIUM 4.3 03/23/2021    CHLORIDE 108 (H) 03/11/2024    CHLORIDE 105 08/15/2022    CHLORIDE 103 03/23/2021    CO2 26 03/11/2024    CO2 33 (H) 08/15/2022    CO2 31 03/23/2021    ANIONGAP 8 03/11/2024    ANIONGAP 2 (L) 08/15/2022    ANIONGAP 4 03/23/2021    GLC 75 03/11/2024     (H) 02/25/2024     (H) 10/10/2022     (H) 03/23/2021    BUN 22.5 03/11/2024    BUN 44 (H) 08/15/2022    BUN 27 03/23/2021    CR 1.26 (H) 03/11/2024    CR 1.40 (H) 03/23/2021    GFRESTIMATED 39 (L) 03/11/2024    GFRESTIMATED 32 (L) 03/23/2021    GFRESTBLACK 37 (L) 03/23/2021    ROEL 8.5 03/11/2024    ROEL 9.3 03/23/2021        INR RESULTS:  Lab Results   Component Value Date    INR 1.54 (H) 02/24/2024    INR 1.41 (H) 04/25/2016    INR 1.63 (H) 04/25/2016    INR 0.90 04/04/2016       Procedures:      Assessment and Plan:      Paroxysmal atrial fibrillation-well-controlled by amiodarone  Status post aortic valve replacement      I discussed extensively with patient and her daughter the management strategy for atrial fibrillation.    A transthoracic echocardiogram on February 28, 2024 showed ejection fraction of 65 to 70% with severe left atrial enlargement.  The mean aortic gradient was 10 mmHg.      We will add hepatic panel to her basic metabolic panel today.  We will continue amiodarone 200 mg once daily and Eliquis and see patient in about 5 to 6 months.  Prior to that visit, patient will repeat a comprehensive metabolic panel, TSH.  Patient will also undergo a pulmonary function test as well as see an ophthalmologist.    All questions and concerns were addressed and patient and her daughter were happy with the plan.      CC  Patient Care Team:  Swapna Gaines MD as PCP - General (Family Medicine)  Brandan Landin MD as MD (OB/Gyn)  Mono Ribeiro MD as MD (Gynecologic Oncology)  Swapna Gaines MD as Assigned PCP  Aminta Garcia MD as Assigned Cancer Care Provider  Jamie Johnson MD as Assigned Rheumatology Provider  Whitney Rogers MD as MD (Ophthalmology)  Abdon Bullard MD as MD (Ophthalmology)  Abodn Bullard MD as Assigned Surgical Provider  Cassandra Herrera MD as MD (Cardiovascular Disease)  Sina Green, JOHN as Lead Care Coordinator (Primary Care - CC)

## 2024-03-11 NOTE — PLAN OF CARE
Goal Outcome Evaluation:      Plan of Care Reviewed With: patient    Overall Patient Progress: no change  VSS, denies SOB , back pain managed with ice packs, wears TLSO brace when OOB. Alert/ forgetful, easy to redirect, bed and chair alarm on for safety. No acute issues this shift.Will continue POC.       Patient's most recent vital signs are:     Vital signs:  BP: 123/56  Temp: 97.6  HR: 61  RR: 16  SpO2: 94 %     Patient does not have new respiratory symptoms.  Patient does not have new sore throat.  Patient does not have a fever greater than 99.5.

## 2024-03-11 NOTE — PROGRESS NOTES
LORI called and talked to Elena.  From [facility]. [phone] did come out here on Sat. To see pt. They cannot get her admitted until the 18th or 19th.  Please fax [phone]  it's [facility]  [address] [facility]. Get that there before discharge so they can get the meds to pt in time.     LORI talked to Fina/ [facility]. Updated on start date and they can pull info from Epic.     SW was called into pt room.  Daughter was very mad about Medicare ending and pt having to pay for stay.  Daughter said they should have been told sooner. They should have had a care conference.  Why didn't we have a care conference??  SW apologized. LORI showed her the ABN notice.  LORI said they could take pt to hotel for a few days or have pt stay with another family member.  This daughter and son in law are staying at pt house and there is only one bed.  The rest of belongings is already at [facility].  Daughter said no to all LORI suggestions. Daughter marked option 1 on ABN for pt to sign.     LORI met with pt.  Pt was not happy to hear the cost of stay. Daughter has mom's checkbook and credit card. Pt signed ABN.     GHISLAINE Rahman   [facility], Transitional Care Unit   Social Work   [facility] 4th [place] [address]  (PH) [phone]

## 2024-03-12 PROCEDURE — 120N000009 HC R&B SNF

## 2024-03-12 PROCEDURE — 250N000013 HC RX MED GY IP 250 OP 250 PS 637: Performed by: INTERNAL MEDICINE

## 2024-03-12 RX ADMIN — GABAPENTIN 100 MG: 100 CAPSULE ORAL at 08:19

## 2024-03-12 RX ADMIN — METOPROLOL TARTRATE 25 MG: 25 TABLET, FILM COATED ORAL at 08:18

## 2024-03-12 RX ADMIN — CALCITONIN SALMON 1 SPRAY: 200 SPRAY, METERED NASAL at 08:21

## 2024-03-12 RX ADMIN — APIXABAN 2.5 MG: 2.5 TABLET, FILM COATED ORAL at 08:19

## 2024-03-12 RX ADMIN — HYDROXYCHLOROQUINE SULFATE 200 MG: 200 TABLET ORAL at 08:19

## 2024-03-12 RX ADMIN — ACETAMINOPHEN 975 MG: 325 TABLET, FILM COATED ORAL at 20:30

## 2024-03-12 RX ADMIN — METOPROLOL TARTRATE 25 MG: 25 TABLET, FILM COATED ORAL at 20:30

## 2024-03-12 RX ADMIN — Medication 1 TABLET: at 08:19

## 2024-03-12 RX ADMIN — LIDOCAINE 1 PATCH: 4 PATCH TOPICAL at 20:31

## 2024-03-12 RX ADMIN — ACETAMINOPHEN 975 MG: 325 TABLET, FILM COATED ORAL at 08:19

## 2024-03-12 RX ADMIN — AMIODARONE HYDROCHLORIDE 200 MG: 200 TABLET ORAL at 08:19

## 2024-03-12 RX ADMIN — AMLODIPINE BESYLATE 5 MG: 5 TABLET ORAL at 08:19

## 2024-03-12 RX ADMIN — FUROSEMIDE 20 MG: 20 TABLET ORAL at 08:19

## 2024-03-12 RX ADMIN — ACETAMINOPHEN 975 MG: 325 TABLET, FILM COATED ORAL at 15:34

## 2024-03-12 RX ADMIN — APIXABAN 2.5 MG: 2.5 TABLET, FILM COATED ORAL at 20:30

## 2024-03-12 ASSESSMENT — ACTIVITIES OF DAILY LIVING (ADL)
ADLS_ACUITY_SCORE: 42

## 2024-03-12 NOTE — PROGRESS NOTES
LORI met with pt and family and therapies to explain why pt isn't a candidate for more therapy while wait for [facility] admission.  Therapies said they could walk the pt in the hallways.  ANNIE said how many times is nursing going to walk pt?  We said we are unsure but if they come daily they can do it at least once.    Then SW got the deposit and took it to . They were still not happy about situation, SW gave them pt relations number.     GHISLAINE Rahman   [facility], Transitional Care Unit   Social Work   [facility] [address]th [place] [address]  (PH) [phone]

## 2024-03-12 NOTE — PLAN OF CARE
Goal Outcome Evaluation:         Pt alert and oriented X2, disoriented to time and situation. Takes pills whole with thin liquids. Family came and talked to Therapy and  about plan while awaiting availability of AL facility. (See  notes).   Pt continent of B&B, Pleasant and cooperative. Will continue with POC.          Patient's most recent vital signs are:     Vital signs:  BP: 133/57  Temp: 97.4  HR: 63  RR: 16  SpO2: 97 %     Patient does not have new respiratory symptoms.  Patient does not have new sore throat.  Patient does not have a fever greater than 99.5.

## 2024-03-12 NOTE — PLAN OF CARE
Goal Outcome Evaluation:      Plan of Care Reviewed With: patient    Overall Patient Progress: no changeOverall Patient Progress: no change    FOCUS/GOAL     Bowel management, Bladder management, Medication management, Medical management, Mobility, Skin integrity, and Safety management     ASSESSMENT, INTERVENTIONS AND CONTINUING PLAN FOR GOAL:     Pt is A&OX2, disoriented to time & situation; calm, & cooperative with care. With intermittent confusion. Pt is [place] & wears hearing aids during the daytime. Denied CP, SOB, & n/v. Pt transfers SBA with walker & GB; wears TLSO brace when OOB. Pt was continent for both B&B; uses the bathroom. Takes meds whole with thin liquid. On 2 grams sodium diet. Pt slept well for most of the shift & no other acute issue. Able to make needs known & call light within reach. Will continue with plan of care.

## 2024-03-13 PROCEDURE — 120N000009 HC R&B SNF

## 2024-03-13 PROCEDURE — 250N000013 HC RX MED GY IP 250 OP 250 PS 637: Performed by: INTERNAL MEDICINE

## 2024-03-13 RX ADMIN — METOPROLOL TARTRATE 25 MG: 25 TABLET, FILM COATED ORAL at 08:58

## 2024-03-13 RX ADMIN — APIXABAN 2.5 MG: 2.5 TABLET, FILM COATED ORAL at 20:26

## 2024-03-13 RX ADMIN — ACETAMINOPHEN 975 MG: 325 TABLET, FILM COATED ORAL at 08:58

## 2024-03-13 RX ADMIN — APIXABAN 2.5 MG: 2.5 TABLET, FILM COATED ORAL at 08:58

## 2024-03-13 RX ADMIN — AMIODARONE HYDROCHLORIDE 200 MG: 200 TABLET ORAL at 08:58

## 2024-03-13 RX ADMIN — POLYETHYLENE GLYCOL 3350 17 G: 17 POWDER, FOR SOLUTION ORAL at 08:57

## 2024-03-13 RX ADMIN — GABAPENTIN 100 MG: 100 CAPSULE ORAL at 08:57

## 2024-03-13 RX ADMIN — FUROSEMIDE 20 MG: 20 TABLET ORAL at 08:58

## 2024-03-13 RX ADMIN — Medication 1 TABLET: at 08:58

## 2024-03-13 RX ADMIN — LIDOCAINE 1 PATCH: 4 PATCH TOPICAL at 20:26

## 2024-03-13 RX ADMIN — ACETAMINOPHEN 975 MG: 325 TABLET, FILM COATED ORAL at 14:31

## 2024-03-13 RX ADMIN — AMLODIPINE BESYLATE 5 MG: 5 TABLET ORAL at 08:57

## 2024-03-13 RX ADMIN — CALCITONIN SALMON 1 SPRAY: 200 SPRAY, METERED NASAL at 08:57

## 2024-03-13 RX ADMIN — METOPROLOL TARTRATE 25 MG: 25 TABLET, FILM COATED ORAL at 20:26

## 2024-03-13 RX ADMIN — HYDROXYCHLOROQUINE SULFATE 200 MG: 200 TABLET ORAL at 08:58

## 2024-03-13 RX ADMIN — ACETAMINOPHEN 975 MG: 325 TABLET, FILM COATED ORAL at 20:26

## 2024-03-13 ASSESSMENT — ACTIVITIES OF DAILY LIVING (ADL)
ADLS_ACUITY_SCORE: 46
ADLS_ACUITY_SCORE: 46
ADLS_ACUITY_SCORE: 48
ADLS_ACUITY_SCORE: 48
ADLS_ACUITY_SCORE: 46
ADLS_ACUITY_SCORE: 46
ADLS_ACUITY_SCORE: 48
ADLS_ACUITY_SCORE: 46
ADLS_ACUITY_SCORE: 46
ADLS_ACUITY_SCORE: 48
ADLS_ACUITY_SCORE: 46
ADLS_ACUITY_SCORE: 48
ADLS_ACUITY_SCORE: 48
ADLS_ACUITY_SCORE: 46
ADLS_ACUITY_SCORE: 48
ADLS_ACUITY_SCORE: 46
ADLS_ACUITY_SCORE: 48
ADLS_ACUITY_SCORE: 48
ADLS_ACUITY_SCORE: 42
ADLS_ACUITY_SCORE: 46
ADLS_ACUITY_SCORE: 46

## 2024-03-13 NOTE — PROGRESS NOTES
SW received a call from [facility] [employer] [employer]. [phone] ext. [phone]  told her when the discharge date is.  Told her to maybe call Daughter/Anna for update.     LORI printed off med list.  LORI asked asked about othro follow up appt.  SW was told there is an appt for 4/11 with Neuro.  They are following not Othro.     GHISLAINE Rahman   [facility], Transitional Care Unit   Social Work   [facility] [place] [address], [address]th [place] [address]  (PH) [phone]

## 2024-03-13 NOTE — PLAN OF CARE
Goal Outcome Evaluation:         VS: BP: 138/38,Temp: 97.4,HR: 65,RR: 16. Pt denies chest pain or SOB.   O2: SpO2: 97 % on RA   Output: Mixed incontinence   Last BM: 03/12/24   Activity: A 1 with Walker GB   Skin: Intact   Pain:  No PRN requested   CMS:  Neuro:  Alert X2 with intermittent confusion and needs time reorientation.  Bed alarm on.   Dressing: None   Diet: Combination 2g Na,meds whole, thin liquids.   LDA: No drains or lines   Equipment: TLSO brace when OOB   Plan: Con't POC.    Additional Info:  Client able to make needs known.              Patient's most recent vital signs are:     Vital signs:  BP: 138/38  Temp: 97.4  HR: 65  RR: 16  SpO2: 97 %     Patient does not have new respiratory symptoms.  Patient does not have new sore throat.  Patient does not have a fever greater than 99.5.

## 2024-03-13 NOTE — PLAN OF CARE
Goal Outcome Evaluation:      Plan of Care Reviewed With: patient    Overall Patient Progress: no change  Orientation: Alert and oriented x4. Able to make needs known to staff.   Bowel & Bladder: Continent of both bowel and bladder. Voids spontaneously without difficulty.  Medications: Takes medication whole with thin liquids.  Pain: Pain managed with scheduled acetaminophen which was effective.  Ambulation/Transfers: with one assist w/ walker and gait belt.  Diet: 2 gm NA diet with good  appetite.  Tubes/Lines/Drains: None  Oxygen: Room air  Infection/Isolation: No active isolations.  Safety: No concerns noted this shift. Door opened per patient's request.  Other: Denied chest pain and SOB. Bed and chair alarms on at all times. Call-light within reach. Utilizes call-light appropriately. Continue with POC.    Vital signs: T: 98.2,  B/P: 138/60,  P: 75,  R: 16,  SpO2: 92 %     Patient does not have new respiratory symptoms.  Patient does not have new sore throat.  Patient does not have a fever greater than 99.5.

## 2024-03-13 NOTE — PLAN OF CARE
Goal Outcome Evaluation:         VS: Temp: 97.4  F (36.3  C) Temp src: Oral BP: (!) 138/38 Pulse: 65   Resp: 16 SpO2: 97 % O2 Device: None (Room air)     O2: Stable on RA    Output: Incontinent of bladder    Last BM: Incontinent of bowel   3/12    Activity: SBA w/ walker and GB    Skin: Intact, bruising to forearms    Pain: Mid back, lidocaine patch applied and takes scheduled tylenol    CMS: A&O x2, disoriented to time. Intermittent confusion/ forgetful.   Bed alarms on for safety    Dressing: None    Diet: 2gm sodium diet.   Thin liquids    LDA: No lines, no drains    Equipment: TLSO brace when OOB    Plan: Continue POC    Additional Info: Takes medications whole with thin liquids, pleasant and cooperative with care.        Patient does not have new respiratory symptoms.  Patient does not have new sore throat.  Patient does not have a fever greater than 99.5.

## 2024-03-14 PROCEDURE — 250N000013 HC RX MED GY IP 250 OP 250 PS 637: Performed by: INTERNAL MEDICINE

## 2024-03-14 PROCEDURE — 120N000009 HC R&B SNF

## 2024-03-14 RX ADMIN — ACETAMINOPHEN 975 MG: 325 TABLET, FILM COATED ORAL at 08:10

## 2024-03-14 RX ADMIN — ACETAMINOPHEN 975 MG: 325 TABLET, FILM COATED ORAL at 14:12

## 2024-03-14 RX ADMIN — ACETAMINOPHEN 975 MG: 325 TABLET, FILM COATED ORAL at 20:38

## 2024-03-14 RX ADMIN — METOPROLOL TARTRATE 25 MG: 25 TABLET, FILM COATED ORAL at 20:38

## 2024-03-14 RX ADMIN — CALCITONIN SALMON 1 SPRAY: 200 SPRAY, METERED NASAL at 08:10

## 2024-03-14 RX ADMIN — GABAPENTIN 100 MG: 100 CAPSULE ORAL at 08:10

## 2024-03-14 RX ADMIN — AMIODARONE HYDROCHLORIDE 200 MG: 200 TABLET ORAL at 08:10

## 2024-03-14 RX ADMIN — AMLODIPINE BESYLATE 5 MG: 5 TABLET ORAL at 08:10

## 2024-03-14 RX ADMIN — Medication 1 TABLET: at 08:10

## 2024-03-14 RX ADMIN — LIDOCAINE 1 PATCH: 4 PATCH TOPICAL at 20:38

## 2024-03-14 RX ADMIN — HYDROXYCHLOROQUINE SULFATE 200 MG: 200 TABLET ORAL at 08:10

## 2024-03-14 RX ADMIN — APIXABAN 2.5 MG: 2.5 TABLET, FILM COATED ORAL at 20:38

## 2024-03-14 RX ADMIN — APIXABAN 2.5 MG: 2.5 TABLET, FILM COATED ORAL at 08:10

## 2024-03-14 RX ADMIN — METOPROLOL TARTRATE 25 MG: 25 TABLET, FILM COATED ORAL at 08:10

## 2024-03-14 RX ADMIN — FUROSEMIDE 20 MG: 20 TABLET ORAL at 08:10

## 2024-03-14 ASSESSMENT — ACTIVITIES OF DAILY LIVING (ADL)
ADLS_ACUITY_SCORE: 48

## 2024-03-14 NOTE — PROGRESS NOTES
SW reviewed email from [facility]. They would like SW to switch HH.  SW tried but could not find them in Epic.    SW emailed the Liaison and asked for correct name or fax number.   SW emailed [facility] For HH. SW faxed referral to her.     GHISLAINE Rahman   [facility], Transitional Care Unit   Social Work   [address] [place] [address], [address]th [place] [address] (ph) [phone]

## 2024-03-14 NOTE — PROGRESS NOTES
End of shift Summary: See flowsheet for VS and detail assessments.     Changes this Shift:      Pulmonary: Pt denies SOB & chest pain. Pt is on RA.     Diet: 2 gm NA diet, medication whole with thin liquids.    Output: Pt is continent of bowel and bladder, LBM 3/12.     Activity: Pt is stand by assist with walker and gait belt, TLSO brace on when OOB.     Skin: Bruising to forearms.     Pain: Pt denies pain.     Neuro/CMS: Pt is alert and oriented x 2, disoriented to time and situation. Denies numbness and tingling.     Dressings/Drains: None.     IV: None.     Additional info: No significant changes on shift.     Plan:  Plan of care ongoing.

## 2024-03-14 NOTE — PLAN OF CARE
Goal Outcome Evaluation:         VS: Temp: 98  F (36.7  C) Temp src: Oral BP: (!) 150/42 Pulse: 68   Resp: 16 SpO2: 99 % O2 Device: None (Room air)     O2: Stable on RA    Output: Incontinent of bladder    Last BM: Incontinent of bowel   3/12    Activity: SBA w/ walker and GB    Skin: Intact, bruising to forearms    Pain: Mid back, lidocaine patch applied and takes scheduled tylenol    CMS: A&O x2, disoriented to time. Intermittent confusion/ forgetful.   Bed alarms on for safety    Dressing: None    Diet: 2gm sodium diet.   Thin liquids   Good appetite    LDA: No lines, no drains    Equipment: TLSO brace when OOB    Plan: Continue POC    Additional Info: Takes medications whole with thin liquids, pleasant and cooperative with care.  Family came to visit late afternoon.       Patient does not have new respiratory symptoms.  Patient does not have new sore throat.  Patient does not have a fever greater than 99.5.

## 2024-03-14 NOTE — PLAN OF CARE
Goal Outcome Evaluation:      Plan of Care Reviewed With: patient    Overall Patient Progress: no change  Orientation: Alert and oriented x4. Able to make needs known to staff.   Bowel & Bladder: Mixed incontinence. Voids spontaneously without difficulty.  Medications: Takes medication whole with thin liquids.  Pain: Pain managed with scheduled acetaminophen which was effective.  Ambulation/Transfers: with one assist w/ walker and gait belt. TLSO brace on when OOB  Diet: Regular diet: 2 gm NA diet with good  appetite.  Tubes/Lines/Drains: None  Oxygen: Room air  Skin: Lidocaine patch removed from mid back  Infection/Isolation: No active isolations.  Safety: No concerns noted this shift. Door open ad bed and chair alarms on for safety.  Other: Denied chest pain and SOB. Call-light within reach. Utilizes call-light appropriately. Continue with POC.    Vital signs: T: 97.8,  B/P: 153/61,  P: 66,  R: 16,  SpO2: 96 %     Patient does not have new respiratory symptoms.  Patient does not have new sore throat.  Patient does not have a fever greater than 99.5.

## 2024-03-15 PROCEDURE — 120N000009 HC R&B SNF

## 2024-03-15 PROCEDURE — 250N000013 HC RX MED GY IP 250 OP 250 PS 637: Performed by: INTERNAL MEDICINE

## 2024-03-15 RX ORDER — AMIODARONE HYDROCHLORIDE 200 MG/1
TABLET ORAL
Qty: 60 TABLET | Refills: 0 | Status: SHIPPED | OUTPATIENT
Start: 2024-03-15 | End: 2024-05-13

## 2024-03-15 RX ORDER — AMLODIPINE BESYLATE 5 MG/1
5 TABLET ORAL DAILY
Qty: 30 TABLET | Refills: 0 | Status: SHIPPED | OUTPATIENT
Start: 2024-03-16 | End: 2024-04-02

## 2024-03-15 RX ORDER — ACETAMINOPHEN 325 MG/1
975 TABLET ORAL EVERY 8 HOURS PRN
Qty: 30 TABLET | Refills: 0 | Status: SHIPPED | OUTPATIENT
Start: 2024-03-15

## 2024-03-15 RX ORDER — FUROSEMIDE 20 MG
20 TABLET ORAL DAILY
Qty: 30 TABLET | Refills: 0 | Status: SHIPPED | OUTPATIENT
Start: 2024-03-16 | End: 2024-04-02

## 2024-03-15 RX ORDER — HYDROXYCHLOROQUINE SULFATE 200 MG/1
200 TABLET, FILM COATED ORAL DAILY
Qty: 30 TABLET | Refills: 0 | Status: SHIPPED | OUTPATIENT
Start: 2024-03-15 | End: 2024-04-03

## 2024-03-15 RX ORDER — METOPROLOL TARTRATE 25 MG/1
25 TABLET, FILM COATED ORAL 2 TIMES DAILY
Qty: 60 TABLET | Refills: 0 | Status: SHIPPED | OUTPATIENT
Start: 2024-03-15 | End: 2024-04-02

## 2024-03-15 RX ORDER — LIDOCAINE 4 G/G
1 PATCH TOPICAL EVERY 24 HOURS
Qty: 20 PATCH | Refills: 0 | Status: SHIPPED | OUTPATIENT
Start: 2024-03-15 | End: 2024-05-16

## 2024-03-15 RX ORDER — AMOXICILLIN 250 MG
1 CAPSULE ORAL 2 TIMES DAILY PRN
Qty: 30 TABLET | Refills: 0 | Status: SHIPPED | OUTPATIENT
Start: 2024-03-15

## 2024-03-15 RX ORDER — POLYETHYLENE GLYCOL 3350 17 G/17G
17 POWDER, FOR SOLUTION ORAL DAILY
Qty: 510 G | Refills: 0 | Status: SHIPPED | OUTPATIENT
Start: 2024-03-15 | End: 2024-04-11

## 2024-03-15 RX ORDER — GABAPENTIN 100 MG/1
100 CAPSULE ORAL DAILY
Qty: 30 CAPSULE | Refills: 0 | Status: SHIPPED | OUTPATIENT
Start: 2024-03-15 | End: 2024-04-02

## 2024-03-15 RX ORDER — BISACODYL 10 MG
10 SUPPOSITORY, RECTAL RECTAL DAILY PRN
Qty: 10 SUPPOSITORY | Refills: 0 | Status: SHIPPED | OUTPATIENT
Start: 2024-03-15 | End: 2024-08-16

## 2024-03-15 RX ADMIN — ACETAMINOPHEN 975 MG: 325 TABLET, FILM COATED ORAL at 13:30

## 2024-03-15 RX ADMIN — APIXABAN 2.5 MG: 2.5 TABLET, FILM COATED ORAL at 07:59

## 2024-03-15 RX ADMIN — ACETAMINOPHEN 975 MG: 325 TABLET, FILM COATED ORAL at 07:59

## 2024-03-15 RX ADMIN — ACETAMINOPHEN 975 MG: 325 TABLET, FILM COATED ORAL at 21:30

## 2024-03-15 RX ADMIN — FUROSEMIDE 20 MG: 20 TABLET ORAL at 07:59

## 2024-03-15 RX ADMIN — HYDROXYCHLOROQUINE SULFATE 200 MG: 200 TABLET ORAL at 07:59

## 2024-03-15 RX ADMIN — APIXABAN 2.5 MG: 2.5 TABLET, FILM COATED ORAL at 21:30

## 2024-03-15 RX ADMIN — METOPROLOL TARTRATE 25 MG: 25 TABLET, FILM COATED ORAL at 07:59

## 2024-03-15 RX ADMIN — METOPROLOL TARTRATE 25 MG: 25 TABLET, FILM COATED ORAL at 21:30

## 2024-03-15 RX ADMIN — AMIODARONE HYDROCHLORIDE 200 MG: 200 TABLET ORAL at 07:59

## 2024-03-15 RX ADMIN — LIDOCAINE 1 PATCH: 4 PATCH TOPICAL at 21:30

## 2024-03-15 RX ADMIN — Medication 1 TABLET: at 07:59

## 2024-03-15 RX ADMIN — AMLODIPINE BESYLATE 5 MG: 5 TABLET ORAL at 07:59

## 2024-03-15 RX ADMIN — CALCITONIN SALMON 1 SPRAY: 200 SPRAY, METERED NASAL at 08:00

## 2024-03-15 RX ADMIN — POLYETHYLENE GLYCOL 3350 17 G: 17 POWDER, FOR SOLUTION ORAL at 07:58

## 2024-03-15 RX ADMIN — GABAPENTIN 100 MG: 100 CAPSULE ORAL at 07:59

## 2024-03-15 ASSESSMENT — ACTIVITIES OF DAILY LIVING (ADL)
ADLS_ACUITY_SCORE: 48

## 2024-03-15 NOTE — PLAN OF CARE
Goal Outcome Evaluation:    Patient is alert and oriented x4 with intermittent confusion. Able to make needs known. Pt denied SOB, N/V and chest pain. Pt received scheduled Tylenol and Lidocaine patch for lower back pain. Pt is stand-by assist with walker. Wears TLSO brace when up. Bed and chair alarm are used due to impulse and confusion. Reminded patient to use call light when in need. Call light within reach.    Patient's most recent vital signs are:     Vital signs:  BP: 132/46  Temp: 97.7  HR: 62  RR: 16  SpO2: 99 %     Patient does not have new respiratory symptoms.  Patient does not have new sore throat.  Patient does not have a fever greater than 99.5.

## 2024-03-15 NOTE — PROGRESS NOTES
LORI received a call from Gil Rodriguez from [facility] [employer].   Ph- [phone]  Fax- [phone]  They will accept case.      LORI called [facility] back and had them close the referral.     LORI faxed the H & P and med list to [facility].   Sent Rx to pharmacy so they can get started on list. Faxed the standing orders.     GHISLAINE Rahman   [facility], Transitional Care Unit   Social Work   [address] [place] [address], [address]th [place] [address]  (PH) [phone]

## 2024-03-15 NOTE — PLAN OF CARE
Goal Outcome Evaluation:    No acute issue in the night, pt appears comfortable in bed during safety checks. Bed alarm on. Denies CP, SOB and no n/v. Alert and oriented to self situation. Voided with no difficulty on this shift. Assist of 1 with walker and gait belt. Wears TLSO brace when out of bed. Denies pain at this time. Able to make needs known , call light within reach continue with plan of care.         Patient's most recent vital signs are:     Vital signs:  BP: 132/46  Temp: 97.7  HR: 62  RR: 16  SpO2: 99 %     Patient does not have new respiratory symptoms.  Patient does not have new sore throat.  Patient does not have a fever greater than 99.5.

## 2024-03-15 NOTE — PLAN OF CARE
Goal Outcome Evaluation:         VS: Temp: 97.9  F (36.6  C) Temp src: Oral BP: (!) 141/58 Pulse: 59   Resp: 16 SpO2: 94 % O2 Device: None (Room air)     O2: Stable on RA    Output: Incontinent of bladder    Last BM: Incontinent of bowel      Activity: SBA w/ walker and GB    Skin: Intact  bruising to forearms    Pain: Mid back  scheduled tylenol given    CMS: A&O x2, disoriented to time. Intermittent confusion/ forgetful.   Bed alarms on for safety    Dressing: None    Diet: 2gm sodium diet.   Thin liquids   Good appetite   Takes pills whole with thin liquids    LDA: No lines, no drains    Equipment: TLSO brace when OOB    Plan: Continue POC    Additional Info:        Patient does not have new respiratory symptoms.  Patient does not have new sore throat.  Patient does not have a fever greater than 99.5.

## 2024-03-16 PROCEDURE — 250N000013 HC RX MED GY IP 250 OP 250 PS 637: Performed by: INTERNAL MEDICINE

## 2024-03-16 PROCEDURE — 120N000009 HC R&B SNF

## 2024-03-16 PROCEDURE — 99310 SBSQ NF CARE HIGH MDM 45: CPT | Performed by: INTERNAL MEDICINE

## 2024-03-16 RX ADMIN — ACETAMINOPHEN 975 MG: 325 TABLET, FILM COATED ORAL at 22:06

## 2024-03-16 RX ADMIN — APIXABAN 2.5 MG: 2.5 TABLET, FILM COATED ORAL at 08:24

## 2024-03-16 RX ADMIN — ACETAMINOPHEN 975 MG: 325 TABLET, FILM COATED ORAL at 12:55

## 2024-03-16 RX ADMIN — METOPROLOL TARTRATE 25 MG: 25 TABLET, FILM COATED ORAL at 08:24

## 2024-03-16 RX ADMIN — FUROSEMIDE 20 MG: 20 TABLET ORAL at 08:24

## 2024-03-16 RX ADMIN — HYDROXYCHLOROQUINE SULFATE 200 MG: 200 TABLET ORAL at 08:24

## 2024-03-16 RX ADMIN — Medication 1 TABLET: at 08:24

## 2024-03-16 RX ADMIN — ACETAMINOPHEN 975 MG: 325 TABLET, FILM COATED ORAL at 08:24

## 2024-03-16 RX ADMIN — APIXABAN 2.5 MG: 2.5 TABLET, FILM COATED ORAL at 22:06

## 2024-03-16 RX ADMIN — POLYETHYLENE GLYCOL 3350 17 G: 17 POWDER, FOR SOLUTION ORAL at 08:24

## 2024-03-16 RX ADMIN — METOPROLOL TARTRATE 25 MG: 25 TABLET, FILM COATED ORAL at 22:06

## 2024-03-16 RX ADMIN — AMLODIPINE BESYLATE 5 MG: 5 TABLET ORAL at 08:24

## 2024-03-16 RX ADMIN — AMIODARONE HYDROCHLORIDE 200 MG: 200 TABLET ORAL at 08:24

## 2024-03-16 RX ADMIN — CALCITONIN SALMON 1 SPRAY: 200 SPRAY, METERED NASAL at 08:29

## 2024-03-16 RX ADMIN — LIDOCAINE 1 PATCH: 4 PATCH TOPICAL at 22:06

## 2024-03-16 RX ADMIN — GABAPENTIN 100 MG: 100 CAPSULE ORAL at 08:24

## 2024-03-16 ASSESSMENT — ACTIVITIES OF DAILY LIVING (ADL)
ADLS_ACUITY_SCORE: 48
ADLS_ACUITY_SCORE: 46
ADLS_ACUITY_SCORE: 48
ADLS_ACUITY_SCORE: 46
ADLS_ACUITY_SCORE: 48

## 2024-03-16 NOTE — PROGRESS NOTES
LORI met with Roxanna and family as they had a question. They were wondering if it's possible for SW to set up transportation at discharge for Roxanna. The daughter said they thought they were going to transport but it may be tough for Roxanna with her braces. Daughter says they have a wheelchair for Roxanna and was wondering if she needs to bring the wheelchair at discharge.LORI will consult with LORI Morales as Carmen will work on discharge.     Barbara Goldberg [facility] Hospital[facility],   [facility], [facility]    Social Work  [address] [place] [id number]  [phone] or [phone])

## 2024-03-16 NOTE — PLAN OF CARE
Goal Outcome Evaluation:  No acute issues overnight. Has been sleeping well throughout shift and has no c/o's as of yet. Wears TLSO when OOB. Lidoderm patch to lower back - off in AM. On 2gm Na diet.  0455 - Bed alarm sounded, pt.was sitting EOB getting up to go to the BR. Reminded on use of call light and placed within reach.      Patient's most recent vital signs are:     Vital signs:  BP: 128/44  Temp: 97.6  HR: 62  RR: 16  SpO2: 98 %     Patient does not have new respiratory symptoms.  Patient does not have new sore throat.  Patient does not have a fever greater than 99.5.

## 2024-03-16 NOTE — PLAN OF CARE
Goal Outcome Evaluation:         VS: Temp: 98  F (36.7  C) Temp src: Oral BP: (!) 156/62 Pulse: 65   Resp: 16 SpO2: 93 % O2 Device: None (Room air)     O2: Stable on RA    Output: Mixed continence of bladder    Last BM: Mixed incontinence    Activity: SBA w/ walker and GB    Skin: Intact  bruising to forearms    Pain: Mid back  scheduled tylenol given    CMS: A&O x2, disoriented to time. Intermittent confusion/ forgetful.   Bed alarms on for safety    Dressing: None    Diet: 2gm sodium diet.   Thin liquids   Good appetite   Takes pills whole with thin liquids    LDA: No lines, no drains    Equipment: TLSO brace when OOB    Plan: To discharge to [facility] 3/19    Additional Info:  Family came this afternoon to visit, pt and family sitting in activity room at end of hallway playing board games.       Patient does not have new respiratory symptoms.  Patient does not have new sore throat.  Patient does not have a fever greater than 99.5.

## 2024-03-16 NOTE — PLAN OF CARE
Goal Outcome Evaluation:    Patient is alert with intermittent confusion/forgetfulness. Able to make needs known. Pt denied SOB, N/V and chest pain. Pt received scheduled Tylenol and Lidocaine patch for lower back pain. Pt is stand-by assist with walker. Wears TLSO brace when up. Bed and chair alarm are used due to impulse and confusion. Reminded patient to use call light when in need. Call light within reach.    Patient's most recent vital signs are:     Vital signs:  BP: 128/44  Temp: 97.6  HR: 62  RR: 16  SpO2: 98 %     Patient does not have new respiratory symptoms.  Patient does not have new sore throat.  Patient does not have a fever greater than 99.5.

## 2024-03-16 NOTE — PROGRESS NOTES
[facility]    Medicine Progress Note - Hospitalist Service    Date of Admission:  3/2/2024    Assessment & Plan   Roxanna Stark is a 89 year old female with HTN, CKD, RA, PAD, aortic stenosis s/p AVR (04/2016), Atrial fibrillation (On Eliquis), HFpEF who presented to the [facility] ED from her nursing home via EMS after she was found down 02/24/24. ED work up notable of T12 burst fracture. She was admitted to the trauma service. Conservative management with a TLSO brace per Neurosurgery.  Transferred to TCU 3/2/2024 for her rehab  needs.         Today's changes: 3/16/2024    - No acute events overnight. No new issues. Patient is pleasant.   - Patient is waiting for discharge to assisted living.   - Patient denies chest pain, palpitations, shortness of breath, nausea, vomit, fever or chills.      #Compression Fracture:  # T12 burst fracture, > 40% height loss with mild retropulsion  Roxnana Stark was evaluated by Neurosurgery and managed non-operatively for her fractures. She was placed on spinal precautions and monitored with serial neurological examinations which remained stable. Orthotics was consulted for a thoraco-lumbar orthotic brace (TLSO).  Patient had post bracing and mobilization films performed and reviewed by Neurosurgery. She will need to wear the TLSO brace for comfort whenever she is out of bed.   Doing well in TCU, pain well-controlled and ambulating with a walker and therapy with her TLSO.  -Follow up in Neurosurgery clinic in 6 weeks with repeat X-rays.   -Continue TLSO when out of bed.  -PT, OT  ,therapy as able.   -Continue scheduled Tylenol, Lidoderm  -Continue on Miacalcin nasal spray for 2 weeks  -Continue on vitamin D3 2000 units daily  -Discussed outpatient follow-up with PCP to include DEXA scan if indicated     # Acute on chronic pain   Compensated.  See above.  - continue PTA: gabapentin 100mg  - Scheduled: Tylenol, Lidoderm patches     # Hypertension   # PAD  #  Severe CAD by imaging  # Aortic Stenosis s/p AVR w/ porcine valve 4/25/16  # pAfib   # HFpEF on most recent ECHO 01/2024  # Elongated Qtc 458/508  Recent Admission 1/27/24-2/1/24 for Afib with RVR vs Aflutter, decompensated CHF.  Echo 2/28/2024: EF 65-70%.  Porcine aortic valve.  Severe LAE.  Normal RV size and function.  Mild MR, TR.  Normal PASP.  Had bilateral effusions on imaging.  Was followed by cardiology in hospital, started amiodarone and Eliquis.  Stopped losartan due to CKD, borderline blood pressures. Lasix held in hospital due to NICOLAS, resumed on 3/2.  BMP stable, creatinine 1.27 3/7 .  Intermittent high bp, better now.   - Continue PTA: amiodarone, metoprolol, Eliquis  -Start amlodipine 5 mg daily with hold parameters  - resumed lasix 20 mg every day 3/2  -Daily weights  -BMP Q monday     # Acute hospital associated delirium, resolved  Noted mild delirium initially in hospital due to sleep deprivation, resolved with restored sleep cycles.  Head CT showed no acute findings with moderate volume loss and chronic small vessel ischemic changes, no hydrocephalus.  Fully oriented, appropriate on exam.  No behavioral concerns.  -Judicious use of pain medications, CNS active agents     # Acute Kidney Injury on CKD stage 3b  Creatinine baseline of 1.2-1.6 noted NICOLAS during recent hospitalization, peak creatinine 2.05.  On 2/28/2024.  Seen by nephrology.  Renal ultrasound 2/28 showed atrophic left kidney, no hydronephrosis.  FENa 0.9.  UA unremarkable except for 10 mg deciliter protein 2/28.  PTA sulfasalazine was discontinued.  NICOLAS subsequently resolved.  No dysuria, emptying well.  BMP stable, creatinine 1.15 on 3/2.  -Avoid NSAIDs, nephrotoxins  -BMP q mon     # Empiric treatment for possible Urinary Tract Infection - Resolved.      # Seronegative RA  PTA Plaquenil and sulfasalazine. Sulfasalazine discontinued in hospital due to NICOLAS/CKD. NICOLAS resolved. RA has been well-controlled, no deformity or active synovitis  on exam.  -Continue Plaquenil     # normocytic anemia  Baseline hemoglobin normal.  No bleeding clinically or by history.  Suspect component of ACD with RA, CKD. Hemoglobin stable at 10.8 on 3/2.     # Thyroid nodules, incidental finding    - Chest CT: Heterogeneous nodular thyroid. Correlate with thyroid ultrasound which may be performed on a nonemergent outpatient basis.   - TSH wnl  - Pt to follow-up with PCP after discharge for further work-up if needed      #Disposition  - Discharge process started.       Diet: Orders Placed This Encounter      Combination Diet Regular Diet Adult; 2 gm NA Diet      Diet     DVT Prophylaxis: DOAC  Grace Catheter: Not present  Lines: None     Cardiac Monitoring: None  Code Status: No CPR- Do NOT Intubate      Clinically Significant Risk Factors              # Hypoalbuminemia: Lowest albumin = 2.8 g/dL at 3/11/2024  5:44 AM, will monitor as appropriate     # Hypertension: Noted on problem list         # Moderate Malnutrition: based on nutrition assessment           Disposition Plan     Expected Discharge Date: 03/19/2024                    Daniel Grey MD  Hospitalist Service  [facility] [place]  Securely message with [employer] (more info)  Text page via [employer] Paging/Directory   ______________________________________________________________________    Interval History   Interval events reviewed.     No acute events overnight.   No new issues.     Physical Exam   Vital Signs: Temp: 98  F (36.7  C) Temp src: Oral BP: (!) 156/62 Pulse: 65   Resp: 16 SpO2: 93 % O2 Device: None (Room air)    Weight: 107 lbs 2.3 oz    General Appearance: Awake, interactive, NAD  HEENT: AT/NC, Anicteric, Moist MM  Respiratory: Clear lungs.    Cardiovascular: S1 S2 Regular.  GI/Abd: Soft. NT. ND. No g/r.  Extremities: Distally wwp. No pedal edema.  Neuro: AO x 4, Grossly non focal.   Psychiatry: Stable mood.     Medical Decision Making       45 MINUTES SPENT BY ME on the date of  service doing chart review, history, exam, documentation & further activities per the note.      Data         Imaging results reviewed over the past 24 hrs:   No results found for this or any previous visit (from the past 24 hour(s)).  Recent Labs   Lab 03/11/24  0544   WBC 10.1   HGB 10.5*   MCV 90         POTASSIUM 4.5   CHLORIDE 108*   CO2 26   BUN 22.5   CR 1.26*   ANIONGAP 8   ROEL 8.5   GLC 75   ALBUMIN 2.8*   PROTTOTAL 6.2*   BILITOTAL <0.2   ALKPHOS 171*   ALT 12   AST 19

## 2024-03-17 LAB — GLUCOSE BLDC GLUCOMTR-MCNC: 86 MG/DL (ref 70–99)

## 2024-03-17 PROCEDURE — 120N000009 HC R&B SNF

## 2024-03-17 PROCEDURE — 250N000013 HC RX MED GY IP 250 OP 250 PS 637: Performed by: INTERNAL MEDICINE

## 2024-03-17 RX ADMIN — APIXABAN 2.5 MG: 2.5 TABLET, FILM COATED ORAL at 21:24

## 2024-03-17 RX ADMIN — AMIODARONE HYDROCHLORIDE 200 MG: 200 TABLET ORAL at 09:03

## 2024-03-17 RX ADMIN — Medication 1 TABLET: at 09:03

## 2024-03-17 RX ADMIN — ACETAMINOPHEN 975 MG: 325 TABLET, FILM COATED ORAL at 21:24

## 2024-03-17 RX ADMIN — HYDROXYCHLOROQUINE SULFATE 200 MG: 200 TABLET ORAL at 09:03

## 2024-03-17 RX ADMIN — POLYETHYLENE GLYCOL 3350 17 G: 17 POWDER, FOR SOLUTION ORAL at 09:02

## 2024-03-17 RX ADMIN — ACETAMINOPHEN 975 MG: 325 TABLET, FILM COATED ORAL at 09:02

## 2024-03-17 RX ADMIN — METOPROLOL TARTRATE 25 MG: 25 TABLET, FILM COATED ORAL at 21:24

## 2024-03-17 RX ADMIN — METOPROLOL TARTRATE 25 MG: 25 TABLET, FILM COATED ORAL at 09:03

## 2024-03-17 RX ADMIN — LIDOCAINE 1 PATCH: 4 PATCH TOPICAL at 21:23

## 2024-03-17 RX ADMIN — AMLODIPINE BESYLATE 5 MG: 5 TABLET ORAL at 09:03

## 2024-03-17 RX ADMIN — FUROSEMIDE 20 MG: 20 TABLET ORAL at 09:03

## 2024-03-17 RX ADMIN — GABAPENTIN 100 MG: 100 CAPSULE ORAL at 09:03

## 2024-03-17 RX ADMIN — APIXABAN 2.5 MG: 2.5 TABLET, FILM COATED ORAL at 09:03

## 2024-03-17 ASSESSMENT — ACTIVITIES OF DAILY LIVING (ADL)
ADLS_ACUITY_SCORE: 44
ADLS_ACUITY_SCORE: 46
ADLS_ACUITY_SCORE: 46
ADLS_ACUITY_SCORE: 44
ADLS_ACUITY_SCORE: 46
ADLS_ACUITY_SCORE: 44
ADLS_ACUITY_SCORE: 46
ADLS_ACUITY_SCORE: 44
ADLS_ACUITY_SCORE: 46
ADLS_ACUITY_SCORE: 44

## 2024-03-17 NOTE — PLAN OF CARE
Goal Outcome Evaluation:         VS: Temp: 97.5  F (36.4  C) Temp src: Oral BP: (!) 141/49 Pulse: 67   Resp: 16 SpO2: 93 % O2 Device: None (Room air)     O2: Stable on RA    Output: Mixed continence of bladder    Last BM: Mixed incontinence   3/17    Activity: SBA w/ walker and GB    Skin: Intact  bruising to forearms    Pain: Mid back  scheduled tylenol given    CMS: A&O x2, disoriented to time. Intermittent confusion/ forgetful.   Bed alarms on for safety    Dressing: None    Diet: 2gm sodium diet.   Thin liquids   Good appetite   Takes pills whole with thin liquids    LDA: No lines, no drains    Equipment: TLSO brace when OOB    Plan: To discharge to senior care 3/19    Additional Info:  Family visiting this afternoon       Patient does not have new respiratory symptoms.  Patient does not have new sore throat.  Patient does not have a fever greater than 99.5.

## 2024-03-17 NOTE — PLAN OF CARE
Goal Outcome Evaluation:  No acute issues overnight. Has been sleeping well throughout shift and has no c/o's or requests as of yet. Wears TLSO when up and OOB. Bed alarm on and call light in reach.        Patient's most recent vital signs are:     Vital signs:  BP: 128/47  Temp: 98.5  HR: 65  RR: 16  SpO2: 92 %     Patient does not have new respiratory symptoms.  Patient does not have new sore throat.  Patient does not have a fever greater than 99.5.

## 2024-03-17 NOTE — PLAN OF CARE
Goal Outcome Evaluation:    Patient is alert with intermittent confusion/forgetfulness. Able to make needs known. Pt denied SOB, N/V and chest pain. Pt received scheduled Tylenol and Lidocaine patch for lower back pain. Pt is stand-by assist with walker. Wears TLSO brace when up. Bed and chair alarm are used due to impulse and confusion. Reminded patient to use call light when in need. Call light within reach.    Patient's most recent vital signs are:     Vital signs:  BP: 128/47  Temp: 98.5  HR: 65  RR: 16  SpO2: 92 %     Patient does not have new respiratory symptoms.  Patient does not have new sore throat.  Patient does not have a fever greater than 99.5.

## 2024-03-18 LAB
ANION GAP SERPL CALCULATED.3IONS-SCNC: 8 MMOL/L (ref 7–15)
BUN SERPL-MCNC: 35.8 MG/DL (ref 8–23)
CALCIUM SERPL-MCNC: 9 MG/DL (ref 8.2–9.6)
CHLORIDE SERPL-SCNC: 104 MMOL/L (ref 98–107)
CREAT SERPL-MCNC: 1.45 MG/DL (ref 0.51–0.95)
DEPRECATED HCO3 PLAS-SCNC: 26 MMOL/L (ref 22–29)
EGFRCR SERPLBLD CKD-EPI 2021: 33 ML/MIN/1.73M2
GLUCOSE SERPL-MCNC: 88 MG/DL (ref 70–99)
HOLD SPECIMEN: NORMAL
POTASSIUM SERPL-SCNC: 4.8 MMOL/L (ref 3.4–5.3)
SODIUM SERPL-SCNC: 138 MMOL/L (ref 135–145)

## 2024-03-18 PROCEDURE — 99316 NF DSCHRG MGMT 30 MIN+: CPT | Performed by: INTERNAL MEDICINE

## 2024-03-18 PROCEDURE — 250N000013 HC RX MED GY IP 250 OP 250 PS 637: Performed by: INTERNAL MEDICINE

## 2024-03-18 PROCEDURE — 80048 BASIC METABOLIC PNL TOTAL CA: CPT | Performed by: INTERNAL MEDICINE

## 2024-03-18 PROCEDURE — 120N000009 HC R&B SNF

## 2024-03-18 PROCEDURE — 36415 COLL VENOUS BLD VENIPUNCTURE: CPT | Performed by: INTERNAL MEDICINE

## 2024-03-18 RX ADMIN — GABAPENTIN 100 MG: 100 CAPSULE ORAL at 08:12

## 2024-03-18 RX ADMIN — ACETAMINOPHEN 975 MG: 325 TABLET, FILM COATED ORAL at 21:15

## 2024-03-18 RX ADMIN — HYDROXYCHLOROQUINE SULFATE 200 MG: 200 TABLET ORAL at 08:12

## 2024-03-18 RX ADMIN — AMIODARONE HYDROCHLORIDE 200 MG: 200 TABLET ORAL at 08:12

## 2024-03-18 RX ADMIN — Medication 1 TABLET: at 08:12

## 2024-03-18 RX ADMIN — METOPROLOL TARTRATE 25 MG: 25 TABLET, FILM COATED ORAL at 21:17

## 2024-03-18 RX ADMIN — FUROSEMIDE 20 MG: 20 TABLET ORAL at 08:12

## 2024-03-18 RX ADMIN — LIDOCAINE 1 PATCH: 4 PATCH TOPICAL at 21:16

## 2024-03-18 RX ADMIN — METOPROLOL TARTRATE 25 MG: 25 TABLET, FILM COATED ORAL at 08:12

## 2024-03-18 RX ADMIN — ACETAMINOPHEN 975 MG: 325 TABLET, FILM COATED ORAL at 08:11

## 2024-03-18 RX ADMIN — APIXABAN 2.5 MG: 2.5 TABLET, FILM COATED ORAL at 21:16

## 2024-03-18 RX ADMIN — APIXABAN 2.5 MG: 2.5 TABLET, FILM COATED ORAL at 08:12

## 2024-03-18 RX ADMIN — POLYETHYLENE GLYCOL 3350 17 G: 17 POWDER, FOR SOLUTION ORAL at 08:12

## 2024-03-18 RX ADMIN — ACETAMINOPHEN 975 MG: 325 TABLET, FILM COATED ORAL at 13:57

## 2024-03-18 RX ADMIN — AMLODIPINE BESYLATE 5 MG: 5 TABLET ORAL at 08:12

## 2024-03-18 ASSESSMENT — ACTIVITIES OF DAILY LIVING (ADL)
ADLS_ACUITY_SCORE: 40
ADLS_ACUITY_SCORE: 44
ADLS_ACUITY_SCORE: 40
ADLS_ACUITY_SCORE: 40
ADLS_ACUITY_SCORE: 44
ADLS_ACUITY_SCORE: 40
ADLS_ACUITY_SCORE: 44
ADLS_ACUITY_SCORE: 40
ADLS_ACUITY_SCORE: 40
ADLS_ACUITY_SCORE: 44
ADLS_ACUITY_SCORE: 40

## 2024-03-18 NOTE — DISCHARGE SUMMARY
[facility]  Hospitalist Discharge Summary      Date of Admission:  3/2/2024  Date of Discharge:  3/19/2024  Discharging Provider: Daniel Grey MD  Discharge Service: Hospitalist Service    Discharge Diagnoses     Compression Fracture   T12 burst fracture, > 40% height loss with mild retropulsion   Acute on chronic pain    Weakness and deconditioning     Clinically Significant Risk Factors     # Moderate Malnutrition: based on nutrition assessment      Follow-ups Needed After Discharge   Follow-up Appointments     Follow Up and recommended labs and tests      Follow up with primary care provider in 7 days.  No follow up labs or   test are needed.            Unresulted Labs Ordered in the Past 30 Days of this Admission       No orders found from 2/1/2024 to 3/3/2024.            Discharge Disposition   Discharged to home  Condition at discharge: Stable    Hospital Course   Roxanna Stark is a 89 year old female with HTN, CKD, RA, PAD, aortic stenosis s/p AVR (04/2016), Atrial fibrillation (On Eliquis), HFpEF who presented to the [facility] ED from her nursing home via EMS after she was found down 02/24/24. ED work up notable of T12 burst fracture. She was admitted to the trauma service. Conservative management with a TLSO brace per Neurosurgery.  Patient was transferred to TCU on 3/2/2024 for ongoing rehabilitation. Patient was evaluated by PT / OT and she completed rehabilitation program. No complications during her TCU stay.       #Compression Fracture:  # T12 burst fracture, > 40% height loss with mild retropulsion  Roxanna Stark was evaluated by Neurosurgery and managed non-operatively for her fractures. She was placed on spinal precautions and monitored with serial neurological examinations which remained stable. Orthotics was consulted for a thoraco-lumbar orthotic brace (TLSO).  Patient had post bracing and mobilization films performed and reviewed by Neurosurgery. She will need to  wear the TLSO brace for comfort whenever she is out of bed.   Doing well in TCU, pain well-controlled and ambulating with a walker and therapy with her TLSO.  -Follow up in Neurosurgery clinic in 6 weeks with repeat X-rays.   -Continue TLSO when out of bed.  -PT, OT  ,therapy as able.   -Continue scheduled Tylenol, Lidoderm  -Continue on Miacalcin nasal spray for 2 weeks  -Continue on vitamin D3 2000 units daily  -Discussed outpatient follow-up with PCP to include DEXA scan if indicated     # Acute on chronic pain   - continue PTA: gabapentin 100mg  - Scheduled: Tylenol, Lidoderm patches     # Hypertension   # PAD  # Severe CAD by imaging  # Aortic Stenosis s/p AVR w/ porcine valve 4/25/16  # pAfib   # HFpEF on most recent ECHO 01/2024  # Elongated Qtc 458/508  Recent Admission 1/27/24-2/1/24 for Afib with RVR vs Aflutter, decompensated CHF.  Echo 2/28/2024: EF 65-70%.  Porcine aortic valve.  Severe LAE.  Normal RV size and function.  Mild MR, TR.  Normal PASP.  Had bilateral effusions on imaging.  Was followed by cardiology in hospital, started amiodarone and Eliquis.  Stopped losartan due to CKD, borderline blood pressures. Lasix held in hospital due to NICOLAS, resumed on 3/2.  BMP stable, creatinine 1.27 3/7 .  Intermittent high bp, better now.   -Continue PTA: amiodarone, metoprolol, Eliquis  -Start amlodipine 5 mg daily with hold parameters  -Resumed lasix 20 mg every day 3/2  -Daily weights  -BMP Q monday     # Acute hospital associated delirium, resolved  Noted mild delirium initially in hospital due to sleep deprivation, resolved with restored sleep cycles.  Head CT showed no acute findings with moderate volume loss and chronic small vessel ischemic changes, no hydrocephalus.  Fully oriented, appropriate on exam.  No behavioral concerns.  -Judicious use of pain medications, CNS active agents     # Acute Kidney Injury on CKD stage 3b  Creatinine baseline of 1.2-1.6 noted NICOLAS during recent hospitalization, peak  creatinine 2.05.  On 2/28/2024.  Seen by nephrology.  Renal ultrasound 2/28 showed atrophic left kidney, no hydronephrosis.  FENa 0.9.  UA unremarkable except for 10 mg deciliter protein 2/28.  PTA sulfasalazine was discontinued.  NICOLAS subsequently resolved.  No dysuria, emptying well.  BMP stable, creatinine 1.15 on 3/2.  -Avoid NSAIDs, nephrotoxins  -Cr 1.45. Recommended follow-up as outpatient.      # Empiric treatment for possible Urinary Tract Infection - Resolved.      # Seronegative RA  PTA Plaquenil and sulfasalazine. Sulfasalazine discontinued in hospital due to NICOLAS/CKD. NICOLAS resolved. RA has been well-controlled, no deformity or active synovitis on exam.  -Continue Plaquenil     # normocytic anemia  Baseline hemoglobin normal.  No bleeding clinically or by history.  Suspect component of ACD with RA, CKD. Hemoglobin stable at 10.8 on 3/2.     # Thyroid nodules, incidental finding    - Chest CT: Heterogeneous nodular thyroid. Correlate with thyroid ultrasound which may be performed on a nonemergent outpatient basis.   - TSH wnl  - Pt to follow-up with PCP after discharge for further work-up if needed        Consultations This Hospital Stay   PHYSICAL THERAPY ADULT IP CONSULT  OCCUPATIONAL THERAPY ADULT IP CONSULT  SPEECH LANGUAGE PATH ADULT IP CONSULT  PHYSICAL THERAPY ADULT IP CONSULT  OCCUPATIONAL THERAPY ADULT IP CONSULT    Code Status   No CPR- Do NOT Intubate    Time Spent on this Encounter   IDaniel MD, personally saw the patient today and spent greater than 30 minutes discharging this patient.       Daniel Grey MD  [facility] [address] [address]  Phone: [phone]  ______________________________________________________________________    Physical Exam   Vital Signs: Temp: 97.8  F (36.6  C) Temp src: Oral BP: (!) 142/68 Pulse: 63   Resp: 16 SpO2: 96 % O2 Device: None (Room air)    Weight: 104 lbs 11.5  oz  General Appearance:  Awake, interactive, NAD  HEENT: AT/NC, Anicteric, Moist MM  Respiratory: Clear lungs.    Cardiovascular: S1 S2 Regular.  GI/Abd: Soft. NT. ND. No g/r.  Extremities: Distally wwp. No pedal edema.  Neuro: AO x 4, Grossly non focal.   Psychiatry: Stable mood.        Primary Care Physician   Swapna Gaines    Discharge Orders      Home Care Referral      Mantoux instructions    Give two-step Mantoux (PPD) Per Facility Policy Yes     Follow Up and recommended labs and tests    Follow up with primary care provider in 7 days.  No follow up labs or test are needed.     Reason for your hospital stay    89 year old female with HTN, CKD, RA, PAD, aortic stenosis s/p AVR (04/2016), Atrial fibrillation (On Eliquis), HFpEF who presented to the [facility] ED from her nursing home via EMS after she was found down 02/24/24. ED work up notable of T12 burst fracture. She was admitted to the trauma service. Conservative management with a TLSO brace per Neurosurgery.  Transferred to TCU 3/2/2024 for her rehab  needs.     Activity - Up ad danya     Physical Therapy Adult Consult    Evaluate and treat as clinically indicated.    Reason:  Weakness     Occupational Therapy Adult Consult    Evaluate and treat as clinically indicated.    Reason:  Weakness     Fall precautions     Diet    Follow this diet upon discharge: Orders Placed This Encounter      Snacks/Supplements Adult: Other; Strawberry Greek yogurt at 10 am; With Meals      Snacks/Supplements Adult: Ensure Enlive; With Meals      Combination Diet Regular Diet Adult; 2 gm NA Diet       Significant Results and Procedures   Results for orders placed or performed during the hospital encounter of 02/24/24   CT Head w/o Contrast    Narrative    EXAM: CT HEAD W/O CONTRAST, CT CERVICAL SPINE W/O CONTRAST  LOCATION: [facility]  DATE: 2/25/2024    INDICATION: Head and neck injury  COMPARISON: None.  TECHNIQUE:   1) Routine CT Head  without IV contrast. Multiplanar reformats. Dose reduction techniques were used.  2) Routine CT Cervical Spine without IV contrast. Multiplanar reformats. Dose reduction techniques were used.    FINDINGS:   HEAD CT:   INTRACRANIAL CONTENTS: No intracranial hemorrhage, extraaxial collection, or mass effect.  No CT evidence of acute infarct. Moderate presumed chronic small vessel ischemic changes. Moderate generalized volume loss. No hydrocephalus.     VISUALIZED ORBITS/SINUSES/MASTOIDS: No intraorbital abnormality. Opacification anterior aspect of right sphenoid sinus. No middle ear or mastoid effusion.    BONES/SOFT TISSUES: No acute abnormality.    CERVICAL SPINE CT:   VERTEBRA: Normal vertebral body heights. Straightening of cervical lordosis. Minimal anterior subluxation C7 on T1 and minimal posterior subluxation C3 on C4. No fracture or posttraumatic subluxation.     CANAL/FORAMINA: Mild to moderate degenerative changes with mild canal narrowing at C3-C4, C5-C6. Multilevel prominent neural foraminal narrowing.    PARASPINAL: 2 cm nodule left thyroid gland; ultrasound recommended, if not done previously. Mild bilateral pleural effusions.      Impression    IMPRESSION:  HEAD CT:  1.  No acute intracranial process.    CERVICAL SPINE CT:  1.  No CT evidence for acute fracture or post traumatic subluxation.   CT Cervical Spine w/o Contrast    Narrative    EXAM: CT HEAD W/O CONTRAST, CT CERVICAL SPINE W/O CONTRAST  LOCATION: [facility]  DATE: 2/25/2024    INDICATION: Head and neck injury  COMPARISON: None.  TECHNIQUE:   1) Routine CT Head without IV contrast. Multiplanar reformats. Dose reduction techniques were used.  2) Routine CT Cervical Spine without IV contrast. Multiplanar reformats. Dose reduction techniques were used.    FINDINGS:   HEAD CT:   INTRACRANIAL CONTENTS: No intracranial hemorrhage, extraaxial collection, or mass effect.  No CT evidence of acute  infarct. Moderate presumed chronic small vessel ischemic changes. Moderate generalized volume loss. No hydrocephalus.     VISUALIZED ORBITS/SINUSES/MASTOIDS: No intraorbital abnormality. Opacification anterior aspect of right sphenoid sinus. No middle ear or mastoid effusion.    BONES/SOFT TISSUES: No acute abnormality.    CERVICAL SPINE CT:   VERTEBRA: Normal vertebral body heights. Straightening of cervical lordosis. Minimal anterior subluxation C7 on T1 and minimal posterior subluxation C3 on C4. No fracture or posttraumatic subluxation.     CANAL/FORAMINA: Mild to moderate degenerative changes with mild canal narrowing at C3-C4, C5-C6. Multilevel prominent neural foraminal narrowing.    PARASPINAL: 2 cm nodule left thyroid gland; ultrasound recommended, if not done previously. Mild bilateral pleural effusions.      Impression    IMPRESSION:  HEAD CT:  1.  No acute intracranial process.    CERVICAL SPINE CT:  1.  No CT evidence for acute fracture or post traumatic subluxation.   CT Thoracic Spine w/o Contrast    Narrative    EXAM: CT THORACIC SPINE W/O CONTRAST, CT LUMBAR SPINE W/O CONTRAST  LOCATION: [facility]  DATE: 2/25/2024    INDICATION: Fall, back pain.  COMPARISON: None.  TECHNIQUE:  1) Routine CT Thoracic Spine without IV contrast. Multiplanar reformats. Dose reduction techniques were used.   2) Routine CT Lumbar Spine without IV contrast. Multiplanar reformats. Dose reduction techniques were used.     FINDINGS:    THORACIC SPINE CT:  VERTEBRA: Normal alignment. T12 burst fracture with involvement of the superior and inferior endplates resulting in 40% vertebral body height loss and retropulsion of the posterior superior cortex by 5 mm. No other fractures.      CANAL/FORAMINA: Mild to moderate spondylosis. Mild spinal canal stenosis associated with retropulsion at T12. Moderate right foraminal stenosis at T11-T12. Otherwise, no significant spinal canal or  foraminal stenosis.    PARASPINAL: See dedicated chest CT regarding intrathoracic findings.    LUMBAR SPINE CT:  VERTEBRA: Accentuation of lumbar lordosis with mild anterior spondylolisthesis at L4-L5 and L5-S1 and posterior spondylolisthesis at L1-L2. Mild rightward curvature centered at L2-L3. Otherwise normal alignment. Normal vertebral body heights. No fracture   or posttraumatic subluxation.     CANAL/FORAMINA: Severe left and moderate right foraminal stenoses at L1-L2. Moderate left foraminal stenosis at L2-L3. Moderate foraminal stenosis at L3-L4. Mild to moderate foraminal stenosis at L4-L5. Severe right and moderate left foraminal stenosis   at L5-S1.    PARASPINAL: See dedicated abdomen and pelvis CT regarding intra-abdominal findings.      Impression    IMPRESSION:  THORACIC SPINE CT:  1.  T12 burst fracture with involvement of the superior and inferior endplate resulting in 40% height loss with retropulsion causing mild spinal canal stenosis. No other fractures.    LUMBAR SPINE CT:  1.  No fracture or posttraumatic subluxation.  2.  Spondylosis with foraminal stenoses as detailed.     CT Lumbar Spine w/o Contrast    Narrative    EXAM: CT THORACIC SPINE W/O CONTRAST, CT LUMBAR SPINE W/O CONTRAST  LOCATION: [facility]  DATE: 2/25/2024    INDICATION: Fall, back pain.  COMPARISON: None.  TECHNIQUE:  1) Routine CT Thoracic Spine without IV contrast. Multiplanar reformats. Dose reduction techniques were used.   2) Routine CT Lumbar Spine without IV contrast. Multiplanar reformats. Dose reduction techniques were used.     FINDINGS:    THORACIC SPINE CT:  VERTEBRA: Normal alignment. T12 burst fracture with involvement of the superior and inferior endplates resulting in 40% vertebral body height loss and retropulsion of the posterior superior cortex by 5 mm. No other fractures.      CANAL/FORAMINA: Mild to moderate spondylosis. Mild spinal canal stenosis associated  with retropulsion at T12. Moderate right foraminal stenosis at T11-T12. Otherwise, no significant spinal canal or foraminal stenosis.    PARASPINAL: See dedicated chest CT regarding intrathoracic findings.    LUMBAR SPINE CT:  VERTEBRA: Accentuation of lumbar lordosis with mild anterior spondylolisthesis at L4-L5 and L5-S1 and posterior spondylolisthesis at L1-L2. Mild rightward curvature centered at L2-L3. Otherwise normal alignment. Normal vertebral body heights. No fracture   or posttraumatic subluxation.     CANAL/FORAMINA: Severe left and moderate right foraminal stenoses at L1-L2. Moderate left foraminal stenosis at L2-L3. Moderate foraminal stenosis at L3-L4. Mild to moderate foraminal stenosis at L4-L5. Severe right and moderate left foraminal stenosis   at L5-S1.    PARASPINAL: See dedicated abdomen and pelvis CT regarding intra-abdominal findings.      Impression    IMPRESSION:  THORACIC SPINE CT:  1.  T12 burst fracture with involvement of the superior and inferior endplate resulting in 40% height loss with retropulsion causing mild spinal canal stenosis. No other fractures.    LUMBAR SPINE CT:  1.  No fracture or posttraumatic subluxation.  2.  Spondylosis with foraminal stenoses as detailed.     CT Chest Abdomen Pelvis w/o Contrast    Narrative    EXAM: CT CHEST ABDOMEN PELVIS W/O CONTRAST  LOCATION: [facility]  DATE: 2/25/2024    INDICATION: Fall, back and L abdominal pain. Low GFR.  COMPARISON: Two-view chest radiograph 01/27/2024, CT chest 03/18/2016  TECHNIQUE: CT scan of the chest, abdomen, and pelvis was performed without IV contrast. Multiplanar reformats were obtained. Dose reduction techniques were used.   CONTRAST: None.    FINDINGS:     LUNGS AND PLEURA: Calcified granulomas of the left lower lobe. Small to moderate pleural effusions right greater than left with compressive atelectasis. There is also partial collapse of the right middle lobe and  lingula.    MEDIASTINUM/AXILLAE: Heterogeneous nodular thyroid. Calcified mediastinal and hilar lymph nodes compatible with prior granulomatous disease. Aortic valve replacement. Severe mitral annular calcifications. No significant pericardial effusion. Upper limits   normal heart size.    CORONARY ARTERY CALCIFICATION: Severe.    HEPATOBILIARY: Calcified hepatic granulomas. Mild layering sludge or dense bile within the gallbladder, which is otherwise unremarkable.    PANCREAS: Somewhat atrophic, otherwise unremarkable.    SPLEEN: Multiple calcified splenic granulomas.    ADRENAL GLANDS: Normal.    KIDNEYS/BLADDER: Atrophic left kidney with small renal calyceal calculi, nonobstructing. Somewhat hypertrophied right kidney without hydronephrosis. Unremarkable urinary bladder.    BOWEL: Colonic diverticulosis. Large colonic stool suggestive of constipation. Normal appendix. No bowel obstruction.    LYMPH NODES: Normal.    VASCULATURE: Diffuse atherosclerotic calcifications of the aortoiliac vessels without evidence of aneurysmal dilatation.    PELVIC ORGANS: Periuterine calcifications.     MUSCULOSKELETAL: Prior median sternotomy. Degenerative changes of the shoulders and hips. Degenerative changes of the pubic symphysis. Multilevel degenerative changes of the cervicothoracic and lumbosacral spine. Acute or subacute-appearing burst-type   compression fracture of T12, new from CXR from 1/27/24. Mild bony retropulsion of superior endplate.      Impression    IMPRESSION:    1.  Acute-appearing burst-type compression fracture of T12, new from CXR from 1/27/24. Mild bony retropulsion of superior endplate resulting in mild canal narrowing. Small associated anterior paravertebral hematoma. Please see separately dictated CT   spine reconstruction reports for additional details.  2.  No other evidence of acute traumatic abnormality of the chest, abdomen, or pelvis.  3.  Small to moderate pleural effusions right greater than  left with compressive atelectasis. There is also partial collapse of the right middle lobe and lingula.  4.  Evidence of prior granulomatous disease.  5.  Severe coronary artery atherosclerotic calcifications.  6.  Heterogeneous nodular thyroid. Correlate with thyroid ultrasound which may be performed on a nonemergent outpatient basis.  7.  Large colonic stool suggestive of constipation.  8.  No other acute process within the chest, abdomen, or pelvis.   XR Thoracic Lumbar Standing 2 Views    Narrative    EXAM: XR THORACIC LUMBAR STANDING 2 VIEWS  LOCATION: [facility]  DATE: 02/25/2024    INDICATION: T12 burst fracture with TLSO on.  COMPARISON: CT thoracic spine 02/25/2024.      Impression    IMPRESSION: Moderate burst compression fracture deformity of the T12 vertebral body with approximately 40% loss of vertebral body height again noted. No other fractures identified. Alignment of the visualized portions of the spine is normal.      XR Thoracic Lumbar Standing 2 Views    Narrative    XR THORACIC LUMBAR STANDING 2 VIEWS  2/27/2024 9:43 AM      HISTORY: Known T12 burst fracture    COMPARISON: 2/25/2024    FINDINGS:   AP and lateral views of the thoracolumbar spine. Postoperative changes  of aortic valve replacement. Similar basilar probable pulmonary  opacities left greater than right pleural effusions. Similar  appearance of the T12 vertebral body burst fracture with similar  amount of retropulsion when accounting for patient positioning. Mild  retrolisthesis of L1 on L2. Similar multilevel degenerative change. No  new osseous abnormality.      Impression    IMPRESSION:   Similar appearance of the known T12 vertebral body burst fracture.    JOANNA LEE MD         SYSTEM ID:  [id number]   XR Abdomen 1 View    Narrative    Exam: XR ABDOMEN 1 VIEW, 2/27/2024 9:42 AM    Indication: abdominal pain, distension, known T12 vertebrae fracture    Comparison: CT thoracic and  lumbar spine 2/25/2024. CT chest abdomen  and pelvis 2/25/2024.    Findings:   Supine abdominal radiograph. Overlying radiopaque structure secondary  to external brace for spinal fracture. Scattered loops of bowel. There  is a borderline small bowel loop in the right lower quadrant measuring  2.8 cm however overall pattern does not appear obstructed. Large  volume of stool in the distal colon noted with fecal retention in the  ascending and transverse region likely improved from prior CT. Limited  assessment for free air on supine imaging. Slight curvature of the  spine with degenerative changes. Partially visualized fracture of T12.  Vascular calcifications in the pelvis.      Impression    Impression:   1.  There is still a large amount of retained stool in the  rectosigmoid colon however more proximal fecal retention appears  improved from CT. Borderline gas-filled small bowel loops however  overall pattern does not appear obstructed.  2.  T12 fracture partially visualized. Please see recent spine  imaging.    RAFAEL ELAM MD         SYSTEM ID:  [id number]   US Renal Complete Non-Vascular     Value    Radiologist flags Renal cyst    Narrative    EXAMINATION: Ultrasound renal complete nonvascular 2/28/2024 9:16 AM     COMPARISON: None.    HISTORY: Renal failure    FINDINGS:    Right kidney: Measures 11 cm in length. No focal mass. No  hydronephrosis.    Left kidney: Not well-visualized; atrophic and measures  about 4.6 cm  cm in length. Focal predominantly anechoic lesion with no internal  flow on color Doppler measuring 1.9 x 1.3 x 1.5 cm. No hydronephrosis.      Bladder: Partially distended and within normal limits.        Impression    IMPRESSION:       1. Left kidney is not well seen and appears atrophic; grossly no  hydronephrosis. Focal lesion along the left kidney measuring up to 1.9  cm, indeterminate, possibly cyst, recommend attention on follow-up.  2. No right-sided  hydronephrosis.        [  [Consider Follow Up: Renal cyst]    This report will be copied to the [facility] to ensure a  provider acknowledges the finding. Access Center is available Monday  through Friday 8am-3:30 pm.       EMILY VALERO MD         SYSTEM ID:  [id number]   Echo Complete     Value    LVEF  65-70%    Narrative    [id number]  [id number]  [id number]  [id number]^CHEYANNE^TOM     [facility] [place]  Echocardiography Laboratory  [address] [place] [id number]     Name: ADONIS BIGGS  MRN: [id number]  : 1927  Study Date: 2024 10:53 AM  Age: 89 yrs  Gender: Female  Patient Location: [facility]  Reason For Study: Heart Failure  Ordering Physician: CHEYANNE SHER  Performed By: Elisa Chin RDCS     BSA: 1.5 m2  Height: 60 in  Weight: 112 lb  BP: 110/56 mmHg  ______________________________________________________________________________  Procedure  Echocardiogram with two-dimensional, color and spectral Doppler performed.  ______________________________________________________________________________  Interpretation Summary  Global and regional left ventricular function is hyperkinetic with an EF of  65-70%.  Right ventricular function, chamber size, wall motion, and thickness are  normal.  S/P Minimally invasive aortic valve replacement with 21 mm Epic porcine valve  on 2016. Mean aortic gradient 10mmHg. No change.  Pulmonary artery systolic pressure is normal.  IVC diameter and respiratory changes fall into an intermediate range  suggesting an RA pressure of 8 mmHg.  No pericardial effusion is present.  A bilateral pleural effusion is present.  ______________________________________________________________________________  Left Ventricle  Global and regional left ventricular function is hyperkinetic with an EF of  65-70%. Left ventricular wall thickness is normal. Left ventricular size is  normal. Diastolic function not assessed due  to significant mitral annular  calcification. No regional wall motion abnormalities are seen.     Right Ventricle  Right ventricular function, chamber size, wall motion, and thickness are  normal.     Atria  The right atria appears normal. Severe left atrial enlargement is present.     Mitral Valve  Severe mitral annular calcification is present. Mild mitral insufficiency is  present. The peak mitral valve gradient is 13.8 mmHg.     Aortic Valve  S/P Minimally invasive aortic valve replacement with 21 mm Epic porcine valve  on 2016. Mean aortic gradient 10mmHg. No change. AV AT 99ms.     Tricuspid Valve  The tricuspid valve is normal. Mild tricuspid insufficiency is present. The  right ventricular systolic pressure is approximated at 27.9 mmHg plus the  right atrial pressure. Pulmonary artery systolic pressure is normal.     Pulmonic Valve  The pulmonic valve is normal. Trace to mild pulmonic insufficiency is present.     Vessels  The thoracic aorta cannot be assessed. The pulmonary artery cannot be  assessed. IVC diameter and respiratory changes fall into an intermediate range  suggesting an RA pressure of 8 mmHg.     Pericardium  No pericardial effusion is present.     Miscellaneous  A bilateral pleural effusion is present.  ______________________________________________________________________________  MMode/2D Measurements & Calculations  LVOT diam: 1.8 cm  LVOT area: 2.5 cm2  LA Volume (BP): 71.6 ml  LA Volume Index (BP): 49.0 ml/m2  RV Base: 3.8 cm     Doppler Measurements & Calculations  MV E max damien: 181.0 cm/sec  MV A max damien: 124.0 cm/sec  MV E/A: 1.5  MV max P.8 mmHg  MV mean P.0 mmHg  MV V2 VTI: 61.2 cm  MVA(VTI): 1.2 cm2  MV dec time: 0.26 sec     Ao V2 max: 209.0 cm/sec  Ao max P.5 mmHg  Ao V2 mean: 150.0 cm/sec  Ao mean PG: 10.0 mmHg  Ao V2 VTI: 48.4 cm  JAVIER(I,D): 1.5 cm2  JAVIER(V,D): 1.4 cm2  Ao acc time: 0.10 sec  LV V1 max P.6 mmHg  LV V1 max: 118.0 cm/sec  LV V1 VTI: 28.3  cm  SV(LVOT): 72.0 ml  SI(LVOT): 49.4 ml/m2  PA acc time: 0.18 sec  PI end-d juan c: 110.0 cm/sec  TR max juan c: 264.0 cm/sec  TR max P.9 mmHg  AV Juan C Ratio (DI): 0.56  JAVIER Index (cm2/m2): 1.0  E/E' av.7  Lateral E/e': 41.6  Medial E/e': 37.8     ______________________________________________________________________________  Report approved by: Alin Costello 2024 11:36 AM             Discharge Medications   Current Discharge Medication List        START taking these medications    Details   amLODIPine (NORVASC) 5 MG tablet Take 1 tablet (5 mg) by mouth daily  Qty: 30 tablet, Refills: 0    Associated Diagnoses: Burst fracture of T12 vertebra (H)      bisacodyl (DULCOLAX) 10 MG suppository Place 1 suppository (10 mg) rectally daily as needed for constipation  Qty: 10 suppository, Refills: 0    Associated Diagnoses: Burst fracture of T12 vertebra (H)      calcium carbonate-vitamin D (OSCAL) 500-5 MG-MCG tablet Take 1 tablet by mouth daily  Qty: 30 tablet, Refills: 0    Associated Diagnoses: Burst fracture of T12 vertebra (H)           CONTINUE these medications which have CHANGED    Details   acetaminophen (TYLENOL) 325 MG tablet Take 3 tablets (975 mg) by mouth every 8 hours as needed for mild pain, headaches or fever  Qty: 30 tablet, Refills: 0    Associated Diagnoses: Fall, initial encounter; T12 burst fracture (H)      amiodarone (PACERONE) 200 MG tablet Take 2 tablets (400 mg) by mouth daily for 6 days, THEN 1 tablet (200 mg) daily for 90 days.  Qty: 60 tablet, Refills: 0    Associated Diagnoses: Atrial flutter with rapid ventricular response (H)      apixaban ANTICOAGULANT (ELIQUIS) 2.5 MG tablet Take 1 tablet (2.5 mg) by mouth 2 times daily  Qty: 180 tablet, Refills: 3    Associated Diagnoses: Atrial flutter with rapid ventricular response (H)      furosemide (LASIX) 20 MG tablet Take 1 tablet (20 mg) by mouth daily  Qty: 30 tablet, Refills: 0    Associated Diagnoses: Burst fracture of T12 vertebra  (H)      gabapentin (NEURONTIN) 100 MG capsule Take 1 capsule (100 mg) by mouth daily  Qty: 30 capsule, Refills: 0    Associated Diagnoses: Neuropathy of both feet      hydroxychloroquine (PLAQUENIL) 200 MG tablet Take 1 tablet (200 mg) by mouth daily  Qty: 30 tablet, Refills: 0    Associated Diagnoses: Rheumatoid arthritis of multiple sites with negative rheumatoid factor (H)      Lidocaine (LIDOCARE) 4 % Patch Place 1 patch onto the skin every 24 hours To prevent lidocaine toxicity, patient should be patch free for 12 hrs daily.  Qty: 20 patch, Refills: 0    Associated Diagnoses: Burst fracture of T12 vertebra (H)      metoprolol tartrate (LOPRESSOR) 25 MG tablet Take 1 tablet (25 mg) by mouth 2 times daily  Qty: 60 tablet, Refills: 0    Associated Diagnoses: Burst fracture of T12 vertebra (H)      polyethylene glycol (MIRALAX) 17 GM/Dose powder Take 17 g by mouth daily  Qty: 510 g, Refills: 0    Associated Diagnoses: Burst fracture of T12 vertebra (H)      senna-docusate (SENOKOT-S/PERICOLACE) 8.6-50 MG tablet Take 1 tablet by mouth 2 times daily as needed for constipation  Qty: 30 tablet, Refills: 0    Associated Diagnoses: Burst fracture of T12 vertebra (H)           CONTINUE these medications which have NOT CHANGED    Details   Misc. Devices (ROLLATOR ULTRA-LIGHT) MISC       Omega-3 Fatty Acids (OMEGA-3 FISH OIL PO) Take 1 tablet twice daily.           STOP taking these medications       calcium-vitamin D 500-125 MG-UNIT TABS Comments:   Reason for Stopping:         ICAPS PO Comments:   Reason for Stopping:         Menthol, Topical Analgesic, 4 % GEL Comments:   Reason for Stopping:             Allergies   Allergies   Allergen Reactions    Lisinopril Cough

## 2024-03-18 NOTE — PROGRESS NOTES
LORI faxed the signed orders and after summary visit to [facility].    LORI met with pt and daughter/ANNIE. They are happy everything is working out.  All is set up.     LORI set up transportation with . Pt will have her own w/c to go in. 9:10-9:50.     Discharge Plan     Discharge Date: 3/18/24  Discharge Disposition:  Shena Sal    main [phone]    direct [phone]    fax [phone]  [address] [facility] [address]    Discharge Services: [facility] PT/OT/RN      ORDERS AND DISCHARGE SUMMARY WILL be pulled off Epic  Discharge Supplies: All DME supplied by PT/OT    Discharge Transportation:  GHISLAINE Osman   [facility], Transitional Care Unit   Social Work   [facility] [place] [address]  () [phone]

## 2024-03-18 NOTE — PLAN OF CARE
Goal Outcome Evaluation:  No acute issues overnight. Sleeping well per usual. Bed alarm on. TLSO when up and OOB. Lidoderm patch to lower back - off in AM. Per CNA, noted bladder incontinence during rounds x1. Discharging retirement tomorrow.        Patient's most recent vital signs are:     Vital signs:  BP: 148/48  Temp: 98.5  HR: 65  RR: 16  SpO2: 97 %     Patient does not have new respiratory symptoms.  Patient does not have new sore throat.  Patient does not have a fever greater than 99.5.

## 2024-03-18 NOTE — PLAN OF CARE
Goal Outcome Evaluation:      Plan of Care Reviewed With: patient    Overall Patient Progress: improving  Orientation: Alert and oriented x4. Able to make needs known to staff.   Bowel & Bladder: Continent of both bowel and bladder. Voids spontaneously without difficulty.  Medications: Takes medication whole with thin liquids.  Pain: Pain managed with scheduled acetaminophen which was effective.  Ambulation/Transfers: with one assist. w/ walker and gait belt.  Diet: 2 gm NA Diet with good  appetite.  Tubes/Lines/Drains: None  Oxygen: Room air  Skin: No issues  Infection/Isolation: No active isolations.  Safety: No concerns noted this shift. Door opened per patient's request.  Other: Denied chest pain and SOB. Call-light within reach. Utilizes call-light appropriately. Continue with POC.    Vital signs: T: 97.8,  B/P: 142/68,  P: 63,  R: 16,  SpO2: 96 %     Patient does not have new respiratory symptoms.  Patient does not have new sore throat.  Patient does not have a fever greater than 99.5

## 2024-03-18 NOTE — PLAN OF CARE
Goal Outcome Evaluation:    Patient is alert with intermittent confusion/forgetfulness. Able to make needs known. Pt denied SOB, N/V and chest pain. Pt received scheduled Tylenol and Lidocaine patch for lower back pain. Pt is stand-by assist with walker. Wears TLSO brace when up. Bed and chair alarm are used due to impulse and confusion. Reminded patient to use call light when in need. Call light within reach.    Patient's most recent vital signs are:     Vital signs:  BP: 148/48  Temp: 98.5  HR: 65  RR: 16  SpO2: 97 %     Patient does not have new respiratory symptoms.  Patient does not have new sore throat.  Patient does not have a fever greater than 99.5.

## 2024-03-18 NOTE — PROGRESS NOTES
MDS Pain Assessment    The following is the pain interview as conducted by the TCU RN caring for the patient on March 18, 2024. This assessment is required by the [facility] for all patients in [place] SNF (Skilled Nursing Facilities).     . Pain Presence  Have you had pain or hurting at any time in the last 5 days?   1. Yes    . Pain Frequency  How much of the time have you experienced pain or hurting over the last 5 days?   2. Occasionally    . Pain Effect on Sleep  Over the past 5 days, how much of the time has pain made it hard for you to sleep at night?   1. Rarely or not at all    . Pain Interference with Therapy Activities  Over the past 5 days, how often have you limited your participation in rehabilitation therapy sessions due to pain?   0. Does not apply - I have not received rehabilitation therapy in the past 5 days    . Pain Interference with Day-to-Day Activities  Over the past 5 days, have you limited your day-to-day activities (excluding rehabilitation therapy sessions) because of pain?  1. Rarely or not at all    . Pain intensity   Numeric Rating Scale (00-10): Please rate your worst pain over the last 5 days on a zero to ten scale, with zero being no pain and ten as the worst pain you can imagine. 04

## 2024-03-19 VITALS
OXYGEN SATURATION: 96 % | WEIGHT: 104.72 LBS | DIASTOLIC BLOOD PRESSURE: 53 MMHG | SYSTOLIC BLOOD PRESSURE: 147 MMHG | TEMPERATURE: 97.7 F | HEART RATE: 59 BPM | BODY MASS INDEX: 20.45 KG/M2 | RESPIRATION RATE: 15 BRPM

## 2024-03-19 PROCEDURE — 250N000013 HC RX MED GY IP 250 OP 250 PS 637: Performed by: INTERNAL MEDICINE

## 2024-03-19 RX ADMIN — HYDROXYCHLOROQUINE SULFATE 200 MG: 200 TABLET ORAL at 08:16

## 2024-03-19 RX ADMIN — AMLODIPINE BESYLATE 5 MG: 5 TABLET ORAL at 08:17

## 2024-03-19 RX ADMIN — POLYETHYLENE GLYCOL 3350 17 G: 17 POWDER, FOR SOLUTION ORAL at 08:15

## 2024-03-19 RX ADMIN — AMIODARONE HYDROCHLORIDE 200 MG: 200 TABLET ORAL at 08:16

## 2024-03-19 RX ADMIN — FUROSEMIDE 20 MG: 20 TABLET ORAL at 08:16

## 2024-03-19 RX ADMIN — Medication 1 TABLET: at 08:16

## 2024-03-19 RX ADMIN — GABAPENTIN 100 MG: 100 CAPSULE ORAL at 08:16

## 2024-03-19 RX ADMIN — APIXABAN 2.5 MG: 2.5 TABLET, FILM COATED ORAL at 08:16

## 2024-03-19 RX ADMIN — ACETAMINOPHEN 975 MG: 325 TABLET, FILM COATED ORAL at 08:15

## 2024-03-19 RX ADMIN — METOPROLOL TARTRATE 25 MG: 25 TABLET, FILM COATED ORAL at 08:16

## 2024-03-19 ASSESSMENT — ACTIVITIES OF DAILY LIVING (ADL)
ADLS_ACUITY_SCORE: 40

## 2024-03-19 NOTE — PLAN OF CARE
Goal Outcome Evaluation:      Plan of Care Reviewed With: patient    Overall Patient Progress: improvingOverall Patient Progress: improving    FOCUS/GOAL     Bowel management, Bladder management, Medication management, Medical management, Mobility, Skin integrity, and Safety management     ASSESSMENT, INTERVENTIONS AND CONTINUING PLAN FOR GOAL:     Pt is A&OX2, disoriented to time & situation; calm, & cooperative with care. With intermittent confusion. Pt is [place] & wears hearing aids during the daytime. Denied CP, SOB, & n/v. Pt transfers SBA with walker & GB; wears TLSO brace when OOB. Continent for both B&B; uses the bathroom. Takes meds whole with thin liquid. Pt is scheduled to discharge to [facility] tomorrow (03/19). Pt spent most of the shift on a chair. On 2 grams sodium diet. Pt ate dinner with good appetite & no other acute issue. Able to make needs known & call light within reach. Will continue with plan of care.    Patient's most recent vital signs are:     Vital signs:  BP: 141/54  Temp: 97.6  HR: 62  RR: 16  SpO2: 96 %     Patient does not have new respiratory symptoms.  Patient does not have new sore throat.  Patient does not have a fever greater than 99.5.

## 2024-03-19 NOTE — PLAN OF CARE
Goal Outcome Evaluation:    Discharged to [facility] via [facility] transportation. Daughter here and took patient's belongings and will meet pt. at A.. I gave the AVS to daughter to give to nursing staff who will be administering medications.         Patient's most recent vital signs are:     Vital signs:  BP: 147/53  Temp: 97.7  HR: 59  RR: 15  SpO2: 96 %     Patient does not have new respiratory symptoms.  Patient does not have new sore throat.  Patient does not have a fever greater than 99.5.

## 2024-03-19 NOTE — PLAN OF CARE
Goal Outcome Evaluation:      Plan of Care Reviewed With: patient    Overall Patient Progress: no changeOverall Patient Progress: no change      No acute concerns during this shift. Pt is A&OX2, disoriented to time & situation. calm, & cooperative with care. With intermittent confusion. Pt [place] & wears hearing aids during the daytime. Pt transfers SBA with walker & GB, to the bathroom, continent for both B&B. Wears TLSO brace when OOB. Pt is scheduled to discharge today morning 3/19 to [facility]. Pt is  able to make needs known & call light within reach. Slept well all night during his shift. Will continue with plan of care.

## 2024-03-20 ENCOUNTER — PATIENT OUTREACH (OUTPATIENT)
Dept: CARE COORDINATION | Facility: CLINIC | Age: 89
End: 2024-03-20
Payer: MEDICARE

## 2024-03-20 NOTE — PROGRESS NOTES
Clinic Care Coordination Contact  Care Coordination Clinician Chart Review    Situation: Patient chart reviewed by care coordinator.    Background: Patient was discharged from the hospital to [facility], and RN/SW CC has been monitoring for TCU discharge to identify any outstanding Clinic Care Coordination needs/concerns. Refer to initial patient outreach encounter by this writer dated 3/4/24 for additional details.    Assessment: Upon chart review, patient is not a candidate for Primary Care Clinic Care Coordination enrollment due to reason stated below:  The patient is in assisted living.  The daughter Anna declined primary care-care coordination.    Plan/Recommendations: Clinic Care Coordination Referral/order cancelled. RN/SW CC will perform no further monitoring/outreaches at this time and will remain available as needed. If new needs arise, a new Care Coordination Referral may be placed.    Sina Pierce MSN, RN, PHN, [facility]   Primary Care Clinical RN Care Coordinator  [facility]  3/20/2024   9:15 AM  [email or web address]  Office: [phone]

## 2024-03-22 ENCOUNTER — DOCUMENTATION ONLY (OUTPATIENT)
Dept: OTHER | Facility: CLINIC | Age: 89
End: 2024-03-22
Payer: MEDICARE

## 2024-03-27 ENCOUNTER — OFFICE VISIT (OUTPATIENT)
Dept: FAMILY MEDICINE | Facility: CLINIC | Age: 89
End: 2024-03-27
Payer: MEDICARE

## 2024-03-27 VITALS
WEIGHT: 108.6 LBS | OXYGEN SATURATION: 99 % | RESPIRATION RATE: 16 BRPM | HEART RATE: 56 BPM | BODY MASS INDEX: 21.21 KG/M2 | DIASTOLIC BLOOD PRESSURE: 82 MMHG | SYSTOLIC BLOOD PRESSURE: 136 MMHG

## 2024-03-27 DIAGNOSIS — N18.30 BENIGN HYPERTENSION WITH CKD (CHRONIC KIDNEY DISEASE) STAGE III (H): ICD-10-CM

## 2024-03-27 DIAGNOSIS — R26.9 GAIT ABNORMALITY: ICD-10-CM

## 2024-03-27 DIAGNOSIS — M06.09 RHEUMATOID ARTHRITIS OF MULTIPLE SITES WITH NEGATIVE RHEUMATOID FACTOR (H): ICD-10-CM

## 2024-03-27 DIAGNOSIS — D63.8 ANEMIA OF CHRONIC DISEASE: ICD-10-CM

## 2024-03-27 DIAGNOSIS — E04.1 THYROID NODULE: Primary | ICD-10-CM

## 2024-03-27 DIAGNOSIS — I12.9 BENIGN HYPERTENSION WITH CKD (CHRONIC KIDNEY DISEASE) STAGE III (H): ICD-10-CM

## 2024-03-27 DIAGNOSIS — G89.29 OTHER CHRONIC PAIN: ICD-10-CM

## 2024-03-27 DIAGNOSIS — S22.080D COMPRESSION FRACTURE OF T12 VERTEBRA WITH ROUTINE HEALING, SUBSEQUENT ENCOUNTER: ICD-10-CM

## 2024-03-27 DIAGNOSIS — I48.0 PAF (PAROXYSMAL ATRIAL FIBRILLATION) (H): ICD-10-CM

## 2024-03-27 DIAGNOSIS — N18.32 STAGE 3B CHRONIC KIDNEY DISEASE (H): ICD-10-CM

## 2024-03-27 PROCEDURE — 36415 COLL VENOUS BLD VENIPUNCTURE: CPT | Performed by: FAMILY MEDICINE

## 2024-03-27 PROCEDURE — 99214 OFFICE O/P EST MOD 30 MIN: CPT | Performed by: FAMILY MEDICINE

## 2024-03-27 PROCEDURE — 80048 BASIC METABOLIC PNL TOTAL CA: CPT | Performed by: FAMILY MEDICINE

## 2024-03-27 PROCEDURE — 84443 ASSAY THYROID STIM HORMONE: CPT | Performed by: FAMILY MEDICINE

## 2024-03-27 ASSESSMENT — PAIN SCALES - GENERAL: PAINLEVEL: SEVERE PAIN (6)

## 2024-03-27 NOTE — PROGRESS NOTES
Assessment & Plan     Thyroid nodule  Advised   - US Thyroid; Future  - TSH with free T4 reflex; Future  - TSH with free T4 reflex    Compression fracture of T12 vertebra with routine healing, subsequent encounter    In assisted Living  Getting PT  Follow up Neurosurgery-Pt has appointment   Use Tylenol/lidoderm  On Miacalcin and Vit D    Other chronic pain  As above    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)  Stable   - Basic metabolic panel  (Ca, Cl, CO2, Creat, Gluc, K, Na, BUN); Future  - Basic metabolic panel  (Ca, Cl, CO2, Creat, Gluc, K, Na, BUN)    Gait abnormality  Uses walker     Rheumatoid arthritis of multiple sites with negative rheumatoid factor (H)  Reviewed meds   Stable     PAF (paroxysmal atrial fibrillation) (H)  In SR  On eliquis  On amidalrone, Beta Blockers   Pt has anemia of chronic disease  Over all doing wellReview of external notes as documented elsewhere in note      MED REC REQUIRED  Post Medication Reconciliation Status: discharge medications reconciled, continue medications without change        Corbin Mcknight is a 89 year old, presenting for the following health issues:  Hospital F/U        3/27/2024     2:17 PM   Additional Questions   Roomed by Jannette VITAL   Per Hospital Notes  Roxanna Stark is a 89 year old female with HTN, CKD, RA, PAD, aortic stenosis s/p AVR (04/2016), Atrial fibrillation (On Eliquis), HFpEF who presented to the [facility] ED from her nursing home via EMS after she was found down 02/24/24. ED work up notable of T12 burst fracture. She was admitted to the trauma service. Conservative management with a TLSO brace per Neurosurgery.  Patient was transferred to TCU on 3/2/2024 for ongoing rehabilitatio     Hospital Follow-up Visit:    Hospital/Nursing Home/IP Rehab Facility: [facility]   Date of Admission: February 24, 2024 discharged to Transitional  care on March 2, 2024  Date of Discharge: March 19, 2024  Reason(s) for Admission: Fall Burst fracture of T12 vertebra  Compression Fracture   T12 burst fracture, > 40% height loss with mild retropulsion   Acute on chronic pain    Weakness and deconditioning   Was your hospitalization related to COVID-19? No   Problems taking medications regularly:  None  Medication changes since discharge: None  Problems adhering to non-medication therapy:  None    Summary of hospitalization:  [facility] discharge summary reviewed  Diagnostic Tests/Treatments reviewed.  Follow up needed: neurosurgery  Other Healthcare Providers Involved in Patient s Care:         Physical Therapy and lives in assisted living  Update since discharge: improved.       She is better  Has Moved into a assisted Living where she gets PT/OT daily  She walks  with a walker   Plan of care communicated with patient           Hypertension Follow-up    Do you check your blood pressure regularly outside of the clinic? No   Are you following a low salt diet? Yes  Are your blood pressures ever more than 140 on the top number (systolic) OR more   than 90 on the bottom number (diastolic), for example 140/90? No  Overall stable   No further falls  Pt in SR  No palpitations      Review of Systems  CONSTITUTIONAL: NEGATIVE for fever, chills, change in weight  ENT/MOUTH: NEGATIVE for ear, mouth and throat problems  RESP: NEGATIVE for significant cough or SOB  CV: NEGATIVE for chest pain, palpitations or peripheral edema  GI: none  MUSCULOSKELETAL: as above  PSYCHIATRIC: NEGATIVE for changes in mood or affect  Rest of the ROS is Negative except see above and Problem list [stable]        Objective    /82   Pulse 56   Resp 16   Wt 49.3 kg (108 lb 9.6 oz)   SpO2 99%   BMI 21.21 kg/m    Body mass index is 21.21 kg/m .  Physical Exam   GENERAL: alert and no distress in a wheelchair-uses walker at Home  EYES: Eyes grossly normal to inspection  NECK:  no adenopathy, no asymmetry, masses, or scars  RESP: lungs clear to auscultation - no rales, rhonchi or wheezes  CV: regular rate and rhythm, normal S1 S2, no S3 or S4, no murmur, click or rub, no peripheral edema  ABDOMEN: soft, nontender, no hepatosplenomegaly, no masses and bowel sounds normal  MS: no gross musculoskeletal defects noted, no edema  PSYCH: mentation appears normal  Pt has  a TLSO brace    Pending   Admission on 03/02/2024, Discharged on 03/19/2024   Component Date Value Ref Range Status    Sodium 03/03/2024 141  135 - 145 mmol/L Final    Reference intervals for this test were updated on 09/26/2023 to more accurately reflect our healthy population. There may be differences in the flagging of prior results with similar values performed with this method. Interpretation of those prior results can be made in the context of the updated reference intervals.     Potassium 03/03/2024 3.8  3.4 - 5.3 mmol/L Final    Chloride 03/03/2024 108 (H)  98 - 107 mmol/L Final    Carbon Dioxide (CO2) 03/03/2024 23  22 - 29 mmol/L Final    Anion Gap 03/03/2024 10  7 - 15 mmol/L Final    Urea Nitrogen 03/03/2024 26.9 (H)  8.0 - 23.0 mg/dL Final    Creatinine 03/03/2024 1.15 (H)  0.51 - 0.95 mg/dL Final    GFR Estimate 03/03/2024 43 (L)  >60 mL/min/1.73m2 Final    Calcium 03/03/2024 8.5  8.2 - 9.6 mg/dL Final    Glucose 03/03/2024 104 (H)  70 - 99 mg/dL Final    Hold Specimen 03/03/2024 JIC   Final    Sodium 03/04/2024 142  135 - 145 mmol/L Final    Reference intervals for this test were updated on 09/26/2023 to more accurately reflect our healthy population. There may be differences in the flagging of prior results with similar values performed with this method. Interpretation of those prior results can be made in the context of the updated reference intervals.     Potassium 03/04/2024 4.0  3.4 - 5.3 mmol/L Final    Chloride 03/04/2024 107  98 - 107 mmol/L Final    Carbon Dioxide (CO2) 03/04/2024 17 (L)  22 - 29 mmol/L  Final    Anion Gap 03/04/2024 18 (H)  7 - 15 mmol/L Final    Urea Nitrogen 03/04/2024 21.6  8.0 - 23.0 mg/dL Final    Creatinine 03/04/2024 1.03 (H)  0.51 - 0.95 mg/dL Final    GFR Estimate 03/04/2024 50 (L)  >60 mL/min/1.73m2 Final    Calcium 03/04/2024 9.0  8.2 - 9.6 mg/dL Final    Glucose 03/04/2024 90  70 - 99 mg/dL Final    Sodium 03/05/2024 142  135 - 145 mmol/L Final    Reference intervals for this test were updated on 09/26/2023 to more accurately reflect our healthy population. There may be differences in the flagging of prior results with similar values performed with this method. Interpretation of those prior results can be made in the context of the updated reference intervals.     Potassium 03/05/2024 4.1  3.4 - 5.3 mmol/L Final    Chloride 03/05/2024 109 (H)  98 - 107 mmol/L Final    Carbon Dioxide (CO2) 03/05/2024 25  22 - 29 mmol/L Final    Anion Gap 03/05/2024 8  7 - 15 mmol/L Final    Urea Nitrogen 03/05/2024 24.5 (H)  8.0 - 23.0 mg/dL Final    Creatinine 03/05/2024 1.16 (H)  0.51 - 0.95 mg/dL Final    GFR Estimate 03/05/2024 43 (L)  >60 mL/min/1.73m2 Final    Calcium 03/05/2024 8.6  8.2 - 9.6 mg/dL Final    Glucose 03/05/2024 105 (H)  70 - 99 mg/dL Final    Sodium 03/07/2024 141  135 - 145 mmol/L Final    Reference intervals for this test were updated on 09/26/2023 to more accurately reflect our healthy population. There may be differences in the flagging of prior results with similar values performed with this method. Interpretation of those prior results can be made in the context of the updated reference intervals.     Potassium 03/07/2024 4.2  3.4 - 5.3 mmol/L Final    Chloride 03/07/2024 108 (H)  98 - 107 mmol/L Final    Carbon Dioxide (CO2) 03/07/2024 24  22 - 29 mmol/L Final    Anion Gap 03/07/2024 9  7 - 15 mmol/L Final    Urea Nitrogen 03/07/2024 28.5 (H)  8.0 - 23.0 mg/dL Final    Creatinine 03/07/2024 1.27 (H)  0.51 - 0.95 mg/dL Final    GFR Estimate 03/07/2024 39 (L)  >60 mL/min/1.73m2  Final    Calcium 03/07/2024 9.0  8.2 - 9.6 mg/dL Final    Glucose 03/07/2024 83  70 - 99 mg/dL Final    Hold Specimen 03/07/2024 JIC   Final    WBC Count 03/11/2024 10.1  4.0 - 11.0 10e3/uL Final    RBC Count 03/11/2024 3.66 (L)  3.80 - 5.20 10e6/uL Final    Hemoglobin 03/11/2024 10.5 (L)  11.7 - 15.7 g/dL Final    Hematocrit 03/11/2024 33.1 (L)  35.0 - 47.0 % Final    MCV 03/11/2024 90  78 - 100 fL Final    MCH 03/11/2024 28.7  26.5 - 33.0 pg Final    MCHC 03/11/2024 31.7  31.5 - 36.5 g/dL Final    RDW 03/11/2024 16.0 (H)  10.0 - 15.0 % Final    Platelet Count 03/11/2024 272  150 - 450 10e3/uL Final    Sodium 03/11/2024 142  135 - 145 mmol/L Final    Reference intervals for this test were updated on 09/26/2023 to more accurately reflect our healthy population. There may be differences in the flagging of prior results with similar values performed with this method. Interpretation of those prior results can be made in the context of the updated reference intervals.     Potassium 03/11/2024 4.5  3.4 - 5.3 mmol/L Final    Chloride 03/11/2024 108 (H)  98 - 107 mmol/L Final    Carbon Dioxide (CO2) 03/11/2024 26  22 - 29 mmol/L Final    Anion Gap 03/11/2024 8  7 - 15 mmol/L Final    Urea Nitrogen 03/11/2024 22.5  8.0 - 23.0 mg/dL Final    Creatinine 03/11/2024 1.26 (H)  0.51 - 0.95 mg/dL Final    GFR Estimate 03/11/2024 39 (L)  >60 mL/min/1.73m2 Final    Calcium 03/11/2024 8.5  8.2 - 9.6 mg/dL Final    Glucose 03/11/2024 75  70 - 99 mg/dL Final    Protein Total 03/11/2024 6.2 (L)  6.4 - 8.3 g/dL Final    Albumin 03/11/2024 2.8 (L)  3.5 - 5.2 g/dL Final    Bilirubin Total 03/11/2024 <0.2  <=1.2 mg/dL Final    Alkaline Phosphatase 03/11/2024 171 (H)  40 - 150 U/L Final    Reference intervals for this test were updated on 11/14/2023 to more accurately reflect our healthy population. There may be differences in the flagging of prior results with similar values performed with this method. Interpretation of those prior results  can be made in the context of the updated reference intervals.    AST 03/11/2024 19  0 - 45 U/L Final    Reference intervals for this test were updated on 6/12/2023 to more accurately reflect our healthy population. There may be differences in the flagging of prior results with similar values performed with this method. Interpretation of those prior results can be made in the context of the updated reference intervals.    ALT 03/11/2024 12  0 - 50 U/L Final    Reference intervals for this test were updated on 6/12/2023 to more accurately reflect our healthy population. There may be differences in the flagging of prior results with similar values performed with this method. Interpretation of those prior results can be made in the context of the updated reference intervals.      Bilirubin Direct 03/11/2024 <0.20  0.00 - 0.30 mg/dL Final    GLUCOSE BY METER POCT 03/17/2024 86  70 - 99 mg/dL Final    Sodium 03/18/2024 138  135 - 145 mmol/L Final    Reference intervals for this test were updated on 09/26/2023 to more accurately reflect our healthy population. There may be differences in the flagging of prior results with similar values performed with this method. Interpretation of those prior results can be made in the context of the updated reference intervals.     Potassium 03/18/2024 4.8  3.4 - 5.3 mmol/L Final    Chloride 03/18/2024 104  98 - 107 mmol/L Final    Carbon Dioxide (CO2) 03/18/2024 26  22 - 29 mmol/L Final    Anion Gap 03/18/2024 8  7 - 15 mmol/L Final    Urea Nitrogen 03/18/2024 35.8 (H)  8.0 - 23.0 mg/dL Final    Creatinine 03/18/2024 1.45 (H)  0.51 - 0.95 mg/dL Final    GFR Estimate 03/18/2024 33 (L)  >60 mL/min/1.73m2 Final    Calcium 03/18/2024 9.0  8.2 - 9.6 mg/dL Final    Glucose 03/18/2024 88  70 - 99 mg/dL Final    Hold Specimen 03/18/2024 [facility]   Final           Signed Electronically by: Swapna Gaines MD

## 2024-03-27 NOTE — LETTER
April 3, 2024    Roxanna Stark  [address]   [place] [address]          Dear ,    We are writing to inform you of your test results.    Thyroid is normal   Kidney test are higher   Drink More Fluids   You can see Nephrologist for further management/we can repeat test in 3 months     Resulted Orders   TSH with free T4 reflex   Result Value Ref Range    TSH 1.41 0.30 - 4.20 uIU/mL   Basic metabolic panel  (Ca, Cl, CO2, Creat, Gluc, K, Na, BUN)   Result Value Ref Range    Sodium 142 135 - 145 mmol/L      Comment:      Reference intervals for this test were updated on 09/26/2023 to more accurately reflect our healthy population. There may be differences in the flagging of prior results with similar values performed with this method. Interpretation of those prior results can be made in the context of the updated reference intervals.     Potassium 4.6 3.4 - 5.3 mmol/L    Chloride 103 98 - 107 mmol/L    Carbon Dioxide (CO2) 28 22 - 29 mmol/L    Anion Gap 11 7 - 15 mmol/L    Urea Nitrogen 27.9 (H) 8.0 - 23.0 mg/dL    Creatinine 1.53 (H) 0.51 - 0.95 mg/dL    GFR Estimate 31 (L) >60 mL/min/1.73m2    Calcium 9.0 8.2 - 9.6 mg/dL    Glucose 95 70 - 99 mg/dL     If you have any questions or concerns, please call the clinic at the number listed above.     Sincerely,    Swapna Gaines MD

## 2024-03-28 PROBLEM — N18.32 STAGE 3B CHRONIC KIDNEY DISEASE (H): Status: ACTIVE | Noted: 2019-07-01

## 2024-03-28 LAB
ANION GAP SERPL CALCULATED.3IONS-SCNC: 11 MMOL/L (ref 7–15)
BUN SERPL-MCNC: 27.9 MG/DL (ref 8–23)
CALCIUM SERPL-MCNC: 9 MG/DL (ref 8.2–9.6)
CHLORIDE SERPL-SCNC: 103 MMOL/L (ref 98–107)
CREAT SERPL-MCNC: 1.53 MG/DL (ref 0.51–0.95)
DEPRECATED HCO3 PLAS-SCNC: 28 MMOL/L (ref 22–29)
EGFRCR SERPLBLD CKD-EPI 2021: 31 ML/MIN/1.73M2
GLUCOSE SERPL-MCNC: 95 MG/DL (ref 70–99)
POTASSIUM SERPL-SCNC: 4.6 MMOL/L (ref 3.4–5.3)
SODIUM SERPL-SCNC: 142 MMOL/L (ref 135–145)
TSH SERPL DL<=0.005 MIU/L-ACNC: 1.41 UIU/ML (ref 0.3–4.2)

## 2024-03-29 ENCOUNTER — TELEPHONE (OUTPATIENT)
Dept: FAMILY MEDICINE | Facility: CLINIC | Age: 89
End: 2024-03-29
Payer: MEDICARE

## 2024-03-29 NOTE — TELEPHONE ENCOUNTER
Reason for Call:  Form, our goal is to have forms completed with 72 hours, however, some forms may require a visit or additional information.    Type of letter, form or note:  Home Health Certification    Who is the form from?: Home care    Where did the form come from: form was faxed in    What clinic location was the form placed at?: [facility]    Where the form was placed: Given to physician    What number is listed as a contact on the form?: [phone]       Call taken on 3/29/2024 at 10:46 AM by Gillian García

## 2024-04-01 ENCOUNTER — TELEPHONE (OUTPATIENT)
Dept: RHEUMATOLOGY | Facility: CLINIC | Age: 89
End: 2024-04-01
Payer: MEDICARE

## 2024-04-01 DIAGNOSIS — G57.93 NEUROPATHY OF BOTH FEET: ICD-10-CM

## 2024-04-01 DIAGNOSIS — S22.081A BURST FRACTURE OF T12 VERTEBRA (H): ICD-10-CM

## 2024-04-01 DIAGNOSIS — M06.09 RHEUMATOID ARTHRITIS OF MULTIPLE SITES WITH NEGATIVE RHEUMATOID FACTOR (H): ICD-10-CM

## 2024-04-01 DIAGNOSIS — Z53.9 DIAGNOSIS NOT YET DEFINED: Primary | ICD-10-CM

## 2024-04-01 PROCEDURE — G0180 MD CERTIFICATION HHA PATIENT: HCPCS | Performed by: FAMILY MEDICINE

## 2024-04-01 NOTE — TELEPHONE ENCOUNTER
Spoke to Anna. Patient will be in on Wednesday for a clinic. Labs can be done at this time since we are so close to the appointment.     Suly ROMERO RN, [facility] 04/01/24 11:38 AM

## 2024-04-01 NOTE — TELEPHONE ENCOUNTER
[facility] Call Center    Phone Message    May a detailed message be left on voicemail: yes     Reason for Call: Order(s): Other:     Reason for requested: Anna is wanting to have lab orders placed for patient to have done prior to appt with Dr. Johnson on 04/03/2024. Anna are wanting these placed ASAP and to give her a call back to be able to schedule the lab work.    Anna is wanting to know if this would be enough time for the lab work if the orders get placed today to have them done today for the appt on 04/03/2024. Please advise.     Date needed: asap    Provider name: Alex      Action Taken: Message routed to:  [facility] Rheum    Travel Screening: Not Applicable

## 2024-04-02 RX ORDER — FUROSEMIDE 20 MG
20 TABLET ORAL DAILY
Qty: 90 TABLET | Refills: 3 | Status: SHIPPED | OUTPATIENT
Start: 2024-04-02

## 2024-04-02 RX ORDER — METOPROLOL TARTRATE 25 MG/1
25 TABLET, FILM COATED ORAL 2 TIMES DAILY
Qty: 180 TABLET | Refills: 3 | Status: SHIPPED | OUTPATIENT
Start: 2024-04-02

## 2024-04-02 RX ORDER — HYDROXYCHLOROQUINE SULFATE 200 MG/1
200 TABLET, FILM COATED ORAL DAILY
Qty: 90 TABLET | Refills: 97 | OUTPATIENT
Start: 2024-04-02

## 2024-04-02 RX ORDER — GABAPENTIN 100 MG/1
100 CAPSULE ORAL DAILY
Qty: 90 CAPSULE | Refills: 3 | Status: SHIPPED | OUTPATIENT
Start: 2024-04-02

## 2024-04-02 RX ORDER — AMLODIPINE BESYLATE 5 MG/1
5 TABLET ORAL DAILY
Qty: 90 TABLET | Refills: 3 | Status: SHIPPED | OUTPATIENT
Start: 2024-04-02

## 2024-04-03 ENCOUNTER — OFFICE VISIT (OUTPATIENT)
Dept: RHEUMATOLOGY | Facility: CLINIC | Age: 89
End: 2024-04-03
Payer: MEDICARE

## 2024-04-03 VITALS
OXYGEN SATURATION: 96 % | HEART RATE: 54 BPM | RESPIRATION RATE: 16 BRPM | SYSTOLIC BLOOD PRESSURE: 162 MMHG | DIASTOLIC BLOOD PRESSURE: 65 MMHG

## 2024-04-03 DIAGNOSIS — M19.042 PRIMARY OSTEOARTHRITIS OF BOTH HANDS: ICD-10-CM

## 2024-04-03 DIAGNOSIS — M19.041 PRIMARY OSTEOARTHRITIS OF BOTH HANDS: ICD-10-CM

## 2024-04-03 DIAGNOSIS — M06.09 RHEUMATOID ARTHRITIS OF MULTIPLE SITES WITH NEGATIVE RHEUMATOID FACTOR (H): Primary | ICD-10-CM

## 2024-04-03 PROCEDURE — G2211 COMPLEX E/M VISIT ADD ON: HCPCS | Performed by: INTERNAL MEDICINE

## 2024-04-03 PROCEDURE — 99214 OFFICE O/P EST MOD 30 MIN: CPT | Performed by: INTERNAL MEDICINE

## 2024-04-03 RX ORDER — HYDROXYCHLOROQUINE SULFATE 200 MG/1
TABLET, FILM COATED ORAL
Qty: 45 TABLET | Refills: 0 | Status: SHIPPED | OUTPATIENT
Start: 2024-04-03 | End: 2024-05-30

## 2024-04-03 ASSESSMENT — PAIN SCALES - GENERAL: PAINLEVEL: MILD PAIN (3)

## 2024-04-03 NOTE — PROGRESS NOTES
Rheumatology Clinic Visit      Roxanna Stark MRN# [id number]   YOB: 1927 Age: 89 year old      Date of visit: 4/03/24   PCP: Dr. Swapna Gaines  Cardiology: Dr. Boston Navarro     Chief Complaint   Patient presents with:  RECHECK: Rheumatoid arthritis    Assessment and Plan     1. 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  She was treated with prednisone monotherapy for several years, being able to taper off without recurrence of symptoms. She then developed worsening symptoms in her hands and was diagnosed with rheumatoid arthritis. Initially, she was resistant to taking DMARD therapy.  She then was started on MTX that was effective; she reduced the dose with worsening symptoms and then SSZ was added; at one point was doing well on MTX 20mg wkly and SSZ 500mg BID (mild anemia possibly associated with SSZ so the dose was previously reduced); however, when seen in January 2022 she reported that she had stopped taking methotrexate and was only using sulfasalazine.  Then in March 2022 it was reported that she stopped methotrexate 6 months prior and sulfasalazine was stopped 2 months prior.  Telephone visit on 6/30/2022 to clarify her treatment plan and discussed with both the patient and her daughter to get an accurate history; the patient at that time was off all treatment, and based on the history provided at that time she was on sulfasalazine monotherapy for a while and doing well. Prednisone was then stopped and she continued to do well on sulfasalazine for a while but eventually required addition of hydroxychloroquine.  In November 2023 she was doing well on a combination of sulfasalazine 500 mg twice daily and hydroxychloroquine 200 mg daily.  During recent hospitalization for atrial fibrillation she had worsening renal function and sulfasalazine was discontinued.  She  continues to do well so we will remain off of sulfasalazine.  No synovitis on exam today.  Reduce hydroxychloroquine to 100 mg daily.  Reassess in 3 months.  Chronic illness, stable.    - Reduce hydroxychloroquine from 200 mg daily, to 100 mg daily (last eye exam by Dr. Rogers on 5/23/2023)    High risk medication requiring intensive toxicity monitoring at least quarterly    2. Giant Cell Arteritis History?: 12/26/2005 Left TA biopsy negative per [facility] record review.  No symptoms of GCA at this time.      3. Right shoulder rotator cuff tear and history of pain: Previously evaluated by orthopedic surgery and her pain resolved for approximately one year after having a steroid injection in March 2015. She does not want to have surgical correction of her shoulder. Repeat steroid injections have been helpful; not needed today.  Physical therapy was effective previously.  Not an issue today.    4.  Osteoarthritis of the hands: Heberden's nodes present.    Not symptomatic at the DIPs at this time.     5. History of basal cell carcinoma: Following with dermatology; encouraged yearly dermatology evaluation     6. Bone Health: Managed by PCP at this time.  We briefly discussed osteoporosis medications but she does not wish to start, considering age.    7.  Vaccinations:     - Influenza: encouraged yearly vaccination  - [id number]: up to date  - [id number]: up to date  - Shingrix: Up to date  - COVID-19: Advised keeping updated, and to hold sulfasalazine for 2 weeks afterward    8.  CKD: Currently following with her PCP and plans to establish with nephrology    9. Elevated blood pressure:  Roxanna to follow up with Primary Care provider regarding elevated blood pressure.     Total minutes spent in evaluation with patient, documentation, , and review of pertinent studies and chart notes: 14  The longitudinal plan of care for the rheumatology problem(s) were addressed during this visit.  Due to added complexity of  care, we will continue to support the patient and the subsequent management of this condition with ongoing continuity of care.        Ms. Stark verbalized agreement with and understanding of the rational for the diagnosis and treatment plan.  All questions were answered to best of my ability and the patient's satisfaction. Ms. Stark was advised to contact the clinic with any questions that may arise after the clinic visit.      Thank you for involving me in the care of the patient    Return to clinic: 3 months      HPI   Roxanna Stark is a 89 year old female with medical history significant for basal cell carcinoma, hypertension, aortic stenosis, right rotator cuff tear (previously evaluated by Dr. Bingham, orthopedic surgery, on 3/20/2015 where at that time Ms. Stark was not interested in surgical correction; she received an intra-articular steroid injection at that time that was effective for ~1year), temporal arteritis?, and seronegative erosive rheumatoid arthritis.     1/31/2022: doing well at this time. No longer taking MTX and hasn't for some time.  Note that [facility], called to check her pharmacy and MTX and SSZ were last filled in July 2021.  Roxanna says that she is happy with how well she is doing; no joint pain; morning stiffness <20 min. Arthritis is not limiting any of her daily activities.  No difficulty raising her arms above her head or standing up from a chair. No vision change. No jaw or tongue claudication. No scalp tenderness. No new headache.     3/30/2022: Telephone call regarding patient's medication raise question about what she was actually taking so a visit was scheduled for today to review.  Roxanna, and Roaxnna's daughter Anna.  Reportedly methotrexate was stopped about 6 months ago.  Sulfasalazine was stopped a couple months ago.  Worsening joint pain at the MCPs, PIPs, wrists, MTPs, knees; pain is worse in the morning and improves with topical BenGay and moving.  Positive gelling  phenomenon.  Morning stiffness for at least 1 hour.  No jaw or tongue claudication.  No scalp tenderness.  No new headache.  No vision change.    7/8/2022: Currently doing well.  No joint pain or swelling.  No morning stiffness.  Positive gelling phenomenon that resolves within 5 minutes and only occurs after sitting for a very long time.  Confirms that she is taking prednisone 5 mg daily and sulfasalazine 500 mg twice daily.  She also has questions about Lasix and says that she will talk with her primary care provider regarding this.  She is accompanied by her daughter today.    10/14/2022: Currently doing well.  She reports that she is taking sulfasalazine 500 mg twice daily.  No longer taking prednisone.  She noticed no worsening of symptoms with stopping prednisone.  She reports that she has chronic changes of her hands but no swelling and no pain.  Morning stiffness for no more than 10 minutes.    5/1/2023: Pain at the left second MCP where she has limited range of motion and stiffness.  No increased warmth or overlying erythema of the left second MCP.  Also with small bony hypertrophy at the DIPs that are intermittently achy but not symptomatic at this time.  PIPs without swelling or pain.  Wrists, elbows, shoulders, knees, ankles, and MTPs without swelling or pain.  Taking sulfasalazine 500 mg twice daily; she did not bring her medications with her but she confirms that she knows she is taking sulfasalazine twice daily.    8/15/2023: Today she reports that she is doing well.  No joint pain or swelling.  No morning stiffness or gelling phenomenon.  Reports that she has been taking hydroxychloroquine and sulfasalazine as prescribed, except for the past 1 week where she believes that she has not been taking sulfasalazine so she will check on this.  She forgot to bring her medications with her today to today's appointment.  The patient's daughter is with her today.    11/28/2023: RA well-controlled.  Reports 100%  compliance with hydroxychloroquine and sulfasalazine.  Had shingles affecting her left upper extremity; all vesicles have crusted over and she is not having new vesicles but she still has pain where the shingles was; following with her PCP for management of postherpetic neuralgia.  No morning stiffness.  No joint swelling.  Arthritis does not limit daily activities.      Today, 4/3/2024: Since last seen, she was hospitalized for A-fib.  Sulfasalazine was discontinued because of worsening kidney function.  She is planning to see a nephrologist.  She had a burst fracture of T12 vertebrae; following with her PCP for management.  Now in assisted living to help reduce risk for falls and to assist with daily activities.  Continues on hydroxychloroquine 200 mg daily.    Denies fevers, chills, nausea, vomiting, constipation, diarrhea. No abdominal pain. No chest pain/pressure, palpitations, or shortness of breath. No oral or nasal sores. No neck pain. No rash.     Tobacco: None  EtOH: No more than 1 drink per week  Drugs: None  Occupation: Used to work for the telephone company; now retired    ROS   12 point review of system was completed and negative except as noted in the HPI     Active Problem List     Patient Active Problem List   Diagnosis    History of polymyalgia rheumatica    History of basal cell carcinoma    CARDIOVASCULAR SCREENING; LDL GOAL LESS THAN 130    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)    Hip pain    Left atrial enlargement    Status post coronary angiogram    Aortic valve replaced    Rheumatoid arthritis of multiple sites with negative rheumatoid factor (H)    Stage 3b chronic kidney disease (H)    THERESA III (vulvar intraepithelial neoplasia III)    Peripheral arterial disease (H24)    Decreased hearing of both ears    Hearing aid worn    Gait abnormality    Elevated troponin    Atrial flutter with rapid ventricular response (H)    Volume overload state of heart    T12 burst  fracture (H)    Fall, initial encounter    Burst fracture of T12 vertebra (H)    PAF (paroxysmal atrial fibrillation) (H)    Anemia of chronic disease     Past Medical History     Past Medical History:   Diagnosis Date    Actinic keratosis     Aortic stenosis 2014    Atrial flutter with rapid ventricular response (H) 01/27/2024    Basal cell cancer 07/2014    left eye medial canthus     Basal cell carcinoma 09/30/2008    left cheek    CKD (chronic kidney disease) stage 3, GFR 30-59 ml/min (H) 07/01/2019    HTN (hypertension)     Melanoma in situ (H) 09/30/2008    left arm    PAF (paroxysmal atrial fibrillation) (H) 3/27/2024    Polymyalgia rheumatica (H24) 11/1999    Rheumatoid arthritis of multiple sites with negative rheumatoid factor (H)     Status post coronary angiogram 03/03/2016    Temporal arteritis (H) 11/1999     Past Surgical History     Past Surgical History:   Procedure Laterality Date    CATARACT IOL, RT/LT  5/09    bilateral    COLONOSCOPY  2002    EXCISE LESION VULVA N/A 9/27/2019    Procedure: Wide Local Excision Of Vulva, Colposcopy;  Surgeon: Mono Ribeiro MD;  Location:  OR    REPLACE VALVE AORTIC N/A 4/25/2016    Procedure: REPLACE VALVE AORTIC;  Surgeon: Sudeep Tsai MD;  Location:  OR    [facility] SKIN TISSUE PROCEDURE UNLISTED  11/3/08    mmis skin cancer excision     Allergy     Allergies   Allergen Reactions    Lisinopril Cough        Current Medication List     Current Outpatient Medications   Medication Sig Dispense Refill    acetaminophen (TYLENOL) 325 MG tablet Take 3 tablets (975 mg) by mouth every 8 hours as needed for mild pain, headaches or fever 30 tablet 0    amiodarone (PACERONE) 200 MG tablet Take 2 tablets (400 mg) by mouth daily for 6 days, THEN 1 tablet (200 mg) daily for 90 days. 60 tablet 0    amLODIPine (NORVASC) 5 MG tablet TAKE 1 TABLET BY MOUTH ONCE DAILY 90 tablet 3    apixaban ANTICOAGULANT (ELIQUIS) 2.5 MG tablet Take 1 tablet (2.5 mg) by mouth 2 times  daily 180 tablet 3    bisacodyl (DULCOLAX) 10 MG suppository Place 1 suppository (10 mg) rectally daily as needed for constipation 10 suppository 0    calcium carbonate-vitamin D (OSCAL) 500-5 MG-MCG tablet TAKE 1 TABLET BY MOUTH ONCE DAILY 90 tablet 3    furosemide (LASIX) 20 MG tablet TAKE 1 TABLET BY MOUTH ONCE DAILY 90 tablet 3    gabapentin (NEURONTIN) 100 MG capsule TAKE 1 CAPSULE BY MOUTH ONCE DAILY 90 capsule 3    hydroxychloroquine (PLAQUENIL) 200 MG tablet Take 1 tablet (200 mg) by mouth daily 30 tablet 0    Lidocaine (LIDOCARE) 4 % Patch Place 1 patch onto the skin every 24 hours To prevent lidocaine toxicity, patient should be patch free for 12 hrs daily. 20 patch 0    metoprolol tartrate (LOPRESSOR) 25 MG tablet TAKE 1 TABLET BY MOUTH TWICE DAILY 180 tablet 3    Misc. Devices (ROLLATOR ULTRA-LIGHT) MISC       Omega-3 Fatty Acids (OMEGA-3 FISH OIL PO) Take 1 tablet twice daily.      polyethylene glycol (MIRALAX) 17 GM/Dose powder Take 17 g by mouth daily 510 g 0    senna-docusate (SENOKOT-S/PERICOLACE) 8.6-50 MG tablet Take 1 tablet by mouth 2 times daily as needed for constipation 30 tablet 0     No current facility-administered medications for this visit.       Social History   See HPI    Family History     Family History   Problem Relation Age of Onset    Arthritis Mother     Hypertension Father     Prostate Cancer Father     Arthritis Father     Heart Disease Father     Lipids Father     Colon Cancer Father     Breast Cancer Sister 45    Arthritis Sister     Thyroid Disease Sister     Arthritis Sister     Colon Cancer Sister         colon    Arthritis Sister     Asthma Daughter     Cerebrovascular Disease Daughter     Asthma Daughter     Myocardial Infarction Daughter     Lung Cancer Daughter 58        lung    Pancreatic Cancer Other 81        pancreatic      Physical Exam     Temp Readings from Last 3 Encounters:   03/19/24 97.7  F (36.5  C) (Oral)   03/02/24 97.8  F (36.6  C) (Oral)   02/01/24 97.6   F (36.4  C) (Oral)     BP Readings from Last 5 Encounters:   04/03/24 (!) 160/67   03/27/24 136/82   03/19/24 (!) 147/53   03/11/24 136/65   03/02/24 (!) 165/63     Pulse Readings from Last 1 Encounters:   04/03/24 54     Resp Readings from Last 1 Encounters:   04/03/24 16     Estimated body mass index is 21.21 kg/m  as calculated from the following:    Height as of 3/11/24: 1.524 m (5').    Weight as of 3/27/24: 49.3 kg (108 lb 9.6 oz).    GEN: NAD.  HEENT:  Anicteric, noninjected sclera. No obvious external lesions of the ear and nose. Hearing intact.  PULM: No increased work of breathing.   MSK: MCPs and PIPs without synovial swelling or tenderness to palpation.  Heberden's and Gracie's nodes present.  DIPs nontender to palpation.  Wrists without swelling or tenderness to palpation.  Elbows and shoulders without swelling or tenderness to palpation.    Knees, ankles, and MTPs without swelling or tenderness to palpation.    PSYCH: Alert. Appropriate.      Labs / Imaging (select studies)     RF/CCP  Recent Labs   Lab Test 08/11/16  1124 08/04/16  1222 02/18/16  1543   CCPIGG 1  --   --    RHF  --  <20 <20     CBC  Recent Labs   Lab Test 03/11/24  0544 03/02/24  0715 03/01/24  0603 02/26/24  0645 02/24/24  2159 01/28/24  0937 01/27/24  1640 11/21/23  1053 03/23/21  1526 01/22/21  0933 10/30/20  0903 07/24/20  1328   WBC 10.1 8.9 9.2   < > 11.3*   < > 11.6* 9.2   < > 8.1 10.8 11.1*   RBC 3.66* 3.62* 3.55*   < > 4.16   < > 4.54 4.20   < > 3.93 4.04 3.33*   HGB 10.5* 10.8* 10.6*   < > 11.9   < > 13.5 12.6   < > 11.9 11.8 10.4*   HCT 33.1* 33.6* 33.0*   < > 37.8   < > 43.4 39.8   < > 38.0 37.8 32.7*   MCV 90 93 93   < > 91   < > 96 95   < > 97 94 98   RDW 16.0* 15.7* 15.7*   < > 15.6*   < > 15.7* 16.5*   < > 19.4* 18.0* 19.2*    273 253   < > 263   < > 240 160   < > 228 203 184   MCH 28.7 29.8 29.9   < > 28.6   < > 29.7 30.0   < > 30.3 29.2 31.2   MCHC 31.7 32.1 32.1   < > 31.5   < > 31.1* 31.7   < > 31.3*  31.2* 31.8   NEUTROPHIL  --   --   --   --  75  --  77 74   < > 54.0 51.7 62.2   LYMPH  --   --   --   --  13  --  14 15   < > 27.0 29.5 22.5   MONOCYTE  --   --   --   --  9  --  7 9   < > 15.0 14.7 12.0   EOSINOPHIL  --   --   --   --  1  --  1 1   < > 3.3 3.5 2.5   BASOPHIL  --   --   --   --  1  --  1 1   < > 0.7 0.6 0.8   ANEU  --   --   --   --   --   --   --   --   --  4.3 5.6 6.9   ALYM  --   --   --   --   --   --   --   --   --  2.2 3.2 2.5   IGNACIO  --   --   --   --   --   --   --   --   --  1.2 1.6* 1.3   AEOS  --   --   --   --   --   --   --   --   --  0.3 0.4 0.3   ABAS  --   --   --   --   --   --   --   --   --  0.1 0.1 0.1   ANEUTAUTO  --   --   --   --  8.7*  --  9.0* 6.8   < >  --   --   --    ALYMPAUTO  --   --   --   --  1.5  --  1.6 1.4   < >  --   --   --    AMONOAUTO  --   --   --   --  1.0  --  0.8 0.8   < >  --   --   --    AEOSAUTO  --   --   --   --  0.1  --  0.1 0.1   < >  --   --   --    ABSBASO  --   --   --   --  0.1  --  0.1 0.1   < >  --   --   --     < > = values in this interval not displayed.     CMP  Recent Labs   Lab Test 03/27/24  1458 03/18/24  0615 03/17/24  0204 03/11/24  0544 02/25/24  0729 02/24/24  2159 01/28/24  0937 01/27/24  1640 08/06/21  1305 03/23/21  1526 01/22/21  0933 10/30/20  0903    138  --  142   < > 138   < > 137   < > 138  --   --    POTASSIUM 4.6 4.8  --  4.5   < > 4.3   < > 4.9   < > 4.3  --   --    CHLORIDE 103 104  --  108*   < > 100   < > 100   < > 103  --   --    CO2 28 26  --  26   < > 28   < > 25   < > 31  --   --    ANIONGAP 11 8  --  8   < > 10   < > 12   < > 4  --   --    GLC 95 88 86 75   < > 163*   < > 121*   < > 182*  --   --    BUN 27.9* 35.8*  --  22.5   < > 29.6*   < > 28.9*   < > 27  --   --    CR 1.53* 1.45*  --  1.26*   < > 1.33*   < > 1.49*   < > 1.40* 1.17* 1.38*   GFRESTIMATED 31* 33*  --  39*   < > 36*   < > 32*   < > 32* 40* 33*   GFRESTBLACK  --   --   --   --   --   --   --   --   --  37* 46* 38*   ROEL 9.0 9.0  --  8.5   <  > 9.3   < > 10.1*   < > 9.3  --   --    BILITOTAL  --   --   --  <0.2  --  0.2  --  0.4   < >  --  0.2 0.4   ALBUMIN  --   --   --  2.8*  --  3.6  --  4.0   < > 3.5 3.6 3.3*   PROTTOTAL  --   --   --  6.2*  --  7.1  --  7.9   < >  --  7.7 7.6   ALKPHOS  --   --   --  171*  --  110  --  135   < >  --  118 108   AST  --   --   --  19  --  22  --  40   < >  --  23 28   ALT  --   --   --  12  --  12  --  32   < >  --  25 28    < > = values in this interval not displayed.     Calcium/VitaminD  Recent Labs   Lab Test 03/27/24  1458 03/18/24  0615 03/11/24  0544 02/14/24  0530 02/09/24  0720   ROEL 9.0 9.0 8.5   < > 9.5   VITDT  --   --   --   --  26    < > = values in this interval not displayed.     ESR/CRP  Recent Labs   Lab Test 11/21/23  1053 08/10/23  0852 05/01/23  0908 01/20/23  0847 10/10/22  1035   SED 28 38* 48* 45* 33*   CRP  --   --  22.6* 9.0* 11.2*   CRPI 4.80 6.49*  --   --   --      Hepatitis B  Recent Labs   Lab Test 11/10/16  0909   HBCAB Nonreactive   HEPBANG Nonreactive     Hepatitis C  Recent Labs   Lab Test 11/10/16  0909   HCVAB Nonreactive   Assay performance characteristics have not been established for newborns,   infants, and children       HIV Screening  Recent Labs   Lab Test 11/10/16  0909   HIAGAB Nonreactive   HIV-1 p24 Ag & HIV-1/HIV-2 Ab Not Detected       Immunization History     Immunization History   Administered Date(s) Administered    COVID-19 12+ (2023-24) (MODERNA) 10/03/2023    COVID-19 Bivalent 12+ (Pfizer) 06/01/2023    COVID-19 MONOVALENT 12+ (Pfizer) 02/16/2021, 03/09/2021, 10/14/2021    COVID-19 Monovalent 12+ (Pfizer 2022) 08/15/2022    Influenza (H1N1) 10/20/2016    Influenza (High Dose) 3 valent vaccine 10/16/2014, 10/06/2015, 10/23/2016, 10/03/2017, 08/23/2018, 09/18/2019    Influenza (IIV3) PF 11/05/1999, 12/14/2000, 10/26/2004, 10/04/2005, 09/16/2009, 10/20/2010, 11/09/2011, 09/22/2012, 09/15/2013, 10/15/2014    Influenza Vaccine 65+ (FLUAD) 09/17/2021, 10/25/2022     Influenza Vaccine 65+ (Fluzone HD) 09/02/2020, 09/28/2022, 10/03/2023    Influenza, seasonal, injectable, PF 09/08/2010    Mantoux Tuberculin Skin Test 03/06/2024    Pneumo Conj 13-V (2010&after) 03/17/2015    Pneumococcal 23 valent 11/03/2000, 12/13/2010    TD,PF 7+ (Tenivac) 01/12/2004    TDAP (Adacel,Boostrix) 06/26/2023    TDAP Vaccine (Adacel) 05/14/2013    Td (Adult), Adsorbed 01/12/2004    Zoster recombinant adjuvanted (SHINGRIX) 06/21/2018, 08/23/2018    Zoster vaccine, live 12/15/2006          Chart documentation done in part with Dragon Voice recognition Software. Although reviewed after completion, some word and grammatical error may remain.    Jamie Johnson MD

## 2024-04-03 NOTE — NURSING NOTE
RAPID3 (0-30) Cumulative Score  6.3          RAPID3 Weighted Score (divide #4 by 3 and that is the weighted score)  2.1

## 2024-04-04 ENCOUNTER — ANCILLARY PROCEDURE (OUTPATIENT)
Dept: ULTRASOUND IMAGING | Facility: CLINIC | Age: 89
End: 2024-04-04
Attending: FAMILY MEDICINE
Payer: MEDICARE

## 2024-04-04 DIAGNOSIS — E04.1 THYROID NODULE: ICD-10-CM

## 2024-04-04 PROCEDURE — 76536 US EXAM OF HEAD AND NECK: CPT | Mod: TC | Performed by: RADIOLOGY

## 2024-04-11 ENCOUNTER — OFFICE VISIT (OUTPATIENT)
Dept: NEUROSURGERY | Facility: CLINIC | Age: 89
End: 2024-04-11
Payer: MEDICARE

## 2024-04-11 ENCOUNTER — ANCILLARY PROCEDURE (OUTPATIENT)
Dept: GENERAL RADIOLOGY | Facility: CLINIC | Age: 89
End: 2024-04-11
Attending: NURSE PRACTITIONER
Payer: MEDICARE

## 2024-04-11 VITALS
BODY MASS INDEX: 22.18 KG/M2 | DIASTOLIC BLOOD PRESSURE: 60 MMHG | RESPIRATION RATE: 16 BRPM | HEART RATE: 57 BPM | SYSTOLIC BLOOD PRESSURE: 140 MMHG | WEIGHT: 110 LBS | OXYGEN SATURATION: 100 % | HEIGHT: 59 IN

## 2024-04-11 DIAGNOSIS — S22.081A T12 BURST FRACTURE (H): ICD-10-CM

## 2024-04-11 DIAGNOSIS — S22.081A BURST FRACTURE OF T12 VERTEBRA (H): ICD-10-CM

## 2024-04-11 DIAGNOSIS — S22.081A T12 BURST FRACTURE (H): Primary | ICD-10-CM

## 2024-04-11 LAB — RADIOLOGIST FLAGS: ABNORMAL

## 2024-04-11 PROCEDURE — 99203 OFFICE O/P NEW LOW 30 MIN: CPT | Performed by: NURSE PRACTITIONER

## 2024-04-11 PROCEDURE — 72070 X-RAY EXAM THORAC SPINE 2VWS: CPT | Performed by: STUDENT IN AN ORGANIZED HEALTH CARE EDUCATION/TRAINING PROGRAM

## 2024-04-11 RX ORDER — POLYETHYLENE GLYCOL 3350 17 G/17G
POWDER, FOR SOLUTION ORAL
Qty: 510 G | Refills: 97 | Status: SHIPPED | OUTPATIENT
Start: 2024-04-11

## 2024-04-11 ASSESSMENT — PAIN SCALES - GENERAL: PAINLEVEL: NO PAIN (0)

## 2024-04-11 NOTE — LETTER
4/11/2024       RE: Roxanna Stark  [address]  [place] [address]     Dear Colleague,    Thank you for referring your patient, Roxanna Stark, to the [facility] NEUROSURGERY CLINIC [place] at [facility]. Please see a copy of my visit note below.        Neurosurgery Clinic Note    Chief Complaint: hospital follow-up     DATE OF VISIT: 4/11/2024    HPI:    Ms. 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  Patient does not remember the fall event but remembers laying on the floor.  She reports a mild headache and back pain and on and off pain in the left lower extremity mainly in the groin.  Reportedly the last dose of Eliquis was in the morning of 2/24/2024.  She denies any weakness or numbness in the lower extremities or any bladder or bowel incontinence episodes.  She denies any sensory level or any numbness in the groin or belly.  Imaging was performed in the ED.  Neurosurgery was consulted for evaluation and management of T12 fracture.   Patient was managed conservatively with TLSO brace for comfort and repeat upright x-rays were obtained which remained stable.  She was subsequently discharged to TCU on 3/2/2024.    Currently, she notes intermittent pain her lower thoracic spine, worse with activity and at the end of the day.   Is able to ambulate with walker and states the pain does not limit her activity tolerance.  Denies leg pain or paresthesias.  Denies saddle anesthesia.  States she has had more frequent episodes of incontinence however typically is able to make it to the restroom in time.  Continues to wear brace when out of bed.  Has been  undergoing PT at her living facility.  Taking lidocaine patches and tylenol for pain with adequate relief.   Has fallen once since discharge, hurting her elbow, did not hit her head.          Review of Systems   Negative except in HPI    Past Medical History:   Diagnosis Date    Actinic keratosis     Aortic stenosis 2014    Atrial flutter with rapid ventricular response (H) 01/27/2024    Basal cell cancer 07/2014    left eye medial canthus     Basal cell carcinoma 09/30/2008    left cheek    CKD (chronic kidney disease) stage 3, GFR 30-59 ml/min (H) 07/01/2019    HTN (hypertension)     Melanoma in situ (H) 09/30/2008    left arm    PAF (paroxysmal atrial fibrillation) (H) 3/27/2024    Polymyalgia rheumatica (H24) 11/1999    Rheumatoid arthritis of multiple sites with negative rheumatoid factor (H)     Status post coronary angiogram 03/03/2016    Temporal arteritis (H) 11/1999       Past Surgical History:   Procedure Laterality Date    CATARACT IOL, RT/LT  5/09    bilateral    COLONOSCOPY  2002    EXCISE LESION VULVA N/A 9/27/2019    Procedure: Wide Local Excision Of Vulva, Colposcopy;  Surgeon: Mono Ribeiro MD;  Location:  OR    REPLACE VALVE AORTIC N/A 4/25/2016    Procedure: REPLACE VALVE AORTIC;  Surgeon: Sudeep Tsai MD;  Location:  OR    [facility] SKIN TISSUE PROCEDURE UNLISTED  11/3/08    mmis skin cancer excision       Social History     Socioeconomic History    Marital status:    Occupational History     Employer: RETIRED   Tobacco Use    Smoking status: Never     Passive exposure: Never    Smokeless tobacco: Never   Vaping Use    Vaping status: Never Used   Substance and Sexual Activity    Alcohol use: Yes     Comment: rarely    Drug use: No    Sexual activity: Not Currently     Partners: Male     Birth control/protection: None   Other Topics Concern    Parent/sibling w/ CABG, MI or angioplasty before 65F 55M? No       family history includes Arthritis in her father, mother, sister,  sister, and sister; Asthma in her daughter and daughter; Breast Cancer (age of onset: 45) in her sister; Cerebrovascular Disease in her daughter; Colon Cancer in her father and sister; Heart Disease in her father; Hypertension in her father; Lipids in her father; Lung Cancer (age of onset: 58) in her daughter; Myocardial Infarction in her daughter; Pancreatic Cancer (age of onset: 81) in an other family member; Prostate Cancer in her father; Thyroid Disease in her sister.              Physical Exam   There were no vitals taken for this visit.  Constitutional: Oriented to person, place, and time. Appears well-developed and well-nourished. Cooperative. No distress.   HENT: Head normocephalic and atraumatic.   Neurological: alert and oriented to person, place, and time. CN II-XII grossly  intact      STRENGTH LEFT RIGHT   Deltoid 5 5   Bicep 5 5   Wrist Extensor 5 5   Tricep 5 5   Finger flexion 5 5   Finger abduction 5 5    5 5       Hip Flexion     5     5   Knee Extension 5 5   Ankle Dorsiflexion 5 5   Extensor Hallucis Longus 5 5   Plantar Flexion 5 5   Foot eversion 5 5   Foot inversion 5 5       Skin: Skin is warm, dry and intact.   Psychiatric: Normal mood and affect. Speech is normal and behavior is normal.      IMAGING:  Personally reviewed thoracic x-rays dated 4/11/2024:   Progressed burst compression fracture deformity at T12 resulting in approximately 70% vertebral body height loss, previously 50%.    ASSESSMENT:  Roxanna Stark is a 89 year old female who sustained a mechanical fall on 2/24/2024 resulting T12 burst fracture     PLAN:  Do not do any bending, twisting, strenuous exercise, or heavy lifting (greater than 10 pounds) for 4-6 weeks. Be careful and ask for assistance when walking or going up and down stairs.     Given she continues to have pain with palpation and intermittent pain with activity and imaging noting increased height loss, kyphoplasty procedure was discussed with patient and her  daughter Anna today.  At this time, she defers moving forward with procedure and would like to continue conservative therapy.     Continue physical therapy as prescribed      Continue taking Calcium and Vitamin D supplementation as prescribed.    Please continue to wear brace as directed until discontinued by Neurosurgery provider.      If you should sustain new trauma or injury or note increased headache, weakness, speech or vision issues, confusion, or other neurologic changes please call 911 or present to your nearest emergency room for further evaluation.     Please follow-up with me in 6 weeks with repeat upright imaging.        I spent 35 minutes in patient care with greater than 50% spent in counseling and/or coordination of care.     I performed independent visualization of radiographic imaging and entered my own interpretation, reviewed and/or ordered tests in radiology      Again, thank you for allowing me to participate in the care of your patient.      Sincerely,    CARMELITA Chawla CNP

## 2024-04-11 NOTE — PROGRESS NOTES
Neurosurgery Clinic Note    Chief Complaint: hospital follow-up     DATE OF VISIT: 4/11/2024    HPI:    Ms. 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  Patient does not remember the fall event but remembers laying on the floor.  She reports a mild headache and back pain and on and off pain in the left lower extremity mainly in the groin.  Reportedly the last dose of Eliquis was in the morning of 2/24/2024.  She denies any weakness or numbness in the lower extremities or any bladder or bowel incontinence episodes.  She denies any sensory level or any numbness in the groin or belly.  Imaging was performed in the ED.  Neurosurgery was consulted for evaluation and management of T12 fracture.   Patient was managed conservatively with TLSO brace for comfort and repeat upright x-rays were obtained which remained stable.  She was subsequently discharged to TCU on 3/2/2024.    Currently, she notes intermittent pain her lower thoracic spine, worse with activity and at the end of the day.   Is able to ambulate with walker and states the pain does not limit her activity tolerance.  Denies leg pain or paresthesias.  Denies saddle anesthesia.  States she has had more frequent episodes of incontinence however typically is able to make it to the restroom in time.  Continues to wear brace when out of bed.  Has been undergoing PT at her living facility.  Taking lidocaine patches and tylenol for pain with adequate relief.   Has fallen once since discharge, hurting her elbow, did not hit her head.          Review of Systems   Negative except in HPI    Past Medical History:   Diagnosis Date    Actinic keratosis     Aortic stenosis 2014     Atrial flutter with rapid ventricular response (H) 01/27/2024    Basal cell cancer 07/2014    left eye medial canthus     Basal cell carcinoma 09/30/2008    left cheek    CKD (chronic kidney disease) stage 3, GFR 30-59 ml/min (H) 07/01/2019    HTN (hypertension)     Melanoma in situ (H) 09/30/2008    left arm    PAF (paroxysmal atrial fibrillation) (H) 3/27/2024    Polymyalgia rheumatica (H24) 11/1999    Rheumatoid arthritis of multiple sites with negative rheumatoid factor (H)     Status post coronary angiogram 03/03/2016    Temporal arteritis (H) 11/1999       Past Surgical History:   Procedure Laterality Date    CATARACT IOL, RT/LT  5/09    bilateral    COLONOSCOPY  2002    EXCISE LESION VULVA N/A 9/27/2019    Procedure: Wide Local Excision Of Vulva, Colposcopy;  Surgeon: Mono Ribeiro MD;  Location: [facility] OR    REPLACE VALVE AORTIC N/A 4/25/2016    Procedure: REPLACE VALVE AORTIC;  Surgeon: Sudeep Tsai MD;  Location: [facility] OR    [facility] SKIN TISSUE PROCEDURE UNLISTED  11/3/08    mmis skin cancer excision       Social History     Socioeconomic History    Marital status:    Occupational History     Employer: RETIRED   Tobacco Use    Smoking status: Never     Passive exposure: Never    Smokeless tobacco: Never   Vaping Use    Vaping status: Never Used   Substance and Sexual Activity    Alcohol use: Yes     Comment: rarely    Drug use: No    Sexual activity: Not Currently     Partners: Male     Birth control/protection: None   Other Topics Concern    Parent/sibling w/ CABG, MI or angioplasty before 65F 55M? No       family history includes Arthritis in her father, mother, sister, sister, and sister; Asthma in her daughter and daughter; Breast Cancer (age of onset: 45) in her sister; Cerebrovascular Disease in her daughter; Colon Cancer in her father and sister; Heart Disease in her father; Hypertension in her father; Lipids in her father; Lung Cancer (age of onset: 58) in her daughter; Myocardial  Infarction in her daughter; Pancreatic Cancer (age of onset: 81) in an other family member; Prostate Cancer in her father; Thyroid Disease in her sister.              Physical Exam   There were no vitals taken for this visit.  Constitutional: Oriented to person, place, and time. Appears well-developed and well-nourished. Cooperative. No distress.   HENT: Head normocephalic and atraumatic.   Neurological: alert and oriented to person, place, and time. CN II-XII grossly  intact      STRENGTH LEFT RIGHT   Deltoid 5 5   Bicep 5 5   Wrist Extensor 5 5   Tricep 5 5   Finger flexion 5 5   Finger abduction 5 5    5 5       Hip Flexion     5     5   Knee Extension 5 5   Ankle Dorsiflexion 5 5   Extensor Hallucis Longus 5 5   Plantar Flexion 5 5   Foot eversion 5 5   Foot inversion 5 5       Skin: Skin is warm, dry and intact.   Psychiatric: Normal mood and affect. Speech is normal and behavior is normal.      IMAGING:  Personally reviewed thoracic x-rays dated 4/11/2024:   Progressed burst compression fracture deformity at T12 resulting in approximately 70% vertebral body height loss, previously 50%.    ASSESSMENT:  Roxanna Stark is a 89 year old female who sustained a mechanical fall on 2/24/2024 resulting T12 burst fracture     PLAN:  Do not do any bending, twisting, strenuous exercise, or heavy lifting (greater than 10 pounds) for 4-6 weeks. Be careful and ask for assistance when walking or going up and down stairs.     Given she continues to have pain with palpation and intermittent pain with activity and imaging noting increased height loss, kyphoplasty procedure was discussed with patient and her daughter Anna today.  At this time, she defers moving forward with procedure and would like to continue conservative therapy.     Continue physical therapy as prescribed      Continue taking Calcium and Vitamin D supplementation as prescribed.    Please continue to wear brace as directed until discontinued by  Neurosurgery provider.      If you should sustain new trauma or injury or note increased headache, weakness, speech or vision issues, confusion, or other neurologic changes please call 911 or present to your nearest emergency room for further evaluation.     Please follow-up with me in 6 weeks with repeat upright imaging.                I spent 35 minutes in patient care with greater than 50% spent in counseling and/or coordination of care.     I performed independent visualization of radiographic imaging and entered my own interpretation, reviewed and/or ordered tests in radiology        CARMELITA Dos Santos, CNP  Department of Neurosurgery  Pager: [phone]

## 2024-04-11 NOTE — PATIENT INSTRUCTIONS
Do not do any bending, twisting, strenuous exercise, or heavy lifting (greater than 10 pounds) for 4-6 weeks. Be careful and ask for assistance when walking or going up and down stairs.     Kyphoplasty procedure was discussed with patient today given she continues to have pain with palpation and intermittent pain with activity.  At this time, she defers moving forward with procedure and would like to continue conservative therapy.     Continue physical therapy     Continue taking Calcium and Vitamin D supplementation as prescribed.    Please continue to wear brace as directed until discontinued by Neurosurgery provider.      Ok to gradually wean from cervical collar.  Begin by removing at night, if tolerated may begin removing throughout the day, gradually increasing the amoun    If you should sustain new trauma or injury or note increased headache, weakness, speech or vision issues, confusion, or other neurologic changes please call 911 or present to your nearest emergency room for further evaluation.     Please follow-up with me in 6 weeks with repeat upright imaging.

## 2024-04-17 ENCOUNTER — MYC MEDICAL ADVICE (OUTPATIENT)
Dept: NEUROSURGERY | Facility: CLINIC | Age: 89
End: 2024-04-17

## 2024-04-17 DIAGNOSIS — S22.081A T12 BURST FRACTURE (H): Primary | ICD-10-CM

## 2024-04-17 DIAGNOSIS — E04.1 THYROID NODULE: Primary | ICD-10-CM

## 2024-04-23 NOTE — TELEPHONE ENCOUNTER
Patient's daughter Anna was called and the procedure once again discussed.  Anna states her mother would like to proceed with procedure.   Given she has not had a MRI I will order this so it may be completed prior to consultation.   This plan was discussed with patient and daughter who agree to proceed.   Schedulers were contacted to arrange visit.       CARMELITA Dos Santos, CNP  Department of Neurosurgery  Pager: [phone]

## 2024-04-30 ENCOUNTER — TELEPHONE (OUTPATIENT)
Dept: NEUROSURGERY | Facility: CLINIC | Age: 89
End: 2024-04-30
Payer: MEDICARE

## 2024-04-30 ENCOUNTER — TRANSFERRED RECORDS (OUTPATIENT)
Dept: HEALTH INFORMATION MANAGEMENT | Facility: CLINIC | Age: 89
End: 2024-04-30
Payer: MEDICARE

## 2024-04-30 NOTE — TELEPHONE ENCOUNTER
Left Voicemail (1st Attempt) for the patient to call back and schedule the following:    Appointment type: New adult neurosurg - in person  Provider: Dr. Lyles  Return date: 05/14/2024 @ 1:30pm for 60 mins.  Specialty phone number: [phone]  Additional appointment(s) needed: MRI prior  Additonal Notes: Kyphoplasty/ T12 burst fracture/ MRI prior

## 2024-05-02 NOTE — TELEPHONE ENCOUNTER
RECORDS RECEIVED FROM: Internal    REASON FOR VISIT: Kyphoplasty/ T12 burst fracture/ MRI prior   PROVIDER: Ronnell Lyles MD   DATE OF APPT: 5/14/24 @ 1:30 pm    NOTES (FOR ALL VISITS) STATUS DETAILS   OFFICE NOTE from referring provider Internal 4/17/24 (MyChart Note), 4/11/24 Elizabeth Waters APRN CNP @[facility]-NeuroSurg     OFFICE NOTE from other specialist Internal 3/27/24 Swapna Gaines MD @[facility]     DISCHARGE SUMMARY from hospital Internal 3/2/24-3/19/24 Wilfredo Monge MD @[facility]-Transitional Care    2/24/24-3/2/24 Codi Guzman MD @[facility]     DISCHARGE REPORT from the ER Internal 4/13/24 Adelfo Carias MD  @[facility] ED     MEDICATION LIST Internal    IMAGING  (FOR ALL VISITS)     X-RAY Internal [facility]  4/11/24 XR Thoracic Spine  2/27/24 XR Thoracic Lumbar Spine  2/25/24 XR Thoracic Lumbar Spine     MRI (HEAD, NECK, SPINE) Internal [facility]  Scheduled 5/13/24 MR Thoracic Spine     CT (HEAD, NECK, SPINE) Internal [facility]  2/25/24 CT Thoracic Spine  2/25/24 CT Lumbar Spine  2/25/24 CT Cervical Spine  2/25/24 CT Head

## 2024-05-03 ENCOUNTER — ANCILLARY PROCEDURE (OUTPATIENT)
Dept: ULTRASOUND IMAGING | Facility: CLINIC | Age: 89
End: 2024-05-03
Attending: FAMILY MEDICINE
Payer: MEDICARE

## 2024-05-03 DIAGNOSIS — I48.92 ATRIAL FLUTTER WITH RAPID VENTRICULAR RESPONSE (H): ICD-10-CM

## 2024-05-03 DIAGNOSIS — E04.1 THYROID NODULE: ICD-10-CM

## 2024-05-03 PROCEDURE — 88173 CYTOPATH EVAL FNA REPORT: CPT | Performed by: PATHOLOGY

## 2024-05-03 PROCEDURE — 10005 FNA BX W/US GDN 1ST LES: CPT | Performed by: RADIOLOGY

## 2024-05-03 RX ORDER — AMIODARONE HYDROCHLORIDE 200 MG/1
200 TABLET ORAL DAILY
Qty: 90 TABLET | Refills: 97 | OUTPATIENT
Start: 2024-05-03

## 2024-05-06 LAB
PATH REPORT.COMMENTS IMP SPEC: ABNORMAL
PATH REPORT.COMMENTS IMP SPEC: YES
PATH REPORT.FINAL DX SPEC: ABNORMAL
PATH REPORT.GROSS SPEC: ABNORMAL
PATH REPORT.MICROSCOPIC SPEC OTHER STN: ABNORMAL
PATH REPORT.RELEVANT HX SPEC: ABNORMAL

## 2024-05-08 DIAGNOSIS — D49.7 FOLLICULAR NEOPLASM OF THYROID: Primary | ICD-10-CM

## 2024-05-09 ENCOUNTER — TELEPHONE (OUTPATIENT)
Dept: NEUROSURGERY | Facility: CLINIC | Age: 89
End: 2024-05-09
Payer: MEDICARE

## 2024-05-09 NOTE — TELEPHONE ENCOUNTER
Health Call Center    Phone Message    May a detailed message be left on voicemail: yes     Reason for Call: Other: Patient's daughter Anna called in and wanted to make sure that Dr Lyles was aware of pt's hospital visit. Anna reports she fell and cracked some ribs and hit her head. Anna reports the patient will be discharged tomorrow, but wants to make sure its still ok to come in for the MRI on Monday 5/13 and then the follow up on the 14th. Please call back to discuss and advise.       Action Taken: Message routed to:  [facility] ([facility] Neurosurgery    Travel Screening: Not Applicable

## 2024-05-13 ENCOUNTER — HOSPITAL ENCOUNTER (OUTPATIENT)
Dept: MRI IMAGING | Facility: CLINIC | Age: 89
Discharge: HOME | End: 2024-05-13
Attending: NURSE PRACTITIONER | Admitting: NURSE PRACTITIONER
Payer: MEDICARE

## 2024-05-13 ENCOUNTER — OFFICE VISIT (OUTPATIENT)
Dept: FAMILY MEDICINE | Facility: CLINIC | Age: 89
End: 2024-05-13
Payer: MEDICARE

## 2024-05-13 VITALS
DIASTOLIC BLOOD PRESSURE: 69 MMHG | WEIGHT: 108.6 LBS | OXYGEN SATURATION: 97 % | SYSTOLIC BLOOD PRESSURE: 138 MMHG | TEMPERATURE: 99.3 F | HEART RATE: 50 BPM | BODY MASS INDEX: 21.89 KG/M2 | RESPIRATION RATE: 20 BRPM | HEIGHT: 59 IN

## 2024-05-13 DIAGNOSIS — N18.4 STAGE 4 CHRONIC KIDNEY DISEASE (H): ICD-10-CM

## 2024-05-13 DIAGNOSIS — I12.9 BENIGN HYPERTENSION WITH CKD (CHRONIC KIDNEY DISEASE) STAGE IV (H): ICD-10-CM

## 2024-05-13 DIAGNOSIS — S22.41XD CLOSED FRACTURE OF MULTIPLE RIBS OF RIGHT SIDE WITH ROUTINE HEALING, SUBSEQUENT ENCOUNTER: ICD-10-CM

## 2024-05-13 DIAGNOSIS — N18.4 BENIGN HYPERTENSION WITH CKD (CHRONIC KIDNEY DISEASE) STAGE IV (H): ICD-10-CM

## 2024-05-13 DIAGNOSIS — S22.081A T12 BURST FRACTURE (H): ICD-10-CM

## 2024-05-13 DIAGNOSIS — Z29.11 NEED FOR VACCINATION AGAINST RESPIRATORY SYNCYTIAL VIRUS: ICD-10-CM

## 2024-05-13 DIAGNOSIS — R29.6 RECURRENT FALLS: ICD-10-CM

## 2024-05-13 DIAGNOSIS — I48.0 PAF (PAROXYSMAL ATRIAL FIBRILLATION) (H): ICD-10-CM

## 2024-05-13 DIAGNOSIS — D49.7 FOLLICULAR NEOPLASM OF THYROID: ICD-10-CM

## 2024-05-13 DIAGNOSIS — S22.081A BURST FRACTURE OF T12 VERTEBRA (H): ICD-10-CM

## 2024-05-13 DIAGNOSIS — I48.92 ATRIAL FLUTTER WITH RAPID VENTRICULAR RESPONSE (H): ICD-10-CM

## 2024-05-13 DIAGNOSIS — Z29.89 SBE (SUBACUTE BACTERIAL ENDOCARDITIS) PROPHYLAXIS CANDIDATE: ICD-10-CM

## 2024-05-13 LAB
CREAT UR-MCNC: 14 MG/DL
MICROALBUMIN UR-MCNC: 12 MG/L
MICROALBUMIN/CREAT UR: 85.71 MG/G CR (ref 0–25)

## 2024-05-13 PROCEDURE — 82570 ASSAY OF URINE CREATININE: CPT | Performed by: FAMILY MEDICINE

## 2024-05-13 PROCEDURE — 82043 UR ALBUMIN QUANTITATIVE: CPT | Performed by: FAMILY MEDICINE

## 2024-05-13 PROCEDURE — 99495 TRANSJ CARE MGMT MOD F2F 14D: CPT | Performed by: FAMILY MEDICINE

## 2024-05-13 PROCEDURE — 72146 MRI CHEST SPINE W/O DYE: CPT | Mod: MG

## 2024-05-13 RX ORDER — AMIODARONE HYDROCHLORIDE 200 MG/1
200 TABLET ORAL DAILY
COMMUNITY
Start: 2024-05-13 | End: 2024-05-17

## 2024-05-13 RX ORDER — RESPIRATORY SYNCYTIAL VIRUS VACCINE 120MCG/0.5
0.5 KIT INTRAMUSCULAR ONCE
Qty: 1 EACH | Refills: 0 | Status: SHIPPED | OUTPATIENT
Start: 2024-05-13 | End: 2024-05-13

## 2024-05-13 RX ORDER — AMOXICILLIN 500 MG/1
CAPSULE ORAL
Qty: 4 CAPSULE | Refills: 3 | Status: SHIPPED | OUTPATIENT
Start: 2024-05-13

## 2024-05-14 ENCOUNTER — PRE VISIT (OUTPATIENT)
Dept: NEUROSURGERY | Facility: CLINIC | Age: 89
End: 2024-05-14

## 2024-05-14 ENCOUNTER — OFFICE VISIT (OUTPATIENT)
Dept: NEUROSURGERY | Facility: CLINIC | Age: 89
End: 2024-05-14
Payer: MEDICARE

## 2024-05-14 ENCOUNTER — MEDICAL CORRESPONDENCE (OUTPATIENT)
Dept: HEALTH INFORMATION MANAGEMENT | Facility: CLINIC | Age: 89
End: 2024-05-14

## 2024-05-14 VITALS
SYSTOLIC BLOOD PRESSURE: 122 MMHG | HEART RATE: 49 BPM | RESPIRATION RATE: 16 BRPM | WEIGHT: 109 LBS | DIASTOLIC BLOOD PRESSURE: 66 MMHG | HEIGHT: 59 IN | BODY MASS INDEX: 21.97 KG/M2 | OXYGEN SATURATION: 98 %

## 2024-05-14 DIAGNOSIS — M80.08XA AGE-RELATED OSTEOPOROSIS WITH CURRENT PATHOLOGICAL FRACTURE, VERTEBRA(E), INITIAL ENCOUNTER FOR FRACTURE (H): ICD-10-CM

## 2024-05-14 DIAGNOSIS — S22.081A T12 BURST FRACTURE (H): Primary | ICD-10-CM

## 2024-05-14 PROCEDURE — 99214 OFFICE O/P EST MOD 30 MIN: CPT | Performed by: NEUROLOGICAL SURGERY

## 2024-05-14 NOTE — NURSING NOTE
Chief Complaint   Patient presents with    New Patient     Here for consult, confirmed with patient     Kasandra Dominique

## 2024-05-14 NOTE — LETTER
5/14/2024       RE: Roxanna Stark  [address]  [place] [address]       Dear Colleague,    Thank you for referring your patient, Roxanna Stark, to the [facility] NEUROSURGERY CLINIC [place] at [facility]. Please see a copy of my visit note below.    Date of service: 5/14/2024  Length of service: 30 minutes    Swapna Gaines MD  [address]  [place] [address]       Dear Dr. Gaines:    We saw Ms. Stark in neurosurgery clinic today at the request of our partner, Elizabeth Waters CNP regarding a thoracic spinal burst fracture.  Please see the previous neurosurgery clinic notes for complete details.  She was accompanied by her daughter.  The patient currently lives at [facility] in [place].  She is nearly 3 months out from falling and developing a T12 burst fracture.  She has been managed in a corset.  When she is sitting or lying still, she has minimal pain.  She denies pain in the extremities.  However, movement triggers back pain.  She was living independently at home prior to the fall.  Her past medical history is notable for aortic valve replacement and atrial fibrillation, for which she is on Eliquis.  The most recent MRI of the thoracic spine shows some progression of the fracture in terms of loss of vertebral body height.    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  She is  and has 6 children.  She is disgruntled about her new living environment.    On exam, the most noticeable feature in this elderly patient is the thoracolumbar corset.  She tends to hunch forward in the chair.  She has good strength throughout the lower extremities.  She has a small frame and weighs 49.4 kg.  BMI 22.02, heart rate 49, blood pressure 122/66.  Her speech, language, and phonation are normal.  She was very engaged during this visit.    We went over the various imaging studies of her spine including  CT scans and MRIs.  There is over 50% vertebral body height loss at T12 with small amount of retropulsion of the superior endplate into the spinal canal without causing much stenosis.  Alignment seems appropriate.    Since she has persistent mobility limiting pain and because there is radiographic evidence of progression of the fracture, we agree with our partners that vertebral body augmentation is reasonable.  We emphasized that the main purpose of the procedure is alleviation of pain.  We reviewed the risks of hemorrhage, spinal cord injury, nerve root injury, infection, failure to improve, need for additional procedures, and she agrees to proceed.  We will hold the Eliquis about 3 days prior to the procedure.  We will restart the Eliquis 1 to 2 days afterwards, if the procedure is uneventful.  We should be able to complete the procedure under conscious sedation.  We will keep you informed of her progress.  Please not hesitate contact us with questions.          Again, thank you for allowing me to participate in the care of your patient.      Sincerely,    Ronnell Lyles MD

## 2024-05-14 NOTE — PATIENT INSTRUCTIONS
Scheduling will contact you to make arrangements for your vertebroplasty. You will need a  home and someone that can stay with you through the night. Do not eat for 8 hours prior to arrival time. You may drink clear liquids (includes water, Jell-O, clear broth, apple juice or any non-carbonated beverage that you can see through) until 2 hours prior to arrival time. Discontinue Eliquis 72 hours prior to your procedure. You may take your other medications with a sip of water. You will check in at the Gold waiting room at the [facility] 1.5 hours prior to your procedure. You will receive written instructions via mail and [employer] after your procedure has been scheduled.      If you have questions regarding your procedure, please contact us at [phone], option 1.    If you need to cancel, reschedule or have procedure scheduling related questions, please call [facility] Procedure Scheduling at [phone].    Thank you,  Alena Robison RN & Belkis Marin RN, CNRN, SCRN  Stroke & Endovascular Care Coordinators

## 2024-05-15 DIAGNOSIS — S22.081A BURST FRACTURE OF T12 VERTEBRA (H): ICD-10-CM

## 2024-05-16 ENCOUNTER — TELEPHONE (OUTPATIENT)
Dept: NEUROSURGERY | Facility: CLINIC | Age: 89
End: 2024-05-16
Payer: MEDICARE

## 2024-05-16 ENCOUNTER — MEDICAL CORRESPONDENCE (OUTPATIENT)
Dept: HEALTH INFORMATION MANAGEMENT | Facility: CLINIC | Age: 89
End: 2024-05-16
Payer: MEDICARE

## 2024-05-16 DIAGNOSIS — I48.92 ATRIAL FLUTTER WITH RAPID VENTRICULAR RESPONSE (H): ICD-10-CM

## 2024-05-16 RX ORDER — LIDOCAINE PAIN RELIEF 40 MG/1000MG
PATCH TOPICAL
Qty: 30 PATCH | Refills: 97 | Status: SHIPPED | OUTPATIENT
Start: 2024-05-16

## 2024-05-16 NOTE — TELEPHONE ENCOUNTER
Left voicemail for  patient's daughter  regarding scheduling surgery with Dr. Lyles.  Provided contact number to discuss.  [phone]    Johanne Fishman, on 5/16/2024 at 9:37 AM

## 2024-05-16 NOTE — TELEPHONE ENCOUNTER
Spoke with patient's daughter Anna and was able to confirm all scheduled information.     Patient is scheduled for surgery with: Dr. Lyles    Surgery Date: 6/5     Location: [facility]    H&P: NOT NEEDED      Post-op: not needed at this time    Patient's daughter is aware of arrival and procedure times.      Patient questions/concerns: N/A     Surgery packet N/A       Johanne Fishman on 5/16/2024 at 3:45 PM

## 2024-05-16 NOTE — TELEPHONE ENCOUNTER
Left voicemail for  patient's daughter  regarding scheduled surgery with Dr. Lyles.  Provided contact number to discuss.  [phone]    Johanne Fishman, on 5/16/2024 at 10:41 AM

## 2024-05-17 ENCOUNTER — TELEPHONE (OUTPATIENT)
Dept: ENDOCRINOLOGY | Facility: CLINIC | Age: 89
End: 2024-05-17
Payer: MEDICARE

## 2024-05-17 DIAGNOSIS — I48.92 ATRIAL FLUTTER WITH RAPID VENTRICULAR RESPONSE (H): ICD-10-CM

## 2024-05-17 RX ORDER — AMIODARONE HYDROCHLORIDE 200 MG/1
200 TABLET ORAL DAILY
Qty: 90 TABLET | Refills: 1 | Status: SHIPPED | OUTPATIENT
Start: 2024-05-17

## 2024-05-17 NOTE — TELEPHONE ENCOUNTER
Pending Prescriptions:                       Disp   Refills    amiodarone (PACERONE) 200 MG tablet       90 tab*1            Sig: Take 1 tablet (200 mg) by mouth daily

## 2024-05-17 NOTE — TELEPHONE ENCOUNTER
amiodarone (PACERONE) 200 MG tablet          Possible duplicate: Hover to review recent actions on this medication    Sig: Take 1 tablet (200 mg) by mouth daily    Disp: 90 tablet    Refills: 1    Start: 5/17/2024    Class: E-Prescribe    For: Atrial flutter with rapid ventricular response (H)    Last ordered: 4 days ago (5/13/2024) by Swapna Gaines MD       To be filled at: None             Called and spoke with patient and patient's daughter. Patient is running out of her amiodarone. Patient is taking amiodarone 200 mg daily. Patient saw Dr. Herrera on 3/11/24. Patient is to continue amiodarone 200 mg once daily per office visit note. Patient to follow up in 5-6 months with labs and PFT prior. Patient to also see ophthalmologist prior to follow up. Mady refill sent to patient to cover until patient is seen by Dr. Herrera next.    Parisa Anderson, RN, BSN  Cardiology RN Care Coordinator   [place]   Phone: [phone]  Fax: [phone] ([place]) [phone] ([place])

## 2024-05-17 NOTE — TELEPHONE ENCOUNTER
Sent Mychart (1st Attempt) and Patient Contacted for the patient to call back and schedule the following:    Appointment type: new thyroid cancer   Provider: Dr. Teresa   Return date: 5/29 at 4pm (overbook ok)   Specialty phone number: [phone]  Additional appointment(s) needed:   Additonal Notes: scheduled with Dr. De La Cruz on 12/2 ; spoke with daughter, was not a good time. Sent myc with sooner appt info per daughter       Leela Teresa MD  [facility]; Leela Teresa MD  To schedulers : please offer to move her forward to face to face appt with me WED 5/29/24 at 4 PM.  This is an overbook.     Please note that the above appointment(s) will require manual scheduling as they are marked as TIMUR and will not appear using auto search. Do not schedule the patient if another patient has already been scheduled in the requested appointment slot.       Soledad Cardona on 5/17/2024 at 5:25 PM

## 2024-05-17 NOTE — TELEPHONE ENCOUNTER
[facility] Call Center    Phone Message    May a detailed message be left on voicemail: yes     Reason for Call: Medication Refill Request    Has the patient contacted the pharmacy for the refill? Yes   Name of medication being requested:   Provider who prescribed the medication:   Pharmacy:  [facility] - [place], [place] - [address] [place] [address]    Date medication is needed: 5/17   Pt will be out after tomorrow    Action Taken: Other: Cardiology    Travel Screening: Not Applicable    Thank you!  Specialty Access Center

## 2024-05-19 NOTE — PROGRESS NOTES
Date of service: 5/14/2024  Length of service: 30 minutes    Swapna Gaines MD  [address]  [place] [address]       Dear Dr. Gaines:    We saw Ms. Stark in neurosurgery clinic today at the request of our partner, Elizabeth Waters CNP regarding a thoracic spinal burst fracture.  Please see the previous neurosurgery clinic notes for complete details.  She was accompanied by her daughter.  The patient currently lives at [facility] in [place].  She is nearly 3 months out from falling and developing a T12 burst fracture.  She has been managed in a corset.  When she is sitting or lying still, she has minimal pain.  She denies pain in the extremities.  However, movement triggers back pain.  She was living independently at home prior to the fall.  Her past medical history is notable for aortic valve replacement and atrial fibrillation, for which she is on Eliquis.  The most recent MRI of the thoracic spine shows some progression of the fracture in terms of loss of vertebral body height.    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  She is  and has 6 children.  She is disgruntled about her new living environment.    On exam, the most noticeable feature in this elderly patient is the thoracolumbar corset.  She tends to hunch forward in the chair.  She has good strength throughout the lower extremities.  She has a small frame and weighs 49.4 kg.  BMI 22.02, heart rate 49, blood pressure 122/66.  Her speech, language, and phonation are normal.  She was very engaged during this visit.    We went over the various imaging studies of her spine including CT scans and MRIs.  There is over 50% vertebral body height loss at T12 with small amount of retropulsion of the superior endplate into the spinal canal without causing much stenosis.  Alignment seems appropriate.    Since she has persistent mobility limiting pain and because there is radiographic evidence of progression of  the fracture, we agree with our partners that vertebral body augmentation is reasonable.  We emphasized that the main purpose of the procedure is alleviation of pain.  We reviewed the risks of hemorrhage, spinal cord injury, nerve root injury, infection, failure to improve, need for additional procedures, and she agrees to proceed.  We will hold the Eliquis about 3 days prior to the procedure.  We will restart the Eliquis 1 to 2 days afterwards, if the procedure is uneventful.  We should be able to complete the procedure under conscious sedation.  We will keep you informed of her progress.  Please not hesitate contact us with questions.    Sincerely,        Ronnell Lyles MD  Department of Neurosurgery

## 2024-05-20 ENCOUNTER — TELEPHONE (OUTPATIENT)
Dept: FAMILY MEDICINE | Facility: CLINIC | Age: 89
End: 2024-05-20
Payer: MEDICARE

## 2024-05-20 DIAGNOSIS — Z53.9 DIAGNOSIS NOT YET DEFINED: Primary | ICD-10-CM

## 2024-05-20 PROCEDURE — 99207 PR MD RECERTIFICATION HHA PT: CPT | Performed by: FAMILY MEDICINE

## 2024-05-20 RX ORDER — AMIODARONE HYDROCHLORIDE 200 MG/1
200 TABLET ORAL DAILY
OUTPATIENT
Start: 2024-05-20

## 2024-05-20 NOTE — TELEPHONE ENCOUNTER
Forms/Letter Request    Type of form/letter: Home Health Certification      Do we have the form/letter: Yes:     Who is the form from? Home care    Where did/will the form come from? form was faxed in    When is form/letter needed by: 3-5 days     How would you like the form/letter returned: Fax : [phone]    Patient Notified form requests are processed in 5-7 business days:Yes    Could we send this information to you in [employer] or would you prefer to receive a phone call?:   Patient would like to be contacted via [employer]

## 2024-05-21 NOTE — TELEPHONE ENCOUNTER
DX, Referring NOTES: Follicular neoplasm of thyroid; referred by Dr. POOLE    For Cancer Patients: Need the original operative and surgical pathology reports and all imaging reports/images related to the disease (includes all thyroid US, nuclear thyroid and total body scans, PET scans, chest CT reports since prior to the diagnosis ).   APPT DATE: 5/29/2024   NOTES (FOR ALL VISITS) STATUS DETAILS   OFFICE NOTES from referring provider Internal [facility]:  5/13/24, 3/27/24 - [facility] OV with Dr. Poole   OFFICE NOTES from other specialist [facility]:  5/14/24 - NEUROSURG OV with Dr. Lyles  4/11/24 - NEUROSURG OV with Elizabeth Waters NP   ED NOTES N/A    OPERATIVE REPORT  (thyroid, pituitary, adrenal, parathyroid)  (All op notes related to diagnoses) N/A    MEDICATION LIST Internal    IMAGING      XR (Chest) Received / Internal [facility]:  1/27/24 - XR Chest  8/30/21 - XR Chest    [facility]:  8/31/21 - XR Chest   CT (HEAD/NECK/CHEST/ABDOMEN) Received / Internal [facility]:  5/6/24 - CT Head/Brain  5/6/24 - CT Cervical Spine  5/6/24 - CT Thoracic Spine  5/6/24 - CT Lumbar Spine  4/13/24 - CT Pelvis  4/30/21 - CT Cervical Spine  4/30/21 - CT Facial Bones  4/30/21 - CT Head/Brain  11/29/20 - CT Cervical Spine  11/29/20 - CT Head/brain    [facility]:  2/25/24 - CT Spine  2/25/24 - CT Chest/Abd/Pelvis  2/25/24 - CT head   ULTRASOUND (HEAD/NECK)  * Include FNAs Internal [facility]:  5/3/24 - US Thyroid FNA  4/4/24 - US Thyroid   LABS     DIABETES: HBGA1C, CREATININE, FASTING LIPIDS, MICROALBUMIN URINE, POTASSIUM, TSH, T4    THYROID: TSH, T4, CBC, THYRODLONULIN, TOTAL T3, FREE T4, CALCITONIN, CEA Care Everywhere / Internal [facility]:  5/13/24 - Albumin  3/27/24 - TSH, T4  3/17/24 - Glucose  3/2/24 - Phosphorus  2/24/24 - CMP    [facility]:  5/10/24 - Potassium  5/9/24 - Magnesium  5/7/24 - Creatinine  5/6/24 - BMP  5/6/24 - CBC  5/6/24 - Urine Analysis   PATHOLOGY REPORTS WITH CASE NUMBER  *Surgical path reports for endocrine organs  (ovaries, testes, pancreas, etc) Internal   [facility]:  5/3/24 - Thyroid FNA (Case: [id number])     Records Requested  05/21/24    Facility  [facility]  Fax: [phone]   Outcome * 5/21/24 11:17 AM Faxed urgent request to [facility] for images to be pushed to [employer] PACs. - Latricia    * 5/21/24 12:44 PM Images received from [facility] and attached to the patient in PACs. - Latricia

## 2024-05-24 ENCOUNTER — TELEPHONE (OUTPATIENT)
Dept: ENDOCRINOLOGY | Facility: CLINIC | Age: 89
End: 2024-05-24
Payer: MEDICARE

## 2024-05-24 NOTE — TELEPHONE ENCOUNTER
Patient confirmed scheduled appointment:  Date: 6/18   Time: 6 pm   Visit type: new thyroid cancer   Provider: Malick   Location: [facility]  Testing/imaging: NA   Additional notes: Spoke to Pt daughter and they preferred virtual due to the pt's age. Re-maninder to next avail virtual. Will re-maninder if Dr. Teresa says Pt needs to be seen sooner virtually.     Gretel Araujo on 5/24/2024 at 12:47 PM

## 2024-05-27 NOTE — PATIENT INSTRUCTIONS
SCHEDULING:  -RTC in 4-6 months for vulvar HSIL surveillance (MD, in-person) --order(s) placed         DIAGNOSIS:  Vulvar HSIL (VIN3), currently with recurrent disease.   Treatment History:  -9/27/2019: Posterior simple vulvectomy (removal of pre-cancerous cells from the vulva).      PLAN:  1) Vulvar HSIL: Recommend ablative therapy (laser or CUSA to destroy the pre-cancerous cells); alternatives are excision of the abnormal cells or topical therapy.    Plan as follows:  Continue monitoring without intervention given current health concerns.      Please call with any questions or concerns. [phone]       Aminta Garcia MD, MS, FACOG, FACS  5/30/2024  12:23 PM

## 2024-05-27 NOTE — PROGRESS NOTES
Follow-up Note on Referred Patient  Gynecologic Oncology HPV Clinic  [facility]      Date of visit: 2024       Patient: Roxanna Stark   : 1927   Language: English   Pronouns: she/her/hers     Primary Care Provider:  Swapna Gaines  [address]  [place] [address]       Reason for visit: Vulvar HSIL. Surveillance visit.       History of Present Illness:  Roxanna Stark is a 89 year old patient with history of vulvar histologic HSIL. History as follows:    *HPV vaccination status: Unvaccinated     Cervical Cancer Screening History:  No history of abnormal cervical cancer screening (per patient report; no records available for review).       Vulvar HSIL History:  7/3/2019: Biopsy of the right labia minora.  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   2019: Posterior simple vulvectomy.  -Pathology: Vulvar HSIL (THERESA 3) with positive margin from 3-6 o'clock.   -Complicated by wound separation.    23: Surveillance visit.   -Findings: No acetowhite change, but there is a hyperemic 1 cm raised, firm area on the right medial labium majorum (see visit note or media for photo).   -Pathology: Vulvar HSIL (VIN3)  -Discussed treatment options. Given her age, Roxanna preferred to contiue monitoring without immediate treatment.       Subjective:  Roxanna returns to the gyn onc clinic today for a delayed surveillance visit, accompanied by her daughter.   Roxanna reports no gynecologic concerns. No vulvar lesions. No itching. No bleeding nor discharge.   She did develop a new diagnosis of atrial fibrillation for which she was hospitalized in January. Her heart rate is maintained on amiodarone. She has since been living in assisted living.         Past Medical History:  Past Medical History:   Diagnosis Date    Actinic keratosis     Aortic stenosis     Atrial flutter with rapid ventricular response (H) 2024    Basal cell cancer 2014    left eye medial canthus     Basal cell carcinoma 2008    left  cheek    CKD (chronic kidney disease) stage 3, GFR 30-59 ml/min (H) 07/01/2019    HTN (hypertension)     Melanoma in situ (H) 09/30/2008    left arm    PAF (paroxysmal atrial fibrillation) (H) 3/27/2024    Polymyalgia rheumatica (H24) 11/1999    Rheumatoid arthritis of multiple sites with negative rheumatoid factor (H)     Status post coronary angiogram 03/03/2016    Temporal arteritis (H) 11/1999       Past Surgical History:  Past Surgical History:   Procedure Laterality Date    CATARACT IOL, RT/LT  5/09    bilateral    COLONOSCOPY  2002    EXCISE LESION VULVA N/A 9/27/2019    Procedure: Wide Local Excision Of Vulva, Colposcopy;  Surgeon: Mono Ribeiro MD;  Location: [facility] OR    REPLACE VALVE AORTIC N/A 4/25/2016    Procedure: REPLACE VALVE AORTIC;  Surgeon: Sudeep Tsai MD;  Location:  OR    [facility] SKIN TISSUE PROCEDURE UNLISTED  11/3/08    mmis skin cancer excision       Current Medications:   Current Outpatient Medications   Medication Sig Dispense Refill    acetaminophen (TYLENOL) 325 MG tablet Take 3 tablets (975 mg) by mouth every 8 hours as needed for mild pain, headaches or fever 30 tablet 0    amiodarone (PACERONE) 200 MG tablet Take 1 tablet (200 mg) by mouth daily 90 tablet 1    amLODIPine (NORVASC) 5 MG tablet TAKE 1 TABLET BY MOUTH ONCE DAILY 90 tablet 3    amoxicillin (AMOXIL) 500 MG capsule Take 4 tablets  30 minutes before Procedure 4 capsule 3    apixaban ANTICOAGULANT (ELIQUIS) 2.5 MG tablet Take 1 tablet (2.5 mg) by mouth 2 times daily 180 tablet 3    bisacodyl (DULCOLAX) 10 MG suppository Place 1 suppository (10 mg) rectally daily as needed for constipation 10 suppository 0    calcium carbonate-vitamin D (OSCAL) 500-5 MG-MCG tablet TAKE 1 TABLET BY MOUTH ONCE DAILY 90 tablet 3    furosemide (LASIX) 20 MG tablet TAKE 1 TABLET BY MOUTH ONCE DAILY 90 tablet 3    gabapentin (NEURONTIN) 100 MG capsule TAKE 1 CAPSULE BY MOUTH ONCE DAILY 90 capsule 3    hydroxychloroquine (PLAQUENIL) 200 MG  tablet Hydroxychloroquine 100 mg (half tablet) daily 45 tablet 0    LIDOCAINE PAIN RELIEF 4 % Patch APPLY 1 PATCH TO SKIN EVERY 24 HOURS (ON FOR 12 HOURS, OFF FOR 12 HOURS) 30 patch 97    metoprolol tartrate (LOPRESSOR) 25 MG tablet TAKE 1 TABLET BY MOUTH TWICE DAILY 180 tablet 3    Omega-3 Fatty Acids (OMEGA-3 FISH OIL PO) Take 1 tablet twice daily.      polyethylene glycol (MIRALAX) 17 GM/Dose powder MIX 17GM OF POWDER IN 8OZ OF WATER UNTIL COMPLETELY DISSOLVED. DRINK SOLUTION BY MOUTH ONCE DAILY. 510 g 97    senna-docusate (SENOKOT-S/PERICOLACE) 8.6-50 MG tablet Take 1 tablet by mouth 2 times daily as needed for constipation 30 tablet 0     No current facility-administered medications for this visit.          Allergies:   Allergies   Allergen Reactions    Lisinopril Cough         Social History:   Lives independently in Assisted Living. Family actively involved in her care.   She walks with a walker.   Social History     Tobacco Use    Smoking status: Never     Passive exposure: Never    Smokeless tobacco: Never   Substance Use Topics    Alcohol use: Yes     Comment: rarely       History   Drug Use No       Family History:   The patient's family history is notable for a first-degree paternal relative with colon and prostate cancer, a first-degree relative with colon cancer, and a first-degree relative with breast cancer.   Family History   Problem Relation Age of Onset    Arthritis Mother     Hypertension Father     Prostate Cancer Father     Arthritis Father     Heart Disease Father     Lipids Father     Colon Cancer Father     Breast Cancer Sister 45    Arthritis Sister     Thyroid Disease Sister     Arthritis Sister     Colon Cancer Sister         colon    Arthritis Sister     Asthma Daughter     Cerebrovascular Disease Daughter     Asthma Daughter     Myocardial Infarction Daughter     Lung Cancer Daughter 58        lung    Pancreatic Cancer Other 81        pancreatic        Physical Exam:   BP (!) 143/63    Pulse 50   Temp 98  F (36.7  C)   Resp 16   SpO2 99%     General Appearance: healthy and alert, no distress     Musculoskeletal: extremities non tender and without edema. Chronic vascular changes.     Skin: no lesions or rashes     Neurological: no gross defects     Psychiatric: appropriate mood and affect                               Genitourinary: External genitalia notable for diffuse hypopigmentation with multifocal lesions from 4:00-6:00, and a larger more confluent hypopigmented lesion from 6:00-9:00. Not raised. Non-tender. No bleeding nor discharge.   Verbal consent obtained from the patient for the pelvic exam.   Each step of the exam was verbalized throughout.  Present for the exam: JOHN Russell; Roxanna's daughter.        Procedure Risk Statement:   The patient was counseled regarding risks, benefits and alternatives to vulvoscopy, possible biopsies.  Risks discussed include but not limited to:  Bleeding; infection; injury to adjacent organs; inadequate sample or false negative result.  The patient's questions were answered, and informed consent signed.       Procedure:   Vulvoscopy:   After informed consent was signed and time-out procedure performed, dilute acetic acid was applied to the vulva x1 minute. Vulva was examined under colposcopic enhancement.  -Acetowhite changes? No--no change in gross hypopigmented lesion  -Other lesions? No additional lesions.   -Clinical impression: HSIL vs dVIN.   -Specimens: None--see discussion re: plan below.            Performance Status:  ECOG Grade 3.       Assessment:  Roxanna Stark is a 89 year old patient with a history of vulvar HSIL, currently with recurrent disease with gross extension.        Plan:   1.)   Vulvar HSIL:Discussed that clinical findings are most c/w pre-cancerous changes (HSIL vs dVIN), although invasive cancer can only be assessed through microscopic examination. We discussed the utility of biopsies today, and possible management options  based on results. For HSIL, recommend multiple biopsies with ablative therapy given the large involved areas; alternatives are wide local excision or topical therapy, or continued monitoring without treatment given her age, although this needs to be balanced with Roxanna's overall good health status. If cancer were diagnosed, we discussed excision vs radiation therapy.    After discussion of risks/benefits of each option, Roxanna and her daughter would like to proceed with surveillance without treatment. Plan as follows:  -RTC in 4-6 months for repeat evaluation.  -They will call in the interim if Roxanna develops new symptoms.   -Anal cancer screening deferred given Roxanna and her daughter's desire to avoid any aggressive therapy.     2.) Labs: None.    3.) Health maintenance issues discussed today: None.    4.) Prescriptions: None.         A total of  30 minutes was spent with the patient, 25 minutes of which were spent in counseling the patient and/or treatment planning, 5 minutes of which were spent in procedure time; an additional 5 minutes was spent in chart review/documentation.       Aminta Garcia MD, MS, FACOG, FACS  5/30/2024  11:35 AM                  CC  Patient Care Team:  Swapna Gaines MD as PCP - General (Family Medicine)  Brandan Landin MD as MD (OB/Gyn)  Mono Ribeiro MD as MD (Gynecologic Oncology)  Swapna Gaines MD as Assigned PCP  Aminta Garcia MD as Assigned Cancer Care Provider  Jamie Johnson MD as Assigned Rheumatology Provider  Whitney Rogers MD as MD (Ophthalmology)  Abdon Bullard MD as MD (Ophthalmology)  Abdon Bullard MD as Assigned Surgical Provider  Cassandra Herrera MD as MD (Cardiovascular Disease)  Cassandra Herrera MD as Assigned Heart and Vascular Provider  Chalino Little MD as MD (Nephrology)  Gretel Cornelius APRN CNP as Nurse Practitioner (Dermatology)  Belkis Marin, RN as Specialty Care Coordinator (Neurology)  Alena Mitchell, JOHN as Specialty  Care Coordinator  Leela Teresa MD as MD (Endocrinology, Diabetes, and Metabolism)  Ronnell Lyles MD as Assigned Neuroscience Provider  SELF, REFERRED

## 2024-05-29 ENCOUNTER — PRE VISIT (OUTPATIENT)
Dept: ENDOCRINOLOGY | Facility: CLINIC | Age: 89
End: 2024-05-29

## 2024-05-29 DIAGNOSIS — M06.09 RHEUMATOID ARTHRITIS OF MULTIPLE SITES WITH NEGATIVE RHEUMATOID FACTOR (H): ICD-10-CM

## 2024-05-29 RX ORDER — HYDROXYCHLOROQUINE SULFATE 200 MG/1
TABLET, FILM COATED ORAL
Qty: 135 TABLET | Refills: 97 | OUTPATIENT
Start: 2024-05-29

## 2024-05-30 ENCOUNTER — OFFICE VISIT (OUTPATIENT)
Dept: ONCOLOGY | Facility: CLINIC | Age: 89
End: 2024-05-30
Attending: OBSTETRICS & GYNECOLOGY
Payer: MEDICARE

## 2024-05-30 VITALS
TEMPERATURE: 98 F | DIASTOLIC BLOOD PRESSURE: 63 MMHG | SYSTOLIC BLOOD PRESSURE: 143 MMHG | RESPIRATION RATE: 16 BRPM | OXYGEN SATURATION: 99 % | HEART RATE: 50 BPM

## 2024-05-30 DIAGNOSIS — D07.1 VIN III (VULVAR INTRAEPITHELIAL NEOPLASIA III): Primary | ICD-10-CM

## 2024-05-30 PROCEDURE — G0463 HOSPITAL OUTPT CLINIC VISIT: HCPCS | Mod: 25 | Performed by: OBSTETRICS & GYNECOLOGY

## 2024-05-30 PROCEDURE — 99214 OFFICE O/P EST MOD 30 MIN: CPT | Mod: 25 | Performed by: OBSTETRICS & GYNECOLOGY

## 2024-05-30 PROCEDURE — 56820 COLPOSCOPY VULVA: CPT | Performed by: OBSTETRICS & GYNECOLOGY

## 2024-05-30 RX ORDER — HYDROXYCHLOROQUINE SULFATE 200 MG/1
TABLET, FILM COATED ORAL
Qty: 45 TABLET | Refills: 0 | Status: SHIPPED | OUTPATIENT
Start: 2024-05-30 | End: 2024-07-15

## 2024-05-30 RX ORDER — HYDROXYCHLOROQUINE SULFATE 200 MG/1
TABLET, FILM COATED ORAL
Qty: 135 TABLET | Refills: 97 | OUTPATIENT
Start: 2024-05-30

## 2024-05-30 ASSESSMENT — PAIN SCALES - GENERAL: PAINLEVEL: NO PAIN (0)

## 2024-05-30 NOTE — LETTER
2024         RE: Roxanna Stark  [address]  [place] [address]        Dear Colleague,    Thank you for referring your patient, Roxanna Stark, to the [facility]. Please see a copy of my visit note below.    Follow-up Note on Referred Patient  Gynecologic Oncology HPV Clinic  [facility]      Date of visit: 2024       Patient: Roxanna Stark   : 1927   Language: English   Pronouns: she/her/hers     Primary Care Provider:  Swapna Gaines  [address]  [place] [address]       Reason for visit: Vulvar HSIL. Surveillance visit.       History of Present Illness:  Roxanna Stark is a 89 year old patient with history of vulvar histologic HSIL. History as follows:    *HPV vaccination status: Unvaccinated     Cervical Cancer Screening History:  No history of abnormal cervical cancer screening (per patient report; no records available for review).       Vulvar HSIL History:  7/3/2019: Biopsy of the right labia minora.  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   2019: Posterior simple vulvectomy.  -Pathology: Vulvar HSIL (THERESA 3) with positive margin from 3-6 o'clock.   -Complicated by wound separation.    23: Surveillance visit.   -Findings: No acetowhite change, but there is a hyperemic 1 cm raised, firm area on the right medial labium majorum (see visit note or media for photo).   -Pathology: Vulvar HSIL (VIN3)  -Discussed treatment options. Given her age, Roxanna preferred to contiue monitoring without immediate treatment.       Subjective:  Roxanna returns to the gyn onc clinic today for a delayed surveillance visit, accompanied by her daughter.   Roxanna reports no gynecologic concerns. No vulvar lesions. No itching. No bleeding nor discharge.   She did develop a new diagnosis of atrial fibrillation for which she was hospitalized in January. Her heart rate is maintained on amiodarone. She has since been living in assisted living.         Past  Medical History:  Past Medical History:   Diagnosis Date     Actinic keratosis      Aortic stenosis 2014     Atrial flutter with rapid ventricular response (H) 01/27/2024     Basal cell cancer 07/2014    left eye medial canthus      Basal cell carcinoma 09/30/2008    left cheek     CKD (chronic kidney disease) stage 3, GFR 30-59 ml/min (H) 07/01/2019     HTN (hypertension)      Melanoma in situ (H) 09/30/2008    left arm     PAF (paroxysmal atrial fibrillation) (H) 3/27/2024     Polymyalgia rheumatica (H24) 11/1999     Rheumatoid arthritis of multiple sites with negative rheumatoid factor (H)      Status post coronary angiogram 03/03/2016     Temporal arteritis (H) 11/1999       Past Surgical History:  Past Surgical History:   Procedure Laterality Date     CATARACT IOL, RT/LT  5/09    bilateral     COLONOSCOPY  2002     EXCISE LESION VULVA N/A 9/27/2019    Procedure: Wide Local Excision Of Vulva, Colposcopy;  Surgeon: Mono Ribeiro MD;  Location: [facility] OR     REPLACE VALVE AORTIC N/A 4/25/2016    Procedure: REPLACE VALVE AORTIC;  Surgeon: Sudeep Tsai MD;  Location:  OR     [facility] SKIN TISSUE PROCEDURE UNLISTED  11/3/08    mmis skin cancer excision       Current Medications:   Current Outpatient Medications   Medication Sig Dispense Refill     acetaminophen (TYLENOL) 325 MG tablet Take 3 tablets (975 mg) by mouth every 8 hours as needed for mild pain, headaches or fever 30 tablet 0     amiodarone (PACERONE) 200 MG tablet Take 1 tablet (200 mg) by mouth daily 90 tablet 1     amLODIPine (NORVASC) 5 MG tablet TAKE 1 TABLET BY MOUTH ONCE DAILY 90 tablet 3     amoxicillin (AMOXIL) 500 MG capsule Take 4 tablets  30 minutes before Procedure 4 capsule 3     apixaban ANTICOAGULANT (ELIQUIS) 2.5 MG tablet Take 1 tablet (2.5 mg) by mouth 2 times daily 180 tablet 3     bisacodyl (DULCOLAX) 10 MG suppository Place 1 suppository (10 mg) rectally daily as needed for constipation 10 suppository 0     calcium  carbonate-vitamin D (OSCAL) 500-5 MG-MCG tablet TAKE 1 TABLET BY MOUTH ONCE DAILY 90 tablet 3     furosemide (LASIX) 20 MG tablet TAKE 1 TABLET BY MOUTH ONCE DAILY 90 tablet 3     gabapentin (NEURONTIN) 100 MG capsule TAKE 1 CAPSULE BY MOUTH ONCE DAILY 90 capsule 3     hydroxychloroquine (PLAQUENIL) 200 MG tablet Hydroxychloroquine 100 mg (half tablet) daily 45 tablet 0     LIDOCAINE PAIN RELIEF 4 % Patch APPLY 1 PATCH TO SKIN EVERY 24 HOURS (ON FOR 12 HOURS, OFF FOR 12 HOURS) 30 patch 97     metoprolol tartrate (LOPRESSOR) 25 MG tablet TAKE 1 TABLET BY MOUTH TWICE DAILY 180 tablet 3     Omega-3 Fatty Acids (OMEGA-3 FISH OIL PO) Take 1 tablet twice daily.       polyethylene glycol (MIRALAX) 17 GM/Dose powder MIX 17GM OF POWDER IN 8OZ OF WATER UNTIL COMPLETELY DISSOLVED. DRINK SOLUTION BY MOUTH ONCE DAILY. 510 g 97     senna-docusate (SENOKOT-S/PERICOLACE) 8.6-50 MG tablet Take 1 tablet by mouth 2 times daily as needed for constipation 30 tablet 0     No current facility-administered medications for this visit.          Allergies:   Allergies   Allergen Reactions     Lisinopril Cough         Social History:   Lives independently in Assisted Living. Family actively involved in her care.   She walks with a walker.   Social History     Tobacco Use     Smoking status: Never     Passive exposure: Never     Smokeless tobacco: Never   Substance Use Topics     Alcohol use: Yes     Comment: rarely       History   Drug Use No       Family History:   The patient's family history is notable for a first-degree paternal relative with colon and prostate cancer, a first-degree relative with colon cancer, and a first-degree relative with breast cancer.   Family History   Problem Relation Age of Onset     Arthritis Mother      Hypertension Father      Prostate Cancer Father      Arthritis Father      Heart Disease Father      Lipids Father      Colon Cancer Father      Breast Cancer Sister 45     Arthritis Sister      Thyroid Disease  Sister      Arthritis Sister      Colon Cancer Sister         colon     Arthritis Sister      Asthma Daughter      Cerebrovascular Disease Daughter      Asthma Daughter      Myocardial Infarction Daughter      Lung Cancer Daughter 58        lung     Pancreatic Cancer Other 81        pancreatic        Physical Exam:   BP (!) 143/63   Pulse 50   Temp 98  F (36.7  C)   Resp 16   SpO2 99%     General Appearance: healthy and alert, no distress     Musculoskeletal: extremities non tender and without edema. Chronic vascular changes.     Skin: no lesions or rashes     Neurological: no gross defects     Psychiatric: appropriate mood and affect                               Genitourinary: External genitalia notable for diffuse hypopigmentation with multifocal lesions from 4:00-6:00, and a larger more confluent hypopigmented lesion from 6:00-9:00. Not raised. Non-tender. No bleeding nor discharge.   Verbal consent obtained from the patient for the pelvic exam.   Each step of the exam was verbalized throughout.  Present for the exam: JOHN Russell; Roxanna's daughter.        Procedure Risk Statement:   The patient was counseled regarding risks, benefits and alternatives to vulvoscopy, possible biopsies.  Risks discussed include but not limited to:  Bleeding; infection; injury to adjacent organs; inadequate sample or false negative result.  The patient's questions were answered, and informed consent signed.       Procedure:   Vulvoscopy:   After informed consent was signed and time-out procedure performed, dilute acetic acid was applied to the vulva x1 minute. Vulva was examined under colposcopic enhancement.  -Acetowhite changes? No--no change in gross hypopigmented lesion  -Other lesions? No additional lesions.   -Clinical impression: HSIL vs dVIN.   -Specimens: None--see discussion re: plan below.            Performance Status:  ECOG Grade 3.       Assessment:  Roxanna Stark is a 89 year old patient with a history of  vulvar HSIL, currently with recurrent disease with gross extension.        Plan:   1.)   Vulvar HSIL:Discussed that clinical findings are most c/w pre-cancerous changes (HSIL vs dVIN), although invasive cancer can only be assessed through microscopic examination. We discussed the utility of biopsies today, and possible management options based on results. For HSIL, recommend multiple biopsies with ablative therapy given the large involved areas; alternatives are wide local excision or topical therapy, or continued monitoring without treatment given her age, although this needs to be balanced with Roxanna's overall good health status. If cancer were diagnosed, we discussed excision vs radiation therapy.    After discussion of risks/benefits of each option, Roxanna and her daughter would like to proceed with surveillance without treatment. Plan as follows:  -RTC in 4-6 months for repeat evaluation.  -They will call in the interim if Roxanna develops new symptoms.   -Anal cancer screening deferred given Roxanna and her daughter's desire to avoid any aggressive therapy.     2.) Labs: None.    3.) Health maintenance issues discussed today: None.    4.) Prescriptions: None.         A total of  30 minutes was spent with the patient, 25 minutes of which were spent in counseling the patient and/or treatment planning, 5 minutes of which were spent in procedure time; an additional 5 minutes was spent in chart review/documentation.       Aminta Garcia MD, MS, FACOG, FACS  5/30/2024  11:35 AM                  CC  Patient Care Team:  Swapna Gaines MD as PCP - General (Family Medicine)  Brandan Landin MD as MD (OB/Gyn)  Mono Ribeiro MD as MD (Gynecologic Oncology)  Swapna Gaines MD as Assigned PCP  Aminta Garcia MD as Assigned Cancer Care Provider  Jamie Johnson MD as Assigned Rheumatology Provider  Whitney Rogers MD as MD (Ophthalmology)  Abdon Bullard MD as MD (Ophthalmology)  Abdon Bullard MD as  Assigned Surgical Provider  Cassandra Herrera MD as MD (Cardiovascular Disease)  Cassandra Herrera MD as Assigned Heart and Vascular Provider  Chalino Little MD as MD (Nephrology)  Gretel Cornelius APRN CNP as Nurse Practitioner (Dermatology)  Belkis Marin, RN as Specialty Care Coordinator (Neurology)  Alena Mitchell RN as Specialty Care Coordinator  Leela Teresa MD as MD (Endocrinology, Diabetes, and Metabolism)  Ronnell Lyles MD as Assigned Neuroscience Provider  SELF, REFERRED    Again, thank you for allowing me to participate in the care of your patient.        Sincerely,        Aminta Garcia MD

## 2024-05-30 NOTE — NURSING NOTE
"Oncology Rooming Note    May 30, 2024 10:55 AM   Roxanna Stark is a 89 year old female who presents for:    Chief Complaint   Patient presents with    Oncology Clinic Visit     THERESA III (vulvar intraepithelial neoplasia III)      Initial Vitals: BP (!) 143/63   Pulse 50   Temp 98  F (36.7  C)   Resp 16   SpO2 99%  Estimated body mass index is 22.02 kg/m  as calculated from the following:    Height as of 5/14/24: 1.499 m (4' 11\").    Weight as of 5/14/24: 49.4 kg (109 lb). There is no height or weight on file to calculate BSA.  No Pain (0) Comment: Data Unavailable   No LMP recorded. Patient is postmenopausal.  Allergies reviewed: Yes  Medications reviewed: Yes    Medications: Medication refills not needed today.  Pharmacy name entered into [employer]:    [facility]  [id number] - [place], [place] - [address] AT [address] [address] [place] [address]  [facility] - [place], [place] - [address] [place] [address]    Frailty Screening:   Is the patient here for a new oncology consult visit in cancer care? 2. No      Clinical concerns: no other complaints     Fausto Castro"

## 2024-05-30 NOTE — TELEPHONE ENCOUNTER
Called pt and reached daughter- updated Anna that mom is due for SD OCT and FV 10-2 with Dr mohan for toxicity monitoring and to schedule next available. R/f under correct MD Mercedes QUINTERO, BSN RN 5/30/2024 3:38 PM

## 2024-06-02 RX ORDER — SODIUM CHLORIDE 9 MG/ML
INJECTION, SOLUTION INTRAVENOUS CONTINUOUS
Status: CANCELLED | OUTPATIENT
Start: 2024-06-02

## 2024-06-02 RX ORDER — NALOXONE HYDROCHLORIDE 0.4 MG/ML
0.4 INJECTION, SOLUTION INTRAMUSCULAR; INTRAVENOUS; SUBCUTANEOUS
Status: CANCELLED | OUTPATIENT
Start: 2024-06-02

## 2024-06-02 RX ORDER — LIDOCAINE 40 MG/G
CREAM TOPICAL
Status: CANCELLED | OUTPATIENT
Start: 2024-06-02

## 2024-06-02 RX ORDER — FENTANYL CITRATE 50 UG/ML
25-50 INJECTION, SOLUTION INTRAMUSCULAR; INTRAVENOUS EVERY 5 MIN PRN
Status: CANCELLED | OUTPATIENT
Start: 2024-06-02

## 2024-06-02 RX ORDER — NALOXONE HYDROCHLORIDE 0.4 MG/ML
0.2 INJECTION, SOLUTION INTRAMUSCULAR; INTRAVENOUS; SUBCUTANEOUS
Status: CANCELLED | OUTPATIENT
Start: 2024-06-02

## 2024-06-02 RX ORDER — FLUMAZENIL 0.1 MG/ML
0.2 INJECTION, SOLUTION INTRAVENOUS
Status: CANCELLED | OUTPATIENT
Start: 2024-06-02

## 2024-06-04 DIAGNOSIS — L89.301 PRESSURE INJURY OF BUTTOCK, STAGE 1, UNSPECIFIED LATERALITY: Primary | ICD-10-CM

## 2024-06-04 RX ORDER — ZINC OXIDE AND DIMETHICONE 120; 10 MG/G; MG/G
CREAM TOPICAL
Qty: 142 G | Refills: 0 | Status: SHIPPED | OUTPATIENT
Start: 2024-06-04 | End: 2024-07-09

## 2024-06-04 NOTE — TELEPHONE ENCOUNTER
"Spoke with Daughter, Anna. Consent to communicate is on file.   States pt's bottom is sore, red and raw. Skin is fragile and skin \"pulls off\" and get's really sore. No open wounds. Anna reports this has been going on for \"awhile\". Comes and goes. Has used the barrier cream when she was in the hospital previously and this helped.   For awhile was getting Miralax and stools have been \"goo\" consistency. She asked staff to stop giving Miralax. She is not sure if pt is still receiving this. When pt is asked about it, she doesn't know.     Routing to Provider.    Beatris Maldonado RN  [facility] [place]    "

## 2024-06-05 ENCOUNTER — HOSPITAL ENCOUNTER (OUTPATIENT)
Facility: CLINIC | Age: 89
Discharge: HOME | End: 2024-06-05
Attending: INTERNAL MEDICINE | Admitting: INTERNAL MEDICINE
Payer: MEDICARE

## 2024-06-05 ENCOUNTER — APPOINTMENT (OUTPATIENT)
Dept: MEDSURG UNIT | Facility: CLINIC | Age: 89
End: 2024-06-05
Attending: INTERNAL MEDICINE
Payer: MEDICARE

## 2024-06-05 VITALS
TEMPERATURE: 98.1 F | SYSTOLIC BLOOD PRESSURE: 143 MMHG | DIASTOLIC BLOOD PRESSURE: 60 MMHG | HEIGHT: 59 IN | WEIGHT: 111.6 LBS | HEART RATE: 55 BPM | OXYGEN SATURATION: 98 % | RESPIRATION RATE: 18 BRPM | BODY MASS INDEX: 22.5 KG/M2

## 2024-06-05 DIAGNOSIS — M80.08XA AGE-RELATED OSTEOPOROSIS WITH CURRENT PATHOLOGICAL FRACTURE, VERTEBRA(E), INITIAL ENCOUNTER FOR FRACTURE (H): ICD-10-CM

## 2024-06-05 DIAGNOSIS — S22.081A T12 BURST FRACTURE (H): ICD-10-CM

## 2024-06-05 LAB
ANION GAP SERPL CALCULATED.3IONS-SCNC: 11 MMOL/L (ref 7–15)
BUN SERPL-MCNC: 25.2 MG/DL (ref 8–23)
CALCIUM SERPL-MCNC: 9.2 MG/DL (ref 8.2–9.6)
CHLORIDE SERPL-SCNC: 102 MMOL/L (ref 98–107)
CREAT SERPL-MCNC: 1.49 MG/DL (ref 0.51–0.95)
DEPRECATED HCO3 PLAS-SCNC: 25 MMOL/L (ref 22–29)
EGFRCR SERPLBLD CKD-EPI 2021: 32 ML/MIN/1.73M2
ERYTHROCYTE [DISTWIDTH] IN BLOOD BY AUTOMATED COUNT: 15.5 % (ref 10–15)
GLUCOSE SERPL-MCNC: 123 MG/DL (ref 70–99)
HCT VFR BLD AUTO: 41.2 % (ref 35–47)
HGB BLD-MCNC: 13.1 G/DL (ref 11.7–15.7)
INR PPP: 1.26 (ref 0.85–1.15)
MCH RBC QN AUTO: 29.6 PG (ref 26.5–33)
MCHC RBC AUTO-ENTMCNC: 31.8 G/DL (ref 31.5–36.5)
MCV RBC AUTO: 93 FL (ref 78–100)
PLATELET # BLD AUTO: 182 10E3/UL (ref 150–450)
POTASSIUM SERPL-SCNC: 4.9 MMOL/L (ref 3.4–5.3)
RBC # BLD AUTO: 4.42 10E6/UL (ref 3.8–5.2)
SODIUM SERPL-SCNC: 138 MMOL/L (ref 135–145)
WBC # BLD AUTO: 9.9 10E3/UL (ref 4–11)

## 2024-06-05 PROCEDURE — 250N000011 HC RX IP 250 OP 636: Performed by: PHYSICIAN ASSISTANT

## 2024-06-05 PROCEDURE — 85027 COMPLETE CBC AUTOMATED: CPT | Performed by: PHYSICIAN ASSISTANT

## 2024-06-05 PROCEDURE — 999N000142 HC STATISTIC PROCEDURE PREP ONLY

## 2024-06-05 PROCEDURE — 80048 BASIC METABOLIC PNL TOTAL CA: CPT | Performed by: PHYSICIAN ASSISTANT

## 2024-06-05 PROCEDURE — 85610 PROTHROMBIN TIME: CPT | Performed by: PHYSICIAN ASSISTANT

## 2024-06-05 PROCEDURE — 36415 COLL VENOUS BLD VENIPUNCTURE: CPT | Performed by: PHYSICIAN ASSISTANT

## 2024-06-05 PROCEDURE — 258N000003 HC RX IP 258 OP 636: Performed by: PHYSICIAN ASSISTANT

## 2024-06-05 RX ORDER — CEFAZOLIN SODIUM 2 G/100ML
2 INJECTION, SOLUTION INTRAVENOUS
Status: COMPLETED | OUTPATIENT
Start: 2024-06-05 | End: 2024-06-05

## 2024-06-05 RX ORDER — SODIUM CHLORIDE 9 MG/ML
INJECTION, SOLUTION INTRAVENOUS CONTINUOUS
Status: DISCONTINUED | OUTPATIENT
Start: 2024-06-05 | End: 2024-06-05 | Stop reason: HOSPADM

## 2024-06-05 RX ORDER — LIDOCAINE 40 MG/G
CREAM TOPICAL
Status: DISCONTINUED | OUTPATIENT
Start: 2024-06-05 | End: 2024-06-05 | Stop reason: HOSPADM

## 2024-06-05 RX ORDER — KETOROLAC TROMETHAMINE 30 MG/ML
15 INJECTION, SOLUTION INTRAMUSCULAR; INTRAVENOUS ONCE
Status: DISCONTINUED | OUTPATIENT
Start: 2024-06-05 | End: 2024-06-05 | Stop reason: HOSPADM

## 2024-06-05 RX ADMIN — SODIUM CHLORIDE: 9 INJECTION, SOLUTION INTRAVENOUS at 10:57

## 2024-06-05 RX ADMIN — CEFAZOLIN SODIUM 2 G: 2 INJECTION, SOLUTION INTRAVENOUS at 10:58

## 2024-06-05 ASSESSMENT — ACTIVITIES OF DAILY LIVING (ADL)
ADLS_ACUITY_SCORE: 38
ADLS_ACUITY_SCORE: 36
ADLS_ACUITY_SCORE: 38

## 2024-06-06 ENCOUNTER — PATIENT OUTREACH (OUTPATIENT)
Dept: NEUROLOGY | Facility: CLINIC | Age: 89
End: 2024-06-06
Payer: MEDICARE

## 2024-06-06 ENCOUNTER — TELEPHONE (OUTPATIENT)
Dept: NEUROLOGY | Facility: CLINIC | Age: 89
End: 2024-06-06
Payer: MEDICARE

## 2024-06-06 ENCOUNTER — MYC MEDICAL ADVICE (OUTPATIENT)
Dept: NEUROLOGY | Facility: CLINIC | Age: 89
End: 2024-06-06
Payer: MEDICARE

## 2024-06-06 NOTE — TELEPHONE ENCOUNTER
Patient instructions faxed to [facility] pre request from [facility].   Alena Mitchell RN 6/6/2024 1:48 PM

## 2024-06-06 NOTE — TELEPHONE ENCOUNTER
[facility] Call Center    Phone Message    May a detailed message be left on voicemail: yes     Reason for Call: Other: Kem called back for Belkis and wanted to ask if the orders she left on vm she could fax over to them at [phone]     Action Taken: Message routed to:  [facility] Neurosurgery    Travel Screening: Not Applicable     Date of Service:

## 2024-06-06 NOTE — PROGRESS NOTES
Spoke with patients daughter, Anna. Informed of procedure date and time. May resume Eliquis today and continue to tomorrow. Last dose 6/7 PM.     Updated instructions sent via [employer]. Will call facility to inform as well. [facility] [phone]    [facility] for nursing at [place] with above orders regarding Eliquis. Encouraged to return call with questions.     Belkis Marin RN 6/6/2024 11:43 AM

## 2024-06-06 NOTE — PATIENT INSTRUCTIONS
You are scheduled for Vertebroplasty with Dr. Lyles on 6/11/24. Arrival Time: 6:30 am. Procedure Time: 8:00 am.    Please follow these instructions:    * Check in at the [facility] Waiting Room at the [facility]. The address is [address] [place] [address]. The phone number is [phone].     * Nothing to eat for 8 hours prior to arrival time. You may drink clear liquids (includes water, Jell-O, clear broth, apple juice or any non-carbonated beverage that you can see through) up until 2 hours prior to arrival time.     * You will need to discontinue Eliquis 3 days prior to this procedure. Last dose of Eliquis should be taken the evening of 6/7/24. You may take your other medications with a sip of water the morning of the procedure.     * You will be discharged the same day. You must have a  home and someone that can stay with you through the night.     PLEASE NOTE our COVID-19 visitors policy: Adult surgical and procedural patients can have two visitors throughout the surgery process. The two visitors may include children of any age; please note, children cannot stay in the waiting room alone.    All discharge instructions will be given to the  or volunteer. Documentation for the post-operative plan will be given to the patient and . Patients are required to have someone to stay with them for 24 hours after their procedure.    If you have questions regarding your procedure, please contact me at [phone], option 1.    If you need to cancel, reschedule or have procedure scheduling related questions, please call [facility] IR Procedure Scheduling at [phone].    Thank you,  Belkis Marin, RN, CNRN, SCRN  Alena Mitchell, RN, BSN  Stroke & Neuroendovascular Care Coordinator

## 2024-06-06 NOTE — TELEPHONE ENCOUNTER
Patient was scheduled for vertebroplasty yesterday, 6/6/24, however patient had taken Eliquis. Scheduling is working on rescheduling for tomorrow, 6/7. Spoke with patients daughter, Anna, who states patients last dose of Eliquis was yesterday AM.     Will call with reschedule date. Understanding verbalized.     Belkis Marin RN 6/6/2024 8:54 AM

## 2024-06-09 RX ORDER — HEPARIN SODIUM 200 [USP'U]/100ML
1 INJECTION, SOLUTION INTRAVENOUS CONTINUOUS PRN
Status: CANCELLED | OUTPATIENT
Start: 2024-06-09

## 2024-06-09 RX ORDER — HEPARIN SODIUM 200 [USP'U]/100ML
1 INJECTION, SOLUTION INTRAVENOUS EVERY 5 MIN PRN
Status: CANCELLED | OUTPATIENT
Start: 2024-06-09

## 2024-06-09 RX ORDER — FENTANYL CITRATE 50 UG/ML
25-50 INJECTION, SOLUTION INTRAMUSCULAR; INTRAVENOUS EVERY 5 MIN PRN
Status: CANCELLED | OUTPATIENT
Start: 2024-06-09

## 2024-06-09 RX ORDER — LIDOCAINE 40 MG/G
CREAM TOPICAL
Status: CANCELLED | OUTPATIENT
Start: 2024-06-09

## 2024-06-09 RX ORDER — NALOXONE HYDROCHLORIDE 0.4 MG/ML
0.2 INJECTION, SOLUTION INTRAMUSCULAR; INTRAVENOUS; SUBCUTANEOUS
Status: CANCELLED | OUTPATIENT
Start: 2024-06-09

## 2024-06-09 RX ORDER — NALOXONE HYDROCHLORIDE 0.4 MG/ML
0.4 INJECTION, SOLUTION INTRAMUSCULAR; INTRAVENOUS; SUBCUTANEOUS
Status: CANCELLED | OUTPATIENT
Start: 2024-06-09

## 2024-06-09 RX ORDER — SODIUM CHLORIDE 9 MG/ML
INJECTION, SOLUTION INTRAVENOUS CONTINUOUS
Status: CANCELLED | OUTPATIENT
Start: 2024-06-09

## 2024-06-09 RX ORDER — FLUMAZENIL 0.1 MG/ML
0.2 INJECTION, SOLUTION INTRAVENOUS
Status: CANCELLED | OUTPATIENT
Start: 2024-06-09

## 2024-06-11 ENCOUNTER — APPOINTMENT (OUTPATIENT)
Dept: INTERVENTIONAL RADIOLOGY/VASCULAR | Facility: CLINIC | Age: 89
End: 2024-06-11
Attending: NEUROLOGICAL SURGERY
Payer: MEDICARE

## 2024-06-11 ENCOUNTER — HOSPITAL ENCOUNTER (OUTPATIENT)
Facility: CLINIC | Age: 89
Discharge: HOME | End: 2024-06-11
Attending: INTERNAL MEDICINE | Admitting: NEUROLOGICAL SURGERY
Payer: MEDICARE

## 2024-06-11 ENCOUNTER — APPOINTMENT (OUTPATIENT)
Dept: MEDSURG UNIT | Facility: CLINIC | Age: 89
End: 2024-06-11
Attending: INTERNAL MEDICINE
Payer: MEDICARE

## 2024-06-11 VITALS
SYSTOLIC BLOOD PRESSURE: 158 MMHG | RESPIRATION RATE: 16 BRPM | OXYGEN SATURATION: 92 % | TEMPERATURE: 98.6 F | DIASTOLIC BLOOD PRESSURE: 67 MMHG | HEART RATE: 58 BPM

## 2024-06-11 LAB
ANION GAP SERPL CALCULATED.3IONS-SCNC: 9 MMOL/L (ref 7–15)
APTT PPP: 30 SECONDS (ref 22–38)
BUN SERPL-MCNC: 23.7 MG/DL (ref 8–23)
CALCIUM SERPL-MCNC: 8.9 MG/DL (ref 8.2–9.6)
CHLORIDE SERPL-SCNC: 104 MMOL/L (ref 98–107)
CREAT SERPL-MCNC: 1.32 MG/DL (ref 0.51–0.95)
DEPRECATED HCO3 PLAS-SCNC: 26 MMOL/L (ref 22–29)
EGFRCR SERPLBLD CKD-EPI 2021: 37 ML/MIN/1.73M2
ERYTHROCYTE [DISTWIDTH] IN BLOOD BY AUTOMATED COUNT: 15.2 % (ref 10–15)
GLUCOSE SERPL-MCNC: 122 MG/DL (ref 70–99)
HCT VFR BLD AUTO: 39.6 % (ref 35–47)
HGB BLD-MCNC: 12.5 G/DL (ref 11.7–15.7)
INR PPP: 1.04 (ref 0.85–1.15)
MCH RBC QN AUTO: 29.5 PG (ref 26.5–33)
MCHC RBC AUTO-ENTMCNC: 31.6 G/DL (ref 31.5–36.5)
MCV RBC AUTO: 93 FL (ref 78–100)
PLATELET # BLD AUTO: 192 10E3/UL (ref 150–450)
POTASSIUM SERPL-SCNC: 4.5 MMOL/L (ref 3.4–5.3)
RBC # BLD AUTO: 4.24 10E6/UL (ref 3.8–5.2)
SODIUM SERPL-SCNC: 139 MMOL/L (ref 135–145)
WBC # BLD AUTO: 8.7 10E3/UL (ref 4–11)

## 2024-06-11 PROCEDURE — 258N000003 HC RX IP 258 OP 636: Mod: JZ | Performed by: STUDENT IN AN ORGANIZED HEALTH CARE EDUCATION/TRAINING PROGRAM

## 2024-06-11 PROCEDURE — 85730 THROMBOPLASTIN TIME PARTIAL: CPT | Performed by: STUDENT IN AN ORGANIZED HEALTH CARE EDUCATION/TRAINING PROGRAM

## 2024-06-11 PROCEDURE — 999N000142 HC STATISTIC PROCEDURE PREP ONLY

## 2024-06-11 PROCEDURE — 85027 COMPLETE CBC AUTOMATED: CPT | Performed by: STUDENT IN AN ORGANIZED HEALTH CARE EDUCATION/TRAINING PROGRAM

## 2024-06-11 PROCEDURE — 36415 COLL VENOUS BLD VENIPUNCTURE: CPT | Performed by: STUDENT IN AN ORGANIZED HEALTH CARE EDUCATION/TRAINING PROGRAM

## 2024-06-11 PROCEDURE — 272N000495 HC NEEDLE CR14

## 2024-06-11 PROCEDURE — C1889 IMPLANT/INSERT DEVICE, NOC: HCPCS

## 2024-06-11 PROCEDURE — 272N000718 HC KIT CR34

## 2024-06-11 PROCEDURE — 250N000009 HC RX 250: Performed by: STUDENT IN AN ORGANIZED HEALTH CARE EDUCATION/TRAINING PROGRAM

## 2024-06-11 PROCEDURE — 250N000011 HC RX IP 250 OP 636: Performed by: STUDENT IN AN ORGANIZED HEALTH CARE EDUCATION/TRAINING PROGRAM

## 2024-06-11 PROCEDURE — 99152 MOD SED SAME PHYS/QHP 5/>YRS: CPT

## 2024-06-11 PROCEDURE — 22510 PERQ CERVICOTHORACIC INJECT: CPT | Mod: GC | Performed by: NEUROLOGICAL SURGERY

## 2024-06-11 PROCEDURE — 22513 PERQ VERTEBRAL AUGMENTATION: CPT | Mod: XU | Performed by: NEUROLOGICAL SURGERY

## 2024-06-11 PROCEDURE — 80048 BASIC METABOLIC PNL TOTAL CA: CPT | Performed by: STUDENT IN AN ORGANIZED HEALTH CARE EDUCATION/TRAINING PROGRAM

## 2024-06-11 PROCEDURE — 250N000011 HC RX IP 250 OP 636: Mod: JZ | Performed by: STUDENT IN AN ORGANIZED HEALTH CARE EDUCATION/TRAINING PROGRAM

## 2024-06-11 PROCEDURE — 85610 PROTHROMBIN TIME: CPT | Performed by: STUDENT IN AN ORGANIZED HEALTH CARE EDUCATION/TRAINING PROGRAM

## 2024-06-11 PROCEDURE — 99152 MOD SED SAME PHYS/QHP 5/>YRS: CPT | Mod: GC | Performed by: NEUROLOGICAL SURGERY

## 2024-06-11 PROCEDURE — 999N000133 HC STATISTIC PP CARE STAGE 2

## 2024-06-11 PROCEDURE — 272N000161 HC KIT CR32

## 2024-06-11 RX ORDER — LIDOCAINE 40 MG/G
CREAM TOPICAL
Status: DISCONTINUED | OUTPATIENT
Start: 2024-06-11 | End: 2024-06-11 | Stop reason: HOSPADM

## 2024-06-11 RX ORDER — NALOXONE HYDROCHLORIDE 0.4 MG/ML
0.4 INJECTION, SOLUTION INTRAMUSCULAR; INTRAVENOUS; SUBCUTANEOUS
Status: DISCONTINUED | OUTPATIENT
Start: 2024-06-11 | End: 2024-06-11 | Stop reason: HOSPADM

## 2024-06-11 RX ORDER — LIDOCAINE HYDROCHLORIDE 10 MG/ML
1-30 INJECTION, SOLUTION EPIDURAL; INFILTRATION; INTRACAUDAL; PERINEURAL
Status: COMPLETED | OUTPATIENT
Start: 2024-06-11 | End: 2024-06-11

## 2024-06-11 RX ORDER — NALOXONE HYDROCHLORIDE 0.4 MG/ML
0.2 INJECTION, SOLUTION INTRAMUSCULAR; INTRAVENOUS; SUBCUTANEOUS
Status: DISCONTINUED | OUTPATIENT
Start: 2024-06-11 | End: 2024-06-11 | Stop reason: HOSPADM

## 2024-06-11 RX ORDER — FENTANYL CITRATE 50 UG/ML
25-50 INJECTION, SOLUTION INTRAMUSCULAR; INTRAVENOUS EVERY 5 MIN PRN
Status: DISCONTINUED | OUTPATIENT
Start: 2024-06-11 | End: 2024-06-11 | Stop reason: HOSPADM

## 2024-06-11 RX ORDER — ACETAMINOPHEN 325 MG/1
975 TABLET ORAL EVERY 6 HOURS PRN
Status: DISCONTINUED | OUTPATIENT
Start: 2024-06-11 | End: 2024-06-11 | Stop reason: HOSPADM

## 2024-06-11 RX ORDER — FLUMAZENIL 0.1 MG/ML
0.2 INJECTION, SOLUTION INTRAVENOUS
Status: DISCONTINUED | OUTPATIENT
Start: 2024-06-11 | End: 2024-06-11 | Stop reason: HOSPADM

## 2024-06-11 RX ORDER — OXYCODONE HYDROCHLORIDE 5 MG/1
5 TABLET ORAL EVERY 4 HOURS PRN
Status: DISCONTINUED | OUTPATIENT
Start: 2024-06-11 | End: 2024-06-11 | Stop reason: HOSPADM

## 2024-06-11 RX ORDER — CEFAZOLIN SODIUM 1 G/3ML
1 INJECTION, POWDER, FOR SOLUTION INTRAMUSCULAR; INTRAVENOUS
Status: COMPLETED | OUTPATIENT
Start: 2024-06-11 | End: 2024-06-11

## 2024-06-11 RX ORDER — SODIUM CHLORIDE 9 MG/ML
INJECTION, SOLUTION INTRAVENOUS CONTINUOUS
Status: DISCONTINUED | OUTPATIENT
Start: 2024-06-11 | End: 2024-06-11 | Stop reason: HOSPADM

## 2024-06-11 RX ADMIN — FENTANYL CITRATE 25 MCG: 50 INJECTION, SOLUTION INTRAMUSCULAR; INTRAVENOUS at 09:20

## 2024-06-11 RX ADMIN — FENTANYL CITRATE 25 MCG: 50 INJECTION, SOLUTION INTRAMUSCULAR; INTRAVENOUS at 09:03

## 2024-06-11 RX ADMIN — MIDAZOLAM 0.5 MG: 1 INJECTION INTRAMUSCULAR; INTRAVENOUS at 09:35

## 2024-06-11 RX ADMIN — FENTANYL CITRATE 25 MCG: 50 INJECTION, SOLUTION INTRAMUSCULAR; INTRAVENOUS at 09:54

## 2024-06-11 RX ADMIN — MIDAZOLAM 0.5 MG: 1 INJECTION INTRAMUSCULAR; INTRAVENOUS at 09:54

## 2024-06-11 RX ADMIN — SODIUM CHLORIDE: 0.9 INJECTION, SOLUTION INTRAVENOUS at 07:21

## 2024-06-11 RX ADMIN — LIDOCAINE HYDROCHLORIDE 14 ML: 10 INJECTION, SOLUTION EPIDURAL; INFILTRATION; INTRACAUDAL; PERINEURAL at 09:13

## 2024-06-11 RX ADMIN — FENTANYL CITRATE 25 MCG: 50 INJECTION, SOLUTION INTRAMUSCULAR; INTRAVENOUS at 09:35

## 2024-06-11 RX ADMIN — MIDAZOLAM 0.5 MG: 1 INJECTION INTRAMUSCULAR; INTRAVENOUS at 09:20

## 2024-06-11 RX ADMIN — CEFAZOLIN 1 G: 1 INJECTION, POWDER, FOR SOLUTION INTRAMUSCULAR; INTRAVENOUS at 08:56

## 2024-06-11 RX ADMIN — MIDAZOLAM 0.5 MG: 1 INJECTION INTRAMUSCULAR; INTRAVENOUS at 09:06

## 2024-06-11 ASSESSMENT — ACTIVITIES OF DAILY LIVING (ADL)
ADLS_ACUITY_SCORE: 38
ADLS_ACUITY_SCORE: 42

## 2024-06-11 NOTE — PROGRESS NOTES
2A prep for Thoracic Vertebroplasty. Pt alert and oriented. VSS. Appropriately NPO. Held Eliquis x 48hrs. [place], wearing hearing aids. Frequent falls, falls band applied. Denies pain. Left PIV infusing. Labs processing. Daughter Anna.at bedside.

## 2024-06-11 NOTE — IR NOTE
Patient Name: Roxanna Stark  Medical Record Number: [id number]  Today's Date: 6/11/2024    Procedure: Thoracic vertebroplasty  Proceduralist: Dr. Gilberto Brice    Procedure Start: 0902  Procedure end: 1016  Sedation medications administered: 100 mcg fentanyl, 2 mg versed     Report given to: Iftikhar LOPEZ 2A  : DENNIS    Other Notes: Pt arrived to IR room 3 from . Consent reviewed. Pt denies any questions or concerns regarding procedure. Pt positioned prone and monitored per protocol. Pt tolerated procedure without any noted complications. Pt transferred back to .

## 2024-06-11 NOTE — PROGRESS NOTES
S/p T12 Vertebroplasty. Pt drowsy. Awakes with ease. VSS. 94% on RA. Mid back site intact. Bedrest x 2 hrs.

## 2024-06-11 NOTE — DISCHARGE INSTRUCTIONS
Discharge Instructions for Vertebroplasty   You had a vertebroplasty on the bones in your spine (vertebrae). That means a healthcare provider injected surgical cement into the fractured vertebrae of your spine. This was done to help ease back pain caused by fractured vertebrae. The procedure will also help prevent the fracture from getting worse. Here are some home care instructions for you to follow after the surgery.     Activity:  If a brace was prescribed to you, wear it as directed. You usually don't need to wear the brace at night. And only bend within the limits of your brace.     Take short walks. Start by walking for 5 minutes and slowly build up your time and distance.     Don't drive for 2 days after the procedure, or as directed by your healthcare provider. And never drive while you are taking narcotic pain medicine.     Don't do any heavy lifting for 3 months (nothing heavier than 5 pounds). After 3 months, you can slowly increase your lifting to normal unless directed otherwise by your provider.     Home care:  Take your medicine exactly as directed. Call your healthcare provider if you have side effects.     Remove the small bandages on your cut (incision) after 24 to 48 hours or as directed by your provider. Often there are no stitches to be removed.     Wait 1 to 2 days before showering or taking a bath. And don't swim in a pool or sit in a hot tub until your provider tells you it's OK.     Have someone help apply an ice pack to ease the pain around the incisions. Leave the ice pack in place for 20 minutes, then leave it off for 20 minutes. Pain at the incision sites may last for a few days. To make an ice pack, put ice cubes in a plastic bag that seals at the top. Wrap the bag in a clean, thin towel or cloth. Never put ice or an ice pack directly on the skin.     Keep your head raised 30  when lying down for 1 to 2 days after the surgery.     Follow-up care:  Follow up with your healthcare provide,  or as advised. 2 weeks in clinic     Call 911:  Call 911 right away if you have:     -Chest pain     -Shortness of breath     -Trouble controlling your bladder or bowels     -Trouble walking      When to call your healthcare provider:  Call your healthcare provider right away if you have:     -Fever of 100.4  (38 C) or higher, or as directed by your provider     -Shaking chills     -Severe pain or more redness, swelling, drainage, or warmth around the incision sites     -Weakness, numbness, or tingling in your legs

## 2024-06-11 NOTE — PROGRESS NOTES
[facility]     Endovascular Surgical Neuroradiology Pre-Procedure Note      HPI:  Roxanna Stark is a 89 year old woman with history of atrial fibrillation on chronic anticoagulation who suffered a T12 compression fracture after a fall in 3/2024.  The height loss has progressed based on interval imaging, and her pain continues, now to the point where she has subjective weakness of the right leg in addition to her local tenderness of T12.    Medical History:  Past Medical History:   Diagnosis Date    Actinic keratosis     Aortic stenosis 2014    Atrial flutter with rapid ventricular response (H) 01/27/2024    Basal cell cancer 07/2014    left eye medial canthus     Basal cell carcinoma 09/30/2008    left cheek    CKD (chronic kidney disease) stage 3, GFR 30-59 ml/min (H) 07/01/2019    HTN (hypertension)     Melanoma in situ (H) 09/30/2008    left arm    PAF (paroxysmal atrial fibrillation) (H) 3/27/2024    Polymyalgia rheumatica (H24) 11/1999    Rheumatoid arthritis of multiple sites with negative rheumatoid factor (H)     Status post coronary angiogram 03/03/2016    Temporal arteritis (H) 11/1999       Surgical History:  Past Surgical History:   Procedure Laterality Date    CATARACT IOL, RT/LT  5/09    bilateral    COLONOSCOPY  2002    EXCISE LESION VULVA N/A 9/27/2019    Procedure: Wide Local Excision Of Vulva, Colposcopy;  Surgeon: Mono Ribeiro MD;  Location:  OR    REPLACE VALVE AORTIC N/A 4/25/2016    Procedure: REPLACE VALVE AORTIC;  Surgeon: Sudeep Tsai MD;  Location:  OR    [facility] SKIN TISSUE PROCEDURE UNLISTED  11/3/08    mmis skin cancer excision       Family History:  Family History   Problem Relation Age of Onset    Arthritis Mother     Hypertension Father     Prostate Cancer Father     Arthritis Father     Heart Disease Father     Lipids Father     Colon Cancer Father     Breast Cancer Sister 45    Arthritis Sister     Thyroid Disease  Sister     Arthritis Sister     Colon Cancer Sister         colon    Arthritis Sister     Asthma Daughter     Cerebrovascular Disease Daughter     Asthma Daughter     Myocardial Infarction Daughter     Lung Cancer Daughter 58        lung    Pancreatic Cancer Other 81        pancreatic        Social History:  Social History     Socioeconomic History    Marital status:      Spouse name: Not on file    Number of children: Not on file    Years of education: Not on file    Highest education level: Not on file   Occupational History     Employer: RETIRED   Tobacco Use    Smoking status: Never     Passive exposure: Never    Smokeless tobacco: Never   Vaping Use    Vaping status: Never Used   Substance and Sexual Activity    Alcohol use: Yes     Comment: rarely    Drug use: No    Sexual activity: Not Currently     Partners: Male     Birth control/protection: None   Other Topics Concern    Parent/sibling w/ CABG, MI or angioplasty before 65F 55M? No   Social History Narrative    Not on file     Social Determinants of Health     Financial Resource Strain: Low Risk  (5/7/2024)    [facility]    Financial Resource Strain     Difficulty of Paying Living Expenses: 3     Difficulty of Paying Living Expenses: Not on file   Food Insecurity: No Food Insecurity (5/7/2024)    [facility]    Food Insecurity     Worried About Running Out of Food in the Last Year: 1   Transportation Needs: No Transportation Needs (5/7/2024)    Received from [facility]    Transportation Needs     Lack of Transportation (Medical): 1   Physical Activity: Not on file   Stress: Not on file   Social Connections: Socially Integrated (5/7/2024)    Received from [facility]    Social Connections     Frequency of Communication with Friends and Family: 0   Interpersonal Safety: Not on file   Housing  Stability: Low Risk  (5/7/2024)    Received from [facility]    Housing Stability     Unable to Pay for Housing in the Last Year: 1       Allergies:  Allergies   Allergen Reactions    Lisinopril Cough       Is there a contrast allergy?  No    Medications:  I have reviewed this patient's current medications.  Eliquis last dose 6/7    ROS:  The 10 point Review of Systems is negative other than noted in the HPI or here.     PHYSICAL EXAMINATION  Vital Signs:  B/P: 154/71,  T: 98.6,  P: 67,  R: 16    Cardio:  RRR  Pulmonary:  no respiratory distress  Abdomen:  soft, non-tender, non-distended    Neurologic  Mental Status:  fully alert, attentive and oriented, follows commands, speech clear and fluent  Cranial Nerves:  visual fields intact, PERRL, EOMI with normal smooth pursuit, facial sensation intact and symmetric, facial movements symmetric, hearing not formally tested but intact to conversation, palate elevation symmetric and uvula midline, no dysarthria, shoulder shrug strong bilaterally, tongue protrusion midline  Motor:  no abnormal movements, strength 5/5 throughout upper and lower extremities  Sensory:  intact/symmetric to light touch and pin prick throughout upper and lower extremities  Coordination:  FNF and HS intact without dysmetria    Pre-procedure National Institutes of Health Stroke Scale:   Not applicable    LABS  (most recent Cr, BUN, GFR, PLT, INR, PTT within the past 7 days):  Recent Labs   Lab 06/11/24  0707   CR 1.32*   BUN 23.7*   GFRESTIMATED 37*      INR 1.04   PTT 30        Platelet Function P2Y12 (PRU):  Not applicable      ASSESSMENT: T12 compression fracture    PLAN: T12 vertebroplasty        PRE-PROCEDURE SEDATION ASSESSMENT     Pre-Procedure Sedation Assessment done at 0800.    Expected Level:  Moderate Sedation    Indication:  Sedation is required to allow for neurointerventional procedure.    Consent obtained from patient after discussing the  risks, benefits and alternatives.     PO Intake:  Appropriately NPO for procedure    ASA Class:  Class 2 - MILD SYSTEMIC DISEASE, NO ACUTE PROBLEMS, NO FUNCTIONAL LIMITATIONS.    Mallampati:  Grade 2:  Soft palate, base of uvula, tonsillar pillars, and portion of posterior pharyngeal wall visible    History and physical reviewed and no updates needed. I have reviewed the lab findings, diagnostic data, medications, and the plan for sedation. I have determined this patient to be an appropriate candidate for the planned sedation and procedure and have reassessed the patient IMMEDIATELY PRIOR to sedation and procedure.    Patient was discussed with the Attending, Dr. Lyles, who agrees with the plan.    Roberta Macias MD   Pager: [phone]

## 2024-06-11 NOTE — PROCEDURES
[facility]     Endovascular Surgical Neuroradiology Post-Procedure Note    Pre-Procedure Diagnosis:  T12 compression fracture  Post-Procedure Diagnosis:  T12 compression fracture    Procedure(s):   Vertebroplasty    Findings:  Successful T12 vertebroplasty    Plan:  2 hours flat bedrest, return to clinic in 2 weeks    Primary Surgeon:  Dr. Ronnell Lyles  Secondary Surgeon:  Not applicable  Secondary Surgeon Review:  None  Fellow:  Gilberto  Additional Assistants:  none    Prior to the start of the procedure and with procedural staff participation, I verbally confirmed: the patient s identity using two indicators, relevant allergies, that the procedure was appropriate and matched the consent or emergent situation, and that the correct equipment/implants were available. Immediately prior to starting the procedure I conducted the Time Out with the procedural staff and re-confirmed the patient s name, procedure, and site/side. (The Joint Commission universal protocol was followed.)  Yes    PRU value: Not applicable    Anesthesia:  Conscious Sedation  Medications:   Lidocaine 1% 14 ml intradermal, fentanyl 100 mcg IV, midazolam 2 mg IV  Puncture site:   n/a    Fluoroscopy time (minutes):  28.7  Radiation dose (mGy):   859.7     Contrast amount (mL):  Not applicable     Estimated blood loss (mL):  5    Closure:  n/a    Disposition:  Home after recovery.        Sedation Post-Procedure Summary    Sedatives: Fentanyl and Midazolam (Versed)    Vital signs and pulse oximetry were monitored and remained stable throughout the procedure, and sedation was maintained until the procedure was complete.  The patient was monitored by staff until sedation discharge criteria were met.    Patient tolerance:  Patient tolerated the procedure well with no immediate complications.    Time of sedation in minutes:  74 minutes from beginning to end of physician one to one monitoring.    Roberta BARRON  MD Gilberto  Pager:  [phone]

## 2024-06-11 NOTE — PROGRESS NOTES
Discharge criteria met. Small hematoma on mid back. Dr. Lyles assessed. Pt able to get to wheelchair per baseline, void and tolerating food. Reviewed discharge instructions with pt and pt daughter. PIV removed. Pushed to front door in wheelchair accompanied by 2A staff and Moshe

## 2024-06-12 ENCOUNTER — PATIENT OUTREACH (OUTPATIENT)
Dept: NEUROLOGY | Facility: CLINIC | Age: 89
End: 2024-06-12
Payer: MEDICARE

## 2024-06-12 NOTE — PROGRESS NOTES
"Endovascular Surgical Neuroradiology - Post Discharge Call    Admit: 6/11/24    Discharge: 6/11/24    Facility: [facility]    MD/Service: Dr. Lyles/DUANE    Procedure Diagnosis:  T12 compression fracture     Procedure(s):   Vertebroplasty     Findings:  Successful T12 vertebroplasty     Plan: per Dr. Lyles follow-up RN call in 2-3 weeks to see how patient is doing.     Spoke with daughter, Anna. States patient is doing really well. Went home and slept. States she feels better. Was slightly swollen prior to discharge - this has resolved. Denies bleeding, drainage, pain, swelling, warmth, redness at puncture site. Eating and drinking ok.     Will restart Eliquis tonight, 6/12/24.     States Dr. Lyles instructed them to follow-up in a few weeks. Will check with Dr. Lyles and get back to Anna. Understanding verbalized.    Belkis Marin RN 6/12/2024 1:50 PM     Addendum: Per Dr. Lyles plan for RN call in 2-3 weeks. Will check with [facility] to see if follow-up is needed with her as well.     Spoke with Anna. States patient \"definitely notices a difference in pain.\" Feeling better. Informed of above. Will contact her with plan from [facility]. Understanding verbalized.     Belkis Marin RN 6/14/2024 8:55 AM     Addendum: Per [facility] follow-up with [facility] with spine X-rays - already scheduled for 7/25/24. Spoke with Anna. States patient is doing well - less pain, more comfortable however patient really wants to get rid of the brace. They are aware of the appointments. Anna has my contact information and was encouraged to call with questions/concerns.     Belkis Marin RN 6/18/2024 2:57 PM           "

## 2024-06-17 DIAGNOSIS — S22.081A T12 BURST FRACTURE (H): Primary | ICD-10-CM

## 2024-06-18 ENCOUNTER — VIRTUAL VISIT (OUTPATIENT)
Dept: ENDOCRINOLOGY | Facility: CLINIC | Age: 89
End: 2024-06-18
Attending: FAMILY MEDICINE
Payer: MEDICARE

## 2024-06-18 ENCOUNTER — TELEPHONE (OUTPATIENT)
Dept: NEUROSURGERY | Facility: CLINIC | Age: 89
End: 2024-06-18

## 2024-06-18 DIAGNOSIS — Z78.0 POSTMENOPAUSAL STATUS: ICD-10-CM

## 2024-06-18 DIAGNOSIS — N18.32 STAGE 3B CHRONIC KIDNEY DISEASE (H): ICD-10-CM

## 2024-06-18 DIAGNOSIS — D49.7 FOLLICULAR NEOPLASM OF THYROID: ICD-10-CM

## 2024-06-18 DIAGNOSIS — S22.080S COMPRESSION FRACTURE OF T12 VERTEBRA, SEQUELA: Primary | ICD-10-CM

## 2024-06-18 DIAGNOSIS — Z71.0 COUNSELING ON BEHALF OF ANOTHER: ICD-10-CM

## 2024-06-18 PROCEDURE — 99205 OFFICE O/P NEW HI 60 MIN: CPT | Mod: 95

## 2024-06-18 PROCEDURE — 99417 PROLNG OP E/M EACH 15 MIN: CPT

## 2024-06-18 ASSESSMENT — PAIN SCALES - GENERAL: PAINLEVEL: NO PAIN (0)

## 2024-06-18 NOTE — NURSING NOTE
Is the patient currently in the state of [place]? YES    Visit mode:VIDEO    If the visit is dropped, the patient can be reconnected by: VIDEO VISIT: Text to cell phone:   Telephone Information:   Mobile [phone]       Will anyone else be joining the visit? NO  (If patient encounters technical issues they should call [phone]    How would you like to obtain your AVS? MyChart    Are changes needed to the allergy or medication list? No    Are refills needed on medications prescribed by this physician? NO    Reason for visit: Consult    Jaycee WAGNER

## 2024-06-18 NOTE — PROGRESS NOTES
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  Recommend Afirma GSC to try to put this issue to rest, or not .    Left thyroid nodule- with FN cytology - as above     Vertebral compression fracture defines osteoporosis unless it is due to tumor involvement . She is s/p vertebroplasty  I have counseled her daughter today on osteoporosis in general, which is likely her greatest health risk at the present time, in the context of her age and falling history .  Recommend labs to include Spep, PTH, ca, phos  Recommend DXA and treatment of osteoprorosis .   At her age denosumab or romosozumab would be the quickest treatment with least disruption.  Would need to watch she doesn't get low calcium .     Addendum  6/11/4 vertebroplasty  7/15/24 Ca 9.4, phso 3.7, albumin 3.7, PTH 32, SPEP monoclonal protein 0.5 g/dL in the gamma fraction , TSH 1.91, creatinine 1.56, eGFR 30, alk phos 220, CRP 11.7, ESR 37  DXA has not yet been done .      Abnormal T1 and T2 hyperintense lesion at the anterior superior aspect of the T9 vertebral body measuring 0.9 cm.- ? Is this tumor ?     Postmenopausal status.  The last DXA was 2015.    dXa ordered     History of steroid, most recent 11/2023    On amiodarone     CKD3b , eGFR 30s  will limit treatment options for osteoporosis .  At her age denosumab or romosozumab would be the quickest treatment with least disruption.      Due to the COVID 19 pandemic this visit was a video visit in order to help prevent spread of infection in this patient and the general population. The patient gave verbal consent for the visit today. I have independently reviewed and interpreted labs, imaging as indicated.    Distant Location (provider location):  Off-site  Mode of Communication:  Video Conference via [employer]  Chart review/prep  time 1  [id number]; [id number]  Visit Start time  1756  Visit Stop time 1810   80__ minutes spent on the date of the encounter doing chart review, history and exam, documentation and further activities as noted above.    Leela Teresa MD    Chief complaint:  Roxanna is a 89 year old female seen in consultation at the request of Dr Swapna Gaines for follicular neoplasm  I have reviewed Care Everywhere including [facility] lab reports, imaging reports and provider notes as indicated.      HISTORY OF PRESENT ILLNESS  Her daughter presents today without the patient.   She notes she has permission to communicate (see social).  She is asking what the options for treating, or not,  the thyroid nodule? She has believed the thyroid nodule is cancer.     Left thyroid nodule can be documented on imaging dating to 11/29/2020.  The first thyroid US was performed 4/2024.  FNAB cytology of the left thyroid nodule was read as follicular neoplasm.  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 - treated with prednisone monotherapy for several years   Steroid injections 11/1/16, 2/9/17,  8/2/17, 11/1/17    Endocrine relevant labs are as follows:  6/21/2012 TSH 1.55, free T4 0.81  1/29/24 TSH 2.4  2/9/24 25OHD 26  2/14/24 TSH 4.33, free T4 1.53  2/16/24 TSH 4.02, free T4 1.55  3/2/24 phos 2.6   3/27/24 TSH 1.41  5/13/24 UaE 85.7  \6/11/23 creatinine 1.32, eGFR 37, glucose 122    Relevant imaging is as follows: (as read by me as it pertains to endocrine relevant organs)  11/29/2020 C spine CT left thyroid nodule 2.5 x 1.6 x    4/30/2021 C spine CT left thyroid nodule 2 cm   4/4/24 thyroid US:  Right mid # 1 0.3 x 0.2 x 0.4 cm   Right inf # 2 0.6  x 0.5 x 0.6 cm   Right inf # 3  "0.5 x 0.4 x 0.5 cm   Left inf # 4   2.3 x 1.4 x 2.6 cm isoechoic with ? Microcalcifications   5/3/24 FNAB     REVIEW OF SYSTEMS  Frailty increasing  Falls   Walker  Mentally with it but forgets (\"from breakfast\") -- she knows people. She reads every day  Would consider operation depending on goal of operation  Vertebroplasty helped her   10 system ROS otherwise as per the HPI or negative    Past Medical History  Past Medical History:   Diagnosis Date    Actinic keratosis     Aortic stenosis 2014    Atrial flutter with rapid ventricular response (H) 01/27/2024    Basal cell cancer 07/2014    left eye medial canthus     Basal cell carcinoma 09/30/2008    left cheek    CKD (chronic kidney disease) stage 3, GFR 30-59 ml/min (H) 07/01/2019    HTN (hypertension)     Melanoma in situ (H) 09/30/2008    left arm    PAF (paroxysmal atrial fibrillation) (H) 3/27/2024    Polymyalgia rheumatica (H24) 11/1999    Rheumatoid arthritis of multiple sites with negative rheumatoid factor (H)     Status post coronary angiogram 03/03/2016    Temporal arteritis (H) 11/1999     Past Surgical History:   Procedure Laterality Date    CATARACT IOL, RT/LT  5/09    bilateral    COLONOSCOPY  2002    EXCISE LESION VULVA N/A 9/27/2019    Procedure: Wide Local Excision Of Vulva, Colposcopy;  Surgeon: Mono Ribeiro MD;  Location:  OR    REPLACE VALVE AORTIC N/A 4/25/2016    Procedure: REPLACE VALVE AORTIC;  Surgeon: Sudeep Tsai MD;  Location:  OR    [facility] SKIN TISSUE PROCEDURE UNLISTED  11/3/08    mmis skin cancer excision     Vertebroplasty 6/11/24    Medications  Current Outpatient Medications   Medication Sig Dispense Refill    acetaminophen (TYLENOL) 325 MG tablet Take 3 tablets (975 mg) by mouth every 8 hours as needed for mild pain, headaches or fever 30 tablet 0    amiodarone (PACERONE) 200 MG tablet Take 1 tablet (200 mg) by mouth daily 90 tablet 1    amLODIPine (NORVASC) 5 MG tablet TAKE 1 TABLET BY MOUTH ONCE DAILY 90 " tablet 3    amoxicillin (AMOXIL) 500 MG capsule Take 4 tablets  30 minutes before Procedure 4 capsule 3    apixaban ANTICOAGULANT (ELIQUIS) 2.5 MG tablet Take 1 tablet (2.5 mg) by mouth 2 times daily 180 tablet 3    RICKI PROTECT MOISTURE BARRIER 12 % CREA APPLY TOPICALLY TO COCCYX AREA TWICE DAILY UNTIL HEALED THEN DISCONTINUE 142 g 0    bisacodyl (DULCOLAX) 10 MG suppository Place 1 suppository (10 mg) rectally daily as needed for constipation 10 suppository 0    calcium carbonate-vitamin D (OSCAL) 500-5 MG-MCG tablet TAKE 1 TABLET BY MOUTH ONCE DAILY 90 tablet 3    furosemide (LASIX) 20 MG tablet TAKE 1 TABLET BY MOUTH ONCE DAILY 90 tablet 3    gabapentin (NEURONTIN) 100 MG capsule TAKE 1 CAPSULE BY MOUTH ONCE DAILY 90 capsule 3    hydroxychloroquine (PLAQUENIL) 200 MG tablet Hydroxychloroquine 100 mg (half tablet) daily 45 tablet 0    LIDOCAINE PAIN RELIEF 4 % Patch APPLY 1 PATCH TO SKIN EVERY 24 HOURS (ON FOR 12 HOURS, OFF FOR 12 HOURS) 30 patch 97    metoprolol tartrate (LOPRESSOR) 25 MG tablet TAKE 1 TABLET BY MOUTH TWICE DAILY 180 tablet 3    Omega-3 Fatty Acids (OMEGA-3 FISH OIL PO) Take 1 tablet twice daily.      polyethylene glycol (MIRALAX) 17 GM/Dose powder MIX 17GM OF POWDER IN 8OZ OF WATER UNTIL COMPLETELY DISSOLVED. DRINK SOLUTION BY MOUTH ONCE DAILY. 510 g 97    senna-docusate (SENOKOT-S/PERICOLACE) 8.6-50 MG tablet Take 1 tablet by mouth 2 times daily as needed for constipation 30 tablet 0       Allergies  Allergies   Allergen Reactions    Lisinopril Cough       Family History  family history includes Arthritis in her father, mother, sister, sister, and sister; Asthma in her daughter and daughter; Breast Cancer (age of onset: 45) in her sister; Cerebrovascular Disease in her daughter; Colon Cancer in her father and sister; Heart Disease in her father; Hypertension in her father; Lipids in her father; Lung Cancer (age of onset: 58) in her daughter; Myocardial Infarction in her daughter; Pancreatic  "Cancer (age of onset: 81) in an other family member; Prostate Cancer in her father; Thyroid Disease in her sister.    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 She is  and has 6 children   July 8, 2022 Authorization to communicate PHI with: Jyotsna Garcia, Daughter, best contact number [phone]; Anna Stark, Daughter, best contact number [phone] in regards to Scheduling Information, Medical Information, Billing Information , and    items.       Physical Exam  There is no height or weight on file to calculate BMI.  BP Readings from Last 1 Encounters:   06/11/24 (!) 158/67      Pulse Readings from Last 1 Encounters:   06/11/24 58      Resp Readings from Last 1 Encounters:   06/11/24 16      Temp Readings from Last 1 Encounters:   06/11/24 98.6  F (37  C) (Oral)      SpO2 Readings from Last 1 Encounters:   06/11/24 92%      Wt Readings from Last 1 Encounters:   06/05/24 50.6 kg (111 lb 9.6 oz)      Ht Readings from Last 1 Encounters:   06/05/24 1.499 m (4' 11\")       DATA REVIEW    collected 5/3/2024 10:33 AM    Status: Final result    Visible to patient: Yes (seen)       Dx: Thyroid nodule    1 Result Note       1 Patient Communication      Component  Ref Range & Units    Final Diagnosis   A. THYROID, LEFT THYROID NODULE #4, FINE NEEDLE ASPIRATE:  Interpretation -   - Follicular neoplasm, Hurthle cell predominant - Colony (IV)   - The Colony implied risk of malignancy and recommended clinical management:  Follicular neoplasm diagnosis has a 30 (23-34)% risk of malignancy. Molecular testing or diagnostic lobectomy is recommended.  Adequacy: " Satisfactory for evaluation   Electronically signed by Godwin Graham MD on 5/6/2024 at  3:56 PM   Clinical Information    A dominant 2.6 cm TI-RADS 4 nodule in the inferior left lobe of the thyroid gland   Gross Description    A(1). Thyroid, left, Left thyroid nodule #4:A. Thyroid, left, Left thyroid nodule #4, Fine Needle Aspirate:  Received are 4 fixed slides, processed for Pap stain, and 4 air dried slides, processed for Haas stain. Afirma tube held.   Microscopic Description       Case was reviewed by the following:  Pathology Fellow: Kory Vasquez MD  A resident or fellow in a training program was involved in the initial review, preparation, and/or interpretation of this case.  I, as the senior physician, attest that I have personally reviewed all specimens and or slides, including the listed special stains, and used them with my medical judgement to determine the final diagnosis.   Abnormal Result?  No Yes Abnormal    Performing Labs    The technical component of this testing was completed at [facility]   Resulting Agency [facility]              Specimen Collected: 05/03/24 10:33 AM Last Resulted: 05/06/24  3:56 PM           US THYROID 4/4/2024 9:56 AM  CLINICAL HISTORY: Thyroid nodule  TECHNIQUE: Thyroid ultrasound.   COMPARISON: None   FINDINGS:  RIGHT lobe: 4.1 x 1.1 x 1.5 cm. Heterogenous echotexture.  Isthmus: 4 mm.  LEFT lobe: 4.5 x 1.1 x 1.0 cm. Heterogenous echotexture.  NODULES: Only the most significant nodule has been discussed. Few tiny  subcentimeter nodules in the right lobe.  Nodule 4: A 2.6 x 2.3 x 1.4 cm nodule in the inferior left lobe.   Composition: Solid or almost completely solid, 2 points   Echogenicity: Hyperechoic or isoechoic, 1 point   Shape: Wider-than-tall, 0 points   Margin: Smooth, 0 points   Echogenic Foci: Punctate echoic foci, 3 points   Point Total: 6 points. TI-RADS 4. If 1.5 cm or larger,  recommend FNA;  if 1 cm or larger, follow up US (annually for 5 years)                                                           IMPRESSION:  1.  A dominant 2.6 cm TI-RADS 4 nodule in the inferior left lobe of  the thyroid gland. Consider FNA for further evaluation.  Nodules are characterized per  ACR Thyroid Imaging, Reporting and Data System (TI-RADS): White Paper  of the ACR TI-RADS Committee  Tito White. et al. Journal of the American College of  Radiology 2017. Volume 14 (2017), Issue 5, 280-294.      GAGAN THORNTON MD     EXAM: MR THORACIC SPINE W/O CONTRAST  LOCATION: [facility]  DATE: 5/13/2024     INDICATION: T12 burst fracture.  COMPARISON: Thoracic spine CT 5/6/2024.  TECHNIQUE: Routine Thoracic Spine MRI without IV contrast.  FINDINGS:   Slightly exaggerated thoracic kyphosis. Burst type fracture of the T12 vertebral body with anterior wedging and loss of approximately 50% vertebral body height. Question slight further anterior collapse of the T12 vertebral body since CT 5/6/2024  although comparison is difficult given differences in technique. There is posterior displacement of the fractured superior T12 endplate towards the spinal canal which abuts and indents the ventral spinal cord and causes mild spinal canal narrowing. No   definite T2 signal abnormality/edema within the spinal cord. No abnormal fluid collections or epidural hematoma appreciated in the thoracic spinal canal.  No other significant loss of thoracic vertebral body height. Abnormal T1 and T2 hyperintense lesion at the anterior superior aspect of the T9 vertebral body measuring 0.9 cm.  No significant degenerative disc changes or disc herniation in the thoracic spine. Facet hypertrophy in the lower thoracic spine particularly at T11-T12 and T12-L1 causing at least moderate neural foraminal narrowings.  Degenerative changes partially visualized in the cervical spine and as well as at L1-L2. Marked left  greater than right neural foraminal narrowings at L1-L2.  Right greater than left pleural effusions.  Subcutaneous nodule of the posterior back at the level of T1-T2 likely represents a sebaceous cyst                                                      IMPRESSION:  1.  Burst type fracture of the T12 vertebral body with anterior wedging and loss of approximately 50% vertebral body height. Questionable slight increase compression of this fracture since 5/6/2024 although comparison is difficult given differences in   technique.  2.  Posterior displacement of the fractured superior T12 endplate abuts and indents the ventral spinal cord and causes mild spinal canal narrowing. This is similar to prior CT. No obvious edema in the spinal cord. No epidural hematoma.  3.  Indeterminate 0.9 cm lesion in the T9 vertebral body. Recommend follow-up MRI in 2-3 months to ensure stability.  4.  Degenerative changes in the lower thoracic spine with facet hypertrophy causing at least moderate bilateral neural foraminal narrowings.  5.  Partially visualized degenerative changes in the cervical spine and upper lumbar spine.    Narrative & Impression   EXAM: CT HEAD W/O CONTRAST, CT CERVICAL SPINE W/O CONTRAST  LOCATION: [facility]  DATE: 2/25/2024  INDICATION: Head and neck injury  COMPARISON: None.  TECHNIQUE:   1) Routine CT Head without IV contrast. Multiplanar reformats. Dose reduction techniques were used.  2) Routine CT Cervical Spine without IV contrast. Multiplanar reformats. Dose reduction techniques were used.  FINDINGS:   HEAD CT:   INTRACRANIAL CONTENTS: No intracranial hemorrhage, extraaxial collection, or mass effect.  No CT evidence of acute infarct. Moderate presumed chronic small vessel ischemic changes. Moderate generalized volume loss. No hydrocephalus.   VISUALIZED ORBITS/SINUSES/MASTOIDS: No intraorbital abnormality. Opacification anterior aspect of right sphenoid  sinus. No middle ear or mastoid effusion.  BONES/SOFT TISSUES: No acute abnormality.  CERVICAL SPINE CT:   VERTEBRA: Normal vertebral body heights. Straightening of cervical lordosis. Minimal anterior subluxation C7 on T1 and minimal posterior subluxation C3 on C4. No fracture or posttraumatic subluxation.   CANAL/FORAMINA: Mild to moderate degenerative changes with mild canal narrowing at C3-C4, C5-C6. Multilevel prominent neural foraminal narrowing.  PARASPINAL: 2 cm nodule left thyroid gland; ultrasound recommended, if not done previously. Mild bilateral pleural effusions.                                                             IMPRESSION:  HEAD CT:  1.  No acute intracranial process.  CERVICAL SPINE CT:  1.  No CT evidence for acute fracture or post traumatic subluxation.       EXAM: CT CHEST ABDOMEN PELVIS W/O CONTRAST  LOCATION: [facility]  DATE: 2/25/2024  INDICATION: Fall, back and L abdominal pain. Low GFR.  COMPARISON: Two-view chest radiograph 01/27/2024, CT chest 03/18/2016  TECHNIQUE: CT scan of the chest, abdomen, and pelvis was performed without IV contrast. Multiplanar reformats were obtained. Dose reduction techniques were used.   CONTRAST: None.  FINDINGS:   LUNGS AND PLEURA: Calcified granulomas of the left lower lobe. Small to moderate pleural effusions right greater than left with compressive atelectasis. There is also partial collapse of the right middle lobe and lingula.  MEDIASTINUM/AXILLAE: Heterogeneous nodular thyroid. Calcified mediastinal and hilar lymph nodes compatible with prior granulomatous disease. Aortic valve replacement. Severe mitral annular calcifications. No significant pericardial effusion. Upper limits   normal heart size.  CORONARY ARTERY CALCIFICATION: Severe.  HEPATOBILIARY: Calcified hepatic granulomas. Mild layering sludge or dense bile within the gallbladder, which is otherwise unremarkable.  PANCREAS: Somewhat  atrophic, otherwise unremarkable.  SPLEEN: Multiple calcified splenic granulomas.  ADRENAL GLANDS: Normal.  KIDNEYS/BLADDER: Atrophic left kidney with small renal calyceal calculi, nonobstructing. Somewhat hypertrophied right kidney without hydronephrosis. Unremarkable urinary bladder.  BOWEL: Colonic diverticulosis. Large colonic stool suggestive of constipation. Normal appendix. No bowel obstruction.  LYMPH NODES: Normal.  VASCULATURE: Diffuse atherosclerotic calcifications of the aortoiliac vessels without evidence of aneurysmal dilatation.  PELVIC ORGANS: Periuterine calcifications.   MUSCULOSKELETAL: Prior median sternotomy. Degenerative changes of the shoulders and hips. Degenerative changes of the pubic symphysis. Multilevel degenerative changes of the cervicothoracic and lumbosacral spine. Acute or subacute-appearing burst-type   compression fracture of T12, new from CXR from 1/27/24. Mild bony retropulsion of superior endplate.                                                              IMPRESSION:  1.  Acute-appearing burst-type compression fracture of T12, new from CXR from 1/27/24. Mild bony retropulsion of superior endplate resulting in mild canal narrowing. Small associated anterior paravertebral hematoma. Please see separately dictated CT   spine reconstruction reports for additional details.  2.  No other evidence of acute traumatic abnormality of the chest, abdomen, or pelvis.  3.  Small to moderate pleural effusions right greater than left with compressive atelectasis. There is also partial collapse of the right middle lobe and lingula.  4.  Evidence of prior granulomatous disease.  5.  Severe coronary artery atherosclerotic calcifications.  6.  Heterogeneous nodular thyroid. Correlate with thyroid ultrasound which may be performed on a nonemergent outpatient basis.  7.  Large colonic stool suggestive of constipation.  8.  No other acute process within the chest, abdomen, or pelvis.    EXAM: CT  THORACIC SPINE W/O CONTRAST, CT LUMBAR SPINE W/O CONTRAST  LOCATION: [facility]  DATE: 2/25/2024  INDICATION: Fall, back pain.  COMPARISON: None.  TECHNIQUE:  1) Routine CT Thoracic Spine without IV contrast. Multiplanar reformats. Dose reduction techniques were used.   2) Routine CT Lumbar Spine without IV contrast. Multiplanar reformats. Dose reduction techniques were used.   FINDINGS:  THORACIC SPINE CT:  VERTEBRA: Normal alignment. T12 burst fracture with involvement of the superior and inferior endplates resulting in 40% vertebral body height loss and retropulsion of the posterior superior cortex by 5 mm. No other fractures.    CANAL/FORAMINA: Mild to moderate spondylosis. Mild spinal canal stenosis associated with retropulsion at T12. Moderate right foraminal stenosis at T11-T12. Otherwise, no significant spinal canal or foraminal stenosis.  PARASPINAL: See dedicated chest CT regarding intrathoracic findings.  LUMBAR SPINE CT:  VERTEBRA: Accentuation of lumbar lordosis with mild anterior spondylolisthesis at L4-L5 and L5-S1 and posterior spondylolisthesis at L1-L2. Mild rightward curvature centered at L2-L3. Otherwise normal alignment. Normal vertebral body heights. No fracture or posttraumatic subluxation.   CANAL/FORAMINA: Severe left and moderate right foraminal stenoses at L1-L2. Moderate left foraminal stenosis at L2-L3. Moderate foraminal stenosis at L3-L4. Mild to moderate foraminal stenosis at L4-L5. Severe right and moderate left foraminal stenosis   at L5-S1.  PARASPINAL: See dedicated abdomen and pelvis CT regarding intra-abdominal findings.                                                               IMPRESSION:  THORACIC SPINE CT:  1.  T12 burst fracture with involvement of the superior and inferior endplate resulting in 40% height loss with retropulsion causing mild spinal canal stenosis. No other fractures.  LUMBAR SPINE CT:  1.  No fracture or  posttraumatic subluxation.  2.  Spondylosis with foraminal stenoses as detailed.    CT PELVIS LIMITED W/O CONTRAST  Order: [id number]  Impression  1.  No acute fracture. Sensitivity is reduced secondary to bony demineralization. MRI is more sensitive and should be considered for further evaluation.  2.  Likely subacute minimally displaced fracture of the lower sacrum.  Narrative  EXAM: CT PELVIS WO  LOCATION: [facility]  DATE: 4/13/2024  INDICATION: Right hip and pelvic pain related to recent fall.  COMPARISON: None.  TECHNIQUE: CT scan of the pelvis was performed without IV contrast. Multiplanar reformats were obtained. Dose reduction techniques were used.  CONTRAST: None.  FINDINGS:  No acute fracture.  Old healed fracture of the upper sacral body at the S2 level. Minimally displaced fracture of the lower sacral body is probably subacute (series 7 image [id number])  Diffuse bony demineralization. Mild degenerative arthritis of both hip joints. Degenerative changes lumbar spine and SI joints. Small sclerotic lesion right sacral wing most likely represents incidental bone island  No fluid collection or hematoma.     Latest Reference Range & Units 07/15/24 14:23   Albumin Fraction 3.7 - 5.1 g/dL 3.6 (L)   Alpha 1 Fraction 0.2 - 0.4 g/dL 0.4   Alpha 2 Fraction 0.5 - 0.9 g/dL 0.7   Beta Fraction 0.6 - 1.0 g/dL 0.8   ELP Interpretation:  Monoclonal protein (0.5 g/dL) seen in the gamma fraction, not previously characterized in our laboratory. Recommend serum and urine immunofixation for confirmation and further characterization if not previously performed elsewhere. Hypoalbuminemia. Pathologic significance requires clinical correlation. ELENI Ge M.D., Ph.D., Pathologist.   Gamma Fraction 0.7 - 1.6 g/dL 1.7 (H)   Monoclonal Peak <=0.0 g/dL 0.5 (H)   Parathyroid Hormone Intact 15 - 65 pg/mL 32   Total Protein Serum for ELP 6.4 - 8.3 g/dL 7.2        Latest Reference Range & Units 07/15/24 14:25   Sodium 135 - 145 mmol/L  141   Potassium 3.4 - 5.3 mmol/L 4.7   Chloride 98 - 107 mmol/L 104   Carbon Dioxide (CO2) 22 - 29 mmol/L 28   Urea Nitrogen 8.0 - 23.0 mg/dL 33.1 (H)   Creatinine 0.51 - 0.95 mg/dL 1.56 (H)   GFR Estimate >60 mL/min/1.73m2 30 (L)   Calcium 8.8 - 10.4 mg/dL 9.5   Anion Gap 7 - 15 mmol/L 9   Phosphorus 2.5 - 4.5 mg/dL 3.7   Albumin 3.5 - 5.2 g/dL 3.7   Protein Total 6.4 - 8.3 g/dL 7.4   Alkaline Phosphatase 40 - 150 U/L 220 (H)   ALT 0 - 50 U/L 31   AST 0 - 45 U/L 31   Bilirubin Total <=1.2 mg/dL 0.3   Glucose 70 - 99 mg/dL 106 (H)   TSH 0.30 - 4.20 uIU/mL 1.91        Latest Reference Range & Units 07/15/24 14:23   WBC 4.0 - 11.0 10e3/uL 7.6   Hemoglobin 11.7 - 15.7 g/dL 11.9   Hematocrit 35.0 - 47.0 % 38.7   Platelet Count 150 - 450 10e3/uL 185       EXAM: DX AXIAL HIPS/SPINE  LOCATION: [facility]  DATE: 07/23/2024  INDICATION: BMD screening. Followup. History of T12 compression fracture.  DEMOGRAPHICS: Age- 89 years. Gender- Female. Menopausal status- Postmenopausal.  COMPARISON: 08/31/2015  TECHNIQUE: Dual-energy x-ray absorptiometry (DXA) performed with routine technique.    FINDINGS  DXA RESULTS  -Lumbar Spine: L1-L4: BMD: 1.110 g/cm2. T-score: -0.6. Z-score: 1.5. Degenerative change may artifactually increase BMD.  -RIGHT Hip Total: BMD: 1.051 g/cm2. T-score: 0.3. Z-score: 3.3.  -RIGHT Hip Femoral neck: BMD: 0.887 g/cm2. T-score: -1.1. Z-score: 1.8.  -LEFT Hip Total: BMD: 1.002 g/cm2. T-score: 0.0. Z-score: 2.9.  -LEFT Hip Femoral neck: BMD: 0.815 g/cm2. T-score: -1.6. Z-score: 1.3.  INTERVAL CHANGE  -There has been a 6.3% decrease in lumbar spine BMD.  -There has been a 6.0% decrease in bilateral hip BMD.  FRACTURE RISK  -The FRAX risk calculator is not applicable due to a prior vertebral fracture (T12).                                                  IMPRESSION: Low bone density (OSTEOPENIA) with an interval decrease in BMD. T score meets the WHO criteria for low bone density  (osteopenia) at one or more measured sites. The risk of osteoporotic fracture increases approximately two-fold for each   standard deviation decrease in T-score.

## 2024-06-18 NOTE — LETTER
6/18/2024       RE: Roxanna Stark  [address]  [place] [address]     Dear Colleague,    Thank you for referring your patient, Roxanna Stark, to the [facility] ENDOCRINOLOG[facility] [place] at [facility]. Please see a copy of my visit note below.    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  Recommend Afirma GSC to try to put this issue to rest, or not .    Left thyroid nodule- with FN cytology - as above     Vertebral compression fracture defines osteoporosis unless it is due to tumor involvement . She is s/p vertebroplasty  I have counseled her daughter today on osteoporosis in general, which is likely her greatest health risk at the present time, in the context of her age and falling history .  Recommend labs to include Spep, PTH, ca, phos  Recommend DXA and treatment of osteoprorosis .   At her age denosumab or romosozumab would be the quickest treatment with least disruption.  Would need to watch she doesn't get low calcium .     Abnormal T1 and T2 hyperintense lesion at the anterior superior aspect of the T9 vertebral body measuring 0.9 cm.- ? Is this tumor ?     Postmenopausal status.  The last DXA was 2015.    dXa ordered     History of steroid, most recent 11/2023    On amiodarone     CKD3b , eGFR 30s  will limit treatment options for osteoporosis .  At her age denosumab or romosozumab would be the quickest treatment with least disruption.      Due to the COVID 19 pandemic this visit was a video visit in order to help prevent spread of infection in this patient and the general population. The patient gave verbal consent for the visit today. I have independently reviewed and interpreted labs, imaging as indicated.    Distant Location (provider location):   Off-site  Mode of Communication:  Video Conference via Vinted  Chart review/prep time 1  [id number]; [id number]  Visit Start time  1756  Visit Stop time 1810   80__ minutes spent on the date of the encounter doing chart review, history and exam, documentation and further activities as noted above.    Leela Teresa MD    Chief complaint:  Roxanna is a 89 year old female seen in consultation at the request of Dr Swapna Gaines for follicular neoplasm  I have reviewed Care Everywhere including [facility] lab reports, imaging reports and provider notes as indicated.      HISTORY OF PRESENT ILLNESS  Her daughter presents today without the patient.   She notes she has permission to communicate (see social).  She is asking what the options for treating, or not,  the thyroid nodule? She has believed the thyroid nodule is cancer.     Left thyroid nodule can be documented on imaging dating to 11/29/2020.  The first thyroid US was performed 4/2024.  FNAB cytology of the left thyroid nodule was read as follicular neoplasm.  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 - treated with prednisone monotherapy for several years   Steroid injections 11/1/16, 2/9/17,  8/2/17, 11/1/17    Endocrine relevant labs are as follows:  6/21/2012 TSH 1.55, free T4 0.81  1/29/24 TSH 2.4  2/9/24 25OHD 26  2/14/24 TSH 4.33, free T4 1.53  2/16/24 TSH 4.02, free T4 1.55  3/2/24 phos 2.6   3/27/24 TSH 1.41  5/13/24 UaE 85.7  \6/11/23 creatinine 1.32, eGFR 37, glucose 122    Relevant imaging is as follows: (as read by me as it pertains to endocrine relevant organs)  11/29/2020 C spine CT left thyroid nodule 2.5 x 1.6 x    4/30/2021 C spine CT left thyroid nodule 2 cm   4/4/24 thyroid  "US:  Right mid # 1 0.3 x 0.2 x 0.4 cm   Right inf # 2 0.6  x 0.5 x 0.6 cm   Right inf # 3 0.5 x 0.4 x 0.5 cm   Left inf # 4   2.3 x 1.4 x 2.6 cm isoechoic with ? Microcalcifications   5/3/24 FNAB     REVIEW OF SYSTEMS  Frailty increasing  Falls   Walker  Mentally with it but forgets (\"from breakfast\") -- she knows people. She reads every day  Would consider operation depending on goal of operation  Vertebroplasty helped her   10 system ROS otherwise as per the HPI or negative    Past Medical History  Past Medical History:   Diagnosis Date    Actinic keratosis     Aortic stenosis 2014    Atrial flutter with rapid ventricular response (H) 01/27/2024    Basal cell cancer 07/2014    left eye medial canthus     Basal cell carcinoma 09/30/2008    left cheek    CKD (chronic kidney disease) stage 3, GFR 30-59 ml/min (H) 07/01/2019    HTN (hypertension)     Melanoma in situ (H) 09/30/2008    left arm    PAF (paroxysmal atrial fibrillation) (H) 3/27/2024    Polymyalgia rheumatica (H24) 11/1999    Rheumatoid arthritis of multiple sites with negative rheumatoid factor (H)     Status post coronary angiogram 03/03/2016    Temporal arteritis (H) 11/1999     Past Surgical History:   Procedure Laterality Date    CATARACT IOL, RT/LT  5/09    bilateral    COLONOSCOPY  2002    EXCISE LESION VULVA N/A 9/27/2019    Procedure: Wide Local Excision Of Vulva, Colposcopy;  Surgeon: Mono Ribeiro MD;  Location:  OR    REPLACE VALVE AORTIC N/A 4/25/2016    Procedure: REPLACE VALVE AORTIC;  Surgeon: Sudeep Tsai MD;  Location:  OR    [facility] SKIN TISSUE PROCEDURE UNLISTED  11/3/08    mmis skin cancer excision     Vertebroplasty 6/11/24    Medications  Current Outpatient Medications   Medication Sig Dispense Refill    acetaminophen (TYLENOL) 325 MG tablet Take 3 tablets (975 mg) by mouth every 8 hours as needed for mild pain, headaches or fever 30 tablet 0    amiodarone (PACERONE) 200 MG tablet Take 1 tablet (200 mg) by mouth daily " 90 tablet 1    amLODIPine (NORVASC) 5 MG tablet TAKE 1 TABLET BY MOUTH ONCE DAILY 90 tablet 3    amoxicillin (AMOXIL) 500 MG capsule Take 4 tablets  30 minutes before Procedure 4 capsule 3    apixaban ANTICOAGULANT (ELIQUIS) 2.5 MG tablet Take 1 tablet (2.5 mg) by mouth 2 times daily 180 tablet 3    RICKI PROTECT MOISTURE BARRIER 12 % CREA APPLY TOPICALLY TO COCCYX AREA TWICE DAILY UNTIL HEALED THEN DISCONTINUE 142 g 0    bisacodyl (DULCOLAX) 10 MG suppository Place 1 suppository (10 mg) rectally daily as needed for constipation 10 suppository 0    calcium carbonate-vitamin D (OSCAL) 500-5 MG-MCG tablet TAKE 1 TABLET BY MOUTH ONCE DAILY 90 tablet 3    furosemide (LASIX) 20 MG tablet TAKE 1 TABLET BY MOUTH ONCE DAILY 90 tablet 3    gabapentin (NEURONTIN) 100 MG capsule TAKE 1 CAPSULE BY MOUTH ONCE DAILY 90 capsule 3    hydroxychloroquine (PLAQUENIL) 200 MG tablet Hydroxychloroquine 100 mg (half tablet) daily 45 tablet 0    LIDOCAINE PAIN RELIEF 4 % Patch APPLY 1 PATCH TO SKIN EVERY 24 HOURS (ON FOR 12 HOURS, OFF FOR 12 HOURS) 30 patch 97    metoprolol tartrate (LOPRESSOR) 25 MG tablet TAKE 1 TABLET BY MOUTH TWICE DAILY 180 tablet 3    Omega-3 Fatty Acids (OMEGA-3 FISH OIL PO) Take 1 tablet twice daily.      polyethylene glycol (MIRALAX) 17 GM/Dose powder MIX 17GM OF POWDER IN 8OZ OF WATER UNTIL COMPLETELY DISSOLVED. DRINK SOLUTION BY MOUTH ONCE DAILY. 510 g 97    senna-docusate (SENOKOT-S/PERICOLACE) 8.6-50 MG tablet Take 1 tablet by mouth 2 times daily as needed for constipation 30 tablet 0       Allergies  Allergies   Allergen Reactions    Lisinopril Cough       Family History  family history includes Arthritis in her father, mother, sister, sister, and sister; Asthma in her daughter and daughter; Breast Cancer (age of onset: 45) in her sister; Cerebrovascular Disease in her daughter; Colon Cancer in her father and sister; Heart Disease in her father; Hypertension in her father; Lipids in her father; Lung Cancer  "(age of onset: 58) in her daughter; Myocardial Infarction in her daughter; Pancreatic Cancer (age of onset: 81) in an other family member; Prostate Cancer in her father; Thyroid Disease in her sister.    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 She is  and has 6 children   July 8, 2022 Authorization to communicate PHI with: Jyotsna Garcia, Daughter, best contact number [phone]; Anna Stark, Daughter, best contact number [phone] in regards to Scheduling Information, Medical Information, Billing Information , and    items.       Physical Exam  There is no height or weight on file to calculate BMI.  BP Readings from Last 1 Encounters:   06/11/24 (!) 158/67      Pulse Readings from Last 1 Encounters:   06/11/24 58      Resp Readings from Last 1 Encounters:   06/11/24 16      Temp Readings from Last 1 Encounters:   06/11/24 98.6  F (37  C) (Oral)      SpO2 Readings from Last 1 Encounters:   06/11/24 92%      Wt Readings from Last 1 Encounters:   06/05/24 50.6 kg (111 lb 9.6 oz)      Ht Readings from Last 1 Encounters:   06/05/24 1.499 m (4' 11\")       DATA REVIEW    collected 5/3/2024 10:33 AM    Status: Final result    Visible to patient: Yes (seen)       Dx: Thyroid nodule    1 Result Note       1 Patient Communication      Component  Ref Range & Units    Final Diagnosis   A. THYROID, LEFT THYROID NODULE #4, FINE NEEDLE ASPIRATE:  Interpretation -   - Follicular neoplasm, Hurthle cell predominant - Jupiter (IV)   - The Jupiter implied risk of malignancy and recommended clinical management:  Follicular neoplasm diagnosis has a 30 (23-34)% risk of " malignancy. Molecular testing or diagnostic lobectomy is recommended.  Adequacy: Satisfactory for evaluation   Electronically signed by Godwin Graham MD on 5/6/2024 at  3:56 PM   Clinical Information    A dominant 2.6 cm TI-RADS 4 nodule in the inferior left lobe of the thyroid gland   Gross Description    A(1). Thyroid, left, Left thyroid nodule #4:A. Thyroid, left, Left thyroid nodule #4, Fine Needle Aspirate:  Received are 4 fixed slides, processed for Pap stain, and 4 air dried slides, processed for Haas stain. Afirma tube held.   Microscopic Description       Case was reviewed by the following:  Pathology Fellow: Kory Vasquez MD  A resident or fellow in a training program was involved in the initial review, preparation, and/or interpretation of this case.  I, as the senior physician, attest that I have personally reviewed all specimens and or slides, including the listed special stains, and used them with my medical judgement to determine the final diagnosis.   Abnormal Result?  No Yes Abnormal    Performing Labs    The technical component of this testing was completed at [facility]   Resulting Agency [email or web address]              Specimen Collected: 05/03/24 10:33 AM Last Resulted: 05/06/24  3:56 PM           US THYROID 4/4/2024 9:56 AM  CLINICAL HISTORY: Thyroid nodule  TECHNIQUE: Thyroid ultrasound.   COMPARISON: None   FINDINGS:  RIGHT lobe: 4.1 x 1.1 x 1.5 cm. Heterogenous echotexture.  Isthmus: 4 mm.  LEFT lobe: 4.5 x 1.1 x 1.0 cm. Heterogenous echotexture.  NODULES: Only the most significant nodule has been discussed. Few tiny  subcentimeter nodules in the right lobe.  Nodule 4: A 2.6 x 2.3 x 1.4 cm nodule in the inferior left lobe.   Composition: Solid or almost completely solid, 2 points   Echogenicity: Hyperechoic or isoechoic, 1 point   Shape: Wider-than-tall, 0 points   Margin: Smooth, 0 points   Echogenic Foci:  Punctate echoic foci, 3 points   Point Total: 6 points. TI-RADS 4. If 1.5 cm or larger, recommend FNA;  if 1 cm or larger, follow up US (annually for 5 years)                                                           IMPRESSION:  1.  A dominant 2.6 cm TI-RADS 4 nodule in the inferior left lobe of  the thyroid gland. Consider FNA for further evaluation.  Nodules are characterized per  ACR Thyroid Imaging, Reporting and Data System (TI-RADS): White Paper  of the ACR TI-RADS Committee  Tito White et al. Journal of the American College of  Radiology 2017. Volume 14 (2017), Issue 5, 105-086.      GAGAN THORNTON MD     EXAM: MR THORACIC SPINE W/O CONTRAST  LOCATION: [facility]  DATE: 5/13/2024     INDICATION: T12 burst fracture.  COMPARISON: Thoracic spine CT 5/6/2024.  TECHNIQUE: Routine Thoracic Spine MRI without IV contrast.  FINDINGS:   Slightly exaggerated thoracic kyphosis. Burst type fracture of the T12 vertebral body with anterior wedging and loss of approximately 50% vertebral body height. Question slight further anterior collapse of the T12 vertebral body since CT 5/6/2024  although comparison is difficult given differences in technique. There is posterior displacement of the fractured superior T12 endplate towards the spinal canal which abuts and indents the ventral spinal cord and causes mild spinal canal narrowing. No   definite T2 signal abnormality/edema within the spinal cord. No abnormal fluid collections or epidural hematoma appreciated in the thoracic spinal canal.  No other significant loss of thoracic vertebral body height. Abnormal T1 and T2 hyperintense lesion at the anterior superior aspect of the T9 vertebral body measuring 0.9 cm.  No significant degenerative disc changes or disc herniation in the thoracic spine. Facet hypertrophy in the lower thoracic spine particularly at T11-T12 and T12-L1 causing at least moderate neural foraminal narrowings.  Degenerative  changes partially visualized in the cervical spine and as well as at L1-L2. Marked left greater than right neural foraminal narrowings at L1-L2.  Right greater than left pleural effusions.  Subcutaneous nodule of the posterior back at the level of T1-T2 likely represents a sebaceous cyst                                                      IMPRESSION:  1.  Burst type fracture of the T12 vertebral body with anterior wedging and loss of approximately 50% vertebral body height. Questionable slight increase compression of this fracture since 5/6/2024 although comparison is difficult given differences in   technique.  2.  Posterior displacement of the fractured superior T12 endplate abuts and indents the ventral spinal cord and causes mild spinal canal narrowing. This is similar to prior CT. No obvious edema in the spinal cord. No epidural hematoma.  3.  Indeterminate 0.9 cm lesion in the T9 vertebral body. Recommend follow-up MRI in 2-3 months to ensure stability.  4.  Degenerative changes in the lower thoracic spine with facet hypertrophy causing at least moderate bilateral neural foraminal narrowings.  5.  Partially visualized degenerative changes in the cervical spine and upper lumbar spine.    Narrative & Impression   EXAM: CT HEAD W/O CONTRAST, CT CERVICAL SPINE W/O CONTRAST  LOCATION: [facility]  DATE: 2/25/2024  INDICATION: Head and neck injury  COMPARISON: None.  TECHNIQUE:   1) Routine CT Head without IV contrast. Multiplanar reformats. Dose reduction techniques were used.  2) Routine CT Cervical Spine without IV contrast. Multiplanar reformats. Dose reduction techniques were used.  FINDINGS:   HEAD CT:   INTRACRANIAL CONTENTS: No intracranial hemorrhage, extraaxial collection, or mass effect.  No CT evidence of acute infarct. Moderate presumed chronic small vessel ischemic changes. Moderate generalized volume loss. No hydrocephalus.   VISUALIZED  ORBITS/SINUSES/MASTOIDS: No intraorbital abnormality. Opacification anterior aspect of right sphenoid sinus. No middle ear or mastoid effusion.  BONES/SOFT TISSUES: No acute abnormality.  CERVICAL SPINE CT:   VERTEBRA: Normal vertebral body heights. Straightening of cervical lordosis. Minimal anterior subluxation C7 on T1 and minimal posterior subluxation C3 on C4. No fracture or posttraumatic subluxation.   CANAL/FORAMINA: Mild to moderate degenerative changes with mild canal narrowing at C3-C4, C5-C6. Multilevel prominent neural foraminal narrowing.  PARASPINAL: 2 cm nodule left thyroid gland; ultrasound recommended, if not done previously. Mild bilateral pleural effusions.                                                             IMPRESSION:  HEAD CT:  1.  No acute intracranial process.  CERVICAL SPINE CT:  1.  No CT evidence for acute fracture or post traumatic subluxation.       EXAM: CT CHEST ABDOMEN PELVIS W/O CONTRAST  LOCATION: [facility]  DATE: 2/25/2024  INDICATION: Fall, back and L abdominal pain. Low GFR.  COMPARISON: Two-view chest radiograph 01/27/2024, CT chest 03/18/2016  TECHNIQUE: CT scan of the chest, abdomen, and pelvis was performed without IV contrast. Multiplanar reformats were obtained. Dose reduction techniques were used.   CONTRAST: None.  FINDINGS:   LUNGS AND PLEURA: Calcified granulomas of the left lower lobe. Small to moderate pleural effusions right greater than left with compressive atelectasis. There is also partial collapse of the right middle lobe and lingula.  MEDIASTINUM/AXILLAE: Heterogeneous nodular thyroid. Calcified mediastinal and hilar lymph nodes compatible with prior granulomatous disease. Aortic valve replacement. Severe mitral annular calcifications. No significant pericardial effusion. Upper limits   normal heart size.  CORONARY ARTERY CALCIFICATION: Severe.  HEPATOBILIARY: Calcified hepatic granulomas. Mild layering  sludge or dense bile within the gallbladder, which is otherwise unremarkable.  PANCREAS: Somewhat atrophic, otherwise unremarkable.  SPLEEN: Multiple calcified splenic granulomas.  ADRENAL GLANDS: Normal.  KIDNEYS/BLADDER: Atrophic left kidney with small renal calyceal calculi, nonobstructing. Somewhat hypertrophied right kidney without hydronephrosis. Unremarkable urinary bladder.  BOWEL: Colonic diverticulosis. Large colonic stool suggestive of constipation. Normal appendix. No bowel obstruction.  LYMPH NODES: Normal.  VASCULATURE: Diffuse atherosclerotic calcifications of the aortoiliac vessels without evidence of aneurysmal dilatation.  PELVIC ORGANS: Periuterine calcifications.   MUSCULOSKELETAL: Prior median sternotomy. Degenerative changes of the shoulders and hips. Degenerative changes of the pubic symphysis. Multilevel degenerative changes of the cervicothoracic and lumbosacral spine. Acute or subacute-appearing burst-type   compression fracture of T12, new from CXR from 1/27/24. Mild bony retropulsion of superior endplate.                                                              IMPRESSION:  1.  Acute-appearing burst-type compression fracture of T12, new from CXR from 1/27/24. Mild bony retropulsion of superior endplate resulting in mild canal narrowing. Small associated anterior paravertebral hematoma. Please see separately dictated CT   spine reconstruction reports for additional details.  2.  No other evidence of acute traumatic abnormality of the chest, abdomen, or pelvis.  3.  Small to moderate pleural effusions right greater than left with compressive atelectasis. There is also partial collapse of the right middle lobe and lingula.  4.  Evidence of prior granulomatous disease.  5.  Severe coronary artery atherosclerotic calcifications.  6.  Heterogeneous nodular thyroid. Correlate with thyroid ultrasound which may be performed on a nonemergent outpatient basis.  7.  Large colonic stool suggestive  of constipation.  8.  No other acute process within the chest, abdomen, or pelvis.    EXAM: CT THORACIC SPINE W/O CONTRAST, CT LUMBAR SPINE W/O CONTRAST  LOCATION: [facility]  DATE: 2/25/2024  INDICATION: Fall, back pain.  COMPARISON: None.  TECHNIQUE:  1) Routine CT Thoracic Spine without IV contrast. Multiplanar reformats. Dose reduction techniques were used.   2) Routine CT Lumbar Spine without IV contrast. Multiplanar reformats. Dose reduction techniques were used.   FINDINGS:  THORACIC SPINE CT:  VERTEBRA: Normal alignment. T12 burst fracture with involvement of the superior and inferior endplates resulting in 40% vertebral body height loss and retropulsion of the posterior superior cortex by 5 mm. No other fractures.    CANAL/FORAMINA: Mild to moderate spondylosis. Mild spinal canal stenosis associated with retropulsion at T12. Moderate right foraminal stenosis at T11-T12. Otherwise, no significant spinal canal or foraminal stenosis.  PARASPINAL: See dedicated chest CT regarding intrathoracic findings.  LUMBAR SPINE CT:  VERTEBRA: Accentuation of lumbar lordosis with mild anterior spondylolisthesis at L4-L5 and L5-S1 and posterior spondylolisthesis at L1-L2. Mild rightward curvature centered at L2-L3. Otherwise normal alignment. Normal vertebral body heights. No fracture or posttraumatic subluxation.   CANAL/FORAMINA: Severe left and moderate right foraminal stenoses at L1-L2. Moderate left foraminal stenosis at L2-L3. Moderate foraminal stenosis at L3-L4. Mild to moderate foraminal stenosis at L4-L5. Severe right and moderate left foraminal stenosis   at L5-S1.  PARASPINAL: See dedicated abdomen and pelvis CT regarding intra-abdominal findings.                                                               IMPRESSION:  THORACIC SPINE CT:  1.  T12 burst fracture with involvement of the superior and inferior endplate resulting in 40% height loss with retropulsion  causing mild spinal canal stenosis. No other fractures.  LUMBAR SPINE CT:  1.  No fracture or posttraumatic subluxation.  2.  Spondylosis with foraminal stenoses as detailed.    CT PELVIS LIMITED W/O CONTRAST  Order: [id number]  Impression  1.  No acute fracture. Sensitivity is reduced secondary to bony demineralization. MRI is more sensitive and should be considered for further evaluation.  2.  Likely subacute minimally displaced fracture of the lower sacrum.  Narrative  EXAM: CT PELVIS WO  LOCATION: [facility]  DATE: 4/13/2024  INDICATION: Right hip and pelvic pain related to recent fall.  COMPARISON: None.  TECHNIQUE: CT scan of the pelvis was performed without IV contrast. Multiplanar reformats were obtained. Dose reduction techniques were used.  CONTRAST: None.  FINDINGS:  No acute fracture.  Old healed fracture of the upper sacral body at the S2 level. Minimally displaced fracture of the lower sacral body is probably subacute (series 7 image [id number])  Diffuse bony demineralization. Mild degenerative arthritis of both hip joints. Degenerative changes lumbar spine and SI joints. Small sclerotic lesion right sacral wing most likely represents incidental bone island  No fluid collection or hematoma.

## 2024-06-18 NOTE — PATIENT INSTRUCTIONS
Molecular testing, in the context of thyroid nodules,  refers to the analysis of DNA or RNA obtained from a biopsy specimen, looking for mutations known to be associated with or to increase the risk that the biopsied nodule is cancer.  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  We expect this is covered by medicare.  Send me a mychart and let me know.     I recommend she have a bone density DXA and treatment for osteoporosis.    I have placed future lab orders to be drawn with next blood draw, which might impact choice for osteoporosis treatment.     Roxanna must be present at future appointments.     INFORMATION FOR PATIENTS  THYROID NODULES AND   FINE NEEDLE ASPIRATION BIOPSY OF THE THYROID    The finding of a thyroid nodule is almost never an emergency.  Thyroid nodules are common, occurring in up to 50% of patients over the age of 50 years.      Innocent (not important enough to have been detected during life) thyroid cancer may be found in around 5% of people.   Likewise, about 5-15% of patients with demonstrated thyroid nodules will prove to have thyroid cancer if subjected to aggressive diagnostic measures.  As a rule, thyroid cancer has an excellent prognosis.  Therefore, in each patient with thyroid nodules, the decision has to be made about how important it is to identify and treat a cancer, if present.      When we find nodules on the thyroid we use ultrasound and either palpation or ultrasound guided biopsy to help determine which patients, from the large number with nodules on the thyroid, are in the group containing an important thyroid cancer.      Ultrasound Guided Fine Needle Aspiration Biopsy of the Thyroid uses the ultrasound eye to see the nodule and the needle during the biopsy procedure.  This procedure is performed in the radiology department.  The radiologist uses a small needle  to remove cells from the specific area of the thyroid. The cells are then analyzed under the microscope to determine whether or not they might be cancer.      The results of thyroid biopsy come in 4 categories    Benign or negative for cancer.  This is most common result, found in approximately 60-70% of biopsy specimens.  There remains a < 6 % chance that this result is wrong.    Positive for cancer.  This is found about 5 % of the time. There remains a  < 1% chance that cancer is not present when we make this diagnosis by biopsy. This result leads to a recommendation for surgery to make the final diagnosis of cancer.     Indeterminate, or the grey zone.  This is found in approximately 20-30% of patients.  This diagnosis identifies a higher risk group, often resulting in diagnostic surgery to establish the final diagnosis.  If all patients in this group go to surgery, only 10-30%, on average, prove to have cancer.  This group is subdivided into 3 subcategories: atypia of undetermined significance, follicular neoplasm and suspicious, with increasing risk.      Insufficient for diagnosis. This is found in 5-10% of biopsies. When this occurs we need to repeat the biopsy.      The greatest risk of thyroid biopsy is that the result is not clear (that it is in the indeterminate grey zone group), resulting in future thyroid surgery for what is likely to be benign thyroid disease.  There is a small risk of bleeding or infection.

## 2024-07-12 ENCOUNTER — TELEPHONE (OUTPATIENT)
Dept: ENDOCRINOLOGY | Facility: CLINIC | Age: 89
End: 2024-07-12
Payer: MEDICARE

## 2024-07-12 NOTE — TELEPHONE ENCOUNTER
Left Voicemail (1st Attempt) and Sent [facility] [employer] (1st Attempt) for the patient to call back and schedule the following:    Appointment type: labs and DXA   Provider: per Dr. Teresa  Return date: next available  Specialty phone number: [phone]  Additional appointment(s) needed: NA  Additonal Notes: LVM/Rodolfo x1    Check Out Comments: DXA Labs at her convenience

## 2024-07-15 ENCOUNTER — OFFICE VISIT (OUTPATIENT)
Dept: RHEUMATOLOGY | Facility: CLINIC | Age: 89
End: 2024-07-15
Payer: MEDICARE

## 2024-07-15 VITALS — OXYGEN SATURATION: 95 % | SYSTOLIC BLOOD PRESSURE: 131 MMHG | HEART RATE: 53 BPM | DIASTOLIC BLOOD PRESSURE: 73 MMHG

## 2024-07-15 DIAGNOSIS — Z79.899 HIGH RISK MEDICATION USE: ICD-10-CM

## 2024-07-15 DIAGNOSIS — M19.042 PRIMARY OSTEOARTHRITIS OF BOTH HANDS: ICD-10-CM

## 2024-07-15 DIAGNOSIS — M19.041 PRIMARY OSTEOARTHRITIS OF BOTH HANDS: ICD-10-CM

## 2024-07-15 DIAGNOSIS — I48.92 ATRIAL FLUTTER WITH RAPID VENTRICULAR RESPONSE (H): ICD-10-CM

## 2024-07-15 DIAGNOSIS — Z78.0 POSTMENOPAUSAL STATUS: ICD-10-CM

## 2024-07-15 DIAGNOSIS — S22.080S COMPRESSION FRACTURE OF T12 VERTEBRA, SEQUELA: ICD-10-CM

## 2024-07-15 DIAGNOSIS — M06.09 RHEUMATOID ARTHRITIS OF MULTIPLE SITES WITH NEGATIVE RHEUMATOID FACTOR (H): Primary | ICD-10-CM

## 2024-07-15 DIAGNOSIS — D49.7 FOLLICULAR NEOPLASM OF THYROID: ICD-10-CM

## 2024-07-15 DIAGNOSIS — N18.32 STAGE 3B CHRONIC KIDNEY DISEASE (H): ICD-10-CM

## 2024-07-15 LAB
BASOPHILS # BLD AUTO: 0.1 10E3/UL (ref 0–0.2)
BASOPHILS NFR BLD AUTO: 1 %
EOSINOPHIL # BLD AUTO: 0 10E3/UL (ref 0–0.7)
EOSINOPHIL NFR BLD AUTO: 0 %
ERYTHROCYTE [DISTWIDTH] IN BLOOD BY AUTOMATED COUNT: 14.8 % (ref 10–15)
ERYTHROCYTE [SEDIMENTATION RATE] IN BLOOD BY WESTERGREN METHOD: 37 MM/HR (ref 0–30)
HCT VFR BLD AUTO: 38.7 % (ref 35–47)
HGB BLD-MCNC: 11.9 G/DL (ref 11.7–15.7)
IMM GRANULOCYTES # BLD: 0 10E3/UL
IMM GRANULOCYTES NFR BLD: 0 %
LYMPHOCYTES # BLD AUTO: 1.4 10E3/UL (ref 0.8–5.3)
LYMPHOCYTES NFR BLD AUTO: 18 %
MCH RBC QN AUTO: 28.3 PG (ref 26.5–33)
MCHC RBC AUTO-ENTMCNC: 30.7 G/DL (ref 31.5–36.5)
MCV RBC AUTO: 92 FL (ref 78–100)
MONOCYTES # BLD AUTO: 0.9 10E3/UL (ref 0–1.3)
MONOCYTES NFR BLD AUTO: 11 %
NEUTROPHILS # BLD AUTO: 5.3 10E3/UL (ref 1.6–8.3)
NEUTROPHILS NFR BLD AUTO: 69 %
PLATELET # BLD AUTO: 185 10E3/UL (ref 150–450)
RBC # BLD AUTO: 4.21 10E6/UL (ref 3.8–5.2)
WBC # BLD AUTO: 7.6 10E3/UL (ref 4–11)

## 2024-07-15 PROCEDURE — 85652 RBC SED RATE AUTOMATED: CPT | Performed by: INTERNAL MEDICINE

## 2024-07-15 PROCEDURE — 84450 TRANSFERASE (AST) (SGOT): CPT | Performed by: INTERNAL MEDICINE

## 2024-07-15 PROCEDURE — 84075 ASSAY ALKALINE PHOSPHATASE: CPT | Performed by: INTERNAL MEDICINE

## 2024-07-15 PROCEDURE — 85025 COMPLETE CBC W/AUTO DIFF WBC: CPT | Performed by: INTERNAL MEDICINE

## 2024-07-15 PROCEDURE — 84165 PROTEIN E-PHORESIS SERUM: CPT | Performed by: PATHOLOGY

## 2024-07-15 PROCEDURE — 86140 C-REACTIVE PROTEIN: CPT | Performed by: INTERNAL MEDICINE

## 2024-07-15 PROCEDURE — G2211 COMPLEX E/M VISIT ADD ON: HCPCS | Performed by: INTERNAL MEDICINE

## 2024-07-15 PROCEDURE — 82565 ASSAY OF CREATININE: CPT | Performed by: INTERNAL MEDICINE

## 2024-07-15 PROCEDURE — 36415 COLL VENOUS BLD VENIPUNCTURE: CPT | Performed by: INTERNAL MEDICINE

## 2024-07-15 PROCEDURE — 99214 OFFICE O/P EST MOD 30 MIN: CPT | Performed by: INTERNAL MEDICINE

## 2024-07-15 PROCEDURE — 80053 COMPREHEN METABOLIC PANEL: CPT | Performed by: INTERNAL MEDICINE

## 2024-07-15 PROCEDURE — 83970 ASSAY OF PARATHORMONE: CPT | Performed by: INTERNAL MEDICINE

## 2024-07-15 PROCEDURE — 84443 ASSAY THYROID STIM HORMONE: CPT | Performed by: INTERNAL MEDICINE

## 2024-07-15 PROCEDURE — 82310 ASSAY OF CALCIUM: CPT | Performed by: INTERNAL MEDICINE

## 2024-07-15 PROCEDURE — 84155 ASSAY OF PROTEIN SERUM: CPT | Performed by: INTERNAL MEDICINE

## 2024-07-15 PROCEDURE — 82248 BILIRUBIN DIRECT: CPT | Performed by: INTERNAL MEDICINE

## 2024-07-15 PROCEDURE — 82247 BILIRUBIN TOTAL: CPT | Performed by: INTERNAL MEDICINE

## 2024-07-15 PROCEDURE — 84100 ASSAY OF PHOSPHORUS: CPT | Performed by: INTERNAL MEDICINE

## 2024-07-15 PROCEDURE — 84460 ALANINE AMINO (ALT) (SGPT): CPT | Performed by: INTERNAL MEDICINE

## 2024-07-15 RX ORDER — HYDROXYCHLOROQUINE SULFATE 200 MG/1
TABLET, FILM COATED ORAL
Qty: 45 TABLET | Refills: 2 | Status: SHIPPED | OUTPATIENT
Start: 2024-07-15 | End: 2024-09-11

## 2024-07-15 NOTE — TELEPHONE ENCOUNTER
Patient confirmed scheduled appointment:  Date: 6/23/25  Time: 3 pm  Visit type: return endo  Provider: Dr. Teresa  Location: virtual  Testing/imaging: DXA 7/23/24, labs added to 8/22/24 appt  Additional notes: NA

## 2024-07-15 NOTE — PROGRESS NOTES
Rheumatology Clinic Visit      Roxanna Stark MRN# [id number]   YOB: 1927 Age: 89 year old      Date of visit: 7/15/24   PCP: Dr. Swapna Gaines  Cardiology: Dr. Boston Navarro     Chief Complaint   Patient presents with:  RECHECK: RA     Assessment and Plan     1. 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  She was treated with prednisone monotherapy for several years, being able to taper off without recurrence of symptoms. She then developed worsening symptoms in her hands and was diagnosed with rheumatoid arthritis. Initially, she was resistant to taking DMARD therapy.  She then was started on MTX that was effective; she reduced the dose with worsening symptoms and then SSZ was added; at one point was doing well on MTX 20mg wkly and SSZ 500mg BID (mild anemia possibly associated with SSZ so the dose was previously reduced); however, when seen in January 2022 she reported that she had stopped taking methotrexate and was only using sulfasalazine.  Then in March 2022 it was reported that she stopped methotrexate 6 months prior and sulfasalazine was stopped 2 months prior.  Telephone visit on 6/30/2022 to clarify her treatment plan and discussed with both the patient and her daughter to get an accurate history; the patient at that time was off all treatment, and based on the history provided at that time she was on sulfasalazine monotherapy for a while and doing well. Prednisone was then stopped and she continued to do well on sulfasalazine for a while but eventually required addition of hydroxychloroquine.  In November 2023 she was doing well on a combination of sulfasalazine 500 mg twice daily and hydroxychloroquine 200 mg daily.  During recent hospitalization for atrial fibrillation she had worsening renal function and sulfasalazine was discontinued and she continued to do well  off of sulfasalazine.  She had no synovitis when seen previously so hydroxychloroquine was reduced to 100 mg daily.  She continues to do well with regard to RA.  Discussed the option for stopping hydroxychloroquine to see how she does versus remaining on the current dose considering that she is doing well.  Cautioned that if worsening or inflammatory arthritis symptoms that could complicate the current back pain from recent compression fracture.  She agrees with remaining on her current regimen..  She continues to do well so we will remain off of sulfasalazine.  No synovitis on exam today.  Reduce hydroxychloroquine to 100 mg daily.  Reassess in 3 months.  Chronic illness, stable.    - Continue hydroxychloroquine 200 mg daily (last eye exam by Dr. Rogers on 5/23/2023)    High risk medication requiring intensive toxicity monitoring at least quarterly    2. Giant Cell Arteritis History?: 12/26/2005 Left TA biopsy negative per [facility] record review.  No symptoms of GCA at this time.      3. Right shoulder rotator cuff tear and history of pain: Previously evaluated by orthopedic surgery and her pain resolved for approximately one year after having a steroid injection in March 2015. She does not want to have surgical correction of her shoulder. Repeat steroid injections have been helpful; not needed today.  Physical therapy was effective previously.  Not an issue today.    4.  Osteoarthritis of the hands: Heberden's nodes present.    Not symptomatic at the DIPs at this time.     5. History of basal cell carcinoma: Following with dermatology; encouraged yearly dermatology evaluation     6. Bone Health: Managed by PCP previously and now following with endocrinology considering recent T12 compression fracture.      7.  Vaccinations:     - Influenza: encouraged yearly vaccination  - Shingrix: Up to date  - COVID-19: Advised keeping updated    Total minutes spent in evaluation with patient, documentation, , and  review of pertinent studies and chart notes: 14  The longitudinal plan of care for the rheumatology problem(s) were addressed during this visit.  Due to added complexity of care, we will continue to support the patient and the subsequent management of this condition with ongoing continuity of care.        Ms. Stark verbalized agreement with and understanding of the rational for the diagnosis and treatment plan.  All questions were answered to best of my ability and the patient's satisfaction. Ms. Stark was advised to contact the clinic with any questions that may arise after the clinic visit.      Thank you for involving me in the care of the patient    Return to clinic: 3 months      HPI   Roxanna Stark is a 89 year old female with medical history significant for basal cell carcinoma, hypertension, aortic stenosis, right rotator cuff tear (previously evaluated by Dr. Bingham, orthopedic surgery, on 3/20/2015 where at that time Ms. Stark was not interested in surgical correction; she received an intra-articular steroid injection at that time that was effective for ~1year), temporal arteritis?, and seronegative erosive rheumatoid arthritis.     1/31/2022: doing well at this time. No longer taking MTX and hasn't for some time.  Note that VESTA Mattson, called to check her pharmacy and MTX and SSZ were last filled in July 2021.  Roxanna says that she is happy with how well she is doing; no joint pain; morning stiffness <20 min. Arthritis is not limiting any of her daily activities.  No difficulty raising her arms above her head or standing up from a chair. No vision change. No jaw or tongue claudication. No scalp tenderness. No new headache.     3/30/2022: Telephone call regarding patient's medication raise question about what she was actually taking so a visit was scheduled for today to review.  Roxanna, and Roxanna's daughter Anna.  Reportedly methotrexate was stopped about 6 months ago.  Sulfasalazine was stopped a couple  months ago.  Worsening joint pain at the MCPs, PIPs, wrists, MTPs, knees; pain is worse in the morning and improves with topical BenGay and moving.  Positive gelling phenomenon.  Morning stiffness for at least 1 hour.  No jaw or tongue claudication.  No scalp tenderness.  No new headache.  No vision change.    7/8/2022: Currently doing well.  No joint pain or swelling.  No morning stiffness.  Positive gelling phenomenon that resolves within 5 minutes and only occurs after sitting for a very long time.  Confirms that she is taking prednisone 5 mg daily and sulfasalazine 500 mg twice daily.  She also has questions about Lasix and says that she will talk with her primary care provider regarding this.  She is accompanied by her daughter today.    10/14/2022: Currently doing well.  She reports that she is taking sulfasalazine 500 mg twice daily.  No longer taking prednisone.  She noticed no worsening of symptoms with stopping prednisone.  She reports that she has chronic changes of her hands but no swelling and no pain.  Morning stiffness for no more than 10 minutes.    5/1/2023: Pain at the left second MCP where she has limited range of motion and stiffness.  No increased warmth or overlying erythema of the left second MCP.  Also with small bony hypertrophy at the DIPs that are intermittently achy but not symptomatic at this time.  PIPs without swelling or pain.  Wrists, elbows, shoulders, knees, ankles, and MTPs without swelling or pain.  Taking sulfasalazine 500 mg twice daily; she did not bring her medications with her but she confirms that she knows she is taking sulfasalazine twice daily.    8/15/2023: Today she reports that she is doing well.  No joint pain or swelling.  No morning stiffness or gelling phenomenon.  Reports that she has been taking hydroxychloroquine and sulfasalazine as prescribed, except for the past 1 week where she believes that she has not been taking sulfasalazine so she will check on this.   She forgot to bring her medications with her today to today's appointment.  The patient's daughter is with her today.    11/28/2023: RA well-controlled.  Reports 100% compliance with hydroxychloroquine and sulfasalazine.  Had shingles affecting her left upper extremity; all vesicles have crusted over and she is not having new vesicles but she still has pain where the shingles was; following with her PCP for management of postherpetic neuralgia.  No morning stiffness.  No joint swelling.  Arthritis does not limit daily activities.      4/3/2024: Since last seen, she was hospitalized for A-fib.  Sulfasalazine was discontinued because of worsening kidney function.  She is planning to see a nephrologist.  She had a burst fracture of T12 vertebrae; following with her PCP for management.  Now in assisted living to help reduce risk for falls and to assist with daily activities.  Continues on hydroxychloroquine 200 mg daily.    Today, 7/15/2024: Wearing a back brace because of T12 vertebral compression fracture.  Following with endocrinology for bone health.  No worsening RA symptoms since hydroxychloroquine dose reduction.  No peripheral joint pain or swelling.  Only pain she has currently is of her back.  She has a follow-up appointment with neurosurgery later this month.    Denies fevers, chills, nausea, vomiting, constipation, diarrhea. No abdominal pain. No chest pain/pressure, palpitations, or shortness of breath. No oral or nasal sores. No neck pain. No rash.     Tobacco: None  EtOH: No more than 1 drink per week  Drugs: None  Occupation: Used to work for the [employer] company; now retired    ROS   12 point review of system was completed and negative except as noted in the HPI     Active Problem List     Patient Active Problem List   Diagnosis    History of polymyalgia rheumatica    History of basal cell carcinoma    Benign hypertension with CKD (chronic kidney disease) stage IV (H)    Hip pain    Left atrial  enlargement    Status post coronary angiogram    Aortic valve replaced    Rheumatoid arthritis of multiple sites with negative rheumatoid factor (H)    Stage 3b chronic kidney disease (H)    THREESA III (vulvar intraepithelial neoplasia III)    Peripheral arterial disease (H24)    Decreased hearing of both ears    Hearing aid worn    Gait abnormality    Elevated troponin    Volume overload state of heart    T12 burst fracture (H)    Fall, initial encounter    Burst fracture of T12 vertebra (H)    PAF (paroxysmal atrial fibrillation) (H)    Anemia of chronic disease    Follicular neoplasm of thyroid     Past Medical History     Past Medical History:   Diagnosis Date    Actinic keratosis     Aortic stenosis 2014    Atrial flutter with rapid ventricular response (H) 01/27/2024    Basal cell cancer 07/2014    left eye medial canthus     Basal cell carcinoma 09/30/2008    left cheek    CKD (chronic kidney disease) stage 3, GFR 30-59 ml/min (H) 07/01/2019    CKD (chronic kidney disease) stage 4, GFR 15-29 ml/min (H)     Compression fracture of L2 (H) 11/2020    Compression fracture of T12 vertebra (H) 02/25/2024    HTN (hypertension)     Infection due to 2019 novel coronavirus 08/31/2021    Melanoma in situ (H) 09/30/2008    left arm    Multiple rib fractures     PAF (paroxysmal atrial fibrillation) (H) 03/27/2024    Polymyalgia rheumatica (H24) 11/1999    Recurrent falls     Rheumatoid arthritis of multiple sites with negative rheumatoid factor (H)     Status post coronary angiogram 03/03/2016    Temporal arteritis (H) 11/1999    Thyroid nodule 11/2020    THERESA III (vulvar intraepithelial neoplasia III) 2019     Past Surgical History     Past Surgical History:   Procedure Laterality Date    CATARACT IOL, RT/LT  5/09    bilateral    COLONOSCOPY  2002    EXCISE LESION VULVA N/A 9/27/2019    Procedure: Wide Local Excision Of Vulva, Colposcopy;  Surgeon: Mono Ribeiro MD;  Location: [facility] OR    IR THORACIC VERTEBROPLASTY  6/11/2024     REPLACE VALVE AORTIC N/A 4/25/2016    Procedure: REPLACE VALVE AORTIC;  Surgeon: Sudeep Tsai MD;  Location:  OR    [facility] SKIN TISSUE PROCEDURE UNLISTED  11/3/08    mmis skin cancer excision     Allergy     Allergies   Allergen Reactions    Lisinopril Cough        Current Medication List     Current Outpatient Medications   Medication Sig Dispense Refill    acetaminophen (TYLENOL) 325 MG tablet Take 3 tablets (975 mg) by mouth every 8 hours as needed for mild pain, headaches or fever 30 tablet 0    amiodarone (PACERONE) 200 MG tablet Take 1 tablet (200 mg) by mouth daily 90 tablet 1    amLODIPine (NORVASC) 5 MG tablet TAKE 1 TABLET BY MOUTH ONCE DAILY 90 tablet 3    amoxicillin (AMOXIL) 500 MG capsule Take 4 tablets  30 minutes before Procedure 4 capsule 3    apixaban ANTICOAGULANT (ELIQUIS) 2.5 MG tablet Take 1 tablet (2.5 mg) by mouth 2 times daily 180 tablet 3    RICKI PROTECT MOISTURE BARRIER 12 % CREA APPLY TOPICALLY TO COCCYX AREA TWICE DAILY UNTIL HEALED THEN DISCONTINUE 142 g PRN    bisacodyl (DULCOLAX) 10 MG suppository Place 1 suppository (10 mg) rectally daily as needed for constipation 10 suppository 0    calcium carbonate-vitamin D (OSCAL) 500-5 MG-MCG tablet TAKE 1 TABLET BY MOUTH ONCE DAILY 90 tablet 3    furosemide (LASIX) 20 MG tablet TAKE 1 TABLET BY MOUTH ONCE DAILY 90 tablet 3    gabapentin (NEURONTIN) 100 MG capsule TAKE 1 CAPSULE BY MOUTH ONCE DAILY 90 capsule 3    hydroxychloroquine (PLAQUENIL) 200 MG tablet Hydroxychloroquine 100 mg (half tablet) daily 45 tablet 0    LIDOCAINE PAIN RELIEF 4 % Patch APPLY 1 PATCH TO SKIN EVERY 24 HOURS (ON FOR 12 HOURS, OFF FOR 12 HOURS) 30 patch 97    metoprolol tartrate (LOPRESSOR) 25 MG tablet TAKE 1 TABLET BY MOUTH TWICE DAILY 180 tablet 3    Omega-3 Fatty Acids (OMEGA-3 FISH OIL PO) Take 1 tablet twice daily.      polyethylene glycol (MIRALAX) 17 GM/Dose powder MIX 17GM OF POWDER IN 8OZ OF WATER UNTIL COMPLETELY DISSOLVED. DRINK SOLUTION BY  "MOUTH ONCE DAILY. 510 g 97    senna-docusate (SENOKOT-S/PERICOLACE) 8.6-50 MG tablet Take 1 tablet by mouth 2 times daily as needed for constipation 30 tablet 0     No current facility-administered medications for this visit.       Social History   See HPI    Family History     Family History   Problem Relation Age of Onset    Arthritis Mother     Hypertension Father     Prostate Cancer Father     Arthritis Father     Heart Disease Father     Lipids Father     Colon Cancer Father     Breast Cancer Sister 45    Arthritis Sister     Thyroid Disease Sister     Arthritis Sister     Colon Cancer Sister         colon    Arthritis Sister     Asthma Daughter     Cerebrovascular Disease Daughter     Asthma Daughter     Myocardial Infarction Daughter     Lung Cancer Daughter 58        lung    Pancreatic Cancer Other 81        pancreatic      Physical Exam     Temp Readings from Last 3 Encounters:   06/11/24 98.6  F (37  C) (Oral)   06/05/24 98.1  F (36.7  C) (Oral)   05/30/24 98  F (36.7  C)     BP Readings from Last 5 Encounters:   07/15/24 131/73   06/11/24 (!) 158/67   06/05/24 (!) 143/60   05/30/24 (!) 143/63   05/14/24 122/66     Pulse Readings from Last 1 Encounters:   07/15/24 53     Resp Readings from Last 1 Encounters:   06/11/24 16     Estimated body mass index is 22.54 kg/m  as calculated from the following:    Height as of 6/5/24: 1.499 m (4' 11\").    Weight as of 6/5/24: 50.6 kg (111 lb 9.6 oz).    GEN: NAD.  HEENT:  Anicteric, noninjected sclera. No obvious external lesions of the ear and nose. Hearing intact.  PULM: No increased work of breathing.   MSK: MCPs and PIPs without synovial swelling or tenderness to palpation.  Heberden's and Gracie's nodes present.  DIPs nontender to palpation.  Wrists without swelling or tenderness to palpation.  Elbows and shoulders without swelling or tenderness to palpation.    Knees, and ankles without swelling or tenderness to palpation.  Negative MTP squeeze.  Wearing a " brace on her torso.      PSYCH: Alert. Appropriate.      Labs / Imaging (select studies)     RF/CCP  Recent Labs   Lab Test 08/11/16  1124 08/04/16  1222   CCPIGG 1  --    RHF  --  <20     CBC  Recent Labs   Lab Test 06/11/24  0707 06/05/24  1049 03/11/24  0544 02/26/24  0645 02/24/24  2159 01/28/24  0937 01/27/24  1640 11/21/23  1053 03/23/21  1526 01/22/21  0933 10/30/20  0903 07/24/20  1328   WBC 8.7 9.9 10.1   < > 11.3*   < > 11.6* 9.2   < > 8.1 10.8 11.1*   RBC 4.24 4.42 3.66*   < > 4.16   < > 4.54 4.20   < > 3.93 4.04 3.33*   HGB 12.5 13.1 10.5*   < > 11.9   < > 13.5 12.6   < > 11.9 11.8 10.4*   HCT 39.6 41.2 33.1*   < > 37.8   < > 43.4 39.8   < > 38.0 37.8 32.7*   MCV 93 93 90   < > 91   < > 96 95   < > 97 94 98   RDW 15.2* 15.5* 16.0*   < > 15.6*   < > 15.7* 16.5*   < > 19.4* 18.0* 19.2*    182 272   < > 263   < > 240 160   < > 228 203 184   MCH 29.5 29.6 28.7   < > 28.6   < > 29.7 30.0   < > 30.3 29.2 31.2   MCHC 31.6 31.8 31.7   < > 31.5   < > 31.1* 31.7   < > 31.3* 31.2* 31.8   NEUTROPHIL  --   --   --   --  75  --  77 74   < > 54.0 51.7 62.2   LYMPH  --   --   --   --  13  --  14 15   < > 27.0 29.5 22.5   MONOCYTE  --   --   --   --  9  --  7 9   < > 15.0 14.7 12.0   EOSINOPHIL  --   --   --   --  1  --  1 1   < > 3.3 3.5 2.5   BASOPHIL  --   --   --   --  1  --  1 1   < > 0.7 0.6 0.8   ANEU  --   --   --   --   --   --   --   --   --  4.3 5.6 6.9   ALYM  --   --   --   --   --   --   --   --   --  2.2 3.2 2.5   IGNACIO  --   --   --   --   --   --   --   --   --  1.2 1.6* 1.3   AEOS  --   --   --   --   --   --   --   --   --  0.3 0.4 0.3   ABAS  --   --   --   --   --   --   --   --   --  0.1 0.1 0.1   ANEUTAUTO  --   --   --   --  8.7*  --  9.0* 6.8   < >  --   --   --    ALYMPAUTO  --   --   --   --  1.5  --  1.6 1.4   < >  --   --   --    AMONOAUTO  --   --   --   --  1.0  --  0.8 0.8   < >  --   --   --    AEOSAUTO  --   --   --   --  0.1  --  0.1 0.1   < >  --   --   --    ABSBASO  --   --    --   --  0.1  --  0.1 0.1   < >  --   --   --     < > = values in this interval not displayed.     CMP  Recent Labs   Lab Test 06/11/24  0707 06/05/24  1049 03/27/24  1458 03/17/24  0204 03/11/24  0544 02/25/24  0729 02/24/24  2159 01/28/24  0937 01/27/24  1640 08/06/21  1305 03/23/21  1526 01/22/21  0933 10/30/20  0903    138 142   < > 142   < > 138   < > 137   < > 138  --   --    POTASSIUM 4.5 4.9 4.6   < > 4.5   < > 4.3   < > 4.9   < > 4.3  --   --    CHLORIDE 104 102 103   < > 108*   < > 100   < > 100   < > 103  --   --    CO2 26 25 28   < > 26   < > 28   < > 25   < > 31  --   --    ANIONGAP 9 11 11   < > 8   < > 10   < > 12   < > 4  --   --    * 123* 95   < > 75   < > 163*   < > 121*   < > 182*  --   --    BUN 23.7* 25.2* 27.9*   < > 22.5   < > 29.6*   < > 28.9*   < > 27  --   --    CR 1.32* 1.49* 1.53*   < > 1.26*   < > 1.33*   < > 1.49*   < > 1.40* 1.17* 1.38*   GFRESTIMATED 37* 32* 31*   < > 39*   < > 36*   < > 32*   < > 32* 40* 33*   GFRESTBLACK  --   --   --   --   --   --   --   --   --   --  37* 46* 38*   ROEL 8.9 9.2 9.0   < > 8.5   < > 9.3   < > 10.1*   < > 9.3  --   --    BILITOTAL  --   --   --   --  <0.2  --  0.2  --  0.4   < >  --  0.2 0.4   ALBUMIN  --   --   --   --  2.8*  --  3.6  --  4.0   < > 3.5 3.6 3.3*   PROTTOTAL  --   --   --   --  6.2*  --  7.1  --  7.9   < >  --  7.7 7.6   ALKPHOS  --   --   --   --  171*  --  110  --  135   < >  --  118 108   AST  --   --   --   --  19  --  22  --  40   < >  --  23 28   ALT  --   --   --   --  12  --  12  --  32   < >  --  25 28    < > = values in this interval not displayed.     Calcium/VitaminD  Recent Labs   Lab Test 06/11/24  0707 06/05/24  1049 03/27/24  1458 02/14/24  0530 02/09/24  0720   ROEL 8.9 9.2 9.0   < > 9.5   VITDT  --   --   --   --  26    < > = values in this interval not displayed.     ESR/CRP  Recent Labs   Lab Test 11/21/23  1053 08/10/23  0852 05/01/23  0908 01/20/23  0847 10/10/22  1035   SED 28 38* 48* 45* 33*   CRP   --   --  22.6* 9.0* 11.2*   CRPI 4.80 6.49*  --   --   --        Hepatitis B  Recent Labs   Lab Test 11/10/16  0909   HBCAB Nonreactive   HEPBANG Nonreactive     Hepatitis C  Recent Labs   Lab Test 11/10/16  0909   HCVAB Nonreactive   Assay performance characteristics have not been established for newborns,   infants, and children         HIV Screening  Recent Labs   Lab Test 11/10/16  0909   HIAGAB Nonreactive   HIV-1 p24 Ag & HIV-1/HIV-2 Ab Not Detected       Immunization History     Immunization History   Administered Date(s) Administered    COVID-19 12+ (2023-24) (MODERNA) 10/03/2023    COVID-19 Bivalent 12+ (Pfizer) 06/01/2023    COVID-19 MONOVALENT 12+ (Pfizer) 02/16/2021, 03/09/2021, 10/14/2021    COVID-19 Monovalent 12+ (Pfizer 2022) 08/15/2022    Influenza (H1N1) 10/20/2016    Influenza (High Dose) 3 valent vaccine 10/16/2014, 10/06/2015, 10/23/2016, 10/03/2017, 08/23/2018, 09/18/2019    Influenza (IIV3) PF 11/05/1999, 12/14/2000, 10/26/2004, 10/04/2005, 09/16/2009, 10/20/2010, 11/09/2011, 09/22/2012, 09/15/2013, 10/15/2014    Influenza Vaccine 65+ (FLUAD) 09/17/2021, 10/25/2022    Influenza Vaccine 65+ (Fluzone HD) 09/02/2020, 09/28/2022, 10/03/2023    Influenza, seasonal, injectable, PF 09/08/2010    Mantoux Tuberculin Skin Test 03/06/2024    Pneumo Conj 13-V (2010&after) 03/17/2015    Pneumococcal 23 valent 11/03/2000, 12/13/2010    TD,PF 7+ (Tenivac) 01/12/2004    TDAP (Adacel,Boostrix) 06/26/2023    TDAP Vaccine (Adacel) 05/14/2013    Td (Adult), Adsorbed 01/12/2004    Zoster recombinant adjuvanted (SHINGRIX) 06/21/2018, 08/23/2018    Zoster vaccine, live 12/15/2006          Chart documentation done in part with Dragon Voice recognition Software. Although reviewed after completion, some word and grammatical error may remain.    Jamie Johnson MD

## 2024-07-16 LAB
ALBUMIN SERPL BCG-MCNC: 3.7 G/DL (ref 3.5–5.2)
ALBUMIN SERPL BCG-MCNC: 3.7 G/DL (ref 3.5–5.2)
ALP SERPL-CCNC: 216 U/L (ref 40–150)
ALP SERPL-CCNC: 220 U/L (ref 40–150)
ALT SERPL W P-5'-P-CCNC: 28 U/L (ref 0–50)
ALT SERPL W P-5'-P-CCNC: 31 U/L (ref 0–50)
ANION GAP SERPL CALCULATED.3IONS-SCNC: 9 MMOL/L (ref 7–15)
AST SERPL W P-5'-P-CCNC: 30 U/L (ref 0–45)
AST SERPL W P-5'-P-CCNC: 31 U/L (ref 0–45)
BILIRUB DIRECT SERPL-MCNC: <0.2 MG/DL (ref 0–0.3)
BILIRUB SERPL-MCNC: 0.3 MG/DL
BILIRUB SERPL-MCNC: 0.3 MG/DL
BUN SERPL-MCNC: 33.1 MG/DL (ref 8–23)
CALCIUM SERPL-MCNC: 9.2 MG/DL (ref 8.8–10.4)
CALCIUM SERPL-MCNC: 9.5 MG/DL (ref 8.8–10.4)
CHLORIDE SERPL-SCNC: 104 MMOL/L (ref 98–107)
CREAT SERPL-MCNC: 1.53 MG/DL (ref 0.51–0.95)
CREAT SERPL-MCNC: 1.56 MG/DL (ref 0.51–0.95)
CRP SERPL-MCNC: 11.7 MG/L
EGFRCR SERPLBLD CKD-EPI 2021: 30 ML/MIN/1.73M2
EGFRCR SERPLBLD CKD-EPI 2021: 31 ML/MIN/1.73M2
GLUCOSE SERPL-MCNC: 106 MG/DL (ref 70–99)
HCO3 SERPL-SCNC: 28 MMOL/L (ref 22–29)
PHOSPHATE SERPL-MCNC: 3.7 MG/DL (ref 2.5–4.5)
PHOSPHATE SERPL-MCNC: 3.7 MG/DL (ref 2.5–4.5)
POTASSIUM SERPL-SCNC: 4.7 MMOL/L (ref 3.4–5.3)
PROT SERPL-MCNC: 7.4 G/DL (ref 6.4–8.3)
PROT SERPL-MCNC: 7.4 G/DL (ref 6.4–8.3)
PTH-INTACT SERPL-MCNC: 32 PG/ML (ref 15–65)
SODIUM SERPL-SCNC: 141 MMOL/L (ref 135–145)
TOTAL PROTEIN SERUM FOR ELP: 7.2 G/DL (ref 6.4–8.3)
TSH SERPL DL<=0.005 MIU/L-ACNC: 1.91 UIU/ML (ref 0.3–4.2)

## 2024-07-17 LAB
ALBUMIN SERPL ELPH-MCNC: 3.6 G/DL (ref 3.7–5.1)
ALPHA1 GLOB SERPL ELPH-MCNC: 0.4 G/DL (ref 0.2–0.4)
ALPHA2 GLOB SERPL ELPH-MCNC: 0.7 G/DL (ref 0.5–0.9)
B-GLOBULIN SERPL ELPH-MCNC: 0.8 G/DL (ref 0.6–1)
GAMMA GLOB SERPL ELPH-MCNC: 1.7 G/DL (ref 0.7–1.6)
M PROTEIN SERPL ELPH-MCNC: 0.5 G/DL
PROT PATTERN SERPL ELPH-IMP: ABNORMAL

## 2024-07-23 ENCOUNTER — ANCILLARY PROCEDURE (OUTPATIENT)
Dept: BONE DENSITY | Facility: CLINIC | Age: 89
End: 2024-07-23
Payer: COMMERCIAL

## 2024-07-23 DIAGNOSIS — Z71.0 COUNSELING ON BEHALF OF ANOTHER: ICD-10-CM

## 2024-07-23 DIAGNOSIS — N18.32 STAGE 3B CHRONIC KIDNEY DISEASE (H): ICD-10-CM

## 2024-07-23 DIAGNOSIS — S22.080S COMPRESSION FRACTURE OF T12 VERTEBRA, SEQUELA: ICD-10-CM

## 2024-07-23 DIAGNOSIS — Z78.0 POSTMENOPAUSAL STATUS: ICD-10-CM

## 2024-07-23 PROCEDURE — 77080 DXA BONE DENSITY AXIAL: CPT | Mod: TC | Performed by: PHYSICIAN ASSISTANT

## 2024-07-24 NOTE — PROGRESS NOTES
Neurosurgery Clinic Note     Chief Complaint: hospital follow-up      DATE OF VISIT: 7/25/2024       HPI:    Ms. Roxanna Stark is a 89-year-old female, right-handed with past medical history significant for hypertension, CKD, RA, PAD, arctic stenosis status post AVR-2016, atrial fibrillation-on Eliquis, HRpEF who presented to [facility] ED from her nursing home via EMS after she was found down around 7 PM on 2/24/2024.  Patient does not remember the fall event but remembers laying on the floor.  She reports a mild headache and back pain and on and off pain in the left lower extremity mainly in the groin.  Reportedly the last dose of Eliquis was in the morning of 2/24/2024.  She denies any weakness or numbness in the lower extremities or any bladder or bowel incontinence episodes.  She denies any sensory level or any numbness in the groin or belly.  Imaging was performed in the ED.  Neurosurgery was consulted for evaluation and management of T12 fracture.   Patient was managed conservatively with TLSO brace for comfort and repeat upright x-rays were obtained which remained stable.  She was subsequently discharged to TCU on 3/2/2024.      Since last visit on 4/11/2024 patient developed worsening pain and was referred to Dr. Donta Lyles for consideration of vertebroplasty.  The patient underwent T12 vertebroplasty on 6/11/2024 and presents today for follow-up.   Patient met with Endocrinology on 6/18/2024 in regards to osteoporosis management.  DEXA imaging was recommended with follow-up to discuss possible treatment options.    Currently patient states since the procedure in June 2024 her pain has significantly improved.  Continues to note intermittent low back pain but the severe pain has improved.  Not requiring medication for pain currently.  Overall she is feeling much better.            Review of Systems   Negative except in HPI     Past Medical History        Past Medical  History:   Diagnosis Date    Actinic keratosis      Aortic stenosis 2014    Atrial flutter with rapid ventricular response (H) 01/27/2024    Basal cell cancer 07/2014     left eye medial canthus     Basal cell carcinoma 09/30/2008     left cheek    CKD (chronic kidney disease) stage 3, GFR 30-59 ml/min (H) 07/01/2019    HTN (hypertension)      Melanoma in situ (H) 09/30/2008     left arm    PAF (paroxysmal atrial fibrillation) (H) 3/27/2024    Polymyalgia rheumatica (H24) 11/1999    Rheumatoid arthritis of multiple sites with negative rheumatoid factor (H)      Status post coronary angiogram 03/03/2016    Temporal arteritis (H) 11/1999            Past Surgical History         Past Surgical History:   Procedure Laterality Date    CATARACT IOL, RT/LT   5/09     bilateral    COLONOSCOPY   2002    EXCISE LESION VULVA N/A 9/27/2019     Procedure: Wide Local Excision Of Vulva, Colposcopy;  Surgeon: Mono Ribeiro MD;  Location: [facility] OR    REPLACE VALVE AORTIC N/A 4/25/2016     Procedure: REPLACE VALVE AORTIC;  Surgeon: Sudeep Tsai MD;  Location:  OR    [facility] SKIN TISSUE PROCEDURE UNLISTED   11/3/08     mmis skin cancer excision            Social History            Socioeconomic History    Marital status:    Occupational History       Employer: RETIRED   Tobacco Use    Smoking status: Never       Passive exposure: Never    Smokeless tobacco: Never   Vaping Use    Vaping status: Never Used   Substance and Sexual Activity    Alcohol use: Yes       Comment: rarely    Drug use: No    Sexual activity: Not Currently       Partners: Male       Birth control/protection: None   Other Topics Concern    Parent/sibling w/ CABG, MI or angioplasty before 65F 55M? No         family history includes Arthritis in her father, mother, sister, sister, and sister; Asthma in her daughter and daughter; Breast Cancer (age of onset: 45) in her sister; Cerebrovascular Disease in her daughter; Colon Cancer in her father and  sister; Heart Disease in her father; Hypertension in her father; Lipids in her father; Lung Cancer (age of onset: 58) in her daughter; Myocardial Infarction in her daughter; Pancreatic Cancer (age of onset: 81) in an other family member; Prostate Cancer in her father; Thyroid Disease in her sister.                Physical Exam   There were no vitals taken for this visit.  Constitutional: Oriented to person, place, and time. Appears well-developed and well-nourished. Cooperative. No distress.   HENT: Head normocephalic and atraumatic.   Neurological: alert and oriented to person, place, and time. CN II-XII grossly  intact        STRENGTH LEFT RIGHT   Deltoid 5 5   Bicep 5 5   Wrist Extensor 5 5   Tricep 5 5   Finger flexion 5 5   Finger abduction 5 5    5 5         Hip Flexion       5       5   Knee Extension 5 5   Ankle Dorsiflexion 5 5   Extensor Hallucis Longus 5 5   Plantar Flexion 5 5   Foot eversion 5 5   Foot inversion 5 5        Skin: Skin is warm, dry and intact.   Psychiatric: Normal mood and affect. Speech is normal and behavior is normal.        IMAGING:  Personally reviewed thoracic x-rays dated 7/25/2024:   1. Mild right convexed curvature of the lumbar spine.   2. Very positive global sagittal imbalance. No global coronal  imbalance.  3. Weight bearing axis as detailed above.  4. Small bilateral pleural effusions.     ASSESSMENT:  Roxanna Stark is a 89 year old female who sustained a mechanical fall on 2/24/2024 resulting T12 burst fracture      PLAN:  Ok to wean from TLSO brace.  Ok to remove as tolerated.       Continue taking Calcium and Vitamin D supplementation as prescribed.    If you should sustain new trauma or injury or note increased headache, weakness, speech or vision issues, confusion, or other neurologic changes please call 911 or present to your nearest emergency room for further evaluation.      Given pain is improving and she has no additional complaints today, patient may  follow-up with us as needed.                      I spent 35 minutes in patient care with greater than 50% spent in counseling and/or coordination of care.     I performed independent visualization of radiographic imaging and entered my own interpretation, reviewed and/or ordered tests in radiology           CARMELITA Dos Santos, CNP  Department of Neurosurgery  Pager: [phone]

## 2024-07-25 ENCOUNTER — OFFICE VISIT (OUTPATIENT)
Dept: NEUROSURGERY | Facility: CLINIC | Age: 89
End: 2024-07-25
Payer: COMMERCIAL

## 2024-07-25 ENCOUNTER — ANCILLARY PROCEDURE (OUTPATIENT)
Dept: GENERAL RADIOLOGY | Facility: CLINIC | Age: 89
End: 2024-07-25
Attending: NURSE PRACTITIONER
Payer: COMMERCIAL

## 2024-07-25 ENCOUNTER — TELEPHONE (OUTPATIENT)
Dept: FAMILY MEDICINE | Facility: CLINIC | Age: 89
End: 2024-07-25

## 2024-07-25 VITALS
OXYGEN SATURATION: 96 % | DIASTOLIC BLOOD PRESSURE: 69 MMHG | HEART RATE: 54 BPM | SYSTOLIC BLOOD PRESSURE: 145 MMHG | RESPIRATION RATE: 16 BRPM

## 2024-07-25 DIAGNOSIS — S22.081A T12 BURST FRACTURE (H): Primary | ICD-10-CM

## 2024-07-25 DIAGNOSIS — S22.081A T12 BURST FRACTURE (H): ICD-10-CM

## 2024-07-25 PROCEDURE — 72082 X-RAY EXAM ENTIRE SPI 2/3 VW: CPT | Performed by: STUDENT IN AN ORGANIZED HEALTH CARE EDUCATION/TRAINING PROGRAM

## 2024-07-25 PROCEDURE — 99214 OFFICE O/P EST MOD 30 MIN: CPT | Performed by: NURSE PRACTITIONER

## 2024-07-25 NOTE — LETTER
7/25/2024       RE: Roxanna Stark  [address]  [place] [address]     Dear Colleague,    Thank you for referring your patient, Roxanna Stark, to the [facility] [place] at [facility]. Please see a copy of my visit note below.         Neurosurgery Clinic Note     Chief Complaint: hospital follow-up      DATE OF VISIT: 7/25/2024       HPI:    Ms. Roxanna Stark is a 89-year-old female, right-handed with past medical history significant for hypertension, CKD, RA, PAD, arctic stenosis status post AVR-2016, atrial fibrillation-on Eliquis, HRpEF who presented to [facility] ED from her nursing home via EMS after she was found down around 7 PM on 2/24/2024.  Patient does not remember the fall event but remembers laying on the floor.  She reports a mild headache and back pain and on and off pain in the left lower extremity mainly in the groin.  Reportedly the last dose of Eliquis was in the morning of 2/24/2024.  She denies any weakness or numbness in the lower extremities or any bladder or bowel incontinence episodes.  She denies any sensory level or any numbness in the groin or belly.  Imaging was performed in the ED.  Neurosurgery was consulted for evaluation and management of T12 fracture.   Patient was managed conservatively with TLSO brace for comfort and repeat upright x-rays were obtained which remained stable.  She was subsequently discharged to TCU on 3/2/2024.      Since last visit on 4/11/2024 patient developed worsening pain and was referred to Dr. Donta Lyles for consideration of vertebroplasty.  The patient underwent T12 vertebroplasty on 6/11/2024 and presents today for follow-up.   Patient met with Endocrinology on 6/18/2024 in regards to osteoporosis management.  DEXA imaging was recommended with follow-up to discuss possible treatment options.    Currently patient states since the  procedure in June 2024 her pain has significantly improved.  Continues to note intermittent low back pain but the severe pain has improved.  Not requiring medication for pain currently.  Overall she is feeling much better.            Review of Systems   Negative except in HPI     Past Medical History        Past Medical History:   Diagnosis Date    Actinic keratosis      Aortic stenosis 2014    Atrial flutter with rapid ventricular response (H) 01/27/2024    Basal cell cancer 07/2014     left eye medial canthus     Basal cell carcinoma 09/30/2008     left cheek    CKD (chronic kidney disease) stage 3, GFR 30-59 ml/min (H) 07/01/2019    HTN (hypertension)      Melanoma in situ (H) 09/30/2008     left arm    PAF (paroxysmal atrial fibrillation) (H) 3/27/2024    Polymyalgia rheumatica (H24) 11/1999    Rheumatoid arthritis of multiple sites with negative rheumatoid factor (H)      Status post coronary angiogram 03/03/2016    Temporal arteritis (H) 11/1999            Past Surgical History         Past Surgical History:   Procedure Laterality Date    CATARACT IOL, RT/LT   5/09     bilateral    COLONOSCOPY   2002    EXCISE LESION VULVA N/A 9/27/2019     Procedure: Wide Local Excision Of Vulva, Colposcopy;  Surgeon: Mono Ribeiro MD;  Location:  OR    REPLACE VALVE AORTIC N/A 4/25/2016     Procedure: REPLACE VALVE AORTIC;  Surgeon: Sudeep Tsai MD;  Location:  OR    [facility] SKIN TISSUE PROCEDURE UNLISTED   11/3/08     mmis skin cancer excision            Social History            Socioeconomic History    Marital status:    Occupational History       Employer: RETIRED   Tobacco Use    Smoking status: Never       Passive exposure: Never    Smokeless tobacco: Never   Vaping Use    Vaping status: Never Used   Substance and Sexual Activity    Alcohol use: Yes       Comment: rarely    Drug use: No    Sexual activity: Not Currently       Partners: Male       Birth control/protection: None   Other Topics  Concern    Parent/sibling w/ CABG, MI or angioplasty before 65F 55M? No         family history includes Arthritis in her father, mother, sister, sister, and sister; Asthma in her daughter and daughter; Breast Cancer (age of onset: 45) in her sister; Cerebrovascular Disease in her daughter; Colon Cancer in her father and sister; Heart Disease in her father; Hypertension in her father; Lipids in her father; Lung Cancer (age of onset: 58) in her daughter; Myocardial Infarction in her daughter; Pancreatic Cancer (age of onset: 81) in an other family member; Prostate Cancer in her father; Thyroid Disease in her sister.                Physical Exam   There were no vitals taken for this visit.  Constitutional: Oriented to person, place, and time. Appears well-developed and well-nourished. Cooperative. No distress.   HENT: Head normocephalic and atraumatic.   Neurological: alert and oriented to person, place, and time. CN II-XII grossly  intact        STRENGTH LEFT RIGHT   Deltoid 5 5   Bicep 5 5   Wrist Extensor 5 5   Tricep 5 5   Finger flexion 5 5   Finger abduction 5 5    5 5         Hip Flexion       5       5   Knee Extension 5 5   Ankle Dorsiflexion 5 5   Extensor Hallucis Longus 5 5   Plantar Flexion 5 5   Foot eversion 5 5   Foot inversion 5 5        Skin: Skin is warm, dry and intact.   Psychiatric: Normal mood and affect. Speech is normal and behavior is normal.        IMAGING:  Personally reviewed thoracic x-rays dated 7/25/2024:   1. Mild right convexed curvature of the lumbar spine.   2. Very positive global sagittal imbalance. No global coronal  imbalance.  3. Weight bearing axis as detailed above.  4. Small bilateral pleural effusions.     ASSESSMENT:  Roxanna Stark is a 89 year old female who sustained a mechanical fall on 2/24/2024 resulting T12 burst fracture      PLAN:  Ok to wean from TLSO brace.  Ok to remove as tolerated.       Continue taking Calcium and Vitamin D supplementation as  prescribed.    If you should sustain new trauma or injury or note increased headache, weakness, speech or vision issues, confusion, or other neurologic changes please call 911 or present to your nearest emergency room for further evaluation.      Given pain is improving and she has no additional complaints today, patient may follow-up with us as needed.                      I spent 35 minutes in patient care with greater than 50% spent in counseling and/or coordination of care.     I performed independent visualization of radiographic imaging and entered my own interpretation, reviewed and/or ordered tests in radiology           Again, thank you for allowing me to participate in the care of your patient.      Sincerely,    CARMELITA Chawla CNP

## 2024-07-25 NOTE — TELEPHONE ENCOUNTER
Please see letter section  Call Pt to be seen by me for follow up appointment with endocrine  She needs to be started on Prolia and also has abnormal SPEP-to discuss with her oncologist  Have her see me  See endocrine letter  Have pt see the letter send by endocrine and follow up as recommended

## 2024-07-25 NOTE — TELEPHONE ENCOUNTER
Called patient and her daughter, Anna picked up (signed consent to communicate PHI on file)  Discussed info below with Anna.  She verbalized understanding.  Appointment scheduled with Swapna Pickett on 8/1/24    Will route to oncology as well    Deonte Khan RN  [facility]

## 2024-07-25 NOTE — NURSING NOTE
Chief Complaint   Patient presents with    RECHECK     1.5 month follow up     T12 burst w/ vertebral plasty    Shoshana Reyes, LEFTYN

## 2024-07-25 NOTE — PATIENT INSTRUCTIONS
Ok to wean from TLSO brace.  Ok to remove as tolerated.       Continue taking Calcium and Vitamin D supplementation as prescribed.    If you should sustain new trauma or injury or note increased headache, weakness, speech or vision issues, confusion, or other neurologic changes please call 911 or present to your nearest emergency room for further evaluation.      Given pain is improving and she has no additional complaints today, patient may follow-up with us as needed.

## 2024-07-29 ENCOUNTER — TRANSFERRED RECORDS (OUTPATIENT)
Dept: HEALTH INFORMATION MANAGEMENT | Facility: CLINIC | Age: 89
End: 2024-07-29
Payer: MEDICARE

## 2024-08-01 ENCOUNTER — MYC MEDICAL ADVICE (OUTPATIENT)
Dept: CARDIOLOGY | Facility: CLINIC | Age: 89
End: 2024-08-01

## 2024-08-01 ENCOUNTER — OFFICE VISIT (OUTPATIENT)
Dept: FAMILY MEDICINE | Facility: CLINIC | Age: 89
End: 2024-08-01
Payer: MEDICARE

## 2024-08-01 VITALS
DIASTOLIC BLOOD PRESSURE: 72 MMHG | BODY MASS INDEX: 21.77 KG/M2 | OXYGEN SATURATION: 95 % | HEART RATE: 54 BPM | SYSTOLIC BLOOD PRESSURE: 138 MMHG | RESPIRATION RATE: 26 BRPM | TEMPERATURE: 99 F | WEIGHT: 108 LBS | HEIGHT: 59 IN

## 2024-08-01 DIAGNOSIS — D49.7 FOLLICULAR NEOPLASM OF THYROID: ICD-10-CM

## 2024-08-01 DIAGNOSIS — Z79.899 ON AMIODARONE THERAPY: Primary | ICD-10-CM

## 2024-08-01 DIAGNOSIS — S22.081A T12 BURST FRACTURE (H): ICD-10-CM

## 2024-08-01 DIAGNOSIS — N18.4 BENIGN HYPERTENSION WITH CKD (CHRONIC KIDNEY DISEASE) STAGE IV (H): ICD-10-CM

## 2024-08-01 DIAGNOSIS — I48.0 PAF (PAROXYSMAL ATRIAL FIBRILLATION) (H): ICD-10-CM

## 2024-08-01 DIAGNOSIS — R29.6 RECURRENT FALLS: ICD-10-CM

## 2024-08-01 DIAGNOSIS — R77.8 ABNORMAL SPEP: ICD-10-CM
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
## 2024-08-01 DIAGNOSIS — Z29.11 NEED FOR VACCINATION AGAINST RESPIRATORY SYNCYTIAL VIRUS: ICD-10-CM

## 2024-08-01 DIAGNOSIS — M80.00XD AGE-RELATED OSTEOPOROSIS WITH CURRENT PATHOLOGICAL FRACTURE WITH ROUTINE HEALING, SUBSEQUENT ENCOUNTER: ICD-10-CM

## 2024-08-01 DIAGNOSIS — Z79.01 ANTICOAGULATED: ICD-10-CM

## 2024-08-01 DIAGNOSIS — I12.9 BENIGN HYPERTENSION WITH CKD (CHRONIC KIDNEY DISEASE) STAGE IV (H): ICD-10-CM

## 2024-08-01 DIAGNOSIS — N18.32 STAGE 3B CHRONIC KIDNEY DISEASE (H): ICD-10-CM

## 2024-08-01 DIAGNOSIS — S51.811D SKIN TEAR OF RIGHT FOREARM WITHOUT COMPLICATION, SUBSEQUENT ENCOUNTER: ICD-10-CM

## 2024-08-01 PROCEDURE — 99215 OFFICE O/P EST HI 40 MIN: CPT | Performed by: FAMILY MEDICINE

## 2024-08-01 ASSESSMENT — PAIN SCALES - GENERAL: PAINLEVEL: EXTREME PAIN (8)

## 2024-08-01 NOTE — Clinical Note
Dr Teresa, I am seeing Roxanna today Do you want me to order Prolia or are you going to order this Thanks Swapna

## 2024-08-01 NOTE — PROGRESS NOTES
Pt has skin tear on right forearm. Measuring 7 x 0.5 cm. Bleeding slightly. No other drainage. No redness of surrounding tissue. Pt reports pain (stinging) when cleansing the site. Wound cleansed with irrigation solution. Applied topical bacitracin, non-stick Vaseline dressing, and then covered with Ace wrap. Pt and daughter were given instructions for care to change dressing every 1-2 days until scab forms. Use bacitracin and non-stick dressing, then okay to leave open to air per Dr. Gaines. Daughter is going to see if [place] where patient resides is able to assist with dressing changes. If they are unable, it is okay per Dr. Gaines for patient to be seen with nurse in clinic for dressing change.     Arlen Villeda RN

## 2024-08-01 NOTE — PATIENT INSTRUCTIONS
Please do Daily Dressings   Use Bacitracin ointment daily  Follow Up if any discharge, swelling or Redness   Please make appointment with Oncologist  Check with Dr Teresa about Prolia Injections  There is a lesion on the spine which needs follow up MRI  Call clinic to schedule  Call Cardilogist about eliquis due to her Frequent falls  She needs to considers stopping this due to Frequent falls and take Baby aspirin daily  Sincerely,  Swapna Gaines MD

## 2024-08-01 NOTE — PROGRESS NOTES
Assessment & Plan     Skin tear of right forearm without complication, subsequent encounter  Dressing done and advised Dressing changes  If [facility] is not doing this They can have Our Nurses do This    Stage 3b chronic kidney disease (H)    - Albumin Random Urine Quantitative with Creat Ratio; Future    Fall, subsequent encounter  Pt has had Recurrent Falls  Discussed with daughter that I am very concerned that she is on anticoagulants  Usually with Recurrent falls we stop anticiagulants and advised baby aspirin daily  She is at Risk for Bleeding and Intracerebral hemorrhage with her falling Frequently  She should maybe Just use wheelchair  Advised since Left Forearm continues to Nbleeed -Hold off on eliquis   They will also call Cardiology  Glory discussed That Holding eliquis does increase risk of strokes     Anticoagulated  As above     Benign hypertension with CKD (chronic kidney disease) stage IV (H)  Stable     Abnormal SPEP  Referral done   - Adult Oncology/Hematology  Referral; Future    T12 burst fracture (H)  Sees neurosurgeon  Notes reviewed     PAF (paroxysmal atrial fibrillation) (H)  Take Baby aspirin daily     Follicular neoplasm of thyroid  Seen endocrine Notes reviewed   Follow up with endocrine    Age-related osteoporosis with current pathological fracture with routine healing, subsequent encounter  Endocrine has recommended Prolia  If They wants us to start this we cam do it     Recurrent falls  As above  Preevent falls due to Risk of Fractures   Use wheelchair          Follow up 1 month  Subjective   Roxanna is a 89 year old, presenting for the following health issues:  Follow Up      8/1/2024    11:30 AM   Additional Questions   Roomed by Christiano   Accompanied by Daughter     History of Present Illness       Reason for visit:  Follow up from lab results    She eats 0-1 servings of fruits and vegetables daily.She consumes 1 sweetened beverage(s) daily.She exercises with enough effort to  "increase her heart rate 10 to 19 minutes per day.  She exercises with enough effort to increase her heart rate 3 or less days per week.   She is taking medications regularly.     Roxanna Stark is a 89 y.o. female who maria t anticoagulated on Eliquis with a history of CKD, proximal atrial fibrillation, and hypertension, who presented  to the emergency department for evaluation of arm injury. The patient reports she had a unwitnessed fall on Thursda     ED/UC Followup:    Facility:  [facility]  Date of visit: 7/27/24  Reason for visit: Fall/ injured rt arm  Current Status: 8/10 pain  Pt has had Recurrent falls and still is on eliquis  She Lives in assisted Living  Atrial Fib is stable   She did see Endocrine  Notes reviewed SPEP was abnormal   Endocrine has recommended Prolia  Not sure if they will Order this  I have sent a message to Dr Teresa about This  Pt also saw neurosurgeon for T12 fracture  Notes reviewed     Review of Systems  Constitutional, HEENT, cardiovascular, pulmonary, GI, , musculoskeletal, neuro, skin, endocrine and psych systems are negative, except as otherwise noted.      Objective    BP (!) 140/58 (BP Location: Left arm, Patient Position: Sitting, Cuff Size: Adult Regular)   Pulse 54   Temp 99  F (37.2  C) (Temporal)   Resp 26   Ht 1.499 m (4' 11\")   Wt 49 kg (108 lb)   SpO2 95%   BMI 21.81 kg/m    Body mass index is 21.81 kg/m .  Physical Exam   GENERAL: alert and no distress frail elderly   NECK: no adenopathy, no asymmetry, masses, or scars  RESP: lungs clear to auscultation - no rales, rhonchi or wheezes  CV: regular rate and rhythm, normal S1 S2, no S3 or S4, no murmur, click or rub, no peripheral edema  ABDOMEN: soft, nontender, no hepatosplenomegaly, no masses and bowel sounds normal  MS: In a wheelchair-uses walker at [facility] in assisted Living   SKIN: laceration left arm still Bleeding     Office Visit on 07/15/2024   Component Date Value Ref Range Status    Creatinine 07/15/2024 " 1.53 (H)  0.51 - 0.95 mg/dL Final    GFR Estimate 07/15/2024 31 (L)  >60 mL/min/1.73m2 Final    eGFR calculated using 2021 CKD-EPI equation.    Erythrocyte Sedimentation Rate 07/15/2024 37 (H)  0 - 30 mm/hr Final    CRP Inflammation 07/15/2024 11.70 (H)  <5.00 mg/L Final    Protein Total 07/15/2024 7.4  6.4 - 8.3 g/dL Final    Albumin 07/15/2024 3.7  3.5 - 5.2 g/dL Final    Bilirubin Total 07/15/2024 0.3  <=1.2 mg/dL Final    Alkaline Phosphatase 07/15/2024 216 (H)  40 - 150 U/L Final    AST 07/15/2024 30  0 - 45 U/L Final    ALT 07/15/2024 28  0 - 50 U/L Final    Bilirubin Direct 07/15/2024 <0.20  0.00 - 0.30 mg/dL Final    Parathyroid Hormone Intact 07/15/2024 32  15 - 65 pg/mL Final    Calcium 07/15/2024 9.2  8.8 - 10.4 mg/dL Final    Reference intervals for this test were updated on 7/16/2024 to reflect our healthy population more accurately. There may be differences in the flagging of prior results with similar values performed with this method. Those prior results can be interpreted in the context of the updated reference intervals.    Phosphorus 07/15/2024 3.7  2.5 - 4.5 mg/dL Final    WBC Count 07/15/2024 7.6  4.0 - 11.0 10e3/uL Final    RBC Count 07/15/2024 4.21  3.80 - 5.20 10e6/uL Final    Hemoglobin 07/15/2024 11.9  11.7 - 15.7 g/dL Final    Hematocrit 07/15/2024 38.7  35.0 - 47.0 % Final    MCV 07/15/2024 92  78 - 100 fL Final    MCH 07/15/2024 28.3  26.5 - 33.0 pg Final    MCHC 07/15/2024 30.7 (L)  31.5 - 36.5 g/dL Final    RDW 07/15/2024 14.8  10.0 - 15.0 % Final    Platelet Count 07/15/2024 185  150 - 450 10e3/uL Final    % Neutrophils 07/15/2024 69  % Final    % Lymphocytes 07/15/2024 18  % Final    % Monocytes 07/15/2024 11  % Final    % Eosinophils 07/15/2024 0  % Final    % Basophils 07/15/2024 1  % Final    % Immature Granulocytes 07/15/2024 0  % Final    Absolute Neutrophils 07/15/2024 5.3  1.6 - 8.3 10e3/uL Final    Absolute Lymphocytes 07/15/2024 1.4  0.8 - 5.3 10e3/uL Final    Absolute  Monocytes 07/15/2024 0.9  0.0 - 1.3 10e3/uL Final    Absolute Eosinophils 07/15/2024 0.0  0.0 - 0.7 10e3/uL Final    Absolute Basophils 07/15/2024 0.1  0.0 - 0.2 10e3/uL Final    Absolute Immature Granulocytes 07/15/2024 0.0  <=0.4 10e3/uL Final    Total Protein Serum for ELP 07/15/2024 7.2  6.4 - 8.3 g/dL Final    Albumin 07/15/2024 3.6 (L)  3.7 - 5.1 g/dL Final    Alpha 1 07/15/2024 0.4  0.2 - 0.4 g/dL Final    Alpha 2 07/15/2024 0.7  0.5 - 0.9 g/dL Final    Beta Globulin 07/15/2024 0.8  0.6 - 1.0 g/dL Final    Gamma Globulin 07/15/2024 1.7 (H)  0.7 - 1.6 g/dL Final    Monoclonal Peak 07/15/2024 0.5 (H)  <=0.0 g/dL Final    ELP Interpretation 07/15/2024    Final                    Value:Monoclonal protein (0.5 g/dL) seen in the gamma fraction, not previously characterized in our laboratory. Recommend serum and urine immunofixation for confirmation and further characterization if not previously performed elsewhere. Hypoalbuminemia. Pathologic significance requires clinical correlation. ELENI Ge M.D., Ph.D., Pathologist.    Sodium 07/15/2024 141  135 - 145 mmol/L Final    Potassium 07/15/2024 4.7  3.4 - 5.3 mmol/L Final    Carbon Dioxide (CO2) 07/15/2024 28  22 - 29 mmol/L Final    Anion Gap 07/15/2024 9  7 - 15 mmol/L Final    Urea Nitrogen 07/15/2024 33.1 (H)  8.0 - 23.0 mg/dL Final    Creatinine 07/15/2024 1.56 (H)  0.51 - 0.95 mg/dL Final    GFR Estimate 07/15/2024 30 (L)  >60 mL/min/1.73m2 Final    eGFR calculated using 2021 CKD-EPI equation.    Calcium 07/15/2024 9.5  8.8 - 10.4 mg/dL Final    Reference intervals for this test were updated on 7/16/2024 to reflect our healthy population more accurately. There may be differences in the flagging of prior results with similar values performed with this method. Those prior results can be interpreted in the context of the updated reference intervals.    Chloride 07/15/2024 104  98 - 107 mmol/L Final    Glucose 07/15/2024 106 (H)  70 - 99 mg/dL Final    Alkaline  Phosphatase 07/15/2024 220 (H)  40 - 150 U/L Final    AST 07/15/2024 31  0 - 45 U/L Final    ALT 07/15/2024 31  0 - 50 U/L Final    Protein Total 07/15/2024 7.4  6.4 - 8.3 g/dL Final    Albumin 07/15/2024 3.7  3.5 - 5.2 g/dL Final    Bilirubin Total 07/15/2024 0.3  <=1.2 mg/dL Final    TSH 07/15/2024 1.91  0.30 - 4.20 uIU/mL Final    Phosphorus 07/15/2024 3.7  2.5 - 4.5 mg/dL Final       Time spent reviewing chart, addressing her medical Problems as above, ordering labs, refilling meds and discussing her medical Problems, , documenting-Labs will be reviewed when back and will make further recommendations based on her labs  42 minutes    Signed Electronically by: Swapna Gaines MD

## 2024-08-02 ENCOUNTER — PATIENT OUTREACH (OUTPATIENT)
Dept: ONCOLOGY | Facility: CLINIC | Age: 89
End: 2024-08-02
Payer: MEDICARE

## 2024-08-02 ENCOUNTER — MYC MEDICAL ADVICE (OUTPATIENT)
Dept: CARDIOLOGY | Facility: CLINIC | Age: 89
End: 2024-08-02
Payer: MEDICARE

## 2024-08-02 NOTE — PROGRESS NOTES
New Patient Oncology Nurse Navigator Note     Referring provider:     Swanpa Gaines MD        Referring Clinic/Organization: [facility]      Referred to (specialty:) Hematology      Requested provider (if applicable): NA     Date Referral Received: August 2, 2024     Evaluation for:  R77.8 (ICD-10-CM) - Abnormal SPEP      Clinical History (per Nurse review of records provided):      Patient see in follow up yesterday by PCP. Noted to have abnormal SPEP.      Latest Reference Range & Units 07/15/24 14:23 07/15/24 14:25   Sodium 135 - 145 mmol/L  141   Potassium 3.4 - 5.3 mmol/L  4.7   Chloride 98 - 107 mmol/L  104   Carbon Dioxide (CO2) 22 - 29 mmol/L  28   Urea Nitrogen 8.0 - 23.0 mg/dL  33.1 (H)   Creatinine 0.51 - 0.95 mg/dL 1.53 (H) 1.56 (H)   GFR Estimate >60 mL/min/1.73m2 31 (L) 30 (L)   Calcium 8.8 - 10.4 mg/dL 9.2 9.5   Anion Gap 7 - 15 mmol/L  9   Phosphorus 2.5 - 4.5 mg/dL 3.7 3.7   Albumin 3.5 - 5.2 g/dL 3.7 3.7   Protein Total 6.4 - 8.3 g/dL 7.4 7.4   Alkaline Phosphatase 40 - 150 U/L 216 (H) 220 (H)   ALT 0 - 50 U/L 28 31   AST 0 - 45 U/L 30 31   Bilirubin Direct 0.00 - 0.30 mg/dL <0.20    Bilirubin Total <=1.2 mg/dL 0.3 0.3   CRP Inflammation <5.00 mg/L 11.70 (H)    Glucose 70 - 99 mg/dL  106 (H)   TSH 0.30 - 4.20 uIU/mL  1.91   WBC 4.0 - 11.0 10e3/uL 7.6    Hemoglobin 11.7 - 15.7 g/dL 11.9    Hematocrit 35.0 - 47.0 % 38.7    Platelet Count 150 - 450 10e3/uL 185    RBC Count 3.80 - 5.20 10e6/uL 4.21    MCV 78 - 100 fL 92    MCH 26.5 - 33.0 pg 28.3    MCHC 31.5 - 36.5 g/dL 30.7 (L)    RDW 10.0 - 15.0 % 14.8    % Neutrophils % 69    % Lymphocytes % 18    % Monocytes % 11    % Eosinophils % 0    % Basophils % 1    Absolute Basophils 0.0 - 0.2 10e3/uL 0.1    Absolute Eosinophils 0.0 - 0.7 10e3/uL 0.0    Absolute Immature Granulocytes <=0.4 10e3/uL 0.0    Absolute Lymphocytes 0.8 - 5.3 10e3/uL 1.4    Absolute Monocytes 0.0 - 1.3 10e3/uL 0.9    % Immature Granulocytes % 0    Absolute  Neutrophils 1.6 - 8.3 10e3/uL 5.3    Sed Rate 0 - 30 mm/hr 37 (H)    Albumin Fraction 3.7 - 5.1 g/dL 3.6 (L)    Alpha 1 Fraction 0.2 - 0.4 g/dL 0.4    Alpha 2 Fraction 0.5 - 0.9 g/dL 0.7    Beta Fraction 0.6 - 1.0 g/dL 0.8    ELP Interpretation:  Monoclonal protein (0.5 g/dL) seen in the gamma fraction, not previously characterized in our laboratory. Recommend serum and urine immunofixation for confirmation and further characterization if not previously performed elsewhere. Hypoalbuminemia. Pathologic significance requires clinical correlation. ELENI Ge M.D., Ph.D., Pathologist.    Gamma Fraction 0.7 - 1.6 g/dL 1.7 (H)    Monoclonal Peak <=0.0 g/dL 0.5 (H)    Parathyroid Hormone Intact 15 - 65 pg/mL 32    Total Protein Serum for ELP 6.4 - 8.3 g/dL 7.2    (H): Data is abnormally high  (L): Data is abnormally low     Records Location: See Bookmarked material     Records Needed: NA     Additional testing needed prior to consult: NA    Payor: MEDICARE / Plan: MEDICARE / Product Type: Medicare /     August 2, 2024  Referral received and reviewed.   Sent to [employer] for processing.     Diandra GARCIA, RN   Oncology Nurse Navigator   [facility]   [phone] / [phone]

## 2024-08-05 ENCOUNTER — ALLIED HEALTH/NURSE VISIT (OUTPATIENT)
Dept: FAMILY MEDICINE | Facility: CLINIC | Age: 89
End: 2024-08-05
Payer: MEDICARE

## 2024-08-05 DIAGNOSIS — S51.811A SKIN TEAR OF RIGHT FOREARM WITHOUT COMPLICATION: Primary | ICD-10-CM

## 2024-08-05 PROCEDURE — 99207 PR NO CHARGE NURSE ONLY: CPT

## 2024-08-05 NOTE — TELEPHONE ENCOUNTER
[facility] Call Center    Phone Message    May a detailed message be left on voicemail: yes     Reason for Call: Medication Refill Request    Has the patient contacted the pharmacy for the refill? Yes   Name of medication being requested:   apixaban ANTICOAGULANT (ELIQUIS) 2.5 MG tablet [[id number]] (Order [id number]   Provider who prescribed the medication: Cassandra Herrera      Date medication is needed: pts daughter is calling in to let the team know the reason that provider Eder wanted pt to stop Eliquis was due to a wound that will not heal/fully stop bleeding. Eder is wondering if a baby aspirin could be used instead in the mean time. Pt is about 10-12 days post wound occurrence and it has slowed to a weeping but has not fully stopped bleeding yet still. Please contact daughter back with any further questions.        Action Taken: Other: cardiology     Travel Screening: Not Applicable        Thank you!  [facility]        Date of Service:

## 2024-08-05 NOTE — NURSING NOTE
Wound location Right forearm      Size:   Width 0.5 cm  Length 6 cm  Depth 0 cm      Wound Base: Granulation    Wound Base Color: Red    Surrounding Tissue: Intact    Exudate: Scant    Exudate Color: Sero-Sang.    Odor: No    Pain:  Yes - describe pt reports pain when cleansing wound and patting dry.    Dressing Change: Yes - describe Wound cleansed with normal saline, patted dry. Then applied bacitracin, vaseline gauze, telfa non adherent dressing, then applied kerlix and ace bandage. Pt was scheduled for dressing change on 8/7/24.    Beatris Maldonado RN  [facility]

## 2024-08-05 NOTE — PATIENT INSTRUCTIONS
Monitor for spreading redness, warmth, pus, fever/chills, bleeding.    Number for cardiology is [phone]

## 2024-08-06 NOTE — TELEPHONE ENCOUNTER
[facility] Call Center    Phone Message    May a detailed message be left on voicemail: yes     Reason for Call: Other: Anna called back to check on an update on whether or not her mother can hold her Eliquis due to her being a fall risk and scuffing up her arm and having some complications. Advised Anna the  Can put in her thoughts due to Dr. Herrera being on vacation and that she states it is okay for Roxanna to hold her Eliquis until she follows up with Dr. Herrera. Roxanna is currently at [facility] and they give Roxanna her medications and will not stop giving her her medication unless they get written permission from a doctor. Anna does not have the fax number for the facility but their phone number is [phone] if someone could reach out to them to provide verbal approval for Anna to hold her medication. Please reach out to [facility] to discuss. Thank you!     Action Taken: Other: Cardiology    Travel Screening: Not Applicable    Thank you!  [facility]       Date of Service:

## 2024-08-07 ENCOUNTER — MYC MEDICAL ADVICE (OUTPATIENT)
Dept: FAMILY MEDICINE | Facility: CLINIC | Age: 89
End: 2024-08-07

## 2024-08-07 ENCOUNTER — ALLIED HEALTH/NURSE VISIT (OUTPATIENT)
Dept: FAMILY MEDICINE | Facility: CLINIC | Age: 89
End: 2024-08-07
Payer: MEDICARE

## 2024-08-07 DIAGNOSIS — D49.7 FOLLICULAR NEOPLASM OF THYROID: ICD-10-CM

## 2024-08-07 DIAGNOSIS — I48.0 PAF (PAROXYSMAL ATRIAL FIBRILLATION) (H): ICD-10-CM

## 2024-08-07 DIAGNOSIS — S22.081A BURST FRACTURE OF T12 VERTEBRA (H): ICD-10-CM

## 2024-08-07 DIAGNOSIS — I73.9 PERIPHERAL ARTERIAL DISEASE (H): ICD-10-CM

## 2024-08-07 DIAGNOSIS — M06.09 RHEUMATOID ARTHRITIS OF MULTIPLE SITES WITH NEGATIVE RHEUMATOID FACTOR (H): ICD-10-CM

## 2024-08-07 DIAGNOSIS — D63.8 ANEMIA OF CHRONIC DISEASE: ICD-10-CM

## 2024-08-07 DIAGNOSIS — R26.9 GAIT ABNORMALITY: ICD-10-CM

## 2024-08-07 DIAGNOSIS — S51.811A SKIN TEAR OF RIGHT FOREARM WITHOUT COMPLICATION: Primary | ICD-10-CM

## 2024-08-07 DIAGNOSIS — N18.32 STAGE 3B CHRONIC KIDNEY DISEASE (H): Primary | ICD-10-CM

## 2024-08-07 PROCEDURE — 99207 PR NO CHARGE NURSE ONLY: CPT

## 2024-08-07 NOTE — TELEPHONE ENCOUNTER
[facility] Call Center    Phone Message    May a detailed message be left on voicemail: yes     Reason for Call: Other: assisted living staff is calling as they are returning a call to [facility], please advise thank you.     Action Taken: Other: cardiology    Travel Screening: Not Applicable     Date of Service:                                                                  \

## 2024-08-07 NOTE — PROGRESS NOTES
Patient presented to clinic for nurse visit to provide care to wound. Wound is located on right forearm, skin tear.     Size:     Width 0.5 cm  Length 6 cm  Depth 0 cm     Wound Base: Granulation     Wound Base Color: Red/pink      Surrounding Tissue: Bruised, intact     Exudate: Scant      Exudate Color: Serosanguinous      Odor: None     Pain:  Yes, endorses pain while removing old dressing and taking Tylenol at facility to manage mild pain at the site      Dressing Change: Skin tear cleansed with sterile saline and patted dry. Bacitracin applied. Vaseline gauze over the wound along with, Telfa non-adherent dressing. Wrapped with Kerlix and Ace bandage.     Offered to schedule additional appointment; this was declined as patient has home care nurse coming to her facility to change the dressing.    KATHY JeanN JOHN  [facility]

## 2024-08-07 NOTE — TELEPHONE ENCOUNTER
Date: 8/7/2024    Time of Call: 12:12 PM     Diagnosis:  increased risk for fall, cut on arm that will not heal due to continued bleeding     [ VORB ] Ordering provider: Dr. Sheriff   Order: hold eliquis for now until site is healed.  Keep appt with dr. Herrera 9/16 to reassess.       Order received by: Andressa Bishop RN       Follow-up/additional notes: order was communicated with Rancho nurse at [facility].  He was able to take a verbal order.  Advised to hold eliquis at this time and to reach out to cardiology with questions and if the wound healed in the meantime.  Rancho verbalized understanding.

## 2024-08-13 ENCOUNTER — DOCUMENTATION ONLY (OUTPATIENT)
Dept: NEPHROLOGY | Facility: CLINIC | Age: 89
End: 2024-08-13
Payer: MEDICARE

## 2024-08-13 DIAGNOSIS — N18.32 STAGE 3B CHRONIC KIDNEY DISEASE (H): Primary | ICD-10-CM

## 2024-08-13 NOTE — PROGRESS NOTES
Roxanna CLARITZA Stark has an upcoming lab appointment:    Please place lab order in epic     Thank you    [facility] lab team  [phone]     Future Appointments   Date Time Provider Department Center   8/22/2024 10:15 AM  LAB [facility]   8/27/2024 10:00 AM Chalino Little MD [facility]

## 2024-08-15 NOTE — PROGRESS NOTES
Palliative Care Outpatient Clinic      Patient ID:  Medical - She has A fib, HTN, CKD-4, RA, PMR, THERESA-III; hx aortic valve replacement; recurrent falls. 2/2024 hospitalized at [facility] with a fall and T12 VCF. Hospitalized 5/2024 at [facility] with another fall and imaging showing worsening of the fracture-->vertebroplasty 6/2024.    Social - Lives alone in assisted living; help with meds, meals. Dresses, toilets, transfers, ambulates independently. 6 children; 5 alive; 4 in  area; daughter Anna is a major caregiver.     Care Planning - DNR/DNI POLST complete.  HCD on file.      History:  History gathered today from: patient, family/loved ones, medical chart; Anna and her  are present and add to hx.    I've not met her before personally.   We met her in the hospital in Feb. My colleague documented this care conference discussion:  DNR/DNI and ok for rehospitalization for now. Family plans to see how things go and over time may consider shift to hospice care if/when condition declines further   Goals discussion summary:  The Palliative team met with Roxanna's daughter, Anna and her  Adi today for a goals of care discussion. Anna shared that Roxanna has always enjoyed walking and her [employer] community. She helped found [employer] in [place]. These things are important to her and she would want a quality of life in which she can participate in these activities. Roxanna has had to make some accommodations such as not walking as far, though still very much enjoys these things. Prior to this hospitalization, Roxanna was staying at a TCU following a hospitalization on 1/27 for Afib. While at the TCU, she fell 3 times, the last leading to the fracture of her spine at T12 and this hospitalization. Prior to all of this, Roxanna was living independently. Anna expressed concerns that her mother is not quite as mentally sharp as she used to be and is requiring more assistance. We discussed the  progressive nature of dementia and the need for more cares. Anna has been in contact with [facility] in [place] where her mother was planning to live after TCU discharge. [facility] has assisted living, memory care, and hospice options. Anna believes it would be best for Roxanna to go to [facility] either right after hospitalization or after a short TCU stay as it will be a consistent place with Roxanna's belongings and Roxanna seemed to enjoy visiting there. Additionally, [facility] has a shuttle to [facility], a place important to Roxanna. We also discussed the what ifs such as frequent hospitalizations, declining memory, and physical limitations to ambulation that may occur due to Roxanna's age and medical conditions and increased risk of infections.  Anna recognized these concerns and believes her mother would like to limit hospitalizations in part because staying at multiple facilities has contributed to/exacerbated her confusion. Further discussed the role of hospice and what that could like look going forward. We reviewed hospice criteria (expected prognosis of 6 months or less) and philosophy of care including plan to no re-hospitalize for restorative cares. I do not think she currently meets hospice criteria (ambulatory, eating well, no end organ failure currently) but we talked about how with her recent falls and worsening memory put her at increased risk of further setbacks and medical decline (falls, infections like pneumonia or UTI) which could then make her a candidate for hospice. Anna felt that she is not ready to forgo rehospitalization at this time but can imagine making that decision in the future.     Anna who's with her today asked to meet with us again.  She is concerned Roxanna keeps on being offered/encouraged to pursue more treatments, specialist visits that are unlikely to improve her qol. Eg nephrology eval; hematology referral (she has a small monoclonal peak on recent SPEP I think  ordered because of the VCF). She has probably THERESA and they've eg decided not to pursue bx.   Roxanna notes she doesn't particularly worry about her health care or decisions and relies on Anna to manage the details and decisions--and I think also the worrying.   She affirms that her only medical priority is her comfort, not longevity.    Clinically Roxanna is stable. No pain. Eating, swallowing, weight stable.  Sleeps ok mostly. Mood is good; enjoys her family and family life. She moved to [facility] recently and she's still adjusting to that and grieving that move; it also sounds like it has been ok/not a disaster for her.  Major clinical issues continue to be falls.    PE: There were no vitals taken for this visit.   Wt Readings from Last 3 Encounters:   08/01/24 49 kg (108 lb)   06/05/24 50.6 kg (111 lb 9.6 oz)   05/14/24 49.4 kg (109 lb)     Alert NAD  Clear sensorium  Full affect  Trace le edema      Data reviewed:  I reviewed recent labs and imaging, my comments:  Cr 1.56  AlkP 220  Recent CBC nl    DEXA showed osteopenia    MRI May  IMPRESSION:  1.  Burst type fracture of the T12 vertebral body with anterior wedging and loss of approximately 50% vertebral body height. Questionable slight increase compression of this fracture since 5/6/2024 although comparison is difficult given differences in   technique.  2.  Posterior displacement of the fractured superior T12 endplate abuts and indents the ventral spinal cord and causes mild spinal canal narrowing. This is similar to prior CT. No obvious edema in the spinal cord. No epidural hematoma.  3.  Indeterminate 0.9 cm lesion in the T9 vertebral body. Recommend follow-up MRI in 2-3 months to ensure stability.  4.  Degenerative changes in the lower thoracic spine with facet hypertrophy causing at least moderate bilateral neural foraminal narrowings.  5.  Partially visualized degenerative changes in the cervical spine and upper lumbar spine.    [facility] database reviewed:  y      Impression & Recommendations:  89 yo with frailty & MMP--hx of falls and vertebral compression fx; RA; CKD3-4; probably THERESA; osteopenia vs -porosis; frailty & falls.    Reviewed the roles of our program and medical history.     ----------  In addition to the clinical activities described above in this note, with the patient's voluntary permission I discussed with Roxanna and Anna & Adi care planning.  Discussed care goals with them and care planning. Priority is qol and comfort not longevity.  D/w them I think it's fine to pick and choose what tests, specialty visits, treatments based on the likelihood of any of those improving or quality of life. I think in general she should continue close follow-up with Dr Gaines, and continue to pick and choose other specialty care based on the likelihood of it making a significant impact on her qol. Eg I don't think she needs to see a nephrologist or hematologist in particular. I don't think they should arbitrarily rule out all specialty care, but more pick and choose like they are doing. They feel Dr Gaines understands this and supports this and is giving them good guidance about this. I am happy to give my perspective too about this at any time if it can be of help.    D/w them hospice care for general education. Overall she's clinically stable and I don't think qualifies for hospice care despite her 89 years, but that could change at any time. Eg if she had a major fracture and became non-ambulatory I think she should qualify for hospice. But d/w them hospice enrollees typically cease specialty care as hospice comprehensively takes over someone's health care and manages everything more or less.     Over 46 min today were spent in care planning discussions today.   ----------      Thank you for involving us in the patient's care.   Griffin Randall MD / Palliative Medicine / Text me via [employer]  This note may have been composed with voice recognition software and there  may be mistranscriptions.

## 2024-08-16 ENCOUNTER — OFFICE VISIT (OUTPATIENT)
Dept: PALLIATIVE CARE | Facility: CLINIC | Age: 89
End: 2024-08-16
Attending: FAMILY MEDICINE
Payer: MEDICARE

## 2024-08-16 VITALS
OXYGEN SATURATION: 97 % | BODY MASS INDEX: 21.57 KG/M2 | WEIGHT: 107 LBS | RESPIRATION RATE: 16 BRPM | HEART RATE: 50 BPM | DIASTOLIC BLOOD PRESSURE: 66 MMHG | HEIGHT: 59 IN | SYSTOLIC BLOOD PRESSURE: 124 MMHG

## 2024-08-16 DIAGNOSIS — R54 FRAILTY SYNDROME IN GERIATRIC PATIENT: Primary | ICD-10-CM

## 2024-08-16 DIAGNOSIS — S22.081A BURST FRACTURE OF T12 VERTEBRA (H): ICD-10-CM

## 2024-08-16 DIAGNOSIS — M06.09 RHEUMATOID ARTHRITIS OF MULTIPLE SITES WITH NEGATIVE RHEUMATOID FACTOR (H): ICD-10-CM

## 2024-08-16 DIAGNOSIS — N18.32 STAGE 3B CHRONIC KIDNEY DISEASE (H): ICD-10-CM

## 2024-08-16 PROCEDURE — G0463 HOSPITAL OUTPT CLINIC VISIT: HCPCS | Performed by: INTERNAL MEDICINE

## 2024-08-16 PROCEDURE — 99204 OFFICE O/P NEW MOD 45 MIN: CPT | Mod: 25 | Performed by: INTERNAL MEDICINE

## 2024-08-16 PROCEDURE — 99497 ADVNCD CARE PLAN 30 MIN: CPT | Mod: 25 | Performed by: INTERNAL MEDICINE

## 2024-08-16 PROCEDURE — 99498 ADVNCD CARE PLAN ADDL 30 MIN: CPT | Mod: 25 | Performed by: INTERNAL MEDICINE

## 2024-08-16 ASSESSMENT — PAIN SCALES - GENERAL: PAINLEVEL: MODERATE PAIN (5)

## 2024-08-16 NOTE — LETTER
8/16/2024       RE: Roxanna Stark  [address]  [place] [address]     Dear Colleague,    Thank you for referring your patient, Roxanna Stark[facility]. Please see a copy of my visit note below.    Palliative Care Outpatient Clinic      Patient ID:  Medical - She has A fib, HTN, CKD-4, RA, PMR, THERESA-III; hx aortic valve replacement; recurrent falls. 2/2024 hospitalized at [facility] with a fall and T12 VCF. Hospitalized 5/2024 at [facility] with another fall and imaging showing worsening of the fracture-->vertebroplasty 6/2024.    Social - Lives alone in assisted living; help with meds, meals. Dresses, toilets, transfers, ambulates independently. 6 children; 5 alive; 4 in  area; daughter Anna is a major caregiver.     Care Planning - DNR/DNI POLST complete.  HCD on file.      History:  History gathered today from: patient, family/loved ones, medical chart; Anna and her  are present and add to hx.    I've not met her before personally.   We met her in the hospital in Feb. My colleague documented this care conference discussion:  DNR/DNI and ok for rehospitalization for now. Family plans to see how things go and over time may consider shift to hospice care if/when condition declines further   Goals discussion summary:  The Palliative team met with Roxanna's daughter, Anna and her  Adi today for a goals of care discussion. Anna shared that Roxanna has always enjoyed walking and her [employer] community. She helped found [employer] in [place]. These things are important to her and she would want a quality of life in which she can participate in these activities. Roxanna has had to make some accommodations such as not walking as far, though still very much enjoys these things. Prior to this hospitalization, Roxanna was staying at a TCU following a hospitalization on 1/27 for Afib.  While at the TCU, she fell 3 times, the last leading to the fracture of her spine at T12 and this hospitalization. Prior to all of this, Roxanna was living independently. Anna expressed concerns that her mother is not quite as mentally sharp as she used to be and is requiring more assistance. We discussed the progressive nature of dementia and the need for more cares. Anna has been in contact with [facility] in [place] where her mother was planning to live after TCU discharge. [facility] has assisted living, memory care, and hospice options. Anna believes it would be best for Roxanna to go to [facility] either right after hospitalization or after a short TCU stay as it will be a consistent place with Roxanna's belongings and Roxanna seemed to enjoy visiting there. Additionally, [facility] has a shuttle to [facility], a place important to Roxanna. We also discussed the what ifs such as frequent hospitalizations, declining memory, and physical limitations to ambulation that may occur due to Roxanna's age and medical conditions and increased risk of infections.  Anna recognized these concerns and believes her mother would like to limit hospitalizations in part because staying at multiple facilities has contributed to/exacerbated her confusion. Further discussed the role of hospice and what that could like look going forward. We reviewed hospice criteria (expected prognosis of 6 months or less) and philosophy of care including plan to no re-hospitalize for restorative cares. I do not think she currently meets hospice criteria (ambulatory, eating well, no end organ failure currently) but we talked about how with her recent falls and worsening memory put her at increased risk of further setbacks and medical decline (falls, infections like pneumonia or UTI) which could then make her a candidate for hospice. Anna felt that she is not ready to forgo rehospitalization at this time but can imagine making that decision in the  future.     Anna who's with her today asked to meet with us again.  She is concerned Roxanna keeps on being offered/encouraged to pursue more treatments, specialist visits that are unlikely to improve her qol. Eg nephrology eval; hematology referral (she has a small monoclonal peak on recent SPEP I think ordered because of the VCF). She has probably THERESA and they've eg decided not to pursue bx.   Roxanna notes she doesn't particularly worry about her health care or decisions and relies on Anna to manage the details and decisions--and I think also the worrying.   She affirms that her only medical priority is her comfort, not longevity.    Clinically Roxanna is stable. No pain. Eating, swallowing, weight stable.  Sleeps ok mostly. Mood is good; enjoys her family and family life. She moved to [facility] recently and she's still adjusting to that and grieving that move; it also sounds like it has been ok/not a disaster for her.  Major clinical issues continue to be falls.    PE: There were no vitals taken for this visit.   Wt Readings from Last 3 Encounters:   08/01/24 49 kg (108 lb)   06/05/24 50.6 kg (111 lb 9.6 oz)   05/14/24 49.4 kg (109 lb)     Alert NAD  Clear sensorium  Full affect  Trace le edema      Data reviewed:  I reviewed recent labs and imaging, my comments:  Cr 1.56  AlkP 220  Recent CBC nl    DEXA showed osteopenia    MRI May  IMPRESSION:  1.  Burst type fracture of the T12 vertebral body with anterior wedging and loss of approximately 50% vertebral body height. Questionable slight increase compression of this fracture since 5/6/2024 although comparison is difficult given differences in   technique.  2.  Posterior displacement of the fractured superior T12 endplate abuts and indents the ventral spinal cord and causes mild spinal canal narrowing. This is similar to prior CT. No obvious edema in the spinal cord. No epidural hematoma.  3.  Indeterminate 0.9 cm lesion in the T9 vertebral body. Recommend follow-up MRI  in 2-3 months to ensure stability.  4.  Degenerative changes in the lower thoracic spine with facet hypertrophy causing at least moderate bilateral neural foraminal narrowings.  5.  Partially visualized degenerative changes in the cervical spine and upper lumbar spine.    [facility] database reviewed: y      Impression & Recommendations:  89 yo with frailty & MMP--hx of falls and vertebral compression fx; RA; CKD3-4; probably THERESA; osteopenia vs -porosis; frailty & falls.    Reviewed the roles of our program and medical history.     ----------  In addition to the clinical activities described above in this note, with the patient's voluntary permission I discussed with Roxanna and Anna & Adi care planning.  Discussed care goals with them and care planning. Priority is qol and comfort not longevity.  D/w them I think it's fine to pick and choose what tests, specialty visits, treatments based on the likelihood of any of those improving or quality of life. I think in general she should continue close follow-up with Dr Gaines, and continue to pick and choose other specialty care based on the likelihood of it making a significant impact on her qol. Eg I don't think she needs to see a nephrologist or hematologist in particular. I don't think they should arbitrarily rule out all specialty care, but more pick and choose like they are doing. They feel Dr Gaines understands this and supports this and is giving them good guidance about this. I am happy to give my perspective too about this at any time if it can be of help.    D/w them hospice care for general education. Overall she's clinically stable and I don't think qualifies for hospice care despite her 89 years, but that could change at any time. Eg if she had a major fracture and became non-ambulatory I think she should qualify for hospice. But d/w them hospice enrollees typically cease specialty care as hospice comprehensively takes over someone's health care and manages  everything more or less.     Over 46 min today were spent in care planning discussions today.   ----------      Thank you for involving us in the patient's care.   Griffin Randall MD / Palliative Medicine / Text me via [employer]  This note may have been composed with voice recognition software and there may be mistranscriptions.      Again, thank you for allowing me to participate in the care of your patient.      Sincerely,    Griffin Randall MD

## 2024-08-16 NOTE — NURSING NOTE
"Oncology Rooming Note    August 16, 2024 9:28 AM   Roxanna Stark is a 89 year old female who presents for:    No chief complaint on file.    Initial Vitals: /66 (BP Location: Left arm, Patient Position: Sitting, Cuff Size: Adult Small)   Pulse 50   Resp 16   Ht 1.499 m (4' 11\")   Wt 48.5 kg (107 lb)   SpO2 97%   BMI 21.61 kg/m   Estimated body mass index is 21.61 kg/m  as calculated from the following:    Height as of this encounter: 1.499 m (4' 11\").    Weight as of this encounter: 48.5 kg (107 lb). Body surface area is 1.42 meters squared.  Moderate Pain (5) Comment: Data Unavailable   No LMP recorded. Patient is postmenopausal.  Allergies reviewed: Yes  Medications reviewed: Yes    Medications: Medication refills not needed today.  Pharmacy name entered into [employer]:    [id number] [place] [address] [facility] - [address]    Frailty Screening:   Is the patient here for a new oncology consult visit in cancer care? 1. Yes. Over the past month, have you experienced difficulty or required a caregiver to assist with:   1. Balance, walking or general mobility (including any falls)? YES  2. Completion of self-care tasks such as bathing, dressing, toileting, grooming/hygiene?  YES  3. Concentration or memory that affects your daily life?  NO       Clinical concerns:        Priscilla Garcia              "

## 2024-08-23 ENCOUNTER — OFFICE VISIT (OUTPATIENT)
Dept: NURSING | Facility: CLINIC | Age: 89
End: 2024-08-23
Payer: MEDICARE

## 2024-08-23 VITALS — BODY MASS INDEX: 21.59 KG/M2 | HEART RATE: 57 BPM | WEIGHT: 107.1 LBS | OXYGEN SATURATION: 95 % | HEIGHT: 59 IN

## 2024-08-23 DIAGNOSIS — Z79.899 ON AMIODARONE THERAPY: ICD-10-CM

## 2024-08-23 PROCEDURE — 94010 BREATHING CAPACITY TEST: CPT | Performed by: INTERNAL MEDICINE

## 2024-08-23 PROCEDURE — 94729 DIFFUSING CAPACITY: CPT | Performed by: INTERNAL MEDICINE

## 2024-08-26 LAB
DLCOUNC-PRE: 5.86 ML/MIN/MMHG
ERV-%PRED-PRE: 132 %
ERV-PRE: 0.51 L
ERV-PRED: 0.39 L
EXPTIME-PRE: 4.28 SEC
FEF2575-PRE: 1.24 L/SEC
FEFMAX-%PRED-PRE: 140 %
FEFMAX-PRE: 2.95 L/SEC
FEFMAX-PRED: 2.1 L/SEC
FEV1-PRE: 1.06 L
FEV1FEV6-PRE: 86 %
FEV1FEV6-PRED: 75 %
FEV1FVC-PRE: 86 %
FEV1SVC-PRE: 103 %
FIFMAX-PRE: 1.32 L/SEC
FVC-PRE: 1.23 L
IC-%PRED-PRE: 66 %
IC-PRE: 0.52 L
IC-PRED: 0.78 L
VA-PRE: 2.15 L
VC-%PRED-PRE: 47 %
VC-PRE: 1.03 L
VC-PRED: 2.19 L

## 2024-09-03 ENCOUNTER — TRANSFERRED RECORDS (OUTPATIENT)
Dept: HEALTH INFORMATION MANAGEMENT | Facility: CLINIC | Age: 89
End: 2024-09-03
Payer: MEDICARE

## 2024-09-05 ENCOUNTER — TELEPHONE (OUTPATIENT)
Dept: FAMILY MEDICINE | Facility: CLINIC | Age: 89
End: 2024-09-05
Payer: MEDICARE

## 2024-09-05 NOTE — TELEPHONE ENCOUNTER
Routing to PCP as an FYI.    JOHN Field, called from [facility] to report patient fell today at 11:40 am.  She complained about left hip pain and reported a possible head injury.      EMS was contacted, and patient was transferred to [place] ER.    Gilson Bermudez RN  Triage Nurse  [facility]

## 2024-09-06 DIAGNOSIS — M06.09 RHEUMATOID ARTHRITIS OF MULTIPLE SITES WITH NEGATIVE RHEUMATOID FACTOR (H): ICD-10-CM

## 2024-09-06 NOTE — TELEPHONE ENCOUNTER
Dr. Swapna Gaines from Family Practice is not here today , I am reviewing chart and responding to this message as a cross cover    Amiodarone (Pacerone / Cordarone) is a medication managed by cardiology  and I am making no recommendations with this medication     I would say hold Lopressor [metoprolol] if heart rate is below 46    For amiodarone you have to ask cardiologist    Dereje Taylor MD

## 2024-09-06 NOTE — TELEPHONE ENCOUNTER
Routing to PCP     JOHN Field at [facility] calling.    Patient returned yesterday from [place] ER around 6 pm    She denies pain and feels well. Neuros are WNL    HR 49-50 bpms (he checked it a few times)    Her HR is usually around 54-63 bpm    BP: 119/57    No chest pain, no SOB or difficulty breathing    He is wondering if pcp would like any parameters on her amiodarone and/or metoprolol    [facility] nurse line call back number:  [phone]  Detailed VM is ok    Brenna Voss RN

## 2024-09-06 NOTE — TELEPHONE ENCOUNTER
"Called [place] RN line, left a DVM as requested with provider's message below and asked for a callback if any further questions to [phone].    \"  Dr. Swapna Gaines from Family Practice is not here today , I am reviewing chart and responding to this message as a cross cover     Amiodarone (Pacerone / Cordarone) is a medication managed by cardiology  and I am making no recommendations with this medication      I would say hold Lopressor [metoprolol] if heart rate is below 46     For amiodarone you have to ask cardiologist     Dereje Taylor MD     \"  Thanks,  JOSE Izaguirre RN  [facility]  "

## 2024-09-11 RX ORDER — HYDROXYCHLOROQUINE SULFATE 200 MG/1
TABLET, FILM COATED ORAL
Qty: 45 TABLET | Refills: 1 | Status: SHIPPED | OUTPATIENT
Start: 2024-09-11

## 2024-09-11 NOTE — TELEPHONE ENCOUNTER
Hydroxychloroquine refilled as requested to new pharmacy at [facility].    Jamie Johnson MD  9/11/2024

## 2024-09-16 ENCOUNTER — OFFICE VISIT (OUTPATIENT)
Dept: CARDIOLOGY | Facility: CLINIC | Age: 89
End: 2024-09-16
Payer: COMMERCIAL

## 2024-09-16 VITALS
SYSTOLIC BLOOD PRESSURE: 138 MMHG | WEIGHT: 107 LBS | HEART RATE: 52 BPM | BODY MASS INDEX: 21.61 KG/M2 | OXYGEN SATURATION: 93 % | DIASTOLIC BLOOD PRESSURE: 72 MMHG

## 2024-09-16 DIAGNOSIS — I48.0 PAROXYSMAL ATRIAL FIBRILLATION (H): Primary | ICD-10-CM

## 2024-09-16 DIAGNOSIS — I48.92 ATRIAL FLUTTER WITH RAPID VENTRICULAR RESPONSE (H): ICD-10-CM

## 2024-09-16 DIAGNOSIS — Z79.899 ON AMIODARONE THERAPY: ICD-10-CM

## 2024-09-16 DIAGNOSIS — Z95.2 S/P AVR (AORTIC VALVE REPLACEMENT): ICD-10-CM

## 2024-09-16 PROCEDURE — 99214 OFFICE O/P EST MOD 30 MIN: CPT | Performed by: INTERNAL MEDICINE

## 2024-09-16 NOTE — LETTER
9/16/2024      RE: Roxanna Stark  [address]  [place] [address]       Dear Colleague,    Thank you for the opportunity to participate in the care of your patient, Roxanna Stark, at the [facility] at [facility]. Please see a copy of my visit note below.    HPI: Purpose of visit: Follow-up for atrial fibrillation    Patient is a 89-year-old woman with a recent diagnosis of atrial fibrillation in January 2024, heart failure with preserved ejection fraction, coronary artery disease, hypertension, peripheral artery disease, aortic stenosis status post atrial valve replacement with porcine valve in 2016.     In January 2024, patient was hospitalized with an acute episode of rapid atrial fibrillation.  She also experienced a few falls while undergoing rehabilitation and sustained a compression fracture.  She is currently in the transitional care facility.  Amiodarone 200 mg once daily and Eliquis 2.5 mg twice daily was started on February 1, 2024.    Patient's last visit with me was in March 2024.  Since the last visit, patient has fallen multiple times averaging about once a month.  Her apixaban has been discontinued by Dr. Gaines, her primary care physician.    She continues amiodarone that has kept her in normal sinus rhythm.  Her TSH and LFTs as well as lung function test are satisfactory.  She also underwent an ophthalmology assessment which did not show any concerning abnormalities.      PAST MEDICAL HISTORY:  Past Medical History:   Diagnosis Date     Actinic keratosis      Aortic stenosis 2014     Atrial flutter with rapid ventricular response (H) 01/27/2024     Basal cell cancer 07/2014    left eye medial canthus      Basal cell carcinoma 09/30/2008    left cheek     CKD (chronic kidney disease) stage 3, GFR 30-59 ml/min (H) 07/01/2019     CKD (chronic kidney disease) stage 4, GFR 15-29 ml/min (H)      Compression  fracture of L2 (H) 11/2020     Compression fracture of T12 vertebra (H) 02/25/2024     HTN (hypertension)      Infection due to 2019 novel coronavirus 08/31/2021     Melanoma in situ (H) 09/30/2008    left arm     Multiple rib fractures      PAF (paroxysmal atrial fibrillation) (H) 03/27/2024     Polymyalgia rheumatica (H24) 11/1999     Recurrent falls      Rheumatoid arthritis of multiple sites with negative rheumatoid factor (H)      Status post coronary angiogram 03/03/2016     Temporal arteritis (H) 11/1999     Thyroid nodule 11/2020     THERESA III (vulvar intraepithelial neoplasia III) 2019       CURRENT MEDICATIONS:  Current Outpatient Medications   Medication Sig Dispense Refill     acetaminophen (TYLENOL) 325 MG tablet Take 3 tablets (975 mg) by mouth every 8 hours as needed for mild pain, headaches or fever 30 tablet 0     amiodarone (PACERONE) 200 MG tablet Take 1 tablet (200 mg) by mouth daily 90 tablet 1     amLODIPine (NORVASC) 5 MG tablet TAKE 1 TABLET BY MOUTH ONCE DAILY 90 tablet 3     RICKI PROTECT MOISTURE BARRIER 12 % CREA APPLY TOPICALLY TO COCCYX AREA TWICE DAILY UNTIL HEALED THEN DISCONTINUE 142 g PRN     calcium carbonate-vitamin D (OSCAL) 500-5 MG-MCG tablet TAKE 1 TABLET BY MOUTH ONCE DAILY 90 tablet 3     furosemide (LASIX) 20 MG tablet TAKE 1 TABLET BY MOUTH ONCE DAILY 90 tablet 3     gabapentin (NEURONTIN) 100 MG capsule TAKE 1 CAPSULE BY MOUTH ONCE DAILY 90 capsule 3     hydroxychloroquine (PLAQUENIL) 200 MG tablet TAKE ONE-HALF TABLET (100 MG) BY MOUTH DAILY 45 tablet 1     LIDOCAINE PAIN RELIEF 4 % Patch APPLY 1 PATCH TO SKIN EVERY 24 HOURS (ON FOR 12 HOURS, OFF FOR 12 HOURS) 30 patch 97     metoprolol tartrate (LOPRESSOR) 25 MG tablet TAKE 1 TABLET BY MOUTH TWICE DAILY 180 tablet 3     Omega-3 Fatty Acids (OMEGA-3 FISH OIL PO) Take 1 tablet twice daily.       polyethylene glycol (MIRALAX) 17 GM/Dose powder MIX 17GM OF POWDER IN 8OZ OF WATER UNTIL COMPLETELY DISSOLVED. DRINK SOLUTION BY  MOUTH ONCE DAILY. 510 g 97     senna-docusate (SENOKOT-S/PERICOLACE) 8.6-50 MG tablet Take 1 tablet by mouth 2 times daily as needed for constipation 30 tablet 0     amoxicillin (AMOXIL) 500 MG capsule Take 4 tablets  30 minutes before Procedure 4 capsule 3       PAST SURGICAL HISTORY:  Past Surgical History:   Procedure Laterality Date     CATARACT IOL, RT/LT  5/09    bilateral     COLONOSCOPY  2002     EXCISE LESION VULVA N/A 9/27/2019    Procedure: Wide Local Excision Of Vulva, Colposcopy;  Surgeon: Mono Ribeiro MD;  Location:  OR      THORACIC VERTEBROPLASTY  6/11/2024     REPLACE VALVE AORTIC N/A 4/25/2016    Procedure: REPLACE VALVE AORTIC;  Surgeon: Sudeep Tsai MD;  Location:  OR     [facility] SKIN TISSUE PROCEDURE UNLISTED  11/3/08    mmis skin cancer excision       ALLERGIES:     Allergies   Allergen Reactions     Lisinopril Cough       FAMILY HISTORY:  - Premature coronary artery disease  - Atrial fibrillation  - Sudden cardiac death     SOCIAL HISTORY:  Social History     Tobacco Use     Smoking status: Never     Passive exposure: Never     Smokeless tobacco: Never   Vaping Use     Vaping status: Never Used   Substance Use Topics     Alcohol use: Yes     Comment: rarely     Drug use: No       ROS:   Constitutional: No fever, chills, or sweats. Weight stable.   ENT: No visual disturbance, ear ache, epistaxis, sore throat.   Cardiovascular: As per HPI.   Respiratory: No cough, hemoptysis.    GI: No nausea, vomiting, hematemesis, melena, or hematochezia.   : No hematuria.   Integument: Negative.   Psychiatric: Negative.   Hematologic:  Easy bruising, no easy bleeding.  Neuro: Negative.   Endocrinology: No significant heat or cold intolerance   Musculoskeletal: No myalgia.    Exam:  /72   Pulse 52   Wt 48.5 kg (107 lb)   SpO2 93%   BMI 21.61 kg/m    GENERAL APPEARANCE: healthy, alert and no distress  HEENT: no icterus, no xanthelasmas, normal pupil size and reaction, normal palate,  mucosa moist, no central cyanosis  NECK: no adenopathy, no asymmetry, masses, or scars, thyroid normal to palpation and no bruits, JVP not elevated  RESPIRATORY: lungs clear to auscultation - no rales, rhonchi or wheezes, no use of accessory muscles, no retractions, respirations are unlabored, normal respiratory rate  CARDIOVASCULAR: regular rhythm, MICHAELA at the aortic area  ABDOMEN: soft, non tender, without hepatosplenomegaly, no masses palpable, bowel sounds normal, aorta not enlarged by palpation, no abdominal bruits  EXTREMITIES: peripheral pulses normal, no edema, no bruits  NEURO: alert and oriented to person/place/time, normal speech, gait and affect  VASC: Radial, femoral, dorsalis pedis and posterior tibialis pulses are normal in volumes and symmetric bilaterally. No bruits are heard.  SKIN: no ecchymoses, no rashes    Labs:  CBC RESULTS:   Lab Results   Component Value Date    WBC 7.6 07/15/2024    WBC 9.4 03/23/2021    RBC 4.21 07/15/2024    RBC 3.55 (L) 03/23/2021    HGB 11.9 07/15/2024    HGB 11.0 (L) 03/23/2021    HCT 38.7 07/15/2024    HCT 35.4 03/23/2021    MCV 92 07/15/2024     03/23/2021    MCH 28.3 07/15/2024    MCH 31.0 03/23/2021    MCHC 30.7 (L) 07/15/2024    MCHC 31.1 (L) 03/23/2021    RDW 14.8 07/15/2024    RDW 17.7 (H) 03/23/2021     07/15/2024     03/23/2021       BMP RESULTS:  Lab Results   Component Value Date     07/15/2024     03/23/2021    POTASSIUM 4.7 07/15/2024    POTASSIUM 4.1 08/15/2022    POTASSIUM 4.3 03/23/2021    CHLORIDE 104 07/15/2024    CHLORIDE 105 08/15/2022    CHLORIDE 103 03/23/2021    CO2 28 07/15/2024    CO2 33 (H) 08/15/2022    CO2 31 03/23/2021    ANIONGAP 9 07/15/2024    ANIONGAP 2 (L) 08/15/2022    ANIONGAP 4 03/23/2021     (H) 07/15/2024    GLC 86 03/17/2024     (H) 10/10/2022     (H) 03/23/2021    BUN 33.1 (H) 07/15/2024    BUN 44 (H) 08/15/2022    BUN 27 03/23/2021    CR 1.56 (H) 07/15/2024    CR 1.40 (H)  03/23/2021    GFRESTIMATED 30 (L) 07/15/2024    GFRESTIMATED 32 (L) 03/23/2021    GFRESTBLACK 37 (L) 03/23/2021    ROEL 9.5 07/15/2024    ROEL 9.3 03/23/2021        INR RESULTS:  Lab Results   Component Value Date    INR 1.04 06/11/2024    INR 1.26 (H) 06/05/2024    INR 1.54 (H) 02/24/2024    INR 1.41 (H) 04/25/2016    INR 1.63 (H) 04/25/2016    INR 0.90 04/04/2016       Procedures:      Assessment and Plan:     Paroxysmal atrial fibrillation-well-controlled by amiodarone  Status post bioprosthetic aortic valve replacement    Given her frequent falls, on balance, it is reasonable to discontinue anticoagulation.  Patient will continue amiodarone which has kept her in sinus rhythm.  I will see patient again in person in 6 months and prior to that visit, patient will have a TSH, LFT, and echocardiogram performed.  Patient will need a lung function test and ophthalmology assessment in 1 year.    All questions and concerns were addressed and patient and family are happy with the plan.         CC  Patient Care Team:  Swapna Gaines MD as PCP - General (Family Medicine)  Brandan Landin MD as MD (OB/Gyn)  Mono Ribeiro MD as MD (Gynecologic Oncology)  Swapna Gaines MD as Assigned PCP  Aminta Garcia MD as Assigned Cancer Care Provider  Jamie Johnson MD as Assigned Rheumatology Provider  Whitney Rogers MD as MD (Ophthalmology)  Abdon Bullard MD as MD (Ophthalmology)  Abdon Bullard MD as Assigned Surgical Provider  Cassandra Herrera MD as MD (Cardiovascular Disease)  Cassandra Herrera MD as Assigned Heart and Vascular Provider  Chalino Little MD as MD (Nephrology)  Gretel Cornelius APRN CNP as Nurse Practitioner (Dermatology)  Belkis Marin RN as Specialty Care Coordinator (Neurology)  Alena Mitchell, RN as Specialty Care Coordinator  Leela Teresa MD as MD (Endocrinology, Diabetes, and Metabolism)  Leela Teresa MD as Assigned Endocrinology Provider  Henok Mckeon MD as MD  (Hematology & Oncology)  Griffin Randall MD as MD (Hospice And Palliative Care)  Swapna Gaines MD as MD (Family Medicine)  Griffin Randall MD as Assigned Palliative Care Provider  Elizabeth Waters APRN CNP as Assigned Neuroscience Provider          Please do not hesitate to contact me if you have any questions/concerns.     Sincerely,     Cassandra Herrera MD

## 2024-09-16 NOTE — PROGRESS NOTES
HPI: Purpose of visit: Follow-up for atrial fibrillation    Patient is a 89-year-old woman with a recent diagnosis of atrial fibrillation in January 2024, heart failure with preserved ejection fraction, coronary artery disease, hypertension, peripheral artery disease, aortic stenosis status post atrial valve replacement with porcine valve in 2016.     In January 2024, patient was hospitalized with an acute episode of rapid atrial fibrillation.  She also experienced a few falls while undergoing rehabilitation and sustained a compression fracture.  She is currently in the transitional care facility.  Amiodarone 200 mg once daily and Eliquis 2.5 mg twice daily was started on February 1, 2024.    Patient's last visit with me was in March 2024.  Since the last visit, patient has fallen multiple times averaging about once a month.  Her apixaban has been discontinued by Dr. Gaines, her primary care physician.    She continues amiodarone that has kept her in normal sinus rhythm.  Her TSH and LFTs as well as lung function test are satisfactory.  She also underwent an ophthalmology assessment which did not show any concerning abnormalities.      PAST MEDICAL HISTORY:  Past Medical History:   Diagnosis Date    Actinic keratosis     Aortic stenosis 2014    Atrial flutter with rapid ventricular response (H) 01/27/2024    Basal cell cancer 07/2014    left eye medial canthus     Basal cell carcinoma 09/30/2008    left cheek    CKD (chronic kidney disease) stage 3, GFR 30-59 ml/min (H) 07/01/2019    CKD (chronic kidney disease) stage 4, GFR 15-29 ml/min (H)     Compression fracture of L2 (H) 11/2020    Compression fracture of T12 vertebra (H) 02/25/2024    HTN (hypertension)     Infection due to 2019 novel coronavirus 08/31/2021    Melanoma in situ (H) 09/30/2008    left arm    Multiple rib fractures     PAF (paroxysmal atrial fibrillation) (H) 03/27/2024    Polymyalgia rheumatica (H24) 11/1999    Recurrent falls     Rheumatoid  arthritis of multiple sites with negative rheumatoid factor (H)     Status post coronary angiogram 03/03/2016    Temporal arteritis (H) 11/1999    Thyroid nodule 11/2020    THERESA III (vulvar intraepithelial neoplasia III) 2019       CURRENT MEDICATIONS:  Current Outpatient Medications   Medication Sig Dispense Refill    acetaminophen (TYLENOL) 325 MG tablet Take 3 tablets (975 mg) by mouth every 8 hours as needed for mild pain, headaches or fever 30 tablet 0    amiodarone (PACERONE) 200 MG tablet Take 1 tablet (200 mg) by mouth daily 90 tablet 1    amLODIPine (NORVASC) 5 MG tablet TAKE 1 TABLET BY MOUTH ONCE DAILY 90 tablet 3    RICKI PROTECT MOISTURE BARRIER 12 % CREA APPLY TOPICALLY TO COCCYX AREA TWICE DAILY UNTIL HEALED THEN DISCONTINUE 142 g PRN    calcium carbonate-vitamin D (OSCAL) 500-5 MG-MCG tablet TAKE 1 TABLET BY MOUTH ONCE DAILY 90 tablet 3    furosemide (LASIX) 20 MG tablet TAKE 1 TABLET BY MOUTH ONCE DAILY 90 tablet 3    gabapentin (NEURONTIN) 100 MG capsule TAKE 1 CAPSULE BY MOUTH ONCE DAILY 90 capsule 3    hydroxychloroquine (PLAQUENIL) 200 MG tablet TAKE ONE-HALF TABLET (100 MG) BY MOUTH DAILY 45 tablet 1    LIDOCAINE PAIN RELIEF 4 % Patch APPLY 1 PATCH TO SKIN EVERY 24 HOURS (ON FOR 12 HOURS, OFF FOR 12 HOURS) 30 patch 97    metoprolol tartrate (LOPRESSOR) 25 MG tablet TAKE 1 TABLET BY MOUTH TWICE DAILY 180 tablet 3    Omega-3 Fatty Acids (OMEGA-3 FISH OIL PO) Take 1 tablet twice daily.      polyethylene glycol (MIRALAX) 17 GM/Dose powder MIX 17GM OF POWDER IN 8OZ OF WATER UNTIL COMPLETELY DISSOLVED. DRINK SOLUTION BY MOUTH ONCE DAILY. 510 g 97    senna-docusate (SENOKOT-S/PERICOLACE) 8.6-50 MG tablet Take 1 tablet by mouth 2 times daily as needed for constipation 30 tablet 0    amoxicillin (AMOXIL) 500 MG capsule Take 4 tablets  30 minutes before Procedure 4 capsule 3       PAST SURGICAL HISTORY:  Past Surgical History:   Procedure Laterality Date    CATARACT IOL, RT/LT  5/09    bilateral     COLONOSCOPY  2002    EXCISE LESION VULVA N/A 9/27/2019    Procedure: Wide Local Excision Of Vulva, Colposcopy;  Surgeon: Mono Ribeiro MD;  Location: [facility]     THORACIC VERTEBROPLASTY  6/11/2024    REPLACE VALVE AORTIC N/A 4/25/2016    Procedure: REPLACE VALVE AORTIC;  Surgeon: Sudeep Tsai MD;  Location:  OR    [facility] SKIN TISSUE PROCEDURE UNLISTED  11/3/08    mmis skin cancer excision       ALLERGIES:     Allergies   Allergen Reactions    Lisinopril Cough       FAMILY HISTORY:  - Premature coronary artery disease  - Atrial fibrillation  - Sudden cardiac death     SOCIAL HISTORY:  Social History     Tobacco Use    Smoking status: Never     Passive exposure: Never    Smokeless tobacco: Never   Vaping Use    Vaping status: Never Used   Substance Use Topics    Alcohol use: Yes     Comment: rarely    Drug use: No       ROS:   Constitutional: No fever, chills, or sweats. Weight stable.   ENT: No visual disturbance, ear ache, epistaxis, sore throat.   Cardiovascular: As per HPI.   Respiratory: No cough, hemoptysis.    GI: No nausea, vomiting, hematemesis, melena, or hematochezia.   : No hematuria.   Integument: Negative.   Psychiatric: Negative.   Hematologic:  Easy bruising, no easy bleeding.  Neuro: Negative.   Endocrinology: No significant heat or cold intolerance   Musculoskeletal: No myalgia.    Exam:  /72   Pulse 52   Wt 48.5 kg (107 lb)   SpO2 93%   BMI 21.61 kg/m    GENERAL APPEARANCE: healthy, alert and no distress  HEENT: no icterus, no xanthelasmas, normal pupil size and reaction, normal palate, mucosa moist, no central cyanosis  NECK: no adenopathy, no asymmetry, masses, or scars, thyroid normal to palpation and no bruits, JVP not elevated  RESPIRATORY: lungs clear to auscultation - no rales, rhonchi or wheezes, no use of accessory muscles, no retractions, respirations are unlabored, normal respiratory rate  CARDIOVASCULAR: regular rhythm, MICHAELA at the aortic area  ABDOMEN: soft, non  tender, without hepatosplenomegaly, no masses palpable, bowel sounds normal, aorta not enlarged by palpation, no abdominal bruits  EXTREMITIES: peripheral pulses normal, no edema, no bruits  NEURO: alert and oriented to person/place/time, normal speech, gait and affect  VASC: Radial, femoral, dorsalis pedis and posterior tibialis pulses are normal in volumes and symmetric bilaterally. No bruits are heard.  SKIN: no ecchymoses, no rashes    Labs:  CBC RESULTS:   Lab Results   Component Value Date    WBC 7.6 07/15/2024    WBC 9.4 03/23/2021    RBC 4.21 07/15/2024    RBC 3.55 (L) 03/23/2021    HGB 11.9 07/15/2024    HGB 11.0 (L) 03/23/2021    HCT 38.7 07/15/2024    HCT 35.4 03/23/2021    MCV 92 07/15/2024     03/23/2021    MCH 28.3 07/15/2024    MCH 31.0 03/23/2021    MCHC 30.7 (L) 07/15/2024    MCHC 31.1 (L) 03/23/2021    RDW 14.8 07/15/2024    RDW 17.7 (H) 03/23/2021     07/15/2024     03/23/2021       BMP RESULTS:  Lab Results   Component Value Date     07/15/2024     03/23/2021    POTASSIUM 4.7 07/15/2024    POTASSIUM 4.1 08/15/2022    POTASSIUM 4.3 03/23/2021    CHLORIDE 104 07/15/2024    CHLORIDE 105 08/15/2022    CHLORIDE 103 03/23/2021    CO2 28 07/15/2024    CO2 33 (H) 08/15/2022    CO2 31 03/23/2021    ANIONGAP 9 07/15/2024    ANIONGAP 2 (L) 08/15/2022    ANIONGAP 4 03/23/2021     (H) 07/15/2024    GLC 86 03/17/2024     (H) 10/10/2022     (H) 03/23/2021    BUN 33.1 (H) 07/15/2024    BUN 44 (H) 08/15/2022    BUN 27 03/23/2021    CR 1.56 (H) 07/15/2024    CR 1.40 (H) 03/23/2021    GFRESTIMATED 30 (L) 07/15/2024    GFRESTIMATED 32 (L) 03/23/2021    GFRESTBLACK 37 (L) 03/23/2021    ROEL 9.5 07/15/2024    ROEL 9.3 03/23/2021        INR RESULTS:  Lab Results   Component Value Date    INR 1.04 06/11/2024    INR 1.26 (H) 06/05/2024    INR 1.54 (H) 02/24/2024    INR 1.41 (H) 04/25/2016    INR 1.63 (H) 04/25/2016    INR 0.90 04/04/2016        Procedures:      Assessment and Plan:     Paroxysmal atrial fibrillation-well-controlled by amiodarone  Status post bioprosthetic aortic valve replacement    Given her frequent falls, on balance, it is reasonable to discontinue anticoagulation.  Patient will continue amiodarone which has kept her in sinus rhythm.  I will see patient again in person in 6 months and prior to that visit, patient will have a TSH, LFT, and echocardiogram performed.  Patient will need a lung function test and ophthalmology assessment in 1 year.    All questions and concerns were addressed and patient and family are happy with the plan.         CC  Patient Care Team:  Swapna Gaines MD as PCP - General (Family Medicine)  Brandan Landin MD as MD (OB/Gyn)  Mono Ribeiro MD as MD (Gynecologic Oncology)  Swapna Gaines MD as Assigned PCP  Aminta Garcia MD as Assigned Cancer Care Provider  Jamie Johnson MD as Assigned Rheumatology Provider  Whitney Rogers MD as MD (Ophthalmology)  Abdon Bullard MD as MD (Ophthalmology)  Abdon Bullard MD as Assigned Surgical Provider  Cassandra Herrera MD as MD (Cardiovascular Disease)  Cassandra Herrera MD as Assigned Heart and Vascular Provider  Chalino Little MD as MD (Nephrology)  Gretel Cornelius APRN CNP as Nurse Practitioner (Dermatology)  Belkis Marin, RN as Specialty Care Coordinator (Neurology)  Alena Mitchell, RN as Specialty Care Coordinator  Leela Teresa MD as MD (Endocrinology, Diabetes, and Metabolism)  Leela Teresa MD as Assigned Endocrinology Provider  Henok Mckeon MD as MD (Hematology & Oncology)  Griffin Randall MD as MD (Hospice And Palliative Care)  Swapna Gaines MD as MD (Family Medicine)  Griffin Randall MD as Assigned Palliative Care Provider  Elizabeth Waters APRN CNP as Assigned Neuroscience Provider

## 2024-09-16 NOTE — PATIENT INSTRUCTIONS
Thank you for coming to the [facility]; please note the following instructions:    1. Follow-up in 6 months with labs (Thyroid and liver) and echo prior        If you have any questions regarding your visit please contact your care team:     Cardiology  Telephone Number   Andressa MCGRATH, RN  Parisa LUU, RN  Syeda HAMILTON, RN  Linda HENDRIX, RMA  Toshia HINKLE, RMA  Toshia JENSEN, Visit Facilitator  Latricia ROMERO [facility] [phone] (option 1)   For scheduling appts:     [phone] (select option 1)       For the Device Clinic (Pacemakers and ICD's)  RN's :  Rosa Knight   During business hours: [phone]    *After business hours:  [phone] (select option 4)      Normal test result notifications will be released via [employer] or mailed within 7 business days.  All other test results, will be communicated via telephone once reviewed by your cardiologist.    If you need a medication refill please contact your pharmacy.  Please allow 3 business days for your refill to be completed.    As always, thank you for trusting us with your health care needs!

## 2024-10-29 ENCOUNTER — TELEPHONE (OUTPATIENT)
Dept: FAMILY MEDICINE | Facility: CLINIC | Age: 89
End: 2024-10-29

## 2024-10-29 NOTE — TELEPHONE ENCOUNTER
Received call from Elena with [facility]. She is calling to let PCP know that Roxanna will not be returning to their facility. Roxanna fell on 10/19/24. Went to ER and was found to have no fracture. 10/24/24 they did more imaging and they found a fracture. She had surgery on Sunday, is gong to go to TCU after that and then family wants to move her to a Long [facility] Care in [place], [place].     This is FYI only- voluntary discharge from [facility].    KATHY JeanN RN  [facility]

## 2024-11-04 ENCOUNTER — NURSING HOME VISIT (OUTPATIENT)
Dept: GERIATRICS | Facility: CLINIC | Age: 89
End: 2024-11-04
Payer: COMMERCIAL

## 2024-11-04 DIAGNOSIS — S72.002S CLOSED FRACTURE OF NECK OF LEFT FEMUR, SEQUELA: Primary | ICD-10-CM

## 2024-11-04 DIAGNOSIS — I48.0 PAROXYSMAL ATRIAL FIBRILLATION (H): ICD-10-CM

## 2024-11-04 DIAGNOSIS — S70.12XS HEMATOMA OF LEFT THIGH, SEQUELA: ICD-10-CM

## 2024-11-04 DIAGNOSIS — I50.33 ACUTE ON CHRONIC DIASTOLIC CHF (CONGESTIVE HEART FAILURE) (H): ICD-10-CM

## 2024-11-04 PROBLEM — I45.2 BIFASCICULAR BUNDLE BRANCH BLOCK: Status: ACTIVE | Noted: 2024-10-19

## 2024-11-04 PROBLEM — N30.00 ACUTE CYSTITIS WITHOUT HEMATURIA: Status: ACTIVE | Noted: 2024-10-22

## 2024-11-04 PROBLEM — S70.12XA HEMATOMA OF LEFT THIGH: Status: ACTIVE | Noted: 2024-10-19

## 2024-11-04 PROBLEM — R53.1 WEAKNESS: Status: ACTIVE | Noted: 2024-10-19

## 2024-11-04 PROBLEM — R41.0 DELIRIUM: Status: ACTIVE | Noted: 2024-10-23

## 2024-11-04 PROBLEM — N17.9 ACUTE KIDNEY INJURY (H): Status: ACTIVE | Noted: 2024-10-22

## 2024-11-04 PROBLEM — S72.002A CLOSED FRACTURE OF NECK OF LEFT FEMUR (H): Status: ACTIVE | Noted: 2024-10-25

## 2024-11-04 PROCEDURE — 99308 SBSQ NF CARE LOW MDM 20: CPT | Performed by: FAMILY MEDICINE

## 2024-11-04 NOTE — PROGRESS NOTES
[facility]    PRIMARY CARE PROVIDER AND CLINIC:  Swapna Gaines MD, [address] [facility] / [place] [address]    CC: new admission     [facility] Medical Record Number:  [id number]  Place of Service where encounter took place:  [facility] (SNF) [[id number]]    Roxanna Stark  is a 89 year old  (5/20/1927), admitted to the above facility from [facility]..   HPI:      Resident has been admitted from [facility] for rehab and continued care after a mechanical fall.  She suffered a fracture of her left femur.  This was repaired.  Discharge summary reviewed below.    HOSPITALIST DISCHARGE SUMMARY [facility]     Admission Date: 10/19/2024  Discharge Date: 10/28/2024    Discharge Plan: Roxanna Stark was discharged to transitional care unit / skilled nursing facility.    Principal Diagnosis     Mechanical fall  Hematoma of left thigh  Acute left non displaced subcapital femoral neck fracture  Acute on chronic diastolic CHF (congestive heart failure) (HC), resolved  Acute kidney injury on CKD stage 4, resolved      Hospital Problem List   Principal Problem:  Hematoma of left thigh  Active Problems:  CKD (chronic kidney disease) stage 4, GFR 15-29 ml/min (HC)  Rheumatoid arthritis of multiple sites with negative rheumatoid factor (HC)  S/P aortic valve replacement  Paroxysmal atrial fibrillation (HC)  PVD (peripheral vascular disease) (HC)  HTN (hypertension)  Fall  Anemia of chronic disease  Abnormal serum protein electrophoresis  PMR (polymyalgia rheumatica) (HC)  Weakness  Bifascicular bundle branch block  Prolonged Q-T interval on ECG  Acute on chronic diastolic CHF (congestive heart failure) (HC)  Acute kidney injury (HC)  Acute cystitis without hematuria  Delirium  Closed fracture of neck of left femur (HC)    Diagnoses impacting complexity:   Malnutrition:    Etiologic criteria: acute disease with inflammation. chronic disease with inflammation.     Phenotypic criteria: BMI 21.31 and  age 89 y.o..     Plan of care: nutritional supplement.     Dietitian Diagnosis  Dietitian Diagnosis: Moderate Malnutrition and Present on Admission      Hospital Course   BRIEF SUMMARY:  - Admitted after mechanical fall resulting in large left thigh/hip hematoma. CT then revealed acute left non-displaced subcapital femoral neck fracture. Ortho consulted: s/p fixation pinning of left hip on 10/26.   - Developed NICOLAS and UTI. Urine culture positive for pseudomonas and klebsiella. Completed cefepime, last dose 10/25. NICOLAS resolving at d/c, resuming home meds.  - Medication regimen changes: D/c on ASA 81 mg bid x 6 wks per Ortho. D/c on oxycodone 2.5 mg prn, methocarbamol 250 mg prn. Continue tylenol 1000 mg tid. Decreased metoprolol 25 to 12.5 mg bid.  - Other specialty follow-up not included in DC orders: Rollow up with the Orthopaedic Trauma Service in 2-3 weeks virtually per discussion between Dr. Perry and daughter for wound check.  - Special considerations: Hx of dementia. D/c to STR/LTC, arranged with SW and family.    HPI:  Roxanna Stark is a 89 y.o. with a history of dementia, CKD stage 4, rheumatoid arthritis, polymyalgia rheumatica, paroxysmal atrial fibrillation, aortic valve replacement with bovine valve, not on anticoagulation who presented to [facility] ED by EMS on 10/19 after a fall. She was walking, lost her footing and fell on her left hip. CT pelvis wo showed large subcutaneous hematoma lateral and posterior to the left greater trochanter. CT scan and XR of the left femur was negative for fracture.     She was also incidentally found to have acute on chronic diastolic CHF with high BNP, CT suggestive of pulmonary edema and hypoxia. She was diuresed with IV lasix and developed an acute kidney injury. Further diuretics were held and her NICOLAS has resolved. She was also found to have a UTI and has completed a course of cefepime. She developed delirium and was noted to be in severe pain, which was an acute  change. Repeat imaging revealed an acute nondisplaced left subcapital femoral neck fracture. No trauma or falls were noted while inpatient, however she was working with PT/OT. Orthopedic surgery was consulted and recommended percutaneous pinning. Family leaning towards palliative care/hospice, but after extensive discussion with the orthopedic team, percutaneous pinning was an appropriate option to improve pain control and quality of life. She was transferred to [facility] for surgery.     PROBLEMS ADDRESSED:     Mechanical fall  Large left thigh/hip hematoma  - Hematoma traumatic due to fall. Not on anticoagulation. Hemoglobin has been stable.   Hgb 10>?10.6>10  Plan:  - Monitor hematoma site for clinical changes  - Daily hemoglobin  - Hold all anticoagulants. Ok to start DVT ppx post operatively  - PT/OT evaluations, recommending STR  - Care management following    Delirium with underlying dementia  - Improving now, due to pain and new femoral fracture  - CT head negative acute process. Urinary retention ruled out. Constipation still may be contributing  Plan:  - Bladder scan to monitor for urinary retention  - Bowel regimen to treat constipation  - Delirium protocol  - Minimize sedating and CNS acting medications  - Use only very low dose seroquel as needed    Acute left non displaced subcapital femoral neck fracture  - Not present on admission despite fall and left hip hematoma  - she developed delirium and severe pain on 10/22 prompting further investigation  - Repeat CT scan showed acute fracture  Plan:  - Orthopedic surgery consulted,  - Transferred to [facility]  - 10/26: s/p FIXATION PINNING LEFT HIP  - Post-op management, pain control, WB/activity and DVT ppx per Ortho  - WBAT  - Dressing/Wound care: Wound(s) closed with monocryl suture and dermabond. Aquacel dressing covering wound. Prefereable to leave on until first postop visit, but okay to change if saturation >80%.   - Pain control with low dose  dilaudid, scheduled tylenol   - . Recommend discharge on ASA 81 mg BID x 6 wks  - PT/OT rec STR    Acute cystitis without hematuria  - Urine culture positive for pseudomonas and klebsiella  - Completed cefepime, last dose 10/25    Acute on chronic diastolic CHF (congestive heart failure) (HC), resolved  - BNP high, hypoxia, interstitial pulmonary edema and pleural effusion seen on CT scan   - Started on IV lasix, creatinine increasing so this was stopped  Plan:  - Holding PTA lasix given NICOLAS.   - Hold pre operatively but reassessed and resumed post-op for d/c    Acute kidney injury on CKD stage 4, resolved  - Cr 1.73>>2.04>2.23>2.09>1.59>1.37  - Due to diuresis, UTI  - s/p 500 mL of NS given over 10 hours on 10/22  Plan:  - Continue to monitor closely  - Avoid nephrotoxins and hypotension    S/P aortic valve replacement  - Bovine valve in 2016    Paroxysmal atrial fibrillation (HC)  Hypertension  - On metoprolol 25 mg BID, amiodarone 200 mg daily  - BP low/normal with relative bradycardia initially  Plan:  - Decreased metoprolol to 12.5 mg BID  - Hold amlodipine, resumed at d/c  - Continue amiodarone  - Resume lasix when appropriate  - Not on anticoagulation  - IV hydralazine as needed    Subconjunctival hemorrhage of right eye  - Noted on 10/22, unclear if she injured it yesterday  - hemoglobin stable and coags wnl. No evidence of DIC or hemolysis   - Holding all anticoagulants as above  - Monitor clinically    Recommendations for Outpatient Provider PCP: [facility]     Admission: 10/19/2024 @ [facility]  Notify Orthopedic Provider   Please call surgeon with any operative site concerns [phone]   Recommendations for the Outpatient Provider   Specific recommendations to be addressed at the follow up visit:  routine post-op follow-up; pt had uncomplicated course but is at higher risk for readmission.  - Admitted after mechanical fall resulting in large left thigh/hip hematoma. CT then revealed  acute left non-displaced subcapital femoral neck fracture. Ortho consulted: s/p fixation pinning of left hip on 10/26.   - Developed NICOLAS and UTI. Urine culture positive for pseudomonas and klebsiella  - Completed cefepime, last dose 10/25. NICOLAS resolving at d/c, resuming home meds.    Medication regimen changes: D/c on ASA 81 mg bid x 6 wks per Ortho. D/c on oxycodone 2.5 mg prn, methocarbamol 250 mg prn. Continue tylenol 1000 mg tid. Decreased metoprolol 25 to 12.5 mg bid.    Follow-up labs/imaging: none    Other specialty follow-up not included in DC orders: Rollow up with the Orthopaedic Trauma Service in 2-3 weeks virtually per discussion between Dr. Perry and daughter for wound check.    Special considerations: Hx of dementia. D/c to STR/LTC, arranged with SW and family.    Functional evaluations:   Fall Risk: Total Score (If 5 or > is High Risk): 10 (10/28/24 0946)  NuDESC (>/=2 abnormal): 1 (10/28/24 0946)   MOCA: // SLUMS:             Follow-Up     After Hospital Follow Up Appointment(s)   Follow up with DANA Lee in the Orthopaedic Trauma clinic in 2-3 weeks for post op evaluation and incision check. This visit can be virtual per Dr. Perry.     All prescription pain medication refills prescribed by your treating orthopaedic physician will be addressed at your first post operative appointment. No medications will be refilled on Friday's. Please plan accordingly and contact our office in the days prior to Friday if needing refills of your medications.     Please call the Orthopaedic office at [phone] if you develop signs of an infection which may include: foul smelling drainage from your incision or redness, tenderness, or warmth surrounding your incision. Please alert our office if you develop a fever or chills, or your pain is severe and unable to be controlled with prescribed medications.   When to follow up: Other (Comment)   Other (Comment): 2-3 weeks   When is patient being discharged?:  Other/Unknown   After Hospital Follow up appointment(s)   The skilled nursing facility staff will help arrange your appointments or your health care provider may be able to come to you. Please follow up within 5 days.   When to follow up: 1 to 5 days         Discharge Medications         Your Home Medicines       START taking these medicines     Instructions   aspirin 81 mg enteric coated tablet  For diagnoses: Closed fracture of neck of left femur, initial encounter (HC)  Commonly known as: ECOTRIN  Take 1 Tablet (81 mg) by mouth two times daily with meals.    methocarbamoL 500 mg tablet  For diagnoses: Hematoma of left thigh, initial encounter, Closed fracture of neck of left femur, initial encounter (HC)  Take 0.5 Tablets (250 mg) by mouth every 6 hours if needed for Muscle Spasm PO 1st choice.    oxyCODONE 5 mg immediate release tablet  For diagnoses: Closed fracture of neck of left femur, initial encounter (HC)  Commonly known as: ROXICODONE  Take 0.5 Tablets (2.5 mg) by mouth every 6 hours if needed for Pain.          CHANGE how you take these medicines     Instructions   acetaminophen 500 mg tablet  For diagnoses: Closed fracture of neck of left femur, initial encounter (HC)  What changed:   medication strength  how much to take  when to take this  reasons to take this  Commonly known as: TYLENOL EXTRA STRGTH  Take 2 Tablets (1,000 mg) by mouth three times daily. Max acetaminophen dose: 4000mg in 24 hrs.    metoprolol tartrate 25 mg tablet  For diagnoses: HTN (hypertension), Acute on chronic diastolic CHF (congestive heart failure) (HC), Paroxysmal atrial fibrillation (HC)  What changed:   how much to take  additional instructions  Commonly known as: LOPRESSOR  Take 0.5 Tablets (12.5 mg) by mouth two times daily.          CONTINUE taking these medicines     Instructions   amiodarone 200 mg tablet  For diagnoses: Paroxysmal atrial fibrillation (HC)  Commonly known as: CORDARONE  Take 1 Tablet (200 mg) by mouth  once daily. Please followup with your cardiologist regarding further continuation or dose changes    amLODIPine 5 mg tablet  For diagnoses: HTN (hypertension)  Commonly known as: NORVASC  Take 1 Tablet (5 mg) by mouth once daily. 07:15    bisacodyL 10 mg suppository  Commonly known as: DULCOLAX  Insert 10 mg rectally once daily if needed for Constipation.    calcium with vitamin D3 tablet  For diagnoses: Rheumatoid arthritis of multiple sites with negative rheumatoid factor (HC)  Commonly known as: OS-ROEL 500 + D  Take 1 Tablet by mouth once daily. 07:15    furosemide 20 mg tablet  For diagnoses: Acute on chronic diastolic CHF (congestive heart failure) (HC)  Commonly known as: LASIX  Take 1 Tablet (20 mg) by mouth once daily in the morning. 07:15    gabapentin 100 mg capsule  For diagnoses: Closed fracture of neck of left femur with routine healing, subsequent encounter, Closed fracture of multiple ribs of right side with routine healing, subsequent encounter  Commonly known as: NEURONTIN  Take 1 Capsule (100 mg) by mouth once daily. 07:15    hydroxychloroquine 200 mg tablet  Commonly known as: PLAQUENIL  Take 100 mg by mouth once daily. 07:15    lidocaine 4 % topical patch  Apply 1 Patch on dry, clean, hairless skin every 24 hours. Apply to intact skin to cover most painful area for max 12hr per 24hr period.  07:15    polyethylene glycoL 17 gram/scoop powder  For diagnoses: Constipation, unspecified constipation type  Commonly known as: MIRALAX  Mix 1 scoop (17 g) in liquid then take by mouth once daily. 07:15    sennosides 8.6 mg tablet  Commonly known as: SENNA  Take 8.6 mg by mouth 2 times daily if needed for Constipation.    Walker  For diagnoses: Abrasion of face, initial encounter, Skin tear of hand without complication, left, initial encounter, Contusion of nose, initial encounter  Walker with front wheels for home use, 99 months        CODE STATUS/ADVANCE DIRECTIVES DISCUSSION:  Prior  DNR /  DNI  ALLERGIES:   Allergies   Allergen Reactions    Lisinopril Cough      PAST MEDICAL HISTORY:   Past Medical History:   Diagnosis Date    Actinic keratosis     Aortic stenosis 2014    Atrial flutter with rapid ventricular response (H) 01/27/2024    Basal cell cancer 07/2014    left eye medial canthus     Basal cell carcinoma 09/30/2008    left cheek    CKD (chronic kidney disease) stage 3, GFR 30-59 ml/min (H) 07/01/2019    CKD (chronic kidney disease) stage 4, GFR 15-29 ml/min (H)     Compression fracture of L2 (H) 11/2020    Compression fracture of T12 vertebra (H) 02/25/2024    HTN (hypertension)     Infection due to 2019 novel coronavirus 08/31/2021    Melanoma in situ (H) 09/30/2008    left arm    Multiple rib fractures     PAF (paroxysmal atrial fibrillation) (H) 03/27/2024    Polymyalgia rheumatica (H) 11/1999    Recurrent falls     Rheumatoid arthritis of multiple sites with negative rheumatoid factor (H)     Status post coronary angiogram 03/03/2016    Temporal arteritis (H) 11/1999    Thyroid nodule 11/2020    THERESA III (vulvar intraepithelial neoplasia III) 2019      PAST SURGICAL HISTORY:   has a past surgical history that includes colonoscopy (2002); SKIN TISSUE PROCEDURE UNLISTED (11/3/08); cataract iol, rt/lt (5/09); Replace valve aortic (N/A, 4/25/2016); Excise Lesion Vulva (N/A, 9/27/2019); and IR Thoracic Vertebroplasty (6/11/2024).  FAMILY HISTORY: family history includes Arthritis in her father, mother, sister, sister, and sister; Asthma in her daughter and daughter; Breast Cancer (age of onset: 45) in her sister; Cerebrovascular Disease in her daughter; Colon Cancer in her father and sister; Heart Disease in her father; Hypertension in her father; Lipids in her father; Lung Cancer (age of onset: 58) in her daughter; Myocardial Infarction in her daughter; Pancreatic Cancer (age of onset: 81) in an other family member; Prostate Cancer in her father; Thyroid Disease in her sister.  SOCIAL HISTORY:    reports that she has never smoked. She has never been exposed to tobacco smoke. She has never used smokeless tobacco. She reports current alcohol use. She reports that she does not use drugs.  Patient's living condition: lives alone    Post Discharge Medication Reconciliation Status:   MED REC REQUIRED  Post Medication Reconciliation Status:          Current Outpatient Medications   Medication Sig Dispense Refill    acetaminophen (TYLENOL) 325 MG tablet Take 3 tablets (975 mg) by mouth every 8 hours as needed for mild pain, headaches or fever 30 tablet 0    amiodarone (PACERONE) 200 MG tablet Take 1 tablet (200 mg) by mouth daily 90 tablet 1    amLODIPine (NORVASC) 5 MG tablet TAKE 1 TABLET BY MOUTH ONCE DAILY 90 tablet 3    amoxicillin (AMOXIL) 500 MG capsule Take 4 tablets  30 minutes before Procedure 4 capsule 3    RICKI PROTECT MOISTURE BARRIER 12 % CREA APPLY TOPICALLY TO COCCYX AREA TWICE DAILY UNTIL HEALED THEN DISCONTINUE 142 g PRN    calcium carbonate-vitamin D (OSCAL) 500-5 MG-MCG tablet TAKE 1 TABLET BY MOUTH ONCE DAILY 90 tablet 3    furosemide (LASIX) 20 MG tablet TAKE 1 TABLET BY MOUTH ONCE DAILY 90 tablet 3    gabapentin (NEURONTIN) 100 MG capsule TAKE 1 CAPSULE BY MOUTH ONCE DAILY 90 capsule 3    hydroxychloroquine (PLAQUENIL) 200 MG tablet TAKE ONE-HALF TABLET (100 MG) BY MOUTH DAILY 45 tablet 1    LIDOCAINE PAIN RELIEF 4 % Patch APPLY 1 PATCH TO SKIN EVERY 24 HOURS (ON FOR 12 HOURS, OFF FOR 12 HOURS) 30 patch 97    metoprolol tartrate (LOPRESSOR) 25 MG tablet TAKE 1 TABLET BY MOUTH TWICE DAILY 180 tablet 3    Omega-3 Fatty Acids (OMEGA-3 FISH OIL PO) Take 1 tablet twice daily.      polyethylene glycol (MIRALAX) 17 GM/Dose powder MIX 17GM OF POWDER IN 8OZ OF WATER UNTIL COMPLETELY DISSOLVED. DRINK SOLUTION BY MOUTH ONCE DAILY. 510 g 97    senna-docusate (SENOKOT-S/PERICOLACE) 8.6-50 MG tablet Take 1 tablet by mouth 2 times daily as needed for constipation 30 tablet 0     No current  facility-administered medications for this visit.       ROS:  Unobtainable secondary to cognitive impairment.     Vitals:  There were no vitals taken for this visit.  Exam:  GENERAL APPEARANCE:  Alert, in no distress  RESP:  no respiratory distress  CV:  rate-normal  PSYCH:  memory impaired     Lab/Diagnostic data:  Recent labs in [facility] reviewed by me today.     ASSESSMENT/PLAN:    1. Closed fracture of neck of left femur, sequela    2. Hematoma of left thigh, sequela    3. Paroxysmal atrial fibrillation (H)    4. Acute on chronic diastolic CHF (congestive heart failure) (H)        Hospital records, discharge summary, medications, care plan reviewed.  Continue with therapy for strengthening and balance and fall prevention.  Follow-up at next nursing home rounds or as needed    Electronically signed by:  Glen Simons MD

## 2025-07-22 NOTE — NURSING NOTE
"Chief Complaint   Patient presents with     Arthritis     RA, patient states she is ok.       Initial /69  Pulse 70  Resp 14  Ht 1.549 m (5' 1\")  Wt 52.6 kg (116 lb)  SpO2 99%  BMI 21.92 kg/m2 Estimated body mass index is 21.92 kg/(m^2) as calculated from the following:    Height as of this encounter: 1.549 m (5' 1\").    Weight as of this encounter: 52.6 kg (116 lb).  BP completed using cuff size: regular         RAPID3 (0-30) Cumulative Score  11.0          RAPID3 Weighted Score (divide #4 by 3 and that is the weighted score)  3.6         "
Opt out

## (undated) DEVICE — LINEN TOWEL PACK X5 5464

## (undated) DEVICE — GLOVE PROTEXIS BLUE W/NEU-THERA 7.0  2D73EB70

## (undated) DEVICE — SU SILK 2-0 SH 30" K833H

## (undated) DEVICE — SUCTION MANIFOLD DORNOCH ULTRA CART UL-CL500

## (undated) DEVICE — SU VICRYL 2-0 SH 27" UND J417H

## (undated) DEVICE — PACK VAG HYST

## (undated) DEVICE — ESU PENCIL SMOKE EVAC W/ROCKER SWITCH 0703-047-000

## (undated) DEVICE — SOL NACL 0.9% IRRIG 1000ML BOTTLE 2F7124

## (undated) DEVICE — SU VICRYL 3-0 SH 27" UND J416H

## (undated) DEVICE — GLOVE PROTEXIS W/NEU-THERA 6.5  2D73TE65

## (undated) DEVICE — DRSG TELFA 3X8" 1238

## (undated) DEVICE — APPLICATOR COTTON TIP 6"X2 STERILE LF 6012

## (undated) DEVICE — SOL WATER IRRIG 1000ML BOTTLE 2F7114

## (undated) RX ORDER — ACETIC ACID 5 %
LIQUID (ML) MISCELLANEOUS
Status: DISPENSED
Start: 2019-09-27

## (undated) RX ORDER — EPHEDRINE SULFATE 50 MG/ML
INJECTION, SOLUTION INTRAMUSCULAR; INTRAVENOUS; SUBCUTANEOUS
Status: DISPENSED
Start: 2019-09-27

## (undated) RX ORDER — GABAPENTIN 300 MG/1
CAPSULE ORAL
Status: DISPENSED
Start: 2019-09-27

## (undated) RX ORDER — PHENYLEPHRINE HCL IN 0.9% NACL 1 MG/10 ML
SYRINGE (ML) INTRAVENOUS
Status: DISPENSED
Start: 2019-09-27

## (undated) RX ORDER — FERRIC SUBSULFATE 0.21 G/G
LIQUID TOPICAL
Status: DISPENSED
Start: 2019-09-27

## (undated) RX ORDER — SODIUM CHLORIDE 9 MG/ML
INJECTION, SOLUTION INTRAVENOUS
Status: DISPENSED
Start: 2024-06-05

## (undated) RX ORDER — BUPIVACAINE HYDROCHLORIDE 5 MG/ML
INJECTION, SOLUTION EPIDURAL; INTRACAUDAL
Status: DISPENSED
Start: 2019-09-27

## (undated) RX ORDER — CEFAZOLIN SODIUM 2 G/100ML
INJECTION, SOLUTION INTRAVENOUS
Status: DISPENSED
Start: 2024-06-05

## (undated) RX ORDER — LIDOCAINE HYDROCHLORIDE 10 MG/ML
INJECTION, SOLUTION EPIDURAL; INFILTRATION; INTRACAUDAL; PERINEURAL
Status: DISPENSED
Start: 2024-06-11

## (undated) RX ORDER — LIDOCAINE HYDROCHLORIDE 20 MG/ML
INJECTION, SOLUTION EPIDURAL; INFILTRATION; INTRACAUDAL; PERINEURAL
Status: DISPENSED
Start: 2019-09-27

## (undated) RX ORDER — BUPIVACAINE HYDROCHLORIDE 2.5 MG/ML
INJECTION, SOLUTION EPIDURAL; INFILTRATION; INTRACAUDAL
Status: DISPENSED
Start: 2019-09-27

## (undated) RX ORDER — SODIUM CHLORIDE 9 MG/ML
INJECTION, SOLUTION INTRAVENOUS
Status: DISPENSED
Start: 2024-06-11

## (undated) RX ORDER — FENTANYL CITRATE 50 UG/ML
INJECTION, SOLUTION INTRAMUSCULAR; INTRAVENOUS
Status: DISPENSED
Start: 2019-09-27

## (undated) RX ORDER — CEFAZOLIN SODIUM 1 G/3ML
INJECTION, POWDER, FOR SOLUTION INTRAMUSCULAR; INTRAVENOUS
Status: DISPENSED
Start: 2024-06-11

## (undated) RX ORDER — ACETAMINOPHEN 325 MG/1
TABLET ORAL
Status: DISPENSED
Start: 2019-09-27

## (undated) RX ORDER — FENTANYL CITRATE 50 UG/ML
INJECTION, SOLUTION INTRAMUSCULAR; INTRAVENOUS
Status: DISPENSED
Start: 2024-06-11

## (undated) RX ORDER — ONDANSETRON 2 MG/ML
INJECTION INTRAMUSCULAR; INTRAVENOUS
Status: DISPENSED
Start: 2019-09-27

## (undated) RX ORDER — BACITRACIN 500 [USP'U]/G
OINTMENT OPHTHALMIC
Status: DISPENSED
Start: 2019-09-27